# Patient Record
Sex: FEMALE | Race: WHITE | NOT HISPANIC OR LATINO | Employment: OTHER | URBAN - METROPOLITAN AREA
[De-identification: names, ages, dates, MRNs, and addresses within clinical notes are randomized per-mention and may not be internally consistent; named-entity substitution may affect disease eponyms.]

---

## 2017-01-05 ENCOUNTER — TELEPHONE (OUTPATIENT)
Dept: FAMILY MEDICINE | Facility: CLINIC | Age: 67
End: 2017-01-05

## 2017-01-05 DIAGNOSIS — R07.0 THROAT PAIN: Primary | ICD-10-CM

## 2017-01-05 LAB — S PYO AG THROAT QL IA.RAPID: NEGATIVE

## 2017-01-05 PROCEDURE — 87430 STREP A AG IA: CPT | Performed by: FAMILY MEDICINE

## 2017-01-05 NOTE — TELEPHONE ENCOUNTER
Patient called with concerns of a very sore throat.  Would like a rapid strep done but wants to avoid office visit and have lab only due to having grandchild with her.  Per Dr. Benitez-- ok for lab with rapid strep.  On arrival -- she states her one eye is red.  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  She does have a little crustiness.  No pain and vision ok.  Also states this started a few days ago with blisters on roof of mouth.  Rapid strep was negative and was sent for culture.  Per Dr Benitez -- no antibiotic now and will await the culture.  Told patient to call on Monday to check the results.  In the meantime to use lozengers, cool liquids and rest.

## 2017-01-07 LAB — BETA STREP GP A CULTURE: NEGATIVE

## 2017-01-23 ENCOUNTER — TRANSFERRED RECORDS (OUTPATIENT)
Dept: FAMILY MEDICINE | Facility: CLINIC | Age: 67
End: 2017-01-23

## 2017-01-31 ENCOUNTER — OFFICE VISIT (OUTPATIENT)
Dept: FAMILY MEDICINE | Facility: CLINIC | Age: 67
End: 2017-01-31

## 2017-01-31 VITALS
OXYGEN SATURATION: 98 % | SYSTOLIC BLOOD PRESSURE: 104 MMHG | DIASTOLIC BLOOD PRESSURE: 60 MMHG | HEART RATE: 82 BPM | BODY MASS INDEX: 27.11 KG/M2 | WEIGHT: 158 LBS | TEMPERATURE: 97.8 F

## 2017-01-31 DIAGNOSIS — J01.01 ACUTE RECURRENT MAXILLARY SINUSITIS: Primary | ICD-10-CM

## 2017-01-31 PROCEDURE — 99214 OFFICE O/P EST MOD 30 MIN: CPT | Performed by: FAMILY MEDICINE

## 2017-01-31 RX ORDER — CEFPROZIL 500 MG/1
500 TABLET, FILM COATED ORAL 2 TIMES DAILY
Qty: 20 TABLET | Refills: 0 | Status: SHIPPED | OUTPATIENT
Start: 2017-01-31 | End: 2017-03-27

## 2017-01-31 NOTE — MR AVS SNAPSHOT
After Visit Summary   1/31/2017    Macey Romero    MRN: [id number]           Patient Information     Date Of Birth          1950        Visit Information        Provider Department      1/31/2017 11:45 AM Long Ferrari MD [facility]        Today's Diagnoses     Acute recurrent maxillary sinusitis    -  1        Follow-ups after your visit        Who to contact     If you have questions or need follow up information about today's clinic visit or your schedule please contact [facility] directly at [phone].  Normal or non-critical lab and imaging results will be communicated to you by BaseKithart, letter or phone within 4 business days after the clinic has received the results. If you do not hear from us within 7 days, please contact the clinic through 2housest or phone. If you have a critical or abnormal lab result, we will notify you by phone as soon as possible.  Submit refill requests through Reclamador or call your pharmacy and they will forward the refill request to us. Please allow 3 business days for your refill to be completed.          Additional Information About Your Visit        MyChart Information     Reclamador gives you secure access to your electronic health record. If you see a primary care provider, you can also send messages to your care team and make appointments. If you have questions, please call your primary care clinic.  If you do not have a primary care provider, please call [phone] and they will assist you.        Care EveryWhere ID     This is your Care EveryWhere ID. This could be used by other organizations to access your [place] medical records  [id number]        Your Vitals Were     Pulse Temperature Pulse Oximetry             82 97.8  F (36.6  C) (Oral) 98%          Blood Pressure from Last 3 Encounters:   01/31/17 104/60   12/08/16 112/58   11/19/16 135/67    Weight from Last 3 Encounters:   01/31/17 71.668 kg (158 lb)   11/18/16  67.586 kg (149 lb)   10/28/16 69.4 kg (153 lb)              We Performed the Following     Asthma Action Plan (AAP)          Today's Medication Changes          These changes are accurate as of: 1/31/17 12:19 PM.  If you have any questions, ask your nurse or doctor.               Start taking these medicines.        Dose/Directions    cefPROZIL 500 MG tablet   Commonly known as:  CEFZIL   Used for:  Acute recurrent maxillary sinusitis        Dose:  500 mg   Take 1 tablet (500 mg) by mouth 2 times daily   Quantity:  20 tablet   Refills:  0         These medicines have changed or have updated prescriptions.        Dose/Directions    budesonide-formoterol 160-4.5 MCG/ACT Inhaler   Commonly known as:  SYMBICORT   This may have changed:    - when to take this  - reasons to take this   Used for:  Acute bronchospasm        Dose:  2 puff   Inhale 2 puffs into the lungs 2 times daily   Quantity:  3 Inhaler   Refills:  3            Where to get your medicines      These medications were sent to [facility] [address] [place] [address] [address]  [address] [place] [address] [facility] [address]    Hours:  24-hours Phone:  [phone]    - cefPROZIL 500 MG tablet             Primary Care Provider Office Phone # Fax #    Long Ferrari [phone]       [facility] [address] [place] [address]        Thank you!     Thank you for choosing [facility]  for your care. Our goal is always to provide you with excellent care. Hearing back from our patients is one way we can continue to improve our services. Please take a few minutes to complete the written survey that you may receive in the mail after your visit with us. Thank you!             Your Updated Medication List - Protect others around you: Learn how to safely use, store and throw away your medicines at www.disposemymeds.org.          This list is accurate as of: 1/31/17 12:19 PM.  Always use  your most recent med list.                   Brand Name Dispense Instructions for use    albuterol 108 (90 BASE) MCG/ACT Inhaler    PROAIR HFA/PROVENTIL HFA/VENTOLIN HFA    1 Inhaler    Inhale 2 puffs into the lungs every 6 hours as needed for shortness of breath / dyspnea       budesonide-formoterol 160-4.5 MCG/ACT Inhaler    SYMBICORT    3 Inhaler    Inhale 2 puffs into the lungs 2 times daily       cefPROZIL 500 MG tablet    CEFZIL    20 tablet    Take 1 tablet (500 mg) by mouth 2 times daily       docusate sodium 100 MG tablet    COLACE    60 tablet    Take 100-300 mg by mouth 2 times daily as needed for constipation       ibuprofen 600 MG tablet    ADVIL/MOTRIN    60 tablet    Take 1 tablet (600 mg) by mouth every 6 hours as needed for other (inflammatory pain)       NYQUIL SEVERE COLD/FLU PO          valACYclovir 1000 mg tablet    VALTREX    21 tablet    Take 1 tablet (1,000 mg) by mouth daily as needed

## 2017-01-31 NOTE — Clinical Note
My Asthma Action Plan  Name: Macey Roemro   YOB: 1950  Date: 1/31/2017   My doctor: Long Ferrari   My clinic: [facility]      My Control Medicine: Budesonide+formoterol (Symbicort) -            My Rescue Medicine: Albuterol (Proair/Ventolin/Proventil) HFA           My Asthma Severity: intermittent  Avoid your asthma triggers: dust mites, pollens and mold        GREEN ZONE   Good Control    I feel good    No cough or wheeze    Can work, sleep and play without asthma symptoms       Take your asthma control medicine every day.     1. If exercise triggers your asthma, take your rescue medication    15 minutes before exercise or sports, and    During exercise if you have asthma symptoms  2. Spacer to use with inhaler: If you have a spacer, make sure to use it with your inhaler             YELLOW ZONE Getting Worse  I have ANY of these:    I do not feel good    Cough or wheeze    Chest feels tight    Wake up at night   1. Keep taking your Green Zone medications  2. Start taking your rescue medicine:    every 20 minutes for up to 1 hour. Then every 4 hours for 24-48 hours.  3. If you stay in the Yellow Zone for more than 12-24 hours, contact your doctor.  4. If you do not return to the Green Zone in 12-24 hours or you get worse, start taking your oral steroid medicine if prescribed by your provider.           RED ZONE Medical Alert - Get Help  I have ANY of these:    I feel awful    Medicine is not helping    Breathing getting harder    Trouble walking or talking    Nose opens wide to breathe       1. Take your rescue medicine NOW  2. If your provider has prescribed an oral steroid medicine, start taking it NOW  3. Call your doctor NOW  4. If you are still in the Red Zone after 20 minutes and you have not reached your doctor:    Take your rescue medicine again and    Call 911 or go to the emergency room right away    See your regular doctor within 2 weeks of an Emergency Room or Urgent  Care visit for follow-up treatment.        The above medication may be given at school or day care?: Yes and N/A (Adult Patient)  Child can carry and use inhaler(s) at school with approval of school nurse?: Yes and N/A (Adult Patient)    Electronically signed by: Poonam Hope, January 31, 2017    Annual Reminders:  Meet with Asthma Educator,  Flu Shot in the Fall, consider Pneumonia Vaccination for patients with asthma (aged 19 and older).    Pharmacy:    [facility] [address] [place], [place] - [address] [facility] [address] [place], [place] - [address] [place] [address] AT [address] [place] [address] & [address]                    Asthma Triggers  How To Control Things That Make Your Asthma Worse    Triggers are things that make your asthma worse.  Look at the list below to help you find your triggers and what you can do about them.  You can help prevent asthma flare-ups by staying away from your triggers.      Trigger                                                          What you can do   Cigarette Smoke  Tobacco smoke can make asthma worse. Do not allow smoking in your home, car or around you.  Be sure no one smokes at a child s day care or school.  If you smoke, ask your health care provider for ways to help you quit.  Ask family members to quit too.  Ask your health care provider for a referral to Quit Plan to help you quit smoking, or call 1-519-566-PLAN.     Colds, Flu, Bronchitis  These are common triggers of asthma. Wash your hands often.  Don t touch your eyes, nose or mouth.  Get a flu shot every year.     Dust Mites  These are tiny bugs that live in cloth or carpet. They are too small to see. Wash sheets and blankets in hot water every week.   Encase pillows and mattress in dust mite proof covers.  Avoid having carpet if you can. If you have carpet, vacuum weekly.   Use a dust mask and HEPA vacuum.   Pollen and Outdoor Mold  Some people are allergic to trees, grass, or weed  pollen, or molds. Try to keep your windows closed.  Limit time out doors when pollen count is high.   Ask you health care provider about taking medicine during allergy season.     Animal Dander  Some people are allergic to skin flakes, urine or saliva from pets with fur or feathers. Keep pets with fur or feathers out of your home.    If you can t keep the pet outdoors, then keep the pet out of your bedroom.  Keep the bedroom door closed.  Keep pets off cloth furniture and away from stuffed toys.     Mice, Rats, and Cockroaches  Some people are allergic to the waste from these pests.   Cover food and garbage.  Clean up spills and food crumbs.  Store grease in the refrigerator.   Keep food out of the bedroom.   Indoor Mold  This can be a trigger if your home has high moisture. Fix leaking faucets, pipes, or other sources of water.   Clean moldy surfaces.  Dehumidify basement if it is damp and smelly.   Smoke, Strong Odors, and Sprays  These can reduce air quality. Stay away from strong odors and sprays, such as perfume, powder, hair spray, paints, smoke incense, paint, cleaning products, candles and new carpet.   Exercise or Sports  Some people with asthma have this trigger. Be active!  Ask your doctor about taking medicine before sports or exercise to prevent symptoms.    Warm up for 5-10 minutes before and after sports or exercise.     Other Triggers of Asthma  Cold air:  Cover your nose and mouth with a scarf.  Sometimes laughing or crying can be a trigger.  Some medicines and food can trigger asthma.

## 2017-01-31 NOTE — PROGRESS NOTES
Problem(s) Oriented visit        SUBJECTIVE:                                                    Macey Romero is a 66 year old female who presents to clinic today for the following health issues :    1. Acute recurrent maxillary sinusitis  Patient complains of frontal sinus pain and pressure.  There is purulent drainage from nose and post nasally.  There is pain in upper teeth.    Has been using afrin  Tired,  Sore everywhere,  Some wheezing  >25 min spent with patient, greater than 50% spent on discussion/education/planning, etc. About The encounter diagnosis was Acute recurrent maxillary sinusitis.      - cefPROZIL (CEFZIL) 500 MG tablet; Take 1 tablet (500 mg) by mouth 2 times daily  Dispense: 20 tablet; Refill: 0         Problem list, Medication list, Allergies, and Medical/Social/Surgical histories reviewed in EPIC and updated as appropriate.   Additional history: as documented    ROS:  General and CV completed and negative except as noted above    Histories:   Patient Active Problem List   Diagnosis     Reactive airway disease     Osteoarthritis     Osteopenia     Malignant neoplasm of female breast (H)     Pain disorder     IBS (irritable bowel syndrome)     Keloid of skin     Myalgia and myositis     Pterygium eye     Fx ankle     Health Care Home     Small bowel obstruction (H)     Hip pain, left     Past Surgical History   Procedure Laterality Date     Joint replacemtn, knee rt/lt  1/2011     Joint Replacement knee RT, with tibial straightenin     Mastectomy, simple rt/lt/claire  1989     Mastectomy Simple RT/LT/CLAIRE     Hysterectomy, ned       Anterior c-disc fusion       Mammoplasty augmentation bilateral       breast ca      Endoscopy  04/21/08     Open reduction internal fixation ankle  8/27/2013     Procedure: OPEN REDUCTION INTERNAL FIXATION ANKLE;  RIGHT OPEN REDUCTION INTERNAL FIXATION ANKLE WITH LIGAMENT REPAIR;  Surgeon: Nando Chang MD;  Location:  OR     Repair ligament ankle  8/27/2013      Procedure: REPAIR LIGAMENT ANKLE;;  Surgeon: Nando Chang MD;  Location:  OR     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     Procedure: PTERYGIUM WITH CONJUNCTIVAL AUTOLOGOUS TRANSPLANT;  Surgeon: Сергей Walters MD;  Location:  EC       Social History   Substance Use Topics     Smoking status: Former Smoker     Quit date: 01/01/2016     Smokeless tobacco: Never Used      Comment: quit but  still smokes, but not in house     Alcohol Use: No     Family History   Problem Relation Age of Onset     Respiratory Father      CANCER Mother      CANCER Brother      DIABETES Brother      CEREBROVASCULAR DISEASE Brother            OBJECTIVE:                                                    /60 mmHg  Pulse 82  Temp(Src) 97.8  F (36.6  C) (Oral)  Wt 71.668 kg (158 lb)  SpO2 98%  Body mass index is 27.11 kg/(m^2).   APPEARANCE: = Relaxed and in no distress  Conj/Eyelids = noninjected and lids and lashes are without inflammation  PERRLA/Irises = Pupils Round Reactive to Light and Irisis without inflammation  Ears/Nose = bilateral maxillary  Ear canals and TM's = Canals are not inflammed and have none or little wax and the drums are not injected and have a light reflex   Lips/Teeth/Gums = No lesions seen, good dentition, and gums seem healthy  Oropharynx = No leukoplakia, No injection to the tissues, Normal Uvula  Neck = No anterior or posterior adenopathy appreciated.  Resp effort = Calm regular breathing  Breath Sounds = Good air movement with no rales or rhonchi in any lung fields  Mood/Affect = Cooperative and interested     ASSESSMENT/PLAN:                                                        Macey was seen today for sinus problem, cough, headache, fever and results.    Diagnoses and all orders for this visit:    Acute recurrent maxillary sinusitis  -     cefPROZIL (CEFZIL) 500 MG tablet; Take 1 tablet (500 mg) by mouth 2 times daily            The following health  maintenance items are reviewed in Epic and correct as of today:  Health Maintenance   Topic Date Due     COLON CANCER SCREEN (SYSTEM ASSIGNED)  11/08/2000     A1C Q6 MO( NO INBASKET)  11/21/2012     PNEUMOCOCCAL (1 of 2 - PCV13) 11/08/2015     ASTHMA ACTION PLAN Q1 YR (NO INBASKET)  01/04/2017     FALL RISK ASSESSMENT  01/04/2017     ASTHMA CONTROL TEST Q6 MOS (NO INBASKET)  04/27/2017     LIPID MONITORING Q1 YEAR( NO INBASKET)  08/22/2017     INFLUENZA VACCINE (SYSTEM ASSIGNED)  09/01/2017     DEXA Q2 YR  09/14/2017     ADVANCE DIRECTIVE PLANNING Q5 YRS (NO INBASKET)  08/26/2018     TETANUS IMMUNIZATION (SYSTEM ASSIGNED)  06/04/2022     HEPATITIS C SCREENING  Completed       Long Ferrari MD  [facility]  Family Practice  [facility]  [phone]    For any issues my office # is [phone]

## 2017-02-01 ASSESSMENT — ASTHMA QUESTIONNAIRES: ACT_TOTALSCORE: 24

## 2017-02-09 ENCOUNTER — TELEPHONE (OUTPATIENT)
Dept: FAMILY MEDICINE | Facility: CLINIC | Age: 67
End: 2017-02-09

## 2017-02-09 DIAGNOSIS — J32.9 SINUSITIS: Primary | ICD-10-CM

## 2017-02-09 NOTE — TELEPHONE ENCOUNTER
----- Message from Long Ferrari MD sent at 2/9/2017  5:01 PM CST -----  Regarding: RE: sinusitis-- not much better  Dr. Jesus Manuel ARAUJO   ----- Message -----     From: Julianna Woodall     Sent: 2/9/2017  10:48 AM       To: Long Ferrari MD  Subject: sinusitis-- not much better                      Patient was seen 1/31 for sinusitis.  You gave her 10 days of cefzil 500 mg bid.  She is only about 25% better.  She can now breathe thru her nose but has headache, teeth hurt and face hurts still.  States she has been dealing with this since end of October.  So what now?  Allergy-- PCN, Erthromycin and levaquin.  More of cefzil?  Different rx ?  See ENT-- has seen  for different problem in the past.??

## 2017-02-23 ENCOUNTER — TELEPHONE (OUTPATIENT)
Dept: FAMILY MEDICINE | Facility: CLINIC | Age: 67
End: 2017-02-23

## 2017-02-23 DIAGNOSIS — F41.9 ANXIETY: Primary | ICD-10-CM

## 2017-02-23 RX ORDER — LORAZEPAM 0.5 MG/1
0.5 TABLET ORAL EVERY 8 HOURS PRN
Qty: 20 TABLET | Refills: 0 | COMMUNITY
Start: 2017-02-23 | End: 2017-04-17

## 2017-02-23 NOTE — TELEPHONE ENCOUNTER
Patient called and states she was sent to the ENT for her sinus problems and put on prednisone.  This always winds her up and makes her very anxious.  The ENT doctor told her to call the primary to get some Ativan while on the prednisone.  Per Dr. Long Ferrari-- ok for the ativan 0.5 mg -- one every 8 hours prn anxiety.  Patient informed and rx to pharmacy.

## 2017-03-27 ENCOUNTER — OFFICE VISIT (OUTPATIENT)
Dept: FAMILY MEDICINE | Facility: CLINIC | Age: 67
End: 2017-03-27

## 2017-03-27 ENCOUNTER — TRANSFERRED RECORDS (OUTPATIENT)
Dept: FAMILY MEDICINE | Facility: CLINIC | Age: 67
End: 2017-03-27

## 2017-03-27 VITALS
BODY MASS INDEX: 27.57 KG/M2 | SYSTOLIC BLOOD PRESSURE: 106 MMHG | OXYGEN SATURATION: 99 % | DIASTOLIC BLOOD PRESSURE: 64 MMHG | HEART RATE: 97 BPM | WEIGHT: 160.6 LBS | TEMPERATURE: 98.1 F | RESPIRATION RATE: 16 BRPM

## 2017-03-27 DIAGNOSIS — R10.11 ABDOMINAL PAIN, RIGHT UPPER QUADRANT: Primary | ICD-10-CM

## 2017-03-27 LAB
% GRANULOCYTES: 59.4 % (ref 42.2–75.2)
HCT VFR BLD AUTO: 40.9 % (ref 35–46)
HEMOGLOBIN: 13.5 G/DL (ref 11.8–15.5)
LYMPHOCYTES NFR BLD AUTO: 32.8 % (ref 20.5–51.1)
MCH RBC QN AUTO: 27.3 PG (ref 27–31)
MCHC RBC AUTO-ENTMCNC: 33.2 G/DL (ref 33–37)
MCV RBC AUTO: 82.4 FL (ref 80–100)
MONOCYTES NFR BLD AUTO: 7.8 % (ref 1.7–9.3)
PLATELET # BLD AUTO: 231 K/UL (ref 140–450)
RBC # BLD AUTO: 4.96 X10/CMM (ref 3.7–5.2)
WBC # BLD AUTO: 8.2 X10/CMM (ref 3.8–11)

## 2017-03-27 PROCEDURE — 85025 COMPLETE CBC W/AUTO DIFF WBC: CPT | Performed by: FAMILY MEDICINE

## 2017-03-27 PROCEDURE — 36415 COLL VENOUS BLD VENIPUNCTURE: CPT | Performed by: FAMILY MEDICINE

## 2017-03-27 PROCEDURE — 99214 OFFICE O/P EST MOD 30 MIN: CPT | Performed by: FAMILY MEDICINE

## 2017-03-27 NOTE — PROGRESS NOTES
"Has been on prednisone from Dr. Lugo,  Since around [facility] every day.  Lots of tylenol and advil.  \"I just want to feel good.!!!!!    Several low blood sugar episodes over the past few months.    Abdominal pain in the front and radiates from the front to the back \"like a rocket ship\"        Problem(s) Oriented visit      ROS:  5 point ROS completed and negative except noted above, including Gen, CV, Resp, GI, MS     HISTORY:   has no alcohol history on file.   reports that she quit smoking about 14 months ago. She has never used smokeless tobacco.    Past Medical History:   Diagnosis Date     Breast CA in situ 1987     Fibromyalgia      Osteoarthritis 2/1/2011     Osteopenia 2/1/2011     Pain disorder 2/1/2011     Pneumonia      PONV (postoperative nausea and vomiting)      Pterygium eye 8/26/2013     Reactive airway disease 2/1/2011     Past Surgical History:   Procedure Laterality Date     anterior c-disc fusion       ENDOSCOPY  04/21/08     HYSTERECTOMY, MARCUS       JOINT REPLACEMTN, KNEE RT/LT  1/2011    Joint Replacement knee RT, with tibial straightenin     MAMMOPLASTY AUGMENTATION BILATERAL      breast ca      MASTECTOMY, SIMPLE RT/LT/CLAIRE  1989    Mastectomy Simple RT/LT/CLAIRE     OPEN REDUCTION INTERNAL FIXATION ANKLE  8/27/2013    Procedure: OPEN REDUCTION INTERNAL FIXATION ANKLE;  RIGHT OPEN REDUCTION INTERNAL FIXATION ANKLE WITH LIGAMENT REPAIR;  Surgeon: Nando Chang MD;  Location:  OR     PTERYGIUM WITH CONJUNCTIVAL AUTOLOGOUS TRANSPLANT Left 8/29/2016    Procedure: PTERYGIUM WITH CONJUNCTIVAL AUTOLOGOUS TRANSPLANT;  Surgeon: Сергей Walters MD;  Location:  [facility]     REPAIR LIGAMENT ANKLE  8/27/2013    Procedure: REPAIR LIGAMENT ANKLE;;  Surgeon: Nando Chang MD;  Location:  OR       EXAM:  BP: 106/64   Pulse: 97    Temp: 98.1    Wt Readings from Last 2 Encounters:   03/27/17 72.8 kg (160 lb 9.6 oz)   01/31/17 71.7 kg (158 lb)       BMI= Body mass index is 27.57 " "kg/(m^2).    EXAM:  APPEARANCE: = Relaxed and in no distress  Conj/Eyelids = noninjected and lids and lashes are without inflammation  PERRLA/Irises = Pupils Round Reactive to Light and Irisis without inflammation  Neck = No anterior or posterior adenopathy appreciated.  Thyroid = Not enlarged and no masses felt  Resp effort = Calm regular breathing  Breath Sounds = Good air movement with no rales or rhonchi in any lung fields  Heart Rate, Rythym, & sounds (no Murm)  = Regular rate and rythym with no S3, S4, or murmer appreciated.  Carotid Art's = Pulses full and equal and no bruits appreciated  Abdomen: soft, nontender, no masses, & bowel sounds = tenderness marked RUQ, hypoactive bowel sounds  Liver/Spleen = Normal span and no splenomegaly noted  Digits and Nails = FROM in all finger joints, no nail dystrophy  Ext (edema) = No pretibial edema noted or elsewhere  Musculsktl =  Strength and ROM of major joints are within normal limits  SKIN = absent significant rashes or lesions   Recent/Remote Memory = Alert and Oriented x 3  Mood/Affect = Cooperative and interested      Assessment/Plan:  Macey was seen today for abdominal pain and fatigue.    Diagnoses and all orders for this visit:    Abdominal pain, right upper quadrant  -     CBC with Diff/Plt ([facility])  -     Comp. Metabolic Panel (14) ([facility])  -     Referral to [facility]        COUNSELING:   reports that she quit smoking about 14 months ago. She has never used smokeless tobacco.    Estimated body mass index is 27.57 kg/(m^2) as calculated from the following:    Height as of 11/18/16: 1.626 m (5' 4\").    Weight as of this encounter: 72.8 kg (160 lb 9.6 oz).     PatientAppropriate preventive services were discussed with this patient, including applicable screening as appropriate for cardiovascular disease, diabetes, osteopenia/osteoporosis, and glaucoma.  As appropriate for age/gender, discussed screening for colorectal cancer, prostate cancer, breast " cancer, and cervical cancer. Checklist reviewing preventive services available has been given to the patient.    Reviewed patients plan of care and provided an AVS. The Basic Care Plan (routine screening as documented in Health Maintenance) for Macey meets the Care Plan requirement. This Care Plan has been established and reviewed with the  Patient.      The following health maintenance items are reviewed in Epic and correct as of today:  Health Maintenance   Topic Date Due     COLON CANCER SCREEN (SYSTEM ASSIGNED)  11/08/2000     A1C Q6 MO( NO INBASKET)  11/21/2012     PNEUMOCOCCAL (1 of 2 - PCV13) 11/08/2015     FALL RISK ASSESSMENT  01/04/2017     ASTHMA CONTROL TEST Q6 MOS (NO INBASKET)  07/31/2017     LIPID MONITORING Q1 YEAR( NO INBASKET)  08/22/2017     INFLUENZA VACCINE (SYSTEM ASSIGNED)  09/01/2017     DEXA Q2 YR  09/14/2017     ASTHMA ACTION PLAN Q1 YR (NO INBASKET)  01/31/2018     ADVANCE DIRECTIVE PLANNING Q5 YRS (NO INBASKET)  08/26/2018     TETANUS IMMUNIZATION (SYSTEM ASSIGNED)  06/04/2022     HEPATITIS C SCREENING  Completed       Long Ferrari  [facility]  For any issues my office # is [phone]

## 2017-03-27 NOTE — MR AVS SNAPSHOT
After Visit Summary   3/27/2017    Macey Romero    MRN: [id number]           Patient Information     Date Of Birth          1950        Visit Information        Provider Department      3/27/2017 3:45 PM Long Ferrari MD [facility]        Today's Diagnoses     Abdominal pain, right upper quadrant    -  1       Follow-ups after your visit        Additional Services     Referral to [facility]       Referral to [facility]  Phone: [phone]  Fax: [phone]  Reason for referral: CT of abd pelvis  Very t  Please read and call                  Who to contact     If you have questions or need follow up information about today's clinic visit or your schedule please contact [facility] directly at [phone].  Normal or non-critical lab and imaging results will be communicated to you by [employer], letter or phone within 4 business days after the clinic has received the results. If you do not hear from us within 7 days, please contact the clinic through [employer] or phone. If you have a critical or abnormal lab result, we will notify you by phone as soon as possible.  Submit refill requests through [employer] or call your pharmacy and they will forward the refill request to us. Please allow 3 business days for your refill to be completed.          Additional Information About Your Visit        MyChart Information     [employer] gives you secure access to your electronic health record. If you see a primary care provider, you can also send messages to your care team and make appointments. If you have questions, please call your primary care clinic.  If you do not have a primary care provider, please call [phone] and they will assist you.        Care EveryWhere ID     This is your Care EveryWhere ID. This could be used by other organizations to access your [place] medical records  [id number]        Your Vitals Were     Pulse Temperature Respirations Pulse  Oximetry BMI (Body Mass Index)       97 98.1  F (36.7  C) (Oral) 16 99% 27.57 kg/m2        Blood Pressure from Last 3 Encounters:   03/27/17 106/64   01/31/17 104/60   12/08/16 112/58    Weight from Last 3 Encounters:   03/27/17 72.8 kg (160 lb 9.6 oz)   01/31/17 71.7 kg (158 lb)   11/18/16 67.6 kg (149 lb)              We Performed the Following     CBC with Diff/Plt ([facility])     Comp. Metabolic Panel (14) ([facility])     Referral to [facility]          Today's Medication Changes          These changes are accurate as of: 3/27/17  4:36 PM.  If you have any questions, ask your nurse or doctor.               These medicines have changed or have updated prescriptions.        Dose/Directions    budesonide-formoterol 160-4.5 MCG/ACT Inhaler   Commonly known as:  SYMBICORT   This may have changed:    - when to take this  - reasons to take this   Used for:  Acute bronchospasm        Dose:  2 puff   Inhale 2 puffs into the lungs 2 times daily   Quantity:  3 Inhaler   Refills:  3       ibuprofen 600 MG tablet   Commonly known as:  ADVIL/MOTRIN   This may have changed:    - how much to take  - when to take this   Used for:  Fx ankle        Dose:  600 mg   Take 1 tablet (600 mg) by mouth every 6 hours as needed for other (inflammatory pain)   Quantity:  60 tablet   Refills:  0                Primary Care Provider Office Phone # Fax #    Long Ferrari [phone]       [facility] [address]  [facility] [address]        Thank you!     Thank you for choosing [facility]  for your care. Our goal is always to provide you with excellent care. Hearing back from our patients is one way we can continue to improve our services. Please take a few minutes to complete the written survey that you may receive in the mail after your visit with us. Thank you!             Your Updated Medication List - Protect others around you: Learn how to safely use, store and throw away your  medicines at www.disposemymeds.org.          This list is accurate as of: 3/27/17  4:36 PM.  Always use your most recent med list.                   Brand Name Dispense Instructions for use    albuterol 108 (90 BASE) MCG/ACT Inhaler    PROAIR HFA/PROVENTIL HFA/VENTOLIN HFA    1 Inhaler    Inhale 2 puffs into the lungs every 6 hours as needed for shortness of breath / dyspnea       ATIVAN 0.5 MG tablet   Generic drug:  LORazepam     20 tablet    Take 1 tablet (0.5 mg) by mouth every 8 hours as needed for anxiety       budesonide-formoterol 160-4.5 MCG/ACT Inhaler    SYMBICORT    3 Inhaler    Inhale 2 puffs into the lungs 2 times daily       docusate sodium 100 MG tablet    COLACE    60 tablet    Take 100-300 mg by mouth 2 times daily as needed for constipation       ibuprofen 600 MG tablet    ADVIL/MOTRIN    60 tablet    Take 1 tablet (600 mg) by mouth every 6 hours as needed for other (inflammatory pain)       TYLENOL PO      Take 325 mg by mouth 2 times daily as needed for mild pain       valACYclovir 1000 mg tablet    VALTREX    21 tablet    Take 1 tablet (1,000 mg) by mouth daily as needed

## 2017-03-28 ENCOUNTER — TELEPHONE (OUTPATIENT)
Dept: FAMILY MEDICINE | Facility: CLINIC | Age: 67
End: 2017-03-28

## 2017-03-28 NOTE — TELEPHONE ENCOUNTER
Patient notified of normal abd CT results.  Advised to possibly add in miralax, dietary fiber, increase water intake to help with daily stools.  Adilene Vale

## 2017-03-29 LAB
ALBUMIN SERPL-MCNC: 4.4 G/DL (ref 3.6–4.8)
ALBUMIN/GLOB SERPL: 1.8 {RATIO} (ref 1.2–2.2)
ALP SERPL-CCNC: 75 IU/L (ref 39–117)
ALT SERPL-CCNC: 16 IU/L (ref 0–32)
AST SERPL-CCNC: 18 IU/L (ref 0–40)
BILIRUB SERPL-MCNC: 0.3 MG/DL (ref 0–1.2)
BUN SERPL-MCNC: 14 MG/DL (ref 8–27)
BUN/CREATININE RATIO: 18 (ref 11–26)
CALCIUM SERPL-MCNC: 9.5 MG/DL (ref 8.7–10.3)
CHLORIDE SERPLBLD-SCNC: 100 MMOL/L (ref 96–106)
CREAT SERPL-MCNC: 0.79 MG/DL (ref 0.57–1)
EGFR IF AFRICN AM: 90 ML/MIN/1.73
EGFR IF NONAFRICN AM: 78 ML/MIN/1.73
GLOBULIN, TOTAL: 2.5 G/DL (ref 1.5–4.5)
GLUCOSE SERPL-MCNC: 93 MG/DL (ref 65–99)
POTASSIUM SERPL-SCNC: 4.4 MMOL/L (ref 3.5–5.2)
PROT SERPL-MCNC: 6.9 G/DL (ref 6–8.5)
SODIUM SERPL-SCNC: 141 MMOL/L (ref 134–144)
TOTAL CO2: 26 MMOL/L (ref 18–28)

## 2017-03-30 NOTE — PROGRESS NOTES
Dear Macey,   I am writing to report that your included test results are within expected ranges. I do not suggest that we make any changes at this time.    Long Frerari M.D.

## 2017-04-07 ENCOUNTER — HOSPITAL ENCOUNTER (EMERGENCY)
Facility: CLINIC | Age: 67
Discharge: HOME | End: 2017-04-08
Attending: PHYSICIAN ASSISTANT | Admitting: PHYSICIAN ASSISTANT
Payer: MEDICARE

## 2017-04-07 ENCOUNTER — APPOINTMENT (OUTPATIENT)
Dept: CT IMAGING | Facility: CLINIC | Age: 67
End: 2017-04-07
Attending: PHYSICIAN ASSISTANT
Payer: MEDICARE

## 2017-04-07 DIAGNOSIS — R11.2 NAUSEA AND VOMITING IN ADULT: ICD-10-CM

## 2017-04-07 DIAGNOSIS — R10.84 ABDOMINAL PAIN, GENERALIZED: ICD-10-CM

## 2017-04-07 DIAGNOSIS — K59.00 CONSTIPATION, UNSPECIFIED CONSTIPATION TYPE: ICD-10-CM

## 2017-04-07 LAB
ALBUMIN SERPL-MCNC: 3.6 G/DL (ref 3.4–5)
ALBUMIN UR-MCNC: NEGATIVE MG/DL
ALP SERPL-CCNC: 75 U/L (ref 40–150)
ALT SERPL W P-5'-P-CCNC: 18 U/L (ref 0–50)
ANION GAP SERPL CALCULATED.3IONS-SCNC: 11 MMOL/L (ref 3–14)
APPEARANCE UR: CLEAR
AST SERPL W P-5'-P-CCNC: 16 U/L (ref 0–45)
BASOPHILS # BLD AUTO: 0 10E9/L (ref 0–0.2)
BASOPHILS NFR BLD AUTO: 0.3 %
BILIRUB SERPL-MCNC: 0.3 MG/DL (ref 0.2–1.3)
BILIRUB UR QL STRIP: NEGATIVE
BUN SERPL-MCNC: 16 MG/DL (ref 7–30)
CALCIUM SERPL-MCNC: 8.8 MG/DL (ref 8.5–10.1)
CHLORIDE SERPL-SCNC: 100 MMOL/L (ref 94–109)
CO2 SERPL-SCNC: 26 MMOL/L (ref 20–32)
COLOR UR AUTO: NORMAL
CREAT SERPL-MCNC: 0.94 MG/DL (ref 0.52–1.04)
DIFFERENTIAL METHOD BLD: NORMAL
EOSINOPHIL # BLD AUTO: 0.2 10E9/L (ref 0–0.7)
EOSINOPHIL NFR BLD AUTO: 2.3 %
ERYTHROCYTE [DISTWIDTH] IN BLOOD BY AUTOMATED COUNT: 13.6 % (ref 10–15)
GFR SERPL CREATININE-BSD FRML MDRD: 60 ML/MIN/1.7M2
GLUCOSE SERPL-MCNC: 162 MG/DL (ref 70–99)
GLUCOSE UR STRIP-MCNC: NEGATIVE MG/DL
HCT VFR BLD AUTO: 39.6 % (ref 35–47)
HGB BLD-MCNC: 13.8 G/DL (ref 11.7–15.7)
HGB UR QL STRIP: NEGATIVE
IMM GRANULOCYTES # BLD: 0.1 10E9/L (ref 0–0.4)
IMM GRANULOCYTES NFR BLD: 1 %
KETONES UR STRIP-MCNC: NEGATIVE MG/DL
LEUKOCYTE ESTERASE UR QL STRIP: NEGATIVE
LIPASE SERPL-CCNC: 102 U/L (ref 73–393)
LYMPHOCYTES # BLD AUTO: 3.3 10E9/L (ref 0.8–5.3)
LYMPHOCYTES NFR BLD AUTO: 31 %
MCH RBC QN AUTO: 28.8 PG (ref 26.5–33)
MCHC RBC AUTO-ENTMCNC: 34.8 G/DL (ref 31.5–36.5)
MCV RBC AUTO: 83 FL (ref 78–100)
MONOCYTES # BLD AUTO: 0.8 10E9/L (ref 0–1.3)
MONOCYTES NFR BLD AUTO: 7.8 %
NEUTROPHILS # BLD AUTO: 6.1 10E9/L (ref 1.6–8.3)
NEUTROPHILS NFR BLD AUTO: 57.6 %
NITRATE UR QL: NEGATIVE
NRBC # BLD AUTO: 0 10*3/UL
NRBC BLD AUTO-RTO: 0 /100
PH UR STRIP: 6.5 PH (ref 5–7)
PLATELET # BLD AUTO: 242 10E9/L (ref 150–450)
POTASSIUM SERPL-SCNC: 3.5 MMOL/L (ref 3.4–5.3)
PROT SERPL-MCNC: 7 G/DL (ref 6.8–8.8)
RBC # BLD AUTO: 4.8 10E12/L (ref 3.8–5.2)
RBC #/AREA URNS AUTO: 0 /HPF (ref 0–2)
SODIUM SERPL-SCNC: 137 MMOL/L (ref 133–144)
SP GR UR STRIP: 1.03 (ref 1–1.03)
SQUAMOUS #/AREA URNS AUTO: 1 /HPF (ref 0–1)
URN SPEC COLLECT METH UR: NORMAL
UROBILINOGEN UR STRIP-MCNC: NORMAL MG/DL (ref 0–2)
WBC # BLD AUTO: 10.7 10E9/L (ref 4–11)
WBC #/AREA URNS AUTO: <1 /HPF (ref 0–2)

## 2017-04-07 PROCEDURE — 25000132 ZZH RX MED GY IP 250 OP 250 PS 637: Mod: GY | Performed by: PHYSICIAN ASSISTANT

## 2017-04-07 PROCEDURE — 25500064 ZZH RX 255 OP 636: Performed by: PHYSICIAN ASSISTANT

## 2017-04-07 PROCEDURE — 74177 CT ABD & PELVIS W/CONTRAST: CPT

## 2017-04-07 PROCEDURE — 80053 COMPREHEN METABOLIC PANEL: CPT | Performed by: PHYSICIAN ASSISTANT

## 2017-04-07 PROCEDURE — A9270 NON-COVERED ITEM OR SERVICE: HCPCS | Mod: GY | Performed by: PHYSICIAN ASSISTANT

## 2017-04-07 PROCEDURE — 81001 URINALYSIS AUTO W/SCOPE: CPT | Performed by: PHYSICIAN ASSISTANT

## 2017-04-07 PROCEDURE — 85025 COMPLETE CBC W/AUTO DIFF WBC: CPT | Performed by: PHYSICIAN ASSISTANT

## 2017-04-07 PROCEDURE — 96361 HYDRATE IV INFUSION ADD-ON: CPT

## 2017-04-07 PROCEDURE — 25000128 H RX IP 250 OP 636: Performed by: PHYSICIAN ASSISTANT

## 2017-04-07 PROCEDURE — 99285 EMERGENCY DEPT VISIT HI MDM: CPT | Mod: 25

## 2017-04-07 PROCEDURE — 83690 ASSAY OF LIPASE: CPT | Performed by: PHYSICIAN ASSISTANT

## 2017-04-07 PROCEDURE — 25000125 ZZHC RX 250: Performed by: PHYSICIAN ASSISTANT

## 2017-04-07 PROCEDURE — 96374 THER/PROPH/DIAG INJ IV PUSH: CPT | Mod: 59

## 2017-04-07 RX ORDER — IOPAMIDOL 755 MG/ML
64 INJECTION, SOLUTION INTRAVASCULAR ONCE
Status: COMPLETED | OUTPATIENT
Start: 2017-04-07 | End: 2017-04-07

## 2017-04-07 RX ORDER — MECLIZINE HYDROCHLORIDE 25 MG/1
25 TABLET ORAL ONCE
Status: COMPLETED | OUTPATIENT
Start: 2017-04-07 | End: 2017-04-08

## 2017-04-07 RX ORDER — SODIUM CHLORIDE 9 MG/ML
1000 INJECTION, SOLUTION INTRAVENOUS CONTINUOUS
Status: DISCONTINUED | OUTPATIENT
Start: 2017-04-07 | End: 2017-04-08 | Stop reason: HOSPADM

## 2017-04-07 RX ORDER — HYDROMORPHONE HYDROCHLORIDE 1 MG/ML
0.5 INJECTION, SOLUTION INTRAMUSCULAR; INTRAVENOUS; SUBCUTANEOUS
Status: DISCONTINUED | OUTPATIENT
Start: 2017-04-07 | End: 2017-04-08 | Stop reason: HOSPADM

## 2017-04-07 RX ORDER — ONDANSETRON 2 MG/ML
4 INJECTION INTRAMUSCULAR; INTRAVENOUS EVERY 30 MIN PRN
Status: DISCONTINUED | OUTPATIENT
Start: 2017-04-07 | End: 2017-04-08 | Stop reason: HOSPADM

## 2017-04-07 RX ADMIN — ONDANSETRON 4 MG: 2 SOLUTION INTRAMUSCULAR; INTRAVENOUS at 21:03

## 2017-04-07 RX ADMIN — SODIUM CHLORIDE 78 ML: 9 INJECTION, SOLUTION INTRAVENOUS at 21:38

## 2017-04-07 RX ADMIN — IOPAMIDOL 64 ML: 755 INJECTION, SOLUTION INTRAVENOUS at 21:38

## 2017-04-07 RX ADMIN — SODIUM CHLORIDE 1000 ML: 9 INJECTION, SOLUTION INTRAVENOUS at 21:00

## 2017-04-07 RX ADMIN — DOCUSATE SODIUM 286 ML: 50 LIQUID ORAL at 23:45

## 2017-04-07 ASSESSMENT — ENCOUNTER SYMPTOMS
ABDOMINAL PAIN: 1
DIZZINESS: 1
VOMITING: 1
SHORTNESS OF BREATH: 0
HEMATURIA: 0
FEVER: 0
CONSTIPATION: 1
NAUSEA: 1
DYSURIA: 0
LIGHT-HEADEDNESS: 0
BLOOD IN STOOL: 1

## 2017-04-07 NOTE — ED AVS SNAPSHOT
Emergency Department    [address] [place] [id number]    Phone:  [phone]    Fax:  [phone]                                       Macey Romero   MRN: [id number]    Department:   Emergency Department   Date of Visit:  4/7/2017           After Visit Summary Signature Page     I have received my discharge instructions, and my questions have been answered. I have discussed any challenges I see with this plan with the nurse or doctor.    ..........................................................................................................................................  Patient/Patient Representative Signature      ..........................................................................................................................................  Patient Representative Print Name and Relationship to Patient    ..................................................               ................................................  Date                                            Time    ..........................................................................................................................................  Reviewed by Signature/Title    ...................................................              ..............................................  Date                                                            Time

## 2017-04-07 NOTE — ED AVS SNAPSHOT
Emergency Department    [address] [facility] [address]    Phone:  [phone]    Fax:  [phone]                                       Macey Romero   MRN: [id number]    Department:   Emergency Department   Date of Visit:  4/7/2017           Patient Information     Date Of Birth          1950        Your diagnoses for this visit were:     Constipation, unspecified constipation type     Nausea and vomiting in adult     Abdominal pain, generalized        You were seen by Marisa Farrar PA-C.      Follow-up Information     Follow up with Long Ferrari MD.    Specialty:  Family Practice    Contact information:    [facility]  [address]  [facility] [address] [phone]          Follow up with  Emergency Department.    Specialty:  EMERGENCY MEDICINE    Why:  If symptoms worsen    Contact information:    [address] [facility] [id number] [phone]        Discharge Instructions       Discharge Instructions  Constipation  Your doctor has diagnosed you with constipation. Constipation can cause severe cramping pain and your physician thinks this is the cause of your abdominal pain today.  People usually recognize that they are constipated because they have difficulty having bowel movements, are not having bowel movements frequently enough, or are not having large enough bowel movements. Sometimes, especially in children or older people, you do not recognize that you are constipated until it becomes severe. The most common cause of constipation is a combination of lack of exercise and not eating enough fruits, vegetables and whole grains. Constipation can also be a side effect of medications, such as narcotics, or may be caused by a disease of the digestive system.    Return to the Emergency Department if:    Your abdominal pain worsens or does not improve after a bowel movement.    You become very weak.    You get an oral temperature  above 102oF or as directed by your doctor.    You have blood in your stools (bright red or black, tarry stools).    You keep throwing up or can t drink liquids.    Your see blood when you throw up.    Your stomach gets bloated or bigger.    You have new symptoms or anything that worries you.    What can I do to help myself?    If your doctor gave you a cathartic medication, like magnesium citrate or GoLytely  (polyethylene glycol), you can expect to have cramps and gas pains after taking it. You can expect to have a number of bowel movements and even diarrhea in the course of clearing your bowels.  You will know your bowels have been cleaned out after you pass clear liquid. The cramps and gas should let up after you have emptied your bowels. You may want to wait until morning to take this type of medication so you aren t up in the night.     Sometimes instead of cathartics, we recommend laxatives like milk of magnesia to move your bowels more slowly, or an enema to help the bowels to move. Read and follow the package directions, or follow your physician s instructions.    Once you have become very constipated, it takes time for your bowels to return to normal and you need to be very careful to prevent becoming constipated again. Take a laxative if you don t move your bowels at least every two days.       Eat foods that have a lot of fiber. Good choices are fruits, vegetables, prune juice, apple juice and high fiber cereal. Limit dairy products such as milk and cheese, since these can make constipation worse.     Drink plenty of water and other fluids.     When you feel the need to go to the bathroom, go to the bathroom. Don t hold it.    Miralax , Metamucil , Colace , Senna or fiber supplements can be used daily.  Miralax  daily is often the best choice for children.    FOLLOW UP WITH YOUR REGULAR DOCTOR IF YOUR CONSTIPATION IS NOT IMPROVING.  Sometimes, chronic constipation requires further testing to determine the  cause. If you are over 50 years old, you may need a colonoscopy if you have not had one before.   If you were given a prescription for medicine here today, be sure to read all of the information (including the package insert) that comes with your prescription.  This will include important information about the medicine, its side effects, and any warnings that you need to know about.  The pharmacist who fills the prescription can provide more information and answer questions you may have about the medicine.  If you have questions or concerns that the pharmacist cannot address, please call or return to the Emergency Department.       Discharge Instructions  Abdominal Pain    Abdominal pain can be caused by many things. Your evaluation today does not show the exact cause for your pain. Your doctor today has decided that it is unlikely your pain is due to a life threatening problem, or a problem requiring surgery or hospital admission. Sometimes those problems cannot be found right away, so it is very important that you follow up as directed.  Sometimes only the changes which occur over time allow the cause of your pain to be found.    Return to the Emergency Department for a recheck in 8-12 hours if your pain continues.  If your pain gets worse, changes in location, or feels different, return to the Emergency Department right away.    ADULTS:  Return to the Emergency Department right away if:      You get an oral temperature above 102oF or as directed by your doctor.    You have blood in your stools (bright red or black, tarry stools).    You keep throwing up or can t drink liquids.    You see blood when you throw up.    You can t have a bowel movement or you can t pass gas.    Your stomach gets bloated or bigger.    Your skin or the whites of your eyes look yellow.    You faint.    You have bloody, frequent or painful urination.    You have new symptoms or anything that worries you.    CHILDREN:  Return to the  Emergency Department right away if your child has any of the above-listed symptoms or the following:      Pushes your hand away or screams/cries when his/her belly is touched.    You notice your child is very fussy or weak.    Your child is very tired and is too tired to eat or drink.    Your child is dehydrated.  Signs of dehydration can be:  o Your infant has had no wet diapers in 4-5 hours.  o Your older child has not passed urine in 6-8 hours.  o Your infant or child starts to have dry mouth and lips, or no saliva or tears.    PREGNANT WOMEN:  Return to the Emergency Department right away if you have any of the above-listed symptoms or the following:      You have bleeding, leaking fluid or passing tissue from the vagina.    You have worse pain or cramping, or pain in your shoulder or back.    You have vomiting that will not stop.    You have painful or bloody urination.    You have a temperature of 100oF or more.    Your baby is not moving as much as usual.    You faint.    You get a bad headache with or without eye problems and abdominal pain.    You have a convulsion or seizure.    You have unusual discharge from your vagina and abdominal pain.    Abdominal pain is pretty common during pregnancy.  Your pain may or may not be related to your pregnancy. You should follow-up closely with your OB doctor so they can evaluate you and your baby.  Until you follow-up with your regular doctor, do the following:       Avoid sex and do not put anything in your vagina.    Drink clear fluids.    Only take medications approved by your doctor.    MORE INFORMATION:    Appendicitis:  A possible cause of abdominal pain in any person who still has their appendix is acute appendicitis. Appendicitis is often hard to diagnose.  Testing does not always rule out early appendicitis or other causes of abdominal pain. Close follow-up with your doctor and re-evaluations may be needed to figure out the reason for your abdominal  "pain.    Follow-up:  It is very important that you make an appointment with your clinic and go to the appointment.  If you do not follow-up with your primary doctor, it may result in missing an important development which could result in permanent injury or disability and/or lasting pain.  If there is any problem keeping your appointment, call your doctor or return to the Emergency Department.    Medications:  Take your medications as directed by your doctor today.  Before using over-the-counter medications, ask your doctor and make sure to take the medications as directed.  If you have any questions about medications, ask your doctor.    Diet:  Resume your normal diet as much as possible, but do not eat fried, fatty or spicy foods while you have pain.  Do not drink alcohol or have caffeine.  Do not smoke tobacco.    Probiotics: If you have been given an antibiotic, you may want to also take a probiotic pill or eat yogurt with live cultures. Probiotics have \"good bacteria\" to help your intestines stay healthy. Studies have shown that probiotics help prevent diarrhea and other intestine problems (including C. diff infection) when you take antibiotics. You can buy these without a prescription in the pharmacy section of the store.     If you were given a prescription for medicine here today, be sure to read all of the information (including the package insert) that comes with your prescription.  This will include important information about the medicine, its side effects, and any warnings that you need to know about.  The pharmacist who fills the prescription can provide more information and answer questions you may have about the medicine.  If you have questions or concerns that the pharmacist cannot address, please call or return to the Emergency Department.       Remember that you can always come back to the Emergency Department if you are not able to see your regular doctor in the amount of time listed above, if " you get any new symptoms, or if there is anything that worries you.         24 Hour Appointment Hotline       To make an appointment at any [facility], call [phone] ([phone]). If you don't have a family doctor or clinic, we will help you find one. [place] clinics are conveniently located to serve the needs of you and your family.             Review of your medicines      START taking        Dose / Directions Last dose taken    meclizine 12.5 MG tablet   Commonly known as:  ANTIVERT   Dose:  12.5-25 mg   Quantity:  30 tablet        Take 1-2 tablets (12.5-25 mg) by mouth 4 times daily as needed for dizziness   Refills:  0        ondansetron 4 MG ODT tab   Commonly known as:  ZOFRAN ODT   Dose:  4 mg   Quantity:  10 tablet        Take 1 tablet (4 mg) by mouth every 8 hours as needed for nausea   Refills:  0          Our records show that you are taking the medicines listed below. If these are incorrect, please call your family doctor or clinic.        Dose / Directions Last dose taken    albuterol 108 (90 BASE) MCG/ACT Inhaler   Commonly known as:  PROAIR HFA/PROVENTIL HFA/VENTOLIN HFA   Dose:  2 puff   Quantity:  1 Inhaler        Inhale 2 puffs into the lungs every 6 hours as needed for shortness of breath / dyspnea   Refills:  3        ATIVAN 0.5 MG tablet   Dose:  0.5 mg   Quantity:  20 tablet   Generic drug:  LORazepam        Take 1 tablet (0.5 mg) by mouth every 8 hours as needed for anxiety   Refills:  0        budesonide-formoterol 160-4.5 MCG/ACT Inhaler   Commonly known as:  SYMBICORT   Dose:  2 puff   Quantity:  3 Inhaler        Inhale 2 puffs into the lungs 2 times daily   Refills:  3        docusate sodium 100 MG tablet   Commonly known as:  COLACE   Dose:  100-300 mg   Quantity:  60 tablet        Take 100-300 mg by mouth 2 times daily as needed for constipation   Refills:  1        ibuprofen 600 MG tablet   Commonly known as:  ADVIL/MOTRIN   Dose:  600 mg   Quantity:  60 tablet         Take 1 tablet (600 mg) by mouth every 6 hours as needed for other (inflammatory pain)   Refills:  0        TYLENOL PO   Dose:  325 mg   Indication:  Pain        Take 325 mg by mouth 2 times daily as needed for mild pain   Refills:  0        valACYclovir 1000 mg tablet   Commonly known as:  VALTREX   Dose:  1000 mg   Quantity:  21 tablet        Take 1 tablet (1,000 mg) by mouth daily as needed   Refills:  1                Prescriptions were sent or printed at these locations (2 Prescriptions)                   Other Prescriptions                Printed at Department/Unit printer (2 of 2)         meclizine (ANTIVERT) 12.5 MG tablet               ondansetron (ZOFRAN ODT) 4 MG ODT tab                Procedures and tests performed during your visit     CBC with platelets differential    CT Abdomen Pelvis w Contrast    Comprehensive metabolic panel    Lipase    UA with Microscopic      Orders Needing Specimen Collection     None      Pending Results     No orders found for last 3 day(s).            Pending Culture Results     No orders found for last 3 day(s).            Test Results From Your Hospital Stay        4/7/2017  9:18 PM      Component Results     Component Value Ref Range & Units Status    WBC 10.7 4.0 - 11.0 10e9/L Final    RBC Count 4.80 3.8 - 5.2 10e12/L Final    Hemoglobin 13.8 11.7 - 15.7 g/dL Final    Hematocrit 39.6 35.0 - 47.0 % Final    MCV 83 78 - 100 fl Final    MCH 28.8 26.5 - 33.0 pg Final    MCHC 34.8 31.5 - 36.5 g/dL Final    RDW 13.6 10.0 - 15.0 % Final    Platelet Count 242 150 - 450 10e9/L Final    Diff Method Automated Method  Final    % Neutrophils 57.6 % Final    % Lymphocytes 31.0 % Final    % Monocytes 7.8 % Final    % Eosinophils 2.3 % Final    % Basophils 0.3 % Final    % Immature Granulocytes 1.0 % Final    Nucleated RBCs 0 0 /100 Final    Absolute Neutrophil 6.1 1.6 - 8.3 10e9/L Final    Absolute Lymphocytes 3.3 0.8 - 5.3 10e9/L Final    Absolute Monocytes 0.8 0.0 - 1.3 10e9/L Final     Absolute Eosinophils 0.2 0.0 - 0.7 10e9/L Final    Absolute Basophils 0.0 0.0 - 0.2 10e9/L Final    Abs Immature Granulocytes 0.1 0 - 0.4 10e9/L Final    Absolute Nucleated RBC 0.0  Final         4/7/2017  9:41 PM      Component Results     Component Value Ref Range & Units Status    Sodium 137 133 - 144 mmol/L Final    Potassium 3.5 3.4 - 5.3 mmol/L Final    Chloride 100 94 - 109 mmol/L Final    Carbon Dioxide 26 20 - 32 mmol/L Final    Anion Gap 11 3 - 14 mmol/L Final    Glucose 162 (H) 70 - 99 mg/dL Final    Urea Nitrogen 16 7 - 30 mg/dL Final    Creatinine 0.94 0.52 - 1.04 mg/dL Final    GFR Estimate 60 (L) >60 mL/min/1.7m2 Final    Non  GFR Calc    GFR Estimate If Black 72 >60 mL/min/1.7m2 Final    African American GFR Calc    Calcium 8.8 8.5 - 10.1 mg/dL Final    Bilirubin Total 0.3 0.2 - 1.3 mg/dL Final    Albumin 3.6 3.4 - 5.0 g/dL Final    Protein Total 7.0 6.8 - 8.8 g/dL Final    Alkaline Phosphatase 75 40 - 150 U/L Final    ALT 18 0 - 50 U/L Final    AST 16 0 - 45 U/L Final         4/7/2017  9:38 PM      Component Results     Component Value Ref Range & Units Status    Lipase 102 73 - 393 U/L Final         4/7/2017 10:58 PM      Narrative     CT ABDOMEN AND PELVIS WITH CONTRAST 4/7/2017 9:44 PM     HISTORY: Mid abdominal pain, nausea and vomiting, evaluate for  obstruction.    CONTRAST DOSE:  64mL Isovue-370.    COMPARISON: 10/12/2015.    Radiation dose for this scan was reduced using automated exposure  control, adjustment of the mA and/or kV according to patient size, or  iterative reconstruction technique.    FINDINGS:  The liver, spleen, kidneys, adrenal glands, and pancreas  appear within normal limits. Cholecystectomy surgical clips are noted.  Prominent fecal debris is noted within the colon. The appendix is not  identified. However, no pericecal inflammatory changes are noted.  There is no bowel obstruction or pericolonic inflammatory change. No  free peritoneal fluid or air.  Pelvic contents appear within normal  limits.        Impression     IMPRESSION: No CT scan of an acute inflammatory process in the abdomen  or pelvis.    ADIS GARCIA MD         4/7/2017 11:17 PM      Component Results     Component Value Ref Range & Units Status    Color Urine Light Yellow  Final    Appearance Urine Clear  Final    Glucose Urine Negative NEG mg/dL Final    Bilirubin Urine Negative NEG Final    Ketones Urine Negative NEG mg/dL Final    Specific Gravity Urine 1.033 1.003 - 1.035 Final    Blood Urine Negative NEG Final    pH Urine 6.5 5.0 - 7.0 pH Final    Protein Albumin Urine Negative NEG mg/dL Final    Urobilinogen mg/dL Normal 0.0 - 2.0 mg/dL Final    Nitrite Urine Negative NEG Final    Leukocyte Esterase Urine Negative NEG Final    Source Midstream Urine  Final    WBC Urine <1 0 - 2 /HPF Final    RBC Urine 0 0 - 2 /HPF Final    Squamous Epithelial /HPF Urine 1 0 - 1 /HPF Final                Clinical Quality Measure: Blood Pressure Screening     Your blood pressure was checked while you were in the emergency department today. The last reading we obtained was  BP: 105/57 . Please read the guidelines below about what these numbers mean and what you should do about them.  If your systolic blood pressure (the top number) is less than 120 and your diastolic blood pressure (the bottom number) is less than 80, then your blood pressure is normal. There is nothing more that you need to do about it.  If your systolic blood pressure (the top number) is 120-139 or your diastolic blood pressure (the bottom number) is 80-89, your blood pressure may be higher than it should be. You should have your blood pressure rechecked within a year by a primary care provider.  If your systolic blood pressure (the top number) is 140 or greater or your diastolic blood pressure (the bottom number) is 90 or greater, you may have high blood pressure. High blood pressure is treatable, but if left untreated over time it can put  you at risk for heart attack, stroke, or kidney failure. You should have your blood pressure rechecked by a primary care provider within the next 4 weeks.  If your provider in the emergency department today gave you specific instructions to follow-up with your doctor or provider even sooner than that, you should follow that instruction and not wait for up to 4 weeks for your follow-up visit.        Thank you for choosing [facility]       Thank you for choosing [facility] for your care. Our goal is always to provide you with excellent care. Hearing back from our patients is one way we can continue to improve our services. Please take a few minutes to complete the written survey that you may receive in the mail after you visit with us. Thank you!        [employer] Information     [facility] gives you secure access to your electronic health record. If you see a primary care provider, you can also send messages to your care team and make appointments. If you have questions, please call your primary care clinic.  If you do not have a primary care provider, please call [phone] and they will assist you.        Care EveryWhere ID     This is your Care EveryWhere ID. This could be used by other organizations to access your [facility] medical records  [id number]        After Visit Summary       This is your record. Keep this with you and show to your community pharmacist(s) and doctor(s) at your next visit.

## 2017-04-08 VITALS
RESPIRATION RATE: 16 BRPM | TEMPERATURE: 97.8 F | BODY MASS INDEX: 26.46 KG/M2 | HEIGHT: 64 IN | WEIGHT: 155 LBS | SYSTOLIC BLOOD PRESSURE: 105 MMHG | DIASTOLIC BLOOD PRESSURE: 57 MMHG | OXYGEN SATURATION: 96 %

## 2017-04-08 PROCEDURE — A9270 NON-COVERED ITEM OR SERVICE: HCPCS | Mod: GY | Performed by: PHYSICIAN ASSISTANT

## 2017-04-08 PROCEDURE — 25000131 ZZH RX MED GY IP 250 OP 636 PS 637: Mod: GY | Performed by: PHYSICIAN ASSISTANT

## 2017-04-08 RX ORDER — MECLIZINE HCL 12.5 MG 12.5 MG/1
12.5-25 TABLET ORAL 4 TIMES DAILY PRN
Qty: 30 TABLET | Refills: 0 | Status: SHIPPED | OUTPATIENT
Start: 2017-04-08 | End: 2017-04-17

## 2017-04-08 RX ORDER — ONDANSETRON 4 MG/1
4 TABLET, ORALLY DISINTEGRATING ORAL EVERY 8 HOURS PRN
Qty: 10 TABLET | Refills: 0 | Status: SHIPPED | OUTPATIENT
Start: 2017-04-08 | End: 2017-04-11

## 2017-04-08 RX ADMIN — MECLIZINE HYDROCHLORIDE 25 MG: 25 TABLET ORAL at 00:12

## 2017-04-08 NOTE — ED PROVIDER NOTES
History     Chief Complaint:  Nausea & Vomiting    HPI   Macey Romero is a 66 year old female with history of SBO and IBS who presents by EMS to the emergency department today for evaluation of constipation. Patient also reports unusual abdominal distension, abdominal pain, nausea, vomiting, and dizziness that began today suddenly while at rest. Abdominal pain is characterized as a dull generalized ache. She states that she has a longstanding history of constipation and symptomatic treatment at home did not provide its expected relief, prompting her visit. Last BM was on 04/02 and patient describes sensations of needing to defecate, but not being able to. Last week patient had sharp twisting abdominal pain that was evaluated in clinic with CT imaging, as noted below, that was notable for significant stool. Hamburger and fries were had for lunch today. Patient has since felt weak and fatigued. Patient notes recent hemorrhoidectomy in November 2016 and an episode of bright red rectal bleeding that she attributes to postsurgical problems following that, but none since symptom onset on 4/2. Patient reports no blood in vomit, fever, lightheadedness, chest pain, shortness of breath, or urinary symptoms. No other concerns were voiced at this time.     Imaging results from clinic visit on 03/27/2017  CT abdomen and pelvis with contrast:  Impression:  Geno cute abnormality or specific cause for abdominal pain. There is moderate prominent stool throughout transverse and descending colon.     Allergies:  Tetanus toxoids  Erythromycin  Levaquin  Penicillin      Medications:    Diflucan  Tessalon  Robitussin  Bactrim  Percocet  Claritin  Tylenol  Zofran  Hydrodiuril  Valatrex  Symbicort  Albuterol  Advil     Past Medical History:    Fibromyalgia  Breast CA in situ  Pneumonia  Small bowel obstruction  Left hip pain  Pterygium eye  Myalgia and myositis  IBS  Keloid of skin  Reactive airway  "disease  Osteoarthritis  Osteopenia  Malignant neoplasm of female breast  Pain disorder      Past Surgical History:   Joint replacement of right and left knees  Mastectomy, bilateral  Hysterectomy  Anterior C-disc fusion  Mammoplasty augmentation bilateral  Endoscopy  Open reduction internal fixation ankle  Repair ligament ankle  Pterygium with conjunctival autologous transplant  Cholecystectomy      Family History:   Respiratory  Cancer  Diabetes  Cerebrovascular disease     Social History:  Relationship status:   Tobacco use: pos (quit)  Alcohol use: neg  The patient presents alone.     Review of Systems   Constitutional: Negative for fever.   Respiratory: Negative for shortness of breath.    Cardiovascular: Negative for chest pain.   Gastrointestinal: Positive for abdominal pain, blood in stool, constipation, nausea and vomiting.   Genitourinary: Negative for dysuria and hematuria.   Neurological: Positive for dizziness. Negative for light-headedness.   All other systems reviewed and are negative.    Physical Exam   First Vitals:  BP: 140/59  Heart Rate: 88  Temp: 97.8  F (36.6  C)  Resp: 16  Height: 162.6 cm (5' 4\")  Weight: 70.3 kg (155 lb)  SpO2: 93 %    Physical Exam  General: Resting comfortably on the gurney.    Head:  The scalp, head and face appear normal. No evidence of trauma.   ENT:  Pupils are equal, round and reactive to light. EOM intact. No nystagmus.     Oropharynx is dry.   Resp:  Non-labored breathing. No tachypnea.     Lung fields clear to auscultation without wheezes or rales.   CV:  Regular rate and rhythm. Normal S1 and S2, no S3 or S4.     No pathological murmur detected.     Radial, DP and PT pulses intact and symmetric.   GI:  Abdomen is soft and non-distended.     Hyperactive bowel sounds. Diffuse tenderness in the mid abdomen.  No rebound or guarding.     No masses or organomegaly.    MS:  Normal muscular tone.     Normal and symmetric motor strength of all four extremities. "     No asymmetrical lower leg swelling or calf tenderness.   Neuro:  Awake and alert. Speech is clear.     Symmetric facial expressions. Normal coordination.   Skin:  No rash or pallor.  Psych: Normal affect. Appropriate interactions.     Emergency Department Course     Imaging:  Radiology findings were communicated with the patient who voiced understanding of the findings.    CT abdomen pelvis w/ contrast:  No CT scan of an acute inflammatory process in the abdomen  or pelvis.  Reading per radiology    Laboratory:  Laboratory findings were communicated with the patient who voiced understanding of the findings.  CBC: AWNL. (WBC 10.7, HGB 13.8, )   CMP: Glucose: 162(H), GFR Estimate: 60(L), Creatinine 0.94  Lipase: 102  UA: AWNL    Interventions:  2100 NS 1000 mL IV  2103 Zofran 4 mg IV  Pink lady enema 286 mg Rectal    Emergency Department Course:  Nursing notes and vitals reviewed.  I performed an exam of the patient as documented above.   IV was inserted and blood was drawn for laboratory testing, results above.  The patient provided a urine sample here in the emergency department. This was sent for laboratory testing, findings above.  The patient was sent for a CT abdomen pelvis while in the emergency department, results above.     At 2336 the patient was rechecked and updated on lab results, imaging, and plan of care. She notes significant relief with enema here. We discussed symptomatic care for home.     I personally reviewed the treatment plan with the Patient and answered all related questions prior to discharge.    Impression & Plan      Medical Decision Making:  Macey Romero is a 66 year old female with a history of pain disorder and fibromyalgia who present to the ED with abdominal pain, nausea, vomiting, and constipation. She reports a longstanding history of constipation and has not had a BM for 6 days now. Just prior to this though she does note constipation last week with significant output  just prior to again not having a BM. She did have a CT scan of her abdomen last week for this that was normal. Concern today was for UTI, bowel obstruction, pancreatitis, diverticulitis, among others. Her lab work here is reassuring, without leukocytosis, anemia, concerning electrolyte abnormality, or renal dysfunction. She has had a cholecystectomy. No lipase elevation to suggest pancreatitis. Her urine is clear with no signs of infection or hematuria. CT scan of her abdomen is normal with no signs of obstruction or any other acute abnormality. There is a fair amount of fecal matter and I suspect that a lot of her pain is due to constipation. She was given an enema here with good output. She was feeling a significant improvement after IV fluids and was tolerating PO, but was still noting a mild amount of dizziness. She was given oral Meclizine with good improvement. I suspect her dizziness is due to her dehydration as well as possible inner ear problems as she also notes problems with her inner ear that is being evaluated by ENT. She has no other neurologic symptoms and a normal exam, I doubt central cause of dizziness such as stroke. She felt significantly improved after the above interventions. At this point I feel that she is safe for discharge to home. She was prescribed zofran and meclizine and told to take Magnesium Citrate at home for further stool clearance. She will follow-up with her PCP on Monday or Tuesday for recheck and return to the ED with worsening or changing pain, persistent vomiting, worsening dizziness or any other neurologic changes, or if she becomes worse in any way. I discussed the results, plan and any additional questions with the patient. She verbalized understanding and agreement with the plan.     Diagnosis:    ICD-10-CM    1. Constipation, unspecified constipation type K59.00    2. Nausea and vomiting in adult R11.2    3. Abdominal pain, generalized R10.84        Disposition:    Discharged to home. Plan for follow up with [facility]    Discharge Medications:   Details   meclizine (ANTIVERT) 12.5 MG tablet Take 1-2 tablets (12.5-25 mg) by mouth 4 times daily as needed for dizziness, Disp-30 tablet, R-0, Local Print      ondansetron (ZOFRAN ODT) 4 MG ODT tab Take 1 tablet (4 mg) by mouth every 8 hours as needed for nausea, Disp-10 tablet, R-0, Local Print             Scribe Disclosure:  I, Steve Prakash, am serving as a scribe at 8:55 PM on 4/7/2017 to document services personally performed by Marisa Farrar PA-C, based on my observations and the provider's statements to me.   EMERGENCY DEPARTMENT       Marisa Farrar PA-C  04/08/17 0138

## 2017-04-08 NOTE — ED NOTES
Bed: ED26  Expected date:   Expected time:   Means of arrival:   Comments:  441 56F constipation/N/V  2022

## 2017-04-08 NOTE — DISCHARGE INSTRUCTIONS
Discharge Instructions  Constipation  Your doctor has diagnosed you with constipation. Constipation can cause severe cramping pain and your physician thinks this is the cause of your abdominal pain today.  People usually recognize that they are constipated because they have difficulty having bowel movements, are not having bowel movements frequently enough, or are not having large enough bowel movements. Sometimes, especially in children or older people, you do not recognize that you are constipated until it becomes severe. The most common cause of constipation is a combination of lack of exercise and not eating enough fruits, vegetables and whole grains. Constipation can also be a side effect of medications, such as narcotics, or may be caused by a disease of the digestive system.    Return to the Emergency Department if:    Your abdominal pain worsens or does not improve after a bowel movement.    You become very weak.    You get an oral temperature above 102oF or as directed by your doctor.    You have blood in your stools (bright red or black, tarry stools).    You keep throwing up or can t drink liquids.    Your see blood when you throw up.    Your stomach gets bloated or bigger.    You have new symptoms or anything that worries you.    What can I do to help myself?    If your doctor gave you a cathartic medication, like magnesium citrate or GoLytely  (polyethylene glycol), you can expect to have cramps and gas pains after taking it. You can expect to have a number of bowel movements and even diarrhea in the course of clearing your bowels.  You will know your bowels have been cleaned out after you pass clear liquid. The cramps and gas should let up after you have emptied your bowels. You may want to wait until morning to take this type of medication so you aren t up in the night.     Sometimes instead of cathartics, we recommend laxatives like milk of magnesia to move your bowels more slowly, or an enema to  help the bowels to move. Read and follow the package directions, or follow your physician s instructions.    Once you have become very constipated, it takes time for your bowels to return to normal and you need to be very careful to prevent becoming constipated again. Take a laxative if you don t move your bowels at least every two days.       Eat foods that have a lot of fiber. Good choices are fruits, vegetables, prune juice, apple juice and high fiber cereal. Limit dairy products such as milk and cheese, since these can make constipation worse.     Drink plenty of water and other fluids.     When you feel the need to go to the bathroom, go to the bathroom. Don t hold it.    Miralax , Metamucil , Colace , Senna or fiber supplements can be used daily.  Miralax  daily is often the best choice for children.    FOLLOW UP WITH YOUR REGULAR DOCTOR IF YOUR CONSTIPATION IS NOT IMPROVING.  Sometimes, chronic constipation requires further testing to determine the cause. If you are over 50 years old, you may need a colonoscopy if you have not had one before.   If you were given a prescription for medicine here today, be sure to read all of the information (including the package insert) that comes with your prescription.  This will include important information about the medicine, its side effects, and any warnings that you need to know about.  The pharmacist who fills the prescription can provide more information and answer questions you may have about the medicine.  If you have questions or concerns that the pharmacist cannot address, please call or return to the Emergency Department.       Discharge Instructions  Abdominal Pain    Abdominal pain can be caused by many things. Your evaluation today does not show the exact cause for your pain. Your doctor today has decided that it is unlikely your pain is due to a life threatening problem, or a problem requiring surgery or hospital admission. Sometimes those problems cannot  be found right away, so it is very important that you follow up as directed.  Sometimes only the changes which occur over time allow the cause of your pain to be found.    Return to the Emergency Department for a recheck in 8-12 hours if your pain continues.  If your pain gets worse, changes in location, or feels different, return to the Emergency Department right away.    ADULTS:  Return to the Emergency Department right away if:      You get an oral temperature above 102oF or as directed by your doctor.    You have blood in your stools (bright red or black, tarry stools).    You keep throwing up or can t drink liquids.    You see blood when you throw up.    You can t have a bowel movement or you can t pass gas.    Your stomach gets bloated or bigger.    Your skin or the whites of your eyes look yellow.    You faint.    You have bloody, frequent or painful urination.    You have new symptoms or anything that worries you.    CHILDREN:  Return to the Emergency Department right away if your child has any of the above-listed symptoms or the following:      Pushes your hand away or screams/cries when his/her belly is touched.    You notice your child is very fussy or weak.    Your child is very tired and is too tired to eat or drink.    Your child is dehydrated.  Signs of dehydration can be:  o Your infant has had no wet diapers in 4-5 hours.  o Your older child has not passed urine in 6-8 hours.  o Your infant or child starts to have dry mouth and lips, or no saliva or tears.    PREGNANT WOMEN:  Return to the Emergency Department right away if you have any of the above-listed symptoms or the following:      You have bleeding, leaking fluid or passing tissue from the vagina.    You have worse pain or cramping, or pain in your shoulder or back.    You have vomiting that will not stop.    You have painful or bloody urination.    You have a temperature of 100oF or more.    Your baby is not moving as much as usual.    You  "faint.    You get a bad headache with or without eye problems and abdominal pain.    You have a convulsion or seizure.    You have unusual discharge from your vagina and abdominal pain.    Abdominal pain is pretty common during pregnancy.  Your pain may or may not be related to your pregnancy. You should follow-up closely with your OB doctor so they can evaluate you and your baby.  Until you follow-up with your regular doctor, do the following:       Avoid sex and do not put anything in your vagina.    Drink clear fluids.    Only take medications approved by your doctor.    MORE INFORMATION:    Appendicitis:  A possible cause of abdominal pain in any person who still has their appendix is acute appendicitis. Appendicitis is often hard to diagnose.  Testing does not always rule out early appendicitis or other causes of abdominal pain. Close follow-up with your doctor and re-evaluations may be needed to figure out the reason for your abdominal pain.    Follow-up:  It is very important that you make an appointment with your clinic and go to the appointment.  If you do not follow-up with your primary doctor, it may result in missing an important development which could result in permanent injury or disability and/or lasting pain.  If there is any problem keeping your appointment, call your doctor or return to the Emergency Department.    Medications:  Take your medications as directed by your doctor today.  Before using over-the-counter medications, ask your doctor and make sure to take the medications as directed.  If you have any questions about medications, ask your doctor.    Diet:  Resume your normal diet as much as possible, but do not eat fried, fatty or spicy foods while you have pain.  Do not drink alcohol or have caffeine.  Do not smoke tobacco.    Probiotics: If you have been given an antibiotic, you may want to also take a probiotic pill or eat yogurt with live cultures. Probiotics have \"good bacteria\" to " help your intestines stay healthy. Studies have shown that probiotics help prevent diarrhea and other intestine problems (including C. diff infection) when you take antibiotics. You can buy these without a prescription in the pharmacy section of the store.     If you were given a prescription for medicine here today, be sure to read all of the information (including the package insert) that comes with your prescription.  This will include important information about the medicine, its side effects, and any warnings that you need to know about.  The pharmacist who fills the prescription can provide more information and answer questions you may have about the medicine.  If you have questions or concerns that the pharmacist cannot address, please call or return to the Emergency Department.       Remember that you can always come back to the Emergency Department if you are not able to see your regular doctor in the amount of time listed above, if you get any new symptoms, or if there is anything that worries you.

## 2017-04-10 ENCOUNTER — CARE COORDINATION (OUTPATIENT)
Dept: CARE COORDINATION | Facility: CLINIC | Age: 67
End: 2017-04-10

## 2017-04-10 NOTE — PROGRESS NOTES
"ED/Discharge Protocol    \"Hi, my name is @ Rachel Wong MA CC  @, a Care Coordinator, and I am calling on behalf of [facility] Gomes's office at [facility].  I am calling to follow up and see how things are going for you after your recent visit.\"    \"I see that you were in the (ER/UC/IP) on 4/7/17 - dehydration, vomiting due to constipation of 4-5 days,   Better after after IV fluids, meclizine, zofran, an enema. Drinking one glass water every hour   How are you doing now that you are home?\" better now- but comes & goes to either extreme - diarrhea or constipation  C/o ringing in ears, elevated FBS in ER    Is patient experiencing symptoms that may require a hospital visit?  Not at this time    Discharge Instructions    \"Let's review your discharge instructions.  What is/are the follow-up recommendations?  Pt. Response: watch for fever, see PCP    \"Were you instructed to make a follow-up appointment?\"  Pt. Response: Yes.  Has appointment been made?   Yes  4/17/17    \"When you see the provider, I would recommend that you bring your discharge instructions with you. And bring medications/or list along with you.    Medications    \"How many new medications are you on since your hospitalization/ED visit?\"    0-1  \"How many of your current medicines changed (dose, timing, name, etc.) while you were in the hospital/ED visit?\"   No changes  \"Do you have questions about your medications?\"   No  \"Were you newly diagnosed with heart failure, COPD, diabetes, dvt, blood clot, pulmonary emboli or did you have a heart attack?\"   No  For patients on insulin: \"Did you start on insulin in the hospital or did you have your insulin dose changed?\"   No    Medication reconciliation completed? Yes    Was MTM referral placed (*Make sure to put transitions as reason for referral)?   No    Call Summary    \"Do you have any questions or concerns about your condition or care plan at the moment?\"    Yes wondering about " "ringing in ears - asking if any connection ?- recommend to discuss with PCP  Concerned with elevated BS    Triage advice given: yes will follow up with PCP             yes will continue with ED advise             yes will  contact hospital, if symptoms return or get worse    Patient was in ER 4 in the past 12 months (assess appropriateness of ER visits.)      \"If you have questions or things don't continue to improve, we encourage you contact us through the main clinic number,  [phone]. If the clinic is not open, the on-call provider is available to help you.     \"Thank you for your time and take care!\"            "

## 2017-04-17 ENCOUNTER — OFFICE VISIT (OUTPATIENT)
Dept: FAMILY MEDICINE | Facility: CLINIC | Age: 67
End: 2017-04-17

## 2017-04-17 VITALS
HEART RATE: 91 BPM | SYSTOLIC BLOOD PRESSURE: 92 MMHG | DIASTOLIC BLOOD PRESSURE: 48 MMHG | OXYGEN SATURATION: 98 % | RESPIRATION RATE: 16 BRPM

## 2017-04-17 DIAGNOSIS — Z12.11 SPECIAL SCREENING FOR MALIGNANT NEOPLASMS, COLON: ICD-10-CM

## 2017-04-17 DIAGNOSIS — K59.01 SLOW TRANSIT CONSTIPATION: Primary | ICD-10-CM

## 2017-04-17 PROCEDURE — 99213 OFFICE O/P EST LOW 20 MIN: CPT | Performed by: FAMILY MEDICINE

## 2017-04-17 RX ORDER — TOBRAMYCIN 3 MG/ML
1 SOLUTION/ DROPS OPHTHALMIC 4 TIMES DAILY
Refills: 1 | COMMUNITY
Start: 2017-04-04 | End: 2017-05-18

## 2017-04-17 NOTE — MR AVS SNAPSHOT
After Visit Summary   4/17/2017    Macey Romero    MRN: [id number]           Patient Information     Date Of Birth          1950        Visit Information        Provider Department      4/17/2017 10:15 AM Long Ferrari MD [facility]        Today's Diagnoses     Slow transit constipation    -  1    Special screening for malignant neoplasms, colon           Follow-ups after your visit        Additional Services     GASTROENTEROLOGY ADULT REF PROCEDURE ONLY       Last Lab Result: Creatinine (mg/dL)       Date                     Value                 04/07/2017               0.94             ----------  There is no height or weight on file to calculate BMI.     Needed:  No  Language:  English    Patient will be contacted to schedule procedure.     Please be aware that coverage of these services is subject to the terms and limitations of your health insurance plan.  Call member services at your health plan with any benefit or coverage questions.  Any procedures must be performed at a [facility] facility OR coordinated by your clinic's referral office.    Please bring the following with you to your appointment:    (1) Any X-Rays, CTs or MRIs which have been performed.  Contact the facility where they were done to arrange for  prior to your scheduled appointment.    (2) List of current medications   (3) This referral request   (4) Any documents/labs given to you for this referral                  Who to contact     If you have questions or need follow up information about today's clinic visit or your schedule please contact [facility] directly at [phone].  Normal or non-critical lab and imaging results will be communicated to you by MyChart, letter or phone within 4 business days after the clinic has received the results. If you do not hear from us within 7 days, please contact the clinic through MyChart or phone. If you have a critical or abnormal  lab result, we will notify you by phone as soon as possible.  Submit refill requests through [employer] or call your pharmacy and they will forward the refill request to us. Please allow 3 business days for your refill to be completed.          Additional Information About Your Visit        [employer] Information     [employer] gives you secure access to your electronic health record. If you see a primary care provider, you can also send messages to your care team and make appointments. If you have questions, please call your primary care clinic.  If you do not have a primary care provider, please call [phone] and they will assist you.        Care EveryWhere ID     This is your Care EveryWhere ID. This could be used by other organizations to access your [place] medical records  [id number]        Your Vitals Were     Pulse Respirations Pulse Oximetry             91 16 98%          Blood Pressure from Last 3 Encounters:   04/17/17 92/48   04/08/17 105/57   03/27/17 106/64    Weight from Last 3 Encounters:   04/07/17 70.3 kg (155 lb)   03/27/17 72.8 kg (160 lb 9.6 oz)   01/31/17 71.7 kg (158 lb)              We Performed the Following     GASTROENTEROLOGY ADULT REF PROCEDURE ONLY          Today's Medication Changes          These changes are accurate as of: 4/17/17 10:48 AM.  If you have any questions, ask your nurse or doctor.               These medicines have changed or have updated prescriptions.        Dose/Directions    ibuprofen 600 MG tablet   Commonly known as:  ADVIL/MOTRIN   This may have changed:    - how much to take  - when to take this   Used for:  Fx ankle        Dose:  600 mg   Take 1 tablet (600 mg) by mouth every 6 hours as needed for other (inflammatory pain)   Quantity:  60 tablet   Refills:  0                Primary Care Provider Office Phone # Fax #    Long Ferrari [phone]       [facility] [address]  [facility] [address]        Thank you!      Thank you for choosing [facility]  for your care. Our goal is always to provide you with excellent care. Hearing back from our patients is one way we can continue to improve our services. Please take a few minutes to complete the written survey that you may receive in the mail after your visit with us. Thank you!             Your Updated Medication List - Protect others around you: Learn how to safely use, store and throw away your medicines at www.disposemymeds.org.          This list is accurate as of: 4/17/17 10:48 AM.  Always use your most recent med list.                   Brand Name Dispense Instructions for use    albuterol 108 (90 BASE) MCG/ACT Inhaler    PROAIR HFA/PROVENTIL HFA/VENTOLIN HFA    1 Inhaler    Inhale 2 puffs into the lungs every 6 hours as needed for shortness of breath / dyspnea       budesonide-formoterol 160-4.5 MCG/ACT Inhaler    SYMBICORT    3 Inhaler    Inhale 2 puffs into the lungs 2 times daily       docusate sodium 100 MG tablet    COLACE    60 tablet    Take 100-300 mg by mouth 2 times daily as needed for constipation       ibuprofen 600 MG tablet    ADVIL/MOTRIN    60 tablet    Take 1 tablet (600 mg) by mouth every 6 hours as needed for other (inflammatory pain)       tobramycin 0.3 % ophthalmic solution    TOBREX     Place 1 drop Into the left ear 4 times daily       TYLENOL PO      Take 325 mg by mouth 2 times daily as needed for mild pain       valACYclovir 1000 mg tablet    VALTREX    21 tablet    Take 1 tablet (1,000 mg) by mouth daily as needed

## 2017-04-17 NOTE — PROGRESS NOTES
Follow up from the er.  Note reviewed from Marisa Farrar  Drinking much more water and stools are improved.    Problem(s) Oriented visit      ROS:  General and Resp. completed and negative except as noted above     HISTORY:   has no alcohol history on file.   reports that she quit smoking about 15 months ago. She has never used smokeless tobacco.    Past Medical History:   Diagnosis Date     Breast CA in situ 1987     Fibromyalgia      Osteoarthritis 2/1/2011     Osteopenia 2/1/2011     Pain disorder 2/1/2011     Pneumonia      PONV (postoperative nausea and vomiting)      Pterygium eye 8/26/2013     Reactive airway disease 2/1/2011     Past Surgical History:   Procedure Laterality Date     anterior c-disc fusion       ENDOSCOPY  04/21/08     HYSTERECTOMY, MARCUS       JOINT REPLACEMTN, KNEE RT/LT  1/2011    Joint Replacement knee RT, with tibial straightenin     MAMMOPLASTY AUGMENTATION BILATERAL      breast ca      MASTECTOMY, SIMPLE RT/LT/CLAIRE  1989    Mastectomy Simple RT/LT/CLAIRE     OPEN REDUCTION INTERNAL FIXATION ANKLE  8/27/2013    Procedure: OPEN REDUCTION INTERNAL FIXATION ANKLE;  RIGHT OPEN REDUCTION INTERNAL FIXATION ANKLE WITH LIGAMENT REPAIR;  Surgeon: Nando Chang MD;  Location: [facility] OR     PTERYGIUM WITH CONJUNCTIVAL AUTOLOGOUS TRANSPLANT Left 8/29/2016    Procedure: PTERYGIUM WITH CONJUNCTIVAL AUTOLOGOUS TRANSPLANT;  Surgeon: Сергей Walters MD;  Location:  [facility]     REPAIR LIGAMENT ANKLE  8/27/2013    Procedure: REPAIR LIGAMENT ANKLE;;  Surgeon: Nando Chang MD;  Location: [facility] OR       EXAM:  BP: 92/48   Pulse: 91    Temp: Data Unavailable    Wt Readings from Last 2 Encounters:   04/07/17 70.3 kg (155 lb)   03/27/17 72.8 kg (160 lb 9.6 oz)       BMI= There is no height or weight on file to calculate BMI.    EXAM:  APPEARANCE: = Relaxed and in no distress  Conj/Eyelids = noninjected and lids and lashes are without inflammation  PERRLA/Irises = Pupils Round Reactive to Light and Irisis without  "inflammation  Neck = No anterior or posterior adenopathy appreciated.  Thyroid = Not enlarged and no masses felt  Resp effort = Calm regular breathing  Breath Sounds = Good air movement with no rales or rhonchi in any lung fields  Heart Rate, Rythym, & sounds (no Murm)  = Regular rate and rythym with no S3, S4, or murmer appreciated.  Carotid Art's = Pulses full and equal and no bruits appreciated  Abdomen = Soft, nontender, no masses, & bowel sounds in all quadrants  Liver/Spleen = Normal span and no splenomegaly noted  Digits and Nails = FROM in all finger joints, no nail dystrophy  Ext (edema) = No pretibial edema noted or elsewhere  Musculsktl =  Strength and ROM of major joints are within normal limits  SKIN = absent significant rashes or lesions   Recent/Remote Memory = Alert and Oriented x 3  Mood/Affect = Cooperative and interested      Assessment/Plan:  Macey was seen today for fu after er visit.    Diagnoses and all orders for this visit:    Slow transit constipation    Special screening for malignant neoplasms, colon  -     GASTROENTEROLOGY ADULT REF PROCEDURE ONLY        COUNSELING:   reports that she quit smoking about 15 months ago. She has never used smokeless tobacco.    Estimated body mass index is 26.61 kg/(m^2) as calculated from the following:    Height as of 4/7/17: 1.626 m (5' 4\").    Weight as of 4/7/17: 70.3 kg (155 lb).       Appropriate preventive services were discussed with this patient, including applicable screening as appropriate for cardiovascular disease, diabetes, osteopenia/osteoporosis, and glaucoma.  As appropriate for age/gender, discussed screening for colorectal cancer, prostate cancer, breast cancer, and cervical cancer. Checklist reviewing preventive services available has been given to the patient.    Reviewed patients plan of care and provided an AVS. The Basic Care Plan (routine screening as documented in Health Maintenance) for Macey meets the Care Plan requirement. This " Care Plan has been established and reviewed with the  Patient.      The following health maintenance items are reviewed in Epic and correct as of today:  Health Maintenance   Topic Date Due     COLON CANCER SCREEN (SYSTEM ASSIGNED)  11/08/2000     A1C Q6 MO( NO INBASKET)  11/21/2012     PNEUMOCOCCAL (1 of 2 - PCV13) 11/08/2015     FALL RISK ASSESSMENT  01/04/2017     ASTHMA CONTROL TEST Q6 MOS (NO INBASKET)  07/31/2017     LIPID MONITORING Q1 YEAR( NO INBASKET)  08/22/2017     INFLUENZA VACCINE (SYSTEM ASSIGNED)  09/01/2017     DEXA Q2 YR  09/14/2017     ASTHMA ACTION PLAN Q1 YR (NO INBASKET)  01/31/2018     ADVANCE DIRECTIVE PLANNING Q5 YRS (NO INBASKET)  08/26/2018     TETANUS IMMUNIZATION (SYSTEM ASSIGNED)  06/04/2022     HEPATITIS C SCREENING  Completed       Long Ferrari  [facility]  For any issues my office # is [phone]

## 2017-05-18 ENCOUNTER — OFFICE VISIT (OUTPATIENT)
Dept: FAMILY MEDICINE | Facility: CLINIC | Age: 67
End: 2017-05-18

## 2017-05-18 VITALS
SYSTOLIC BLOOD PRESSURE: 100 MMHG | BODY MASS INDEX: 28.17 KG/M2 | DIASTOLIC BLOOD PRESSURE: 60 MMHG | HEIGHT: 63 IN | WEIGHT: 159 LBS | HEART RATE: 91 BPM | TEMPERATURE: 98.6 F | OXYGEN SATURATION: 96 %

## 2017-05-18 DIAGNOSIS — J01.00 ACUTE NON-RECURRENT MAXILLARY SINUSITIS: ICD-10-CM

## 2017-05-18 DIAGNOSIS — J20.9 ACUTE BRONCHITIS, UNSPECIFIED ORGANISM: ICD-10-CM

## 2017-05-18 DIAGNOSIS — J45.41 REACTIVE AIRWAY DISEASE, MODERATE PERSISTENT, WITH ACUTE EXACERBATION: ICD-10-CM

## 2017-05-18 DIAGNOSIS — R07.0 THROAT PAIN: Primary | ICD-10-CM

## 2017-05-18 LAB — S PYO AG THROAT QL IA.RAPID: NEGATIVE

## 2017-05-18 PROCEDURE — 87430 STREP A AG IA: CPT | Performed by: FAMILY MEDICINE

## 2017-05-18 PROCEDURE — 99213 OFFICE O/P EST LOW 20 MIN: CPT | Performed by: FAMILY MEDICINE

## 2017-05-18 RX ORDER — BUDESONIDE AND FORMOTEROL FUMARATE DIHYDRATE 160; 4.5 UG/1; UG/1
2 AEROSOL RESPIRATORY (INHALATION) 2 TIMES DAILY
Qty: 3 INHALER | Refills: 3 | Status: SHIPPED | OUTPATIENT
Start: 2017-05-18 | End: 2019-09-09

## 2017-05-18 RX ORDER — ALBUTEROL SULFATE 90 UG/1
2 AEROSOL, METERED RESPIRATORY (INHALATION) EVERY 6 HOURS PRN
Qty: 1 INHALER | Refills: 3 | Status: SHIPPED | OUTPATIENT
Start: 2017-05-18 | End: 2018-09-18

## 2017-05-18 RX ORDER — DOXYCYCLINE 100 MG/1
100 CAPSULE ORAL 2 TIMES DAILY
Qty: 20 CAPSULE | Refills: 0 | Status: SHIPPED | OUTPATIENT
Start: 2017-05-18 | End: 2017-09-14

## 2017-05-18 RX ORDER — FLUOROMETHOLONE 0.1 %
SUSPENSION, DROPS(FINAL DOSAGE FORM)(ML) OPHTHALMIC (EYE)
Refills: 0 | COMMUNITY
Start: 2017-05-03 | End: 2017-09-14

## 2017-05-18 NOTE — PROGRESS NOTES
Problem(s) Oriented visit        SUBJECTIVE:                                                    Macey Romero is a 66 year old female who presents to clinic today for 3-4 days of headache, productive cough with large amount of dark green sputum, and right ear pain. She had fever to 101F. She has had lots of neck aches. She does have RAD. She is using Symbicort and Albuterol, although she just realized hers are outdated.       Problem list, Medication list, Allergies, and Medical/Social/Surgical histories reviewed in [facility] and updated as appropriate.   Additional history: as documented    ROS:  5 point ROS completed and negative except noted above, including Gen, CV, Resp, GI, MS      Histories:   Patient Active Problem List   Diagnosis     Reactive airway disease     Osteoarthritis     Osteopenia     Malignant neoplasm of female breast (H)     Pain disorder     IBS (irritable bowel syndrome)     Keloid of skin     Myalgia and myositis     Pterygium eye     Fx ankle     Health Care Home     Small bowel obstruction (H)     Hip pain, left     Past Surgical History:   Procedure Laterality Date     anterior c-disc fusion       ENDOSCOPY  04/21/08     HYSTERECTOMY, MARCUS       JOINT REPLACEMTN, KNEE RT/LT  1/2011    Joint Replacement knee RT, with tibial straightenin     MAMMOPLASTY AUGMENTATION BILATERAL      breast ca      MASTECTOMY, SIMPLE RT/LT/CLAIRE  1989    Mastectomy Simple RT/LT/CLAIRE     OPEN REDUCTION INTERNAL FIXATION ANKLE  8/27/2013    Procedure: OPEN REDUCTION INTERNAL FIXATION ANKLE;  RIGHT OPEN REDUCTION INTERNAL FIXATION ANKLE WITH LIGAMENT REPAIR;  Surgeon: Nando Chang MD;  Location: [facility] OR     PTERYGIUM WITH CONJUNCTIVAL AUTOLOGOUS TRANSPLANT Left 8/29/2016    Procedure: PTERYGIUM WITH CONJUNCTIVAL AUTOLOGOUS TRANSPLANT;  Surgeon: Сергей Walters MD;  Location:  [facility]     REPAIR LIGAMENT ANKLE  8/27/2013    Procedure: REPAIR LIGAMENT ANKLE;;  Surgeon: Nando Chang MD;  Location:  OR      "  Social History   Substance Use Topics     Smoking status: Former Smoker     Quit date: 1/1/2016     Smokeless tobacco: Never Used      Comment: quit but  still smokes, but not in house     Alcohol use Not on file     Family History   Problem Relation Age of Onset     Respiratory Father      CANCER Mother      CANCER Brother      DIABETES Brother      CEREBROVASCULAR DISEASE Brother            OBJECTIVE:                                                    /60  Pulse 91  Temp 98.6  F (37  C)  Ht 1.6 m (5' 3\")  Wt 72.1 kg (159 lb)  SpO2 96%  BMI 28.17 kg/m2  Body mass index is 28.17 kg/(m^2).   GENERAL APPEARANCE: Alert, no acute distress  EYES: PERRL, EOM normal, conjunctiva and lids normal  HENT: right TM normal, left TM normal, maxillary sinus tenderness bilateral, throat/mouth:normal, mild erythema  NECK: No adenopathy,masses or thyromegaly  RESP: musical rhonchi scattered  CV: normal rate, regular rhythm, no murmur or gallop  NEURO: Alert, oriented, speech and mentation normal  PSYCH: mentation appears normal, affect and mood normal   Labs Resulted Today:   Results for orders placed or performed in visit on 05/18/17   Rapid Strep ([facility])   Result Value Ref Range    Rapid Strep A Screen negative neg     ASSESSMENT/PLAN:                                                        Macey was seen today for throat pain, fever, chronic cough and headache.    Diagnoses and all orders for this visit:    Throat pain  -     Rapid Strep ([facility])  -     Beta Strep Grp A Cult ([facility])    Reactive airway disease, moderate persistent, with acute exacerbation  -     budesonide-formoterol (SYMBICORT) 160-4.5 MCG/ACT Inhaler; Inhale 2 puffs into the lungs 2 times daily  -     albuterol (PROAIR HFA/PROVENTIL HFA/VENTOLIN HFA) 108 (90 BASE) MCG/ACT Inhaler; Inhale 2 puffs into the lungs every 6 hours as needed for shortness of breath / dyspnea    Acute non-recurrent maxillary sinusitis  -     doxycycline Monohydrate 100 MG " CAPS; Take 1 capsule (100 mg) by mouth 2 times daily for 10 days    Acute bronchitis, unspecified organism  -     doxycycline Monohydrate 100 MG CAPS; Take 1 capsule (100 mg) by mouth 2 times daily for 10 days    F/U if not improved. Recommend Mucinex for symptomatic relief.    The following health maintenance items are reviewed in Epic and correct as of today:  Health Maintenance   Topic Date Due     COLON CANCER SCREEN (SYSTEM ASSIGNED)  11/08/2000     A1C Q6 MO( NO INBASKET)  11/21/2012     PNEUMOCOCCAL (1 of 2 - PCV13) 11/08/2015     FALL RISK ASSESSMENT  01/04/2017     ASTHMA CONTROL TEST Q6 MOS (NO INBASKET)  07/31/2017     LIPID MONITORING Q1 YEAR( NO INBASKET)  08/22/2017     INFLUENZA VACCINE (SYSTEM ASSIGNED)  09/01/2017     DEXA Q2 YR  09/14/2017     ASTHMA ACTION PLAN Q1 YR (NO INBASKET)  01/31/2018     ADVANCE DIRECTIVE PLANNING Q5 YRS (NO INBASKET)  08/26/2018     TETANUS IMMUNIZATION (SYSTEM ASSIGNED)  06/04/2022     HEPATITIS C SCREENING  Completed       Thao Benitez MD  [facility]  Family Practice  [facility]  [phone]    For any issues my office # is [phone]

## 2017-05-18 NOTE — MR AVS SNAPSHOT
"              After Visit Summary   5/18/2017    Macey Romero    MRN: [id number]           Patient Information     Date Of Birth          1950        Visit Information        Provider Department      5/18/2017 11:45 AM Thao Benitez MD [facility]        Today's Diagnoses     Throat pain    -  1    Reactive airway disease, moderate persistent, with acute exacerbation        Acute non-recurrent maxillary sinusitis        Acute bronchitis, unspecified organism           Follow-ups after your visit        Who to contact     If you have questions or need follow up information about today's clinic visit or your schedule please contact [facility] directly at [phone].  Normal or non-critical lab and imaging results will be communicated to you by [employer], letter or phone within 4 business days after the clinic has received the results. If you do not hear from us within 7 days, please contact the clinic through [employer] or phone. If you have a critical or abnormal lab result, we will notify you by phone as soon as possible.  Submit refill requests through [employer] or call your pharmacy and they will forward the refill request to us. Please allow 3 business days for your refill to be completed.          Additional Information About Your Visit        MyChart Information     [employer] gives you secure access to your electronic health record. If you see a primary care provider, you can also send messages to your care team and make appointments. If you have questions, please call your primary care clinic.  If you do not have a primary care provider, please call [phone] and they will assist you.        Care EveryWhere ID     This is your Care EveryWhere ID. This could be used by other organizations to access your [place] medical records  [id number]        Your Vitals Were     Pulse Temperature Height Pulse Oximetry BMI (Body Mass Index)       91 98.6  F (37  C) 1.6 m (5' 3\") 96% " 28.17 kg/m2        Blood Pressure from Last 3 Encounters:   05/18/17 100/60   04/17/17 92/48   04/08/17 105/57    Weight from Last 3 Encounters:   05/18/17 72.1 kg (159 lb)   04/07/17 70.3 kg (155 lb)   03/27/17 72.8 kg (160 lb 9.6 oz)              We Performed the Following     Beta Strep Grp A Cult ([facility])     Rapid Strep ([facility])          Today's Medication Changes          These changes are accurate as of: 5/18/17 12:08 PM.  If you have any questions, ask your nurse or doctor.               Start taking these medicines.        Dose/Directions    doxycycline Monohydrate 100 MG Caps   Used for:  Acute non-recurrent maxillary sinusitis, Acute bronchitis, unspecified organism   Started by:  Thao Benitez MD        Dose:  100 mg   Take 1 capsule (100 mg) by mouth 2 times daily for 10 days   Quantity:  20 capsule   Refills:  0         These medicines have changed or have updated prescriptions.        Dose/Directions    ibuprofen 600 MG tablet   Commonly known as:  ADVIL/MOTRIN   This may have changed:    - how much to take  - when to take this   Used for:  Fx ankle        Dose:  600 mg   Take 1 tablet (600 mg) by mouth every 6 hours as needed for other (inflammatory pain)   Quantity:  60 tablet   Refills:  0            Where to get your medicines      These medications were sent to [facility] [address] [place] [address] [address] [place] [id number]    Hours:  24-hours Phone:  [phone]     albuterol 108 (90 BASE) MCG/ACT Inhaler    budesonide-formoterol 160-4.5 MCG/ACT Inhaler    doxycycline Monohydrate 100 MG Caps                Primary Care Provider Office Phone # Fax #    Long Ferrari [phone]       [facility] [address]  [facility] [address]        Thank you!     Thank you for choosing [facility]  for your care. Our goal is always to provide you with excellent care. Hearing back from  our patients is one way we can continue to improve our services. Please take a few minutes to complete the written survey that you may receive in the mail after your visit with us. Thank you!             Your Updated Medication List - Protect others around you: Learn how to safely use, store and throw away your medicines at www.disposemymeds.org.          This list is accurate as of: 5/18/17 12:08 PM.  Always use your most recent med list.                   Brand Name Dispense Instructions for use    albuterol 108 (90 BASE) MCG/ACT Inhaler    PROAIR HFA/PROVENTIL HFA/VENTOLIN HFA    1 Inhaler    Inhale 2 puffs into the lungs every 6 hours as needed for shortness of breath / dyspnea       budesonide-formoterol 160-4.5 MCG/ACT Inhaler    SYMBICORT    3 Inhaler    Inhale 2 puffs into the lungs 2 times daily       doxycycline Monohydrate 100 MG Caps     20 capsule    Take 1 capsule (100 mg) by mouth 2 times daily for 10 days       fluorometholone 0.1 % ophthalmic susp    FML LIQUIFILM     INSTILL 1 DROP IN BOTH EYES TID.       ibuprofen 600 MG tablet    ADVIL/MOTRIN    60 tablet    Take 1 tablet (600 mg) by mouth every 6 hours as needed for other (inflammatory pain)       TYLENOL PO      Take 325 mg by mouth 2 times daily as needed for mild pain

## 2017-05-20 LAB — BETA STREP GP A CULTURE: NEGATIVE

## 2017-07-19 ENCOUNTER — TRANSFERRED RECORDS (OUTPATIENT)
Dept: FAMILY MEDICINE | Facility: CLINIC | Age: 67
End: 2017-07-19

## 2017-07-31 ENCOUNTER — TELEPHONE (OUTPATIENT)
Dept: FAMILY MEDICINE | Facility: CLINIC | Age: 67
End: 2017-07-31

## 2017-07-31 DIAGNOSIS — T85.42XA: ICD-10-CM

## 2017-07-31 DIAGNOSIS — R07.89 CHEST WALL TENDERNESS: Primary | ICD-10-CM

## 2017-07-31 DIAGNOSIS — Z85.3 PERSONAL HISTORY OF MALIGNANT NEOPLASM OF BREAST: ICD-10-CM

## 2017-07-31 NOTE — TELEPHONE ENCOUNTER
"Patient calls saying saw plastic surgeon (Dr. Arriaga) due to implants out of place, extra tissue and pain at right lateral breast/chest when she reaches or moves in certain ways. States Dr. Arriaga told her to go back to her oncologist to work this up to be sure not cancer recurrence and if negative he would then proceed with options they discussed at visit to help with the issues.   Her oncology group was [facility] which dissolved and is no longer. She called [facility] and reports they won't see her without scans.   Patient is asking Dr. Ferrari to order the proper \"scans\" for her.     Surgeon, in his note, describes a \"tender fullness with a diffuse mass effect on the right chest wall. This is trigger point vs scar tissue vs neoplasia. Recommend oncology visit to workup the tender firmness in her right chest wall. This probably needs some imaging and possible biopsy.\"   See scanned copy of Dr. Arriaga's 7/19/17 consult note.     Hx Breast cancer with bilateral mastectomy and gel implants placed in 1987. Implants changed to saline in 1994.   Plan: per Dr. Ferrari - recommend see if Dr. Tanya Gonzalez at [facility] thinks this might be appropriate referral for her to evaluate. Left message on voicemail of Dr. Gonzalez's assistant with this question. Will await their response.  Luz Wilkinson RN    "

## 2017-07-31 NOTE — TELEPHONE ENCOUNTER
Call from Dr. Carlos Bañuelos's assistant - would recommend breast MRI as imaging. If Dr. Ferrari would like patient to see surgeon first, they could arrange or could start with MRI.  Plan: per Dr. Ferrari - will order bilateral breast MRI at [facility]. Patient agreeable and will await radiology call to schedule.  Luz Wilkinson RN

## 2017-08-09 ENCOUNTER — HOSPITAL ENCOUNTER (OUTPATIENT)
Dept: MRI IMAGING | Facility: CLINIC | Age: 67
Discharge: HOME | End: 2017-08-09
Attending: FAMILY MEDICINE | Admitting: FAMILY MEDICINE
Payer: MEDICARE

## 2017-08-09 DIAGNOSIS — T85.42XA: ICD-10-CM

## 2017-08-09 DIAGNOSIS — Z85.3 PERSONAL HISTORY OF MALIGNANT NEOPLASM OF BREAST: ICD-10-CM

## 2017-08-09 DIAGNOSIS — R07.89 CHEST WALL TENDERNESS: ICD-10-CM

## 2017-08-09 PROCEDURE — 0159T MR BREAST BILATERAL W/O & W CONTRAST: CPT

## 2017-08-09 PROCEDURE — A9585 GADOBUTROL INJECTION: HCPCS | Performed by: FAMILY MEDICINE

## 2017-08-09 PROCEDURE — 27210995 ZZH RX 272: Performed by: FAMILY MEDICINE

## 2017-08-09 PROCEDURE — 25000128 H RX IP 250 OP 636: Performed by: FAMILY MEDICINE

## 2017-08-09 RX ORDER — ACYCLOVIR 200 MG/1
60 CAPSULE ORAL ONCE
Status: COMPLETED | OUTPATIENT
Start: 2017-08-09 | End: 2017-08-09

## 2017-08-09 RX ORDER — GADOBUTROL 604.72 MG/ML
7 INJECTION INTRAVENOUS ONCE
Status: COMPLETED | OUTPATIENT
Start: 2017-08-09 | End: 2017-08-09

## 2017-08-09 RX ADMIN — SODIUM CHLORIDE 60 ML: 9 INJECTION, SOLUTION INTRAMUSCULAR; INTRAVENOUS; SUBCUTANEOUS at 14:27

## 2017-08-09 RX ADMIN — GADOBUTROL 7 ML: 604.72 INJECTION INTRAVENOUS at 14:27

## 2017-08-10 ENCOUNTER — TELEPHONE (OUTPATIENT)
Dept: MAMMOGRAPHY | Facility: CLINIC | Age: 67
End: 2017-08-10

## 2017-08-10 DIAGNOSIS — R92.8 ABNORMAL MAGNETIC RESONANCE IMAGING OF RIGHT BREAST: Primary | ICD-10-CM

## 2017-08-10 NOTE — TELEPHONE ENCOUNTER
Contacted [facility] regarding Ms. Romero 8/9/2017 Breast MRI report (BI-RADS 0) and of additional imaging recommended (Diagnostic U/S of Right breast and axilla) by Breast Center Radiologist, Dr. Magdy Grigsby.  Dr. Ferrari's nurse, Adilene, requested that we call the patient with MRI results and follow-up.  Orders to be placed by Dr. Ferrari for the recommended ultrasound imaging.  Will contact patient with MRI results and follow-up once orders received.

## 2017-08-11 ENCOUNTER — TELEPHONE (OUTPATIENT)
Dept: MAMMOGRAPHY | Facility: CLINIC | Age: 67
End: 2017-08-11

## 2017-08-11 NOTE — TELEPHONE ENCOUNTER
Ms. Romero was called and given her 8/9/2017 Breast MRI Results (Additional imaging evaluation needed Right Breast). She was given the scheduling phone number to set up Right Breast Ultrasound.

## 2017-08-14 ENCOUNTER — HOSPITAL ENCOUNTER (OUTPATIENT)
Dept: MAMMOGRAPHY | Facility: CLINIC | Age: 67
Discharge: HOME | End: 2017-08-14
Attending: FAMILY MEDICINE | Admitting: FAMILY MEDICINE
Payer: MEDICARE

## 2017-08-14 ENCOUNTER — HOSPITAL ENCOUNTER (OUTPATIENT)
Dept: MAMMOGRAPHY | Facility: CLINIC | Age: 67
End: 2017-08-14
Attending: FAMILY MEDICINE
Payer: MEDICARE

## 2017-08-14 DIAGNOSIS — R93.89 ABNORMAL MRI: ICD-10-CM

## 2017-08-14 DIAGNOSIS — N64.89 OTHER SPECIFIED DISORDERS OF BREAST: ICD-10-CM

## 2017-08-14 PROCEDURE — 76942 ECHO GUIDE FOR BIOPSY: CPT

## 2017-08-14 PROCEDURE — 88305 TISSUE EXAM BY PATHOLOGIST: CPT | Mod: 26 | Performed by: RADIOLOGY

## 2017-08-14 PROCEDURE — 88305 TISSUE EXAM BY PATHOLOGIST: CPT | Performed by: RADIOLOGY

## 2017-08-14 PROCEDURE — 38505 NEEDLE BIOPSY LYMPH NODES: CPT

## 2017-08-14 PROCEDURE — 25000125 ZZHC RX 250: Performed by: FAMILY MEDICINE

## 2017-08-14 PROCEDURE — 76642 ULTRASOUND BREAST LIMITED: CPT | Mod: RT

## 2017-08-14 RX ADMIN — LIDOCAINE HYDROCHLORIDE 5 ML: 10 INJECTION, SOLUTION INFILTRATION; PERINEURAL at 11:05

## 2017-08-14 NOTE — DISCHARGE INSTRUCTIONS

## 2017-08-15 LAB — COPATH REPORT: NORMAL

## 2017-08-15 NOTE — PROGRESS NOTES
After PATHOLOGY review by Breast Center Radiologist, Dr. Celena Blair, Ms. Romero was called and given her 8/14/2017 Right Axillary area Biopsy results (Benign Lymph Node) and recommended Follow up (No Imaging follow up needed at this time).  Biopsy site is moderately painful.  I encouraged her to try ice to the area and explained the swelling may take a few weeks to decrease entirely.

## 2017-09-13 NOTE — PROGRESS NOTES
HO of reactive airway disease   HO of Pneumonia   Ho of Fibromyalgia    SUBJECTIVE:   Macey Romero is a 66 year old female who presents to clinic today for the following health issues:    Often ill in the fall.  Travel no  Exposure: granddaughter clear runny nose  Retired OB RN    White female glasses pterygium in left eye more than right  RESPIRATORY SYMPTOMS/ cough      Duration:  3 weeks     Description  sore throat, productive cough, wheezing, chills, headache, fatigue/malaise, hoarse voice and nausea, measuring temperature from , chest congestion, post nasal drip.     Severity: moderate    Accompanying signs and symptoms: see above     History (predisposing factors):  Former smoker quit in 2016, HO of reactive airway disease     Precipitating or alleviating factors: NONE    Therapies tried and outcome:  Guaifenesin, Mucinex-D, tylenol - somewhat effective; Tesson             Problem list and histories reviewed & adjusted, as indicated.  Additional history: as documented    Patient Active Problem List   Diagnosis     Reactive airway disease     Osteoarthritis     Osteopenia     Malignant neoplasm of female breast (H)     Pain disorder     IBS (irritable bowel syndrome)     Keloid of skin     Myalgia and myositis     Pterygium eye     Fx ankle     Health Care Home     Small bowel obstruction (H)     Hip pain, left     Pneumonia     Past Surgical History:   Procedure Laterality Date     anterior c-disc fusion       ENDOSCOPY  04/21/08     HYSTERECTOMY, MARCUS       JOINT REPLACEMTN, KNEE RT/LT  1/2011    Joint Replacement knee RT, with tibial straightenin     MAMMOPLASTY AUGMENTATION BILATERAL      breast ca      MASTECTOMY, SIMPLE RT/LT/CLAIRE  1989    Mastectomy Simple RT/LT/CLAIRE     OPEN REDUCTION INTERNAL FIXATION ANKLE  8/27/2013    Procedure: OPEN REDUCTION INTERNAL FIXATION ANKLE;  RIGHT OPEN REDUCTION INTERNAL FIXATION ANKLE WITH LIGAMENT REPAIR;  Surgeon: Nando Chang MD;  Location:  OR      PTERYGIUM WITH CONJUNCTIVAL AUTOLOGOUS TRANSPLANT Left 8/29/2016    Procedure: PTERYGIUM WITH CONJUNCTIVAL AUTOLOGOUS TRANSPLANT;  Surgeon: Сергей Walters MD;  Location:  EC     REPAIR LIGAMENT ANKLE  8/27/2013    Procedure: REPAIR LIGAMENT ANKLE;;  Surgeon: Nando Chang MD;  Location:  OR       Social History   Substance Use Topics     Smoking status: Former Smoker     Quit date: 1/1/2016     Smokeless tobacco: Never Used      Comment: quit but  still smokes, but not in house     Alcohol use Not on file     Family History   Problem Relation Age of Onset     Respiratory Father      CANCER Mother      CANCER Brother      DIABETES Brother      CEREBROVASCULAR DISEASE Brother          Current Outpatient Prescriptions   Medication Sig Dispense Refill     olopatadine (PATANOL) 0.1 % ophthalmic solution 1 drop 2 times daily       budesonide-formoterol (SYMBICORT) 160-4.5 MCG/ACT Inhaler Inhale 2 puffs into the lungs 2 times daily 3 Inhaler 3     albuterol (PROAIR HFA/PROVENTIL HFA/VENTOLIN HFA) 108 (90 BASE) MCG/ACT Inhaler Inhale 2 puffs into the lungs every 6 hours as needed for shortness of breath / dyspnea 1 Inhaler 3     Acetaminophen (TYLENOL PO) Take 325 mg by mouth 2 times daily as needed for mild pain       Allergies   Allergen Reactions     Tetanus Toxoids Anaphylaxis     Erythromycin GI Disturbance     Levaquin Other (See Comments)     unknown     Penicillins Hives     Silicone      Recent Labs   Lab Test  04/07/17   2045  03/27/17   1645  10/28/16   1210  08/22/16   1050   09/24/15   1014   10/31/13   1520   05/21/12   0700  03/25/10   A1C   --    --    --    --    --    --    --    --    --   5.9   --   5.7   LDL   --    --    --   128*   --   128*   --   159*   < >   --    --    --    HDL   --    --    --   35*   --   40   --   38*   < >   --    --    --    TRIG   --    --    --   166*   --   133   --   245*   < >   --    --    --    ALT  18  16  33   --    --   22   < >   --    < >   --   "  < >   --    CR  0.94  0.79  1.10*  0.92   < >  0.89   < >   --    < >  0.76   < >   --    GFRESTIMATED  60*   --   50*   --    < >   --    < >   --    --   77   < >   --    GFRESTBLACK  72   --   60*   --    < >   --    < >   --    --   >90   < >   --    POTASSIUM  3.5  4.4  4.1  4.2   < >  4.9   < >   --    < >  4.5   < >   --     < > = values in this interval not displayed.      BP Readings from Last 3 Encounters:   09/14/17 110/76   05/18/17 100/60   04/17/17 92/48    Wt Readings from Last 3 Encounters:   09/14/17 72.6 kg (160 lb)   05/18/17 72.1 kg (159 lb)   04/07/17 70.3 kg (155 lb)                  Labs reviewed in EPIC          Reviewed and updated as needed this visit by clinical staff     Reviewed and updated as needed this visit by Provider         ROS:  Constitutional, HEENT, cardiovascular, pulmonary, GI, , musculoskeletal, neuro, skin, endocrine and psych systems are negative, except as otherwise noted.      OBJECTIVE:   /76  Pulse 98  Temp 98.2  F (36.8  C) (Oral)  Resp 16  Ht 1.6 m (5' 3\")  Wt 72.6 kg (160 lb)  SpO2 95%  BMI 28.34 kg/m2  Body mass index is 28.34 kg/(m^2).  GENERAL: healthy, alert and no distress  EYES: Eyes grossly normal to inspection, PERRL and conjunctivae and sclerae normal  HENT: ear canals and TM's normal, nose and mouth without ulcers or lesions  NECK: no adenopathy, no asymmetry, masses, or scars and thyroid normal to palpation  RESP: lungs clear to auscultation - no rales, rhonchi POSITIVE wheezes  CV: regular rate and rhythm, normal S1 S2, no S3 or S4, no murmur, click or rub, no peripheral edema and peripheral pulses strong  ABDOMEN: soft, nontender, no hepatosplenomegaly, no masses and bowel sounds normal  MS: no gross musculoskeletal defects noted, no edema  SKIN: no suspicious lesions or rashes  NEURO: Normal strength and tone, mentation intact and speech normal  PSYCH: mentation appears normal, affect normal/bright  LYMPH: no cervical, supraclavicular, " axillary, or inguinal adenopathy    Diagnostic Test Results:  Results for orders placed or performed during the hospital encounter of 08/14/17   US Biopsy Lymph Node Core    Addendum: 8/16/2017    AMISHA HOLDER  Accession # [id number]    Final pathology demonstrates benign lymph node. This is concordant.  Clinical follow-up is recommended, with management dependent on the  results/findings of the physical examination. Findings were discussed  with the patient at 11:45 AM on August 15, 2017.    CHANTE MURPHY MD (Date of Addendum: 8/16/2017 11:31 AM)        CHANTE MURPHY MD      Narrative    ULTRASOUND-GUIDED RIGHT BREAST CORE BIOPSY;   CLIP PLACEMENT;   8/14/2017 11:08 AM    INDICATION FOR PROCEDURE: Right axillary hypoechoic mass, possibly  lymph node.    PROCEDURE: Approximately 5 mL lidocaine without epinephrine was  infiltrated for local anesthetic and a 13-gauge trocar was introduced  via lateral approach.  The needle tip was placed adjacent to the  lesion. A series of 3 samples were obtained with a 14-gauge  core-cutting needle. A clip was then deployed to [place] the lesion.   There was less than 5 cc of blood loss.    The patient tolerated the procedure without difficulty and there was  no significant pain or immediate complication at the end of the  procedure.       Impression    IMPRESSION: Successful right axilla ultrasound-guided core biopsy and  clip placement.  Final pathology is pending.      CHANTE MURPHY MD   Surgical Path Exam   Result Value Ref Range    Copath Report       Patient Name: AMISHA HOLDER  MR#: [id number]  Specimen #: [id number]  Collected: 8/14/2017  Received: 8/14/2017  Reported: 8/15/2017 10:41  Ordering Phy(s): CHANTE MURPHY  Additional Phy(s): LISSY PUGA    For improved result formatting, select 'View Enhanced Report Format'  under Linked Documents section.    SPECIMEN(S):  Right ultrasound guided axillary needle biopsy    FINAL DIAGNOSIS:  Right axilla, ultrasound guided core  "biopsy - Benign lymph node and  fibrofatty tissue without pathologic abnormalities    COMMENT:    The benign lymph nodes are show focal vascular transformation of  sinuses, but this is of no special significance    Electronically signed out by:    Roib Ceja M.D.    CLINICAL HISTORY:    1.5 cm in size area of intermediate suspicion    GROSS:  The specimen is received in formalin with the patient's name and proper  identification labeled \"ultrasound guided right axillary biopsy, 1.5 cm  in size\".  The specimen consists of multiple similar appearing  yell ow-pink fibrofatty breast tissue core fragments measuring 3.7 x 0.1  x 0.1 cm in aggregate.  The specimen is entirely submitted in one  cassette. (Dictated by: Mohan Patterson 8/14/2017 04:06 PM)    MICROSCOPIC:  Microscopic performed    CPT Codes:  A: 65583-UA5    TESTING LAB LOCATION:  [address]  [address]  [phone]    COLLECTION SITE:  Client: [facility]  Location: [facility] (S)         ASSESSMENT/PLAN:     ASSESSMENT / PLAN:  (E78.5) Hyperlipidemia LDL goal <130  (primary encounter diagnosis)  Comment:   LDL Cholesterol Calculated   Date Value Ref Range Status   08/22/2016 128 (H) 0 - 99 mg/dL Final   ]  Fasting today for recheck  Plan: Lipid Panel ([facility]), VENOUS COLLECTION                (J20.9) Acute bronchitis, unspecified organism  Comment: we added codeine cough syrup at well  Plan: methylPREDNISolone (MEDROL DOSEPAK) 4 MG         tablet, doxycycline Monohydrate 100 MG CAPS,         guaiFENesin-codeine (ROBITUSSIN AC) 100-10         MG/5ML SOLN solution            (Z83.3) FHx: diabetes mellitus  Comment:   Plan: Hemoglobin A1C ([facility])            (Z12.11) Special screening for malignant neoplasms, colon  Comment:   Plan: ABSTRACT COLOGUARD-NO CHARGE                Patient Instructions   Medrol dose pack  Doxycycline    Lab today for lipid    Given biopsy report from breast "     Marlon Gastelum MD  [facility]

## 2017-09-14 ENCOUNTER — OFFICE VISIT (OUTPATIENT)
Dept: FAMILY MEDICINE | Facility: CLINIC | Age: 67
End: 2017-09-14

## 2017-09-14 VITALS
WEIGHT: 160 LBS | BODY MASS INDEX: 28.35 KG/M2 | HEART RATE: 98 BPM | HEIGHT: 63 IN | OXYGEN SATURATION: 95 % | TEMPERATURE: 98.2 F | DIASTOLIC BLOOD PRESSURE: 76 MMHG | RESPIRATION RATE: 16 BRPM | SYSTOLIC BLOOD PRESSURE: 110 MMHG

## 2017-09-14 DIAGNOSIS — J20.9 ACUTE BRONCHITIS, UNSPECIFIED ORGANISM: ICD-10-CM

## 2017-09-14 DIAGNOSIS — Z12.11 SPECIAL SCREENING FOR MALIGNANT NEOPLASMS, COLON: ICD-10-CM

## 2017-09-14 DIAGNOSIS — Z83.3 FHX: DIABETES MELLITUS: ICD-10-CM

## 2017-09-14 DIAGNOSIS — E78.5 HYPERLIPIDEMIA LDL GOAL <130: Primary | ICD-10-CM

## 2017-09-14 PROCEDURE — 99214 OFFICE O/P EST MOD 30 MIN: CPT | Performed by: FAMILY MEDICINE

## 2017-09-14 PROCEDURE — 36415 COLL VENOUS BLD VENIPUNCTURE: CPT | Performed by: FAMILY MEDICINE

## 2017-09-14 RX ORDER — DOXYCYCLINE 100 MG/1
100 CAPSULE ORAL 2 TIMES DAILY
Qty: 20 CAPSULE | Refills: 0 | Status: ON HOLD | OUTPATIENT
Start: 2017-09-14 | End: 2017-10-19

## 2017-09-14 RX ORDER — CODEINE PHOSPHATE AND GUAIFENESIN 10; 100 MG/5ML; MG/5ML
1 SOLUTION ORAL EVERY 4 HOURS PRN
Qty: 236 ML | Refills: 0 | Status: SHIPPED | OUTPATIENT
Start: 2017-09-14 | End: 2017-12-15

## 2017-09-14 RX ORDER — METHYLPREDNISOLONE 4 MG
TABLET, DOSE PACK ORAL
Qty: 21 TABLET | Refills: 0 | Status: SHIPPED | OUTPATIENT
Start: 2017-09-14 | End: 2017-12-15

## 2017-09-14 RX ORDER — OLOPATADINE HYDROCHLORIDE 1 MG/ML
1 SOLUTION/ DROPS OPHTHALMIC 2 TIMES DAILY
Status: ON HOLD | COMMUNITY
End: 2018-06-18

## 2017-09-14 NOTE — MR AVS SNAPSHOT
After Visit Summary   9/14/2017    Macey Romero    MRN: [id number]           Patient Information     Date Of Birth          1950        Visit Information        Provider Department      9/14/2017 7:45 AM Wilda Gastelum MD [facility]        Today's Diagnoses     Hyperlipidemia LDL goal <130    -  1    Acute non-recurrent maxillary sinusitis        Acute bronchitis, unspecified organism        FHx: diabetes mellitus        Special screening for malignant neoplasms, colon          Care Instructions    Medrol dose pack  Doxycycline    Lab today for lipid    Given biopsy report from breast     Cologuard          Follow-ups after your visit        Future tests that were ordered for you today     Open Future Orders        Priority Expected Expires Ordered    ABSTRACT COLOGUARD-NO CHARGE Routine  9/14/2018 9/14/2017            Who to contact     If you have questions or need follow up information about today's clinic visit or your schedule please contact [facility] directly at [phone].  Normal or non-critical lab and imaging results will be communicated to you by [employer], letter or phone within 4 business days after the clinic has received the results. If you do not hear from us within 7 days, please contact the clinic through [employer]ator[employer] or phone. If you have a critical or abnormal lab result, we will notify you by phone as soon as possible.  Submit refill requests through [employer] or call your pharmacy and they will forward the refill request to us. Please allow 3 business days for your refill to be completed.          Additional Information About Your Visit        MyChart Information     [employer] gives you secure access to your electronic health record. If you see a primary care provider, you can also send messages to your care team and make appointments. If you have questions, please call your primary care clinic.  If you do not have a primary care provider, please call  "[phone] and they will assist you.        Care EveryWhere ID     This is your Care EveryWhere ID. This could be used by other organizations to access your [facility] medical records  [id number]        Your Vitals Were     Pulse Temperature Respirations Height Pulse Oximetry BMI (Body Mass Index)    98 98.2  F (36.8  C) (Oral) 16 1.6 m (5' 3\") 95% 28.34 kg/m2       Blood Pressure from Last 3 Encounters:   09/14/17 110/76   05/18/17 100/60   04/17/17 92/48    Weight from Last 3 Encounters:   09/14/17 72.6 kg (160 lb)   05/18/17 72.1 kg (159 lb)   04/07/17 70.3 kg (155 lb)              We Performed the Following     Hemoglobin A1C ([facility])     Lipid Panel ([facility])          Today's Medication Changes          These changes are accurate as of: 9/14/17  8:17 AM.  If you have any questions, ask your nurse or doctor.               Start taking these medicines.        Dose/Directions    doxycycline Monohydrate 100 MG Caps   Used for:  Acute non-recurrent maxillary sinusitis, Acute bronchitis, unspecified organism   Started by:  Wilda Gastelum MD        Dose:  100 mg   Take 1 capsule (100 mg) by mouth 2 times daily   Quantity:  20 capsule   Refills:  0       methylPREDNISolone 4 MG tablet   Commonly known as:  MEDROL DOSEPAK   Used for:  Acute bronchitis, unspecified organism   Started by:  Wilda Gastelum MD        Follow package instructions   Quantity:  21 tablet   Refills:  0            Where to get your medicines      These medications were sent to [facility] [address] [place] [address] [address] [place] [address]    Hours:  24-hours Phone:  [phone]     doxycycline Monohydrate 100 MG Caps    methylPREDNISolone 4 MG tablet                Primary Care Provider Office Phone # Fax #    Long Ferrari [phone] [address]  [facility] [address]        Equal Access to Services     NATALIE DOSS AH: Hadii boris duval " john Mueller, wanatanaelda luqadaha, qaybta kaalmada taras, jacquelin chavarria laabyan devi. So [facility] [phone].    ATENCIÓN: Si habla havenañol, tiene a hoyos disposición servicios gratuitos de asistencia lingüística. Beatris al [phone].    We comply with applicable federal civil rights laws and [place] laws. We do not discriminate on the basis of race, color, national origin, age, disability sex, sexual orientation or gender identity.            Thank you!     Thank you for choosing [facility]  for your care. Our goal is always to provide you with excellent care. Hearing back from our patients is one way we can continue to improve our services. Please take a few minutes to complete the written survey that you may receive in the mail after your visit with us. Thank you!             Your Updated Medication List - Protect others around you: Learn how to safely use, store and throw away your medicines at www.disposemymeds.org.          This list is accurate as of: 9/14/17  8:17 AM.  Always use your most recent med list.                   Brand Name Dispense Instructions for use Diagnosis    albuterol 108 (90 BASE) MCG/ACT Inhaler    PROAIR HFA/PROVENTIL HFA/VENTOLIN HFA    1 Inhaler    Inhale 2 puffs into the lungs every 6 hours as needed for shortness of breath / dyspnea    Reactive airway disease, moderate persistent, with acute exacerbation       budesonide-formoterol 160-4.5 MCG/ACT Inhaler    SYMBICORT    3 Inhaler    Inhale 2 puffs into the lungs 2 times daily    Reactive airway disease, moderate persistent, with acute exacerbation       doxycycline Monohydrate 100 MG Caps     20 capsule    Take 1 capsule (100 mg) by mouth 2 times daily    Acute non-recurrent maxillary sinusitis, Acute bronchitis, unspecified organism       methylPREDNISolone 4 MG tablet    MEDROL DOSEPAK    21 tablet    Follow package instructions    Acute bronchitis, unspecified organism       olopatadine 0.1 %  ophthalmic solution    PATANOL     1 drop 2 times daily        TYLENOL PO      Take 325 mg by mouth 2 times daily as needed for mild pain    Abdominal pain, right upper quadrant

## 2017-09-14 NOTE — PATIENT INSTRUCTIONS
Medrol dose pack  Doxycycline    Lab today for lipid    Given biopsy report from breast     Cologuard

## 2017-09-15 LAB
CHOLEST SERPL-MCNC: 209 MG/DL (ref 100–199)
HBA1C MFR BLD: 6.2 % (ref 4.8–5.6)
HDLC SERPL-MCNC: 35 MG/DL
LDL/HDL RATIO: 3.7 RATIO UNITS (ref 0–3.2)
LDLC SERPL CALC-MCNC: 129 MG/DL (ref 0–99)
TRIGL SERPL-MCNC: 223 MG/DL (ref 0–149)
VLDLC SERPL CALC-MCNC: 45 MG/DL (ref 5–40)

## 2017-09-18 NOTE — PROGRESS NOTES
Higher than average risk  Lipids need work  Eat more fish, fish oil. Ground flax seed and oatmeal to reduce triglycerides  Increase exercise to improve HDL (the good cholesterol)  Consider Statin like lipitor low dose. Let me know if you want to start that.

## 2017-10-06 ENCOUNTER — TRANSFERRED RECORDS (OUTPATIENT)
Dept: FAMILY MEDICINE | Facility: CLINIC | Age: 67
End: 2017-10-06

## 2017-10-06 LAB
COLOGUARD-ABSTRACT: POSITIVE
COLOGUARD-ABSTRACT: POSITIVE

## 2017-10-13 ENCOUNTER — TELEPHONE (OUTPATIENT)
Dept: FAMILY MEDICINE | Facility: CLINIC | Age: 67
End: 2017-10-13

## 2017-10-13 DIAGNOSIS — R19.5 POSITIVE COLORECTAL CANCER SCREENING USING DNA-BASED STOOL TEST: Primary | ICD-10-CM

## 2017-10-13 NOTE — TELEPHONE ENCOUNTER
----- Message from Long Ferrari MD sent at 10/12/2017  5:29 PM CDT -----  Needs to have colonoscopy due to positive result.  KN

## 2017-10-13 NOTE — TELEPHONE ENCOUNTER
Discussed positive Cologuard results with patient.  Referral for diagnostic colonoscopy ordered.  Adilene Vale

## 2017-10-19 ENCOUNTER — SURGERY (OUTPATIENT)
Age: 67
End: 2017-10-19

## 2017-10-19 ENCOUNTER — HOSPITAL ENCOUNTER (OUTPATIENT)
Facility: CLINIC | Age: 67
Discharge: HOME | End: 2017-10-19
Attending: COLON & RECTAL SURGERY | Admitting: COLON & RECTAL SURGERY
Payer: MEDICARE

## 2017-10-19 VITALS
DIASTOLIC BLOOD PRESSURE: 49 MMHG | WEIGHT: 154 LBS | SYSTOLIC BLOOD PRESSURE: 107 MMHG | BODY MASS INDEX: 26.29 KG/M2 | HEIGHT: 64 IN | RESPIRATION RATE: 12 BRPM | OXYGEN SATURATION: 96 %

## 2017-10-19 LAB — COLONOSCOPY: NORMAL

## 2017-10-19 PROCEDURE — 99153 MOD SED SAME PHYS/QHP EA: CPT

## 2017-10-19 PROCEDURE — 45378 DIAGNOSTIC COLONOSCOPY: CPT | Performed by: COLON & RECTAL SURGERY

## 2017-10-19 PROCEDURE — 25000128 H RX IP 250 OP 636: Performed by: COLON & RECTAL SURGERY

## 2017-10-19 PROCEDURE — G0500 MOD SEDAT ENDO SERVICE >5YRS: HCPCS | Performed by: COLON & RECTAL SURGERY

## 2017-10-19 RX ORDER — SODIUM CHLORIDE 9 MG/ML
INJECTION, SOLUTION INTRAVENOUS CONTINUOUS PRN
Status: DISCONTINUED | OUTPATIENT
Start: 2017-10-19 | End: 2017-10-19 | Stop reason: HOSPADM

## 2017-10-19 RX ORDER — DIPHENHYDRAMINE HYDROCHLORIDE 50 MG/ML
INJECTION INTRAMUSCULAR; INTRAVENOUS PRN
Status: DISCONTINUED | OUTPATIENT
Start: 2017-10-19 | End: 2017-10-19 | Stop reason: HOSPADM

## 2017-10-19 RX ORDER — ONDANSETRON 2 MG/ML
4 INJECTION INTRAMUSCULAR; INTRAVENOUS
Status: COMPLETED | OUTPATIENT
Start: 2017-10-19 | End: 2017-10-19

## 2017-10-19 RX ORDER — LIDOCAINE 40 MG/G
CREAM TOPICAL
Status: DISCONTINUED | OUTPATIENT
Start: 2017-10-19 | End: 2017-10-19 | Stop reason: HOSPADM

## 2017-10-19 RX ORDER — FENTANYL CITRATE 50 UG/ML
INJECTION, SOLUTION INTRAMUSCULAR; INTRAVENOUS PRN
Status: DISCONTINUED | OUTPATIENT
Start: 2017-10-19 | End: 2017-10-19 | Stop reason: HOSPADM

## 2017-10-19 RX ADMIN — DIPHENHYDRAMINE HYDROCHLORIDE 25 MG: 50 INJECTION, SOLUTION INTRAMUSCULAR; INTRAVENOUS at 11:32

## 2017-10-19 RX ADMIN — FENTANYL CITRATE 25 MCG: 50 INJECTION, SOLUTION INTRAMUSCULAR; INTRAVENOUS at 11:28

## 2017-10-19 RX ADMIN — MIDAZOLAM HYDROCHLORIDE 2 MG: 1 INJECTION, SOLUTION INTRAMUSCULAR; INTRAVENOUS at 11:26

## 2017-10-19 RX ADMIN — FENTANYL CITRATE 25 MCG: 50 INJECTION, SOLUTION INTRAMUSCULAR; INTRAVENOUS at 11:39

## 2017-10-19 RX ADMIN — FENTANYL CITRATE 100 MCG: 50 INJECTION, SOLUTION INTRAMUSCULAR; INTRAVENOUS at 11:25

## 2017-10-19 RX ADMIN — ONDANSETRON 4 MG: 2 INJECTION INTRAMUSCULAR; INTRAVENOUS at 10:10

## 2017-10-19 RX ADMIN — MIDAZOLAM HYDROCHLORIDE 1 MG: 1 INJECTION, SOLUTION INTRAMUSCULAR; INTRAVENOUS at 11:43

## 2017-10-19 RX ADMIN — MIDAZOLAM HYDROCHLORIDE 1 MG: 1 INJECTION, SOLUTION INTRAMUSCULAR; INTRAVENOUS at 11:30

## 2017-10-19 RX ADMIN — SODIUM CHLORIDE 500 ML: 9 INJECTION, SOLUTION INTRAVENOUS at 10:10

## 2017-10-19 NOTE — H&P
Pre-Endoscopy History and Physical     Macey Romero MRN# [id number]   YOB: 1950 Age: 66 year old     Date of Procedure: 10/19/2017  Primary care provider: Long Ferrari  Type of Endoscopy: colonoscopy  Reason for Procedure: screening  Type of Anesthesia Anticipated: Moderate Sedation    HPI:    Macey is a 66 year old female who will be undergoing the above procedure.      A history and physical has been performed. The patient's medications and allergies have been reviewed. The risks and benefits of the procedure including the risk of bleeding, perforation, and missed lesions as well as the sedation options and risks were discussed with the patient.  All questions were answered and informed consent was obtained.      Allergies   Allergen Reactions     Tetanus Toxoids Anaphylaxis     Erythromycin GI Disturbance     Levaquin Other (See Comments)     unknown     Penicillins Hives     Silicone         Current Facility-Administered Medications   Medication     lidocaine 1 % 1 mL     lidocaine (LMX4) cream     sodium chloride (PF) 0.9% PF flush 3 mL     sodium chloride (PF) 0.9% PF flush 3 mL     sodium chloride (PF) 0.9% PF flush 3 mL     0.9% sodium chloride infusion     Facility-Administered Medications Ordered in Other Encounters   Medication     bupivacaine 0.75% in dextrose 8.25% (intrathecal) (SENSORCAINE) 0.75-8.25 % injection       Prescriptions Prior to Admission   Medication Sig Dispense Refill Last Dose     budesonide-formoterol (SYMBICORT) 160-4.5 MCG/ACT Inhaler Inhale 2 puffs into the lungs 2 times daily 3 Inhaler 3 10/19/2017     olopatadine (PATANOL) 0.1 % ophthalmic solution 1 drop 2 times daily   Unknown     methylPREDNISolone (MEDROL DOSEPAK) 4 MG tablet Follow package instructions 21 tablet 0 Unknown     guaiFENesin-codeine (ROBITUSSIN AC) 100-10 MG/5ML SOLN solution Take 5 mLs by mouth every 4 hours as needed for cough 236 mL 0 Unknown     albuterol (PROAIR HFA/PROVENTIL  HFA/VENTOLIN HFA) 108 (90 BASE) MCG/ACT Inhaler Inhale 2 puffs into the lungs every 6 hours as needed for shortness of breath / dyspnea 1 Inhaler 3 Unknown     Acetaminophen (TYLENOL PO) Take 325 mg by mouth 2 times daily as needed for mild pain   Unknown       Patient Active Problem List   Diagnosis     Reactive airway disease     Osteoarthritis     Osteopenia     Malignant neoplasm of female breast (H)     Pain disorder     IBS (irritable bowel syndrome)     Keloid of skin     Myalgia and myositis     Pterygium eye     Fx ankle     Health Care Home     Small bowel obstruction     Hip pain, left     Pneumonia        Past Medical History:   Diagnosis Date     Breast CA in situ 1987     Cancer (H) 1987    Right Breast treated with surgery     Fibromyalgia      Osteoarthritis 2/1/2011     Osteopenia 2/1/2011     Pain disorder 2/1/2011     Pneumonia      PONV (postoperative nausea and vomiting)      Pterygium eye 8/26/2013     Reactive airway disease 2/1/2011        Past Surgical History:   Procedure Laterality Date     anterior c-disc fusion       ENDOSCOPY  04/21/08     HYSTERECTOMY, MARCUS       JOINT REPLACEMTN, KNEE RT/LT  1/2011    Joint Replacement knee RT, with tibial straightenin     MAMMOPLASTY AUGMENTATION BILATERAL      breast ca      MASTECTOMY, SIMPLE RT/LT/CLAIRE  1989    Mastectomy Simple RT/LT/CLAIRE     OPEN REDUCTION INTERNAL FIXATION ANKLE  8/27/2013    Procedure: OPEN REDUCTION INTERNAL FIXATION ANKLE;  RIGHT OPEN REDUCTION INTERNAL FIXATION ANKLE WITH LIGAMENT REPAIR;  Surgeon: Nando Chang MD;  Location:  OR     PTERYGIUM WITH CONJUNCTIVAL AUTOLOGOUS TRANSPLANT Left 8/29/2016    Procedure: PTERYGIUM WITH CONJUNCTIVAL AUTOLOGOUS TRANSPLANT;  Surgeon: Сергей Walters MD;  Location:  [facility]     REPAIR LIGAMENT ANKLE  8/27/2013    Procedure: REPAIR LIGAMENT ANKLE;;  Surgeon: Nando Chang MD;  Location:  OR       Social History   Substance Use Topics     Smoking status: Former Smoker     Quit  "date: 1/1/2016     Smokeless tobacco: Never Used      Comment: quit but  still smokes, but not in house     Alcohol use No       Family History   Problem Relation Age of Onset     Respiratory Father      CANCER Brother      sarcoidosis     DIABETES Brother      CEREBROVASCULAR DISEASE Brother          PHYSICAL EXAM:   /68  Resp 16  Ht 1.626 m (5' 4\")  Wt 69.9 kg (154 lb)  SpO2 98%  BMI 26.43 kg/m2 Estimated body mass index is 26.43 kg/(m^2) as calculated from the following:    Height as of this encounter: 1.626 m (5' 4\").    Weight as of this encounter: 69.9 kg (154 lb).   Mental status - alert and oriented  RESP: lungs clear  CV: RRR  AIRWAY EXAM: Mallampatti Class II (visualization of the soft palate, fauces, and uvula)    IMPRESSION   ASA Class 1 - Healthy patient, no medical problems      Signed Electronically by: Niko Wong  October 19, 2017    Colorectal Surgery  [phone] (office)  [phone] (pager)  [email or web address]          "

## 2017-10-24 ENCOUNTER — HOSPITAL ENCOUNTER (EMERGENCY)
Facility: CLINIC | Age: 67
Discharge: HOME | End: 2017-10-24
Attending: EMERGENCY MEDICINE | Admitting: EMERGENCY MEDICINE
Payer: MEDICARE

## 2017-10-24 ENCOUNTER — APPOINTMENT (OUTPATIENT)
Dept: CT IMAGING | Facility: CLINIC | Age: 67
End: 2017-10-24
Attending: EMERGENCY MEDICINE
Payer: MEDICARE

## 2017-10-24 VITALS
DIASTOLIC BLOOD PRESSURE: 72 MMHG | HEIGHT: 64 IN | TEMPERATURE: 98.1 F | SYSTOLIC BLOOD PRESSURE: 130 MMHG | OXYGEN SATURATION: 95 % | RESPIRATION RATE: 16 BRPM | WEIGHT: 159 LBS | BODY MASS INDEX: 27.14 KG/M2 | HEART RATE: 82 BPM

## 2017-10-24 DIAGNOSIS — R10.9 ABDOMINAL CRAMPING: ICD-10-CM

## 2017-10-24 LAB
ALBUMIN SERPL-MCNC: 3.9 G/DL (ref 3.4–5)
ALBUMIN UR-MCNC: NEGATIVE MG/DL
ALP SERPL-CCNC: 80 U/L (ref 40–150)
ALT SERPL W P-5'-P-CCNC: 30 U/L (ref 0–50)
ANION GAP SERPL CALCULATED.3IONS-SCNC: 7 MMOL/L (ref 3–14)
APPEARANCE UR: CLEAR
AST SERPL W P-5'-P-CCNC: 19 U/L (ref 0–45)
BASOPHILS # BLD AUTO: 0 10E9/L (ref 0–0.2)
BASOPHILS NFR BLD AUTO: 0.4 %
BILIRUB SERPL-MCNC: 0.3 MG/DL (ref 0.2–1.3)
BILIRUB UR QL STRIP: NEGATIVE
BUN SERPL-MCNC: 18 MG/DL (ref 7–30)
CALCIUM SERPL-MCNC: 9.5 MG/DL (ref 8.5–10.1)
CHLORIDE SERPL-SCNC: 100 MMOL/L (ref 94–109)
CO2 SERPL-SCNC: 32 MMOL/L (ref 20–32)
COLOR UR AUTO: YELLOW
CREAT SERPL-MCNC: 0.98 MG/DL (ref 0.52–1.04)
DIFFERENTIAL METHOD BLD: NORMAL
EOSINOPHIL # BLD AUTO: 0.2 10E9/L (ref 0–0.7)
EOSINOPHIL NFR BLD AUTO: 2.7 %
ERYTHROCYTE [DISTWIDTH] IN BLOOD BY AUTOMATED COUNT: 13.4 % (ref 10–15)
GFR SERPL CREATININE-BSD FRML MDRD: 56 ML/MIN/1.7M2
GLUCOSE SERPL-MCNC: 97 MG/DL (ref 70–99)
GLUCOSE UR STRIP-MCNC: NEGATIVE MG/DL
HCT VFR BLD AUTO: 42.8 % (ref 35–47)
HGB BLD-MCNC: 15 G/DL (ref 11.7–15.7)
HGB UR QL STRIP: ABNORMAL
IMM GRANULOCYTES # BLD: 0 10E9/L (ref 0–0.4)
IMM GRANULOCYTES NFR BLD: 0.1 %
KETONES UR STRIP-MCNC: NEGATIVE MG/DL
LEUKOCYTE ESTERASE UR QL STRIP: ABNORMAL
LIPASE SERPL-CCNC: 107 U/L (ref 73–393)
LYMPHOCYTES # BLD AUTO: 3.2 10E9/L (ref 0.8–5.3)
LYMPHOCYTES NFR BLD AUTO: 40.7 %
MCH RBC QN AUTO: 29 PG (ref 26.5–33)
MCHC RBC AUTO-ENTMCNC: 35 G/DL (ref 31.5–36.5)
MCV RBC AUTO: 83 FL (ref 78–100)
MONOCYTES # BLD AUTO: 0.7 10E9/L (ref 0–1.3)
MONOCYTES NFR BLD AUTO: 9.5 %
NEUTROPHILS # BLD AUTO: 3.6 10E9/L (ref 1.6–8.3)
NEUTROPHILS NFR BLD AUTO: 46.6 %
NITRATE UR QL: NEGATIVE
NON-SQ EPI CELLS #/AREA URNS LPF: ABNORMAL /LPF
NRBC # BLD AUTO: 0 10*3/UL
NRBC BLD AUTO-RTO: 0 /100
PH UR STRIP: 7 PH (ref 5–7)
PLATELET # BLD AUTO: 196 10E9/L (ref 150–450)
POTASSIUM SERPL-SCNC: 3.6 MMOL/L (ref 3.4–5.3)
PROT SERPL-MCNC: 7.5 G/DL (ref 6.8–8.8)
RBC # BLD AUTO: 5.18 10E12/L (ref 3.8–5.2)
RBC #/AREA URNS AUTO: ABNORMAL /HPF
SODIUM SERPL-SCNC: 139 MMOL/L (ref 133–144)
SOURCE: ABNORMAL
SP GR UR STRIP: 1.01 (ref 1–1.03)
UROBILINOGEN UR STRIP-ACNC: 0.2 EU/DL (ref 0.2–1)
WBC # BLD AUTO: 7.8 10E9/L (ref 4–11)
WBC #/AREA URNS AUTO: ABNORMAL /HPF

## 2017-10-24 PROCEDURE — 85025 COMPLETE CBC W/AUTO DIFF WBC: CPT | Performed by: EMERGENCY MEDICINE

## 2017-10-24 PROCEDURE — 96375 TX/PRO/DX INJ NEW DRUG ADDON: CPT

## 2017-10-24 PROCEDURE — 25000125 ZZHC RX 250: Performed by: EMERGENCY MEDICINE

## 2017-10-24 PROCEDURE — 83690 ASSAY OF LIPASE: CPT | Performed by: EMERGENCY MEDICINE

## 2017-10-24 PROCEDURE — 99285 EMERGENCY DEPT VISIT HI MDM: CPT | Mod: 25

## 2017-10-24 PROCEDURE — 74177 CT ABD & PELVIS W/CONTRAST: CPT

## 2017-10-24 PROCEDURE — 80053 COMPREHEN METABOLIC PANEL: CPT | Performed by: EMERGENCY MEDICINE

## 2017-10-24 PROCEDURE — 96376 TX/PRO/DX INJ SAME DRUG ADON: CPT

## 2017-10-24 PROCEDURE — 25000132 ZZH RX MED GY IP 250 OP 250 PS 637: Mod: GY | Performed by: EMERGENCY MEDICINE

## 2017-10-24 PROCEDURE — 96361 HYDRATE IV INFUSION ADD-ON: CPT

## 2017-10-24 PROCEDURE — 81001 URINALYSIS AUTO W/SCOPE: CPT | Performed by: EMERGENCY MEDICINE

## 2017-10-24 PROCEDURE — 25000128 H RX IP 250 OP 636: Performed by: EMERGENCY MEDICINE

## 2017-10-24 PROCEDURE — 96374 THER/PROPH/DIAG INJ IV PUSH: CPT | Mod: 59

## 2017-10-24 RX ORDER — IOPAMIDOL 755 MG/ML
80 INJECTION, SOLUTION INTRAVASCULAR ONCE
Status: COMPLETED | OUTPATIENT
Start: 2017-10-24 | End: 2017-10-24

## 2017-10-24 RX ORDER — DICYCLOMINE HCL 20 MG
20 TABLET ORAL 2 TIMES DAILY
Qty: 20 TABLET | Refills: 0 | Status: SHIPPED | OUTPATIENT
Start: 2017-10-24 | End: 2017-11-03

## 2017-10-24 RX ORDER — MORPHINE SULFATE 4 MG/ML
4 INJECTION, SOLUTION INTRAMUSCULAR; INTRAVENOUS ONCE
Status: COMPLETED | OUTPATIENT
Start: 2017-10-24 | End: 2017-10-24

## 2017-10-24 RX ORDER — ONDANSETRON 2 MG/ML
4 INJECTION INTRAMUSCULAR; INTRAVENOUS ONCE
Status: COMPLETED | OUTPATIENT
Start: 2017-10-24 | End: 2017-10-24

## 2017-10-24 RX ORDER — ONDANSETRON 4 MG/1
4 TABLET, ORALLY DISINTEGRATING ORAL EVERY 8 HOURS PRN
Qty: 10 TABLET | Refills: 0 | Status: SHIPPED | OUTPATIENT
Start: 2017-10-24 | End: 2018-03-12

## 2017-10-24 RX ORDER — DICYCLOMINE HCL 20 MG
20 TABLET ORAL ONCE
Status: COMPLETED | OUTPATIENT
Start: 2017-10-24 | End: 2017-10-24

## 2017-10-24 RX ADMIN — DICYCLOMINE HYDROCHLORIDE 20 MG: 20 TABLET ORAL at 21:38

## 2017-10-24 RX ADMIN — SODIUM CHLORIDE 64 ML: 9 INJECTION, SOLUTION INTRAVENOUS at 20:17

## 2017-10-24 RX ADMIN — MORPHINE SULFATE 4 MG: 4 INJECTION, SOLUTION INTRAMUSCULAR; INTRAVENOUS at 19:48

## 2017-10-24 RX ADMIN — ONDANSETRON 4 MG: 2 SOLUTION INTRAMUSCULAR; INTRAVENOUS at 19:13

## 2017-10-24 RX ADMIN — ONDANSETRON 4 MG: 2 SOLUTION INTRAMUSCULAR; INTRAVENOUS at 19:47

## 2017-10-24 RX ADMIN — IOPAMIDOL 80 ML: 755 INJECTION, SOLUTION INTRAVENOUS at 20:16

## 2017-10-24 RX ADMIN — SODIUM CHLORIDE 1000 ML: 9 INJECTION, SOLUTION INTRAVENOUS at 19:13

## 2017-10-24 ASSESSMENT — ENCOUNTER SYMPTOMS
APPETITE CHANGE: 0
ABDOMINAL DISTENTION: 1
FEVER: 1
ABDOMINAL PAIN: 1
VOMITING: 0
NAUSEA: 0
CONSTIPATION: 1
CHILLS: 1

## 2017-10-24 NOTE — ED AVS SNAPSHOT
Emergency Department    [address] [facility] [address]    Phone:  [phone]    Fax:  [phone]                                       Macey Romero   MRN: [id number]    Department:   Emergency Department   Date of Visit:  10/24/2017           Patient Information     Date Of Birth          1950        Your diagnoses for this visit were:     Abdominal cramping        You were seen by Trierweiler, Chad A, MD.      Follow-up Information     Follow up with Long Ferrari MD In 2 days.    Specialty:  Family Practice    Contact information:    [address] [facility] [address] [phone]          Follow up with  Emergency Department.    Specialty:  EMERGENCY MEDICINE    Why:  If symptoms worsen    Contact information:    [address] [facility] [address] [phone]        Discharge Instructions         *Abdominal Pain, Unknown Cause (Female)    The exact cause of your abdominal (stomach) pain is not certain. This does not mean that this is something to worry about, or the right tests were not done. Everyone likes to know the exact cause of the problem, but sometimes with abdominal pain, there is no clear-cut cause, and this could be a good thing. The good news is that your symptoms can be treated, and you will feel better.   Your condition does not seem serious now; however, sometimes the signs of a serious problem may take more time to appear. For this reason, it is important for you to watch for any new symptoms, problems, or worsening of your condition.  Over the next few days, the abdominal pain may come and go, or be continuous. Other common symptoms can include nausea and vomiting. Sometimes it can be difficult to tell if you feel nauseous, you may just feel bad and not associate that feeling with nausea. Constipation, diarrhea, and a fever may go along with the pain.  The pain may continue even if treated correctly over the following days.  Depending on how things go, sometimes the cause can become clear and may require further or different treatment. Additional evaluations, medications, or tests may be needed.  Home care  Your health care provider may prescribe medications for pain, symptoms, or an infection.  Follow the health care provider's instructions for taking these medications.  General care    Rest until your next exam. No strenuous activities.    Try to find positions that ease discomfort. A small pillow placed on the abdomen may help relieve pain.    Something warm on your abdomen (such as a heating pad) may help, but be careful not to burn yourself.  Diet    Do not force yourself to eat, especially if having cramps, vomiting, or diarrhea.    Water is important so you do not get dehydrated. Soup may also be good. Sports drinks may also help, especially if they are not too acidic. Make sure you don't drink sugary drinks as this can make things worse. Take liquids in small amounts. Do not guzzle them.    Caffeine sometimes makes the pain and cramping worse.    Avoid dairy products if you have vomiting or diarrhea.    Don't eat large amounts at a time. Wait a few minutes between bites.    Eat a diet low in fiber (called a low-residue diet). Foods allowed include refined breads, white rice, fruit and vegetable juices without pulp, tender meats. These foods will pass more easily through the intestine.    Avoid fried or fatty foods, dairy, alcohol and spicy foods until your symptoms go away.  Follow-up care  Follow up with your health care provider as instructed, or if your pain does not begin to improve in the next 24 hours.  When to seek medical care  Seek prompt medical care if any of the following occur:    Pain gets worse or moves to the right lower abdomen    New or worsening vomiting or diarrhea    Swelling of the abdomen    Unable to pass stool for more than three days    New fever over 101  F (38.3 C), or rising fever    Blood in vomit  or bowel movements (dark red or black color)    Jaundice (yellow color of eyes and skin)    Weakness, dizziness    Chest, arm, back, neck or jaw pain    Unexpected vaginal bleeding or missed period  Call 911  Call emergency services if any of the following occur:    Trouble breathing    Confusion    Fainting or loss of consciousness    Rapid heart rate    Seizure    [id number] [employer] Beaulieu, [address] [facility], [place] [address]. All rights reserved. This information is not intended as a substitute for professional medical care. Always follow your healthcare professional's instructions.          Future Appointments        Provider Department Dept Phone Center    12/15/2017 9:45 AM Long Ferrari MD [facility] [phone] [facility]      24 Hour Appointment Hotline       To make an appointment at any [facility], call [phone] ([phone]). If you don't have a family doctor or clinic, we will help you find one. [facility] clinics are conveniently located to serve the needs of you and your family.             Review of your medicines      START taking        Dose / Directions Last dose taken    dicyclomine 20 MG tablet   Commonly known as:  BENTYL   Dose:  20 mg   Quantity:  20 tablet        Take 1 tablet (20 mg) by mouth 2 times daily for 10 days   Refills:  0        ondansetron 4 MG ODT tab   Commonly known as:  ZOFRAN ODT   Dose:  4 mg   Quantity:  10 tablet        Take 1 tablet (4 mg) by mouth every 8 hours as needed   Refills:  0          Our records show that you are taking the medicines listed below. If these are incorrect, please call your family doctor or clinic.        Dose / Directions Last dose taken    albuterol 108 (90 BASE) MCG/ACT Inhaler   Commonly known as:  PROAIR HFA/PROVENTIL HFA/VENTOLIN HFA   Dose:  2 puff   Quantity:  1 Inhaler        Inhale 2 puffs into the lungs every 6 hours as needed for shortness of breath / dyspnea   Refills:  3         budesonide-formoterol 160-4.5 MCG/ACT Inhaler   Commonly known as:  SYMBICORT   Dose:  2 puff   Quantity:  3 Inhaler        Inhale 2 puffs into the lungs 2 times daily   Refills:  3        guaiFENesin-codeine 100-10 MG/5ML Soln solution   Commonly known as:  ROBITUSSIN AC   Dose:  1 tsp.   Quantity:  236 mL        Take 5 mLs by mouth every 4 hours as needed for cough   Refills:  0        methylPREDNISolone 4 MG tablet   Commonly known as:  MEDROL DOSEPAK   Quantity:  21 tablet        Follow package instructions   Refills:  0        olopatadine 0.1 % ophthalmic solution   Commonly known as:  PATANOL   Dose:  1 drop        1 drop 2 times daily   Refills:  0        TYLENOL PO   Dose:  325 mg   Indication:  Pain        Take 325 mg by mouth 2 times daily as needed for mild pain   Refills:  0                Prescriptions were sent or printed at these locations (2 Prescriptions)                   Other Prescriptions                Printed at Department/Unit printer (2 of 2)         dicyclomine (BENTYL) 20 MG tablet               ondansetron (ZOFRAN ODT) 4 MG ODT tab                Procedures and tests performed during your visit     *UA reflex to Microscopic    CBC with platelets differential    CT Abdomen Pelvis w Contrast    Comprehensive metabolic panel    Lipase    Urine Microscopic      Orders Needing Specimen Collection     None      Pending Results     No orders found from 10/22/2017 to 10/25/2017.            Pending Culture Results     No orders found from 10/22/2017 to 10/25/2017.            Pending Results Instructions     If you had any lab results that were not finalized at the time of your Discharge, you can call the ED Lab Result RN at [phone]. You will be contacted by this team for any positive Lab results or changes in treatment. The nurses are available 7 days a week from 10A to 6:30P.  You can leave a message 24 hours per day and they will return your call.        Test Results From Your Hospital Stay         10/24/2017  7:03 PM      Component Results     Component Value Ref Range & Units Status    WBC 7.8 4.0 - 11.0 10e9/L Final    RBC Count 5.18 3.8 - 5.2 10e12/L Final    Hemoglobin 15.0 11.7 - 15.7 g/dL Final    Hematocrit 42.8 35.0 - 47.0 % Final    MCV 83 78 - 100 fl Final    MCH 29.0 26.5 - 33.0 pg Final    MCHC 35.0 31.5 - 36.5 g/dL Final    RDW 13.4 10.0 - 15.0 % Final    Platelet Count 196 150 - 450 10e9/L Final    Diff Method Automated Method  Final    % Neutrophils 46.6 % Final    % Lymphocytes 40.7 % Final    % Monocytes 9.5 % Final    % Eosinophils 2.7 % Final    % Basophils 0.4 % Final    % Immature Granulocytes 0.1 % Final    Nucleated RBCs 0 0 /100 Final    Absolute Neutrophil 3.6 1.6 - 8.3 10e9/L Final    Absolute Lymphocytes 3.2 0.8 - 5.3 10e9/L Final    Absolute Monocytes 0.7 0.0 - 1.3 10e9/L Final    Absolute Eosinophils 0.2 0.0 - 0.7 10e9/L Final    Absolute Basophils 0.0 0.0 - 0.2 10e9/L Final    Abs Immature Granulocytes 0.0 0 - 0.4 10e9/L Final    Absolute Nucleated RBC 0.0  Final         10/24/2017  7:20 PM      Component Results     Component Value Ref Range & Units Status    Sodium 139 133 - 144 mmol/L Final    Potassium 3.6 3.4 - 5.3 mmol/L Final    Chloride 100 94 - 109 mmol/L Final    Carbon Dioxide 32 20 - 32 mmol/L Final    Anion Gap 7 3 - 14 mmol/L Final    Glucose 97 70 - 99 mg/dL Final    Urea Nitrogen 18 7 - 30 mg/dL Final    Creatinine 0.98 0.52 - 1.04 mg/dL Final    GFR Estimate 56 (L) >60 mL/min/1.7m2 Final    Non  GFR Calc    GFR Estimate If Black 68 >60 mL/min/1.7m2 Final    African American GFR Calc    Calcium 9.5 8.5 - 10.1 mg/dL Final    Bilirubin Total 0.3 0.2 - 1.3 mg/dL Final    Albumin 3.9 3.4 - 5.0 g/dL Final    Protein Total 7.5 6.8 - 8.8 g/dL Final    Alkaline Phosphatase 80 40 - 150 U/L Final    ALT 30 0 - 50 U/L Final    AST 19 0 - 45 U/L Final         10/24/2017  7:18 PM      Component Results     Component Value Ref Range & Units Status     Lipase 107 73 - 393 U/L Final         10/24/2017  7:01 PM      Component Results     Component Value Ref Range & Units Status    Color Urine Yellow  Final    Appearance Urine Clear  Final    Glucose Urine Negative NEG^Negative mg/dL Final    Bilirubin Urine Negative NEG^Negative Final    Ketones Urine Negative NEG^Negative mg/dL Final    Specific Gravity Urine 1.015 1.003 - 1.035 Final    Blood Urine Trace (A) NEG^Negative Final    pH Urine 7.0 5.0 - 7.0 pH Final    Protein Albumin Urine Negative NEG^Negative mg/dL Final    Urobilinogen Urine 0.2 0.2 - 1.0 EU/dL Final    Nitrite Urine Negative NEG^Negative Final    Leukocyte Esterase Urine Trace (A) NEG^Negative Final    Source Midstream Urine  Final         10/24/2017  7:01 PM      Component Results     Component Value Ref Range & Units Status    WBC Urine O - 2 OTO2^O - 2 /HPF Final    RBC Urine O - 2 OTO2^O - 2 /HPF Final    Squamous Epithelial /LPF Urine Moderate (A) FEW^Few /LPF Final         10/24/2017  9:18 PM      Narrative     CT ABDOMEN AND PELVIS WITH CONTRAST 10/24/2017 8:20 PM     HISTORY: Diffuse abdominal pain, distention. Evaluate for SBO.    COMPARISON: 4/7/2017    TECHNIQUE: Volumetric helical acquisition of CT images from the lung  bases through the symphysis pubis after the administration of 80 mL  Isovue 370 intravenous contrast. Radiation dose for this scan was  reduced using automated exposure control, adjustment of the mA and/or  kV according to patient size, or iterative reconstruction technique.    FINDINGS: The liver, bilateral kidneys and adrenal glands, pancreas,  and spleen demonstrate no worrisome focal lesion. No hydronephrosis.  There are mild atherosclerotic changes of the visualized aorta and its  branches. There is no evidence of aortic dissection or aneurysm.  Appendix not confidently identified. Hysterectomy changes. No  diverticulitis. There is postsurgical change of a cholecystectomy.  There is some prominence to the biliary  system, although this is  commonly seen post cholecystectomy and may not have clinical  significance. There is no free fluid in the abdomen or pelvis. No free  air in the abdomen. Bone windows reveal no destructive lesions. There  are no abdominal or pelvic lymph nodes that are abnormal by size  criteria. The visualized lung bases are unremarkable. There are no  dilated loops of small bowel or colon.        Impression     IMPRESSION: No acute process demonstrated within the abdomen and  pelvis.    SHYANN GERARD MD                Clinical Quality Measure: Blood Pressure Screening     Your blood pressure was checked while you were in the emergency department today. The last reading we obtained was  BP: 130/72 . Please read the guidelines below about what these numbers mean and what you should do about them.  If your systolic blood pressure (the top number) is less than 120 and your diastolic blood pressure (the bottom number) is less than 80, then your blood pressure is normal. There is nothing more that you need to do about it.  If your systolic blood pressure (the top number) is 120-139 or your diastolic blood pressure (the bottom number) is 80-89, your blood pressure may be higher than it should be. You should have your blood pressure rechecked within a year by a primary care provider.  If your systolic blood pressure (the top number) is 140 or greater or your diastolic blood pressure (the bottom number) is 90 or greater, you may have high blood pressure. High blood pressure is treatable, but if left untreated over time it can put you at risk for heart attack, stroke, or kidney failure. You should have your blood pressure rechecked by a primary care provider within the next 4 weeks.  If your provider in the emergency department today gave you specific instructions to follow-up with your doctor or provider even sooner than that, you should follow that instruction and not wait for up to 4 weeks for your follow-up  visit.        Thank you for choosing [facility]       Thank you for choosing [facility] for your care. Our goal is always to provide you with excellent care. Hearing back from our patients is one way we can continue to improve our services. Please take a few minutes to complete the written survey that you may receive in the mail after you visit with us. Thank you!        [facility] Information     [email or web address] gives you secure access to your electronic health record. If you see a primary care provider, you can also send messages to your care team and make appointments. If you have questions, please call your primary care clinic.  If you do not have a primary care provider, please call [phone] and they will assist you.        Care EveryWhere ID     This is your Care EveryWhere ID. This could be used by other organizations to access your [facility] medical records  [id number]        Equal Access to Services     NATALIE DOSS : Dane Mueller, leida diaz, jacquelin mayo. So [facility] [place] [phone].    ATENCIÓN: Si habla español, tiene a hoyos disposición servicios gratuitos de asistencia lingüística. Llame al [phone].    We comply with applicable federal civil rights laws and [place] laws. We do not discriminate on the basis of race, color, national origin, age, disability, sex, sexual orientation, or gender identity.            After Visit Summary       This is your record. Keep this with you and show to your community pharmacist(s) and doctor(s) at your next visit.

## 2017-10-24 NOTE — ED PROVIDER NOTES
"  History     Chief Complaint:  abdominal pain     HPI   Macey Romero is a 66 year old female with a history of IBS and SBO and who is status post [facility] who presents for evaluation of abdominal pain. The patient had colonoscopy five days ago which showed only diverticula. She has not defecated since that colonoscopy and states she has not passed significant flatus. She has tried miralax, colace, and milk of magnesia without relief. She endorses diffuse abdominal pain with mild bloating, and subjective fever and chills. However, this differs from symptoms with her previous SBO which was \"sharp, stabbing\" pain. The patient denies any vomiting, urinary symptoms, or any other acute symptoms.         Allergies:  Tetanus Toxoids (anaphylaxis)  Erythromycin (GI upset)  Levaquin  Penicillins (hives)  Silicone     Medications:    Robitussin  Symbicort  Albuterol  Tylenol     Past Medical History:    irritable bowel syndrome   Hx SBO  Osteoarthritis  Osteopenia   Ca, breast  Reactive airway disease   Fibromyalgia     Past Surgical History:    MARCUS  Mastectomy, bilateral   ORIF ankle  TKA, bilateral   Cholecystectomy    Family History:    Sarcoidosis (brother)  Cerebrovascular disease (brother)    Social History:  Former smoker. Non-drinker.  Marital Status:   [2]      Review of Systems   Constitutional: Positive for chills and fever. Negative for appetite change.   Gastrointestinal: Positive for abdominal distention, abdominal pain and constipation. Negative for nausea and vomiting.   Genitourinary: Negative.    All other systems reviewed and are negative.      Physical Exam     Patient Vitals for the past 24 hrs:   BP Temp Temp src Pulse Resp SpO2 Height Weight   10/24/17 2132 - - - - - 95 % - -   10/24/17 2120 - - - - - 95 % - -   10/24/17 2000 130/72 - - - - 95 % - -   10/24/17 1930 133/72 - - - - - - -   10/24/17 1915 - - - - - 96 % - -   10/24/17 1912 - - - - - 93 % - -   10/24/17 1911 133/76 - - - - - - - " "  10/24/17 1747 156/80 98.1  F (36.7  C) Temporal 82 16 98 % 1.626 m (5' 4\") 72.1 kg (159 lb)      Physical Exam  Eye:  Pupils are equal, round, and reactive.  Extraocular movements intact.    ENT:  No rhinorrhea.  Moist mucus membranes.  Normal tongue and tonsil.    Cardiac:  Regular rate and rhythm.  No murmurs, gallops, or rubs.    Pulmonary:  Clear to auscultation bilaterally.  No wheezes, rales, or rhonchi.    Abdomen:  Mild diffuse distension and tenderness. Hyperactive bowel sounds noted    Musculoskeletal:  Normal movement of all extremities without evidence for deficit.    Skin:  Warm and dry without rashes.    Neurologic:  Non-focal exam without asymmetric weakness or numbness.     Psychiatric:  Normal affect with appropriate interaction with examiner.       Emergency Department Course   Imaging:  Radiographic findings were communicated with the patient who voiced understanding of the findings.  CT Abdomen/Pelvis w/ contrast:  No acute process demonstrated within the abdomen and pelvis. Results per Radiology.      Laboratory:  Laboratory findings were communicated with the patient who voiced understanding of the findings.  CBC w/diff: WNL (WBC 7.8, Hgb 15.0, Plt 196)  CMP: eGFR 56 (L), o/w WNL (Cr 0.98)  Lipase: 107 (WNL)   UA: Trace blood, trace leukocyte esterase, moderate squams, o/w Negative    Interventions:  1913: Normal Saline 0.9% 1L, IV   1913: ondansetron 4mg, IV  1947: ondansetron 4mg, IV  1948: morphine 4mg, IV   2138: bentyl 20mg, PO    Emergency Department Course:  Past medical records, nursing notes, and vitals reviewed. Placed on monitors.   1948: I performed an exam of the patient as documented above.   Peripheral IV access established. Blood drawn and sent. The above imaging study and labs were obtained. The patient was given the above interventions with improvement.  2100: I rechecked patient.  Clinical findings and plan explained to the Patient. Patient discharged home with instructions " regarding supportive care, medications, and reasons to return as well as the importance of close follow-up were reviewed.       Impression & Plan    Medical Decision Making:  Macey Romero is a 66 year old female who presented with concerns of having abdominal bloating and cramping after undergoing a colonoscopy last week.  She was concerned when she did not have a bowel movement through the weekend, and noted that she felt intense upper abdominal pressure that was worse with eating.  She has a long history of irritable bowel syndrome, noting she will typically go several days without having a bowel movement followed by having constipation and then multiple liquid stools.  However, she also has a history of small bowel obstruction and therefore further workup was initiated.  Laboratory investigation was unremarkable.  CT does not show any signs of acute intra-abdominal pathology.  With this, she shows stool all the way through the colon and I explained that she will likely be having bowel movements again in the very near future.  We will use Bentyl to help with her abdominal spasms and Zofran to help with nausea.  I advised her to avoid exceedingly strong laxatives.  Otherwise, she was advised to follow-up with her primary doctor by the end of the week if she continues to have no success with stooling with immediate return to us for any worsening of condition or other emergent concerns.      Diagnosis:    ICD-10-CM    1. Abdominal cramping R10.9        Disposition:  discharged to home    Discharge Medications:  New Prescriptions    DICYCLOMINE (BENTYL) 20 MG TABLET    Take 1 tablet (20 mg) by mouth 2 times daily for 10 days    ONDANSETRON (ZOFRAN ODT) 4 MG ODT TAB    Take 1 tablet (4 mg) by mouth every 8 hours as needed       Mario Alberto PEDRO am serving as a scribe at 6:21 PM on 10/24/2017 to document services personally performed by Trierweiler, Chad A, MD based on my observations and the provider's statements to  me.      Mario Alberto Campos  10/24/2017    EMERGENCY DEPARTMENT       Trierweiler, Chad A, MD  10/25/17 0027

## 2017-10-24 NOTE — ED AVS SNAPSHOT
Emergency Department    [address] [place] [address]    Phone:  [phone]    Fax:  [phone]                                       Macey Romero   MRN: [id number]    Department:   Emergency Department   Date of Visit:  10/24/2017           After Visit Summary Signature Page     I have received my discharge instructions, and my questions have been answered. I have discussed any challenges I see with this plan with the nurse or doctor.    ..........................................................................................................................................  Patient/Patient Representative Signature      ..........................................................................................................................................  Patient Representative Print Name and Relationship to Patient    ..................................................               ................................................  Date                                            Time    ..........................................................................................................................................  Reviewed by Signature/Title    ...................................................              ..............................................  Date                                                            Time

## 2017-10-25 NOTE — DISCHARGE INSTRUCTIONS
*Abdominal Pain, Unknown Cause (Female)    The exact cause of your abdominal (stomach) pain is not certain. This does not mean that this is something to worry about, or the right tests were not done. Everyone likes to know the exact cause of the problem, but sometimes with abdominal pain, there is no clear-cut cause, and this could be a good thing. The good news is that your symptoms can be treated, and you will feel better.   Your condition does not seem serious now; however, sometimes the signs of a serious problem may take more time to appear. For this reason, it is important for you to watch for any new symptoms, problems, or worsening of your condition.  Over the next few days, the abdominal pain may come and go, or be continuous. Other common symptoms can include nausea and vomiting. Sometimes it can be difficult to tell if you feel nauseous, you may just feel bad and not associate that feeling with nausea. Constipation, diarrhea, and a fever may go along with the pain.  The pain may continue even if treated correctly over the following days. Depending on how things go, sometimes the cause can become clear and may require further or different treatment. Additional evaluations, medications, or tests may be needed.  Home care  Your health care provider may prescribe medications for pain, symptoms, or an infection.  Follow the health care provider's instructions for taking these medications.  General care    Rest until your next exam. No strenuous activities.    Try to find positions that ease discomfort. A small pillow placed on the abdomen may help relieve pain.    Something warm on your abdomen (such as a heating pad) may help, but be careful not to burn yourself.  Diet    Do not force yourself to eat, especially if having cramps, vomiting, or diarrhea.    Water is important so you do not get dehydrated. Soup may also be good. Sports drinks may also help, especially if they are not too acidic. Make sure you  don't drink sugary drinks as this can make things worse. Take liquids in small amounts. Do not guzzle them.    Caffeine sometimes makes the pain and cramping worse.    Avoid dairy products if you have vomiting or diarrhea.    Don't eat large amounts at a time. Wait a few minutes between bites.    Eat a diet low in fiber (called a low-residue diet). Foods allowed include refined breads, white rice, fruit and vegetable juices without pulp, tender meats. These foods will pass more easily through the intestine.    Avoid fried or fatty foods, dairy, alcohol and spicy foods until your symptoms go away.  Follow-up care  Follow up with your health care provider as instructed, or if your pain does not begin to improve in the next 24 hours.  When to seek medical care  Seek prompt medical care if any of the following occur:    Pain gets worse or moves to the right lower abdomen    New or worsening vomiting or diarrhea    Swelling of the abdomen    Unable to pass stool for more than three days    New fever over 101  F (38.3 C), or rising fever    Blood in vomit or bowel movements (dark red or black color)    Jaundice (yellow color of eyes and skin)    Weakness, dizziness    Chest, arm, back, neck or jaw pain    Unexpected vaginal bleeding or missed period  Call 911  Call emergency services if any of the following occur:    Trouble breathing    Confusion    Fainting or loss of consciousness    Rapid heart rate    Seizure    [id number] Simran MillerP[facility], [address] [place] [address], [place] [address]. All rights reserved. This information is not intended as a substitute for professional medical care. Always follow your healthcare professional's instructions.

## 2017-10-26 ENCOUNTER — CARE COORDINATION (OUTPATIENT)
Dept: CARE COORDINATION | Facility: CLINIC | Age: 67
End: 2017-10-26

## 2017-10-26 RX ORDER — NEOMYCIN SULFATE, POLYMYXIN B SULFATE AND DEXAMETHASONE 3.5; 10000; 1 MG/ML; [USP'U]/ML; MG/ML
SUSPENSION/ DROPS OPHTHALMIC
Refills: 1 | COMMUNITY
Start: 2017-04-14 | End: 2018-05-30

## 2017-10-26 RX ORDER — VALACYCLOVIR HYDROCHLORIDE 1 G/1
TABLET, FILM COATED ORAL PRN
COMMUNITY
Start: 2016-12-08 | End: 2018-05-30

## 2017-10-26 RX ORDER — DOXYCYCLINE 100 MG/1
CAPSULE ORAL
Refills: 0 | COMMUNITY
Start: 2017-09-14 | End: 2017-12-15

## 2017-10-26 NOTE — PROGRESS NOTES
"ED/Discharge Protocol    \"Hi, my name is @ Rachel Wong MA CC  @, a Care Coordinator, and I am calling on behalf of Dr. Ferrari, Long Moscoso's office at [facility].  I am calling to follow up and see how things are going for you after your recent visit.\"    \"I see that you were in the (ER/UC/IP) on ER 10/24/17 abdomen cramping.    How are you doing now that you are home?\" had 2 BM's  Since -  newly on weight Watcher's program    Is patient experiencing symptoms that may require a hospital visit?  no    Discharge Instructions    \"Let's review your discharge instructions.  What is/are the follow-up recommendations?  Pt. Response: recommend to f/up with GI for results & consequences with ER    \"Were you instructed to make a follow-up appointment?\"  Pt. Response: No.       \"When you see the provider, I would recommend that you bring your discharge instructions with you. And bring medications/or list along with you.    Medications    \"How many new medications are you on since your hospitalization/ED visit?\"    0-1  \"How many of your current medicines changed (dose, timing, name, etc.) while you were in the hospital/ED visit?\"   No changes  \"Do you have questions about your medications?\"   Wants to go over colonoscopy   \"Were you newly diagnosed with heart failure, COPD, diabetes, dvt, blood clot, pulmonary emboli or did you have a heart attack?\"   No  For patients on insulin: \"Did you start on insulin in the hospital or did you have your insulin dose changed?\"   No    Medication reconciliation completed? Yes    Was MTM referral placed (*Make sure to put transitions as reason for referral)?   No    Call Summary    \"Do you have any questions or concerns about your condition or care plan at the moment?\"    Yes recommended to f/up with GI doctor- no recollection of verbal instructions while under anesthia or Dr Ferrari  Triage advice given: prn will follow up with PCP             yes will continue with ED " "advise             yes will  contact hospital, if symptoms return or get worse    Patient was in ER 2 in the past year (assess appropriateness of ER visits.)      \"If you have questions or things don't continue to improve, we encourage you contact us through the main clinic number,  [phone]. If the clinic is not open, the on-call provider is available to help you.     \"Thank you for your time and take care!\"              "

## 2017-12-15 ENCOUNTER — OFFICE VISIT (OUTPATIENT)
Dept: FAMILY MEDICINE | Facility: CLINIC | Age: 67
End: 2017-12-15

## 2017-12-15 VITALS
RESPIRATION RATE: 16 BRPM | WEIGHT: 149 LBS | BODY MASS INDEX: 25.44 KG/M2 | OXYGEN SATURATION: 97 % | HEART RATE: 88 BPM | HEIGHT: 64 IN | SYSTOLIC BLOOD PRESSURE: 104 MMHG | DIASTOLIC BLOOD PRESSURE: 52 MMHG

## 2017-12-15 DIAGNOSIS — J43.9 PULMONARY EMPHYSEMA, UNSPECIFIED EMPHYSEMA TYPE (H): ICD-10-CM

## 2017-12-15 DIAGNOSIS — Z23 NEED FOR 23-POLYVALENT PNEUMOCOCCAL POLYSACCHARIDE VACCINE: ICD-10-CM

## 2017-12-15 DIAGNOSIS — C50.919 MALIGNANT NEOPLASM OF FEMALE BREAST, UNSPECIFIED ESTROGEN RECEPTOR STATUS, UNSPECIFIED LATERALITY, UNSPECIFIED SITE OF BREAST (H): ICD-10-CM

## 2017-12-15 DIAGNOSIS — R73.9 HYPERGLYCEMIA: ICD-10-CM

## 2017-12-15 DIAGNOSIS — Z13.220 LIPID SCREENING: ICD-10-CM

## 2017-12-15 DIAGNOSIS — K59.00 CONSTIPATION, UNSPECIFIED CONSTIPATION TYPE: ICD-10-CM

## 2017-12-15 DIAGNOSIS — Z23 NEED FOR VACCINATION WITH 13-POLYVALENT PNEUMOCOCCAL CONJUGATE VACCINE: Primary | ICD-10-CM

## 2017-12-15 LAB
% GRANULOCYTES: 49.3 % (ref 42.2–75.2)
HCT VFR BLD AUTO: 46.8 % (ref 35–46)
HEMOGLOBIN: 15.2 G/DL (ref 11.8–15.5)
LYMPHOCYTES NFR BLD AUTO: 41.4 % (ref 20.5–51.1)
MCH RBC QN AUTO: 27.6 PG (ref 27–31)
MCHC RBC AUTO-ENTMCNC: 32.5 G/DL (ref 33–37)
MCV RBC AUTO: 84.8 FL (ref 80–100)
MONOCYTES NFR BLD AUTO: 9.3 % (ref 1.7–9.3)
PLATELET # BLD AUTO: 208 K/UL (ref 140–450)
RBC # BLD AUTO: 5.52 X10/CMM (ref 3.7–5.2)
WBC # BLD AUTO: 6 X10/CMM (ref 3.8–11)

## 2017-12-15 PROCEDURE — 90670 PCV13 VACCINE IM: CPT | Performed by: FAMILY MEDICINE

## 2017-12-15 PROCEDURE — 85025 COMPLETE CBC W/AUTO DIFF WBC: CPT | Performed by: FAMILY MEDICINE

## 2017-12-15 PROCEDURE — 36415 COLL VENOUS BLD VENIPUNCTURE: CPT | Performed by: FAMILY MEDICINE

## 2017-12-15 PROCEDURE — 99214 OFFICE O/P EST MOD 30 MIN: CPT | Mod: 25 | Performed by: FAMILY MEDICINE

## 2017-12-15 PROCEDURE — G0009 ADMIN PNEUMOCOCCAL VACCINE: HCPCS | Performed by: FAMILY MEDICINE

## 2017-12-15 NOTE — PATIENT INSTRUCTIONS
"Thanks for coming in to see me today!    Your next visit with us should be scheduled for Follow up in 6 months.    Listed next are the medical health Maintenance items we are tracking for your care and when they are next due (or overdue!)  Our goal is 100% \"up to date.\" Keep this list handy to call and schedule what you are due for in the future!    Health Maintenance   Topic Date Due     SPIROMETRY ONETIME  1950     COPD ACTION PLAN Q1 YR  1950     PNEUMOCOCCAL (1 of 2 - PCV13) 11/08/2015     FALL RISK ASSESSMENT  01/04/2017     ASTHMA CONTROL TEST Q6 MOS  07/31/2017     DEXA Q2 YR  09/14/2017     ASTHMA ACTION PLAN Q1 YR  01/31/2018     A1C Q6 MO  03/14/2018     ADVANCE DIRECTIVE PLANNING Q5 YRS  08/26/2018     LIPID MONITORING Q1 YEAR  09/14/2018     FIT-DNA (Cologuard) Q3 YR  10/06/2020     TETANUS IMMUNIZATION (SYSTEM ASSIGNED)  06/04/2022     COLON CANCER SCREEN (SYSTEM ASSIGNED)  10/19/2027     INFLUENZA VACCINE (SYSTEM ASSIGNED)  Addressed     HEPATITIS C SCREENING  Completed         At your visit today, we discussed your risk for falls and preventive options.    Fall Prevention  Falls often occur due to slipping, tripping or losing your balance. Millions of people fall every year and injure themselves. Here are ways to reduce your risk of falling again.    Think about your fall, was there anything that caused your fall that can be fixed, removed, or replaced?    Make your home safe by keeping walkways clear of objects you may trip over.    Use non-slip pads under rugs. Do not use area rugs or small throw rugs.    Use non-slip mats in bathtubs and showers.    Install handrails and lights on staircases.    Do not walk in poorly lit areas.    Do not stand on chairs or wobbly ladders.    Use caution when reaching overhead or looking upward. This position can cause a loss of balance.    Be sure your shoes fit properly, have non-slip bottoms and are in good condition.     Wear shoes both inside and " out. Avoid going barefoot or wearing slippers.    Be cautious when going up and down stairs, curbs, and when walking on uneven sidewalks.    If your balance is poor, consider using a cane or walker.    If your fall was related to alcohol use, stop or limit alcohol intake.     If your fall was related to use of sleeping medicines, talk to your doctor about this. You may need to reduce your dosage at bedtime if you awaken during the night to go to the bathroom.      To reduce the need for nighttime bathroom trips:    Avoid drinking fluids for several hours before going to bed    Empty your bladder before going to bed    Men can keep a urinal at the bedside    Stay as active as you can. Balance, flexibility, strength, and endurance all come from exercise. They all play a role in preventing falls. Ask your healthcare provider which types of activity are right for you.    Get your vision checked on a regular basis.    If you have pets, know where they are before you stand up or walk so you don't trip over them.    Use night lights.  Date Last Reviewed: 11/5/2015 2000-2017 [employer]. [address] [place] [address]. All rights reserved. This information is not intended as a substitute for professional medical care. Always follow your healthcare professional's instructions.

## 2017-12-15 NOTE — PROGRESS NOTES
The patient has had a history of overwieght.  Reviewed the weigth curves.   Their current BMI is:  Body mass index is 25.98 kg/(m^2).  Reviewed previous attempts at weight loss which have not been successful in producing prolonged weigth loss.   Discussed current eating and exercise habits.     Reviewed weights in chart:  Wt Readings from Last 10 Encounters:   12/15/17 67.6 kg (149 lb)   10/24/17 72.1 kg (159 lb)   10/19/17 69.9 kg (154 lb)   09/14/17 72.6 kg (160 lb)   05/18/17 72.1 kg (159 lb)   04/07/17 70.3 kg (155 lb)   03/27/17 72.8 kg (160 lb 9.6 oz)   01/31/17 71.7 kg (158 lb)   11/18/16 67.6 kg (149 lb)   10/28/16 69.4 kg (153 lb)     chief complaint  Constipation -    Onset: since childhood    Description:   Frequency of bowel movements: every 4-6 day    Accompanying Signs & Symptoms:   Abdominal pain: no  Blood in stool: YES- at times     Rectal pain: YES    Nausea/vomiting: no  Fevers: no  Weight loss or gain: losing weight    Precipitating and/or Alleviating factors:    Exercising daily: yes  Recent use of Narcotics, anticholinergics, calcium channel blockers, antacids, or iron supplements: no   Any previous tests: colonoscopy done last month   History of abdominal surgery: no  Chronic Laxative Use no  COPD remains stable.  Has not had any recent breathing troubles beyond usual baseline.  Has not any acute respiratory events.  Remains with intermittent cough, mild shortness of breath with overexertion as per usual.  Using medication as directed with reported side effects.    Breast cancer, no signs of recurrence  Problem(s) Oriented visit      ROS:  General and Resp. completed and negative except as noted above     HISTORY:   reports that she does not drink alcohol.   reports that she quit smoking about 1 years ago. She has never used smokeless tobacco.    Past Medical History:   Diagnosis Date     Breast CA in situ 1987     Cancer (H) 1987     Fibromyalgia      Osteoarthritis 2/1/2011     Osteopenia  2/1/2011     Pain disorder 2/1/2011     Pneumonia      PONV (postoperative nausea and vomiting)      Pterygium eye 8/26/2013     Reactive airway disease 2/1/2011     Past Surgical History:   Procedure Laterality Date     anterior c-disc fusion       COLONOSCOPY N/A 10/19/2017    Procedure: COLONOSCOPY;  COLONOSCOPY;  Surgeon: Niko Wong MD;  Location:  GI     ENDOSCOPY  04/21/08     HYSTERECTOMY, MARCUS       JOINT REPLACEMTN, KNEE RT/LT  1/2011    Joint Replacement knee RT, with tibial straightenin     MAMMOPLASTY AUGMENTATION BILATERAL      breast ca      MASTECTOMY, SIMPLE RT/LT/CLAIRE  1989    Mastectomy Simple RT/LT/CLAIRE     OPEN REDUCTION INTERNAL FIXATION ANKLE  8/27/2013    Procedure: OPEN REDUCTION INTERNAL FIXATION ANKLE;  RIGHT OPEN REDUCTION INTERNAL FIXATION ANKLE WITH LIGAMENT REPAIR;  Surgeon: Nando Chang MD;  Location:  OR     PTERYGIUM WITH CONJUNCTIVAL AUTOLOGOUS TRANSPLANT Left 8/29/2016    Procedure: PTERYGIUM WITH CONJUNCTIVAL AUTOLOGOUS TRANSPLANT;  Surgeon: Сергей Walters MD;  Location:  EC     REPAIR LIGAMENT ANKLE  8/27/2013    Procedure: REPAIR LIGAMENT ANKLE;;  Surgeon: Nando Chang MD;  Location:  OR       EXAM:  BP: 104/52   Pulse: 88    Temp: Data Unavailable    Wt Readings from Last 2 Encounters:   12/15/17 67.6 kg (149 lb)   10/24/17 72.1 kg (159 lb)       BMI= Body mass index is 25.98 kg/(m^2).    EXAM:  APPEARANCE: = Relaxed and in no distress      Assessment/Plan:  Macey was seen today for recheck medication, pancreas issue- hypoglycemic, results and constipation.    Diagnoses and all orders for this visit:    Need for vaccination with 13-polyvalent pneumococcal conjugate vaccine  -     PNEUMOCOCCAL CONJ VACCINE 13 VALENT IM    Malignant neoplasm of female breast, unspecified estrogen receptor status, unspecified laterality, unspecified site of breast (H)    Pulmonary emphysema, unspecified emphysema type (H)    Constipation, unspecified constipation type  -   "   Comp. Metabolic Panel (14) ([facility])  -     CBC with Diff/Plt ([facility])    Lipid screening  -     Lipid Panel ([facility])        COUNSELING:   reports that she quit smoking about 1 years ago. She has never used smokeless tobacco.    Estimated body mass index is 25.98 kg/(m^2) as calculated from the following:    Height as of this encounter: 1.613 m (5' 3.5\").    Weight as of this encounter: 67.6 kg (149 lb).         Appropriate preventive services were discussed with this patient, including applicable screening as appropriate for cardiovascular disease, diabetes, osteopenia/osteoporosis, and glaucoma.  As appropriate for age/gender, discussed screening for colorectal cancer, prostate cancer, breast cancer, and cervical cancer. Checklist reviewing preventive services available has been given to the patient.    Reviewed patients plan of care and provided an AVS. The Basic Care Plan (routine screening as documented in Health Maintenance) for Macey meets the Care Plan requirement. This Care Plan has been established and reviewed with the  Patient.      The following health maintenance items are reviewed in Epic and correct as of today:  Health Maintenance   Topic Date Due     SPIROMETRY ONETIME  1950     COPD ACTION PLAN Q1 YR  1950     PNEUMOCOCCAL (1 of 2 - PCV13) 11/08/2015     FALL RISK ASSESSMENT  01/04/2017     ASTHMA CONTROL TEST Q6 MOS  07/31/2017     DEXA Q2 YR  09/14/2017     ASTHMA ACTION PLAN Q1 YR  01/31/2018     A1C Q6 MO  03/14/2018     ADVANCE DIRECTIVE PLANNING Q5 YRS  08/26/2018     LIPID MONITORING Q1 YEAR  09/14/2018     FIT-DNA (Cologuard) Q3 YR  10/06/2020     TETANUS IMMUNIZATION (SYSTEM ASSIGNED)  06/04/2022     COLON CANCER SCREEN (SYSTEM ASSIGNED)  10/19/2027     INFLUENZA VACCINE (SYSTEM ASSIGNED)  Addressed     HEPATITIS C SCREENING  Completed       Long Ferrari  [facility]  For any issues my office # is [phone]            "

## 2017-12-15 NOTE — LETTER
My Asthma Action Plan  Name: Macey Romero   YOB: 1950  Date: 12/15/2017   My doctor: Long Ferrari MD   My clinic: [facility]        My Control Medicine: Budesonide + formoterol (Symbicort) -  160/4.5 mcg bid  My Rescue Medicine: Albuterol (Proair/Ventolin/Proventil) inhaler prn  My Oral Steroid Medicine: medrol dose pk My Asthma Severity: intermittent  Avoid your asthma triggers: upper respiratory infections               GREEN ZONE   Good Control    I feel good    No cough or wheeze    Can work, sleep and play without asthma symptoms       Take your asthma control medicine every day.     1. If exercise triggers your asthma, take your rescue medication    15 minutes before exercise or sports, and    During exercise if you have asthma symptoms  2. Spacer to use with inhaler: If you have a spacer, make sure to use it with your inhaler             YELLOW ZONE Getting Worse  I have ANY of these:    I do not feel good    Cough or wheeze    Chest feels tight    Wake up at night   1. Keep taking your Green Zone medications  2. Start taking your rescue medicine:    every 20 minutes for up to 1 hour. Then every 4 hours for 24-48 hours.  3. If you stay in the Yellow Zone for more than 12-24 hours, contact your doctor.  4. If you do not return to the Green Zone in 12-24 hours or you get worse, start taking your oral steroid medicine if prescribed by your provider.           RED ZONE Medical Alert - Get Help  I have ANY of these:    I feel awful    Medicine is not helping    Breathing getting harder    Trouble walking or talking    Nose opens wide to breathe       1. Take your rescue medicine NOW  2. If your provider has prescribed an oral steroid medicine, start taking it NOW  3. Call your doctor NOW  4. If you are still in the Red Zone after 20 minutes and you have not reached your doctor:    Take your rescue medicine again and    Call 911 or go to the emergency room right away    See  your regular doctor within 2 weeks of an Emergency Room or Urgent Care visit for follow-up treatment.        Electronically signed by: Rachel Wong, December 15, 2017    Annual Reminders:  Meet with Asthma Educator,  Flu Shot in the Fall, consider Pneumonia Vaccination for patients with asthma (aged 19 and older).    Pharmacy: [facility] [id number] [place] [address] [id number] AT [address] [place] [address]                    Asthma Triggers  How To Control Things That Make Your Asthma Worse    Triggers are things that make your asthma worse.  Look at the list below to help you find your triggers and what you can do about them.  You can help prevent asthma flare-ups by staying away from your triggers.      Trigger                                                          What you can do   Cigarette Smoke  Tobacco smoke can make asthma worse. Do not allow smoking in your home, car or around you.  Be sure no one smokes at a child s day care or school.  If you smoke, ask your health care provider for ways to help you quit.  Ask family members to quit too.  Ask your health care provider for a referral to Quit Plan to help you quit smoking, or call 1-711-908-PLAN.     Colds, Flu, Bronchitis  These are common triggers of asthma. Wash your hands often.  Don t touch your eyes, nose or mouth.  Get a flu shot every year.     Dust Mites  These are tiny bugs that live in cloth or carpet. They are too small to see. Wash sheets and blankets in hot water every week.   Encase pillows and mattress in dust mite proof covers.  Avoid having carpet if you can. If you have carpet, vacuum weekly.   Use a dust mask and HEPA vacuum.   Pollen and Outdoor Mold  Some people are allergic to trees, grass, or weed pollen, or molds. Try to keep your windows closed.  Limit time out doors when pollen count is high.   Ask you health care provider about taking medicine during allergy season.     Animal Dander  Some people are allergic to skin  flakes, urine or saliva from pets with fur or feathers. Keep pets with fur or feathers out of your home.    If you can t keep the pet outdoors, then keep the pet out of your bedroom.  Keep the bedroom door closed.  Keep pets off cloth furniture and away from stuffed toys.     Mice, Rats, and Cockroaches  Some people are allergic to the waste from these pests.   Cover food and garbage.  Clean up spills and food crumbs.  Store grease in the refrigerator.   Keep food out of the bedroom.   Indoor Mold  This can be a trigger if your home has high moisture. Fix leaking faucets, pipes, or other sources of water.   Clean moldy surfaces.  Dehumidify basement if it is damp and smelly.   Smoke, Strong Odors, and Sprays  These can reduce air quality. Stay away from strong odors and sprays, such as perfume, powder, hair spray, paints, smoke incense, paint, cleaning products, candles and new carpet.   Exercise or Sports  Some people with asthma have this trigger. Be active!  Ask your doctor about taking medicine before sports or exercise to prevent symptoms.    Warm up for 5-10 minutes before and after sports or exercise.     Other Triggers of Asthma  Cold air:  Cover your nose and mouth with a scarf.  Sometimes laughing or crying can be a trigger.  Some medicines and food can trigger asthma.

## 2017-12-15 NOTE — MR AVS SNAPSHOT
After Visit Summary   12/15/2017    Macey Romero    MRN: [id number]           Patient Information     Date Of Birth          1950        Visit Information        Provider Department      12/15/2017 9:45 AM Long Ferrari MD [facility]        Today's Diagnoses     Need for vaccination with 13-polyvalent pneumococcal conjugate vaccine    -  1    Malignant neoplasm of female breast, unspecified estrogen receptor status, unspecified laterality, unspecified site of breast (H)        Pulmonary emphysema, unspecified emphysema type (H)        Constipation, unspecified constipation type        Lipid screening          Care Instructions      At your visit today, we discussed your risk for falls and preventive options.    Fall Prevention  Falls often occur due to slipping, tripping or losing your balance. Millions of people fall every year and injure themselves. Here are ways to reduce your risk of falling again.    Think about your fall, was there anything that caused your fall that can be fixed, removed, or replaced?    Make your home safe by keeping walkways clear of objects you may trip over.    Use non-slip pads under rugs. Do not use area rugs or small throw rugs.    Use non-slip mats in bathtubs and showers.    Install handrails and lights on staircases.    Do not walk in poorly lit areas.    Do not stand on chairs or wobbly ladders.    Use caution when reaching overhead or looking upward. This position can cause a loss of balance.    Be sure your shoes fit properly, have non-slip bottoms and are in good condition.     Wear shoes both inside and out. Avoid going barefoot or wearing slippers.    Be cautious when going up and down stairs, curbs, and when walking on uneven sidewalks.    If your balance is poor, consider using a cane or walker.    If your fall was related to alcohol use, stop or limit alcohol intake.     If your fall was related to use of sleeping medicines, talk to  your doctor about this. You may need to reduce your dosage at bedtime if you awaken during the night to go to the bathroom.      To reduce the need for nighttime bathroom trips:    Avoid drinking fluids for several hours before going to bed    Empty your bladder before going to bed    Men can keep a urinal at the bedside    Stay as active as you can. Balance, flexibility, strength, and endurance all come from exercise. They all play a role in preventing falls. Ask your healthcare provider which types of activity are right for you.    Get your vision checked on a regular basis.    If you have pets, know where they are before you stand up or walk so you don't trip over them.    Use night lights.  Date Last Reviewed: 11/5/2015 2000-2017 [employer]. [address] [place] [address], [place] [address]. All rights reserved. This information is not intended as a substitute for professional medical care. Always follow your healthcare professional's instructions.                Follow-ups after your visit        Who to contact     If you have questions or need follow up information about today's clinic visit or your schedule please contact [facility] directly at [phone].  Normal or non-critical lab and imaging results will be communicated to you by GiveLoophart, letter or phone within 4 business days after the clinic has received the results. If you do not hear from us within 7 days, please contact the clinic through Hyperpott or phone. If you have a critical or abnormal lab result, we will notify you by phone as soon as possible.  Submit refill requests through [employer] or call your pharmacy and they will forward the refill request to us. Please allow 3 business days for your refill to be completed.          Additional Information About Your Visit        [employer] Information     [employer] gives you secure access to your electronic health record. If you see a primary care provider, you can also send messages  "to your care team and make appointments. If you have questions, please call your primary care clinic.  If you do not have a primary care provider, please call [phone] and they will assist you.        Care EveryWhere ID     This is your Care EveryWhere ID. This could be used by other organizations to access your [place] medical records  [id number]        Your Vitals Were     Pulse Respirations Height Pulse Oximetry BMI (Body Mass Index)       88 16 1.613 m (5' 3.5\") 97% 25.98 kg/m2        Blood Pressure from Last 3 Encounters:   12/15/17 104/52   10/24/17 130/72   10/19/17 107/49    Weight from Last 3 Encounters:   12/15/17 67.6 kg (149 lb)   10/24/17 72.1 kg (159 lb)   10/19/17 69.9 kg (154 lb)              We Performed the Following     CBC with Diff/Plt ([facility])     Comp. Metabolic Panel (14) ([facility])     Lipid Panel ([facility])     PNEUMOCOCCAL CONJ VACCINE 13 VALENT IM        Primary Care Provider Office Phone # Fax #    Long Ferrari [phone] [address] [facility] [address]        Equal Access to Services     NATALIE DOSS : Hadii aad ku hadasho Soomaali, waaxda luqadaha, qaybta kaalmada adeegyada, waxay idiin hayvenancion patricia chavarria lakyung ah. So [facility] [phone].    ATENCIÓN: Si habla español, tiene a hoyos disposición servicios gratuitos de asistencia lingüística. Llame al [phone].    We comply with applicable federal civil rights laws and [place] laws. We do not discriminate on the basis of race, color, national origin, age, disability, sex, sexual orientation, or gender identity.            Thank you!     Thank you for choosing [facility]  for your care. Our goal is always to provide you with excellent care. Hearing back from our patients is one way we can continue to improve our services. Please take a few minutes to complete the written survey that you may receive in the mail after your visit with us. Thank you!             Your Updated " Medication List - Protect others around you: Learn how to safely use, store and throw away your medicines at www.disposemymeds.org.          This list is accurate as of: 12/15/17 10:16 AM.  Always use your most recent med list.                   Brand Name Dispense Instructions for use Diagnosis    albuterol 108 (90 BASE) MCG/ACT Inhaler    PROAIR HFA/PROVENTIL HFA/VENTOLIN HFA    1 Inhaler    Inhale 2 puffs into the lungs every 6 hours as needed for shortness of breath / dyspnea    Reactive airway disease, moderate persistent, with acute exacerbation       budesonide-formoterol 160-4.5 MCG/ACT Inhaler    SYMBICORT    3 Inhaler    Inhale 2 puffs into the lungs 2 times daily    Reactive airway disease, moderate persistent, with acute exacerbation       DULCOLAX PO      Take by mouth daily        magnesium hydroxide 400 MG/5ML suspension    MILK OF MAGNESIA     Take 5 mLs by mouth daily as needed for constipation        neomycin-polymyxin-dexamethasone 3.5-98486-6.1 Susp ophthalmic susp    MAXITROL     ONE DROP OU QID        olopatadine 0.1 % ophthalmic solution    PATANOL     1 drop 2 times daily        ondansetron 4 MG ODT tab    ZOFRAN ODT    10 tablet    Take 1 tablet (4 mg) by mouth every 8 hours as needed        psyllium 0.52 G capsule     540 capsule    Take 1 capsule (0.52 g) by mouth daily        TYLENOL PO      Take 325 mg by mouth 2 times daily as needed for mild pain    Abdominal pain, right upper quadrant       valACYclovir 1000 mg tablet    VALTREX     as needed

## 2017-12-16 LAB
ALBUMIN SERPL-MCNC: 4.5 G/DL (ref 3.6–4.8)
ALBUMIN/GLOB SERPL: 1.7 {RATIO} (ref 1.2–2.2)
ALP SERPL-CCNC: 79 IU/L (ref 39–117)
ALT SERPL-CCNC: 16 IU/L (ref 0–32)
AST SERPL-CCNC: 23 IU/L (ref 0–40)
BILIRUB SERPL-MCNC: 0.4 MG/DL (ref 0–1.2)
BUN SERPL-MCNC: 13 MG/DL (ref 8–27)
BUN/CREATININE RATIO: 15 (ref 12–28)
CALCIUM SERPL-MCNC: 9.6 MG/DL (ref 8.7–10.3)
CHLORIDE SERPLBLD-SCNC: 100 MMOL/L (ref 96–106)
CHOLEST SERPL-MCNC: 176 MG/DL (ref 100–199)
CREAT SERPL-MCNC: 0.86 MG/DL (ref 0.57–1)
EGFR IF AFRICN AM: 81 ML/MIN/1.73
EGFR IF NONAFRICN AM: 70 ML/MIN/1.73
GLOBULIN, TOTAL: 2.6 G/DL (ref 1.5–4.5)
GLUCOSE SERPL-MCNC: 103 MG/DL (ref 65–99)
HBA1C MFR BLD: 5.7 % (ref 4.8–5.6)
HDLC SERPL-MCNC: 36 MG/DL
LDL/HDL RATIO: 2.9 RATIO UNITS (ref 0–3.2)
LDLC SERPL CALC-MCNC: 105 MG/DL (ref 0–99)
POTASSIUM SERPL-SCNC: 4.9 MMOL/L (ref 3.5–5.2)
PROT SERPL-MCNC: 7.1 G/DL (ref 6–8.5)
SODIUM SERPL-SCNC: 142 MMOL/L (ref 134–144)
TOTAL CO2: 28 MMOL/L (ref 18–28)
TRIGL SERPL-MCNC: 174 MG/DL (ref 0–149)
VLDLC SERPL CALC-MCNC: 35 MG/DL (ref 5–40)

## 2017-12-16 ASSESSMENT — ASTHMA QUESTIONNAIRES: ACT_TOTALSCORE: 24

## 2017-12-18 NOTE — PROGRESS NOTES
Dear Macey,   I am writing to report that your included test results are within expected ranges. I do not suggest that we make any changes at this time.    Long Ferrari M.D.

## 2017-12-21 ENCOUNTER — OFFICE VISIT (OUTPATIENT)
Dept: FAMILY MEDICINE | Facility: CLINIC | Age: 67
End: 2017-12-21

## 2017-12-21 VITALS
BODY MASS INDEX: 25.63 KG/M2 | TEMPERATURE: 98.6 F | DIASTOLIC BLOOD PRESSURE: 48 MMHG | WEIGHT: 147 LBS | SYSTOLIC BLOOD PRESSURE: 102 MMHG

## 2017-12-21 DIAGNOSIS — J20.9 ACUTE BRONCHITIS, UNSPECIFIED ORGANISM: ICD-10-CM

## 2017-12-21 DIAGNOSIS — J98.01 ACUTE BRONCHOSPASM: Primary | ICD-10-CM

## 2017-12-21 LAB
FLUAV AG UPPER RESP QL IA.RAPID: NORMAL
FLUBV AG UPPER RESP QL IA.RAPID: NORMAL

## 2017-12-21 PROCEDURE — 99214 OFFICE O/P EST MOD 30 MIN: CPT | Performed by: FAMILY MEDICINE

## 2017-12-21 PROCEDURE — 87804 INFLUENZA ASSAY W/OPTIC: CPT | Performed by: FAMILY MEDICINE

## 2017-12-21 RX ORDER — PREDNISONE 20 MG/1
TABLET ORAL
Qty: 10 TABLET | Refills: 0 | Status: SHIPPED | OUTPATIENT
Start: 2017-12-21 | End: 2018-03-12

## 2017-12-21 RX ORDER — DOXYCYCLINE 100 MG/1
100 CAPSULE ORAL 2 TIMES DAILY
Qty: 20 CAPSULE | Refills: 0 | Status: SHIPPED | OUTPATIENT
Start: 2017-12-21 | End: 2018-03-12

## 2017-12-21 NOTE — PROGRESS NOTES
Problem(s) Oriented visit        SUBJECTIVE:                                                    Macey Romero is a 67 year old female who presents to clinic today for the following health issues :    1. Acute bronchitis, unspecified organism  Recent URI  with typical URI sx.  At that time, the patient complained of typical URI symptoms: including low grade temps, coryza, nasal stuffiness, congestion, postnasal drip, sore throat, malaise.  Those symptoms have all left and now is having regular cough throughout the day.  Mainly dry cough, but somtimes productive, mainly in the mornings, but the nonproductive during rest of day.  Has some occ wheezing , cough worse at bedtime and in cold or humid air.    Does have a history of asthma.  Does smoke.    - Influenza Testing ([facility])  - predniSONE (DELTASONE) 20 MG tablet; Take two tablets (= 40mg) each day for 5 (five) days  Dispense: 10 tablet; Refill: 0  - doxycycline monohydrate 100 MG capsule; Take 1 capsule (100 mg) by mouth 2 times daily  Dispense: 20 capsule; Refill: 0    2. Acute bronchospasm  The patient complains of cough productive of green/yellow sputum, nocturnal, worsening over time for 4 days.           Problem list, Medication list, Allergies, and Medical/Social/Surgical histories reviewed in [facility] and updated as appropriate.   Additional history: as documented    ROS:  5 point ROS completed and negative except noted above, including Gen, CV, Resp, GI, MS    Histories:   Patient Active Problem List   Diagnosis     Reactive airway disease     Osteoarthritis     Osteopenia     Malignant neoplasm of female breast (H)     Pain disorder     IBS (irritable bowel syndrome)     Keloid of skin     Myalgia and myositis     Pterygium eye     Fx ankle     Health Care Home     Small bowel obstruction     Hip pain, left     Pneumonia     Past Surgical History:   Procedure Laterality Date     anterior c-disc fusion       COLONOSCOPY N/A 10/19/2017    Procedure: COLONOSCOPY;   COLONOSCOPY;  Surgeon: Niko Wong MD;  Location:  GI     ENDOSCOPY  04/21/08     HYSTERECTOMY, MARCUS       JOINT REPLACEMTN, KNEE RT/LT  1/2011    Joint Replacement knee RT, with tibial straightenin     MAMMOPLASTY AUGMENTATION BILATERAL      breast ca      MASTECTOMY, SIMPLE RT/LT/CLAIRE  1989    Mastectomy Simple RT/LT/CLAIRE     OPEN REDUCTION INTERNAL FIXATION ANKLE  8/27/2013    Procedure: OPEN REDUCTION INTERNAL FIXATION ANKLE;  RIGHT OPEN REDUCTION INTERNAL FIXATION ANKLE WITH LIGAMENT REPAIR;  Surgeon: Nando Chang MD;  Location:  OR     PTERYGIUM WITH CONJUNCTIVAL AUTOLOGOUS TRANSPLANT Left 8/29/2016    Procedure: PTERYGIUM WITH CONJUNCTIVAL AUTOLOGOUS TRANSPLANT;  Surgeon: Сергей Walters MD;  Location:  EC     REPAIR LIGAMENT ANKLE  8/27/2013    Procedure: REPAIR LIGAMENT ANKLE;;  Surgeon: Nando Chang MD;  Location:  OR       Social History   Substance Use Topics     Smoking status: Former Smoker     Quit date: 1/1/2016     Smokeless tobacco: Never Used      Comment: quit but  still smokes, but not in house     Alcohol use No     Family History   Problem Relation Age of Onset     Respiratory Father      CANCER Brother      sarcoidosis     DIABETES Brother      CEREBROVASCULAR DISEASE Brother            OBJECTIVE:                                                    /48  Temp 98.6  F (37  C) (Oral)  Wt 66.7 kg (147 lb)  BMI 25.63 kg/m2  Body mass index is 25.63 kg/(m^2).   APPEARANCE: = mild distress  Conj/Eyelids = noninjected and lids and lashes are without inflammation  PERRLA/Irises = Pupils Round Reactive to Light and Irisis without inflammation  Ears/Nose = External structures and Nares have usual shape and form  Ear canals and TM's = Canals are not inflammed and have none or little wax and the drums are not injected and have a light reflex   Lips/Teeth/Gums = No lesions seen, good dentition, and gums seem healthy  Oropharynx = No leukoplakia, No injection to the  tissues, Normal Uvula  Neck = No anterior or posterior adenopathy appreciated.  Resp effort =  Mild Distress  Breath Sounds =  scattered rhonchi and bilateral wheezing  Mood/Affect = Cooperative and interested     ASSESSMENT/PLAN:                                                        Macey was seen today for cough and dizziness.    Diagnoses and all orders for this visit:    Acute bronchospasm    Acute bronchitis, unspecified organism  -     Influenza Testing ([facility])  -     predniSONE (DELTASONE) 20 MG tablet; Take two tablets (= 40mg) each day for 5 (five) days  -     doxycycline monohydrate 100 MG capsule; Take 1 capsule (100 mg) by mouth 2 times daily    Also given samples for [employer]  Call or return if not improving.    See Patient Instructions    The following health maintenance items are reviewed in Epic and correct as of today:  Health Maintenance   Topic Date Due     SPIROMETRY ONETIME  1950     COPD ACTION PLAN Q1 YR  1950     DEXA Q2 YR  09/14/2017     ASTHMA CONTROL TEST Q6 MOS  06/15/2018     A1C Q6 MO  06/15/2018     ADVANCE DIRECTIVE PLANNING Q5 YRS  08/26/2018     ASTHMA ACTION PLAN Q1 YR  12/15/2018     FALL RISK ASSESSMENT  12/15/2018     LIPID MONITORING Q1 YEAR  12/15/2018     PNEUMOCOCCAL (2 of 2 - PPSV23) 12/15/2018     FIT-DNA (Cologuard) Q3 YR  10/06/2020     TETANUS IMMUNIZATION (SYSTEM ASSIGNED)  06/04/2022     COLON CANCER SCREEN (SYSTEM ASSIGNED)  10/19/2027     INFLUENZA VACCINE (SYSTEM ASSIGNED)  Addressed     HEPATITIS C SCREENING  Completed       Lnog Ferrari MD  [facility]  Family Practice  [facility]  [phone]    For any issues my office # is [phone]

## 2017-12-21 NOTE — MR AVS SNAPSHOT
After Visit Summary   12/21/2017    Macey Romero    MRN: [id number]           Patient Information     Date Of Birth          1950        Visit Information        Provider Department      12/21/2017 12:00 PM Long Ferrari MD [facility]        Today's Diagnoses     Acute bronchospasm    -  1    Acute bronchitis, unspecified organism           Follow-ups after your visit        Who to contact     If you have questions or need follow up information about today's clinic visit or your schedule please contact [facility] directly at [phone].  Normal or non-critical lab and imaging results will be communicated to you by Tokopediahart, letter or phone within 4 business days after the clinic has received the results. If you do not hear from us within 7 days, please contact the clinic through Cook[employer] or phone. If you have a critical or abnormal lab result, we will notify you by phone as soon as possible.  Submit refill requests through [employer] or call your pharmacy and they will forward the refill request to us. Please allow 3 business days for your refill to be completed.          Additional Information About Your Visit        MyChart Information     [employer] gives you secure access to your electronic health record. If you see a primary care provider, you can also send messages to your care team and make appointments. If you have questions, please call your primary care clinic.  If you do not have a primary care provider, please call [phone] and they will assist you.        Care EveryWhere ID     This is your Care EveryWhere ID. This could be used by other organizations to access your [place] medical records  [id number]        Your Vitals Were     Temperature BMI (Body Mass Index)                98.6  F (37  C) (Oral) 25.63 kg/m2           Blood Pressure from Last 3 Encounters:   12/21/17 102/48   12/15/17 104/52   10/24/17 130/72    Weight from Last 3 Encounters:    12/21/17 66.7 kg (147 lb)   12/15/17 67.6 kg (149 lb)   10/24/17 72.1 kg (159 lb)              We Performed the Following     Influenza Testing ([facility])          Today's Medication Changes          These changes are accurate as of: 12/21/17 12:38 PM.  If you have any questions, ask your nurse or doctor.               Start taking these medicines.        Dose/Directions    doxycycline monohydrate 100 MG capsule   Used for:  Acute bronchitis, unspecified organism   Started by:  Long Ferrari MD        Dose:  100 mg   Take 1 capsule (100 mg) by mouth 2 times daily   Quantity:  20 capsule   Refills:  0       predniSONE 20 MG tablet   Commonly known as:  DELTASONE   Used for:  Acute bronchitis, unspecified organism   Started by:  Long Ferrari MD        Take two tablets (= 40mg) each day for 5 (five) days   Quantity:  10 tablet   Refills:  0            Where to get your medicines      These medications were sent to [facility] [address] [place] [address] [address] [place] [id number]    Hours:  24-hours Phone:  [phone]     doxycycline monohydrate 100 MG capsule    predniSONE 20 MG tablet                Primary Care Provider Office Phone # Fax #    Long Ferrari [phone] [address]  [facility] [address]        Equal Access to Services     [facility] AH: Hadii aad ku hadasho Soomaali, waaxda luqadaha, qaybta kaalmada adeegyada, jacquelin purcell hayduran jenkins . So [facility] [phone].    ATENCIÓN: Si habla español, tiene a hoyos disposición servicios gratuitos de asistencia lingüística. Llame al [phone].    We comply with applicable federal civil rights laws and [place] laws. We do not discriminate on the basis of race, color, national origin, age, disability, sex, sexual orientation, or gender identity.            Thank you!     Thank you for choosing [facility]  for your care. Our goal  is always to provide you with excellent care. Hearing back from our patients is one way we can continue to improve our services. Please take a few minutes to complete the written survey that you may receive in the mail after your visit with us. Thank you!             Your Updated Medication List - Protect others around you: Learn how to safely use, store and throw away your medicines at www.disposemymeds.org.          This list is accurate as of: 12/21/17 12:38 PM.  Always use your most recent med list.                   Brand Name Dispense Instructions for use Diagnosis    albuterol 108 (90 BASE) MCG/ACT Inhaler    PROAIR HFA/PROVENTIL HFA/VENTOLIN HFA    1 Inhaler    Inhale 2 puffs into the lungs every 6 hours as needed for shortness of breath / dyspnea    Reactive airway disease, moderate persistent, with acute exacerbation       budesonide-formoterol 160-4.5 MCG/ACT Inhaler    SYMBICORT    3 Inhaler    Inhale 2 puffs into the lungs 2 times daily    Reactive airway disease, moderate persistent, with acute exacerbation       doxycycline monohydrate 100 MG capsule     20 capsule    Take 1 capsule (100 mg) by mouth 2 times daily    Acute bronchitis, unspecified organism       DULCOLAX PO      Take by mouth daily        magnesium hydroxide 400 MG/5ML suspension    MILK OF MAGNESIA     Take 5 mLs by mouth daily as needed for constipation        neomycin-polymyxin-dexamethasone 3.5-22344-2.1 Susp ophthalmic susp    MAXITROL     ONE DROP OU QID        olopatadine 0.1 % ophthalmic solution    PATANOL     1 drop 2 times daily        ondansetron 4 MG ODT tab    ZOFRAN ODT    10 tablet    Take 1 tablet (4 mg) by mouth every 8 hours as needed        predniSONE 20 MG tablet    DELTASONE    10 tablet    Take two tablets (= 40mg) each day for 5 (five) days    Acute bronchitis, unspecified organism       psyllium 0.52 G capsule     540 capsule    Take 1 capsule (0.52 g) by mouth daily        TYLENOL PO      Take 325 mg by mouth  2 times daily as needed for mild pain    Abdominal pain, right upper quadrant       valACYclovir 1000 mg tablet    VALTREX     as needed

## 2018-02-24 ENCOUNTER — NURSE TRIAGE (OUTPATIENT)
Dept: NURSING | Facility: CLINIC | Age: 68
End: 2018-02-24

## 2018-02-25 NOTE — TELEPHONE ENCOUNTER
"Reason for call: Macey calling concerned about her post surgical incision symptoms. Reports \"I had a cancerous skin lesion removal (left hand) on Wednesday 2/21/18, at [facility] in [place]. Reports today her symptoms are worsening. Reports \"I had to leave the bandage on for the first 24 hours and then remove it and clean the area; 24 hours afterward, it looked like a volcano and some bloody drainage, the skin around it wasn't swollen or red then.\"   Symptoms: back of left hand red, warm and swollen. Redness from incision \"hole\" to first knuckles. Increased pain. Scant amount of drainage, pink/clear, small amount of pus.  Symptoms started pain since procedure, redness and swelling are more recent.  Denies fever,chills or red streaks, doesn't have a thermometer.    Pain? Yes  Where? Left hand  Rated 4-5/10     Constant/Intermittent? \"painful since procedure, but today it feels like someone jabbed a pencil in there and left it there, the pain is more intense.\"  Pain worsens when touching  Pain is better when using ES Tylenol   Home cares tried: OTC med & wound cares (last ES Tylenol dose was 6 hours ago)  Care advice given per guideline protocol; advised caller to be seen within the next 4 hours, due to time of day, ER is only option. Caller verbalized understanding of care advice given and plans to wait until the morning and go to urgent care clinic. Gave [facility] urgent care [facility] location hours and address. Caller had no further questions.   Advised caller to call [facility] at [phone] and speak to the on-call doctor for 2nd level triage now. ([email or web address] Emergent symptoms reviewed. Encouraged caller to purchase a thermometer. Encouraged call back to [facility] 24/7 for nurse line services, new/worsening symptoms or further questions.  Zahraa Brown RN  [facility]    Reason for Disposition    [1] " "Incision looks infected (spreading redness, pain) AND [2] large red area (> 2 in. or 5 cm)     back of left hand red, warm and swollen. Redness from incision \"hole\" to first knuckles. Increased pain.    Additional Information    Negative: [1] Major abdominal surgical incision AND [2] wound gaping open AND [3] visible internal organs    Negative: Sounds like a life-threatening emergency to the triager    Negative: [1] Bleeding from incision AND [2] won't stop after 10 minutes of direct pressure    Negative: [1] Widespread rash AND [2] bright red, sunburn-like    Negative: Severe pain in the incision    Negative: [1] Suture came out early AND [2] wound gaping AND [3] < 48 hours since sutures placed    Negative: [1] Incision gaping open AND [2] length of opening > 2 inches (5 cm)    Negative: Patient sounds very sick or weak to the triager    Negative: Sounds like a serious complication to the triager    Negative: Fever > 100.5 F (38.1 C)    Negative: [1] Incision looks infected (spreading redness, pain) AND [2] fever > 99.5 F (37.5 C)    Answer Assessment - Initial Assessment Questions  1. SYMPTOM: \"What's the main symptom you're concerned about?\" (e.g., redness, pain, drainage)      pain  2. ONSET: \"When did ________  start?\"      Pain since procedure  3. SURGERY: \"What surgery was performed?\"      Skin cancer removal procedure  4. DATE of SURGERY: \"When was surgery performed?\"       Wednesday 2/21/18  5. INCISION SITE: \"Where is the incision located?\"       Back of left hand  6. REDNESS: \"Is there any redness at the incision site?\" If yes, ask: \"How wide across is the redness?\" (Inches, centimeters)       Yes, goes from where the hole is to knuckles  7. PAIN: \"Is there any pain?\" If so, ask: \"How bad is it?\"  (Scale 1-10; or mild, moderate, severe)      Yes, 4-5/10  8. BLEEDING: \"Is there any bleeding?\" If so, ask: \"How much?\" and \"Where?\"      Denies, some pink tinged fluid on band aid  9. DRAINAGE: \"Is there any " "drainage from the incision site?\" If yes, ask: \"What color and how much?\" (e.g., red, cloudy, pus; drops, teaspoon)      Scant amount, pink/clear, small amount of pus  10. FEVER: \"Do you have a fever?\" If so, ask: \"What is your temperature, how was it measured, and when did it start?\"        Denies, doesn't have a thermometer  11. OTHER SYMPTOMS: \"Do you have any other symptoms?\" (e.g., shaking chills, weakness, rash elsewhere on body)        denies    Protocols used: POST-OP INCISION SYMPTOMS-ADULT-AH    "

## 2018-03-12 ENCOUNTER — OFFICE VISIT (OUTPATIENT)
Dept: FAMILY MEDICINE | Facility: CLINIC | Age: 68
End: 2018-03-12

## 2018-03-12 VITALS
BODY MASS INDEX: 24.04 KG/M2 | HEIGHT: 64 IN | WEIGHT: 140.8 LBS | RESPIRATION RATE: 12 BRPM | DIASTOLIC BLOOD PRESSURE: 70 MMHG | SYSTOLIC BLOOD PRESSURE: 102 MMHG | OXYGEN SATURATION: 98 % | HEART RATE: 80 BPM

## 2018-03-12 DIAGNOSIS — E88.810 METABOLIC SYNDROME X: ICD-10-CM

## 2018-03-12 DIAGNOSIS — K59.01 SLOW TRANSIT CONSTIPATION: ICD-10-CM

## 2018-03-12 DIAGNOSIS — M17.11 PRIMARY OSTEOARTHRITIS OF RIGHT KNEE: Primary | ICD-10-CM

## 2018-03-12 DIAGNOSIS — E55.9 VITAMIN D DEFICIENCY: ICD-10-CM

## 2018-03-12 PROCEDURE — 36415 COLL VENOUS BLD VENIPUNCTURE: CPT | Performed by: FAMILY MEDICINE

## 2018-03-12 PROCEDURE — 99214 OFFICE O/P EST MOD 30 MIN: CPT | Performed by: FAMILY MEDICINE

## 2018-03-12 RX ORDER — MUPIROCIN 20 MG/G
OINTMENT TOPICAL
Refills: 0 | COMMUNITY
Start: 2018-03-05 | End: 2018-05-30

## 2018-03-12 NOTE — PATIENT INSTRUCTIONS
"Thanks for coming in to see me today!    Your next visit with us should be scheduled for Follow up in 6 months.    Listed next are the medical health Maintenance items we are tracking for your care and when they are next due (or overdue!)  Our goal is 100% \"up to date.\" Keep this list handy to call and schedule what you are due for in the future!    Health Maintenance   Topic Date Due     SPIROMETRY ONETIME  1950     COPD ACTION PLAN Q1 YR  1950     DEXA Q2 YR  09/14/2017     ASTHMA CONTROL TEST Q6 MOS  06/15/2018     A1C Q6 MO  06/15/2018     ADVANCE DIRECTIVE PLANNING Q5 YRS  08/26/2018     INFLUENZA VACCINE (SYSTEM ASSIGNED)  09/01/2018     ASTHMA ACTION PLAN Q1 YR  12/15/2018     FALL RISK ASSESSMENT  12/15/2018     LIPID MONITORING Q1 YEAR  12/15/2018     PNEUMOCOCCAL (2 of 2 - PPSV23) 12/15/2018     FIT-DNA (Cologuard) Q3 YR  10/06/2020     TETANUS IMMUNIZATION (SYSTEM ASSIGNED)  06/04/2022     COLON CANCER SCREEN (SYSTEM ASSIGNED)  10/19/2027     HEPATITIS C SCREENING  Completed         "

## 2018-03-12 NOTE — MR AVS SNAPSHOT
"              After Visit Summary   3/12/2018    Macey Romero    MRN: [id number]           Patient Information     Date Of Birth          1950        Visit Information        Provider Department      3/12/2018 8:15 AM Long Ferrari MD [facility]        Today's Diagnoses     Primary osteoarthritis of right knee    -  1    Slow transit constipation        Vitamin D deficiency        Metabolic syndrome X          Care Instructions    Thanks for coming in to see me today!    Your next visit with us should be scheduled for Follow up in 6 months.    Listed next are the medical health Maintenance items we are tracking for your care and when they are next due (or overdue!)  Our goal is 100% \"up to date.\" Keep this list handy to call and schedule what you are due for in the future!    Health Maintenance   Topic Date Due     SPIROMETRY ONETIME  1950     COPD ACTION PLAN Q1 YR  1950     DEXA Q2 YR  09/14/2017     ASTHMA CONTROL TEST Q6 MOS  06/15/2018     A1C Q6 MO  06/15/2018     ADVANCE DIRECTIVE PLANNING Q5 YRS  08/26/2018     INFLUENZA VACCINE (SYSTEM ASSIGNED)  09/01/2018     ASTHMA ACTION PLAN Q1 YR  12/15/2018     FALL RISK ASSESSMENT  12/15/2018     LIPID MONITORING Q1 YEAR  12/15/2018     PNEUMOCOCCAL (2 of 2 - PPSV23) 12/15/2018     FIT-DNA (Cologuard) Q3 YR  10/06/2020     TETANUS IMMUNIZATION (SYSTEM ASSIGNED)  06/04/2022     COLON CANCER SCREEN (SYSTEM ASSIGNED)  10/19/2027     HEPATITIS C SCREENING  Completed                 Follow-ups after your visit        Additional Services     PHYSICAL THERAPY REFERRAL       *This therapy referral will be filtered to a centralized scheduling office at [facility] and the patient will receive a call to schedule an appointment at a [place] location most convenient for them. *     [facility] provides Physical Therapy evaluation and treatment and many specialty services across the [place] " "system.  If requesting a specialty program, please choose from the list below.    If you have not heard from the scheduling office within 2 business days, please call [phone] for all locations, with the exception of [place], please call [phone] and [place], please call [phone]  Treatment: Evaluation & Treatment  Special Instructions/Modalities:   Special Programs: None    Please be aware that coverage of these services is subject to the terms and limitations of your health insurance plan.  Call member services at your health plan with any benefit or coverage questions.      **Note to Provider:  If you are referring outside of [place] for the therapy appointment, please list the name of the location in the \"special instructions\" above, print the referral and give to the patient to schedule the appointment.                  Who to contact     If you have questions or need follow up information about today's clinic visit or your schedule please contact [facility] directly at [phone].  Normal or non-critical lab and imaging results will be communicated to you by Giraffichart, letter or phone within 4 business days after the clinic has received the results. If you do not hear from us within 7 days, please contact the clinic through Scoreloopt or phone. If you have a critical or abnormal lab result, we will notify you by phone as soon as possible.  Submit refill requests through Social Bicycles or call your pharmacy and they will forward the refill request to us. Please allow 3 business days for your refill to be completed.          Additional Information About Your Visit        GirafficharAptela Information     Social Bicycles gives you secure access to your electronic health record. If you see a primary care provider, you can also send messages to your care team and make appointments. If you have questions, please call your primary care clinic.  If you do not have a primary care provider, please call [phone] and " "they will assist you.        Care EveryWhere ID     This is your Care EveryWhere ID. This could be used by other organizations to access your [place] medical records  [id number]        Your Vitals Were     Pulse Respirations Height Pulse Oximetry BMI (Body Mass Index)       80 12 1.613 m (5' 3.5\") 98% 24.55 kg/m2        Blood Pressure from Last 3 Encounters:   03/12/18 102/70   12/21/17 102/48   12/15/17 104/52    Weight from Last 3 Encounters:   03/12/18 63.9 kg (140 lb 12.8 oz)   12/21/17 66.7 kg (147 lb)   12/15/17 67.6 kg (149 lb)              We Performed the Following     Basic Metabolic Panel (8) ([facility])     Hemoglobin A1C ([facility])     Lipid Panel ([facility])     PHYSICAL THERAPY REFERRAL     Vitamin D  25-Hydroxy ([facility])          Today's Medication Changes          These changes are accurate as of 3/12/18  8:40 AM.  If you have any questions, ask your nurse or doctor.               These medicines have changed or have updated prescriptions.        Dose/Directions    budesonide-formoterol 160-4.5 MCG/ACT Inhaler   Commonly known as:  SYMBICORT   This may have changed:    - when to take this  - reasons to take this   Used for:  Reactive airway disease, moderate persistent, with acute exacerbation        Dose:  2 puff   Inhale 2 puffs into the lungs 2 times daily   Quantity:  3 Inhaler   Refills:  3                Primary Care Provider Office Phone # Fax #    Long Ferrari [phone] [address]  [facility] [phone]        Equal Access to Services     [facility] HospitalJOSS[facility] : Hadsteve lopez Soyumiko, waaxda luqadaha, qaybta kaaljacquelin singer. So [facility] [phone].    ATENCIÓN: Si habla español, tiene a hoyos disposición servicios gratuitos de asistencia lingüística. Llame al [phone].    We comply with applicable federal civil rights laws and [place] laws. We do not discriminate on the basis of race, color, national origin, " age, disability, sex, sexual orientation, or gender identity.            Thank you!     Thank you for choosing [facility]  for your care. Our goal is always to provide you with excellent care. Hearing back from our patients is one way we can continue to improve our services. Please take a few minutes to complete the written survey that you may receive in the mail after your visit with us. Thank you!             Your Updated Medication List - Protect others around you: Learn how to safely use, store and throw away your medicines at www.disposemAddonTVeds.org.          This list is accurate as of 3/12/18  8:40 AM.  Always use your most recent med list.                   Brand Name Dispense Instructions for use Diagnosis    albuterol 108 (90 BASE) MCG/ACT Inhaler    PROAIR HFA/PROVENTIL HFA/VENTOLIN HFA    1 Inhaler    Inhale 2 puffs into the lungs every 6 hours as needed for shortness of breath / dyspnea    Reactive airway disease, moderate persistent, with acute exacerbation       budesonide-formoterol 160-4.5 MCG/ACT Inhaler    SYMBICORT    3 Inhaler    Inhale 2 puffs into the lungs 2 times daily    Reactive airway disease, moderate persistent, with acute exacerbation       DULCOLAX PO      Take by mouth daily        magnesium hydroxide 400 MG/5ML suspension    MILK OF MAGNESIA     Take 5 mLs by mouth daily as needed for constipation        mupirocin 2 % ointment    BACTROBAN     KATHERINE TO OPEN SORES BID FOR TEN DAYS        neomycin-polymyxin-dexamethasone [id number] Susp ophthalmic susp    MAXITROL     ONE DROP OU QID        olopatadine 0.1 % ophthalmic solution    PATANOL     1 drop 2 times daily        TYLENOL PO      Take 325 mg by mouth 2 times daily as needed for mild pain    Abdominal pain, right upper quadrant       valACYclovir 1000 mg tablet    VALTREX     as needed Cold sores prn

## 2018-03-12 NOTE — PROGRESS NOTES
"Constipation  Constipation -    Onset: long year(s) ago    Description:   Frequency of bowel movements: every 5-7days    Accompanying Signs & Symptoms:   Abdominal pain: no  Blood in stool: no  Rectal pain: sometimes  Nausea/vomiting: no  Fevers: no  Weight loss or gain: YES    Precipitating and/or Alleviating factors:    Exercising daily: NO  Recent use of Narcotics, anticholinergics, calcium channel blockers, antacids, or iron supplements: no   Any previous tests: colonoscopy   History of abdominal surgery: YES- 4 c sections and abd hys, \"Marshal marcheti\"  Chronic Laxative Use MOM used once weekly, and dulcolax once a week    Mole biopsy, possible infection  Done on Feb 6, back on the 21st for hand      Wants screening blood for   Has had an A1C test in September at 6.2    Lipids, A1c, vit D etc.    Wonders about PT for the left knee.    Joined weight watchers and is down 27 pounds, due to inability to do exercise.\  Lots of hardware. Neck knees, breast implants etc all feel like they are odalys.    Problem(s) Oriented visit      ROS:  General and CV completed and negative except as noted above     HISTORY:   reports that she does not drink alcohol.   reports that she quit smoking about 2 years ago. She has never used smokeless tobacco.    Past Medical History:   Diagnosis Date     Breast CA in situ 1987     Cancer (H) 1987     Fibromyalgia      Osteoarthritis 2/1/2011     Osteopenia 2/1/2011     Pain disorder 2/1/2011     Pneumonia      PONV (postoperative nausea and vomiting)      Pterygium eye 8/26/2013     Reactive airway disease 2/1/2011     Past Surgical History:   Procedure Laterality Date     anterior c-disc fusion       COLONOSCOPY N/A 10/19/2017    Procedure: COLONOSCOPY;  COLONOSCOPY;  Surgeon: Niko Wong MD;  Location:  GI     ENDOSCOPY  04/21/08     HYSTERECTOMY, MARCUS       JOINT REPLACEMTN, KNEE RT/LT  1/2011    Joint Replacement knee RT, with tibial straightenin     MAMMOPLASTY " AUGMENTATION BILATERAL      breast ca      MASTECTOMY, SIMPLE RT/LT/CLAIRE  1989    Mastectomy Simple RT/LT/CLAIRE     OPEN REDUCTION INTERNAL FIXATION ANKLE  8/27/2013    Procedure: OPEN REDUCTION INTERNAL FIXATION ANKLE;  RIGHT OPEN REDUCTION INTERNAL FIXATION ANKLE WITH LIGAMENT REPAIR;  Surgeon: Nando Chang MD;  Location: [facility] OR     PTERYGIUM WITH CONJUNCTIVAL AUTOLOGOUS TRANSPLANT Left 8/29/2016    Procedure: PTERYGIUM WITH CONJUNCTIVAL AUTOLOGOUS TRANSPLANT;  Surgeon: Сергей Walters MD;  Location: [facility]     REPAIR LIGAMENT ANKLE  8/27/2013    Procedure: REPAIR LIGAMENT ANKLE;;  Surgeon: Nando Chang MD;  Location: [facility] OR       EXAM:  BP: 102/70   Pulse: 80    Temp: Data Unavailable    Wt Readings from Last 2 Encounters:   03/12/18 63.9 kg (140 lb 12.8 oz)   12/21/17 66.7 kg (147 lb)       BMI= Body mass index is 24.55 kg/(m^2).    EXAM:  APPEARANCE: = Relaxed and in no distress  Conj/Eyelids = noninjected and lids and lashes are without inflammation  PERRLA/Irises = Pupils Round Reactive to Light and Irisis without inflammation  Neck = No anterior or posterior adenopathy appreciated.  Thyroid = Not enlarged and no masses felt  Resp effort = Calm regular breathing  Breath Sounds = Good air movement with no rales or rhonchi in any lung fields  Heart Rate, Rythym, & sounds (no Murm)  = Regular rate and rythym with no S3, S4, or murmer appreciated.  Carotid Art's = Pulses full and equal and no bruits appreciated  Abdomen = Soft, nontender, no masses, & bowel sounds in all quadrants  Liver/Spleen = Normal span and no splenomegaly noted  Digits and Nails = FROM in all finger joints, no nail dystrophy  Ext (edema) = No pretibial edema noted or elsewhere  Musculsktl: decreased range of motion in the knees  SKIN = absent significant rashes or lesions   Recent/Remote Memory = Alert and Oriented x 3  Mood/Affect = Cooperative and interested      Assessment/Plan:  Macey was seen today for recheck medication and  "wound check.    Diagnoses and all orders for this visit:    Primary osteoarthritis of right knee  -     PHYSICAL THERAPY REFERRAL    Slow transit constipation    Vitamin D deficiency  -     Vitamin D  25-Hydroxy ([facility])    Metabolic syndrome X  -     Hemoglobin A1C ([facility])  -     Lipid Panel ([facility])  -     Basic Metabolic Panel (8) ([facility])        COUNSELING:   reports that she quit smoking about 2 years ago. She has never used smokeless tobacco.    Estimated body mass index is 24.55 kg/(m^2) as calculated from the following:    Height as of this encounter: 1.613 m (5' 3.5\").    Weight as of this encounter: 63.9 kg (140 lb 12.8 oz).       Appropriate preventive services were discussed with this patient, including applicable screening as appropriate for cardiovascular disease, diabetes, osteopenia/osteoporosis, and glaucoma.  As appropriate for age/gender, discussed screening for colorectal cancer, prostate cancer, breast cancer, and cervical cancer. Checklist reviewing preventive services available has been given to the patient.    Reviewed patients plan of care and provided an AVS. The Basic Care Plan (routine screening as documented in Health Maintenance) for Macey meets the Care Plan requirement. This Care Plan has been established and reviewed with the  Patient.      The following health maintenance items are reviewed in Epic and correct as of today:  Health Maintenance   Topic Date Due     SPIROMETRY ONETIME  1950     COPD ACTION PLAN Q1 YR  1950     DEXA Q2 YR  09/14/2017     ASTHMA CONTROL TEST Q6 MOS  06/15/2018     A1C Q6 MO  06/15/2018     ADVANCE DIRECTIVE PLANNING Q5 YRS  08/26/2018     INFLUENZA VACCINE (SYSTEM ASSIGNED)  09/01/2018     ASTHMA ACTION PLAN Q1 YR  12/15/2018     FALL RISK ASSESSMENT  12/15/2018     LIPID MONITORING Q1 YEAR  12/15/2018     PNEUMOCOCCAL (2 of 2 - PPSV23) 12/15/2018     FIT-DNA (Cologuard) Q3 YR  10/06/2020     TETANUS IMMUNIZATION (SYSTEM ASSIGNED)  " 06/04/2022     COLON CANCER SCREEN (SYSTEM ASSIGNED)  10/19/2027     HEPATITIS C SCREENING  Completed       Long Ferrari  [facility]  For any issues my office # is [phone]

## 2018-03-13 ENCOUNTER — TELEPHONE (OUTPATIENT)
Dept: FAMILY MEDICINE | Facility: CLINIC | Age: 68
End: 2018-03-13

## 2018-03-13 LAB
BUN SERPL-MCNC: 12 MG/DL (ref 8–27)
BUN/CREATININE RATIO: 13 (ref 12–28)
CALCIUM SERPL-MCNC: 9.8 MG/DL (ref 8.7–10.3)
CHLORIDE SERPLBLD-SCNC: 101 MMOL/L (ref 96–106)
CHOLEST SERPL-MCNC: 193 MG/DL (ref 100–199)
CREAT SERPL-MCNC: 0.93 MG/DL (ref 0.57–1)
EGFR IF AFRICN AM: 74 ML/MIN/1.73
EGFR IF NONAFRICN AM: 64 ML/MIN/1.73
GLUCOSE SERPL-MCNC: 104 MG/DL (ref 65–99)
HBA1C MFR BLD: 5.7 % (ref 4.8–5.6)
HDLC SERPL-MCNC: 34 MG/DL
LDL/HDL RATIO: 3.7 RATIO UNITS (ref 0–3.2)
LDLC SERPL CALC-MCNC: 127 MG/DL (ref 0–99)
POTASSIUM SERPL-SCNC: 4.5 MMOL/L (ref 3.5–5.2)
SODIUM SERPL-SCNC: 142 MMOL/L (ref 134–144)
TOTAL CO2: 27 MMOL/L (ref 18–28)
TRIGL SERPL-MCNC: 161 MG/DL (ref 0–149)
VITAMIN D, 25-HYDROXY: 28.1 NG/ML (ref 30–100)
VLDLC SERPL CALC-MCNC: 32 MG/DL (ref 5–40)

## 2018-03-13 NOTE — TELEPHONE ENCOUNTER
From: Rachel Wong      Sent: 3/12/2018   2:23 PM        To: Long Ferrari MD, Rachel Wong   Subject: pt asking ideal body wt for [employer]       Hi Dr LUJAN     Patient requested look at ideal weight for [employer].   Is at 140.2 # today BMI =24.55     WW told her between 116-146 #     Is 130 # ok or too low??     Let me know & I can call her back     Thanks Rachel     I think she is pretty close to ideal at 140.   KN     Patient informed-   She is using goal of #135 . Aiming for physical therapy for loosing body fat but has bilateral knee pain.  Post right knee  Replacement - due for treatment on Left knee.    Using [employer] for maintenance of good diet & accountability & flexibility with physical therapy for proper exercising with bad knees.  Due to family & personal hx of obesity    Rachel Wong MA March 13, 2018 10:40 AM

## 2018-03-14 NOTE — PROGRESS NOTES
Dear Macey,   I am writing to report that your included test results are within expected ranges. I do not suggest that we make any changes at this time.  Make sure to take 2,000 units of Vit D daily.    Long Ferrari M.D.

## 2018-03-21 ENCOUNTER — THERAPY VISIT (OUTPATIENT)
Dept: PHYSICAL THERAPY | Facility: CLINIC | Age: 68
End: 2018-03-21
Payer: MEDICARE

## 2018-03-21 DIAGNOSIS — M25.561 CHRONIC PAIN OF BOTH KNEES: Primary | ICD-10-CM

## 2018-03-21 DIAGNOSIS — G89.29 CHRONIC PAIN OF BOTH KNEES: Primary | ICD-10-CM

## 2018-03-21 DIAGNOSIS — M25.562 CHRONIC PAIN OF BOTH KNEES: Primary | ICD-10-CM

## 2018-03-21 PROCEDURE — G8978 MOBILITY CURRENT STATUS: HCPCS | Mod: GP | Performed by: PHYSICAL THERAPIST

## 2018-03-21 PROCEDURE — 97110 THERAPEUTIC EXERCISES: CPT | Mod: GP | Performed by: PHYSICAL THERAPIST

## 2018-03-21 PROCEDURE — 97161 PT EVAL LOW COMPLEX 20 MIN: CPT | Mod: GP | Performed by: PHYSICAL THERAPIST

## 2018-03-21 PROCEDURE — G8979 MOBILITY GOAL STATUS: HCPCS | Mod: GP | Performed by: PHYSICAL THERAPIST

## 2018-03-21 ASSESSMENT — ACTIVITIES OF DAILY LIVING (ADL)
LIMPING: THE SYMPTOM AFFECTS MY ACTIVITY SEVERELY
RAW_SCORE: 24
HOW_WOULD_YOU_RATE_THE_OVERALL_FUNCTION_OF_YOUR_KNEE_DURING_YOUR_USUAL_DAILY_ACTIVITIES?: ABNORMAL
KNEE_ACTIVITY_OF_DAILY_LIVING_SCORE: 34.29
PAIN: THE SYMPTOM AFFECTS MY ACTIVITY SEVERELY
STIFFNESS: THE SYMPTOM AFFECTS MY ACTIVITY SEVERELY
SQUAT: I AM UNABLE TO DO THE ACTIVITY
GIVING WAY, BUCKLING OR SHIFTING OF KNEE: THE SYMPTOM AFFECTS MY ACTIVITY SEVERELY
WALK: ACTIVITY IS FAIRLY DIFFICULT
KNEE_ACTIVITY_OF_DAILY_LIVING_SUM: 24
GO DOWN STAIRS: ACTIVITY IS FAIRLY DIFFICULT
SWELLING: THE SYMPTOM AFFECTS MY ACTIVITY MODERATELY
AS_A_RESULT_OF_YOUR_KNEE_INJURY,_HOW_WOULD_YOU_RATE_YOUR_CURRENT_LEVEL_OF_DAILY_ACTIVITY?: ABNORMAL
HOW_WOULD_YOU_RATE_THE_CURRENT_FUNCTION_OF_YOUR_KNEE_DURING_YOUR_USUAL_DAILY_ACTIVITIES_ON_A_SCALE_FROM_0_TO_100_WITH_100_BEING_YOUR_LEVEL_OF_KNEE_FUNCTION_PRIOR_TO_YOUR_INJURY_AND_0_BEING_THE_INABILITY_TO_PERFORM_ANY_OF_YOUR_USUAL_DAILY_ACTIVITIES?: 35
RISE FROM A CHAIR: ACTIVITY IS SOMEWHAT DIFFICULT
SIT WITH YOUR KNEE BENT: ACTIVITY IS NOT DIFFICULT
GO UP STAIRS: ACTIVITY IS VERY DIFFICULT
WEAKNESS: THE SYMPTOM AFFECTS MY ACTIVITY SEVERELY
STAND: ACTIVITY IS MINIMALLY DIFFICULT
KNEEL ON THE FRONT OF YOUR KNEE: I AM UNABLE TO DO THE ACTIVITY

## 2018-03-21 NOTE — PROGRESS NOTES
[facility] Initial Evaluation  Subjective:  Macey Romero is a 67 year old female.    Patient s chief complaints: pain in both knees.    Condition occurred due to R TKA x 2 last in 2011 (and L knee arthritis (doesn't want surgery)).  .  Date of Onset: 3/12/18 MD orders, patient notes pain has increased in the knees about the past 6 months, but has had some ongoing pain over the past few years  Location of symptoms is R knee anterior joint line and medial pes anserine area, L knee about the borders of the patella.  Symptoms other than pain include: stiffness, swelling, weakness, limping   Quality of pain is aching/sharp and frequency is constant low level ache, sharp with activity.    Pain dependence on time of day is: not dependent on time of day.   Pain rating is: 6/10.    Symptoms are exacerbated by: stairs, squatting, kneeling, sitting with knees extended prolonged.    Symptoms are relieved by:  nothing.    Progression of symptoms is that symptoms are:  worsening.  Imaging/Special tests include: none recent   Previous treatments include: hx of R TKA otherwise nothing recent.   Patient reports that general health is: excellent.   Pertinent medical history includes:  Osteoporosis, R ankle fracture, CA, TIA, fibromyalgia, dizziness, post menopausal.    Medical allergies includes: silicon.   Surgical history includes: breast CA, skin CA, ankle ORIF, fusion C6-7..  Current medications include: listed in epic snapshot (magnesium, valcyclovir, asthma medication)  Current occupation is: retired RN  Work status is: retired  Primary job tasks include: none  Barriers include: none  Red flags include: none    Patient's expectations for therapy include: less pain, flexibility, more use.    HPI                    Objective:  KNEE:    PROM:   L  R   Hyperextension     Extension 0 0   Flexion 155 120 (end range pain)     Strength:   L R   HIP     Flex 5 5   Ext 3+ 3+   Abd 4 4   KNEE     Flex 5 5 Pain  posterolateral knee   Ext 5 5     Functional: mild pain with supported partial depth squat at sink    System    Physical Exam    General     ROS    Assessment/Plan:    Patient is a 67 year old female with both sides knee complaints.    Patient has the following significant findings with corresponding treatment plan.                Diagnosis 1:  B knee pain, R>L (R s/p 2 TKA's 2011, and L more of PFPS type presentation)  Pain -  hot/cold therapy  Decreased ROM/flexibility - manual therapy and therapeutic exercise  Decreased strength - therapeutic exercise and therapeutic activities  Impaired balance - neuro re-education and therapeutic activities  Decreased proprioception - neuro re-education and therapeutic activities  Impaired gait - gait training  Decreased function - therapeutic activities    Therapy Evaluation Codes:   1) History comprised of:   Personal factors that impact the plan of care:      Time since onset of symptoms.    Comorbidity factors that impact the plan of care are:      None.     Medications impacting care: None.  2) Examination of Body Systems comprised of:   Body structures and functions that impact the plan of care:      Knee.   Activity limitations that impact the plan of care are:      Sitting, Squatting/kneeling, Stairs and kneeling.  3) Clinical presentation characteristics are:   Stable/Uncomplicated.  4) Decision-Making    Low complexity using standardized patient assessment instrument and/or measureable assessment of functional outcome.  Cumulative Therapy Evaluation is: Low complexity.    Previous and current functional limitations:  (See Goal Flow Sheet for this information)    Short term and Long term goals: (See Goal Flow Sheet for this information)     Communication ability:  Patient appears to be able to clearly communicate and understand verbal and written communication and follow directions correctly.  Treatment Explanation - The following has been discussed with the patient:    RX ordered/plan of care  Anticipated outcomes  Possible risks and side effects  This patient would benefit from PT intervention to resume normal activities.   Rehab potential is good.    Frequency:  1 X week, once daily  Duration:  for 8 weeks  Discharge Plan:  Achieve all LTG.  Independent in home treatment program.  Reach maximal therapeutic benefit.    Please refer to the daily flowsheet for treatment today, total treatment time and time spent performing 1:1 timed codes.

## 2018-03-21 NOTE — LETTER
DEPARTMENT OF HEALTH AND HUMAN SERVICES  CENTERS FOR MEDICARE & MEDICAID SERVICES    PLAN/UPDATED PLAN OF PROGRESS FOR OUTPATIENT REHABILITATION    PATIENTS NAME:  Macey Romero   : 1950    PROVIDER NUMBER:    [id number]    [facility]  [id number]    PROVIDER NAME: [facility]    MEDICAL RECORD NUMBER: [id number]     START OF CARE DATE:  SOC Date: 18   TYPE:  PT    PRIMARY/TREATMENT DIAGNOSIS: (Pertinent Medical Diagnosis)  Chronic pain of both knees    VISITS FROM START OF CARE: 1 Rxs Used: 1     [place] [facility] Initial Evaluation  Subjective:  Macey Romero is a 67 year old female.    Patient s chief complaints: pain in both knees.    Condition occurred due to R TKA x 2 last in  (and L knee arthritis (doesn't want surgery).  Date of Onset: 3/12/18 MD orders, patient notes pain has increased in the knees about the past 6 months, but has had some ongoing pain over the past few years  Location of symptoms is R knee anterior joint line and medial pes anserine area, L knee about the borders of the patella.  Symptoms other than pain include: stiffness, swelling, weakness, limping.  Quality of pain is aching/sharp and frequency is constant low level ache, sharp with activity.  Pain dependence on time of day is: not dependent on time of day. Pain rating is: 6/10.  Symptoms are exacerbated by: stairs, squatting, kneeling, sitting with knees extended prolonged.  Symptoms are relieved by:  nothing.    Progression of symptoms is that symptoms are:  worsening.  Imaging/Special tests include: none recent   Previous treatments include: hx of R TKA otherwise nothing recent.   Patient reports that general health is: excellent.   Pertinent medical history includes:  Osteoporosis, R ankle fracture, CA, TIA, fibromyalgia, dizziness, post menopausal.    Medical allergies includes: silicon.   Surgical history includes: breast CA, skin CA, ankle ORIF, fusion  C6-7..  Current medications include: listed in epic snapshot (magnesium, valcyclovir, asthma medication)  Current occupation is: retired RN  Work status is: retired  Primary job tasks include: none  Barriers include: none  Red flags include: none    Patient's expectations for therapy include: less pain, flexibility, more use.      PATIENTS NAME:  Macey Romero   : 1950      HPI                Objective:  KNEE:    PROM:   L  R   Hyperextension     Extension 0 0   Flexion 155 120 (end range pain)     Strength:   L R   HIP     Flex 5 5   Ext 3+ 3+   Abd 4 4   KNEE     Flex 5 5 Pain posterolateral knee   Ext 5 5     Functional: mild pain with supported partial depth squat at sink  System  Physical Exam  General   ROS    Assessment/Plan:    Patient is a 67 year old female with both sides knee complaints.    Patient has the following significant findings with corresponding treatment plan.                Diagnosis 1:  B knee pain, R>L (R s/p 2 TKA's , and L more of PFPS type presentation)  Pain -  hot/cold therapy  Decreased ROM/flexibility - manual therapy and therapeutic exercise  Decreased strength - therapeutic exercise and therapeutic activities  Impaired balance - neuro re-education and therapeutic activities  Decreased proprioception - neuro re-education and therapeutic activities  Impaired gait - gait training  Decreased function - therapeutic activities    Therapy Evaluation Codes:   1) History comprised of:   Personal factors that impact the plan of care:      Time since onset of symptoms.    Comorbidity factors that impact the plan of care are:      None.     Medications impacting care: None.        PATIENTS NAME:  Macey Romero   : 1950      2) Examination of Body Systems comprised of:   Body structures and functions that impact the plan of care:      Knee.   Activity limitations that impact the plan of care are:      Sitting, Squatting/kneeling, Stairs and kneeling.  3) Clinical  "presentation characteristics are:   Stable/Uncomplicated.  4) Decision-Making    Low complexity using standardized patient assessment instrument and/or measureable assessment of functional outcome.  Cumulative Therapy Evaluation is: Low complexity.    Communication ability:  Patient appears to be able to clearly communicate and understand verbal and written communication and follow directions correctly.  Treatment Explanation - The following has been discussed with the patient:   RX ordered/plan of care  Anticipated outcomes  Possible risks and side effects  This patient would benefit from PT intervention to resume normal activities.   Rehab potential is good.  Frequency:  1 X week, once daily  Duration:  for 8 weeks  Discharge Plan:  Achieve all LTG.  Independent in home treatment program.  Reach maximal therapeutic benefit.          Caregiver Signature/Credentials _____________________________ Date ________       Treating Provider: Jose Carlos Ngo DPT   I have reviewed and certified the need for these services and plan of treatment while under my care.        PHYSICIAN'S SIGNATURE:   _________________________________________  Date___________   Long Ferrari MD    Certification period:  Beginning of Cert date period: 03/21/18 to  End of Cert period date: 06/18/18     Functional Level Progress Report: Please see attached \"Goal Flow sheet for Functional level.\"    ____X____ Continue Services or       ________ DC Services                Service dates: From  SOC Date: 03/21/18 date to present                         "

## 2018-03-28 ENCOUNTER — THERAPY VISIT (OUTPATIENT)
Dept: PHYSICAL THERAPY | Facility: CLINIC | Age: 68
End: 2018-03-28
Payer: MEDICARE

## 2018-03-28 DIAGNOSIS — M25.561 CHRONIC PAIN OF BOTH KNEES: ICD-10-CM

## 2018-03-28 DIAGNOSIS — M25.562 CHRONIC PAIN OF BOTH KNEES: ICD-10-CM

## 2018-03-28 DIAGNOSIS — G89.29 CHRONIC PAIN OF BOTH KNEES: ICD-10-CM

## 2018-03-28 PROCEDURE — 97530 THERAPEUTIC ACTIVITIES: CPT | Mod: GP | Performed by: PHYSICAL THERAPIST

## 2018-03-28 PROCEDURE — 97110 THERAPEUTIC EXERCISES: CPT | Mod: GP | Performed by: PHYSICAL THERAPIST

## 2018-04-04 ENCOUNTER — THERAPY VISIT (OUTPATIENT)
Dept: PHYSICAL THERAPY | Facility: CLINIC | Age: 68
End: 2018-04-04
Payer: MEDICARE

## 2018-04-04 DIAGNOSIS — G89.29 CHRONIC PAIN OF BOTH KNEES: ICD-10-CM

## 2018-04-04 DIAGNOSIS — M25.561 CHRONIC PAIN OF BOTH KNEES: ICD-10-CM

## 2018-04-04 DIAGNOSIS — M25.562 CHRONIC PAIN OF BOTH KNEES: ICD-10-CM

## 2018-04-04 PROCEDURE — 97110 THERAPEUTIC EXERCISES: CPT | Mod: GP | Performed by: PHYSICAL THERAPIST

## 2018-04-04 PROCEDURE — 97530 THERAPEUTIC ACTIVITIES: CPT | Mod: GP | Performed by: PHYSICAL THERAPIST

## 2018-04-11 ENCOUNTER — THERAPY VISIT (OUTPATIENT)
Dept: PHYSICAL THERAPY | Facility: CLINIC | Age: 68
End: 2018-04-11
Payer: MEDICARE

## 2018-04-11 DIAGNOSIS — M25.562 CHRONIC PAIN OF BOTH KNEES: ICD-10-CM

## 2018-04-11 DIAGNOSIS — M25.561 CHRONIC PAIN OF BOTH KNEES: ICD-10-CM

## 2018-04-11 DIAGNOSIS — G89.29 CHRONIC PAIN OF BOTH KNEES: ICD-10-CM

## 2018-04-11 PROCEDURE — 97110 THERAPEUTIC EXERCISES: CPT | Mod: GP | Performed by: PHYSICAL THERAPIST

## 2018-04-11 PROCEDURE — 97530 THERAPEUTIC ACTIVITIES: CPT | Mod: GP | Performed by: PHYSICAL THERAPIST

## 2018-04-18 ENCOUNTER — THERAPY VISIT (OUTPATIENT)
Dept: PHYSICAL THERAPY | Facility: CLINIC | Age: 68
End: 2018-04-18
Payer: MEDICARE

## 2018-04-18 DIAGNOSIS — M25.561 CHRONIC PAIN OF BOTH KNEES: ICD-10-CM

## 2018-04-18 DIAGNOSIS — M25.562 CHRONIC PAIN OF BOTH KNEES: ICD-10-CM

## 2018-04-18 DIAGNOSIS — G89.29 CHRONIC PAIN OF BOTH KNEES: ICD-10-CM

## 2018-04-18 PROCEDURE — 97110 THERAPEUTIC EXERCISES: CPT | Mod: GP | Performed by: PHYSICAL THERAPIST

## 2018-04-18 PROCEDURE — 97530 THERAPEUTIC ACTIVITIES: CPT | Mod: GP | Performed by: PHYSICAL THERAPIST

## 2018-04-23 ENCOUNTER — THERAPY VISIT (OUTPATIENT)
Dept: PHYSICAL THERAPY | Facility: CLINIC | Age: 68
End: 2018-04-23
Payer: MEDICARE

## 2018-04-23 DIAGNOSIS — G89.29 CHRONIC PAIN OF BOTH KNEES: ICD-10-CM

## 2018-04-23 DIAGNOSIS — M25.561 CHRONIC PAIN OF BOTH KNEES: ICD-10-CM

## 2018-04-23 DIAGNOSIS — M25.562 CHRONIC PAIN OF BOTH KNEES: ICD-10-CM

## 2018-04-23 PROCEDURE — 97530 THERAPEUTIC ACTIVITIES: CPT | Mod: GP | Performed by: PHYSICAL THERAPIST

## 2018-04-23 PROCEDURE — G8978 MOBILITY CURRENT STATUS: HCPCS | Mod: GP | Performed by: PHYSICAL THERAPIST

## 2018-04-23 PROCEDURE — G8979 MOBILITY GOAL STATUS: HCPCS | Mod: GP | Performed by: PHYSICAL THERAPIST

## 2018-04-23 PROCEDURE — 97110 THERAPEUTIC EXERCISES: CPT | Mod: GP | Performed by: PHYSICAL THERAPIST

## 2018-04-23 ASSESSMENT — ACTIVITIES OF DAILY LIVING (ADL)
SIT WITH YOUR KNEE BENT: ACTIVITY IS NOT DIFFICULT
RAW_SCORE: 36
RISE FROM A CHAIR: ACTIVITY IS MINIMALLY DIFFICULT
GO DOWN STAIRS: ACTIVITY IS FAIRLY DIFFICULT
LIMPING: THE SYMPTOM AFFECTS MY ACTIVITY MODERATELY
GO UP STAIRS: ACTIVITY IS VERY DIFFICULT
HOW_WOULD_YOU_RATE_THE_OVERALL_FUNCTION_OF_YOUR_KNEE_DURING_YOUR_USUAL_DAILY_ACTIVITIES?: ABNORMAL
KNEE_ACTIVITY_OF_DAILY_LIVING_SCORE: 51.43
PAIN: THE SYMPTOM AFFECTS MY ACTIVITY MODERATELY
KNEE_ACTIVITY_OF_DAILY_LIVING_SUM: 36
WEAKNESS: THE SYMPTOM AFFECTS MY ACTIVITY SLIGHTLY
STAND: ACTIVITY IS NOT DIFFICULT
KNEEL ON THE FRONT OF YOUR KNEE: I AM UNABLE TO DO THE ACTIVITY
HOW_WOULD_YOU_RATE_THE_CURRENT_FUNCTION_OF_YOUR_KNEE_DURING_YOUR_USUAL_DAILY_ACTIVITIES_ON_A_SCALE_FROM_0_TO_100_WITH_100_BEING_YOUR_LEVEL_OF_KNEE_FUNCTION_PRIOR_TO_YOUR_INJURY_AND_0_BEING_THE_INABILITY_TO_PERFORM_ANY_OF_YOUR_USUAL_DAILY_ACTIVITIES?: 75
GIVING WAY, BUCKLING OR SHIFTING OF KNEE: THE SYMPTOM AFFECTS MY ACTIVITY SLIGHTLY
SQUAT: I AM UNABLE TO DO THE ACTIVITY
STIFFNESS: THE SYMPTOM AFFECTS MY ACTIVITY MODERATELY
SWELLING: I HAVE THE SYMPTOM BUT IT DOES NOT AFFECT MY ACTIVITY
WALK: ACTIVITY IS SOMEWHAT DIFFICULT
AS_A_RESULT_OF_YOUR_KNEE_INJURY,_HOW_WOULD_YOU_RATE_YOUR_CURRENT_LEVEL_OF_DAILY_ACTIVITY?: ABNORMAL

## 2018-04-23 NOTE — PROGRESS NOTES
Subjective:  HPI                    Objective:  System    Physical Exam    General     ROS    Assessment/Plan:    PROGRESS  REPORT    Progress reporting period is from 3/21/18 to 4/23/18 (6 visits).       SUBJECTIVE  Subjective changes noted by patient:  Patient reports overalll L knee feels a bit better than when starting PT.  R knee (hx of TKA) and R hip are about the same.   She will be traveling and continue her HEP moving forward at this time.     Current Pain level:  (4-5/10).     Initial Pain level: 6/10.   Changes in function:  Yes (See Goal flowsheet attached for changes in current functional level)  Adverse reaction to treatment or activity: None    OBJECTIVE  Changes noted in objective findings:  Yes,   Objective:   Able on 4 inch step up B.  Still very tender of greater trochanteric areas R>L.     Does have B hip (gluteal), knee (quad/hamstring) strength program in place.      ASSESSMENT/PLAN  Updated problem list and treatment plan: Diagnosis 1:  B knee pain R s/p 2 TKA in 2011, L more PFPS type presentation  Pain -  home program  Decreased ROM/flexibility - home program  Decreased strength - home program  Decreased proprioception - home program  Decreased function - home program  STG/LTGs have been met or progress has been made towards goals:  Yes (See Goal flow sheet completed today.)  Assessment of Progress: The patient's condition has potential to improve.  Self Management Plans:  Patient has been instructed in a home treatment program.  I have re-evaluated this patient and find that the nature, scope, duration and intensity of the therapy is appropriate for the medical condition of the patient.  Macey continues to require the following intervention to meet STG and LTG's:  PT    Recommendations:  Patient will continue HEP and trial self management with HEP at this time.  She will be traveling in the coming weeks.  Once returning, she may call or schedule PT if she is still having symptoms.  She may  also touch base with her MD as her R hip (consistent with bursitis), is very irritable, to discuss any other treatment options to calm that down.    Please refer to the daily flowsheet for treatment today, total treatment time and time spent performing 1:1 timed codes.

## 2018-05-30 ENCOUNTER — OFFICE VISIT (OUTPATIENT)
Dept: FAMILY MEDICINE | Facility: CLINIC | Age: 68
End: 2018-05-30

## 2018-05-30 VITALS
RESPIRATION RATE: 16 BRPM | BODY MASS INDEX: 24.73 KG/M2 | OXYGEN SATURATION: 99 % | HEIGHT: 63 IN | HEART RATE: 74 BPM | WEIGHT: 139.6 LBS | TEMPERATURE: 98.4 F

## 2018-05-30 DIAGNOSIS — H11.002 PTERYGIUM OF LEFT EYE: ICD-10-CM

## 2018-05-30 DIAGNOSIS — H01.003 BLEPHARITIS OF BOTH EYES, UNSPECIFIED EYELID, UNSPECIFIED TYPE: ICD-10-CM

## 2018-05-30 DIAGNOSIS — H01.006 BLEPHARITIS OF BOTH EYES, UNSPECIFIED EYELID, UNSPECIFIED TYPE: ICD-10-CM

## 2018-05-30 DIAGNOSIS — M79.7 FIBROMYALGIA: ICD-10-CM

## 2018-05-30 DIAGNOSIS — Z01.818 PREOP GENERAL PHYSICAL EXAM: Primary | ICD-10-CM

## 2018-05-30 DIAGNOSIS — Z86.19 H/O COLD SORES: ICD-10-CM

## 2018-05-30 LAB
% GRANULOCYTES: 57.1 % (ref 42.2–75.2)
HCT VFR BLD AUTO: 41.6 % (ref 35–46)
HEMOGLOBIN: 13.9 G/DL (ref 11.8–15.5)
LYMPHOCYTES NFR BLD AUTO: 34.3 % (ref 20.5–51.1)
MCH RBC QN AUTO: 28 PG (ref 27–31)
MCHC RBC AUTO-ENTMCNC: 33.4 G/DL (ref 33–37)
MCV RBC AUTO: 83.9 FL (ref 80–100)
MONOCYTES NFR BLD AUTO: 8.6 % (ref 1.7–9.3)
PLATELET # BLD AUTO: 184 K/UL (ref 140–450)
RBC # BLD AUTO: 4.95 X10/CMM (ref 3.7–5.2)
WBC # BLD AUTO: 5.8 X10/CMM (ref 3.8–11)

## 2018-05-30 PROCEDURE — 36415 COLL VENOUS BLD VENIPUNCTURE: CPT | Performed by: FAMILY MEDICINE

## 2018-05-30 PROCEDURE — 85025 COMPLETE CBC W/AUTO DIFF WBC: CPT | Performed by: FAMILY MEDICINE

## 2018-05-30 PROCEDURE — 99214 OFFICE O/P EST MOD 30 MIN: CPT | Performed by: FAMILY MEDICINE

## 2018-05-30 RX ORDER — VALACYCLOVIR HYDROCHLORIDE 1 G/1
1000 TABLET, FILM COATED ORAL DAILY PRN
Qty: 10 TABLET | Refills: 4 | Status: ON HOLD | OUTPATIENT
Start: 2018-05-30 | End: 2018-06-18

## 2018-05-30 RX ORDER — HYPOCHLOROUS ACID/SODIUM CHLOR 0.01 %
SPRAY, NON-AEROSOL (ML) TOPICAL
COMMUNITY
Start: 2018-05-30 | End: 2020-08-20

## 2018-05-30 RX ORDER — DOXYCYCLINE HYCLATE 50 MG/1
CAPSULE ORAL
Refills: 4 | Status: ON HOLD | COMMUNITY
Start: 2018-04-25 | End: 2018-06-18

## 2018-05-30 ASSESSMENT — ANXIETY QUESTIONNAIRES
7. FEELING AFRAID AS IF SOMETHING AWFUL MIGHT HAPPEN: NOT AT ALL
3. WORRYING TOO MUCH ABOUT DIFFERENT THINGS: NOT AT ALL
5. BEING SO RESTLESS THAT IT IS HARD TO SIT STILL: NOT AT ALL
2. NOT BEING ABLE TO STOP OR CONTROL WORRYING: NOT AT ALL
1. FEELING NERVOUS, ANXIOUS, OR ON EDGE: NOT AT ALL
GAD7 TOTAL SCORE: 0
6. BECOMING EASILY ANNOYED OR IRRITABLE: NOT AT ALL

## 2018-05-30 ASSESSMENT — PATIENT HEALTH QUESTIONNAIRE - PHQ9: 5. POOR APPETITE OR OVEREATING: NOT AT ALL

## 2018-05-30 NOTE — MR AVS SNAPSHOT
After Visit Summary   5/30/2018    Macey Romero    MRN: [id number]           Patient Information     Date Of Birth          1950        Visit Information        Provider Department      5/30/2018 9:45 AM Lakshmi Baez MD [facility]        Today's Diagnoses     Preop general physical exam    -  1    Blepharitis of both eyes, unspecified eyelid, unspecified type        Pterygium of left eye        H/O cold sores        Fibromyalgia          Care Instructions      Before Your Surgery      Call your surgeon if there is any change in your health. This includes signs of a cold or flu (such as a sore throat, runny nose, cough, rash or fever).    Do not smoke, drink alcohol or take over the counter medicine (unless your surgeon or primary care doctor tells you to) for the 24 hours before and after surgery.    If you take prescribed drugs: Follow your doctor s orders about which medicines to take and which to stop until after surgery.    Eating and drinking prior to surgery: follow the instructions from your surgeon    Take a shower or bath the night before surgery. Use the soap your surgeon gave you to gently clean your skin. If you do not have soap from your surgeon, use your regular soap. Do not shave or scrub the surgery site.  Wear clean pajamas and have clean sheets on your bed.     You should stop taking aspirin, ibuprofen (Advil, Motrin), naproxen (Aleve, Naprosyn), fish/krill oil and gingko 7 days before your surgery to minimize bleeding risk. You may use only acetaminophen (Tylenol) for pain management (up to a maximum of 3,000 mg daily if your liver is healthy). Acetaminophen does not contribute to bleeding risks.     Do NOT take any other oral prescription or over-the-counter medication on the morning of your surgery.  Remember not to eat or drink anything after midnight with the exception of only small sips of water to take your medication. Excess fluid in your  stomach increases your risks of vomiting and aspirating this fluid into your lungs when you have anesthesia. This can lead to a serious pneumonia. If you receive conflicting information between the instructions I provided today and those you receive from your surgeon, please follow the directions you receive from your surgeon.     If eye drops have been prescribed by your surgeon, take them according to the directions you received from your surgeon. If you have questions about your eye drops, please contact your surgeon's office directly.     Call your surgeon if you start to develop a fever, cough, or any other signs of infection between now and the time of your surgery.  You will likely not heal as well and your risk of infection goes up if you have elective surgery while you are ill. Your symptoms will be evaluated on a case by case basis by your surgeon. Some changes in your health may require your surgery to be delayed for your safety.    If you need a refill of your pain medication for post-op pain, please contact your surgeon or the clinician covering his/her practice.  If you are having continued pain that requires more pain medication more than was originally prescribed, your surgeon needs to be aware of this and adjust your medications for you. Pain meds for post-op pain will not be filled by your primary care physician.     You will likely have the best result if you call your surgeon's office before 2:00 pm Monday through Thursday. I do not have access to the specific prescribing practices of your own surgeon, but please be aware some surgeons' offices do not fill pain meds late in the day or on Friday/weekends.     Please obtain recommendations from your surgeon to manage narcotic induced constipation. This is very common and the pain from constipation can be excruciating. If you have post-op constipation, please contact your surgeon's office. Pushing hard to have a bowel movement after surgery may  compromise your sutures, make you feel lightheaded, and may contribute to new or worsening hemorrhoids. Some foods will help keep your bowels soft and moving (prunes). Avoid constipating foods such as peanut butter, cheese, bananas etc.    Medications to keep your bowels moving are all available over the counter. Common stool softeners and stimulants include Colace (100 mg twice a day), Senna (2 pills before bed every evening), Miralax (1 cap or a 17 gram capful mixed in any liquid), and Milk of Magnesia (only if your kidneys are healthy). Please work with your surgeon before your procedure to develop a plan to manage your bowels post-operatively. If you have significant constipation or diarrhea after your surgery, please contact your surgeon's office.                Follow-ups after your visit        Your next 10 appointments already scheduled     Jun 18, 2018   Procedure with Сергей Walters MD   [facility] (--)    [address], [address]  [facility] [place] [address] [phone]              Who to contact     If you have questions or need follow up information about today's clinic visit or your schedule please contact [facility] directly at [phone].  Normal or non-critical lab and imaging results will be communicated to you by [employer], letter or phone within 4 business days after the clinic has received the results. If you do not hear from us within 7 days, please contact the clinic through [employer] or phone. If you have a critical or abnormal lab result, we will notify you by phone as soon as possible.  Submit refill requests through [employer] or call your pharmacy and they will forward the refill request to us. Please allow 3 business days for your refill to be completed.          Additional Information About Your Visit        [employer] Information     [employer] gives you secure access to your electronic health record. If you see a primary care provider, you can also send  "messages to your care team and make appointments. If you have questions, please call your primary care clinic.  If you do not have a primary care provider, please call [phone] and they will assist you.        Care EveryWhere ID     This is your Care EveryWhere ID. This could be used by other organizations to access your [facility] medical records  [id number]        Your Vitals Were     Pulse Temperature Respirations Height Pulse Oximetry BMI (Body Mass Index)    74 98.4  F (36.9  C) (Oral) 16 1.6 m (5' 3\") 99% 24.73 kg/m2       Blood Pressure from Last 3 Encounters:   03/12/18 102/70   12/21/17 102/48   12/15/17 104/52    Weight from Last 3 Encounters:   05/30/18 63.3 kg (139 lb 9.6 oz)   03/12/18 63.9 kg (140 lb 12.8 oz)   12/21/17 66.7 kg (147 lb)              We Performed the Following     Basic Metabolic Panel (8) ([facility])     CBC with Diff/Plt ([facility])          Today's Medication Changes          These changes are accurate as of 5/30/18 11:59 PM.  If you have any questions, ask your nurse or doctor.               These medicines have changed or have updated prescriptions.        Dose/Directions    budesonide-formoterol 160-4.5 MCG/ACT Inhaler   Commonly known as:  SYMBICORT   This may have changed:    - when to take this  - reasons to take this   Used for:  Reactive airway disease, moderate persistent, with acute exacerbation        Dose:  2 puff   Inhale 2 puffs into the lungs 2 times daily   Quantity:  3 Inhaler   Refills:  3       valACYclovir 1000 mg tablet   Commonly known as:  VALTREX   This may have changed:    - how much to take  - how to take this  - when to take this  - additional instructions   Used for:  H/O cold sores   Changed by:  Lakshmi Baez MD        Dose:  1000 mg   Take 1 tablet (1,000 mg) by mouth daily as needed For cold sore.   Quantity:  10 tablet   Refills:  4         Stop taking these medicines if you haven't already. Please contact your care team if you have questions.  "    neomycin-polymyxin-dexamethasone [id number] Susp ophthalmic susp   Commonly known as:  MAXITROL   Stopped by:  Lakshmi Baez MD                Where to get your medicines      Some of these will need a paper prescription and others can be bought over the counter.  Ask your nurse if you have questions.     Bring a paper prescription for each of these medications     valACYclovir 1000 mg tablet                Primary Care Provider Office Phone # Fax #    Long Ferrari [phone] [address]  [facility] [address]        Equal Access to Services     [facility]: Hadii aad ku hadasho Soomaali, waaxda luqadaha, qaybta kaalmada adeegyada, jacquelin jenkins . So [facility] [phone].    ATENCIÓN: Si habla español, tiene a hoyos disposición servicios gratuitos de asistencia lingüística. [facility] [phone].    We comply with applicable federal civil rights laws and [place] laws. We do not discriminate on the basis of race, color, national origin, age, disability, sex, sexual orientation, or gender identity.            Thank you!     Thank you for choosing [facility]  for your care. Our goal is always to provide you with excellent care. Hearing back from our patients is one way we can continue to improve our services. Please take a few minutes to complete the written survey that you may receive in the mail after your visit with us. Thank you!             Your Updated Medication List - Protect others around you: Learn how to safely use, store and throw away your medicines at www.disposemymeds.org.          This list is accurate as of 5/30/18 11:59 PM.  Always use your most recent med list.                   Brand Name Dispense Instructions for use Diagnosis    albuterol 108 (90 Base) MCG/ACT Inhaler    PROAIR HFA/PROVENTIL HFA/VENTOLIN HFA    1 Inhaler    Inhale 2 puffs into the lungs every 6 hours as needed for shortness of breath /  dyspnea    Reactive airway disease, moderate persistent, with acute exacerbation       AVENOVA 0.01 % Soln      For chronic blepharitis.        budesonide-formoterol 160-4.5 MCG/ACT Inhaler    SYMBICORT    3 Inhaler    Inhale 2 puffs into the lungs 2 times daily    Reactive airway disease, moderate persistent, with acute exacerbation       doxycycline 50 MG capsule    VIBRAMYCIN     TK ONE C PO QD        magnesium hydroxide 400 MG/5ML suspension    MILK OF MAGNESIA     Take 5 mLs by mouth daily as needed for constipation        olopatadine 0.1 % ophthalmic solution    PATANOL     1 drop 2 times daily        JAY STOOL SOFTENER PO      Take by mouth as needed for constipation        TYLENOL PO      Take 325 mg by mouth 2 times daily as needed for mild pain    Abdominal pain, right upper quadrant       valACYclovir 1000 mg tablet    VALTREX    10 tablet    Take 1 tablet (1,000 mg) by mouth daily as needed For cold sore.    H/O cold sores

## 2018-05-30 NOTE — PROGRESS NOTES
[facility]  [address] [place] [id number] [phone]  Dept: [phone]    PRE-OP EVALUATION:  Today's date: 2018    Macey Romero (: 1950) presents for pre-operative evaluation assessment as requested by Dr. Walters.  She requires evaluation and anesthesia risk assessment prior to undergoing surgery/procedure for treatment of Eyelids .    Proposed Surgery/ Procedure: Combined blepharoplasty, brow lift bilateral  Date of Surgery/ Procedure: 18  Time of Surgery/ Procedure: 1200  Hospital/Surgical Facility: [facility]  Fax number for surgical facility: [phone]  Primary Physician: Long Ferrari  Type of Anesthesia Anticipated: to be determined    Patient has a Health Care Directive or Living Will:  YES     1. YES - DO YOU HAVE A HISTORY OF HEART ATTACK, STROKE, STENT, BYPASS OR SURGERY ON AN ARTERY IN THE HEAD, NECK, HEART OR LEG? Mini stoke  - some trouble with numbers; occasionally transposes numbers  2. NO - Do you ever have any pain or discomfort in your chest?  3. NO - Do you have a history of  Heart Failure?  4. NO - Are you troubled by shortness of breath when: walking on the level, up a slight hill or at night?  5. NO - Do you currently have a cold, bronchitis or other respiratory infection?  6. NO - Do you have a cough, shortness of breath or wheezing?  7. NO - Do you sometimes get pains in the calves of your legs when you walk?  8. YES - DO YOU OR ANYONE IN YOUR FAMILY HAVE PREVIOUS HISTORY OF BLOOD CLOTS? Mother (Von Willebrand's disease) & patient in early 70's after emergency    9. YES - DO YOU OR DOES ANYONE IN YOUR FAMILY HAVE A SERIOUS BLEEDING PROBLEM SUCH AS PROLONGED BLEEDING FOLLOWING SURGERIES OR CUTS? Mother and Daughter   10. NO - Have you ever had problems with anemia or been told to take iron pills?  11. NO - Have you had any abnormal blood loss such as black, tarry or bloody stools, or abnormal vaginal bleeding?  12. YES  - HAVE YOU EVER HAD A BLOOD TRANSFUSION? Early 70's  13. NO - Have you or any of your relatives ever had problems with anesthesia?  14. NO - Do you have sleep apnea, excessive snoring or daytime drowsiness?  15. NO - Do you have any prosthetic heart valves?  16. YES - DO YOU HAVE PROSTHETIC JOINTS? Knee  17. NO - Is there any chance that you may be pregnant?      HPI:     HPI related to upcoming procedure: Pt with limited field of vision. She has had eyelid surgery in the past, but continues to have difficult with retaining full field of vision and has chronic blepharitis.     Pt w/ known pterygium that continues to bother her.     Pt has seasonal allergies that bother her occasionally. She does not use her pulm meds except in cases of a flare. Has not used them for several months prior to her surgery.     Fibromyalgia - diffuse chronic pain for pt. Occas requires pt more time to move around secondary to pain.     Pt has a hx of recurrent cold sores. Would like refill for Valtrex, taken only when she feels one coming on.     MEDICAL HISTORY:     Note: Pt states she has a longer medical/surgical hx than we have on file at [facility].   I provided a copy of her note in process and asked her to review, update hard copy and return to us for medical record revision.     Patient Active Problem List    Diagnosis Date Noted     Fibromyalgia      Priority: Medium     Chronic pain of both knees 03/21/2018     Priority: Medium     Hip pain, left 12/18/2015     Priority: Medium     Health Care Home 10/31/2013     Priority: Medium     Fx ankle 08/27/2013     Priority: Medium     Pterygium eye 08/26/2013     Priority: Medium     Myalgia and myositis 09/12/2011     Priority: Medium     Problem list name updated by automated process. Provider to review       IBS (irritable bowel syndrome) 06/28/2011     Priority: Medium     Keloid of skin 06/28/2011     Priority: Medium     Reactive airway disease 02/01/2011     Priority: Medium      Osteoarthritis 02/01/2011     Priority: Medium     Osteopenia 02/01/2011     Priority: Medium     Malignant neoplasm of female breast (H) 02/01/2011     Priority: Medium     1987       Pain disorder 02/01/2011     Priority: Medium      Past Medical History:   Diagnosis Date     Breast CA in situ 1987     Cancer (H) 1987    Right Breast treated with surgery     Chronic constipation      Fibromyalgia      Meningitis     Several times as an adult     Osteoarthritis 2/1/2011     Osteopenia 2/1/2011     Pneumonia      Pterygium eye 8/26/2013     Reactive airway disease 2/1/2011     Seasonal allergies      Shingles     Prior to 2008 - scalp     Skin cancer     SCC - hand, left nose     Past Surgical History:   Procedure Laterality Date     anterior c-disc fusion       CHOLECYSTECTOMY       COLONOSCOPY N/A 10/19/2017    Procedure: COLONOSCOPY;  COLONOSCOPY;  Surgeon: Niko Wong MD;  Location:  GI     D & C      Bleeding before hysterectomy     ENDOSCOPY  04/21/08     HYSTERECTOMY, MARCUS      Ovaries and tubes out due to breast cancer     JOINT REPLACEMTN, KNEE RT/LT Right 01/2011    Joint Replacement knee RT, with tibial straightening (2 replacements)     MAMMOPLASTY AUGMENTATION BILATERAL      breast ca      MASTECTOMY, SIMPLE RT/LT/CLAIRE Bilateral 1989    Mastectomy Simple RT/LT/CLAIRE     MOHS MICROGRAPHIC PROCEDURE      Left lateral nose     OPEN REDUCTION INTERNAL FIXATION ANKLE  8/27/2013    Procedure: OPEN REDUCTION INTERNAL FIXATION ANKLE;  RIGHT OPEN REDUCTION INTERNAL FIXATION ANKLE WITH LIGAMENT REPAIR;  Surgeon: Nando Chang MD;  Location:  OR     PTERYGIUM WITH CONJUNCTIVAL AUTOLOGOUS TRANSPLANT Left 8/29/2016    Procedure: PTERYGIUM WITH CONJUNCTIVAL AUTOLOGOUS TRANSPLANT;  Surgeon: Сергей Walters MD;  Location:  EC     REPAIR LIGAMENT ANKLE  8/27/2013    Procedure: REPAIR LIGAMENT ANKLE;;  Surgeon: Nando Chang MD;  Location:  OR     SALIVARY GLAND SURGERY Left     Stone removal       "TONSILLECTOMY       Current Outpatient Prescriptions   Medication Sig Dispense Refill     Acetaminophen (TYLENOL PO) Take 325 mg by mouth 2 times daily as needed for mild pain       albuterol (PROAIR HFA/PROVENTIL HFA/VENTOLIN HFA) 108 (90 BASE) MCG/ACT Inhaler Inhale 2 puffs into the lungs every 6 hours as needed for shortness of breath / dyspnea 1 Inhaler 3     budesonide-formoterol (SYMBICORT) 160-4.5 MCG/ACT Inhaler Inhale 2 puffs into the lungs 2 times daily (Patient taking differently: Inhale 2 puffs into the lungs as needed ) 3 Inhaler 3     Docusate Sodium (JAY STOOL SOFTENER PO) Take by mouth as needed for constipation       doxycycline (VIBRAMYCIN) 50 MG capsule TK ONE C PO QD  4     Eyelid Cleansers (AVENOVA) 0.01 % SOLN For chronic blepharitis.       magnesium hydroxide (MILK OF MAGNESIA) 400 MG/5ML suspension Take 5 mLs by mouth daily as needed for constipation       olopatadine (PATANOL) 0.1 % ophthalmic solution 1 drop 2 times daily       valACYclovir (VALTREX) 1000 mg tablet Take 1 tablet (1,000 mg) by mouth daily as needed For cold sore. 10 tablet 4     OTC products: none    Allergies   Allergen Reactions     Tetanus Toxoids Anaphylaxis     Erythromycin GI Disturbance     Levaquin Other (See Comments)     unknown     Penicillins Hives     Silicone       Latex Allergy: NO    Social History   Substance Use Topics     Smoking status: Former Smoker     Quit date: 1/1/2016     Smokeless tobacco: Never Used      Comment: quit but  still smokes, but not in house     Alcohol use No     History   Drug Use No       REVIEW OF SYSTEMS:   Constitutional, neuro, ENT, endocrine, pulmonary, cardiac, gastrointestinal, genitourinary, musculoskeletal, integument and psychiatric systems are negative, except as otherwise noted. Pt has chronic pain.     EXAM:   Pulse 74  Temp 98.4  F (36.9  C) (Oral)  Resp 16  Ht 1.6 m (5' 3\")  Wt 63.3 kg (139 lb 9.6 oz)  SpO2 99%  BMI 24.73 kg/m2    GENERAL APPEARANCE: " healthy, alert and no distress     EYES: EOMI, PERRL. b Eyelid Ptosis present. Pterygium present in L eye. Scleras are mildly diffusely injected B.      HENT: ear canals and TM's normal and nose and mouth without ulcers or lesions     NECK: no adenopathy, no asymmetry, masses, or scars and thyroid normal to palpation     RESP: lungs clear to auscultation - no rales, rhonchi or wheezes     CV: regular rates and rhythm, normal S1 S2, no S3 or S4 and no murmur, click or rub     ABDOMEN:  soft, nontender, no HSM or masses and bowel sounds normal     MS: extremities normal- no gross deformities noted, no evidence of inflammation in joints. Note palpable screws present in R lateral ankle from prior ORIF. No erythema or bleeding, but pt notes pain with minimal direct pressure on these sites.      SKIN: no suspicious lesions or rashes     NEURO: Normal strength and tone, sensory exam grossly normal, mentation intact and speech normal     PSYCH: mentation appears normal. and affect normal/bright     LYMPHATICS: No cervical adenopathy    Nml exam. Recent wt loss.   DIAGNOSTICS:   No EKG required for low risk surgery (cataract, skin procedure, breast biopsy, etc)    Recent Labs   Lab Test  03/12/18   0857  12/15/17   1030 12/15/17  10/24/17   1839   09/08/11   0750   01/09/11   1725   HGB   --    --   15.2  15.0   < >  14.0   < >  10.2*   PLT   --    --   208  196   < >   --    < >  167   INR   --    --    --    --    --   1.00   --   0.94   NA  142  142   --   139   < >  141   --   132*   POTASSIUM  4.5  4.9   --   3.6   < >  4.1   --   3.9   CR  0.93  0.86   --   0.98   < >  0.80   --   0.91   A1C  5.7*  5.7*   --    --    < >   --    --    --     < > = values in this interval not displayed.      Results for orders placed or performed in visit on 05/30/18   CBC with Diff/Plt ([facility])   Result Value Ref Range    WBC x10/cmm 5.8 3.8 - 11.0 x10/cmm    % Lymphocytes 34.3 20.5 - 51.1 %    % Monocytes 8.6 1.7 - 9.3 %    %  Granulocytes 57.1 42.2 - 75.2 %    RBC x10/cmm 4.95 3.7 - 5.2 x10/cmm    Hemoglobin 13.9 11.8 - 15.5 g/dl    Hematocrit 41.6 35 - 46 %    MCV 83.9 80 - 100 fL    MCH 28.0 27.0 - 31.0 pg    MCHC 33.4 33.0 - 37.0 g/dL    Platelet Count 184 140 - 450 K/uL   BMP pending at time of initial chart completion.   IMPRESSION:   Reason for surgery/procedure: Decreased field of vision.   See Patient Instructions tab for further information.     The proposed surgical procedure is considered LOW risk.    REVISED CARDIAC RISK INDEX  The patient has the following serious cardiovascular risks for perioperative complications such as (MI, PE, VFib and 3  AV Block):  No serious cardiac risks  INTERPRETATION: 0 risks: Class I (very low risk - 0.4% complication rate)    The patient has the following additional risks for perioperative complications:  No identified additional risks      ICD-10-CM    1. Preop general physical exam Z01.818    2. Blepharitis of both eyes, unspecified eyelid, unspecified type H01.003 Basic Metabolic Panel (8) ([facility])    H01.006    3. Pterygium of left eye H11.002    4. H/O cold sores Z86.19 valACYclovir (VALTREX) 1000 mg tablet     CBC with Diff/Plt ([facility])   5. Fibromyalgia M79.7        RECOMMENDATIONS:     Pt will not be taking any medications on the day of her procedure. See details in AVS.     APPROVAL GIVEN to proceed with proposed procedure if pending BMP is wnl.      Signed Electronically by: Lakshmi Baez MD    Copy of this evaluation report is provided to requesting physician.    [place] Preop Guidelines    Revised Cardiac Risk Index

## 2018-05-31 LAB
BUN SERPL-MCNC: 14 MG/DL (ref 8–27)
BUN/CREATININE RATIO: 18 (ref 12–28)
CALCIUM SERPL-MCNC: 9.4 MG/DL (ref 8.7–10.3)
CHLORIDE SERPLBLD-SCNC: 102 MMOL/L (ref 96–106)
CREAT SERPL-MCNC: 0.76 MG/DL (ref 0.57–1)
EGFR IF AFRICN AM: 94 ML/MIN/1.73
EGFR IF NONAFRICN AM: 81 ML/MIN/1.73
GLUCOSE SERPL-MCNC: 96 MG/DL (ref 65–99)
POTASSIUM SERPL-SCNC: 4.3 MMOL/L (ref 3.5–5.2)
SODIUM SERPL-SCNC: 142 MMOL/L (ref 134–144)
TOTAL CO2: 26 MMOL/L (ref 18–28)

## 2018-05-31 ASSESSMENT — PATIENT HEALTH QUESTIONNAIRE - PHQ9: SUM OF ALL RESPONSES TO PHQ QUESTIONS 1-9: 0

## 2018-05-31 ASSESSMENT — ANXIETY QUESTIONNAIRES: GAD7 TOTAL SCORE: 0

## 2018-05-31 ASSESSMENT — ASTHMA QUESTIONNAIRES: ACT_TOTALSCORE: 25

## 2018-06-15 NOTE — H&P (VIEW-ONLY)
[facility]  [address] [place] [id number] [phone]  Dept: [phone]    PRE-OP EVALUATION:  Today's date: 2018    Macey Romero (: 1950) presents for pre-operative evaluation assessment as requested by Dr. Walters.  She requires evaluation and anesthesia risk assessment prior to undergoing surgery/procedure for treatment of Eyelids .    Proposed Surgery/ Procedure: Combined blepharoplasty, brow lift bilateral  Date of Surgery/ Procedure: 18  Time of Surgery/ Procedure: 1200  Hospital/Surgical Facility: [facility]  Fax number for surgical facility: [phone]  Primary Physician: Long Ferrari  Type of Anesthesia Anticipated: to be determined    Patient has a Health Care Directive or Living Will:  YES     1. YES - DO YOU HAVE A HISTORY OF HEART ATTACK, STROKE, STENT, BYPASS OR SURGERY ON AN ARTERY IN THE HEAD, NECK, HEART OR LEG? Mini stoke  - some trouble with numbers; occasionally transposes numbers  2. NO - Do you ever have any pain or discomfort in your chest?  3. NO - Do you have a history of  Heart Failure?  4. NO - Are you troubled by shortness of breath when: walking on the level, up a slight hill or at night?  5. NO - Do you currently have a cold, bronchitis or other respiratory infection?  6. NO - Do you have a cough, shortness of breath or wheezing?  7. NO - Do you sometimes get pains in the calves of your legs when you walk?  8. YES - DO YOU OR ANYONE IN YOUR FAMILY HAVE PREVIOUS HISTORY OF BLOOD CLOTS? Mother (Von Willebrand's disease) & patient in early 70's after emergency    9. YES - DO YOU OR DOES ANYONE IN YOUR FAMILY HAVE A SERIOUS BLEEDING PROBLEM SUCH AS PROLONGED BLEEDING FOLLOWING SURGERIES OR CUTS? Mother and Daughter   10. NO - Have you ever had problems with anemia or been told to take iron pills?  11. NO - Have you had any abnormal blood loss such as black, tarry or bloody stools, or abnormal vaginal bleeding?  12. YES  - HAVE YOU EVER HAD A BLOOD TRANSFUSION? Early 70's  13. NO - Have you or any of your relatives ever had problems with anesthesia?  14. NO - Do you have sleep apnea, excessive snoring or daytime drowsiness?  15. NO - Do you have any prosthetic heart valves?  16. YES - DO YOU HAVE PROSTHETIC JOINTS? Knee  17. NO - Is there any chance that you may be pregnant?      HPI:     HPI related to upcoming procedure: Pt with limited field of vision. She has had eyelid surgery in the past, but continues to have difficult with retaining full field of vision and has chronic blepharitis.     Pt w/ known pterygium that continues to bother her.     Pt has seasonal allergies that bother her occasionally. She does not use her pulm meds except in cases of a flare. Has not used them for several months prior to her surgery.     Fibromyalgia - diffuse chronic pain for pt. Occas requires pt more time to move around secondary to pain.     Pt has a hx of recurrent cold sores. Would like refill for Valtrex, taken only when she feels one coming on.     MEDICAL HISTORY:     Note: Pt states she has a longer medical/surgical hx than we have on file at [facility].   I provided a copy of her note in process and asked her to review, update hard copy and return to us for medical record revision.     Patient Active Problem List    Diagnosis Date Noted     Fibromyalgia      Priority: Medium     Chronic pain of both knees 03/21/2018     Priority: Medium     Hip pain, left 12/18/2015     Priority: Medium     Health Care Home 10/31/2013     Priority: Medium     Fx ankle 08/27/2013     Priority: Medium     Pterygium eye 08/26/2013     Priority: Medium     Myalgia and myositis 09/12/2011     Priority: Medium     Problem list name updated by automated process. Provider to review       IBS (irritable bowel syndrome) 06/28/2011     Priority: Medium     Keloid of skin 06/28/2011     Priority: Medium     Reactive airway disease 02/01/2011     Priority: Medium      Osteoarthritis 02/01/2011     Priority: Medium     Osteopenia 02/01/2011     Priority: Medium     Malignant neoplasm of female breast (H) 02/01/2011     Priority: Medium     1987       Pain disorder 02/01/2011     Priority: Medium      Past Medical History:   Diagnosis Date     Breast CA in situ 1987     Cancer (H) 1987    Right Breast treated with surgery     Chronic constipation      Fibromyalgia      Meningitis     Several times as an adult     Osteoarthritis 2/1/2011     Osteopenia 2/1/2011     Pneumonia      Pterygium eye 8/26/2013     Reactive airway disease 2/1/2011     Seasonal allergies      Shingles     Prior to 2008 - scalp     Skin cancer     SCC - hand, left nose     Past Surgical History:   Procedure Laterality Date     anterior c-disc fusion       CHOLECYSTECTOMY       COLONOSCOPY N/A 10/19/2017    Procedure: COLONOSCOPY;  COLONOSCOPY;  Surgeon: Niko Wong MD;  Location:  GI     D & C      Bleeding before hysterectomy     ENDOSCOPY  04/21/08     HYSTERECTOMY, MARCUS      Ovaries and tubes out due to breast cancer     JOINT REPLACEMTN, KNEE RT/LT Right 01/2011    Joint Replacement knee RT, with tibial straightening (2 replacements)     MAMMOPLASTY AUGMENTATION BILATERAL      breast ca      MASTECTOMY, SIMPLE RT/LT/CLAIRE Bilateral 1989    Mastectomy Simple RT/LT/CLAIRE     MOHS MICROGRAPHIC PROCEDURE      Left lateral nose     OPEN REDUCTION INTERNAL FIXATION ANKLE  8/27/2013    Procedure: OPEN REDUCTION INTERNAL FIXATION ANKLE;  RIGHT OPEN REDUCTION INTERNAL FIXATION ANKLE WITH LIGAMENT REPAIR;  Surgeon: Nando Chang MD;  Location:  OR     PTERYGIUM WITH CONJUNCTIVAL AUTOLOGOUS TRANSPLANT Left 8/29/2016    Procedure: PTERYGIUM WITH CONJUNCTIVAL AUTOLOGOUS TRANSPLANT;  Surgeon: Сергей Walters MD;  Location:  [facility]     REPAIR LIGAMENT ANKLE  8/27/2013    Procedure: REPAIR LIGAMENT ANKLE;;  Surgeon: Nando Chang MD;  Location:  OR     SALIVARY GLAND SURGERY Left     Stone removal       "TONSILLECTOMY       Current Outpatient Prescriptions   Medication Sig Dispense Refill     Acetaminophen (TYLENOL PO) Take 325 mg by mouth 2 times daily as needed for mild pain       albuterol (PROAIR HFA/PROVENTIL HFA/VENTOLIN HFA) 108 (90 BASE) MCG/ACT Inhaler Inhale 2 puffs into the lungs every 6 hours as needed for shortness of breath / dyspnea 1 Inhaler 3     budesonide-formoterol (SYMBICORT) 160-4.5 MCG/ACT Inhaler Inhale 2 puffs into the lungs 2 times daily (Patient taking differently: Inhale 2 puffs into the lungs as needed ) 3 Inhaler 3     Docusate Sodium (JAY STOOL SOFTENER PO) Take by mouth as needed for constipation       doxycycline (VIBRAMYCIN) 50 MG capsule TK ONE C PO QD  4     Eyelid Cleansers (AVENOVA) 0.01 % SOLN For chronic blepharitis.       magnesium hydroxide (MILK OF MAGNESIA) 400 MG/5ML suspension Take 5 mLs by mouth daily as needed for constipation       olopatadine (PATANOL) 0.1 % ophthalmic solution 1 drop 2 times daily       valACYclovir (VALTREX) 1000 mg tablet Take 1 tablet (1,000 mg) by mouth daily as needed For cold sore. 10 tablet 4     OTC products: none    Allergies   Allergen Reactions     Tetanus Toxoids Anaphylaxis     Erythromycin GI Disturbance     Levaquin Other (See Comments)     unknown     Penicillins Hives     Silicone       Latex Allergy: NO    Social History   Substance Use Topics     Smoking status: Former Smoker     Quit date: 1/1/2016     Smokeless tobacco: Never Used      Comment: quit but  still smokes, but not in house     Alcohol use No     History   Drug Use No       REVIEW OF SYSTEMS:   Constitutional, neuro, ENT, endocrine, pulmonary, cardiac, gastrointestinal, genitourinary, musculoskeletal, integument and psychiatric systems are negative, except as otherwise noted. Pt has chronic pain.     EXAM:   Pulse 74  Temp 98.4  F (36.9  C) (Oral)  Resp 16  Ht 1.6 m (5' 3\")  Wt 63.3 kg (139 lb 9.6 oz)  SpO2 99%  BMI 24.73 kg/m2    GENERAL APPEARANCE: " healthy, alert and no distress     EYES: EOMI, PERRL. b Eyelid Ptosis present. Pterygium present in L eye. Scleras are mildly diffusely injected B.      HENT: ear canals and TM's normal and nose and mouth without ulcers or lesions     NECK: no adenopathy, no asymmetry, masses, or scars and thyroid normal to palpation     RESP: lungs clear to auscultation - no rales, rhonchi or wheezes     CV: regular rates and rhythm, normal S1 S2, no S3 or S4 and no murmur, click or rub     ABDOMEN:  soft, nontender, no HSM or masses and bowel sounds normal     MS: extremities normal- no gross deformities noted, no evidence of inflammation in joints. Note palpable screws present in R lateral ankle from prior ORIF. No erythema or bleeding, but pt notes pain with minimal direct pressure on these sites.      SKIN: no suspicious lesions or rashes     NEURO: Normal strength and tone, sensory exam grossly normal, mentation intact and speech normal     PSYCH: mentation appears normal. and affect normal/bright     LYMPHATICS: No cervical adenopathy    Nml exam. Recent wt loss.   DIAGNOSTICS:   No EKG required for low risk surgery (cataract, skin procedure, breast biopsy, etc)    Recent Labs   Lab Test  03/12/18   0857  12/15/17   1030 12/15/17  10/24/17   1839   09/08/11   0750   01/09/11   1725   HGB   --    --   15.2  15.0   < >  14.0   < >  10.2*   PLT   --    --   208  196   < >   --    < >  167   INR   --    --    --    --    --   1.00   --   0.94   NA  142  142   --   139   < >  141   --   132*   POTASSIUM  4.5  4.9   --   3.6   < >  4.1   --   3.9   CR  0.93  0.86   --   0.98   < >  0.80   --   0.91   A1C  5.7*  5.7*   --    --    < >   --    --    --     < > = values in this interval not displayed.      Results for orders placed or performed in visit on 05/30/18   CBC with Diff/Plt ([facility])   Result Value Ref Range    WBC x10/cmm 5.8 3.8 - 11.0 x10/cmm    % Lymphocytes 34.3 20.5 - 51.1 %    % Monocytes 8.6 1.7 - 9.3 %    %  Granulocytes 57.1 42.2 - 75.2 %    RBC x10/cmm 4.95 3.7 - 5.2 x10/cmm    Hemoglobin 13.9 11.8 - 15.5 g/dl    Hematocrit 41.6 35 - 46 %    MCV 83.9 80 - 100 fL    MCH 28.0 27.0 - 31.0 pg    MCHC 33.4 33.0 - 37.0 g/dL    Platelet Count 184 140 - 450 K/uL   BMP pending at time of initial chart completion.   IMPRESSION:   Reason for surgery/procedure: Decreased field of vision.   See Patient Instructions tab for further information.     The proposed surgical procedure is considered LOW risk.    REVISED CARDIAC RISK INDEX  The patient has the following serious cardiovascular risks for perioperative complications such as (MI, PE, VFib and 3  AV Block):  No serious cardiac risks  INTERPRETATION: 0 risks: Class I (very low risk - 0.4% complication rate)    The patient has the following additional risks for perioperative complications:  No identified additional risks      ICD-10-CM    1. Preop general physical exam Z01.818    2. Blepharitis of both eyes, unspecified eyelid, unspecified type H01.003 Basic Metabolic Panel (8) ([facility])    H01.006    3. Pterygium of left eye H11.002    4. H/O cold sores Z86.19 valACYclovir (VALTREX) 1000 mg tablet     CBC with Diff/Plt ([facility])   5. Fibromyalgia M79.7        RECOMMENDATIONS:     Pt will not be taking any medications on the day of her procedure. See details in AVS.     APPROVAL GIVEN to proceed with proposed procedure if pending BMP is wnl.      Signed Electronically by: Lakshmi Baez MD    Copy of this evaluation report is provided to requesting physician.    [place] Preop Guidelines    Revised Cardiac Risk Index

## 2018-06-18 ENCOUNTER — ANESTHESIA (OUTPATIENT)
Dept: SURGERY | Facility: CLINIC | Age: 68
End: 2018-06-18
Payer: MEDICARE

## 2018-06-18 ENCOUNTER — ANESTHESIA EVENT (OUTPATIENT)
Dept: SURGERY | Facility: CLINIC | Age: 68
End: 2018-06-18
Payer: MEDICARE

## 2018-06-18 ENCOUNTER — HOSPITAL ENCOUNTER (OUTPATIENT)
Facility: CLINIC | Age: 68
Discharge: HOME | End: 2018-06-18
Attending: OPHTHALMOLOGY | Admitting: OPHTHALMOLOGY
Payer: MEDICARE

## 2018-06-18 VITALS
HEART RATE: 67 BPM | DIASTOLIC BLOOD PRESSURE: 46 MMHG | TEMPERATURE: 98 F | OXYGEN SATURATION: 93 % | RESPIRATION RATE: 14 BRPM | BODY MASS INDEX: 24.63 KG/M2 | SYSTOLIC BLOOD PRESSURE: 109 MMHG | HEIGHT: 63 IN | WEIGHT: 139 LBS

## 2018-06-18 DIAGNOSIS — H02.834 DERMATOCHALASIS OF BOTH UPPER EYELIDS: Primary | ICD-10-CM

## 2018-06-18 DIAGNOSIS — H57.819 BROW PTOSIS: ICD-10-CM

## 2018-06-18 DIAGNOSIS — H02.831 DERMATOCHALASIS OF BOTH UPPER EYELIDS: Primary | ICD-10-CM

## 2018-06-18 PROCEDURE — 25000125 ZZHC RX 250: Performed by: OPHTHALMOLOGY

## 2018-06-18 PROCEDURE — 25000128 H RX IP 250 OP 636: Performed by: NURSE ANESTHETIST, CERTIFIED REGISTERED

## 2018-06-18 PROCEDURE — 25000128 H RX IP 250 OP 636: Performed by: OPHTHALMOLOGY

## 2018-06-18 PROCEDURE — 36000102 ZZH EYE SURGERY LEVEL 3 EA 15 ADDTL MIN: Performed by: OPHTHALMOLOGY

## 2018-06-18 PROCEDURE — 40000169 ZZH STATISTIC PRE-PROCEDURE ASSESSMENT I: Performed by: OPHTHALMOLOGY

## 2018-06-18 PROCEDURE — 37000009 ZZH ANESTHESIA TECHNICAL FEE, EACH ADDTL 15 MIN: Performed by: OPHTHALMOLOGY

## 2018-06-18 PROCEDURE — 71000028 ZZH EYE RECOVERY PHASE 2 EACH 15 MINS: Performed by: OPHTHALMOLOGY

## 2018-06-18 PROCEDURE — 25000125 ZZHC RX 250: Performed by: NURSE ANESTHETIST, CERTIFIED REGISTERED

## 2018-06-18 PROCEDURE — 27210794 ZZH OR GENERAL SUPPLY STERILE: Performed by: OPHTHALMOLOGY

## 2018-06-18 PROCEDURE — 36000101 ZZH EYE SURGERY LEVEL 3 1ST 30 MIN: Performed by: OPHTHALMOLOGY

## 2018-06-18 PROCEDURE — 37000008 ZZH ANESTHESIA TECHNICAL FEE, 1ST 30 MIN: Performed by: OPHTHALMOLOGY

## 2018-06-18 RX ORDER — HYDROCODONE BITARTRATE AND ACETAMINOPHEN 5; 325 MG/1; MG/1
1 TABLET ORAL EVERY 4 HOURS PRN
Qty: 18 TABLET | Refills: 0 | Status: SHIPPED | OUTPATIENT
Start: 2018-06-18 | End: 2019-12-02

## 2018-06-18 RX ORDER — ERYTHROMYCIN 5 MG/G
OINTMENT OPHTHALMIC PRN
Status: DISCONTINUED | OUTPATIENT
Start: 2018-06-18 | End: 2018-06-18 | Stop reason: HOSPADM

## 2018-06-18 RX ORDER — LIDOCAINE HYDROCHLORIDE 20 MG/ML
INJECTION, SOLUTION INFILTRATION; PERINEURAL PRN
Status: DISCONTINUED | OUTPATIENT
Start: 2018-06-18 | End: 2018-06-18

## 2018-06-18 RX ORDER — PROPOFOL 10 MG/ML
INJECTION, EMULSION INTRAVENOUS PRN
Status: DISCONTINUED | OUTPATIENT
Start: 2018-06-18 | End: 2018-06-18

## 2018-06-18 RX ORDER — ERYTHROMYCIN 5 MG/G
OINTMENT OPHTHALMIC
Qty: 1 G | Refills: 1 | Status: SHIPPED | OUTPATIENT
Start: 2018-06-18 | End: 2018-11-06

## 2018-06-18 RX ORDER — DEXAMETHASONE SODIUM PHOSPHATE 4 MG/ML
INJECTION, SOLUTION INTRA-ARTICULAR; INTRALESIONAL; INTRAMUSCULAR; INTRAVENOUS; SOFT TISSUE PRN
Status: DISCONTINUED | OUTPATIENT
Start: 2018-06-18 | End: 2018-06-18

## 2018-06-18 RX ORDER — SODIUM CHLORIDE, SODIUM LACTATE, POTASSIUM CHLORIDE, CALCIUM CHLORIDE 600; 310; 30; 20 MG/100ML; MG/100ML; MG/100ML; MG/100ML
INJECTION, SOLUTION INTRAVENOUS CONTINUOUS PRN
Status: DISCONTINUED | OUTPATIENT
Start: 2018-06-18 | End: 2018-06-18

## 2018-06-18 RX ORDER — LORATADINE 10 MG/1
10 TABLET ORAL DAILY
COMMUNITY
End: 2018-09-18

## 2018-06-18 RX ORDER — FENTANYL CITRATE 50 UG/ML
INJECTION, SOLUTION INTRAMUSCULAR; INTRAVENOUS PRN
Status: DISCONTINUED | OUTPATIENT
Start: 2018-06-18 | End: 2018-06-18

## 2018-06-18 RX ORDER — ONDANSETRON 2 MG/ML
INJECTION INTRAMUSCULAR; INTRAVENOUS PRN
Status: DISCONTINUED | OUTPATIENT
Start: 2018-06-18 | End: 2018-06-18

## 2018-06-18 RX ADMIN — FENTANYL CITRATE 25 MCG: 50 INJECTION, SOLUTION INTRAMUSCULAR; INTRAVENOUS at 13:11

## 2018-06-18 RX ADMIN — PHENYLEPHRINE HYDROCHLORIDE 100 MCG: 10 INJECTION, SOLUTION INTRAMUSCULAR; INTRAVENOUS; SUBCUTANEOUS at 13:15

## 2018-06-18 RX ADMIN — FENTANYL CITRATE 50 MCG: 50 INJECTION, SOLUTION INTRAMUSCULAR; INTRAVENOUS at 12:21

## 2018-06-18 RX ADMIN — PROPOFOL 10 MG: 10 INJECTION, EMULSION INTRAVENOUS at 12:23

## 2018-06-18 RX ADMIN — FENTANYL CITRATE 25 MCG: 50 INJECTION, SOLUTION INTRAMUSCULAR; INTRAVENOUS at 12:55

## 2018-06-18 RX ADMIN — MIDAZOLAM 0.5 MG: 1 INJECTION INTRAMUSCULAR; INTRAVENOUS at 12:55

## 2018-06-18 RX ADMIN — LIDOCAINE HYDROCHLORIDE 40 MG: 20 INJECTION, SOLUTION INFILTRATION; PERINEURAL at 12:32

## 2018-06-18 RX ADMIN — MIDAZOLAM 0.5 MG: 1 INJECTION INTRAMUSCULAR; INTRAVENOUS at 12:58

## 2018-06-18 RX ADMIN — PROPOFOL 20 MG: 10 INJECTION, EMULSION INTRAVENOUS at 13:11

## 2018-06-18 RX ADMIN — DEXAMETHASONE SODIUM PHOSPHATE 4 MG: 4 INJECTION, SOLUTION INTRA-ARTICULAR; INTRALESIONAL; INTRAMUSCULAR; INTRAVENOUS; SOFT TISSUE at 12:29

## 2018-06-18 RX ADMIN — ONDANSETRON 4 MG: 2 INJECTION INTRAMUSCULAR; INTRAVENOUS at 12:29

## 2018-06-18 RX ADMIN — PROPOFOL 30 MG: 10 INJECTION, EMULSION INTRAVENOUS at 12:32

## 2018-06-18 RX ADMIN — MIDAZOLAM 1 MG: 1 INJECTION INTRAMUSCULAR; INTRAVENOUS at 12:21

## 2018-06-18 RX ADMIN — SODIUM CHLORIDE, POTASSIUM CHLORIDE, SODIUM LACTATE AND CALCIUM CHLORIDE: 600; 310; 30; 20 INJECTION, SOLUTION INTRAVENOUS at 12:05

## 2018-06-18 ASSESSMENT — LIFESTYLE VARIABLES: TOBACCO_USE: 0

## 2018-06-18 NOTE — IP AVS SNAPSHOT
MRN:[id number]                      After Visit Summary   6/18/2018    Macey Romero    MRN: [id number]           Thank you!     Thank you for choosing [facility] for your care. Our goal is always to provide you with excellent care. Hearing back from our patients is one way we can continue to improve our services. Please take a few minutes to complete the written survey that you may receive in the mail after you visit with us. Thank you!        Patient Information     Date Of Birth          1950        Designated Caregiver       Most Recent Value    Caregiver    Will someone help with your care after discharge? yes    Name of designated caregiver Kevin    Phone number of caregiver [phone]      About your hospital stay     You were admitted on:  June 18, 2018 You last received care in the:  [facility] [place]    You were discharged on:  June 18, 2018       Who to Call     For medical emergencies, please call 911.  For non-urgent questions about your medical care, please call your primary care provider or clinic, [phone]  For questions related to your surgery, please call your surgery clinic        Attending Provider     Provider Specialty    Сергей Walters MD Ophthalmology       Primary Care Provider Office Phone # Fax #    Long Ferrari [phone]      Further instructions from your care team       [facility] Discharge  Instructions  Anesthesia ([facility] Care Center)   Adult Discharge Instructions    For 24 hours after surgery    1. Get plenty of rest.  Make arrangements to have a responsible adult stay with you for at least 6 hours after you leave the hospital.  2. Do not drive or use heavy equipment for 24 hours.    3. Do not drink alcohol for 24 hours.  4. Do not sign legal documents or make important decisions for 24 hours.  5. Avoid strenuous or risky activities. You may feel lightheaded.  If so, sit for a few  minutes before standing.  Have someone help you get up.   6. Conscious sedation patients may resume a regular diet..  7. Any questions of medical nature, call your physician.    POST-OPERATIVE CARE FOLLOWING EYELID SURGERY  DR. Сергей Walters  [facility]  [phone]  ICE COMPRESSES:  Immediately following surgery, you should begin to apply ice compresses.  A good method is to use a clean washcloth soaked in a bowl of ice water.  Wring it out and gently lay across your eyes.  Do this at least 6 times per day for 15 minutes, but not while sleeping.  Continue cold compresses for the first four days after surgery, or longer if swelling persists.    OINTMENT:  Apply ERYTHROMYCIN OINTMENT to the surgical area (along the sutures if present) 3 TIMES PER DAY FOR 1 WEEK.  If you get ointment in your eye, it will blur your vision slightly, but will not harm you eye.  If you run out of antibiotic ointment use Vaseline instead until the wounds are fully healed.    ACTIVITY:  You may resume regular activities as tolerated.  You may shower and wash your hair on the day after surgery, be careful to avoid getting shampoo in your eyes. Sleeping with your head elevated will help with swelling.    MEDICATION:  Some discomfort and tenderness may be noticed around the eye.  If the DrCherelle has prescribed pain medication, please take it according to the directions on the bottle.  If you are not prescribed pain medication or you feel you do not need it, you may take Extra Strength Tylenol for mild pain.      WHAT TO EXPECT:  You should expect some slight oozing of blood from the incision site over the first two to three days after surgery.  Swelling and bruising will occur for one to two weeks or longer.  You may also experience itching and tearing during the first several weeks after surgery.  This is part of the normal healing process.    CALL THE DOCTOR S OFFICE WITH ANY QUESTIONS OR CONCERNS, [phone].  Please call if you  "suspect your wound has opened, you have a large / growing bruise (hematoma), vision loss not attributable to the ointment, or bleeding that does not seem to be stopping.    Pending Results     No orders found from 6/16/2018 to 6/19/2018.            Admission Information     Date & Time Provider Department Dept. Phone    6/18/2018 [place], Сергей SERNA MD [facility] [phone]      Your Vitals Were     Blood Pressure Pulse Temperature Respirations Height Weight    109/65 67 98  F (36.7  C) (Temporal) 14 1.6 m (5' 3\") 63 kg (139 lb)    Pulse Oximetry BMI (Body Mass Index)                93% 24.62 kg/m2          MyChart Information     [employer] gives you secure access to your electronic health record. If you see a primary care provider, you can also send messages to your care team and make appointments. If you have questions, please call your primary care clinic.  If you do not have a primary care provider, please call [phone] and they will assist you.        Care EveryWhere ID     This is your Care EveryWhere ID. This could be used by other organizations to access your [facility] medical records  [id number]        Equal Access to Services     NATALIE DOSS AH: Hadii boris Mueller, warin diaz, qadeniata kaalmaashely grajeda, jacquelin quinonez. So [facility] [phone].    ATENCIÓN: Si habla español, tiene a hoyos disposición servicios gratuitos de asistencia lingüística. Llame al [phone].    We comply with applicable federal civil rights laws and [place] laws. We do not discriminate on the basis of race, color, national origin, age, disability, sex, sexual orientation, or gender identity.               Review of your medicines      START taking        Dose / Directions    erythromycin ophthalmic ointment   Commonly known as:  ROMYCIN   Used for:  Dermatochalasis of both upper eyelids, Brow ptosis        Apply to incisions three times daily, in eyes at bedtime "   Quantity:  1 g   Refills:  1       HYDROcodone-acetaminophen 5-325 MG per tablet   Commonly known as:  NORCO   Used for:  Dermatochalasis of both upper eyelids, Brow ptosis        Dose:  1 tablet   Take 1 tablet by mouth every 4 hours as needed for pain   Quantity:  18 tablet   Refills:  0         CONTINUE these medicines which may have CHANGED, or have new prescriptions. If we are uncertain of the size of tablets/capsules you have at home, strength may be listed as something that might have changed.        Dose / Directions    budesonide-formoterol 160-4.5 MCG/ACT Inhaler   Commonly known as:  SYMBICORT   This may have changed:    - when to take this  - reasons to take this   Used for:  Reactive airway disease, moderate persistent, with acute exacerbation        Dose:  2 puff   Inhale 2 puffs into the lungs 2 times daily   Quantity:  3 Inhaler   Refills:  3         CONTINUE these medicines which have NOT CHANGED        Dose / Directions    albuterol 108 (90 Base) MCG/ACT Inhaler   Commonly known as:  PROAIR HFA/PROVENTIL HFA/VENTOLIN HFA   Used for:  Reactive airway disease, moderate persistent, with acute exacerbation        Dose:  2 puff   Inhale 2 puffs into the lungs every 6 hours as needed for shortness of breath / dyspnea   Quantity:  1 Inhaler   Refills:  3       AVENOVA 0.01 % Soln        For chronic blepharitis.   Refills:  0       loratadine 10 MG tablet   Commonly known as:  CLARITIN        Dose:  10 mg   Take 10 mg by mouth daily   Refills:  0       magnesium hydroxide 400 MG/5ML suspension   Commonly known as:  MILK OF MAGNESIA        Dose:  5 mL   Take 5 mLs by mouth daily as needed for constipation   Refills:  0       JAY STOOL SOFTENER PO        Take by mouth as needed for constipation   Refills:  0       TYLENOL PO   Indication:  Pain   Used for:  Abdominal pain, right upper quadrant        Dose:  325 mg   Take 325 mg by mouth 2 times daily as needed for mild pain   Refills:  0             Where to get your medicines      These medications were sent to [facility] [place], [place] - [address]  [address], [place] [address]     Phone:  [phone]     erythromycin ophthalmic ointment         Some of these will need a paper prescription and others can be bought over the counter. Ask your nurse if you have questions.     Bring a paper prescription for each of these medications     HYDROcodone-acetaminophen 5-325 MG per tablet                Protect others around you: Learn how to safely use, store and throw away your medicines at www.disposemymeds.org.        Information about OPIOIDS     PRESCRIPTION OPIOIDS: WHAT YOU NEED TO KNOW   We gave you an opioid (narcotic) pain medicine. It is important to manage your pain, but opioids are not always the best choice. You should first try all the other options your care team gave you. Take this medicine for as short a time (and as few doses) as possible.     These medicines have risks:    DO NOT drive when on new or higher doses of pain medicine. These medicines can affect your alertness and reaction times, and you could be arrested for driving under the influence (DUI). If you need to use opioids long-term, talk to your care team about driving.    DO NOT operate heave machinery    DO NOT do any other dangerous activities while taking these medicines.     DO NOT drink any alcohol while taking these medicines.      If the opioid prescribed includes acetaminophen, DO NOT take with any other medicines that contain acetaminophen. Read all labels carefully. Look for the word  acetaminophen  or  Tylenol.  Ask your pharmacist if you have questions or are unsure.    You can get addicted to pain medicines, especially if you have a history of addiction (chemical, alcohol or substance dependence). Talk to your care team about ways to reduce this risk.    Store your pills in a secure place, locked if possible. We will not replace any lost  or stolen medicine. If you don t finish your medicine, please throw away (dispose) as directed by your pharmacist. The [place] Pollution Control Agency has more information about safe disposal: https://www.pca.[facility].[place].us/living-green/managing-unwanted-medications.     All opioids tend to cause constipation. Drink plenty of water and eat foods that have a lot of fiber, such as fruits, vegetables, prune juice, apple juice and high-fiber cereal. Take a laxative (Miralax, milk of magnesia, Colace, Senna) if you don t move your bowels at least every other day.              Medication List: This is a list of all your medications and when to take them. Check marks below indicate your daily home schedule. Keep this list as a reference.      Medications           Morning Afternoon Evening Bedtime As Needed    albuterol 108 (90 Base) MCG/ACT Inhaler   Commonly known as:  PROAIR HFA/PROVENTIL HFA/VENTOLIN HFA   Inhale 2 puffs into the lungs every 6 hours as needed for shortness of breath / dyspnea                                AVENOVA 0.01 % Soln   For chronic blepharitis.                                budesonide-formoterol 160-4.5 MCG/ACT Inhaler   Commonly known as:  SYMBICORT   Inhale 2 puffs into the lungs 2 times daily                                erythromycin ophthalmic ointment   Commonly known as:  ROMYCIN   Apply to incisions three times daily, in eyes at bedtime   Last time this was given:  1 inch on 6/18/2018  1:03 PM                                HYDROcodone-acetaminophen 5-325 MG per tablet   Commonly known as:  NORCO   Take 1 tablet by mouth every 4 hours as needed for pain                                loratadine 10 MG tablet   Commonly known as:  CLARITIN   Take 10 mg by mouth daily                                magnesium hydroxide 400 MG/5ML suspension   Commonly known as:  MILK OF MAGNESIA   Take 5 mLs by mouth daily as needed for constipation                                JAY STOOL SOFTENER  PO   Take by mouth as needed for constipation                                TYLENOL PO   Take 325 mg by mouth 2 times daily as needed for mild pain

## 2018-06-18 NOTE — ANESTHESIA CARE TRANSFER NOTE
Patient: Macey Romero    Procedure(s):  BILATERAL UPPER LID BLEPHAROPLASTY; BILATERAL BROW PTOSIS REPAIR - Wound Class: I-Clean    Diagnosis: ptosis   Diagnosis Additional Information: No value filed.    Anesthesia Type:   MAC     Note:  Airway :Room Air  Patient transferred to:PACU  Handoff Report: Identifed the Patient, Identified the Reponsible Provider, Reviewed the pertinent medical history, Discussed the surgical course, Reviewed Intra-OP anesthesia mangement and issues during anesthesia, Set expectations for post-procedure period and Allowed opportunity for questions and acknowledgement of understanding      Vitals: (Last set prior to Anesthesia Care Transfer)    CRNA VITALS  6/18/2018 1315 - 6/18/2018 1350      6/18/2018             EKG: Sinus rhythm                Electronically Signed By: SHAHRAM Wong CRNA  June 18, 2018  1:50 PM

## 2018-06-18 NOTE — IP AVS SNAPSHOT
[facility]    [address]    [place] [address]    Phone:  [phone]    Fax:  [phone]                                       After Visit Summary   6/18/2018    Macey Romero    MRN: [id number]           After Visit Summary Signature Page     I have received my discharge instructions, and my questions have been answered. I have discussed any challenges I see with this plan with the nurse or doctor.    ..........................................................................................................................................  Patient/Patient Representative Signature      ..........................................................................................................................................  Patient Representative Print Name and Relationship to Patient    ..................................................               ................................................  Date                                            Time    ..........................................................................................................................................  Reviewed by Signature/Title    ...................................................              ..............................................  Date                                                            Time

## 2018-06-18 NOTE — ANESTHESIA POSTPROCEDURE EVALUATION
Patient: Macey Romero    Procedure(s):  BILATERAL UPPER LID BLEPHAROPLASTY; BILATERAL BROW PTOSIS REPAIR - Wound Class: I-Clean    Diagnosis:ptosis   Diagnosis Additional Information: No value filed.    Anesthesia Type:  MAC    Note:  Anesthesia Post Evaluation    Patient location during evaluation: PACU  Patient participation: Able to fully participate in evaluation  Level of consciousness: awake  Pain management: adequate  Airway patency: patent  Cardiovascular status: acceptable  Respiratory status: acceptable  Hydration status: acceptable  PONV: none     Anesthetic complications: None          Last vitals:  Vitals:    06/18/18 1100 06/18/18 1345   BP: 117/58 109/65   Pulse: 67    Resp: 20 14   Temp: 36.7  C (98  F)    SpO2: 98% 93%         Electronically Signed By: Марина Oneill  June 18, 2018  2:24 PM

## 2018-06-18 NOTE — ANESTHESIA PREPROCEDURE EVALUATION
Anesthesia Evaluation     . Pt has had prior anesthetic.     No history of anesthetic complications          ROS/MED HX    ENT/Pulmonary:     (+)asthma , recent URI . .   (-) tobacco use   Neurologic:       Cardiovascular:         METS/Exercise Tolerance:     Hematologic:         Musculoskeletal:         GI/Hepatic:        (-) GERD   Renal/Genitourinary:         Endo:         Psychiatric:         Infectious Disease:         Malignancy:         Other: Comment: Fibromyalgia  ibs    Mother with von willebrands                    Physical Exam  Normal systems: dental    Airway   Mallampati: II  TM distance: >3 FB  Neck ROM: full    Dental     Cardiovascular   Rhythm and rate: regular      Pulmonary    breath sounds clear to auscultation                    Anesthesia Plan      History & Physical Review  History and physical reviewed and following examination; no interval change.    ASA Status:  2 .        Plan for MAC with Intravenous induction.   PONV prophylaxis:  Ondansetron (or other 5HT-3)       Postoperative Care      Consents  Anesthetic plan, risks, benefits and alternatives discussed with:  Patient..                          .

## 2018-06-18 NOTE — DISCHARGE INSTRUCTIONS
[facility] Discharge  Instructions  Anesthesia (Eye Care Center)   Adult Discharge Instructions    For 24 hours after surgery    1. Get plenty of rest.  Make arrangements to have a responsible adult stay with you for at least 6 hours after you leave the hospital.  2. Do not drive or use heavy equipment for 24 hours.    3. Do not drink alcohol for 24 hours.  4. Do not sign legal documents or make important decisions for 24 hours.  5. Avoid strenuous or risky activities. You may feel lightheaded.  If so, sit for a few minutes before standing.  Have someone help you get up.   6. Conscious sedation patients may resume a regular diet..  7. Any questions of medical nature, call your physician.    POST-OPERATIVE CARE FOLLOWING EYELID SURGERY  DR. Сергей Walters  [facility]  [phone]  ICE COMPRESSES:  Immediately following surgery, you should begin to apply ice compresses.  A good method is to use a clean washcloth soaked in a bowl of ice water.  Wring it out and gently lay across your eyes.  Do this at least 6 times per day for 15 minutes, but not while sleeping.  Continue cold compresses for the first four days after surgery, or longer if swelling persists.    OINTMENT:  Apply ERYTHROMYCIN OINTMENT to the surgical area (along the sutures if present) 3 TIMES PER DAY FOR 1 WEEK.  If you get ointment in your eye, it will blur your vision slightly, but will not harm you eye.  If you run out of antibiotic ointment use Vaseline instead until the wounds are fully healed.    ACTIVITY:  You may resume regular activities as tolerated.  You may shower and wash your hair on the day after surgery, be careful to avoid getting shampoo in your eyes. Sleeping with your head elevated will help with swelling.    MEDICATION:  Some discomfort and tenderness may be noticed around the eye.  If the DrCherelle has prescribed pain medication, please take it according to the directions on the bottle.  If  you are not prescribed pain medication or you feel you do not need it, you may take Extra Strength Tylenol for mild pain.      WHAT TO EXPECT:  You should expect some slight oozing of blood from the incision site over the first two to three days after surgery.  Swelling and bruising will occur for one to two weeks or longer.  You may also experience itching and tearing during the first several weeks after surgery.  This is part of the normal healing process.    CALL THE DOCTOR S OFFICE WITH ANY QUESTIONS OR CONCERNS, [phone].  Please call if you suspect your wound has opened, you have a large / growing bruise (hematoma), vision loss not attributable to the ointment, or bleeding that does not seem to be stopping.

## 2018-07-16 NOTE — OP NOTE
PREOPERATIVE DIAGNOSIS: Bilateral brow ptosis, Bilateral upper lid dermatochalasis, visually significant.   POSTOPERATIVE DIAGNOSIS: Same  PROCEDURE PERFORMED: Bilateral direct brow lift, Bilateral upper lid blepharoplasty.   INDICATIONS FOR PROCEDURE:  Macey Romero presented to the eye clinic with decreased vision secondary to drooping of the eyebrows with the brow drooping below the margin of the superior orbital rim.  In addtion, upper eyelid skin was drooping which was visually significant based on visual field testing demonstrating improved superior peripheral vision with taped elevation of the eyelids and brows.  The risks, benefits and alternatives to bilateral upper lid blepharoplasty with brow ptosis repair were discussed with the patient. All pateint questions were answered. They understood and elected to proceed.   DESCRIPTION OF PROCEDURE: The patient was brought to the operating suite where they were prepped and draped in sterile fashion. Local anesthetic compromised of a 50:50 mixture of 0.5% Marcaine and 2% lidocaine with epinephrine was injected into the subcutaneous tissue of the brow as well as the eyelid.  Brow incisions were marked in a crescent shape and were approximately 6 mm in maximum vertical dimension - inscisions encompassed the lateral third of the brow and the inferior incision was just superior to the brow.  Incisions were created using the feather blade.  The flap was excised using the ashwini scissors.  Bleeding vessels were cauterized using the hot-wire cautery.  Incisions were then closed in two layers.  The deep layer was closed with 5-0 interrupted vicryl sutures and the superficial skin was closed using 6-0 fast gut in a running fashion.  Lid incisions were marked approximately 9 mm superior to the lash line, and 13 mm inferior to the inferior brow hairs. A pinch technique was used to ensure that sufficient anterior lamellar tissue would be present at the completion of the  procedure.  Incisions were created using the feather blade. The skin muscle flap was undermined using the Feliz tenotomy scissors. This flap was then excised using a combination of the Lynsey scissors and hot wire cautery. All bleeders were cauterized. The incisions were then closed using a 6-0 fast-absorbing gut suture in a running fashion.  Erythromycin ointment was applied to the wounds  ESTIMATED BLOOD LOSS: 5 cc   COMPLICATIONS: None.   DISPOSITION: The patient will be discharged to home, and will follow up in 1 week in the eye clinic.

## 2018-09-06 ENCOUNTER — TRANSFERRED RECORDS (OUTPATIENT)
Dept: FAMILY MEDICINE | Facility: CLINIC | Age: 68
End: 2018-09-06

## 2018-09-16 ENCOUNTER — APPOINTMENT (OUTPATIENT)
Dept: GENERAL RADIOLOGY | Facility: CLINIC | Age: 68
End: 2018-09-16
Attending: EMERGENCY MEDICINE
Payer: MEDICARE

## 2018-09-16 ENCOUNTER — HOSPITAL ENCOUNTER (EMERGENCY)
Facility: CLINIC | Age: 68
Discharge: HOME | End: 2018-09-16
Attending: EMERGENCY MEDICINE | Admitting: EMERGENCY MEDICINE
Payer: MEDICARE

## 2018-09-16 VITALS
WEIGHT: 135 LBS | RESPIRATION RATE: 18 BRPM | HEART RATE: 67 BPM | OXYGEN SATURATION: 98 % | SYSTOLIC BLOOD PRESSURE: 121 MMHG | HEIGHT: 63 IN | TEMPERATURE: 98.5 F | DIASTOLIC BLOOD PRESSURE: 55 MMHG | BODY MASS INDEX: 23.92 KG/M2

## 2018-09-16 DIAGNOSIS — J98.01 ACUTE BRONCHOSPASM: ICD-10-CM

## 2018-09-16 DIAGNOSIS — R05.9 COUGH: ICD-10-CM

## 2018-09-16 LAB
ANION GAP SERPL CALCULATED.3IONS-SCNC: 7 MMOL/L (ref 3–14)
BASE EXCESS BLDV CALC-SCNC: 4.1 MMOL/L
BASOPHILS # BLD AUTO: 0 10E9/L (ref 0–0.2)
BASOPHILS NFR BLD AUTO: 0 %
BUN SERPL-MCNC: 20 MG/DL (ref 7–30)
CALCIUM SERPL-MCNC: 8.8 MG/DL (ref 8.5–10.1)
CHLORIDE SERPL-SCNC: 106 MMOL/L (ref 94–109)
CO2 SERPL-SCNC: 27 MMOL/L (ref 20–32)
CREAT SERPL-MCNC: 0.93 MG/DL (ref 0.52–1.04)
DIFFERENTIAL METHOD BLD: NORMAL
EOSINOPHIL # BLD AUTO: 0.2 10E9/L (ref 0–0.7)
EOSINOPHIL NFR BLD AUTO: 2 %
ERYTHROCYTE [DISTWIDTH] IN BLOOD BY AUTOMATED COUNT: 12.9 % (ref 10–15)
GFR SERPL CREATININE-BSD FRML MDRD: 60 ML/MIN/1.7M2
GLUCOSE SERPL-MCNC: 89 MG/DL (ref 70–99)
HCO3 BLDV-SCNC: 29 MMOL/L (ref 21–28)
HCT VFR BLD AUTO: 40.3 % (ref 35–47)
HGB BLD-MCNC: 14 G/DL (ref 11.7–15.7)
LYMPHOCYTES # BLD AUTO: 4.2 10E9/L (ref 0.8–5.3)
LYMPHOCYTES NFR BLD AUTO: 45.5 %
MAGNESIUM SERPL-MCNC: 2.3 MG/DL (ref 1.6–2.3)
MCH RBC QN AUTO: 29.4 PG (ref 26.5–33)
MCHC RBC AUTO-ENTMCNC: 34.7 G/DL (ref 31.5–36.5)
MCV RBC AUTO: 85 FL (ref 78–100)
MONOCYTES # BLD AUTO: 0.6 10E9/L (ref 0–1.3)
MONOCYTES NFR BLD AUTO: 7 %
NEUTROPHILS # BLD AUTO: 4.2 10E9/L (ref 1.6–8.3)
NEUTROPHILS NFR BLD AUTO: 45.5 %
PCO2 BLDV: 42 MM HG (ref 40–50)
PH BLDV: 7.44 PH (ref 7.32–7.43)
PLATELET # BLD AUTO: 183 10E9/L (ref 150–450)
PLATELET # BLD EST: NORMAL 10*3/UL
PO2 BLDV: 28 MM HG (ref 25–47)
POTASSIUM SERPL-SCNC: 3.7 MMOL/L (ref 3.4–5.3)
RBC # BLD AUTO: 4.76 10E12/L (ref 3.8–5.2)
RBC MORPH BLD: NORMAL
SODIUM SERPL-SCNC: 140 MMOL/L (ref 133–144)
WBC # BLD AUTO: 9.2 10E9/L (ref 4–11)

## 2018-09-16 PROCEDURE — 96374 THER/PROPH/DIAG INJ IV PUSH: CPT

## 2018-09-16 PROCEDURE — 82803 BLOOD GASES ANY COMBINATION: CPT | Performed by: EMERGENCY MEDICINE

## 2018-09-16 PROCEDURE — 83735 ASSAY OF MAGNESIUM: CPT | Performed by: EMERGENCY MEDICINE

## 2018-09-16 PROCEDURE — 25000125 ZZHC RX 250: Performed by: EMERGENCY MEDICINE

## 2018-09-16 PROCEDURE — 85025 COMPLETE CBC W/AUTO DIFF WBC: CPT | Performed by: EMERGENCY MEDICINE

## 2018-09-16 PROCEDURE — A9270 NON-COVERED ITEM OR SERVICE: HCPCS | Mod: GY | Performed by: EMERGENCY MEDICINE

## 2018-09-16 PROCEDURE — 25000132 ZZH RX MED GY IP 250 OP 250 PS 637: Mod: GY | Performed by: EMERGENCY MEDICINE

## 2018-09-16 PROCEDURE — 71046 X-RAY EXAM CHEST 2 VIEWS: CPT

## 2018-09-16 PROCEDURE — 80048 BASIC METABOLIC PNL TOTAL CA: CPT | Performed by: EMERGENCY MEDICINE

## 2018-09-16 PROCEDURE — 94640 AIRWAY INHALATION TREATMENT: CPT

## 2018-09-16 PROCEDURE — 99284 EMERGENCY DEPT VISIT MOD MDM: CPT | Mod: 25

## 2018-09-16 PROCEDURE — 25000128 H RX IP 250 OP 636: Performed by: EMERGENCY MEDICINE

## 2018-09-16 PROCEDURE — 40000275 ZZH STATISTIC RCP TIME EA 10 MIN

## 2018-09-16 PROCEDURE — 96361 HYDRATE IV INFUSION ADD-ON: CPT

## 2018-09-16 RX ORDER — ALBUTEROL SULFATE 0.83 MG/ML
2.5 SOLUTION RESPIRATORY (INHALATION) EVERY 4 HOURS PRN
Qty: 1 BOX | Refills: 1 | Status: SHIPPED | OUTPATIENT
Start: 2018-09-16 | End: 2019-09-09

## 2018-09-16 RX ORDER — SODIUM CHLORIDE 9 MG/ML
1000 INJECTION, SOLUTION INTRAVENOUS CONTINUOUS
Status: DISCONTINUED | OUTPATIENT
Start: 2018-09-16 | End: 2018-09-16 | Stop reason: HOSPADM

## 2018-09-16 RX ORDER — IPRATROPIUM BROMIDE AND ALBUTEROL SULFATE 2.5; .5 MG/3ML; MG/3ML
3 SOLUTION RESPIRATORY (INHALATION)
Status: DISCONTINUED | OUTPATIENT
Start: 2018-09-16 | End: 2018-09-16 | Stop reason: HOSPADM

## 2018-09-16 RX ORDER — LORAZEPAM 1 MG/1
0.5 TABLET ORAL 3 TIMES DAILY PRN
Qty: 5 TABLET | Refills: 0 | Status: SHIPPED | OUTPATIENT
Start: 2018-09-16 | End: 2018-09-18

## 2018-09-16 RX ORDER — METHYLPREDNISOLONE SODIUM SUCCINATE 125 MG/2ML
80 INJECTION, POWDER, LYOPHILIZED, FOR SOLUTION INTRAMUSCULAR; INTRAVENOUS ONCE
Status: COMPLETED | OUTPATIENT
Start: 2018-09-16 | End: 2018-09-16

## 2018-09-16 RX ORDER — CETIRIZINE HYDROCHLORIDE 10 MG/1
10 TABLET ORAL DAILY
Qty: 30 TABLET | Refills: 0 | Status: SHIPPED | OUTPATIENT
Start: 2018-09-16 | End: 2018-10-16

## 2018-09-16 RX ORDER — PREDNISONE 20 MG/1
60 TABLET ORAL DAILY
Qty: 15 TABLET | Refills: 0 | Status: SHIPPED | OUTPATIENT
Start: 2018-09-16 | End: 2018-09-18 | Stop reason: SINTOL

## 2018-09-16 RX ORDER — BENZONATATE 200 MG/1
200 CAPSULE ORAL 3 TIMES DAILY PRN
Qty: 21 CAPSULE | Refills: 0 | Status: SHIPPED | OUTPATIENT
Start: 2018-09-16 | End: 2018-11-06

## 2018-09-16 RX ORDER — ACETAMINOPHEN 500 MG
1000 TABLET ORAL ONCE
Status: COMPLETED | OUTPATIENT
Start: 2018-09-16 | End: 2018-09-16

## 2018-09-16 RX ADMIN — IPRATROPIUM BROMIDE AND ALBUTEROL SULFATE 3 ML: 2.5; .5 SOLUTION RESPIRATORY (INHALATION) at 07:48

## 2018-09-16 RX ADMIN — METHYLPREDNISOLONE SODIUM SUCCINATE 81.25 MG: 125 INJECTION, POWDER, FOR SOLUTION INTRAMUSCULAR; INTRAVENOUS at 08:17

## 2018-09-16 RX ADMIN — SODIUM CHLORIDE 1000 ML: 9 INJECTION, SOLUTION INTRAVENOUS at 08:16

## 2018-09-16 RX ADMIN — ACETAMINOPHEN 1000 MG: 500 TABLET, FILM COATED ORAL at 09:51

## 2018-09-16 ASSESSMENT — ENCOUNTER SYMPTOMS
NUMBNESS: 0
HEADACHES: 1
HEMATURIA: 0
ABDOMINAL PAIN: 0
BACK PAIN: 0
NAUSEA: 0
WEAKNESS: 0
DIARRHEA: 0
FEVER: 0
DIFFICULTY URINATING: 0
VOMITING: 0
DYSURIA: 0
SHORTNESS OF BREATH: 1
COUGH: 1
CHILLS: 0

## 2018-09-16 NOTE — ED AVS SNAPSHOT
Emergency Department    [address] [place] [address]    Phone:  [phone]    Fax:  [phone]                                       Macey Romero   MRN: [id number]    Department:   Emergency Department   Date of Visit:  9/16/2018           After Visit Summary Signature Page     I have received my discharge instructions, and my questions have been answered. I have discussed any challenges I see with this plan with the nurse or doctor.    ..........................................................................................................................................  Patient/Patient Representative Signature      ..........................................................................................................................................  Patient Representative Print Name and Relationship to Patient    ..................................................               ................................................  Date                                   Time    ..........................................................................................................................................  Reviewed by Signature/Title    ...................................................              ..............................................  Date                                               Time          22EPIC Rev 08/18

## 2018-09-16 NOTE — DISCHARGE INSTRUCTIONS
Take the below medications as prescribed.  Please do not miss any doses.    New Prescriptions    ALBUTEROL (2.5 MG/3ML) 0.083% NEB SOLUTION    Take 1 vial (2.5 mg) by nebulization every 4 hours as needed for shortness of breath / dyspnea    CETIRIZINE (ZYRTEC) 10 MG TABLET    Take 1 tablet (10 mg) by mouth daily    LORAZEPAM (ATIVAN) 1 MG TABLET    Take 0.5 tablets (0.5 mg) by mouth 3 times daily as needed for anxiety    PREDNISONE (DELTASONE) 20 MG TABLET    Take 3 tablets (60 mg) by mouth daily for 5 days         Discharge Instructions  Bronchitis, Pneumonia, Bronchospasm    You were seen today for a chest infection or inflammation. If your provider decided this was due to a bacterial infection, you may need an antibiotic. Sometimes these are caused by a virus, and then an antibiotic will not help.     Generally, every Emergency Department visit should have a follow-up clinic visit with either a primary or a specialty clinic/provider. Please follow-up as instructed by your emergency provider today.    Return to the Emergency Department if:    Your breathing gets much worse.    You are very weak, or feel much more ill.    You develop new symptoms, such as chest pain.    You cough up blood.    You are vomiting (throwing up) enough that you cannot keep fluids or your medicine down.    What can I do to help myself?    Fill any prescriptions the provider gave you and take them right away--especially antibiotics. Be sure to finish the whole antibiotic prescription.    You may be given a prescription for an inhaler, which can help loosen tight air passages.  Use this as needed, but not more often than directed. Inhalers work much better when used with a spacer.     You may be given a prescription for a steroid to reduce inflammation. Used long-term, these can have side effects, but for short-term use they are safe. You may notice restlessness or increased appetite.        You may use non-prescription cough or cold  medicines. Cough medicines may help, but don t make the cough go away completely.     Avoid smoke, because this can make your symptoms worse. If you smoke, this may be a good time to quit! Consider using nicotine lozenges, gum, or patches to reduce cravings.     If you have a fever, Tylenol  (acetaminophen), Motrin  (ibuprofen), or Advil  (ibuprofen) may help bring fever down and may help you feel more comfortable. Be sure to read and follow the package directions, and ask your provider if you have questions.    Be sure to get your flu shot each year.  For certain ages, the pneumonia shot can help prevent pneumonia.  If you were given a prescription for medicine here today, be sure to read all of the information (including the package insert) that comes with your prescription.  This will include important information about the medicine, its side effects, and any warnings that you need to know about.  The pharmacist who fills the prescription can provide more information and answer questions you may have about the medicine.  If you have questions or concerns that the pharmacist cannot address, please call or return to the Emergency Department.     Remember that you can always come back to the Emergency Department if you are not able to see your regular provider in the amount of time listed above, if you get any new symptoms, or if there is anything that worries you.    Discharge Instructions  Wheezing    Reactive airway disease is a condition causing narrowing and inflammation of the airways that can make it hard to breathe.  Reactive airway disease can also cause cough, wheezing, noisy breathing and tightness in the chest.  Asthma can be brought on or  triggered  by many things, including dust, mold, pollen, cigarette smoke, exercise, stress and infections (like the common cold).     Generally, every Emergency Department visit should have a follow-up clinic visit with either a primary or a specialty clinic/provider.  Please follow-up as instructed by your emergency provider today.    Return to the Emergency Department if:    Your breathing gets worse.    You need to use your inhaler more often than every 4 hours, or cannot get relief from your inhaler.    You are very weak, or feel much more ill.    You develop new symptoms, such as chest pain.    You cough up blood.    You are vomiting (throwing up) enough that you cannot keep fluids or your medicine down.    What can I do to help myself?    Fill any prescriptions the provider gave you and take them right away--especially antibiotics. Be sure to finish the whole antibiotic prescription.    You may be given a prescription for an inhaler, which can help loosen tight air passages.  Use this as needed, but not more often than directed. Inhalers work much better when used with a spacer.     You may be given a prescription for a steroid to reduce inflammation. Used long-term, these can have some side effects, but for short-term use they are safe. You may notice restlessness or increased appetite (eating more).        You may use non-prescription cough or cold medicines. Cough medicines may help, but do not make the cough go away completely.     Avoid smoke, because this can make you feel worse. If you smoke, this may be a good time to quit! Consider using nicotine lozenges, gum, or patches to reduce cravings.     If you have a fever, Tylenol  (acetaminophen), Motrin  (ibuprofen), or Advil  (ibuprofen), may help bring fever down and may help you feel more comfortable. Be sure to read and follow the package directions, and ask your provider if you have questions.    Be sure to get your flu shot each year.  For certain age groups, the pneumonia shot can help prevent pneumonia.  It is important that you follow up with your regular provider, to be sure that you are improving from this spell (an acute asthma exacerbation), and also to do what you can to keep from having trouble again.  Sometimes you need long-term medicines to keep your asthma under control.   If you were given a prescription for medicine here today, be sure to read all of the information (including the package insert) that comes with your prescription.  This will include important information about the medicine, its side effects, and any warnings that you need to know about.  The pharmacist who fills the prescription can provide more information and answer questions you may have about the medicine.  If you have questions or concerns that the pharmacist cannot address, please call or return to the Emergency Department.   Remember that you can always come back to the Emergency Department if you are not able to see your regular provider in the amount of time listed above, if you get any new symptoms, or if there is anything that worries you.

## 2018-09-16 NOTE — ED PROVIDER NOTES
History     Chief Complaint:  Shortness of Breath    HPI   Macey Romero is a 67 year old female with a history of reactive airway disease who presents for evaluation of a cough and shortness of breath. The patient reports a one week history of productive cough with yellow sputum and associated shortness of breath. Symptoms have been progressively worsening. She is using her Symbicort inhaler and tried Mucinex at home without significant relief. She also reports a mild generalized headache that is a typical headache for her. She states she feels hot but denies any fevers, chills, chest pain, abdominal pain, or back pain. She denies any nausea, vomiting, bowel or bladder changes. No leg swelling. No recent travel or sick contacts. She has been eating and drinking normally. She notes she recently completed a Medrol Dosepak 3 days ago for a shoulder nerve impingement. The patient is a former smoker, quit in 2016. No known history of heart disease.     Allergies:  Tetanus toxoids  Erythromycin  Levaquin  Penicillins  Silicone     Medications:    Acetaminophen (TYLENOL PO)  albuterol (PROAIR HFA/PROVENTIL HFA/VENTOLIN HFA) 108 (90 BASE) MCG/ACT Inhaler  budesonide-formoterol (SYMBICORT) 160-4.5 MCG/ACT Inhaler  Docusate Sodium (JAY STOOL SOFTENER PO)  loratadine (CLARITIN) 10 MG tablet  magnesium hydroxide (MILK OF MAGNESIA) 400 MG/5ML suspension    Past Medical History:    Reactive airway disease  Fibromyalgia  History of breast cancer  Chronic constipation  Osteopenia  Meningitis  Squamous cell carcinoma  Shingles    Past Surgical History:    Cholecystectomy  D&C  Bilateral mastectomy  Cervical disc fusion  Hysterectomy  Right knee replacement  Tonsillectomy  Repair ankle ligament  Conjunctival autologous transplant  Mohs procedure    Family History:    Sarcoidosis  Diabetes  Cerebrovascular disease  Liver disease  Respiratory    Social History:  Smoking status: Former smoker, quit 2016  Alcohol use:  "No  Marital Status:   [2]     Review of Systems   Constitutional: Negative for chills and fever.   Eyes: Negative for visual disturbance.   Respiratory: Positive for cough and shortness of breath.    Cardiovascular: Negative for chest pain and leg swelling.   Gastrointestinal: Negative for abdominal pain, diarrhea, nausea and vomiting.   Genitourinary: Negative for difficulty urinating, dysuria and hematuria.   Musculoskeletal: Negative for back pain.   Neurological: Positive for headaches. Negative for weakness and numbness.   All other systems reviewed and are negative.      Physical Exam   Patient Vitals for the past 24 hrs:   BP Temp Temp src Pulse Resp SpO2 Height Weight   09/16/18 0900 - - - - - 96 % - -   09/16/18 0845 - - - - - 99 % - -   09/16/18 0815 - - - - - 100 % - -   09/16/18 0800 - - - - - 98 % - -   09/16/18 0748 - - - - - 98 % - -   09/16/18 0732 (!) 142/95 98.5  F (36.9  C) Oral 79 24 96 % 1.6 m (5' 3\") 61.2 kg (135 lb)       Physical Exam  Constitutional: Well developed, nontox appearance, appears forlorn, tearful  Head: Atraumatic.   Mouth/Throat: Oropharynx is clear and moist.   Neck:  no stridor  Eyes: no scleral icterus  Cardiovascular: RRR, 2+ bilat radial pulses  Pulmonary/Chest: intermittent coughing, nml resp effort, wheezing bilat  Abdominal: ND, +BS, soft, NT, no rebound or guarding   Ext: Warm, well perfused, no edema  Neurological: A&O, symmetric facies, moves ext x4  Skin: Skin is warm and dry.   Psychiatric: Behavior is normal. Thought content normal.   Nursing note and vitals reviewed.    Emergency Department Course   Imaging:  Radiographic findings were communicated with the patient who voiced understanding of the findings.    XR Chest 2 views  No active disease. Previous cervical fusion and  cholecystectomy noted.  As read by Radiology.    Laboratory:  CBC: WNL (WBC 9.2, HGB 14.0, )  BMP: GFR 60(L), o/w WNL (Creatinine 0.93)  VBG: pH 7.44(H), Bicarbonate venous " 29(H), o/w WNL  Magnesium: 2.3     Interventions:  0748: Duoneb 3 mL  0816: NS 1L IV Bolus  0817: Solumedrol 81.25 mg IV  0951: Tylenol 1,000 mg oral    Emergency Department Course:  Past medical records, nursing notes, and vitals reviewed.  724: I performed an exam of the patient and obtained history, as documented above.  IV inserted and blood drawn.  The patient was sent for a CXR while in the emergency department, findings above.    32: I rechecked the patient. Explained findings to the patient.    I rechecked the patient.  Findings and plan explained to the Patient. Patient discharged home with instructions regarding supportive care, medications, and reasons to return. The importance of close follow-up was reviewed.     Impression & Plan      Medical Decision Makin year old female presents with cough and dyspnea.     Differential diagnosis includes bronchitis, acute bronchospasm, pneumonia, viral URI, allergies, asthma. Chest x-ray obtained as noted above and negative for pneumonia. Laboratory work up as above is unremarkable, notable for a normal WBC and normal VBG. Evaluation today showed evidence of bronchitis and bronchospasm.  No evidence of pneumonia.  No clinical concern for cardiac cause of symptoms or PE.  No evidence of respiratory failure. Patient was given a Duoneb and solumedrol in the ED with improvement and resolution of wheezing. Based on time course of illness, I suspect viral process and no indication for antibiotics at this time. Plan for discharge home bronchodilators, steroids, symptom management.  Pt reports she gets anxiety with steroid use and needs anxiolytic medications for him. Recommendations given regarding follow up with primary care doctor and return to the emergency department as needed for new or worsening symptoms. The patient is understanding and agreeable to plan. Patient discharged in stable condition.      Diagnosis:    ICD-10-CM   1. Acute bronchospasm J98.01   2.  Cough R05     Disposition: Discharged to home    Discharge Medications:   Details   albuterol (2.5 MG/3ML) 0.083% neb solution Take 1 vial (2.5 mg) by nebulization every 4 hours as needed for shortness of breath / dyspnea, Disp-1 Box, R-1, Local Print      cetirizine (ZYRTEC) 10 MG tablet Take 1 tablet (10 mg) by mouth daily, Disp-30 tablet, R-0, Local Print      LORazepam (ATIVAN) 1 MG tablet Take 0.5 tablets (0.5 mg) by mouth 3 times daily as needed for anxiety, Disp-5 tablet, R-0, Local Print      predniSONE (DELTASONE) 20 MG tablet Take 3 tablets (60 mg) by mouth daily for 5 days, Disp-15 tablet, R-0, Local Print     Gayathri Clay  9/16/2018    EMERGENCY DEPARTMENT    I, Gayathri Clay, am serving as a scribe at 7:24 AM on 9/16/2018 to document services personally performed by Gurpreet Lambert MD based on my observations and the provider's statements to me.        Gurpreet Lambert MD  09/17/18 0034

## 2018-09-16 NOTE — ED AVS SNAPSHOT
Emergency Department    [address] [facility] [address]    Phone:  [phone]    Fax:  [phone]                                       Macey Romero   MRN: [id number]    Department:   Emergency Department   Date of Visit:  9/16/2018           Patient Information     Date Of Birth          1950        Your diagnoses for this visit were:     Acute bronchospasm     Cough        You were seen by Gurpreet Lambert MD.      Follow-up Information     Follow up with Long Ferrari MD In 2 days.    Specialty:  Family Practice    Contact information:    [address]  [facility] [address] [phone]          Follow up with  Emergency Department.    Specialty:  EMERGENCY MEDICINE    Why:  As needed    Contact information:    [address] [facility] [address] [phone]        Discharge Instructions       Take the below medications as prescribed.  Please do not miss any doses.    New Prescriptions    ALBUTEROL (2.5 MG/3ML) 0.083% NEB SOLUTION    Take 1 vial (2.5 mg) by nebulization every 4 hours as needed for shortness of breath / dyspnea    CETIRIZINE (ZYRTEC) 10 MG TABLET    Take 1 tablet (10 mg) by mouth daily    LORAZEPAM (ATIVAN) 1 MG TABLET    Take 0.5 tablets (0.5 mg) by mouth 3 times daily as needed for anxiety    PREDNISONE (DELTASONE) 20 MG TABLET    Take 3 tablets (60 mg) by mouth daily for 5 days         Discharge Instructions  Bronchitis, Pneumonia, Bronchospasm    You were seen today for a chest infection or inflammation. If your provider decided this was due to a bacterial infection, you may need an antibiotic. Sometimes these are caused by a virus, and then an antibiotic will not help.     Generally, every Emergency Department visit should have a follow-up clinic visit with either a primary or a specialty clinic/provider. Please follow-up as instructed by your emergency provider today.    Return to the Emergency Department if:    Your  breathing gets much worse.    You are very weak, or feel much more ill.    You develop new symptoms, such as chest pain.    You cough up blood.    You are vomiting (throwing up) enough that you cannot keep fluids or your medicine down.    What can I do to help myself?    Fill any prescriptions the provider gave you and take them right away--especially antibiotics. Be sure to finish the whole antibiotic prescription.    You may be given a prescription for an inhaler, which can help loosen tight air passages.  Use this as needed, but not more often than directed. Inhalers work much better when used with a spacer.     You may be given a prescription for a steroid to reduce inflammation. Used long-term, these can have side effects, but for short-term use they are safe. You may notice restlessness or increased appetite.        You may use non-prescription cough or cold medicines. Cough medicines may help, but don t make the cough go away completely.     Avoid smoke, because this can make your symptoms worse. If you smoke, this may be a good time to quit! Consider using nicotine lozenges, gum, or patches to reduce cravings.     If you have a fever, Tylenol  (acetaminophen), Motrin  (ibuprofen), or Advil  (ibuprofen) may help bring fever down and may help you feel more comfortable. Be sure to read and follow the package directions, and ask your provider if you have questions.    Be sure to get your flu shot each year.  For certain ages, the pneumonia shot can help prevent pneumonia.  If you were given a prescription for medicine here today, be sure to read all of the information (including the package insert) that comes with your prescription.  This will include important information about the medicine, its side effects, and any warnings that you need to know about.  The pharmacist who fills the prescription can provide more information and answer questions you may have about the medicine.  If you have questions or  concerns that the pharmacist cannot address, please call or return to the Emergency Department.     Remember that you can always come back to the Emergency Department if you are not able to see your regular provider in the amount of time listed above, if you get any new symptoms, or if there is anything that worries you.    Discharge Instructions  Wheezing    Reactive airway disease is a condition causing narrowing and inflammation of the airways that can make it hard to breathe.  Reactive airway disease can also cause cough, wheezing, noisy breathing and tightness in the chest.  Asthma can be brought on or  triggered  by many things, including dust, mold, pollen, cigarette smoke, exercise, stress and infections (like the common cold).     Generally, every Emergency Department visit should have a follow-up clinic visit with either a primary or a specialty clinic/provider. Please follow-up as instructed by your emergency provider today.    Return to the Emergency Department if:    Your breathing gets worse.    You need to use your inhaler more often than every 4 hours, or cannot get relief from your inhaler.    You are very weak, or feel much more ill.    You develop new symptoms, such as chest pain.    You cough up blood.    You are vomiting (throwing up) enough that you cannot keep fluids or your medicine down.    What can I do to help myself?    Fill any prescriptions the provider gave you and take them right away--especially antibiotics. Be sure to finish the whole antibiotic prescription.    You may be given a prescription for an inhaler, which can help loosen tight air passages.  Use this as needed, but not more often than directed. Inhalers work much better when used with a spacer.     You may be given a prescription for a steroid to reduce inflammation. Used long-term, these can have some side effects, but for short-term use they are safe. You may notice restlessness or increased appetite (eating more).         You may use non-prescription cough or cold medicines. Cough medicines may help, but do not make the cough go away completely.     Avoid smoke, because this can make you feel worse. If you smoke, this may be a good time to quit! Consider using nicotine lozenges, gum, or patches to reduce cravings.     If you have a fever, Tylenol  (acetaminophen), Motrin  (ibuprofen), or Advil  (ibuprofen), may help bring fever down and may help you feel more comfortable. Be sure to read and follow the package directions, and ask your provider if you have questions.    Be sure to get your flu shot each year.  For certain age groups, the pneumonia shot can help prevent pneumonia.  It is important that you follow up with your regular provider, to be sure that you are improving from this spell (an acute asthma exacerbation), and also to do what you can to keep from having trouble again. Sometimes you need long-term medicines to keep your asthma under control.   If you were given a prescription for medicine here today, be sure to read all of the information (including the package insert) that comes with your prescription.  This will include important information about the medicine, its side effects, and any warnings that you need to know about.  The pharmacist who fills the prescription can provide more information and answer questions you may have about the medicine.  If you have questions or concerns that the pharmacist cannot address, please call or return to the Emergency Department.   Remember that you can always come back to the Emergency Department if you are not able to see your regular provider in the amount of time listed above, if you get any new symptoms, or if there is anything that worries you.      24 Hour Appointment Hotline       To make an appointment at any [facility], call [phone] ([phone]). If you don't have a family doctor or clinic, we will help you find one. [facility] are conveniently  located to serve the needs of you and your family.             Review of your medicines      START taking        Dose / Directions Last dose taken    cetirizine 10 MG tablet   Commonly known as:  zyrTEC   Dose:  10 mg   Quantity:  30 tablet        Take 1 tablet (10 mg) by mouth daily   Refills:  0        LORazepam 1 MG tablet   Commonly known as:  ATIVAN   Dose:  0.5 mg   Quantity:  5 tablet        Take 0.5 tablets (0.5 mg) by mouth 3 times daily as needed for anxiety   Refills:  0        predniSONE 20 MG tablet   Commonly known as:  DELTASONE   Dose:  60 mg   Quantity:  15 tablet        Take 3 tablets (60 mg) by mouth daily for 5 days   Refills:  0          CONTINUE these medicines which may have CHANGED, or have new prescriptions. If we are uncertain of the size of tablets/capsules you have at home, strength may be listed as something that might have changed.        Dose / Directions Last dose taken    * albuterol 108 (90 Base) MCG/ACT inhaler   Commonly known as:  PROAIR HFA/PROVENTIL HFA/VENTOLIN HFA   Dose:  2 puff   What changed:  Another medication with the same name was added. Make sure you understand how and when to take each.   Quantity:  1 Inhaler        Inhale 2 puffs into the lungs every 6 hours as needed for shortness of breath / dyspnea   Refills:  3        * albuterol (2.5 MG/3ML) 0.083% neb solution   Dose:  2.5 mg   What changed:  You were already taking a medication with the same name, and this prescription was added. Make sure you understand how and when to take each.   Quantity:  1 Box        Take 1 vial (2.5 mg) by nebulization every 4 hours as needed for shortness of breath / dyspnea   Refills:  1        budesonide-formoterol 160-4.5 MCG/ACT Inhaler   Commonly known as:  SYMBICORT   Dose:  2 puff   What changed:    - when to take this  - reasons to take this   Quantity:  3 Inhaler        Inhale 2 puffs into the lungs 2 times daily   Refills:  3        * Notice:  This list has 2 medication(s)  that are the same as other medications prescribed for you. Read the directions carefully, and ask your doctor or other care provider to review them with you.      Our records show that you are taking the medicines listed below. If these are incorrect, please call your family doctor or clinic.        Dose / Directions Last dose taken    AVENOVA 0.01 % Soln        For chronic blepharitis.   Refills:  0        erythromycin ophthalmic ointment   Commonly known as:  ROMYCIN   Quantity:  1 g        Apply to incisions three times daily, in eyes at bedtime   Refills:  1        HYDROcodone-acetaminophen 5-325 MG per tablet   Commonly known as:  NORCO   Dose:  1 tablet   Quantity:  18 tablet        Take 1 tablet by mouth every 4 hours as needed for pain   Refills:  0        loratadine 10 MG tablet   Commonly known as:  CLARITIN   Dose:  10 mg        Take 10 mg by mouth daily   Refills:  0        magnesium hydroxide 400 MG/5ML suspension   Commonly known as:  MILK OF MAGNESIA   Dose:  5 mL        Take 5 mLs by mouth daily as needed for constipation   Refills:  0        JAY STOOL SOFTENER PO        Take by mouth as needed for constipation   Refills:  0        TYLENOL PO   Dose:  325 mg   Indication:  Pain        Take 325 mg by mouth 2 times daily as needed for mild pain   Refills:  0                Prescriptions were sent or printed at these locations (4 Prescriptions)                   Other Prescriptions                Printed at Department/Unit printer (4 of 4)         albuterol (2.5 MG/3ML) 0.083% neb solution               cetirizine (ZYRTEC) 10 MG tablet               LORazepam (ATIVAN) 1 MG tablet               predniSONE (DELTASONE) 20 MG tablet                Procedures and tests performed during your visit     Basic metabolic panel    Blood gas venous    CBC with platelets differential    Magnesium    XR Chest 2 Views      Orders Needing Specimen Collection     None      Pending Results     No orders found from  9/14/2018 to 9/17/2018.            Pending Culture Results     No orders found from 9/14/2018 to 9/17/2018.            Pending Results Instructions     If you had any lab results that were not finalized at the time of your Discharge, you can call the ED Lab Result RN at [phone]. You will be contacted by this team for any positive Lab results or changes in treatment. The nurses are available 7 days a week from 10A to 6:30P.  You can leave a message 24 hours per day and they will return your call.        Test Results From Your Hospital Stay        9/16/2018  9:02 AM      Component Results     Component Value Ref Range & Units Status    WBC 9.2 4.0 - 11.0 10e9/L Final    RBC Count 4.76 3.8 - 5.2 10e12/L Final    Hemoglobin 14.0 11.7 - 15.7 g/dL Final    Hematocrit 40.3 35.0 - 47.0 % Final    MCV 85 78 - 100 fl Final    MCH 29.4 26.5 - 33.0 pg Final    MCHC 34.7 31.5 - 36.5 g/dL Final    RDW 12.9 10.0 - 15.0 % Final    Platelet Count 183 150 - 450 10e9/L Final    Diff Method Manual Differential  Final    % Neutrophils 45.5 % Final    % Lymphocytes 45.5 % Final    % Monocytes 7.0 % Final    % Eosinophils 2.0 % Final    % Basophils 0.0 % Final    Absolute Neutrophil 4.2 1.6 - 8.3 10e9/L Final    Absolute Lymphocytes 4.2 0.8 - 5.3 10e9/L Final    Absolute Monocytes 0.6 0.0 - 1.3 10e9/L Final    Absolute Eosinophils 0.2 0.0 - 0.7 10e9/L Final    Absolute Basophils 0.0 0.0 - 0.2 10e9/L Final    RBC Morphology   Final    Consistent with reported results    Platelet Estimate   Final    Automated count confirmed.  Platelet morphology is normal.         9/16/2018  8:48 AM      Component Results     Component Value Ref Range & Units Status    Sodium 140 133 - 144 mmol/L Final    Potassium 3.7 3.4 - 5.3 mmol/L Final    Chloride 106 94 - 109 mmol/L Final    Carbon Dioxide 27 20 - 32 mmol/L Final    Anion Gap 7 3 - 14 mmol/L Final    Glucose 89 70 - 99 mg/dL Final    Urea Nitrogen 20 7 - 30 mg/dL Final    Creatinine 0.93 0.52 -  1.04 mg/dL Final    GFR Estimate 60 (L) >60 mL/min/1.7m2 Final    Non  GFR Calc    GFR Estimate If Black 72 >60 mL/min/1.7m2 Final    African American GFR Calc    Calcium 8.8 8.5 - 10.1 mg/dL Final         9/16/2018  8:36 AM      Component Results     Component Value Ref Range & Units Status    Ph Venous 7.44 (H) 7.32 - 7.43 pH Final    PCO2 Venous 42 40 - 50 mm Hg Final    PO2 Venous 28 25 - 47 mm Hg Final    Bicarbonate Venous 29 (H) 21 - 28 mmol/L Final    Base Excess Venous 4.1 mmol/L Final    Abnormal Result, Ref range: -7.7 to 1.9         9/16/2018  8:59 AM      Narrative     CHEST TWO VIEWS  9/16/2018 8:37 AM     HISTORY: Dyspnea, cough.    COMPARISON: None.        Impression     IMPRESSION: No active disease. Previous cervical fusion and  cholecystectomy noted.    EFRAIN CASTELLANO MD         9/16/2018  8:48 AM      Component Results     Component Value Ref Range & Units Status    Magnesium 2.3 1.6 - 2.3 mg/dL Final                Clinical Quality Measure: Blood Pressure Screening     Your blood pressure was checked while you were in the emergency department today. The last reading we obtained was  BP: 121/55 . Please read the guidelines below about what these numbers mean and what you should do about them.  If your systolic blood pressure (the top number) is less than 120 and your diastolic blood pressure (the bottom number) is less than 80, then your blood pressure is normal. There is nothing more that you need to do about it.  If your systolic blood pressure (the top number) is 120-139 or your diastolic blood pressure (the bottom number) is 80-89, your blood pressure may be higher than it should be. You should have your blood pressure rechecked within a year by a primary care provider.  If your systolic blood pressure (the top number) is 140 or greater or your diastolic blood pressure (the bottom number) is 90 or greater, you may have high blood pressure. High blood pressure is treatable, but  if left untreated over time it can put you at risk for heart attack, stroke, or kidney failure. You should have your blood pressure rechecked by a primary care provider within the next 4 weeks.  If your provider in the emergency department today gave you specific instructions to follow-up with your doctor or provider even sooner than that, you should follow that instruction and not wait for up to 4 weeks for your follow-up visit.        Thank you for choosing [facility]       Thank you for choosing [facility] for your care. Our goal is always to provide you with excellent care. Hearing back from our patients is one way we can continue to improve our services. Please take a few minutes to complete the written survey that you may receive in the mail after you visit with us. Thank you!        [employer] Information     [employer] gives you secure access to your electronic health record. If you see a primary care provider, you can also send messages to your care team and make appointments. If you have questions, please call your primary care clinic.  If you do not have a primary care provider, please call [phone] and they will assist you.        Care EveryWhere ID     This is your Care EveryWhere ID. This could be used by other organizations to access your [facility] medical records  [id number]        Equal Access to Services     NATALIE DOSS : Hadii boris Mueller, leida diaz, pascale grajeda, jacquelin quinonez. So [facility] [phone].    ATENCIÓN: Si habla español, tiene a hoyos disposición servicios gratuitos de asistencia lingüística. Trevoname al [phone].    We comply with applicable federal civil rights laws and [place] laws. We do not discriminate on the basis of race, color, national origin, age, disability, sex, sexual orientation, or gender identity.            After Visit Summary       This is your record. Keep this with you and show to your community pharmacist(s) and  doctor(s) at your next visit.

## 2018-09-18 ENCOUNTER — OFFICE VISIT (OUTPATIENT)
Dept: FAMILY MEDICINE | Facility: CLINIC | Age: 68
End: 2018-09-18

## 2018-09-18 VITALS
OXYGEN SATURATION: 96 % | TEMPERATURE: 98 F | RESPIRATION RATE: 20 BRPM | DIASTOLIC BLOOD PRESSURE: 56 MMHG | HEART RATE: 88 BPM | SYSTOLIC BLOOD PRESSURE: 124 MMHG | BODY MASS INDEX: 24.52 KG/M2 | WEIGHT: 138.4 LBS

## 2018-09-18 DIAGNOSIS — J98.01 ACUTE BRONCHOSPASM: ICD-10-CM

## 2018-09-18 DIAGNOSIS — J45.41 REACTIVE AIRWAY DISEASE, MODERATE PERSISTENT, WITH ACUTE EXACERBATION: ICD-10-CM

## 2018-09-18 DIAGNOSIS — R05.9 COUGH: Primary | ICD-10-CM

## 2018-09-18 PROCEDURE — 99214 OFFICE O/P EST MOD 30 MIN: CPT | Performed by: FAMILY MEDICINE

## 2018-09-18 PROCEDURE — 94760 N-INVAS EAR/PLS OXIMETRY 1: CPT | Performed by: FAMILY MEDICINE

## 2018-09-18 RX ORDER — LORAZEPAM 1 MG/1
0.5 TABLET ORAL 3 TIMES DAILY PRN
Qty: 20 TABLET | Refills: 0 | Status: SHIPPED | OUTPATIENT
Start: 2018-09-18 | End: 2020-05-20

## 2018-09-18 RX ORDER — CODEINE PHOSPHATE AND GUAIFENESIN 10; 100 MG/5ML; MG/5ML
2 SOLUTION ORAL EVERY 4 HOURS PRN
Qty: 473 ML | Refills: 0 | Status: SHIPPED | OUTPATIENT
Start: 2018-09-18 | End: 2020-05-20

## 2018-09-18 RX ORDER — PREDNISONE 20 MG/1
60 TABLET ORAL EVERY MORNING
Refills: 0 | COMMUNITY
Start: 2018-09-16 | End: 2020-05-20

## 2018-09-18 RX ORDER — ALBUTEROL SULFATE 90 UG/1
2 AEROSOL, METERED RESPIRATORY (INHALATION) EVERY 6 HOURS PRN
Qty: 1 INHALER | Refills: 3 | Status: SHIPPED | OUTPATIENT
Start: 2018-09-18 | End: 2019-09-09

## 2018-09-18 NOTE — PROGRESS NOTES
Problem(s) Oriented visit        SUBJECTIVE:                                                    Macey Romero is a 67 year old female who presents to clinic today for the following health issues :    ED/UC Followup:    Facility:  [facility]  Date of visit: 9/16/18  Reason for visit: terrible URI put on high dose steroids.  Current Status: much improved but doesn't tolerate these steroids well......  Some halluciantions of wispy figures on the edge of her vision when she is on steroids.               1. Cough  See notes from the ER,  Still wheezy  Usual problem in the fall   Back on all her inhalers.    - PULSE OXIMETRY, SINGLE DETERMINATION  - Pulse oximetry nursing         Problem list, Medication list, Allergies, and Medical/Social/Surgical histories reviewed in [facility] and updated as appropriate.   Additional history: as documented    ROS:  General and CV completed and negative except as noted above    Histories:   Patient Active Problem List   Diagnosis     Reactive airway disease     Osteoarthritis     Osteopenia     Malignant neoplasm of female breast (H)     Pain disorder     IBS (irritable bowel syndrome)     Keloid of skin     Myalgia and myositis     Pterygium eye     Fx ankle     Health Care Home     Hip pain, left     Chronic pain of both knees     Fibromyalgia     Past Surgical History:   Procedure Laterality Date     anterior c-disc fusion       BLEPHAROPLASTY, BROW LIFT BILATERAL, COMBINED Bilateral 6/18/2018    Procedure: COMBINED BLEPHAROPLASTY, BROW LIFT BILATERAL;  BILATERAL UPPER LID BLEPHAROPLASTY; BILATERAL BROW PTOSIS REPAIR;  Surgeon: Сергей Walters MD;  Location:  [facility]     CHOLECYSTECTOMY       COLONOSCOPY N/A 10/19/2017    Procedure: COLONOSCOPY;  COLONOSCOPY;  Surgeon: Niko Wong MD;  Location:  GI     D & C      Bleeding before hysterectomy     ENDOSCOPY  04/21/08     HYSTERECTOMY, MARCUS      Ovaries and tubes out due to breast cancer     JOINT REPLACEMTN, KNEE RT/LT Right 01/2011     Joint Replacement knee RT, with tibial straightening (2 replacements)     MAMMOPLASTY AUGMENTATION BILATERAL      breast ca      MASTECTOMY, SIMPLE RT/LT/CLAIRE Bilateral 1989    Mastectomy Simple RT/LT/CLAIRE     MOHS MICROGRAPHIC PROCEDURE      Left lateral nose     OPEN REDUCTION INTERNAL FIXATION ANKLE  8/27/2013    Procedure: OPEN REDUCTION INTERNAL FIXATION ANKLE;  RIGHT OPEN REDUCTION INTERNAL FIXATION ANKLE WITH LIGAMENT REPAIR;  Surgeon: Nando Chang MD;  Location: [facility] OR     PTERYGIUM WITH CONJUNCTIVAL AUTOLOGOUS TRANSPLANT Left 8/29/2016    Procedure: PTERYGIUM WITH CONJUNCTIVAL AUTOLOGOUS TRANSPLANT;  Surgeon: Сергей Walters MD;  Location: [facility]     REPAIR LIGAMENT ANKLE  8/27/2013    Procedure: REPAIR LIGAMENT ANKLE;;  Surgeon: Nando Chang MD;  Location: [facility] OR     SALIVARY GLAND SURGERY Left     Stone removal      TONSILLECTOMY         Social History   Substance Use Topics     Smoking status: Former Smoker     Quit date: 1/1/2016     Smokeless tobacco: Never Used      Comment: quit but  still smokes, but not in house     Alcohol use No     Family History   Problem Relation Age of Onset     Respiratory Father      Cancer Brother      sarcoidosis     Diabetes Brother      Cerebrovascular Disease Brother      Liver Disease Brother            OBJECTIVE:                                                    /56  Pulse 88  Temp 98  F (36.7  C) (Oral)  Resp 20  Wt 62.8 kg (138 lb 6.4 oz)  SpO2 96%  BMI 24.52 kg/m2  Body mass index is 24.52 kg/(m^2).   APPEARANCE: = Relaxed and in no distress  Conj/Eyelids = noninjected and lids and lashes are without inflammation  PERRLA/Irises = Pupils Round Reactive to Light and Irisis without inflammation  Ears/Nose = External structures and Nares have usual shape and form  Ear canals and TM's = Canals are not inflammed and have none or little wax and the drums are not injected and have a light reflex   Lips/Teeth/Gums = No lesions seen, good  dentition, and gums seem healthy  Oropharynx = No leukoplakia, No injection to the tissues, Normal Uvula  Neck = No anterior or posterior adenopathy appreciated.  Resp effort = Calm regular breathing  Breath Sounds =  bilateral wheezing  Mood/Affect = Cooperative and interested     ASSESSMENT/PLAN:                                                        Macey was seen today for er f/u, cough and breathing problem.    Diagnoses and all orders for this visit:    Cough  -     PULSE OXIMETRY, SINGLE DETERMINATION  -     Pulse oximetry nursing  -     guaiFENesin-codeine (ROBITUSSIN AC) 100-10 MG/5ML SOLN solution; Take 10 mLs by mouth every 4 hours as needed for cough    Acute bronchospasm    Reactive airway disease, moderate persistent, with acute exacerbation  -     albuterol (PROAIR HFA/PROVENTIL HFA/VENTOLIN HFA) 108 (90 Base) MCG/ACT inhaler; Inhale 2 puffs into the lungs every 6 hours as needed for shortness of breath / dyspnea        >25 min spent with patient, greater than 50% spent on discussion/education/planning, etc. About The primary encounter diagnosis was Cough. Diagnoses of Acute bronchospasm and Reactive airway disease, moderate persistent, with acute exacerbation were also pertinent to this visit.      See Patient Instructions    The following health maintenance items are reviewed in Epic and correct as of today:  Health Maintenance   Topic Date Due     SPIROMETRY ONETIME  1950     COPD ACTION PLAN Q1 YR  1950     DEXA Q2 YR  09/14/2017     ADVANCE DIRECTIVE PLANNING Q5 YRS  08/26/2018     INFLUENZA VACCINE (1) 09/01/2018     A1C Q6 MO  09/12/2018     ASTHMA CONTROL TEST Q6 MOS  11/30/2018     ASTHMA ACTION PLAN Q1 YR  12/15/2018     FALL RISK ASSESSMENT  12/15/2018     PNEUMOCOCCAL (2 of 2 - PPSV23) 12/15/2018     LIPID MONITORING Q1 YEAR  03/12/2019     AGUSTINA QUESTIONNAIRE 1 YEAR  05/30/2019     PHQ-9 Q1YR  05/30/2019     FIT-DNA (Cologuard) Q3 YR  10/06/2020     TETANUS IMMUNIZATION (SYSTEM  ASSIGNED)  06/04/2022     HEPATITIS C SCREENING  Completed       Long Ferrari MD  [facility]  [phone]    For any issues my office # is [phone]

## 2018-09-18 NOTE — MR AVS SNAPSHOT
After Visit Summary   9/18/2018    Macey Romero    MRN: [id number]           Patient Information     Date Of Birth          1950        Visit Information        Provider Department      9/18/2018 3:30 PM Long Ferrari MD [facility]        Today's Diagnoses     Cough    -  1    Acute bronchospasm        Reactive airway disease, moderate persistent, with acute exacerbation           Follow-ups after your visit        Who to contact     If you have questions or need follow up information about today's clinic visit or your schedule please contact [facility] directly at [phone].  Normal or non-critical lab and imaging results will be communicated to you by [employer], letter or phone within 4 business days after the clinic has received the results. If you do not hear from us within 7 days, please contact the clinic through [employer] or phone. If you have a critical or abnormal lab result, we will notify you by phone as soon as possible.  Submit refill requests through [employer] or call your pharmacy and they will forward the refill request to us. Please allow 3 business days for your refill to be completed.          Additional Information About Your Visit        MyChart Information     [employer] gives you secure access to your electronic health record. If you see a primary care provider, you can also send messages to your care team and make appointments. If you have questions, please call your primary care clinic.  If you do not have a primary care provider, please call [phone] and they will assist you.        Care EveryWhere ID     This is your Care EveryWhere ID. This could be used by other organizations to access your [facility] medical records  [id number]        Your Vitals Were     Pulse Temperature Respirations Pulse Oximetry BMI (Body Mass Index)       88 98  F (36.7  C) (Oral) 20 96% 24.52 kg/m2        Blood Pressure from Last 3 Encounters:   09/18/18 124/56    09/16/18 121/55   06/18/18 109/46    Weight from Last 3 Encounters:   09/18/18 62.8 kg (138 lb 6.4 oz)   09/16/18 61.2 kg (135 lb)   06/18/18 63 kg (139 lb)              We Performed the Following     Pulse oximetry nursing     PULSE OXIMETRY, SINGLE DETERMINATION          Today's Medication Changes          These changes are accurate as of 9/18/18  4:16 PM.  If you have any questions, ask your nurse or doctor.               Start taking these medicines.        Dose/Directions    guaiFENesin-codeine 100-10 MG/5ML Soln solution   Commonly known as:  ROBITUSSIN AC   Used for:  Cough   Started by:  Long Ferrari MD        Dose:  2 tsp.   Take 10 mLs by mouth every 4 hours as needed for cough   Quantity:  473 mL   Refills:  0         These medicines have changed or have updated prescriptions.        Dose/Directions    budesonide-formoterol 160-4.5 MCG/ACT Inhaler   Commonly known as:  SYMBICORT   This may have changed:    - when to take this  - reasons to take this   Used for:  Reactive airway disease, moderate persistent, with acute exacerbation        Dose:  2 puff   Inhale 2 puffs into the lungs 2 times daily   Quantity:  3 Inhaler   Refills:  3       predniSONE 20 MG tablet   Commonly known as:  DELTASONE   This may have changed:  Another medication with the same name was removed. Continue taking this medication, and follow the directions you see here.   Changed by:  Long Ferrari MD        Dose:  60 mg   60 mg by Oral or Feeding Tube route every morning 3 tablets   Refills:  0            Where to get your medicines      [facility] [address] [place] [address] [address] & [facility]  [address] [place] [address] [facility] [address]    Hours:  24-hours Phone:  [phone]     albuterol 108 (90 Base) MCG/ACT inhaler         Some of these will need a paper prescription and others can be bought over the counter.  Ask your nurse if you have questions.     Bring a  paper prescription for each of these medications     guaiFENesin-codeine 100-10 MG/5ML Soln solution                Primary Care Provider Office Phone # Fax #    Long Ferrari [phone] [address]  [facility] [address]        Equal Access to Services     [facility] Hospital[facility] : Hadii aad ku hadasho Soomaali, waaxda luqadaha, qaybta kaalmada adeegyada, waxay idiin hayaan adeeg deon la'aan . So [facility] [phone].    ATENCIÓN: Si habla español, tiene a hoyos disposición servicios gratuitos de asistencia lingüística. Treovname al [phone].    We comply with applicable federal civil rights laws and [place] laws. We do not discriminate on the basis of race, color, national origin, age, disability, sex, sexual orientation, or gender identity.            Thank you!     Thank you for choosing [facility]  for your care. Our goal is always to provide you with excellent care. Hearing back from our patients is one way we can continue to improve our services. Please take a few minutes to complete the written survey that you may receive in the mail after your visit with us. Thank you!             Your Updated Medication List - Protect others around you: Learn how to safely use, store and throw away your medicines at www.disposemymeds.org.          This list is accurate as of 9/18/18  4:16 PM.  Always use your most recent med list.                   Brand Name Dispense Instructions for use Diagnosis    * albuterol (2.5 MG/3ML) 0.083% neb solution     1 Box    Take 1 vial (2.5 mg) by nebulization every 4 hours as needed for shortness of breath / dyspnea        * albuterol 108 (90 Base) MCG/ACT inhaler    PROAIR HFA/PROVENTIL HFA/VENTOLIN HFA    1 Inhaler    Inhale 2 puffs into the lungs every 6 hours as needed for shortness of breath / dyspnea    Reactive airway disease, moderate persistent, with acute exacerbation       AVENOVA 0.01 % Soln      For chronic blepharitis.         benzonatate 200 MG capsule    TESSALON    21 capsule    Take 1 capsule (200 mg) by mouth 3 times daily as needed for cough        budesonide-formoterol 160-4.5 MCG/ACT Inhaler    SYMBICORT    3 Inhaler    Inhale 2 puffs into the lungs 2 times daily    Reactive airway disease, moderate persistent, with acute exacerbation       cetirizine 10 MG tablet    zyrTEC    30 tablet    Take 1 tablet (10 mg) by mouth daily        erythromycin ophthalmic ointment    ROMYCIN    1 g    Apply to incisions three times daily, in eyes at bedtime    Dermatochalasis of both upper eyelids, Brow ptosis       guaiFENesin-codeine 100-10 MG/5ML Soln solution    ROBITUSSIN AC    473 mL    Take 10 mLs by mouth every 4 hours as needed for cough    Cough       HYDROcodone-acetaminophen 5-325 MG per tablet    NORCO    18 tablet    Take 1 tablet by mouth every 4 hours as needed for pain    Dermatochalasis of both upper eyelids, Brow ptosis       LORazepam 1 MG tablet    ATIVAN    5 tablet    Take 0.5 tablets (0.5 mg) by mouth 3 times daily as needed for anxiety        magnesium hydroxide 400 MG/5ML suspension    MILK OF MAGNESIA     Take 5 mLs by mouth daily as needed for constipation        JAY STOOL SOFTENER PO      Take by mouth as needed for constipation        predniSONE 20 MG tablet    DELTASONE     60 mg by Oral or Feeding Tube route every morning 3 tablets        TYLENOL PO      Take 325 mg by mouth 2 times daily as needed for mild pain    Abdominal pain, right upper quadrant       * Notice:  This list has 2 medication(s) that are the same as other medications prescribed for you. Read the directions carefully, and ask your doctor or other care provider to review them with you.

## 2018-10-20 ENCOUNTER — TRANSFERRED RECORDS (OUTPATIENT)
Dept: FAMILY MEDICINE | Facility: CLINIC | Age: 68
End: 2018-10-20

## 2018-10-22 ENCOUNTER — TRANSFERRED RECORDS (OUTPATIENT)
Dept: FAMILY MEDICINE | Facility: CLINIC | Age: 68
End: 2018-10-22

## 2018-11-05 NOTE — PROGRESS NOTES
[facility]  [address] [place] [id number] [phone]  Dept: [phone]    PRE-OP EVALUATION:  Today's date: 2018    Macey Romero (: 1950) presents for pre-operative evaluation assessment as requested by Dr. Guadarrama.  She requires evaluation and anesthesia risk assessment prior to undergoing surgery/procedure for treatment of right ankle hardware failure- removal .     Proposed Surgery/ Procedure: remove hardware in right ankle  Date of Surgery/ Procedure: 18  Time of Surgery/ Procedure: [facility]  Fax number for surgical facility: [phone]  Primary Physician: Long Ferrari/Juanita BUTTS  Type of Anesthesia Anticipated: General    Patient has a Health Care Directive or Living Will:  YES has will bring    1. NO - Do you have a history of heart attack, stroke, stent, bypass or surgery on an artery in the head, neck, heart or legs?  2. NO - Do you ever have any pain or discomfort in your chest?  3. NO - Do you have a history of  Heart Failure?  4. NO - Are you troubled by shortness of breath when: walking on the level, up a slight hill or at night?  5. NO - Do you currently have a cold, bronchitis or other respiratory infection?  6. NO - Do you have a cough, shortness of breath or wheezing?  7. NO - Do you sometimes get pains in the calves of your legs when you walk?  8. YES - DO YOU OR ANYONE IN YOUR FAMILY HAVE PREVIOUS HISTORY OF BLOOD CLOTS? Self, mother  9. YES - DO YOU OR DOES ANYONE IN YOUR FAMILY HAVE A SERIOUS BLEEDING PROBLEM SUCH AS PROLONGED BLEEDING FOLLOWING SURGERIES OR CUTS? Mother , daughter  10. NO - Have you ever had problems with anemia or been told to take iron pills?  11. NO - Have you had any abnormal blood loss such as black, tarry or bloody stools, or abnormal vaginal bleeding?  12. YES - HAVE YOU EVER HAD A BLOOD TRANSFUSION? 1972  13. NO - Have you or any of your  relatives ever had problems with anesthesia?  14. NO - Do you have sleep apnea, excessive snoring or daytime drowsiness?  15. NO - Do you have any prosthetic heart valves?  16. YES - DO YOU HAVE PROSTHETIC JOINTS? Right knee & C6-7 titanium box, right ankle  17. NO - Is there any chance that you may be pregnant?      HPI:     HPI related to upcoming procedure: 4 screws and a plate in right ankle causing pain and needs to be removed.   Also with Significant weight loss from [employer] (30 lbs since Oct) and wants chol and A1C tested.      Emphysema - Patient has a longstanding history of moderate-severe Asthma . Patient has been doing well overall noting NO SYMPTOMS and continues on medication regimen consisting of Symbicort without adverse reactions or side effects.                                                                                                                                               .                                                                                                                            .      MEDICAL HISTORY:     Patient Active Problem List    Diagnosis Date Noted     Pulmonary emphysema, unspecified emphysema type (H) 11/06/2018     Priority: Medium     Fibromyalgia      Priority: Medium     Chronic pain of both knees 03/21/2018     Priority: Medium     Hip pain, left 12/18/2015     Priority: Medium     Health Care Home 10/31/2013     Priority: Medium     Fx ankle 08/27/2013     Priority: Medium     Pterygium eye 08/26/2013     Priority: Medium     Myalgia and myositis 09/12/2011     Priority: Medium     Problem list name updated by automated process. Provider to review       IBS (irritable bowel syndrome) 06/28/2011     Priority: Medium     Keloid of skin 06/28/2011     Priority: Medium     Reactive airway disease 02/01/2011     Priority: Medium     Osteoarthritis 02/01/2011     Priority: Medium     Osteopenia 02/01/2011     Priority: Medium     Malignant neoplasm of  female breast (H) 02/01/2011     Priority: Medium     1987       Pain disorder 02/01/2011     Priority: Medium      Past Medical History:   Diagnosis Date     Breast CA in situ 1987     Cancer (H) 1987    Right Breast treated with surgery     Chronic constipation      Fibromyalgia      Meningitis     Several times as an adult     Osteoarthritis 2/1/2011     Osteopenia 2/1/2011     Pneumonia      Pterygium eye 8/26/2013     Reactive airway disease 2/1/2011     Seasonal allergies      Shingles     Prior to 2008 - scalp     Skin cancer     SCC - hand, left nose     Past Surgical History:   Procedure Laterality Date     anterior c-disc fusion       BLEPHAROPLASTY, BROW LIFT BILATERAL, COMBINED Bilateral 6/18/2018    Procedure: COMBINED BLEPHAROPLASTY, BROW LIFT BILATERAL;  BILATERAL UPPER LID BLEPHAROPLASTY; BILATERAL BROW PTOSIS REPAIR;  Surgeon: Сергей Walters MD;  Location:  [facility]     CHOLECYSTECTOMY       COLONOSCOPY N/A 10/19/2017    Procedure: COLONOSCOPY;  COLONOSCOPY;  Surgeon: Niko Wong MD;  Location:  [facility]     D & C      Bleeding before hysterectomy     ENDOSCOPY  04/21/08     HYSTERECTOMY, MARCUS      Ovaries and tubes out due to breast cancer     JOINT REPLACEMTN, KNEE RT/LT Right 01/2011    Joint Replacement knee RT, with tibial straightening (2 replacements)     MAMMOPLASTY AUGMENTATION BILATERAL      breast ca      MASTECTOMY, SIMPLE RT/LT/CLAIRE Bilateral 1989    Mastectomy Simple RT/LT/CLAIRE     MOHS MICROGRAPHIC PROCEDURE      Left lateral nose     OPEN REDUCTION INTERNAL FIXATION ANKLE  8/27/2013    Procedure: OPEN REDUCTION INTERNAL FIXATION ANKLE;  RIGHT OPEN REDUCTION INTERNAL FIXATION ANKLE WITH LIGAMENT REPAIR;  Surgeon: Nando Chang MD;  Location:  OR     PTERYGIUM WITH CONJUNCTIVAL AUTOLOGOUS TRANSPLANT Left 8/29/2016    Procedure: PTERYGIUM WITH CONJUNCTIVAL AUTOLOGOUS TRANSPLANT;  Surgeon: Сергей Walters MD;  Location:  [facility]     REPAIR LIGAMENT ANKLE  8/27/2013    Procedure:  REPAIR LIGAMENT ANKLE;;  Surgeon: Nando Chang MD;  Location: [facility] OR     SALIVARY GLAND SURGERY Left     Stone removal      TONSILLECTOMY       Current Outpatient Prescriptions   Medication Sig Dispense Refill     Acetaminophen (TYLENOL PO) Take 325 mg by mouth 2 times daily as needed for mild pain       albuterol (PROAIR HFA/PROVENTIL HFA/VENTOLIN HFA) 108 (90 Base) MCG/ACT inhaler Inhale 2 puffs into the lungs every 6 hours as needed for shortness of breath / dyspnea 1 Inhaler 3     budesonide-formoterol (SYMBICORT) 160-4.5 MCG/ACT Inhaler Inhale 2 puffs into the lungs 2 times daily (Patient taking differently: Inhale 2 puffs into the lungs as needed ) 3 Inhaler 3     Docusate Sodium (JAY STOOL SOFTENER PO) Take by mouth as needed for constipation       Eyelid Cleansers (AVENOVA) 0.01 % SOLN For chronic blepharitis.       UNABLE TO FIND by Both Eyelids route daily MEDICATION NAME: Cliradex - eyelid - blepharitis       guaiFENesin-codeine (ROBITUSSIN AC) 100-10 MG/5ML SOLN solution Take 10 mLs by mouth every 4 hours as needed for cough (Patient not taking: Reported on 11/6/2018) 473 mL 0     HYDROcodone-acetaminophen (NORCO) 5-325 MG per tablet Take 1 tablet by mouth every 4 hours as needed for pain (Patient not taking: Reported on 9/18/2018) 18 tablet 0     LORazepam (ATIVAN) 1 MG tablet Take 0.5 tablets (0.5 mg) by mouth 3 times daily as needed for anxiety (Patient not taking: Reported on 11/6/2018) 20 tablet 0     magnesium hydroxide (MILK OF MAGNESIA) 400 MG/5ML suspension Take 5 mLs by mouth daily as needed for constipation       predniSONE (DELTASONE) 20 MG tablet 60 mg by Oral or Feeding Tube route every morning 3 tablets  0     OTC products: no recent use of OTC ASA, NSAIDS or Steroids and no use of herbal medications or other supplements    Allergies   Allergen Reactions     Tetanus Toxoids Anaphylaxis     Erythromycin GI Disturbance     Levaquin Other (See Comments)     unknown      "Penicillins Hives     Silicone Rash      Latex Allergy: NO    Social History   Substance Use Topics     Smoking status: Former Smoker     Quit date: 1/1/2016     Smokeless tobacco: Never Used      Comment: quit but  still smokes, but not in house     Alcohol use No     History   Drug Use No       REVIEW OF SYSTEMS:   CONSTITUTIONAL: NEGATIVE for fever, chills, change in weight  INTEGUMENTARY/SKIN: NEGATIVE for worrisome rashes, moles or lesions  EYES: NEGATIVE for vision changes or irritation  ENT/MOUTH: NEGATIVE for ear, mouth and throat problems  RESP: NEGATIVE for significant cough or SOB  BREAST: NEGATIVE for masses, tenderness or discharge  CV: NEGATIVE for chest pain, palpitations or peripheral edema  GI: NEGATIVE for nausea, abdominal pain, heartburn, or change in bowel habits  : NEGATIVE for frequency, dysuria, or hematuria  MUSCULOSKELETAL:POSITIVE  For whole body arthralgias and Hx joint replacement right knee, titanium box in C 6-7  NEURO: NEGATIVE for weakness, dizziness or paresthesias  ENDOCRINE: NEGATIVE for temperature intolerance, skin/hair changes  HEME: NEGATIVE for bleeding problems  PSYCHIATRIC: NEGATIVE for changes in mood or affect    EXAM:   /62  Pulse 68  Temp 97.9  F (36.6  C) (Oral)  Resp 16  Ht 1.6 m (5' 3\")  Wt 63 kg (138 lb 12.8 oz)  BMI 24.59 kg/m2    GENERAL APPEARANCE: healthy, alert and no distress     EYES: EOMI, PERRL     HENT: ear canals and TM's normal and nose and mouth without ulcers or lesions     NECK: no adenopathy, no asymmetry, masses, or scars and thyroid normal to palpation     RESP: lungs clear to auscultation - no rales, rhonchi or wheezes     CV: regular rates and rhythm, normal S1 S2, no S3 or S4 and no murmur, click or rub     ABDOMEN:  soft, nontender, no HSM or masses and bowel sounds normal     MS: extremities normal- no gross deformities noted and tenderness right ankle over screws     SKIN: no suspicious lesions or rashes     NEURO: Normal " strength and tone, sensory exam grossly normal, mentation intact and speech normal     PSYCH: mentation appears normal. and affect normal/bright     LYMPHATICS: No cervical adenopathy    DIAGNOSTICS:     EKG: appears normal, NSR, normal axis, normal intervals, no acute ST/T changes c/w ischemia, unchanged from previous tracings  Hemoglobin A1C  Labs Resulted Today:   Results for orders placed or performed in visit on 11/06/18   CBC with Diff/Plt ([facility])   Result Value Ref Range    WBC x10/cmm 6.6 3.8 - 11.0 x10/cmm    % Lymphocytes 39.2 20.5 - 51.1 %    % Monocytes 7.9 1.7 - 9.3 %    % Granulocytes 52.9 42.2 - 75.2 %    RBC x10/cmm 5.13 3.7 - 5.2 x10/cmm    Hemoglobin 14.4 11.8 - 15.5 g/dl    Hematocrit 43.3 35 - 46 %    MCV 84.2 80 - 100 fL    MCH 28.2 27.0 - 31.0 pg    MCHC 33.4 33.0 - 37.0 g/dL    Platelet Count 188 140 - 450 K/uL       Recent Labs   Lab Test  09/16/18   0812  05/30/18   1137  05/30/18   1035  03/12/18   0857  12/15/17   1030   09/08/11   0750   01/09/11   1725   HGB  14.0   --   13.9   --    --    < >  14.0   < >  10.2*   PLT  183   --   184   --    --    < >   --    < >  167   INR   --    --    --    --    --    --   1.00   --   0.94   NA  140  142   --   142  142   < >  141   --   132*   POTASSIUM  3.7  4.3   --   4.5  4.9   < >  4.1   --   3.9   CR  0.93  0.76   --   0.93  0.86   < >  0.80   --   0.91   A1C   --    --    --   5.7*  5.7*   < >   --    --    --     < > = values in this interval not displayed.        IMPRESSION:   Reason for surgery/procedure: hardwarre removal  Diagnosis/reason for consult: pre op    The proposed surgical procedure is considered INTERMEDIATE risk.    REVISED CARDIAC RISK INDEX  The patient has the following serious cardiovascular risks for perioperative complications such as (MI, PE, VFib and 3  AV Block):  No serious cardiac risks  INTERPRETATION: 0 risks: Class I (very low risk - 0.4% complication rate)    The patient has the following additional risks for  perioperative complications:  No identified additional risks      ICD-10-CM    1. Preop general physical exam Z01.818 CBC with Diff/Plt ([facility])     EKG 12-lead complete w/read - Clinics     Hemoglobin A1C ([facility])   2. Pain in joint involving ankle and foot, right M25.571 CBC with Diff/Plt ([facility])     EKG 12-lead complete w/read - Clinics     Hemoglobin A1C ([facility])   3. Pulmonary emphysema, unspecified emphysema type (H) J43.9        RECOMMENDATIONS:     --Consult hospital rounder / IM to assist post-op medical management      --Patient is to take all scheduled medications on the day of surgery EXCEPT for modifications listed below.  --Patient is on no chronic medications    APPROVAL GIVEN to proceed with proposed procedure, without further diagnostic evaluation     1. Preop general physical exam  Okay for surgery  - CBC with Diff/Plt ([facility])  - EKG 12-lead complete w/read - Clinics  - Hemoglobin A1C ([facility])    2. Pain in joint involving ankle and foot, right  Per surgeon     3. Pulmonary emphysema, unspecified emphysema type (H)  Controlled with symbicort seasonally    4. Hyperglycemia  Significant weight loss from [employer] (30 lbs since Oct)  - Hemoglobin A1C ([facility])      5. Hyperlipidemia LDL goal <130  - Lipid Panel ([facility])       Signed Electronically by: SHAHRAM Martines CNP    Copy of this evaluation report is provided to requesting physician.    [facility] Preop Guidelines    Revised Cardiac Risk Index

## 2018-11-06 ENCOUNTER — OFFICE VISIT (OUTPATIENT)
Dept: FAMILY MEDICINE | Facility: CLINIC | Age: 68
End: 2018-11-06

## 2018-11-06 VITALS
DIASTOLIC BLOOD PRESSURE: 62 MMHG | SYSTOLIC BLOOD PRESSURE: 114 MMHG | TEMPERATURE: 97.9 F | HEART RATE: 68 BPM | WEIGHT: 138.8 LBS | HEIGHT: 63 IN | BODY MASS INDEX: 24.59 KG/M2 | RESPIRATION RATE: 16 BRPM

## 2018-11-06 DIAGNOSIS — E78.5 HYPERLIPIDEMIA LDL GOAL <130: ICD-10-CM

## 2018-11-06 DIAGNOSIS — J43.9 PULMONARY EMPHYSEMA, UNSPECIFIED EMPHYSEMA TYPE (H): ICD-10-CM

## 2018-11-06 DIAGNOSIS — M25.571 PAIN IN JOINT INVOLVING ANKLE AND FOOT, RIGHT: ICD-10-CM

## 2018-11-06 DIAGNOSIS — Z01.818 PREOP GENERAL PHYSICAL EXAM: Primary | ICD-10-CM

## 2018-11-06 DIAGNOSIS — R73.9 HYPERGLYCEMIA: ICD-10-CM

## 2018-11-06 LAB
% GRANULOCYTES: 52.9 % (ref 42.2–75.2)
HCT VFR BLD AUTO: 43.3 % (ref 35–46)
HEMOGLOBIN: 14.4 G/DL (ref 11.8–15.5)
LYMPHOCYTES NFR BLD AUTO: 39.2 % (ref 20.5–51.1)
MCH RBC QN AUTO: 28.2 PG (ref 27–31)
MCHC RBC AUTO-ENTMCNC: 33.4 G/DL (ref 33–37)
MCV RBC AUTO: 84.2 FL (ref 80–100)
MONOCYTES NFR BLD AUTO: 7.9 % (ref 1.7–9.3)
PLATELET # BLD AUTO: 188 K/UL (ref 140–450)
RBC # BLD AUTO: 5.13 X10/CMM (ref 3.7–5.2)
WBC # BLD AUTO: 6.6 X10/CMM (ref 3.8–11)

## 2018-11-06 PROCEDURE — 36415 COLL VENOUS BLD VENIPUNCTURE: CPT | Performed by: NURSE PRACTITIONER

## 2018-11-06 PROCEDURE — 85025 COMPLETE CBC W/AUTO DIFF WBC: CPT | Performed by: NURSE PRACTITIONER

## 2018-11-06 PROCEDURE — 99214 OFFICE O/P EST MOD 30 MIN: CPT | Performed by: NURSE PRACTITIONER

## 2018-11-06 PROCEDURE — 93000 ELECTROCARDIOGRAM COMPLETE: CPT | Performed by: NURSE PRACTITIONER

## 2018-11-06 NOTE — MR AVS SNAPSHOT
After Visit Summary   11/6/2018    Macey Romero    MRN: [id number]           Patient Information     Date Of Birth          1950        Visit Information        Provider Department      11/6/2018 8:45 AM Juanita Bajwa APRN [facility]        Today's Diagnoses     Preop general physical exam    -  1    Pain in joint involving ankle and foot, right        Pulmonary emphysema, unspecified emphysema type (H)        Hyperglycemia        Hyperlipidemia LDL goal <130          Care Instructions      Before Your Surgery      Call your surgeon if there is any change in your health. This includes signs of a cold or flu (such as a sore throat, runny nose, cough, rash or fever).    Do not smoke, drink alcohol or take over the counter medicine (unless your surgeon or primary care doctor tells you to) for the 24 hours before and after surgery.    If you take prescribed drugs: Follow your doctor s orders about which medicines to take and which to stop until after surgery.    Eating and drinking prior to surgery: follow the instructions from your surgeon    Take a shower or bath the night before surgery. Use the soap your surgeon gave you to gently clean your skin. If you do not have soap from your surgeon, use your regular soap. Do not shave or scrub the surgery site.  Wear clean pajamas and have clean sheets on your bed.     Before Your Surgery      Call your surgeon if there is any change in your health. This includes signs of a cold or flu (such as a sore throat, runny nose, cough, rash or fever).    Do not smoke, drink alcohol or take over the counter medicine (unless your surgeon or primary care doctor tells you to) for the 24 hours before and after surgery.    If you take prescribed drugs: Follow your doctor s orders about which medicines to take and which to stop until after surgery.    Eating and drinking prior to surgery: follow the instructions from your surgeon    Take a shower  "or bath the night before surgery. Use the soap your surgeon gave you to gently clean your skin. If you do not have soap from your surgeon, use your regular soap. Do not shave or scrub the surgery site.  Wear clean pajamas and have clean sheets on your bed.           Follow-ups after your visit        Follow-up notes from your care team     Return if symptoms worsen or fail to improve.      Who to contact     If you have questions or need follow up information about today's clinic visit or your schedule please contact [facility] directly at [phone].  Normal or non-critical lab and imaging results will be communicated to you by [employer], letter or phone within 4 business days after the clinic has received the results. If you do not hear from us within 7 days, please contact the clinic through [employer] or phone. If you have a critical or abnormal lab result, we will notify you by phone as soon as possible.  Submit refill requests through [employer] or call your pharmacy and they will forward the refill request to us. Please allow 3 business days for your refill to be completed.          Additional Information About Your Visit        MyChart Information     [employer] gives you secure access to your electronic health record. If you see a primary care provider, you can also send messages to your care team and make appointments. If you have questions, please call your primary care clinic.  If you do not have a primary care provider, please call [phone] and they will assist you.        Care EveryWhere ID     This is your Care EveryWhere ID. This could be used by other organizations to access your [place] medical records  [id number]        Your Vitals Were     Pulse Temperature Respirations Height BMI (Body Mass Index)       68 97.9  F (36.6  C) (Oral) 16 1.6 m (5' 3\") 24.59 kg/m2        Blood Pressure from Last 3 Encounters:   11/06/18 114/62   09/18/18 124/56   09/16/18 121/55    Weight from Last 3 " Encounters:   11/06/18 63 kg (138 lb 12.8 oz)   09/18/18 62.8 kg (138 lb 6.4 oz)   09/16/18 61.2 kg (135 lb)              We Performed the Following     CBC with Diff/Plt ([facility])     EKG 12-lead complete w/read - Clinics     Hemoglobin A1C ([facility])     Lipid Panel ([facility])          Today's Medication Changes          These changes are accurate as of 11/6/18 11:56 AM.  If you have any questions, ask your nurse or doctor.               These medicines have changed or have updated prescriptions.        Dose/Directions    budesonide-formoterol 160-4.5 MCG/ACT Inhaler   Commonly known as:  SYMBICORT   This may have changed:    - when to take this  - reasons to take this   Used for:  Reactive airway disease, moderate persistent, with acute exacerbation        Dose:  2 puff   Inhale 2 puffs into the lungs 2 times daily   Quantity:  3 Inhaler   Refills:  3                Primary Care Provider Office Phone # Fax #    Long Ferrari [phone] [address]  [facility] [phone]        Equal Access to Services     ANAND [facility] : Hadii boris lopez Soyumiko, waaxda emily, qaybta marguerite grajeda, jacquelin quinonez. So [facility] [phone].    ATENCIÓN: Si habla español, tiene a hoyos disposición servicios gratuitos de asistencia lingüística. [facility] [phone].    We comply with applicable federal civil rights laws and [place] laws. We do not discriminate on the basis of race, color, national origin, age, disability, sex, sexual orientation, or gender identity.            Thank you!     Thank you for choosing [facility]  for your care. Our goal is always to provide you with excellent care. Hearing back from our patients is one way we can continue to improve our services. Please take a few minutes to complete the written survey that you may receive in the mail after your visit with us. Thank you!             Your Updated Medication List - Protect  others around you: Learn how to safely use, store and throw away your medicines at www.disposemymeds.org.          This list is accurate as of 11/6/18 11:56 AM.  Always use your most recent med list.                   Brand Name Dispense Instructions for use Diagnosis    albuterol 108 (90 Base) MCG/ACT inhaler    PROAIR HFA/PROVENTIL HFA/VENTOLIN HFA    1 Inhaler    Inhale 2 puffs into the lungs every 6 hours as needed for shortness of breath / dyspnea    Reactive airway disease, moderate persistent, with acute exacerbation       AVENOVA 0.01 % Soln      For chronic blepharitis.        budesonide-formoterol 160-4.5 MCG/ACT Inhaler    SYMBICORT    3 Inhaler    Inhale 2 puffs into the lungs 2 times daily    Reactive airway disease, moderate persistent, with acute exacerbation       guaiFENesin-codeine 100-10 MG/5ML Soln solution    ROBITUSSIN AC    473 mL    Take 10 mLs by mouth every 4 hours as needed for cough    Cough       HYDROcodone-acetaminophen 5-325 MG per tablet    NORCO    18 tablet    Take 1 tablet by mouth every 4 hours as needed for pain    Dermatochalasis of both upper eyelids, Brow ptosis       LORazepam 1 MG tablet    ATIVAN    20 tablet    Take 0.5 tablets (0.5 mg) by mouth 3 times daily as needed for anxiety    Acute bronchospasm       magnesium hydroxide 400 MG/5ML suspension    MILK OF MAGNESIA     Take 5 mLs by mouth daily as needed for constipation        JAY STOOL SOFTENER PO      Take by mouth as needed for constipation        predniSONE 20 MG tablet    DELTASONE     60 mg by Oral or Feeding Tube route every morning 3 tablets        TYLENOL PO      Take 325 mg by mouth 2 times daily as needed for mild pain    Abdominal pain, right upper quadrant       UNABLE TO FIND      by Both Eyelids route daily MEDICATION NAME: Cliradex - eyelid - blepharitis

## 2018-11-07 LAB
CHOLEST SERPL-MCNC: 200 MG/DL (ref 100–199)
HBA1C MFR BLD: 5.8 % (ref 4.8–5.6)
HDLC SERPL-MCNC: 42 MG/DL
LDL/HDL RATIO: 3.1 RATIO (ref 0–3.2)
LDLC SERPL CALC-MCNC: 129 MG/DL (ref 0–99)
TRIGL SERPL-MCNC: 144 MG/DL (ref 0–149)
VLDLC SERPL CALC-MCNC: 29 MG/DL (ref 5–40)

## 2018-11-13 NOTE — PROGRESS NOTES
11/6/18 [facility] @ [phone]    Isaiah Hilton,   [facility]  [phone]

## 2018-11-16 ENCOUNTER — TRANSFERRED RECORDS (OUTPATIENT)
Dept: FAMILY MEDICINE | Facility: CLINIC | Age: 68
End: 2018-11-16

## 2018-11-20 ENCOUNTER — TRANSFERRED RECORDS (OUTPATIENT)
Dept: FAMILY MEDICINE | Facility: CLINIC | Age: 68
End: 2018-11-20

## 2019-05-01 DIAGNOSIS — Z13.29 SCREENING FOR THYROID DISORDER: Primary | ICD-10-CM

## 2019-05-02 LAB — TSH BLD-ACNC: 2.62 UIU/ML (ref 0.45–4.5)

## 2019-05-28 ENCOUNTER — TRANSFERRED RECORDS (OUTPATIENT)
Dept: FAMILY MEDICINE | Facility: CLINIC | Age: 69
End: 2019-05-28

## 2019-06-04 ENCOUNTER — TRANSFERRED RECORDS (OUTPATIENT)
Dept: FAMILY MEDICINE | Facility: CLINIC | Age: 69
End: 2019-06-04

## 2019-09-09 ENCOUNTER — OFFICE VISIT (OUTPATIENT)
Dept: FAMILY MEDICINE | Facility: CLINIC | Age: 69
End: 2019-09-09

## 2019-09-09 VITALS
OXYGEN SATURATION: 98 % | HEART RATE: 83 BPM | RESPIRATION RATE: 16 BRPM | DIASTOLIC BLOOD PRESSURE: 64 MMHG | WEIGHT: 148.6 LBS | HEIGHT: 63 IN | SYSTOLIC BLOOD PRESSURE: 112 MMHG | BODY MASS INDEX: 26.33 KG/M2

## 2019-09-09 DIAGNOSIS — R22.1 NECK MASS: ICD-10-CM

## 2019-09-09 DIAGNOSIS — M85.89 OSTEOPENIA OF MULTIPLE SITES: ICD-10-CM

## 2019-09-09 DIAGNOSIS — C50.919 MALIGNANT NEOPLASM OF FEMALE BREAST, UNSPECIFIED ESTROGEN RECEPTOR STATUS, UNSPECIFIED LATERALITY, UNSPECIFIED SITE OF BREAST (H): ICD-10-CM

## 2019-09-09 DIAGNOSIS — K58.2 IRRITABLE BOWEL SYNDROME WITH BOTH CONSTIPATION AND DIARRHEA: ICD-10-CM

## 2019-09-09 DIAGNOSIS — M79.7 FIBROMYALGIA: ICD-10-CM

## 2019-09-09 DIAGNOSIS — Z23 NEED FOR VACCINATION: ICD-10-CM

## 2019-09-09 DIAGNOSIS — Z00.00 ENCOUNTER FOR MEDICARE ANNUAL WELLNESS EXAM: Primary | ICD-10-CM

## 2019-09-09 DIAGNOSIS — Z13.6 SCREENING FOR HEART DISEASE: ICD-10-CM

## 2019-09-09 DIAGNOSIS — Z82.69 FAMILY HISTORY OF ANKYLOSING SPONDYLITIS: ICD-10-CM

## 2019-09-09 DIAGNOSIS — J43.9 PULMONARY EMPHYSEMA, UNSPECIFIED EMPHYSEMA TYPE (H): ICD-10-CM

## 2019-09-09 DIAGNOSIS — J45.41 REACTIVE AIRWAY DISEASE, MODERATE PERSISTENT, WITH ACUTE EXACERBATION: ICD-10-CM

## 2019-09-09 LAB
% GRANULOCYTES: 53.3 % (ref 42.2–75.2)
HCT VFR BLD AUTO: 45.6 % (ref 35–46)
HEMOGLOBIN: 15 G/DL (ref 11.8–15.5)
LYMPHOCYTES NFR BLD AUTO: 38.6 % (ref 20.5–51.1)
MCH RBC QN AUTO: 27.8 PG (ref 27–31)
MCHC RBC AUTO-ENTMCNC: 32.9 G/DL (ref 33–37)
MCV RBC AUTO: 84.3 FL (ref 80–100)
MONOCYTES NFR BLD AUTO: 8.1 % (ref 1.7–9.3)
PLATELET # BLD AUTO: 213 K/UL (ref 140–450)
RBC # BLD AUTO: 5.41 X10/CMM (ref 3.7–5.2)
WBC # BLD AUTO: 6.2 X10/CMM (ref 3.8–11)

## 2019-09-09 PROCEDURE — G0439 PPPS, SUBSEQ VISIT: HCPCS | Performed by: FAMILY MEDICINE

## 2019-09-09 PROCEDURE — 36415 COLL VENOUS BLD VENIPUNCTURE: CPT | Performed by: FAMILY MEDICINE

## 2019-09-09 PROCEDURE — 99214 OFFICE O/P EST MOD 30 MIN: CPT | Mod: 25 | Performed by: FAMILY MEDICINE

## 2019-09-09 PROCEDURE — 85025 COMPLETE CBC W/AUTO DIFF WBC: CPT | Performed by: FAMILY MEDICINE

## 2019-09-09 PROCEDURE — 90732 PPSV23 VACC 2 YRS+ SUBQ/IM: CPT | Performed by: FAMILY MEDICINE

## 2019-09-09 PROCEDURE — G0009 ADMIN PNEUMOCOCCAL VACCINE: HCPCS | Performed by: FAMILY MEDICINE

## 2019-09-09 RX ORDER — ALBUTEROL SULFATE 90 UG/1
2 AEROSOL, METERED RESPIRATORY (INHALATION) EVERY 6 HOURS PRN
Qty: 1 INHALER | Refills: 3 | Status: SHIPPED | OUTPATIENT
Start: 2019-09-09 | End: 2020-05-20

## 2019-09-09 RX ORDER — BUDESONIDE AND FORMOTEROL FUMARATE DIHYDRATE 160; 4.5 UG/1; UG/1
2 AEROSOL RESPIRATORY (INHALATION) 2 TIMES DAILY
Qty: 3 INHALER | Refills: 3 | Status: SHIPPED | OUTPATIENT
Start: 2019-09-09 | End: 2020-10-21

## 2019-09-09 ASSESSMENT — ANXIETY QUESTIONNAIRES
2. NOT BEING ABLE TO STOP OR CONTROL WORRYING: NOT AT ALL
5. BEING SO RESTLESS THAT IT IS HARD TO SIT STILL: NOT AT ALL
1. FEELING NERVOUS, ANXIOUS, OR ON EDGE: NOT AT ALL
7. FEELING AFRAID AS IF SOMETHING AWFUL MIGHT HAPPEN: NOT AT ALL
6. BECOMING EASILY ANNOYED OR IRRITABLE: NOT AT ALL
3. WORRYING TOO MUCH ABOUT DIFFERENT THINGS: NOT AT ALL
GAD7 TOTAL SCORE: 0

## 2019-09-09 ASSESSMENT — PATIENT HEALTH QUESTIONNAIRE - PHQ9
5. POOR APPETITE OR OVEREATING: NOT AT ALL
SUM OF ALL RESPONSES TO PHQ QUESTIONS 1-9: 0

## 2019-09-09 ASSESSMENT — MIFFLIN-ST. JEOR: SCORE: 1177.14

## 2019-09-09 NOTE — PROGRESS NOTES
"  SUBJECTIVE:   Macey Romero is a 68 year old female who presents for Preventive Visit.    CONCERNS:  1. Chronic Pain  Ongoing pain in the sholders, multiple joints, push a spot and it hurts.  2. Vertigo has been dealing with for a month or so, moves quick and the room spins.  \"Floating and not connected.\"  3. Edema at end of the day  Noting more recently.  4. Lymph Node - right side of neck been slowly growing for 6 months. Sore to touch  5.   Asthma stable.  Has not had any recent breathing troubles beyond usual baseline.  Has not any acute respiratory events.  Remains with intermittent cough, mild shortness of breath with overexertion as per usual.  Using medication as directed with reported side effects    ACT Total Scores 12/15/2017 5/30/2018 9/9/2019   ACT TOTAL SCORE - - -   ASTHMA ER VISITS - - -   ASTHMA HOSPITALIZATIONS - - -   ACT TOTAL SCORE (Goal Greater than or Equal to 20) 24 25 25   In the past 12 months, how many times did you visit the emergency room for your asthma without being admitted to the hospital? 0 0 0   In the past 12 months, how many times were you hospitalized overnight because of your asthma? 0 0 0       Are you in the first 12 months of your Medicare Part B coverage?  No    Physical Health:    In general, how would you rate your overall physical health? good    Outside of work, how many days during the week do you exercise? none    Outside of work, approximately how many minutes a day do you exercise?less than 15 minutes    If you drink alcohol do you typically have >3 drinks per day or >7 drinks per week? Not Applicable    Do you usually eat at least 4 servings of fruit and vegetables a day, include whole grains & fiber and avoid regularly eating high fat or \"junk\" foods? Yes    Do you have any problems taking medications regularly?  No    Do you have any side effects from medications? none    Needs assistance for the following daily activities: no assistance needed    Which of the " following safety concerns are present in your home?  throw rugs in the hallway     Hearing impairment: No    In the past 6 months, have you been bothered by leaking of urine? yes    Mental Health:    In general, how would you rate your overall mental or emotional health? excellent  PHQ-2 Score:      Do you feel safe in your environment? Yes    HEARING FREQUENCY:     Right Ear:     500 Hz : Pass   1000 Hz: Pass   2000 Hz: Pass   4000 Hz: Pass  Left Ear:     500 Hz :  Pass   1000 Hz: Pass   2000 Hz: Pass   4000 Hz: Pass    Nery Michele CMA    Do you have a Health Care Directive? Yes: Advance Directive has been received and scanned.    Fall risk:  Fallen 2 or more times in the past year?: Yes  Any fall with injury in the past year?: Yes     Cognitive Screenin) Repeat 3 items (Leader, Season, Table)    2) Clock draw: NORMAL  3) 3 item recall: Recalls 3 objects  Results: 3 items recalled: COGNITIVE IMPAIRMENT LESS LIKELY    Mini-CogTM Copyright ROBERT Ruelas. Licensed by the author for use in [facility]; reprinted with permission (bonifacio@.[facility]). All rights reserved.      Do you have sleep apnea, excessive snoring or daytime drowsiness?: no    Reviewed and updated as needed this visit by clinical staff  Tobacco  Allergies  Meds  Problems         Reviewed and updated as needed this visit by Provider  Allergies  Meds  Problems        Social History     Tobacco Use     Smoking status: Former Smoker     Last attempt to quit: 2016     Years since quitting: 3.6     Smokeless tobacco: Never Used     Tobacco comment: quit but  still smokes, but not in house   Substance Use Topics     Alcohol use: No     Alcohol/week: 0.0 oz                           Current providers sharing in care for this patient include:   Patient Care Team:  Long Ferrari MD as PCP - General (Family Practice)  Long Ferrari MD as Assigned PCP    The following health maintenance items are reviewed in Epic and  correct as of today:  Health Maintenance   Topic Date Due     SPIROMETRY  1950     COPD ACTION PLAN  1950     DTAP/TDAP/TD IMMUNIZATION (1 - Tdap) 11/08/1975     ZOSTER IMMUNIZATION (1 of 2) 11/08/2000     MEDICARE ANNUAL WELLNESS VISIT  11/08/2015     DEXA  09/14/2017     PNEUMOCOCCAL IMMUNIZATION 65+ HIGH/HIGHEST RISK (2 of 2 - PPSV23) 02/09/2018     ADVANCE CARE PLANNING  08/26/2018     ASTHMA CONTROL TEST  11/30/2018     ASTHMA ACTION PLAN  12/15/2018     FALL RISK ASSESSMENT  12/15/2018     A1C  05/06/2019     AGUSTINA ASSESSMENT  05/30/2019     PHQ-9  05/30/2019     INFLUENZA VACCINE (1) 09/01/2019     LIPID  11/06/2019     FIT-DNA (Cologuard)  10/06/2020     HEPATITIS C SCREENING  Completed     IPV IMMUNIZATION  Aged Out     MENINGITIS IMMUNIZATION  Aged Out     Patient Active Problem List   Diagnosis     Reactive airway disease     Osteoarthritis     Osteopenia     Malignant neoplasm of female breast (H)     Pain disorder     IBS (irritable bowel syndrome)     Keloid of skin     Myalgia and myositis     Pterygium eye     Fx ankle     Health Care Home     Hip pain, left     Chronic pain of both knees     Fibromyalgia     Pulmonary emphysema, unspecified emphysema type (H)     Past Surgical History:   Procedure Laterality Date     anterior c-disc fusion       BLEPHAROPLASTY, BROW LIFT BILATERAL, COMBINED Bilateral 6/18/2018    Procedure: COMBINED BLEPHAROPLASTY, BROW LIFT BILATERAL;  BILATERAL UPPER LID BLEPHAROPLASTY; BILATERAL BROW PTOSIS REPAIR;  Surgeon: Сергей Walters MD;  Location:  [facility]     CHOLECYSTECTOMY       COLONOSCOPY N/A 10/19/2017    Procedure: COLONOSCOPY;  COLONOSCOPY;  Surgeon: Niko Wong MD;  Location:  GI     D & C      Bleeding before hysterectomy     ENDOSCOPY  04/21/08     HYSTERECTOMY, MARCUS      Ovaries and tubes out due to breast cancer     JOINT REPLACEMTN, KNEE RT/LT Right 01/2011    Joint Replacement knee RT, with tibial straightening (2 replacements)      "MAMMOPLASTY AUGMENTATION BILATERAL      breast ca      MASTECTOMY, SIMPLE RT/LT/CLAIRE Bilateral 1989    Mastectomy Simple RT/LT/CLAIRE     MOHS MICROGRAPHIC PROCEDURE      Left lateral nose     OPEN REDUCTION INTERNAL FIXATION ANKLE  8/27/2013    Procedure: OPEN REDUCTION INTERNAL FIXATION ANKLE;  RIGHT OPEN REDUCTION INTERNAL FIXATION ANKLE WITH LIGAMENT REPAIR;  Surgeon: Nando Chang MD;  Location: [facility] OR     PTERYGIUM WITH CONJUNCTIVAL AUTOLOGOUS TRANSPLANT Left 8/29/2016    Procedure: PTERYGIUM WITH CONJUNCTIVAL AUTOLOGOUS TRANSPLANT;  Surgeon: Сергей Walters MD;  Location: [facility]     REPAIR LIGAMENT ANKLE  8/27/2013    Procedure: REPAIR LIGAMENT ANKLE;;  Surgeon: Nando Chang MD;  Location: [facility] OR     SALIVARY GLAND SURGERY Left     Stone removal      TONSILLECTOMY         Social History     Tobacco Use     Smoking status: Former Smoker     Last attempt to quit: 1/1/2016     Years since quitting: 3.6     Smokeless tobacco: Never Used     Tobacco comment: quit but  still smokes, but not in house   Substance Use Topics     Alcohol use: No     Alcohol/week: 0.0 oz     Family History   Problem Relation Age of Onset     Respiratory Father      Cancer Brother         sarcoidosis     Diabetes Brother      Cerebrovascular Disease Brother      Liver Disease Brother          Pneumonia Vaccine:Adults age 65+ who received their first dose of Pneumovax (PPSV23) prior to age 65 years: Should be given PCV 13 > 1 year after their most recent PPSV23 AND should be given a another dose of PPSV23 > 5 years after their most recent dose of PPSV23    ROS:  Constitutional, HEENT, cardiovascular, pulmonary, GI, , musculoskeletal, neuro, skin, endocrine and psych systems are negative, except as otherwise noted.    OBJECTIVE:   /64   Pulse 83   Resp 16   Ht 1.607 m (5' 3.25\")   Wt 67.4 kg (148 lb 9.6 oz)   SpO2 98%   BMI 26.12 kg/m   Estimated body mass index is 26.12 kg/m  as calculated from the following:    " "Height as of this encounter: 1.607 m (5' 3.25\").    Weight as of this encounter: 67.4 kg (148 lb 9.6 oz).  EXAM:   GENERAL APPEARANCE: healthy, alert and no distress  EYES: Eyes grossly normal to inspection, PERRL and conjunctivae and sclerae normal  HENT: ear canals and TM's normal, nose and mouth without ulcers or lesions, oropharynx clear and oral mucous membranes moist  NECK: no adenopathy, no asymmetry, there is a small tender right sided mass, or scars and thyroid normal to palpation  RESP: lungs clear to auscultation - no rales, rhonchi or wheezes  BREAST:implants are 30 years old and there is a stiffing to the right one  CV: regular rate and rhythm, normal S1 S2, no S3 or S4, no murmur, click or rub, no peripheral edema and peripheral pulses strong  ABDOMEN: soft, nontender, no hepatosplenomegaly, no masses and bowel sounds normal  MS: no musculoskeletal defects are noted and gait is age appropriate without ataxia  SKIN: no suspicious lesions or rashes  NEURO: Normal strength and tone, sensory exam grossly normal, mentation intact and speech normal  PSYCH: mentation appears normal and affect normal/bright    Diagnostic Test Results:  Labs reviewed in Epic    ASSESSMENT / PLAN:   Macey was seen today for physical and hearing screening.    Diagnoses and all orders for this visit:    Encounter for Medicare annual wellness exam    Need for vaccination  -     PNEUMOCOCCAL VACCINE,ADULT,SQ OR IM  -     ADMIN PNEUMOCOCCAL VACCINE  -     HC FLU VACCINE, INCREASED ANTIGEN, PRESV FREE [92579]    Pulmonary emphysema, unspecified emphysema type (H)    Malignant neoplasm of female breast, unspecified estrogen receptor status, unspecified laterality, unspecified site of breast (H)    Fibromyalgia  Discussed briefly the issues of fibromylagia, including the diagnostic criteria, lack of specific pathological findings, high frequency of co-morbid psychological diagnosed (usually depression and bipolar), and treatment " "considerations (importance of maintaining regular sleep, regular exercise, etc).     Irritable bowel syndrome with both constipation and diarrhea  -     Comp. Metabolic Panel (14) ([facility])  -     CBC with Diff/Plt ([facility])    Osteopenia of multiple sites  -     DEXA - Hip/Pelvis/Spine (FUTURE/[facility]); Future    Screening for heart disease  -     Lipid Panel ([facility])    Neck mass  Small and should be evaluated  -     OTOLARYNGOLOGY REFERRAL    Family history of ankylosing spondylitis  -     HLA-B27 Typing        End of Life Planning:  Patient currently has an advanced directive: Yes.  Practitioner is supportive of decision.    COUNSELING:  Reviewed preventive health counseling, as reflected in patient instructions       Regular exercise    Estimated body mass index is 26.12 kg/m  as calculated from the following:    Height as of this encounter: 1.607 m (5' 3.25\").    Weight as of this encounter: 67.4 kg (148 lb 9.6 oz).         reports that she quit smoking about 3 years ago. She has never used smokeless tobacco.      Appropriate preventive services were discussed with this patient, including applicable screening as appropriate for cardiovascular disease, diabetes, osteopenia/osteoporosis, and glaucoma.  As appropriate for age/gender, discussed screening for colorectal cancer, prostate cancer, breast cancer, and cervical cancer. Checklist reviewing preventive services available has been given to the patient.    Reviewed patients plan of care and provided an AVS. The Basic Care Plan (routine screening as documented in Health Maintenance) for Macey meets the Care Plan requirement. This Care Plan has been established and reviewed with the Patient.    Counseling Resources:  ATP IV Guidelines  Pooled Cohorts Equation Calculator  Breast Cancer Risk Calculator  FRAX Risk Assessment  ICSI Preventive Guidelines  Dietary Guidelines for Americans, 2010  USDA's MyPlate  ASA Prophylaxis  Lung CA Screening    Long Moscoso " MD [place]  [facility]    She is at risk for falling and has been provided with information to reduce the risk of falling at home.

## 2019-09-09 NOTE — PATIENT INSTRUCTIONS
Patient Education   Personalized Prevention Plan  You are due for the preventive services outlined below.  Your care team is available to assist you in scheduling these services.  If you have already completed any of these items, please share that information with your care team to update in your medical record.  Health Maintenance Due   Topic Date Due     Breathing Capacity Test  1950     COPD Action Plan  1950     Diptheria Tetanus Pertussis (DTAP/TDAP/TD) Vaccine (1 - Tdap) 11/08/1975     Zoster (Shingles) Vaccine (1 of 2) 11/08/2000     Annual Wellness Visit  11/08/2015     Osteoporosis Screening  09/14/2017     Pneumococcal Vaccine (2 of 2 - PPSV23) 02/09/2018     Discuss Advance Care Planning  08/26/2018     Asthma Control Test  11/30/2018     Asthma Action Plan - yearly  12/15/2018     FALL RISK ASSESSMENT  12/15/2018     A1C Lab  05/06/2019     Anxiety Assessment  05/30/2019     Depression Assessment  05/30/2019     Flu Vaccine (1) 09/01/2019        Patient Education   Personalized Prevention Plan  You are due for the preventive services outlined below.  Your care team is available to assist you in scheduling these services.  If you have already completed any of these items, please share that information with your care team to update in your medical record.  Health Maintenance Due   Topic Date Due     Breathing Capacity Test  1950     COPD Action Plan  1950     Diptheria Tetanus Pertussis (DTAP/TDAP/TD) Vaccine (1 - Tdap) 11/08/1975     Zoster (Shingles) Vaccine (1 of 2) 11/08/2000     Annual Wellness Visit  11/08/2015     Osteoporosis Screening  09/14/2017     Pneumococcal Vaccine (2 of 2 - PPSV23) 02/09/2018     Discuss Advance Care Planning  08/26/2018     Asthma Control Test  11/30/2018     Asthma Action Plan - yearly  12/15/2018     FALL RISK ASSESSMENT  12/15/2018     A1C Lab  05/06/2019     Anxiety Assessment  05/30/2019     Depression Assessment  05/30/2019     Flu Vaccine  (1) 09/01/2019       Preventing Falls in the Home  An adult or child can fall for many reasons. If you are an older adult, you may fall because your reaction time slows down. Your muscles and joints may get stiff, weak, or less flexible because of illness, medicines, or a physical condition. These things can also make a child more likely to fall or be injured in a fall.  Other health problems that make falls more likely include:    Arthritis    Dizziness or lightheadedness when you get out of bed (orthostatic hypotension)    History of a stroke    Dizziness    Anemia    Certain medicines taken for mental illness    Problems with balance or gait    History of falls with or without an injury    Changes in vision (vision impairment)    Changes in thinking skills and memory (cognitive impairment)  Injuries from a fall can include broken bones, dislocated joints, and cuts. When these injuries are serious enough, they can make it impossible for you or a child who is injured in a fall to live on his or her own.  Prevention tips  To help prevent falls and fall-related injuries, follow the tips below.   Floors  Make floors safer by doing the following:     Put nonskid pads under area rugs.    Remove throw rugs.    Replace worn floor coverings.    Tack carpets firmly to each step on carpeted stairs. Put nonskid strips on the edges of uncarpeted stairs.    Keep floors and stairs free of clutter and cords.    Arrange furniture so there are clear pathways.    Clean up any spills right away.    Wear shoes that fit.  Bathrooms    Make bathrooms safer by doing the following:     Install grab bars in the tub or shower.    Apply nonskid strips or put a nonskid rubber mat in the tub or shower.    Sit on a bath chair to bathe.    Use bathmats with nonskid backing.  Lighting and the environment  Improve lighting in your home by doing the following:     Keep a flashlight in each room. Or put a lamp next to the bed within easy  reach.    Put nightlights in the bedrooms, hallways, kitchen, and bathrooms.    Make sure all stairways have good lighting.    Take your time when going up and down stairs.    Put handrails on both sides of stairs and in walkways for more support. To prevent injury to your wrist or arm, don t use handrails to pull yourself up.    Install grab bars to pull yourself up.    Move or rearrange items that you use often. This will make them easier to find or reach.    Look at your home to find any safety hazards. Especially look at doorways, walkways, and the driveway. Remove or repair any safety problems that you find.  Date Last Reviewed: 8/1/2016 2000-2018 [employer]. [address] [place] [address], [place] [address]. All rights reserved. This information is not intended as a substitute for professional medical care. Always follow your healthcare professional's instructions.           At your visit today, we discussed your risk for falls and preventive options.    Fall Prevention  Falls often occur due to slipping, tripping or losing your balance. Millions of people fall every year and injure themselves. Here are ways to reduce your risk of falling again.    Think about your fall, was there anything that caused your fall that can be fixed, removed, or replaced?    Make your home safe by keeping walkways clear of objects you may trip over, such as electric cords.    Use non-slip pads under rugs. Don't use area rugs or small throw rugs.    Use non-slip mats in bathtubs and showers.    Install handrails and lights on staircases. The handrails should be on both sides of the stairs.    Don't walk in poorly lit areas.    Don't stand on chairs or wobbly ladders.    Use caution when reaching overhead or looking upward. This position can cause a loss of balance.    Be sure your shoes fit properly, have non-slip bottoms and are in good condition.     Wear shoes both inside and out. Don't go barefoot or wear  slippers.    Be cautious when going up and down stairs, curbs, and when walking on uneven sidewalks.    If your balance is poor, consider using a cane or walker.    If your fall was related to alcohol use, stop or limit alcohol intake.     If your fall was related to use of sleeping medicines, talk to your healthcare provider about this. You may need to reduce your dosage at bedtime if you awaken during the night to go to the bathroom.      To reduce the need for nighttime bathroom trips:  ? Don't drink fluids for several hours before going to bed  ? Empty your bladder before going to bed  ? Men can keep a urinal at the bedside    Stay as active as you can. Balance, flexibility, strength, and endurance all come from exercise. They all play a role in preventing falls. Ask your healthcare provider which types of activity are right for you.    Get your vision checked on a regular basis.    If you have pets, know where they are before you stand up or walk so you don't trip over them.    Use night lights.    Go over all your medicines with a pharmacist or other healthcare provider to see if any of them could make you more likely to fall.  Date Last Reviewed: 4/1/2018 2000-2018 [employer]. [address] [place] [address], [place] [address]. All rights reserved. This information is not intended as a substitute for professional medical care. Always follow your healthcare professional's instructions.

## 2019-09-09 NOTE — LETTER
My Asthma Action Plan    Name: Macey Romero   YOB: 1950  Date: 9/9/2019   My doctor: Long Ferrari MD   My clinic: [facility]        My Rescue Medicine:   Albuterol inhaler (Proair/Ventolin/Proventil HFA)  2-4 puffs EVERY 4 HOURS as needed. Use a spacer if recommended by your provider.   My Asthma Severity:   Intermittent / Exercise Induced  Know your asthma triggers: Patient is unaware of triggers             GREEN ZONE   Good Control    I feel good    No cough or wheeze    Can work, sleep and play without asthma symptoms       Take your asthma control medicine every day.     1. If exercise triggers your asthma, take your rescue medication    15 minutes before exercise or sports, and    During exercise if you have asthma symptoms  2. Spacer to use with inhaler: If you have a spacer, make sure to use it with your inhaler             YELLOW ZONE Getting Worse  I have ANY of these:    I do not feel good    Cough or wheeze    Chest feels tight    Wake up at night   1. Keep taking your Green Zone medications  2. Start taking your rescue medicine:    every 20 minutes for up to 1 hour. Then every 4 hours for 24-48 hours.  3. If you stay in the Yellow Zone for more than 12-24 hours, contact your doctor.  4. If you do not return to the Green Zone in 12-24 hours or you get worse, start taking your oral steroid medicine if prescribed by your provider.           RED ZONE Medical Alert - Get Help  I have ANY of these:    I feel awful    Medicine is not helping    Breathing getting harder    Trouble walking or talking    Nose opens wide to breathe       1. Take your rescue medicine NOW  2. If your provider has prescribed an oral steroid medicine, start taking it NOW  3. Call your doctor NOW  4. If you are still in the Red Zone after 20 minutes and you have not reached your doctor:    Take your rescue medicine again and    Call 911 or go to the emergency room right away    See your regular  doctor within 2 weeks of an Emergency Room or Urgent Care visit for follow-up treatment.          Annual Reminders:  Meet with Asthma Educator,  Flu Shot in the Fall, consider Pneumonia Vaccination for patients with asthma (aged 19 and older).    Pharmacy: [id number] - [place], [place] - [address] AT [address] [place] [address]                        Asthma Triggers  How To Control Things That Make Your Asthma Worse    Triggers are things that make your asthma worse.  Look at the list below to help you find your triggers and   what you can do about them. You can help prevent asthma flare-ups by staying away from your triggers.      Trigger                                                          What you can do   Cigarette Smoke  Tobacco smoke can make asthma worse. Do not allow smoking in your home, car or around you.  Be sure no one smokes at a child s day care or school.  If you smoke, ask your health care provider for ways to help you quit.  Ask family members to quit too.  Ask your health care provider for a referral to Quit Plan to help you quit smoking, or call 3-154-946-PLAN.     Colds, Flu, Bronchitis  These are common triggers of asthma. Wash your hands often.  Don t touch your eyes, nose or mouth.  Get a flu shot every year.     Dust Mites  These are tiny bugs that live in cloth or carpet. They are too small to see. Wash sheets and blankets in hot water every week.   Encase pillows and mattress in dust mite proof covers.  Avoid having carpet if you can. If you have carpet, vacuum weekly.   Use a dust mask and HEPA vacuum.   Pollen and Outdoor Mold  Some people are allergic to trees, grass, or weed pollen, or molds. Try to keep your windows closed.  Limit time out doors when pollen count is high.   Ask you health care provider about taking medicine during allergy season.     Animal Dander  Some people are allergic to skin flakes, urine or saliva from pets with fur or feathers. Keep pets with  fur or feathers out of your home.    If you can t keep the pet outdoors, then keep the pet out of your bedroom.  Keep the bedroom door closed.  Keep pets off cloth furniture and away from stuffed toys.     Mice, Rats, and Cockroaches  Some people are allergic to the waste from these pests.   Cover food and garbage.  Clean up spills and food crumbs.  Store grease in the refrigerator.   Keep food out of the bedroom.   Indoor Mold  This can be a trigger if your home has high moisture. Fix leaking faucets, pipes, or other sources of water.   Clean moldy surfaces.  Dehumidify basement if it is damp and smelly.   Smoke, Strong Odors, and Sprays  These can reduce air quality. Stay away from strong odors and sprays, such as perfume, powder, hair spray, paints, smoke incense, paint, cleaning products, candles and new carpet.   Exercise or Sports  Some people with asthma have this trigger. Be active!  Ask your doctor about taking medicine before sports or exercise to prevent symptoms.    Warm up for 5-10 minutes before and after sports or exercise.     Other Triggers of Asthma  Cold air:  Cover your nose and mouth with a scarf.  Sometimes laughing or crying can be a trigger.  Some medicines and food can trigger asthma.

## 2019-09-10 LAB
ALBUMIN SERPL-MCNC: 4.6 G/DL (ref 3.6–4.8)
ALBUMIN/GLOB SERPL: 2.1 {RATIO} (ref 1.2–2.2)
ALP SERPL-CCNC: 83 IU/L (ref 39–117)
ALT SERPL-CCNC: 11 IU/L (ref 0–32)
AST SERPL-CCNC: 17 IU/L (ref 0–40)
BILIRUB SERPL-MCNC: 0.5 MG/DL (ref 0–1.2)
BUN SERPL-MCNC: 14 MG/DL (ref 8–27)
BUN/CREATININE RATIO: 18 (ref 12–28)
CALCIUM SERPL-MCNC: 9.8 MG/DL (ref 8.7–10.3)
CHLORIDE SERPLBLD-SCNC: 101 MMOL/L (ref 96–106)
CHOLEST SERPL-MCNC: 195 MG/DL (ref 100–199)
CREAT SERPL-MCNC: 0.79 MG/DL (ref 0.57–1)
EGFR IF AFRICN AM: 89 ML/MIN/1.73
EGFR IF NONAFRICN AM: 77 ML/MIN/1.73
GLOBULIN, TOTAL: 2.2 G/DL (ref 1.5–4.5)
GLUCOSE SERPL-MCNC: 93 MG/DL (ref 65–99)
HDLC SERPL-MCNC: 40 MG/DL
LDL/HDL RATIO: 3 RATIO (ref 0–3.2)
LDLC SERPL CALC-MCNC: 120 MG/DL (ref 0–99)
POTASSIUM SERPL-SCNC: 4.5 MMOL/L (ref 3.5–5.2)
PROT SERPL-MCNC: 6.8 G/DL (ref 6–8.5)
SODIUM SERPL-SCNC: 143 MMOL/L (ref 134–144)
TOTAL CO2: 29 MMOL/L (ref 20–29)
TRIGL SERPL-MCNC: 173 MG/DL (ref 0–149)
VLDLC SERPL CALC-MCNC: 35 MG/DL (ref 5–40)

## 2019-09-10 ASSESSMENT — ASTHMA QUESTIONNAIRES: ACT_TOTALSCORE: 25

## 2019-09-10 ASSESSMENT — ANXIETY QUESTIONNAIRES: GAD7 TOTAL SCORE: 0

## 2019-09-13 ENCOUNTER — HOSPITAL ENCOUNTER (OUTPATIENT)
Dept: BONE DENSITY | Facility: CLINIC | Age: 69
Discharge: HOME | End: 2019-09-13
Attending: FAMILY MEDICINE | Admitting: FAMILY MEDICINE
Payer: MEDICARE

## 2019-09-13 DIAGNOSIS — M85.89 OSTEOPENIA OF MULTIPLE SITES: ICD-10-CM

## 2019-09-13 PROCEDURE — 77080 DXA BONE DENSITY AXIAL: CPT

## 2019-09-16 LAB — HLA-B27 QL NAA+PROBE: POSITIVE

## 2019-09-20 ENCOUNTER — TRANSFERRED RECORDS (OUTPATIENT)
Dept: HEALTH INFORMATION MANAGEMENT | Facility: CLINIC | Age: 69
End: 2019-09-20

## 2019-09-20 ENCOUNTER — TELEPHONE (OUTPATIENT)
Dept: FAMILY MEDICINE | Facility: CLINIC | Age: 69
End: 2019-09-20

## 2019-09-20 DIAGNOSIS — Z82.69 FAMILY HISTORY OF ANKYLOSING SPONDYLITIS: ICD-10-CM

## 2019-09-20 DIAGNOSIS — Z15.89 HLA B27 (HLA B27 POSITIVE): ICD-10-CM

## 2019-09-20 DIAGNOSIS — M25.50 ARTHRALGIA OF MULTIPLE SITES: Primary | ICD-10-CM

## 2019-09-20 DIAGNOSIS — M79.7 FIBROMYALGIA: ICD-10-CM

## 2019-09-20 NOTE — TELEPHONE ENCOUNTER
Per Dr. 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 Patient agrees. She is given phone number to [facility] and will call to schedule. Referral, visit notes, labs and DEXA faxed to rheum.  Luz Wilkinson RN

## 2019-10-03 NOTE — PROGRESS NOTES
[facility] Breast Surgery Consultation    HPI:   Macey Romero is a 68 year old female who is seen in consultation at the request of Dr. Weston for evaluation of a right breast mass. She has a history of breast cancer and underwent bilateral mastectomy with reconstruction in 1987. She reports she had felt a lump in her breast in her 20s and presented to several providers then prior to having a surgical excisional biopsy [facility] which revealed DCIS. She then underwent bilateral mastectomies with immediate reconstruction with silicone implants. She had issues related to the silicone with constant fatigue. She had saline implants then placed in 1994. She had a breast MRI in 2017 due to a concerning lump in the right axilla and had a biopsy which was benign and revealed a benign lymph node. Her MRI of the breast was normal and revealed retropectoral saline implants.     She reports that for the past few weeks she has felt lumpy tissue on her right breast as well as her left which is more tender on the right and feels above the implant. She feels this when she is sitting up and can pinch the skin to feel a lump type feeling. She reports she had keloids post operatively in the past which resolved with time. She used to work as a surgical RN in the past. She was here today with Julia Strong's mother (also a surgical RN).     She had an ultrasound prior to her appt today which revealed normal tissue at the areas of her concern.       Hormonal history:   menarche 13,  4 children, 1st at age 22, post-menopausal, unsure of number of years of OCP use, no HRT, no fertility treatment.     Family history of breast cancer: No  Family history of ovarian cancer:  No  Family history of colon cancer: No  Family history of prostate cancer: No    Imaging:   ULTRASOUND BILATERAL BREAST  10/8/2019 8:00 AM      HISTORY:  Multiple breast lumps and rippling of her implants. The  patient is status post  bilateral mastectomy with implant  reconstruction. She originally had silicone implants which were  revised to saline implants many years ago.     COMPARISON:  None.     FINDINGS:  Bilateral whole breast ultrasound was performed. No cyst,  mass, or other sonographic abnormality is seen in either breast. The  implants appear sonographically unremarkable.                                                                       IMPRESSION: BI-RADS CATEGORY: 2 - Benign Finding(s).     RECOMMENDED FOLLOW-UP: Clinical follow-up.        Past Medical History:   has a past medical history of Breast CA in situ (1987), Cancer (H) (1987), Chronic constipation, Fibromyalgia, Meningitis, Osteoarthritis (2/1/2011), Osteopenia (2/1/2011), Pneumonia, Pterygium eye (8/26/2013), Reactive airway disease (2/1/2011), Seasonal allergies, Shingles, and Skin cancer.      Current Outpatient Medications:      Acetaminophen (TYLENOL PO), Take 325 mg by mouth 2 times daily as needed for mild pain, Disp: , Rfl:      albuterol (PROAIR HFA/PROVENTIL HFA/VENTOLIN HFA) 108 (90 Base) MCG/ACT inhaler, Inhale 2 puffs into the lungs every 6 hours as needed for shortness of breath / dyspnea, Disp: 1 Inhaler, Rfl: 3     budesonide-formoterol (SYMBICORT) 160-4.5 MCG/ACT Inhaler, Inhale 2 puffs into the lungs 2 times daily, Disp: 3 Inhaler, Rfl: 3     Docusate Sodium (JAY STOOL SOFTENER PO), Take by mouth as needed for constipation, Disp: , Rfl:      Eyelid Cleansers (AVENOVA) 0.01 % SOLN, For chronic blepharitis., Disp: , Rfl:      guaiFENesin-codeine (ROBITUSSIN AC) 100-10 MG/5ML SOLN solution, Take 10 mLs by mouth every 4 hours as needed for cough (Patient not taking: Reported on 11/6/2018), Disp: 473 mL, Rfl: 0     HYDROcodone-acetaminophen (NORCO) 5-325 MG per tablet, Take 1 tablet by mouth every 4 hours as needed for pain (Patient not taking: Reported on 9/18/2018), Disp: 18 tablet, Rfl: 0     LORazepam (ATIVAN) 1 MG tablet, Take 0.5 tablets (0.5  mg) by mouth 3 times daily as needed for anxiety (Patient not taking: Reported on 11/6/2018), Disp: 20 tablet, Rfl: 0     magnesium hydroxide (MILK OF MAGNESIA) 400 MG/5ML suspension, Take 5 mLs by mouth daily as needed for constipation, Disp: , Rfl:      predniSONE (DELTASONE) 20 MG tablet, 60 mg by Oral or Feeding Tube route every morning 3 tablets, Disp: , Rfl: 0     UNABLE TO FIND, by Both Eyelids route daily MEDICATION NAME: Cliradex - eyelid - blepharitis, Disp: , Rfl:     Past Surgical History:  Past Surgical History:   Procedure Laterality Date     anterior c-disc fusion       BLEPHAROPLASTY, BROW LIFT BILATERAL, COMBINED Bilateral 6/18/2018    Procedure: COMBINED BLEPHAROPLASTY, BROW LIFT BILATERAL;  BILATERAL UPPER LID BLEPHAROPLASTY; BILATERAL BROW PTOSIS REPAIR;  Surgeon: Сергей Walters MD;  [facility]:  [facility]     CHOLECYSTECTOMY       COLONOSCOPY N/A 10/19/2017    Procedure: COLONOSCOPY;  COLONOSCOPY;  Surgeon: Niko Wong MD;  [facility]:  [facility]     D & C      Bleeding before hysterectomy     ENDOSCOPY  04/21/08     HYSTERECTOMY, MARCUS      Ovaries and tubes out due to breast cancer     JOINT REPLACEMTN, KNEE RT/LT Right 01/2011    Joint Replacement knee RT, with tibial straightening (2 replacements)     MAMMOPLASTY AUGMENTATION BILATERAL      breast ca      MASTECTOMY, SIMPLE RT/LT/CLAIRE Bilateral 1989    Mastectomy Simple RT/LT/CLAIRE     MOHS MICROGRAPHIC PROCEDURE      Left lateral nose     OPEN REDUCTION INTERNAL FIXATION ANKLE  8/27/2013    Procedure: OPEN REDUCTION INTERNAL FIXATION ANKLE;  RIGHT OPEN REDUCTION INTERNAL FIXATION ANKLE WITH LIGAMENT REPAIR;  Surgeon: Nando Chang MD;  Location:  OR     PTERYGIUM WITH CONJUNCTIVAL AUTOLOGOUS TRANSPLANT Left 8/29/2016    Procedure: PTERYGIUM WITH CONJUNCTIVAL AUTOLOGOUS TRANSPLANT;  Surgeon: Сергей Walters MD;  [facility]:  [facility]     REPAIR LIGAMENT ANKLE  8/27/2013    Procedure: REPAIR LIGAMENT ANKLE;;  Surgeon: Nando Chang MD;   Location: [facility] OR     SALIVARY GLAND SURGERY Left     Stone removal      TONSILLECTOMY             Allergies   Allergen Reactions     Tetanus Toxoids Anaphylaxis     Erythromycin GI Disturbance     Levaquin Other (See Comments)     unknown     Penicillins Hives     Silicone Rash         Social History:  Social History     Socioeconomic History     Marital status:      Spouse name: Not on file     Number of children: Not on file     Years of education: Not on file     Highest education level: Not on file   Occupational History     Not on file   Social Needs     Financial resource strain: Not on file     Food insecurity:     Worry: Not on file     Inability: Not on file     Transportation needs:     Medical: Not on file     Non-medical: Not on file   Tobacco Use     Smoking status: Former Smoker     Last attempt to quit: 1/1/2016     Years since quitting: 3.7     Smokeless tobacco: Never Used     Tobacco comment: quit but  still smokes, but not in house   Substance and Sexual Activity     Alcohol use: No     Alcohol/week: 0.0 standard drinks     Drug use: No     Sexual activity: Never     Partners: Male   Lifestyle     Physical activity:     Days per week: Not on file     Minutes per session: Not on file     Stress: Not on file   Relationships     Social connections:     Talks on phone: Not on file     Gets together: Not on file     Attends Restorationism service: Not on file     Active member of club or organization: Not on file     Attends meetings of clubs or organizations: Not on file     Relationship status: Not on file     Intimate partner violence:     Fear of current or ex partner: Not on file     Emotionally abused: Not on file     Physically abused: Not on file     Forced sexual activity: Not on file   Other Topics Concern     Parent/sibling w/ CABG, MI or angioplasty before 65F 55M? Not Asked   Social History Narrative     Not on file        ROS:  The 10 point review of systems is negative other than  "noted in the HPI and above.    PE:  Vitals: /60   Pulse 81   Ht 1.607 m (5' 3.25\")   Wt 67.1 kg (148 lb)   BMI 26.01 kg/m    General appearance: well-nourished, sitting comfortably, no apparent distress  Psych: normal affect, pleasant  HEENT:  Head normocephalic and atraumatic, pupils equal and round, conjunctivae clear, mucous membranes moist, external ears and nose normal  Neck: Supple without thyromegaly or masses  Lungs: Respirations unlabored  Lymphatic: No cervical, or supraclavicular lymphadenopathy  Extremities: Without edema  Musculoskeletal:  Normal station and gait  Neurologic: nonfocal, grossly intact times four extremities, alert and oriented times three  Psychiatric: Mood and affect are appropriate  Skin: Without lesions or rashes    Breast:  A bilateral breast exam was performed in the supine position.. Bilateral breasts were palpated in a circumferential clockwise fashion including the supraclavicular and axillary areas.   The breasts are surgically absent with implants in place. With patient supine the exam is benign. No palpable masses. Pt sitting upright she is able to squeeze the tissue anterior to the implant which to me feels like normal subcutaneous fat consistent with ultrasound findings. This is present bilaterally.     Lymph:       No supraclavicular/infraclavicular adenopathy.   Axillary adenopathy: none    Assessment/Plan: Macey Romero is a 68 year old who presents with concern for right breast lump anterior to her implant. Her ultrasound and exam reveal benign subcutaneous tissue at the area of concern without evidence of mass. We discussed options going forward. I would recommend annual chest exam, sooner if she has new concerns. We discussed she could consider breast MRI for further evaluation as this is the most sensitive test for breast imaging. At this point she would like to wait on this, if symptoms change, she will call and we will order. We did discuss possible " removal of implants and the surgical details associated with this if she were to proceed. At this point she is not interested in implant removal.       Time spent with the patient with greater that 50% of the time in discussion was 30 minutes.    Karina Cunha MD      Please route or send letter to:  Primary Care Provider (PCP) and Referring Provider

## 2019-10-07 ENCOUNTER — TELEPHONE (OUTPATIENT)
Dept: SURGERY | Facility: CLINIC | Age: 69
End: 2019-10-07

## 2019-10-07 DIAGNOSIS — Z85.3 HISTORY OF RIGHT BREAST CANCER: ICD-10-CM

## 2019-10-07 DIAGNOSIS — N63.10 LUMP OF RIGHT BREAST: Primary | ICD-10-CM

## 2019-10-07 NOTE — TELEPHONE ENCOUNTER
Patient notified, Dr. Cunha would like an ultrasound of right breast prior to consult scheduled for tomorrow.     Macey is scheduled for right breast ultrasound on 10/8 check in at 0715 at [facility], [place]. She will keep her consult with Dr. Cunha as scheduled.    Both parties in agreement of plan.    Christina CRAFTN, RN, OCN  Oncology Care Coordinator  [facility]  [facility]  Phone: [phone]

## 2019-10-08 ENCOUNTER — HOSPITAL ENCOUNTER (OUTPATIENT)
Dept: MAMMOGRAPHY | Facility: CLINIC | Age: 69
Discharge: HOME | End: 2019-10-08
Attending: SURGERY | Admitting: SURGERY
Payer: MEDICARE

## 2019-10-08 ENCOUNTER — OFFICE VISIT (OUTPATIENT)
Dept: SURGERY | Facility: CLINIC | Age: 69
End: 2019-10-08
Payer: MEDICARE

## 2019-10-08 VITALS
SYSTOLIC BLOOD PRESSURE: 110 MMHG | BODY MASS INDEX: 26.22 KG/M2 | HEIGHT: 63 IN | DIASTOLIC BLOOD PRESSURE: 60 MMHG | HEART RATE: 81 BPM | WEIGHT: 148 LBS

## 2019-10-08 DIAGNOSIS — N63.10 LUMP OF RIGHT BREAST: ICD-10-CM

## 2019-10-08 DIAGNOSIS — Z85.3 HISTORY OF RIGHT BREAST CANCER: ICD-10-CM

## 2019-10-08 DIAGNOSIS — Z86.000 HISTORY OF DUCTAL CARCINOMA IN SITU (DCIS) OF BREAST: Primary | ICD-10-CM

## 2019-10-08 DIAGNOSIS — N63.20 LUMP OF LEFT BREAST: ICD-10-CM

## 2019-10-08 PROCEDURE — 99204 OFFICE O/P NEW MOD 45 MIN: CPT | Performed by: SURGERY

## 2019-10-08 PROCEDURE — 76641 ULTRASOUND BREAST COMPLETE: CPT | Mod: 50

## 2019-10-08 ASSESSMENT — MIFFLIN-ST. JEOR: SCORE: 1174.41

## 2019-10-08 NOTE — LETTER
[facility]    [address], [address]  [place], [place] [address]  Phone [phone]  Fax [phone]    [address], [address]  [facility] [place] [address]  Phone [phone]  Fax [phone]    [email or web address]     2019    Re: Macey Romero  : 1950      [facility] Breast Surgery Consultation     HPI:   Macey Romero is a 68 year old female who is seen in consultation at the request of Dr. Weston for evaluation of a right breast mass. She has a history of breast cancer and underwent bilateral mastectomy with reconstruction in . She reports she had felt a lump in her breast in her 20s and presented to several providers then prior to having a surgical excisional biopsy [facility] which revealed DCIS. She then underwent bilateral mastectomies with immediate reconstruction with silicone implants. She had issues related to the silicone with constant fatigue. She had saline implants then placed in . She had a breast MRI in 2017 due to a concerning lump in the right axilla and had a biopsy which was benign and revealed a benign lymph node. Her MRI of the breast was normal and revealed retropectoral saline implants.      She reports that for the past few weeks she has felt lumpy tissue on her right breast as well as her left which is more tender on the right and feels above the implant. She feels this when she is sitting up and can pinch the skin to feel a lump type feeling. She reports she had keloids post operatively in the past which resolved with time. She used to work as a surgical RN in the past. She was here today with Julia Strong's mother (also a surgical RN).      She had an ultrasound prior to her appt today which revealed normal tissue at the areas of her concern.         Hormonal history:   menarche 13,  4 children, 1st at age 22, post-menopausal, unsure of  number of years of OCP use, no HRT, no fertility treatment.      Family history of breast cancer: No  Family history of ovarian cancer:  No  Family history of colon cancer: No  Family history of prostate cancer: No     Imaging:   ULTRASOUND BILATERAL BREAST  10/8/2019 8:00 AM      HISTORY:  Multiple breast lumps and rippling of her implants. The  patient is status post bilateral mastectomy with implant  reconstruction. She originally had silicone implants which were  revised to saline implants many years ago.     COMPARISON:  None.     FINDINGS:  Bilateral whole breast ultrasound was performed. No cyst,  mass, or other sonographic abnormality is seen in either breast. The  implants appear sonographically unremarkable.         IMPRESSION: BI-RADS CATEGORY: 2 - Benign Finding(s).     RECOMMENDED FOLLOW-UP: Clinical follow-up.           Past Medical History:   has a past medical history of Breast CA in situ (1987), Cancer (H) (1987), Chronic constipation, Fibromyalgia, Meningitis, Osteoarthritis (2/1/2011), Osteopenia (2/1/2011), Pneumonia, Pterygium eye (8/26/2013), Reactive airway disease (2/1/2011), Seasonal allergies, Shingles, and Skin cancer.        Current Outpatient Medications:      Acetaminophen (TYLENOL PO), Take 325 mg by mouth 2 times daily as needed for mild pain, Disp: , Rfl:      albuterol (PROAIR HFA/PROVENTIL HFA/VENTOLIN HFA) 108 (90 Base) MCG/ACT inhaler, Inhale 2 puffs into the lungs every 6 hours as needed for shortness of breath / dyspnea, Disp: 1 Inhaler, Rfl: 3     budesonide-formoterol (SYMBICORT) 160-4.5 MCG/ACT Inhaler, Inhale 2 puffs into the lungs 2 times daily, Disp: 3 Inhaler, Rfl: 3     Docusate Sodium (JAY STOOL SOFTENER PO), Take by mouth as needed for constipation, Disp: , Rfl:      Eyelid Cleansers (AVENOVA) 0.01 % SOLN, For chronic blepharitis., Disp: , Rfl:      guaiFENesin-codeine (ROBITUSSIN AC) 100-10 MG/5ML SOLN solution, Take 10 mLs by mouth every 4 hours as needed for  cough (Patient not taking: Reported on 11/6/2018), Disp: 473 mL, Rfl: 0     HYDROcodone-acetaminophen (NORCO) 5-325 MG per tablet, Take 1 tablet by mouth every 4 hours as needed for pain (Patient not taking: Reported on 9/18/2018), Disp: 18 tablet, Rfl: 0     LORazepam (ATIVAN) 1 MG tablet, Take 0.5 tablets (0.5 mg) by mouth 3 times daily as needed for anxiety (Patient not taking: Reported on 11/6/2018), Disp: 20 tablet, Rfl: 0     magnesium hydroxide (MILK OF MAGNESIA) 400 MG/5ML suspension, Take 5 mLs by mouth daily as needed for constipation, Disp: , Rfl:      predniSONE (DELTASONE) 20 MG tablet, 60 mg by Oral or Feeding Tube route every morning 3 tablets, Disp: , Rfl: 0     UNABLE TO FIND, by Both Eyelids route daily MEDICATION NAME: Cliradex - eyelid - blepharitis, Disp: , Rfl:      Past Surgical History:  Past Surgical History         Past Surgical History:   Procedure Laterality Date     anterior c-disc fusion         BLEPHAROPLASTY, BROW LIFT BILATERAL, COMBINED Bilateral 6/18/2018     Procedure: COMBINED BLEPHAROPLASTY, BROW LIFT BILATERAL;  BILATERAL UPPER LID BLEPHAROPLASTY; BILATERAL BROW PTOSIS REPAIR;  Surgeon: Сергей Walters MD;  [facility]     CHOLECYSTECTOMY         COLONOSCOPY N/A 10/19/2017     Procedure: COLONOSCOPY;  COLONOSCOPY;  Surgeon: Niko Wong MD;  Location:  [facility]     D & C         Bleeding before hysterectomy     ENDOSCOPY   04/21/08     HYSTERECTOMY, MARCUS         Ovaries and tubes out due to breast cancer     JOINT REPLACEMTN, KNEE RT/LT Right 01/2011     Joint Replacement knee RT, with tibial straightening (2 replacements)     MAMMOPLASTY AUGMENTATION BILATERAL         breast ca      MASTECTOMY, SIMPLE RT/LT/CLAIRE Bilateral 1989     Mastectomy Simple RT/LT/CLAIRE     MOHS MICROGRAPHIC PROCEDURE         Left lateral nose     OPEN REDUCTION INTERNAL FIXATION ANKLE   8/27/2013     Procedure: OPEN REDUCTION INTERNAL FIXATION ANKLE;  RIGHT OPEN REDUCTION INTERNAL FIXATION  ANKLE WITH LIGAMENT REPAIR;  Surgeon: Nando Chang MD;  Location: [facility]     PTERYGIUM WITH CONJUNCTIVAL AUTOLOGOUS TRANSPLANT Left 8/29/2016     Procedure: PTERYGIUM WITH CONJUNCTIVAL AUTOLOGOUS TRANSPLANT;  Surgeon: Сергей Walters MD;  [facility]     REPAIR LIGAMENT ANKLE   8/27/2013     Procedure: REPAIR LIGAMENT ANKLE;;  Surgeon: Nando Chang MD;  Location: [facility]     SALIVARY GLAND SURGERY Left       Stone removal      TONSILLECTOMY                                     Allergies   Allergen Reactions     Tetanus Toxoids Anaphylaxis     Erythromycin GI Disturbance     Levaquin Other (See Comments)       unknown     Penicillins Hives     Silicone Rash            Social History:  Social History   Social History            Socioeconomic History     Marital status:        Spouse name: Not on file     Number of children: Not on file     Years of education: Not on file     Highest education level: Not on file   Occupational History     Not on file   Social Needs     Financial resource strain: Not on file     Food insecurity:       Worry: Not on file       Inability: Not on file     Transportation needs:       Medical: Not on file       Non-medical: Not on file   Tobacco Use     Smoking status: Former Smoker       Last attempt to quit: 1/1/2016       Years since quitting: 3.7     Smokeless tobacco: Never Used     Tobacco comment: quit but  still smokes, but not in house   Substance and Sexual Activity     Alcohol use: No       Alcohol/week: 0.0 standard drinks     Drug use: No     Sexual activity: Never       Partners: Male   Lifestyle     Physical activity:       Days per week: Not on file       Minutes per session: Not on file     Stress: Not on file   Relationships     Social connections:       Talks on phone: Not on file       Gets together: Not on file       Attends [employer]ism service: Not on file       Active member of club or organization: Not on file       Attends meetings of clubs  "or organizations: Not on file       Relationship status: Not on file     Intimate partner violence:       Fear of current or ex partner: Not on file       Emotionally abused: Not on file       Physically abused: Not on file       Forced sexual activity: Not on file   Other Topics Concern     Parent/sibling w/ CABG, MI or angioplasty before 65F 55M? Not Asked   Social History Narrative     Not on file                        ROS:  The 10 point review of systems is negative other than noted in the HPI and above.     PE:  Vitals: /60   Pulse 81   Ht 1.607 m (5' 3.25\")   Wt 67.1 kg (148 lb)   BMI 26.01 kg/m    General appearance: well-nourished, sitting comfortably, no apparent distress  Psych: normal affect, pleasant  HEENT:  Head normocephalic and atraumatic, pupils equal and round, conjunctivae clear, mucous membranes moist, external ears and nose normal  Neck: Supple without thyromegaly or masses  Lungs: Respirations unlabored  Lymphatic: No cervical, or supraclavicular lymphadenopathy  Extremities: Without edema  Musculoskeletal:  Normal station and gait  Neurologic: nonfocal, grossly intact times four extremities, alert and oriented times three  Psychiatric: Mood and affect are appropriate  Skin: Without lesions or rashes     Breast:  A bilateral breast exam was performed in the supine position.. Bilateral breasts were palpated in a circumferential clockwise fashion including the supraclavicular and axillary areas.   The breasts are surgically absent with implants in place. With patient supine the exam is benign. No palpable masses. Pt sitting upright she is able to squeeze the tissue anterior to the implant which to me feels like normal subcutaneous fat consistent with ultrasound findings. This is present bilaterally.      Lymph:                            No supraclavicular/infraclavicular adenopathy.               Axillary adenopathy: none     Assessment/Plan: Macey Romero is a 68 year old who " presents with concern for right breast lump anterior to her implant. Her ultrasound and exam reveal benign subcutaneous tissue at the area of concern without evidence of mass. We discussed options going forward. I would recommend annual chest exam, sooner if she has new concerns. We discussed she could consider breast MRI for further evaluation as this is the most sensitive test for breast imaging. At this point she would like to wait on this, if symptoms change, she will call and we will order. We did discuss possible removal of implants and the surgical details associated with this if she were to proceed. At this point she is not interested in implant removal.         Time spent with the patient with greater that 50% of the time in discussion was 30 minutes.     Karina Cunha MD

## 2019-10-08 NOTE — NURSING NOTE
Breast Patients    BREAST PATIENTS (ALL)    1-Do you have any of the following symptoms? Breast Pain and Lump(s) or Mass(es)  2-In which breast are you having the symptoms? Both  3-Have you had a Mammogram? Yes  Where: [facility]  Date: 08/10/17  4-Have you ever had a breast cyst drained? No  5-Have you ever had a breast biopsy? Yes  Side: Right  Date: 01/1987 AND 08/2017  6-Have you ever had a Breast Cancer? Yes  Side: Right  Date: 1987   7-Is there a history of Breast Cancer in your family? No  8-Have you ever had Ovarian Cancer? No  9-Is there a history of Ovarian Cancer in your family? No  10-Summarize your caffeine intake (i.e. coffee, tea, chocolate, soda etc.): decaf    BREAST PATIENTS (FEMALE)    11-What age did your periods begin? 13  12-Date your last menstrual period began? ?  13-Number of full-term pregnancies: 0, 4 preemies  14-Your age when your first child was born? 22  15-Did you nurse your children? Yes  16-Are you pregnant now? No  17-Have you begun menopause? Yes  Age Menopause began:  1999  18-Have you had either ovary removed?Yes  Date of Surgery:  1999  19-Do you have breast implants? Yes   20-Do you use hormone replacement therapy?  No  21-Have you taken oral contraceptive pills?  Yes, For how many years?  ?  22-Have you had an intrauterine device (IUD) placed?  No  23-What is your current bra size?  36    Wilma Lassiter CMA  10/8/2019      2:58 PM

## 2019-10-23 NOTE — PROGRESS NOTES
10/11/19 faxed this office note to [facility] @ [phone]    Isaiah Hilton,   [facility]  [phone]

## 2019-10-27 ENCOUNTER — HEALTH MAINTENANCE LETTER (OUTPATIENT)
Age: 69
End: 2019-10-27

## 2019-10-29 ENCOUNTER — TRANSFERRED RECORDS (OUTPATIENT)
Dept: FAMILY MEDICINE | Facility: CLINIC | Age: 69
End: 2019-10-29

## 2019-11-11 ENCOUNTER — TELEPHONE (OUTPATIENT)
Dept: FAMILY MEDICINE | Facility: CLINIC | Age: 69
End: 2019-11-11

## 2019-11-11 ENCOUNTER — TRANSFERRED RECORDS (OUTPATIENT)
Dept: FAMILY MEDICINE | Facility: CLINIC | Age: 69
End: 2019-11-11

## 2019-11-11 DIAGNOSIS — R05.9 COUGH: ICD-10-CM

## 2019-11-11 DIAGNOSIS — J45.909 REACTIVE AIRWAY DISEASE: Primary | ICD-10-CM

## 2019-11-11 RX ORDER — CODEINE PHOSPHATE AND GUAIFENESIN 10; 100 MG/5ML; MG/5ML
1-2 SOLUTION ORAL EVERY 4 HOURS PRN
Qty: 180 ML | Refills: 0 | COMMUNITY
Start: 2019-11-11 | End: 2019-12-02

## 2019-11-11 NOTE — TELEPHONE ENCOUNTER
Patient calls complains of URI symptoms x 4 days. Started with sore throat, fever, cough. Now cough and congestion is into lungs and reactive airway disease is flaring. Has started using her albuterol and symbicort but not helping as much as she would like with cough and wheezing. Gets coughing and can't stop.   Offered appt for tomorrow morning or urgent care this evening. Patient unable to RTC tomorrow and not interested in urgent care. Asks if Dr. Ferrari might prescribe RobAC or medrol dosepak.   Plan: ok per Dr. Ferrari for RobAC 6oz but needs eval if symptoms don't improve. Patient agrees. Rx phoned to pharmacy.   Luz Wilkinson RN

## 2019-11-17 NOTE — PATIENT INSTRUCTIONS
Before Your Surgery      Call your surgeon if there is any change in your health. This includes signs of a cold or flu (such as a sore throat, runny nose, cough, rash or fever).    Do not smoke, drink alcohol or take over the counter medicine (unless your surgeon or primary care doctor tells you to) for the 24 hours before and after surgery.    If you take prescribed drugs: Follow your doctor s orders about which medicines to take and which to stop until after surgery.    Eating and drinking prior to surgery: follow the instructions from your surgeon    Take a shower or bath the night before surgery. Use the soap your surgeon gave you to gently clean your skin. If you do not have soap from your surgeon, use your regular soap. Do not shave or scrub the surgery site.  Wear clean pajamas and have clean sheets on your bed.     You should stop taking aspirin, ibuprofen (Advil, Motrin), naproxen (Aleve, Naprosyn), fish/krill oil and gingko 7 days before your surgery to minimize bleeding risk. You may use only acetaminophen (Tylenol) for pain management (up to a maximum of 3,000 mg daily if your liver is healthy). Acetaminophen does not contribute to bleeding risks.     Do NOT take any other oral prescription or over-the-counter medication on the morning of your surgery.  Remember not to eat or drink anything after midnight with the exception of only small sips of water to take your medication. Excess fluid in your stomach increases your risks of vomiting and aspirating this fluid into your lungs when you have anesthesia. This can lead to a serious pneumonia. If you receive conflicting information between the instructions I provided today and those you receive from your surgeon, please follow the directions you receive from your surgeon.     If eye drops have been prescribed by your surgeon, take them according to the directions you received from your surgeon. If you have questions about your eye drops, please contact  your surgeon's office directly.     Call your surgeon if you start to develop a fever, cough, or any other signs of infection between now and the time of your surgery.  You will likely not heal as well and your risk of infection goes up if you have elective surgery while you are ill. Your symptoms will be evaluated on a case by case basis by your surgeon. Some changes in your health may require your surgery to be delayed for your safety.    If you need a refill of your pain medication for post-op pain, please contact your surgeon or the clinician covering his/her practice.  If you are having continued pain that requires more pain medication more than was originally prescribed, your surgeon needs to be aware of this and adjust your medications for you. Pain meds for post-op pain will not be filled by your primary care physician.     You will likely have the best result if you call your surgeon's office before 2:00 pm Monday through Thursday. I do not have access to the specific prescribing practices of your own surgeon, but please be aware some surgeons' offices do not fill pain meds late in the day or on Friday/weekends.     Please obtain recommendations from your surgeon to manage narcotic induced constipation. This is very common and the pain from constipation can be excruciating. If you have post-op constipation, please contact your surgeon's office. Pushing hard to have a bowel movement after surgery may compromise your sutures, make you feel lightheaded, and may contribute to new or worsening hemorrhoids. Some foods will help keep your bowels soft and moving (prunes). Avoid constipating foods such as peanut butter, cheese, bananas etc.    Medications to keep your bowels moving are all available over the counter. Common stool softeners and stimulants include Colace (100 mg twice a day), Senna (2 pills before bed every evening), Miralax (1 cap or a 17 gram capful mixed in any liquid), and Milk of Magnesia (only  if your kidneys are healthy). Please work with your surgeon before your procedure to develop a plan to manage your bowels post-operatively. If you have significant constipation or diarrhea after your surgery, please contact your surgeon's office.         Safety plan discussed with...

## 2019-11-19 ENCOUNTER — TRANSFERRED RECORDS (OUTPATIENT)
Dept: FAMILY MEDICINE | Facility: CLINIC | Age: 69
End: 2019-11-19

## 2019-11-25 ENCOUNTER — TRANSFERRED RECORDS (OUTPATIENT)
Dept: FAMILY MEDICINE | Facility: CLINIC | Age: 69
End: 2019-11-25

## 2019-12-02 ENCOUNTER — OFFICE VISIT (OUTPATIENT)
Dept: FAMILY MEDICINE | Facility: CLINIC | Age: 69
End: 2019-12-02

## 2019-12-02 VITALS — HEART RATE: 93 BPM | DIASTOLIC BLOOD PRESSURE: 68 MMHG | OXYGEN SATURATION: 97 % | SYSTOLIC BLOOD PRESSURE: 136 MMHG

## 2019-12-02 DIAGNOSIS — R06.2 WHEEZING: ICD-10-CM

## 2019-12-02 DIAGNOSIS — J40 BRONCHITIS: Primary | ICD-10-CM

## 2019-12-02 PROCEDURE — 99214 OFFICE O/P EST MOD 30 MIN: CPT | Performed by: FAMILY MEDICINE

## 2019-12-02 RX ORDER — METHYLPREDNISOLONE 4 MG
TABLET, DOSE PACK ORAL
Qty: 21 TABLET | Refills: 0 | Status: SHIPPED | OUTPATIENT
Start: 2019-12-02 | End: 2020-05-20

## 2019-12-02 RX ORDER — CODEINE PHOSPHATE AND GUAIFENESIN 10; 100 MG/5ML; MG/5ML
1-2 SOLUTION ORAL EVERY 4 HOURS PRN
Qty: 236 ML | Refills: 1 | Status: SHIPPED | OUTPATIENT
Start: 2019-12-02 | End: 2020-05-20

## 2019-12-02 RX ORDER — AZITHROMYCIN 250 MG/1
TABLET, FILM COATED ORAL
Qty: 6 TABLET | Refills: 0 | Status: SHIPPED | OUTPATIENT
Start: 2019-12-02 | End: 2020-05-20

## 2019-12-02 NOTE — PROGRESS NOTES
The patient complains of cough productive of green/yellow sputum, with shortness of breath during the cough, paroxysmal, nocturnal, worsening over time for 4 weeks.  Quality: croupy, daytime, productive and occasional  Severity: moderate  Associated symptoms: nasal congestion and rhinorrhea.    Problem(s) Oriented visit      ROS:  General and CV completed and negative except as noted above     HISTORY:   reports no history of alcohol use.   reports that she quit smoking about 3 years ago. She has never used smokeless tobacco.    Past Medical History:   Diagnosis Date     Breast CA in situ 1987     Cancer (H) 1987     Chronic constipation      Fibromyalgia      Meningitis      Osteoarthritis 2/1/2011     Osteopenia 2/1/2011     Pneumonia      Pterygium eye 8/26/2013     Reactive airway disease 2/1/2011     Seasonal allergies      Shingles      Skin cancer      Past Surgical History:   Procedure Laterality Date     anterior c-disc fusion       BLEPHAROPLASTY, BROW LIFT BILATERAL, COMBINED Bilateral 6/18/2018    Procedure: COMBINED BLEPHAROPLASTY, BROW LIFT BILATERAL;  BILATERAL UPPER LID BLEPHAROPLASTY; BILATERAL BROW PTOSIS REPAIR;  Surgeon: Сергей Walters MD;  Location:  [facility]     CHOLECYSTECTOMY       COLONOSCOPY N/A 10/19/2017    Procedure: COLONOSCOPY;  COLONOSCOPY;  Surgeon: Niko Wong MD;  Location:  GI     D & C      Bleeding before hysterectomy     ENDOSCOPY  04/21/08     HYSTERECTOMY, MARCUS      Ovaries and tubes out due to breast cancer     JOINT REPLACEMTN, KNEE RT/LT Right 01/2011    Joint Replacement knee RT, with tibial straightening (2 replacements)     MAMMOPLASTY AUGMENTATION BILATERAL      breast ca      MASTECTOMY, SIMPLE RT/LT/CLAIRE Bilateral 1989    Mastectomy Simple RT/LT/CLAIRE     MOHS MICROGRAPHIC PROCEDURE      Left lateral nose     OPEN REDUCTION INTERNAL FIXATION ANKLE  8/27/2013    Procedure: OPEN REDUCTION INTERNAL FIXATION ANKLE;  RIGHT OPEN REDUCTION INTERNAL FIXATION ANKLE WITH  LIGAMENT REPAIR;  Surgeon: Nando Chang MD;  Location[facility]     PTERYGIUM WITH CONJUNCTIVAL AUTOLOGOUS TRANSPLANT Left 8/29/2016    Procedure: PTERYGIUM WITH CONJUNCTIVAL AUTOLOGOUS TRANSPLANT;  Surgeon: Сергей Walters MD;  [facility]     REPAIR LIGAMENT ANKLE  8/27/2013    Procedure: REPAIR LIGAMENT ANKLE;;  Surgeon: Nando Chang MD;  Location: [facility]     SALIVARY GLAND SURGERY Left     Stone removal      TONSILLECTOMY         EXAM:  BP: 136/68   Pulse: 93    Temp: Data Unavailable    Wt Readings from Last 2 Encounters:   10/08/19 67.1 kg (148 lb)   09/09/19 67.4 kg (148 lb 9.6 oz)       BMI= There is no height or weight on file to calculate BMI.    EXAM:  APPEARANCE: = Relaxed and in no distress  Conj/Eyelids = noninjected and lids and lashes are without inflammation  PERRLA/Irises = Pupils Round Reactive to Light and Irisis without inflammation  Ears/Nose = External structures and Nares have usual shape and form  Ear canals and TM's = Canals are not inflammed and have none or little wax and the drums are not injected and have a light reflex   Lips/Teeth/Gums = No lesions seen, good dentition, and gums seem healthy  Oropharynx = No leukoplakia, No injection to the tissues, Normal Uvula  Neck = No anterior or posterior adenopathy appreciated.  Resp effort = Calm regular breathing  Breath Sounds =  bilateral wheezing and distant  Mood/Affect = Cooperative and interested      Assessment/Plan:  Macey was seen today for cough.    Diagnoses and all orders for this visit:    Bronchitis  -     azithromycin (ZITHROMAX) 250 MG tablet; Take 2 tablets (500 mg) by mouth daily for 1 day, THEN 1 tablet (250 mg) daily for 4 days.  -     methylPREDNISolone (MEDROL DOSEPAK) 4 MG tablet therapy pack; Follow Package Directions  -     guaiFENesin-codeine (ROBITUSSIN AC) 100-10 MG/5ML solution; Take 5-10 mLs by mouth every 4 hours as needed for cough    Wheezing  -     azithromycin (ZITHROMAX) 250 MG tablet; Take 2  "tablets (500 mg) by mouth daily for 1 day, THEN 1 tablet (250 mg) daily for 4 days.  -     methylPREDNISolone (MEDROL DOSEPAK) 4 MG tablet therapy pack; Follow Package Directions  -     guaiFENesin-codeine (ROBITUSSIN AC) 100-10 MG/5ML solution; Take 5-10 mLs by mouth every 4 hours as needed for cough      >25 min spent with patient, greater than 50% spent on discussion/education/planning, etc. About The primary encounter diagnosis was Bronchitis. A diagnosis of Wheezing was also pertinent to this visit.      COUNSELING:   reports that she quit smoking about 3 years ago. She has never used smokeless tobacco.    Estimated body mass index is 26.01 kg/m  as calculated from the following:    Height as of 10/8/19: 1.607 m (5' 3.25\").    Weight as of 10/8/19: 67.1 kg (148 lb).       Appropriate preventive services were discussed with this patient, including applicable screening as appropriate for cardiovascular disease, diabetes, osteopenia/osteoporosis, and glaucoma.  As appropriate for age/gender, discussed screening for colorectal cancer, prostate cancer, breast cancer, and cervical cancer. Checklist reviewing preventive services available has been given to the patient.    Reviewed patients plan of care and prPatientided an AVS. The Basic Care Plan (routine screening as documented in Health Maintenance) for Macey meets the Care Plan requirement. This Care Plan has been established and reviewed with the  Patient.      The following health maintenance items are reviewed in Epic and correct as of today:  Health Maintenance   Topic Date Due     SPIROMETRY  1950     URINE DRUG SCREEN  1950     COPD ACTION PLAN  1950     DTAP/TDAP/TD IMMUNIZATION (1 - Tdap) 11/08/1961     ZOSTER IMMUNIZATION (1 of 2) 11/08/2000     ADVANCE CARE PLANNING  08/26/2018     A1C  05/06/2019     INFLUENZA VACCINE (1) 09/01/2019     ASTHMA CONTROL TEST  03/09/2020     MEDICARE ANNUAL WELLNESS VISIT  09/09/2020     LIPID  09/09/2020 "     AGUSTINA ASSESSMENT  09/09/2020     ASTHMA ACTION PLAN  09/09/2020     FALL RISK ASSESSMENT  09/09/2020     PHQ-9  09/09/2020     FIT-DNA (Cologuard)  10/06/2020     DEXA  09/13/2021     HEPATITIS C SCREENING  Completed     PNEUMOCOCCAL IMMUNIZATION 65+ HIGH/HIGHEST RISK  Completed     IPV IMMUNIZATION  Aged Out     MENINGITIS IMMUNIZATION  Aged Out       Long Ferrari  [facility]  For any issues my office # is [phone]

## 2019-12-04 ENCOUNTER — TRANSFERRED RECORDS (OUTPATIENT)
Dept: FAMILY MEDICINE | Facility: CLINIC | Age: 69
End: 2019-12-04

## 2019-12-11 ENCOUNTER — TRANSFERRED RECORDS (OUTPATIENT)
Dept: FAMILY MEDICINE | Facility: CLINIC | Age: 69
End: 2019-12-11

## 2020-01-19 ENCOUNTER — HOSPITAL ENCOUNTER (EMERGENCY)
Facility: CLINIC | Age: 70
Discharge: HOME | End: 2020-01-19
Attending: EMERGENCY MEDICINE | Admitting: EMERGENCY MEDICINE
Payer: MEDICARE

## 2020-01-19 ENCOUNTER — APPOINTMENT (OUTPATIENT)
Dept: MRI IMAGING | Facility: CLINIC | Age: 70
End: 2020-01-19
Attending: EMERGENCY MEDICINE
Payer: MEDICARE

## 2020-01-19 ENCOUNTER — APPOINTMENT (OUTPATIENT)
Dept: GENERAL RADIOLOGY | Facility: CLINIC | Age: 70
End: 2020-01-19
Attending: EMERGENCY MEDICINE
Payer: MEDICARE

## 2020-01-19 VITALS
BODY MASS INDEX: 24.8 KG/M2 | HEIGHT: 63 IN | OXYGEN SATURATION: 97 % | DIASTOLIC BLOOD PRESSURE: 65 MMHG | HEART RATE: 93 BPM | RESPIRATION RATE: 18 BRPM | TEMPERATURE: 97.9 F | SYSTOLIC BLOOD PRESSURE: 116 MMHG | WEIGHT: 140 LBS

## 2020-01-19 DIAGNOSIS — J10.1 INFLUENZA A: ICD-10-CM

## 2020-01-19 LAB
ALBUMIN SERPL-MCNC: 3.6 G/DL (ref 3.4–5)
ALBUMIN UR-MCNC: 10 MG/DL
ALP SERPL-CCNC: 110 U/L (ref 40–150)
ALT SERPL W P-5'-P-CCNC: 119 U/L (ref 0–50)
ANION GAP SERPL CALCULATED.3IONS-SCNC: 6 MMOL/L (ref 3–14)
APPEARANCE UR: CLEAR
AST SERPL W P-5'-P-CCNC: 60 U/L (ref 0–45)
BASOPHILS # BLD AUTO: 0 10E9/L (ref 0–0.2)
BASOPHILS NFR BLD AUTO: 0 %
BILIRUB SERPL-MCNC: 0.6 MG/DL (ref 0.2–1.3)
BILIRUB UR QL STRIP: NEGATIVE
BUN SERPL-MCNC: 14 MG/DL (ref 7–30)
CALCIUM SERPL-MCNC: 8.8 MG/DL (ref 8.5–10.1)
CHLORIDE SERPL-SCNC: 100 MMOL/L (ref 94–109)
CO2 SERPL-SCNC: 28 MMOL/L (ref 20–32)
COLOR UR AUTO: YELLOW
CREAT SERPL-MCNC: 0.92 MG/DL (ref 0.52–1.04)
DIFFERENTIAL METHOD BLD: ABNORMAL
EOSINOPHIL # BLD AUTO: 0 10E9/L (ref 0–0.7)
EOSINOPHIL NFR BLD AUTO: 1 %
ERYTHROCYTE [DISTWIDTH] IN BLOOD BY AUTOMATED COUNT: 13.4 % (ref 10–15)
FLUAV+FLUBV AG SPEC QL: NEGATIVE
FLUAV+FLUBV AG SPEC QL: POSITIVE
GFR SERPL CREATININE-BSD FRML MDRD: 64 ML/MIN/{1.73_M2}
GLUCOSE SERPL-MCNC: 105 MG/DL (ref 70–99)
GLUCOSE UR STRIP-MCNC: NEGATIVE MG/DL
HCT VFR BLD AUTO: 44.8 % (ref 35–47)
HGB BLD-MCNC: 15 G/DL (ref 11.7–15.7)
HGB UR QL STRIP: NEGATIVE
IMM GRANULOCYTES # BLD: 0 10E9/L (ref 0–0.4)
IMM GRANULOCYTES NFR BLD: 0.2 %
KETONES UR STRIP-MCNC: 10 MG/DL
LEUKOCYTE ESTERASE UR QL STRIP: ABNORMAL
LYMPHOCYTES # BLD AUTO: 1.4 10E9/L (ref 0.8–5.3)
LYMPHOCYTES NFR BLD AUTO: 35.1 %
MCH RBC QN AUTO: 28.4 PG (ref 26.5–33)
MCHC RBC AUTO-ENTMCNC: 33.5 G/DL (ref 31.5–36.5)
MCV RBC AUTO: 85 FL (ref 78–100)
MONOCYTES # BLD AUTO: 0.6 10E9/L (ref 0–1.3)
MONOCYTES NFR BLD AUTO: 15.5 %
MUCOUS THREADS #/AREA URNS LPF: PRESENT /LPF
NEUTROPHILS # BLD AUTO: 2 10E9/L (ref 1.6–8.3)
NEUTROPHILS NFR BLD AUTO: 48.2 %
NITRATE UR QL: NEGATIVE
NRBC # BLD AUTO: 0 10*3/UL
NRBC BLD AUTO-RTO: 0 /100
PH UR STRIP: 5.5 PH (ref 5–7)
PLATELET # BLD AUTO: 152 10E9/L (ref 150–450)
POTASSIUM SERPL-SCNC: 3.4 MMOL/L (ref 3.4–5.3)
PROT SERPL-MCNC: 7.5 G/DL (ref 6.8–8.8)
RBC # BLD AUTO: 5.29 10E12/L (ref 3.8–5.2)
RBC #/AREA URNS AUTO: 2 /HPF (ref 0–2)
SODIUM SERPL-SCNC: 134 MMOL/L (ref 133–144)
SOURCE: ABNORMAL
SP GR UR STRIP: 1.03 (ref 1–1.03)
SPECIMEN SOURCE: ABNORMAL
SQUAMOUS #/AREA URNS AUTO: 2 /HPF (ref 0–1)
TROPONIN I SERPL-MCNC: <0.015 UG/L (ref 0–0.04)
UROBILINOGEN UR STRIP-MCNC: NORMAL MG/DL (ref 0–2)
WBC # BLD AUTO: 4.1 10E9/L (ref 4–11)
WBC #/AREA URNS AUTO: 29 /HPF (ref 0–5)

## 2020-01-19 PROCEDURE — 85025 COMPLETE CBC W/AUTO DIFF WBC: CPT | Performed by: EMERGENCY MEDICINE

## 2020-01-19 PROCEDURE — 70544 MR ANGIOGRAPHY HEAD W/O DYE: CPT

## 2020-01-19 PROCEDURE — 96375 TX/PRO/DX INJ NEW DRUG ADDON: CPT | Mod: 59

## 2020-01-19 PROCEDURE — 99285 EMERGENCY DEPT VISIT HI MDM: CPT | Mod: 25

## 2020-01-19 PROCEDURE — 84484 ASSAY OF TROPONIN QUANT: CPT | Performed by: EMERGENCY MEDICINE

## 2020-01-19 PROCEDURE — 70553 MRI BRAIN STEM W/O & W/DYE: CPT

## 2020-01-19 PROCEDURE — 70549 MR ANGIOGRAPH NECK W/O&W/DYE: CPT

## 2020-01-19 PROCEDURE — 81001 URINALYSIS AUTO W/SCOPE: CPT | Performed by: EMERGENCY MEDICINE

## 2020-01-19 PROCEDURE — A9585 GADOBUTROL INJECTION: HCPCS | Performed by: EMERGENCY MEDICINE

## 2020-01-19 PROCEDURE — 94640 AIRWAY INHALATION TREATMENT: CPT

## 2020-01-19 PROCEDURE — 87804 INFLUENZA ASSAY W/OPTIC: CPT | Performed by: EMERGENCY MEDICINE

## 2020-01-19 PROCEDURE — 96374 THER/PROPH/DIAG INJ IV PUSH: CPT | Mod: 59

## 2020-01-19 PROCEDURE — 93005 ELECTROCARDIOGRAM TRACING: CPT

## 2020-01-19 PROCEDURE — 96361 HYDRATE IV INFUSION ADD-ON: CPT

## 2020-01-19 PROCEDURE — 87086 URINE CULTURE/COLONY COUNT: CPT | Performed by: EMERGENCY MEDICINE

## 2020-01-19 PROCEDURE — 25500064 ZZH RX 255 OP 636: Performed by: EMERGENCY MEDICINE

## 2020-01-19 PROCEDURE — 25000132 ZZH RX MED GY IP 250 OP 250 PS 637: Mod: GY | Performed by: EMERGENCY MEDICINE

## 2020-01-19 PROCEDURE — 25000125 ZZHC RX 250: Performed by: EMERGENCY MEDICINE

## 2020-01-19 PROCEDURE — 25800030 ZZH RX IP 258 OP 636: Performed by: EMERGENCY MEDICINE

## 2020-01-19 PROCEDURE — 25000128 H RX IP 250 OP 636: Performed by: EMERGENCY MEDICINE

## 2020-01-19 PROCEDURE — 80053 COMPREHEN METABOLIC PANEL: CPT | Performed by: EMERGENCY MEDICINE

## 2020-01-19 PROCEDURE — 71046 X-RAY EXAM CHEST 2 VIEWS: CPT

## 2020-01-19 RX ORDER — IPRATROPIUM BROMIDE AND ALBUTEROL SULFATE 2.5; .5 MG/3ML; MG/3ML
3 SOLUTION RESPIRATORY (INHALATION) ONCE
Status: COMPLETED | OUTPATIENT
Start: 2020-01-19 | End: 2020-01-19

## 2020-01-19 RX ORDER — DIPHENHYDRAMINE HYDROCHLORIDE 50 MG/ML
25 INJECTION INTRAMUSCULAR; INTRAVENOUS ONCE
Status: COMPLETED | OUTPATIENT
Start: 2020-01-19 | End: 2020-01-19

## 2020-01-19 RX ORDER — MECLIZINE HYDROCHLORIDE 25 MG/1
25 TABLET ORAL ONCE
Status: COMPLETED | OUTPATIENT
Start: 2020-01-19 | End: 2020-01-19

## 2020-01-19 RX ORDER — MECLIZINE HYDROCHLORIDE 25 MG/1
25 TABLET ORAL EVERY 6 HOURS PRN
Qty: 30 TABLET | Refills: 1 | Status: SHIPPED | OUTPATIENT
Start: 2020-01-19 | End: 2020-05-20

## 2020-01-19 RX ORDER — SODIUM CHLORIDE 9 MG/ML
1000 INJECTION, SOLUTION INTRAVENOUS CONTINUOUS
Status: DISCONTINUED | OUTPATIENT
Start: 2020-01-19 | End: 2020-01-19 | Stop reason: HOSPADM

## 2020-01-19 RX ORDER — GADOBUTROL 604.72 MG/ML
10 INJECTION INTRAVENOUS ONCE
Status: COMPLETED | OUTPATIENT
Start: 2020-01-19 | End: 2020-01-19

## 2020-01-19 RX ORDER — ONDANSETRON 4 MG/1
4 TABLET, ORALLY DISINTEGRATING ORAL EVERY 6 HOURS PRN
Qty: 20 TABLET | Refills: 0 | Status: SHIPPED | OUTPATIENT
Start: 2020-01-19 | End: 2020-05-20

## 2020-01-19 RX ADMIN — MECLIZINE HYDROCHLORIDE 25 MG: 25 TABLET ORAL at 15:01

## 2020-01-19 RX ADMIN — GADOBUTROL 10 ML: 604.72 INJECTION INTRAVENOUS at 13:22

## 2020-01-19 RX ADMIN — DIPHENHYDRAMINE HYDROCHLORIDE 25 MG: 50 INJECTION, SOLUTION INTRAMUSCULAR; INTRAVENOUS at 11:59

## 2020-01-19 RX ADMIN — SODIUM CHLORIDE 1000 ML: 9 INJECTION, SOLUTION INTRAVENOUS at 12:02

## 2020-01-19 RX ADMIN — IPRATROPIUM BROMIDE AND ALBUTEROL SULFATE 3 ML: .5; 3 SOLUTION RESPIRATORY (INHALATION) at 11:58

## 2020-01-19 RX ADMIN — PROCHLORPERAZINE EDISYLATE 10 MG: 5 INJECTION INTRAMUSCULAR; INTRAVENOUS at 11:59

## 2020-01-19 ASSESSMENT — ENCOUNTER SYMPTOMS
HEADACHES: 1
COUGH: 1
LIGHT-HEADEDNESS: 1
SHORTNESS OF BREATH: 0
BACK PAIN: 1
DIZZINESS: 1
VOMITING: 0
NAUSEA: 0

## 2020-01-19 ASSESSMENT — MIFFLIN-ST. JEOR: SCORE: 1129.17

## 2020-01-19 NOTE — ED PROVIDER NOTES
"  History   Chief Complaint:  Dizziness; Flank Pain; and Headache    The history is provided by the patient.      Macey Romero is a 69 year old female with vertigo who presents for dizziness and a constant headache persisting for the last 6-7 days. She notes that her dizziness and headache started after she hit the back of her head on the car door. The patient states that her dizziness is similar to past vertigo experiences, however her past vertigo does not last as long and is never constant unlike her symptoms today. She generally does not get headaches with her vertigo, and states that her head pain radiates down to her neck and into her back. Her back pain feels muscular to her, and she notes that this is new for her vertigo episodes. She also notes that she has been light-headed with deep breaths over this time and states she has never had a headache last this long. She notes she is unsure if she has ever had a concussion in the past.    She also notes that she has had a constant cough starting around the same time, as well as a rib pain that started shortly after her cough. She notes that her cough is forceful and sometimes productive, and states that her right rib pain \"feels like a bra band is on too tight.\" She notes that her cough exacerbates her headache, and has had right ear \"muffled hearing.\"     She notes that she has also had very dark brown/orange urine recently, and states that she generally tends to be constipated, normally having a bowel movement once a week. She denies any abdominal pain, nausea, vomiting, dysuria, black or bloody stools, and any new medications. She states that she has treated her symptoms with Advil/Tylenol, last taken around 7.5 hours ago. To note, upon examination the patient made remarks about seeing \"yellow flowers\" on the plain white wall, noting that there was a \"meadow of flowers.\"     Allergies:  Tetanus toxoids  Erythromycin  Levaquin  Penicillins   Silicone " "    Medications:    Albuterol inhaler  Symbicort inhaler  Docusate sodium  Ativan    Past Medical History:    Breast cancer in situ  Chronic constipation  Fibromyalgia  Meningitis  Osteoarthritis  Osteopenia  Pneumonia  Pterygium eye  Reactive airway disease  Seasonal allergies  Shingles  Skin cancer  Keloid of skin   IBS  Chronic pain of both knees  Pulmonary emphysema     Past Surgical History:    Anterior cervical disc fusion  Blepharoplasty, brow lift bilateral, combined  Cholecystectomy  D&C  MARCUS  Tonsillectomy  Salivary gland stone removal  Ankle ligament repair    Family History:    Cancer sarcoidosis  Diabetes  Cerebrovascular Disease  Liver disease    Social History:  Former smoker, quit four years ago.  Negative for alcohol use.  Negative for drug use.  Marital Status:      Review of Systems   Respiratory: Positive for cough. Negative for shortness of breath.    Gastrointestinal: Negative for nausea and vomiting.   Genitourinary:        (+) Brown/orange urine   Musculoskeletal: Positive for back pain (Low).   Neurological: Positive for dizziness, light-headedness and headaches.   All other systems reviewed and are negative.      Physical Exam     Patient Vitals for the past 24 hrs:   BP Temp Temp src Pulse Resp SpO2 Height Weight   01/19/20 1400 116/65 -- -- 93 -- -- -- --   01/19/20 1330 116/60 -- -- 92 -- 97 % -- --   01/19/20 1112 125/59 97.9  F (36.6  C) Oral 97 18 100 % 1.6 m (5' 3\") 63.5 kg (140 lb)       Physical Exam  Constitutional: Well developed, nontox appearance  Head: Atraumatic.   Mouth/Throat: Oropharynx is clear and moist.   Neck:  no stridor, Full ROM, no meningismus  Eyes: no scleral icterus, PERRL, EOMI  Cardiovascular: RRR, 2+ bilat radial pulses  Pulmonary/Chest: nml resp effort, Clear BS bilat  Abdominal: ND, +BS, soft, NT, no rebound or guarding   : no CVA tenderness bilat  Ext: Warm, well perfused, no edema  Neurological: A&O,  CNII-XII intact, nml finger to nose, 5/5 " strength throughout upper and lower ext, symmetric; sensation grossly intact  Skin: Skin is warm and dry.   Psychiatric: Behavior is normal. Thought content normal.   Nursing note and vitals reviewed.      Emergency Department Course     ECG:  Indication: Dizziness  Time: 1114  Vent. Rate 100 bpm. CA interval 122. QRS duration 78. QT/QTc 348/448. P-R-T axis 49 44 31. Normal sinus rhythm. Normal ECG. Read time: 1121    Imaging:  Radiology findings were communicated with the patient who voiced understanding of the findings.    XR Chest, G/E 2 views:   IMPRESSION: No evidence of fracture or pneumothorax. The cardiomediastinal silhouette and pulmonary vasculature appear normal. No airspace disease or effusions. No new findings.  As per radiology.    MR Neck (carotids) w/o and w/ contrast :   Normal MR angiogram of the neck. As per radiology     MR Brain w/o and w/ contrast:   Diffuse cerebral volume loss and cerebral white matter changes consistent with chronic small vessel ischemic disease. No evidence for acute intracranial pathology. As per radiology    MRA Brain ([place]) w/o contrast:   Normal MR angiogram of the head. As per radiology    Laboratory:  Laboratory findings were communicated with the patient who voiced understanding of the findings.    CBC: WBC: 4.1, HGB: 15.0, PLT: 152    CMP: Glucose 105 (high),  (high), AST 60 (high), o/w WNL (Creatinine: 0.92)    1131 Troponin: <0.015     UA with Microscopic: Ketones 10, Protein Albumin 10, Leukocyte Estrace 10, WBC 29 (high), Squamous Epithelial 2 (high), Mucous Present o/w Negative    Influenza A/B Antigen: A Antigen: Positive; B Antigen: Negative     Urine Culture: Pending    Interventions:  1158 DuoNeb 3mL Nebulizer  1159 Benadryl 25mg IV  1159 Compazine 10mg IV  1202 NS 1L IV  1322 Gadavist 10mL IV  1501 Antivert 25 mg PO    Emergency Department Course:  Past medical records, nursing notes, and vitals reviewed.    1155 I performed an exam of  the patient as documented above.     EKG obtained in the ED, see results above.   IV was inserted and blood was drawn for laboratory testing, results above.  The patient provided a urine sample here in the emergency department. This was sent for laboratory testing, findings above.  The patient was sent for MRI and x-ray while in the emergency department, results above.     1515 I rechecked the patient and discussed the results of her workup thus far.     Findings and plan explained to the patient. Patient discharged home with instructions regarding supportive care, medications, and reasons to return. The importance of close follow-up was reviewed. The patient was prescribed Antivert and Zofran.    I personally reviewed the laboratory and imaging results with the patient and answered all related questions prior to discharge.     Impression & Plan     Medical Decision Makin year old female presenting w/ headache, cough, vertigo     DDx includes acute on chronic peripheral vertigo, concussion, intracranial abnormality such as hemorrhage or mass, vascular injury such as dissection, viral illness, influenza.  EKG interpretation as noted above.  Doubt acute CVA given history and physical exam labs significant for positive influenza A, minimal elevation in transaminases, UA sig for WBCs and leuk esterase although the pt has had no urinary symptoms therefore I will not treat at this time.  Imaging sig for no acute intracranial or vascular abnormality.  Interventions as noted above with improvement in symptoms.  Patient's presentation most consistent with influenza and concussion.  Given reassuring work-up and improvement in sx, at this time I feel the pt is safe for discharge.  Recommendations given regarding follow up with PCP and return to the emergency department as needed for new or worsening symptoms.  Patient counseled on all results, disposition and diagnosis.  They are understanding and agreeable to plan.  Patient discharged in stable condition.         Diagnosis:    ICD-10-CM    1. Influenza A J10.1        Disposition:  Discharged to home.    Discharge Medications:  New Prescriptions    MECLIZINE (ANTIVERT) 25 MG TABLET    Take 1 tablet (25 mg) by mouth every 6 hours as needed for dizziness    ONDANSETRON (ZOFRAN ODT) 4 MG ODT TAB    Take 1 tablet (4 mg) by mouth every 6 hours as needed       Scribe Disclosure:  I, Keon Kline, am serving as a scribe at 11:28 AM on 1/19/2020 to document services personally performed by Gurpreet Lambert MD based on my observations and the provider's statements to me.          Gurpreet Lambert MD  01/20/20 [id number]

## 2020-01-19 NOTE — ED TRIAGE NOTES
"Pt reports headache and productive cough since Tuesday. Pt also reports right sided flank pain with brown concentrated urine. Pt also reports several episodes where she \"Im dizzy where the ground goes out from under me and I fall down, it has been more frequent where I just need to stay in bed\"  "

## 2020-01-19 NOTE — ED AVS SNAPSHOT
Emergency Department  [address] [place] [address]  Phone:  [phone]  Fax:  [phone]                                    Macey Romero   MRN: [id number]    Department:   Emergency Department   Date of Visit:  1/19/2020           After Visit Summary Signature Page    I have received my discharge instructions, and my questions have been answered. I have discussed any challenges I see with this plan with the nurse or doctor.    ..........................................................................................................................................  Patient/Patient Representative Signature      ..........................................................................................................................................  Patient Representative Print Name and Relationship to Patient    ..................................................               ................................................  Date                                   Time    ..........................................................................................................................................  Reviewed by Signature/Title    ...................................................              ..............................................  Date                                               Time          22EPIC Rev 08/18

## 2020-01-19 NOTE — DISCHARGE INSTRUCTIONS
-Take acetaminophen 500 to 1000 mg by mouth every 4 to 6 hours as needed for pain or fever.  Do not take more than 4000 mg in 24 hours.  Do not take within 6 hours of another acetaminophen containing medication such as norco (vicodin) or percocet.  - Take ibuprofen 600 to 800 mg by mouth every 6 to 8 hours as needed for pain or fever      Discharge Instructions  Influenza    You were diagnosed today with influenza or influenza like illness.  Influenza is a respiratory (breathing) illness caused by influenza A or B viruses.  Influenza causes five primary symptoms: fever, headache, muscle aches/fatigue/malaise, sore throat and cough.  These symptoms start one to four days after you have been around a person with this illness. Influenza is spread through sneezing and coughing and can live on surfaces for several days.  It is usually contagious for 5 days but in some cases up to 10 days and often affects several family members. If you have a family member who is less than 2 years old, older than 65 years old, pregnant or has a serious medical condition, they should be seen right away by a provider to decide if they should take preventative medications. Although influenza will make you feel very ill, most patients don t require any specific treatment. An antiviral medication might be prescribed for certain groups of patients (older patients, younger patients, and those with certain chronic medical problems).    Generally, every Emergency Department visit should have a follow-up clinic visit with either a primary or a specialty clinic/provider. Please follow-up as instructed by your emergency provider today.    Return to the Emergency Department if:  You have trouble breathing.  You develop pain in your chest.  You have signs of being dehydrated, such as dizziness or unable to urinate (pee) at least three times daily.  You are confused or severely weak.  You cannot stop vomiting (throwing up) or you cannot drink enough  fluids.    In children, you should seek help if the child has any of the above or if child:  Has blue or purplish skin color.  Is so irritable that he or she does not want to be held.  Does not have tears when crying (in infants) or does not urinate at least three times daily.  Does not wake up easily.    What can I do to help myself?  Rest.  Fluids -- Drink hydrating solutions such as Gatorade  or Pedialyte  as often as you can. If you are drinking enough, you should pass urine at least every eight hours.  Tylenol  (acetaminophen) and Advil  (ibuprofen) can relieve fever, headache, and muscle aches. Do not give aspirin to children under 18 years old.   Antiviral treatment -- Antiviral medicines do not make the flu symptoms go away immediately.  They have only been shown to make the symptoms go away 12 to 24 hours sooner than they would without treatment.     Antibiotics -- Antibiotics are NOT useful for treating viral illnesses such as influenza. Antibiotics should only be used if there is a bacterial complication of the flu such as bacterial pneumonia, ear infection, or sinusitis.  Because you were diagnosed with a flu like illness you are very contagious.  This means you cannot work, attend school or  for at least 24 hours or until you no longer have a fever.  If you were given a prescription for medicine here today, be sure to read all of the information (including the package insert) that comes with your prescription.  This will include important information about the medicine, its side effects, and any warnings that you need to know about.  The pharmacist who fills the prescription can provide more information and answer questions you may have about the medicine.  If you have questions or concerns that the pharmacist cannot address, please call or return to the Emergency Department.   Remember that you can always come back to the Emergency Department if you are not able to see your regular provider in  the amount of time listed above, if you get any new symptoms, or if there is anything that worries you.

## 2020-01-19 NOTE — ED NOTES
Patient ambulated to the restroom. Patient had  Complaints of being dizzy with movement but she was able to ambulate with assistance

## 2020-01-20 LAB
BACTERIA SPEC CULT: NORMAL
Lab: NORMAL
SPECIMEN SOURCE: NORMAL

## 2020-01-21 NOTE — RESULT ENCOUNTER NOTE
Final urine culture report is NEGATIVE per [facility] Result protocol.    If NEGATIVE result, no change in treatment, per [facility] Result protocol.

## 2020-01-27 ENCOUNTER — TRANSFERRED RECORDS (OUTPATIENT)
Dept: FAMILY MEDICINE | Facility: CLINIC | Age: 70
End: 2020-01-27

## 2020-01-31 ENCOUNTER — TRANSFERRED RECORDS (OUTPATIENT)
Dept: FAMILY MEDICINE | Facility: CLINIC | Age: 70
End: 2020-01-31

## 2020-02-12 ENCOUNTER — TRANSFERRED RECORDS (OUTPATIENT)
Dept: HEALTH INFORMATION MANAGEMENT | Facility: CLINIC | Age: 70
End: 2020-02-12

## 2020-03-19 DIAGNOSIS — J45.20 MILD INTERMITTENT REACTIVE AIRWAY DISEASE WITHOUT COMPLICATION: Primary | ICD-10-CM

## 2020-03-19 RX ORDER — ALBUTEROL SULFATE 90 UG/1
2 AEROSOL, METERED RESPIRATORY (INHALATION) EVERY 6 HOURS
Qty: 1 INHALER | Refills: 2 | Status: SHIPPED | OUTPATIENT
Start: 2020-03-19 | End: 2021-06-25

## 2020-03-23 DIAGNOSIS — J45.20 MILD INTERMITTENT REACTIVE AIRWAY DISEASE WITHOUT COMPLICATION: Primary | ICD-10-CM

## 2020-03-23 RX ORDER — ALBUTEROL SULFATE 0.83 MG/ML
SOLUTION RESPIRATORY (INHALATION)
Qty: 1 BOX | Refills: 3 | Status: SHIPPED | OUTPATIENT
Start: 2020-03-23 | End: 2022-09-23

## 2020-05-19 ENCOUNTER — TRANSFERRED RECORDS (OUTPATIENT)
Dept: HEALTH INFORMATION MANAGEMENT | Facility: CLINIC | Age: 70
End: 2020-05-19

## 2020-05-20 ENCOUNTER — TRANSFERRED RECORDS (OUTPATIENT)
Dept: HEALTH INFORMATION MANAGEMENT | Facility: CLINIC | Age: 70
End: 2020-05-20

## 2020-05-21 ENCOUNTER — TELEPHONE (OUTPATIENT)
Dept: OPHTHALMOLOGY | Facility: CLINIC | Age: 70
End: 2020-05-21

## 2020-05-21 NOTE — TELEPHONE ENCOUNTER
Referral in system-- Dr. Walters referring  Referring to neuro-ophthalmology    Pt illness in December followed by subjective vision changes-- more in certain contrasts-- seeing patterns/dots at times    Scheduled first available with Dr. Frazier June 10th  Pt aware of date/time/location    Note to facilitator--AUGUSTINE Puri, RN 4:30 PM 05/21/20                [facility]    Phone Message    May a detailed message be left on voicemail: yes     Reason for Call: Other: Patient is be referred for subjective visual disturbances. Referral has been scanned. Please review and call patient to schedule. Any questions please call Juanita at [phone]     Action Taken: Other: Eye Clinic    Travel Screening: Not Applicable

## 2020-05-22 ENCOUNTER — VIRTUAL VISIT (OUTPATIENT)
Dept: FAMILY MEDICINE | Facility: CLINIC | Age: 70
End: 2020-05-22

## 2020-05-22 DIAGNOSIS — Z20.822 EXPOSURE TO COVID-19 VIRUS: Primary | ICD-10-CM

## 2020-05-22 DIAGNOSIS — C50.919 MALIGNANT NEOPLASM OF FEMALE BREAST, UNSPECIFIED ESTROGEN RECEPTOR STATUS, UNSPECIFIED LATERALITY, UNSPECIFIED SITE OF BREAST (H): ICD-10-CM

## 2020-05-22 DIAGNOSIS — R42 VERTIGO: ICD-10-CM

## 2020-05-22 DIAGNOSIS — J43.9 PULMONARY EMPHYSEMA, UNSPECIFIED EMPHYSEMA TYPE (H): ICD-10-CM

## 2020-05-22 PROCEDURE — 99214 OFFICE O/P EST MOD 30 MIN: CPT | Mod: GT | Performed by: FAMILY MEDICINE

## 2020-05-22 NOTE — LETTER
[facility]  [address] [place]  [address]  Phone: [phone]    June 1, 2020      Macey Romero  [address]  [facility] [address]              Dear Macey,    I am writing to report that your included test results are negative for previous COVID infection. I do not suggest that we make any changes at this time.         Sincerely,     Long Ferrari M.D.    Results for orders placed or performed in visit on 05/22/20   SARS CoV2 Yamileth IgA ([facility])     Status: None   Result Value Ref Range    SARS CoV2 Yamileth IgA Negative Negative    Narrative    Test(s) 164073-SARS-CoV-2 Antibody, IgA  has not been reviewed by the Food and Drug Administration.  Performed at:  [address]  [id number]  : Russ Dykes MD, Phone:  [phone]   SARS CoV2 Yamileth IgG ([facility])     Status: None   Result Value Ref Range    SARS CoV2 Yamileth IgG Comment Negative    Narrative    Test(s) 164057-SARS-CoV-2 Antibody, IgG; 201429-  DiaSorin SARS-CoV-2 Ab, IgG  has not been FDA cleared or approved. This test has  been authorized by FDA under an Emergency Use Authorization  (EUA). This test is only authorized for the duration of the  declaration that circumstances exist justifying the authorization  of emergency use of in vitro diagnostics for detection and/or  diagnosis of COVID-19 under Section 564(b)(1) of the Act, 21  U.S.C. 360bbb-3(b)(1), unless the authorization is terminated or  revoked sooner. This test has been authorized only for detecting  the presence of antibodies against SARS-CoV-2, not for any other  viruses or pathogens.  Performed at:  01 - [facility] [address] [place], [place]  [id number]  : Lam Tian MD, Phone:  [phone]   DiaSorin SARS CoV2 Yamileth IgG ([facility])     Status: None   Result Value Ref Range    DiaSorin SARS CoV2 Yamileth IgG Negative Negative    Narrative    Performed at:  01 - [facility] [address],  [place], [place]  [id number]  : Lam Tian MD, Phone:  [phone]

## 2020-05-22 NOTE — PROGRESS NOTES
"  Problem(s) Oriented visit      Macey Romero is being evaluated via a billable video visit.      The patient has been notified of following:     \"This video visit will be conducted via a call between you and your physician/provider. We have found that certain health care needs can be provided without the need for an in-person physical exam.  This service lets us provide the care you need with a video conversation.  If a prescription is necessary we can send it directly to your pharmacy.  If lab work is needed we can place an order for that and you can then stop by our lab to have the test done at a later time.    If during the course of the call the physician/provider feels a video visit is not appropriate, you will not be charged for this service.\"     Physician has received verbal consent for a Video Visit from the patient? Yes  11:15 AM  SUBJECTIVE:                                                    Subjective     Macey Romero is a 69 year old female who presents to clinic today for the following health issues:    HPI     Went to the ER in January with with a fever, low grade temp, sick for weeks,did have Influenza A     Histories:   Patient Active Problem List   Diagnosis     Reactive airway disease     Osteoarthritis     Osteopenia     Malignant neoplasm of female breast (H)     Pain disorder     IBS (irritable bowel syndrome)     Keloid of skin     Myalgia and myositis     Pterygium eye     Fx ankle     Health Care Home     Hip pain, left     Chronic pain of both knees     Fibromyalgia     Pulmonary emphysema, unspecified emphysema type (H)     Past Surgical History:   Procedure Laterality Date     anterior c-disc fusion       BLEPHAROPLASTY, BROW LIFT BILATERAL, COMBINED Bilateral 6/18/2018    Procedure: COMBINED BLEPHAROPLASTY, BROW LIFT BILATERAL;  BILATERAL UPPER LID BLEPHAROPLASTY; BILATERAL BROW PTOSIS REPAIR;  Surgeon: Сергей Walters MD;  Location: [facility]     CHOLECYSTECTOMY       COLONOSCOPY N/A " 10/19/2017    Procedure: COLONOSCOPY;  COLONOSCOPY;  Surgeon: Niko Wong MD;  Location:  GI     D & C      Bleeding before hysterectomy     ENDOSCOPY  08     HYSTERECTOMY, MARCUS      Ovaries and tubes out due to breast cancer     JOINT REPLACEMTN, KNEE RT/LT Right 2011    Joint Replacement knee RT, with tibial straightening (2 replacements)     MAMMOPLASTY AUGMENTATION BILATERAL      breast ca      MASTECTOMY, SIMPLE RT/LT/CLAIRE Bilateral 1989    Mastectomy Simple RT/LT/CLAIRE     MOHS MICROGRAPHIC PROCEDURE      Left lateral nose     OPEN REDUCTION INTERNAL FIXATION ANKLE  2013    Procedure: OPEN REDUCTION INTERNAL FIXATION ANKLE;  RIGHT OPEN REDUCTION INTERNAL FIXATION ANKLE WITH LIGAMENT REPAIR;  Surgeon: Nando Chang MD;  Location:  OR     PTERYGIUM WITH CONJUNCTIVAL AUTOLOGOUS TRANSPLANT Left 2016    Procedure: PTERYGIUM WITH CONJUNCTIVAL AUTOLOGOUS TRANSPLANT;  Surgeon: Сергей Walters MD;  Location:  [facility]     REPAIR LIGAMENT ANKLE  2013    Procedure: REPAIR LIGAMENT ANKLE;;  Surgeon: Nando Chagn MD;  Location:  OR     SALIVARY GLAND SURGERY Left     Stone removal      TONSILLECTOMY         Social History     Tobacco Use     Smoking status: Former Smoker     Last attempt to quit: 2016     Years since quittin.3     Smokeless tobacco: Never Used     Tobacco comment: quit but  still smokes, but not in house   Substance Use Topics     Alcohol use: No     Alcohol/week: 0.0 standard drinks     Family History   Problem Relation Age of Onset     Respiratory Father      Cancer Brother         sarcoidosis     Diabetes Brother      Cerebrovascular Disease Brother      Liver Disease Brother            Reviewed and updated as needed this visit by Provider         ROS:  General and Resp. completed and negative except as noted above          OBJECTIVE:                                                      Objective    There were no vitals taken for this  "visit.  Estimated body mass index is 24.8 kg/m  as calculated from the following:    Height as of 1/19/20: 1.6 m (5' 3\").    Weight as of 1/19/20: 63.5 kg (140 lb).    Physical Exam   APPEARANCE: = Relaxed and in no distress  Resp effort = Calm regular breathing  Breath Sounds = Good air movement with no rales or rhonchi in any lung fields  Musculsktl =  Strength and ROM of major joints are within normal limits  Mood/Affect = Cooperative and interested            ASSESSMENT/PLAN:                                                        There are no Patient Instructions on file for this visit.  Diagnoses and all orders for this visit:    Exposure to Covid-19 Virus  -     SARS CoV2 Yamileth IgG ([facility])  -     SARS CoV2 Yamileth IgA ([facility])    Vertigo ciould be due to cerebral volume loss seen on last MRI    Pulmonary emphysema, unspecified emphysema type (H)    Malignant neoplasm of female breast, unspecified estrogen receptor status, unspecified laterality, unspecified site of breast (H)        Able to come over and get blood work...  Long discussion about     Video-Visit Details    Type of service:  Video Visit  Originating Location (pt. Location): Home  Distant Location (provider location):  [facility]   Mode of Communication:  Video Conference via Apple Facetime      The following health maintenance items are reviewed in Epic and correct as of today:  Health Maintenance   Topic Date Due     SPIROMETRY  1950     URINE DRUG SCREEN  1950     COPD ACTION PLAN  1950     DTAP/TDAP/TD IMMUNIZATION (1 - Tdap) 11/08/1975     ZOSTER IMMUNIZATION (1 of 2) 11/08/2000     ADVANCE CARE PLANNING  08/26/2018     A1C  05/06/2019     PHQ-2  01/01/2020     INFLUENZA VACCINE (Season Ended) 09/01/2020     MEDICARE ANNUAL WELLNESS VISIT  09/09/2020     LIPID  09/09/2020     FALL RISK ASSESSMENT  09/09/2020     COLORECTAL CANCER SCREENING  10/06/2020     DEXA  09/13/2021     HEPATITIS C SCREENING  Completed     " PNEUMOCOCCAL IMMUNIZATION 65+ LOW/MEDIUM RISK  Completed     IPV IMMUNIZATION  Aged Out     MENINGITIS IMMUNIZATION  Aged Out       Long eFrrari MD  [facility]  [phone]    For any issues my office # is [phone]

## 2020-05-25 LAB
DIASORIN SARS COV2 ABY IGG: NEGATIVE
SARS COV2 ABY IGA: NEGATIVE
SARS COV2 ABY IGG: NORMAL

## 2020-05-29 NOTE — RESULT ENCOUNTER NOTE
Dear Macey,   I am writing to report that your included test results are negative for previous COVID infection. I do not suggest that we make any changes at this time.  Long Ferrari M.D.

## 2020-06-10 ENCOUNTER — OFFICE VISIT (OUTPATIENT)
Dept: OPHTHALMOLOGY | Facility: CLINIC | Age: 70
End: 2020-06-10
Attending: OPHTHALMOLOGY
Payer: MEDICARE

## 2020-06-10 DIAGNOSIS — H53.40 UNSPECIFIED VISUAL FIELD DEFECTS: ICD-10-CM

## 2020-06-10 DIAGNOSIS — H53.10 SUBJECTIVE VISUAL DISTURBANCE: Primary | ICD-10-CM

## 2020-06-10 PROCEDURE — 92133 CPTRZD OPH DX IMG PST SGM ON: CPT | Mod: ZF | Performed by: OPHTHALMOLOGY

## 2020-06-10 PROCEDURE — G0463 HOSPITAL OUTPT CLINIC VISIT: HCPCS | Mod: ZF

## 2020-06-10 PROCEDURE — 92083 EXTENDED VISUAL FIELD XM: CPT | Mod: ZF | Performed by: OPHTHALMOLOGY

## 2020-06-10 ASSESSMENT — REFRACTION_WEARINGRX
OD_SPHERE: +2.00
OD_CYLINDER: +0.75
OD_AXIS: 070
OS_CYLINDER: +0.75
OS_ADD: +2.75
OS_AXIS: 180
OS_SPHERE: +2.50
OD_ADD: +2.75

## 2020-06-10 ASSESSMENT — VISUAL ACUITY
CORRECTION_TYPE: GLASSES
OD_CC: 20/20
METHOD: SNELLEN - LINEAR
OS_CC: 20/20

## 2020-06-10 ASSESSMENT — TONOMETRY
OS_IOP_MMHG: 13
OD_IOP_MMHG: 12
IOP_METHOD: ICARE

## 2020-06-10 ASSESSMENT — EXTERNAL EXAM - RIGHT EYE: OD_EXAM: NORMAL

## 2020-06-10 ASSESSMENT — CONF VISUAL FIELD
OD_NORMAL: 1
OS_NORMAL: 1

## 2020-06-10 ASSESSMENT — CUP TO DISC RATIO
OD_RATIO: 0.2
OS_RATIO: 0.2

## 2020-06-10 ASSESSMENT — EXTERNAL EXAM - LEFT EYE: OS_EXAM: NORMAL

## 2020-06-10 NOTE — PROGRESS NOTES
"   1. Subjective visual disturbance in both eyes of unclear etiology coinciding with severe headaches, confusion, and influenza A infection.  It is purely speculative however the patient may have suffered mild encephalitis secondary to influenza which is a known potential sequelae.  She now appears to be suffering from impaired suppression of physiologic entopic phenomena.  I think this is attributable to dysfunction of visual processing portions of the brain.  Unfortunately I am not aware of any way to objectively evaluate or prove this very and nor would there be any effective specific treatment aside from hoping that symptoms will improve with time.  Structurally her eye exam is normal normal and neuro-ophthalmologic exam essentially unremarkable. I see no other more serious progressive etiology for her symptoms.  I do not expect that her symptoms will worsen with time.    2. Mild epiretinal membrane in the right eye- not the etiology for subjective visual disturbances that plague her.  Does not appear to be visually significant at this time.    3. Inflamed nasal pterygium in the left eye- patient interested in re-excision. I advised her to follow-up with Dr. Walters for this issue.    Macey Romero is a pleasant 69 year old White female who presents to my neuro-ophthalmology clinic today. Referred by Dr. Walters for subjective visual disturbances.     In January 2020 had high fever and bad cough and found to have Influenza A. Had a bad headache and some encephalopathy. States since that illness has had vision changes. Describes a smooth wall as appearing like it has stucco. Looking at back seat in rearview mirror states seats appear bright red instead of black. Looks like a red mesh with a black spot in it. When looking down to read notices distortion of the lines on the paper and words \"come and go.\" Notices difficult with white objects - for example saw a white car and thought the edges appeared wm. When she " "looks at a white screen notices jagged edges that look like \"a raulito of trees without leaves.\" When blinks feels like she can see the blood vessels in her eye as well as \"black lightning bolts.\" When she closes eyes and looks back and forth gets the appearance of spinning sparklers. Occasionally sees thinks like a bug or man in peripheral vision but then there is nothing there. Overall feels like there visual changes are getting worse. Notes crusting in the AM with some ocular irritation. Denies diplopia.    Also endorses vertigo since this recent illness. Was working with therapist to work on vertigo but unable to follow-up right now due to COVID-19. Has a few episodes of vertigo each day. Has led to 3-4 falls. Denies ataxia. Has daily dull headaches - previously headaches not daily. Some trouble with numbers and short term memory. Had recent memory test that she reportedly passed - had difficulty with placing hands on clock. Denies diplopia or floaters. Had MRI/MRA brain in January due to headache and dizziness:    MRI Brain w/ & w/o Contrast (1/19/2020)  IMPRESSION: Diffuse cerebral volume loss and cerebral white matter  changes consistent with chronic small vessel ischemic disease. No  evidence for acute intracranial pathology.    I reviewed these images today and agree with the interpretation.     MRA Brain & Neck (1/19/2020)  IMPRESSION: Normal MR angiogram of the head.   IMPRESSION: Normal MR angiogram of the neck.      POH: Hx of pterygium removal left eye with recurrence, hx of ERM right eye, s/p bilateral blepharoplasty  PMH: TIA (2005), Hx of breast cancer (s/p bilateral mastectomy 1987), squamous cell carcinomas of face, osteoarthritis, fibromyalgia, irritable bowel syndrome, reactive airway  FH: Family history of ptosis and diabetes. Mother with cerebral aneurysm  SH: Retired OB-Gyn nurse    Visual acuity is 20/20 in each eye with correction. Intraocular pressure is normal. Pupils normal without " afferent pupillary defect. Color plates 11/11 in each eye. Confrontational visual fields full. Motility normal. Slit lamp exam with meibomian gland dysfunction, mild corneal haze right eye, nasal mildly inflamed pterygium left eye and bilateral nuclear sclerotic cataracts. Dilated fundus exam with normal appearing optic nerves and mild epiretinal membrane right eye.    Octopus automated 30 degree visual fields show mild nonspecific changes in each eye. OCT rNFL with normal thickness in each eye compared to age-matched controls. Macular OCT scan with evidence of ERM in the right eye.    This patient has bilateral subjective visual disturbances of unclear etiology.  History sounds suggestive of enhanced recognition of entoptic phenomenon (e.g. 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 Symptoms varied and not stereotypic or localized as would be seen with focal seizures or other pathology of temporal or parietal lobes.  My suspicion is that there is some dysfunction of visual processing following her influenza infection which may have potentially caused mild encephalitis.  I see no role for repeating MRI at this time considering she had those visual changes at the time of her most recent MRI and it was essentially unremarkable per radiology read and my review.    I did not make a follow-up appointment, but I would be happy to see the patient back in the future should any new neuro-ophthalmic concern arise or should her symptoms get considerably worse of course I would always be happy to take another look at her to reevaluate.  She will follow-up for her future care with Dr. Walters and will also inquire about her inflamed pterygium with him.         Complete documentation of historical and exam elements from today's encounter can be found in the full encounter summary report (not reduplicated in this progress note).  I personally obtained the chief  complaint(s) and history of present illness.  I confirmed and edited as necessary the review of systems, past medical/surgical history, family history, social history, and examination findings as documented by others; and I examined the patient myself.  I personally reviewed the relevant tests, images, and reports as documented above.  I formulated and edited as necessary the assessment and plan and discussed the findings and management plan with the patient and family.  I personally reviewed the ophthalmic test(s) associated with this encounter, agree with the interpretation(s) as documented by the resident/fellow, and have edited the corresponding report(s) as necessary.     MD Jax Richards MD  Ophthalmology Resident, PGY-3

## 2020-06-10 NOTE — NURSING NOTE
Chief Complaints and History of Present Illnesses   Patient presents with     Blurred Vision Evaluation     Chief Complaint(s) and History of Present Illness(es)     Blurred Vision Evaluation               Comments     Macey Romero is a 69 year old female who presents today for    1. Subjective visual disturbance  Complaining of   In January had high fever and bad cough. Was seen in ED. They cultured influenza A. Has had daily headaches since then. Patient ended up with vertigo. Has fallen down 3-4 times. That's when the lightening bolts and color changes started. The symptoms have been worse in the last month. Almost constant.   History of breast cancer. Diagnosed 1987. Had bilateral mastectomies. Patient has a history of a couple of squamous cell carcinomas on her face. Around her nose.   Had pterygium that was removed from the left eye, but grew back within a few months.     Jose MURRIETA 8:58 AM Lina 10, 2020

## 2020-06-10 NOTE — LETTER
2020    RE: Macey Romero  : 1950  MRN: [id number]    Dear Dr. Walters:    Thank you for referring your patient, Macey Romero, to my neuro-ophthalmology clinic recently.  After a thorough neuro-ophthalmic history and examination, I came to the following conclusions:   1. Subjective visual disturbance in both eyes of unclear etiology coinciding with severe headaches, confusion, and influenza A infection.  It is purely speculative however the patient may have suffered mild encephalitis secondary to influenza which is a known potential sequelae.  She now appears to be suffering from impaired suppression of physiologic entopic phenomena.  I think this is attributable to dysfunction of visual processing portions of the brain.  Unfortunately I am not aware of any way to objectively evaluate or prove this very and nor would there be any effective specific treatment aside from hoping that symptoms will improve with time.  Structurally her eye exam is normal normal and neuro-ophthalmologic exam essentially unremarkable. I see no other more serious progressive etiology for her symptoms.  I do not expect that her symptoms will worsen with time.    2. Mild epiretinal membrane in the right eye- not the etiology for subjective visual disturbances that plague her.  Does not appear to be visually significant at this time.    3. Inflamed nasal pterygium in the left eye- patient interested in re-excision. I advised her to follow-up with Dr. Walters for this issue.    Macey Romero is a pleasant 69 year old White female who presents to my neuro-ophthalmology clinic today. Referred by Dr. Walters for subjective visual disturbances.     In 2020 had high fever and bad cough and found to have Influenza A. Had a bad headache and some encephalopathy. States since that illness has had vision changes. Describes a smooth wall as appearing like it has stucco. Looking at back seat in rearview mirror states seats appear  "bright red instead of black. Looks like a red mesh with a black spot in it. When looking down to read notices distortion of the lines on the paper and words \"come and go.\" Notices difficult with white objects - for example saw a white car and thought the edges appeared wm. When she looks at a white screen notices jagged edges that look like \"a raulito of trees without leaves.\" When blinks feels like she can see the blood vessels in her eye as well as \"black lightning bolts.\" When she closes eyes and looks back and forth gets the appearance of spinning sparklers. Occasionally sees thinks like a bug or man in peripheral vision but then there is nothing there. Overall feels like there visual changes are getting worse. Notes crusting in the AM with some ocular irritation. Denies diplopia.    Also endorses vertigo since this recent illness. Was working with therapist to work on vertigo but unable to follow-up right now due to COVID-19. Has a few episodes of vertigo each day. Has led to 3-4 falls. Denies ataxia. Has daily dull headaches - previously headaches not daily. Some trouble with numbers and short term memory. Had recent memory test that she reportedly passed - had difficulty with placing hands on clock. Denies diplopia or floaters. Had MRI/MRA brain in January due to headache and dizziness:    MRI Brain w/ & w/o Contrast (1/19/2020)  IMPRESSION: Diffuse cerebral volume loss and cerebral white matter  changes consistent with chronic small vessel ischemic disease. No  evidence for acute intracranial pathology.    I reviewed these images today and agree with the interpretation.     MRA Brain & Neck (1/19/2020)  IMPRESSION: Normal MR angiogram of the head.   IMPRESSION: Normal MR angiogram of the neck.      POH: Hx of pterygium removal left eye with recurrence, hx of ERM right eye, s/p bilateral blepharoplasty  PMH: TIA (2005), Hx of breast cancer (s/p bilateral mastectomy 1987), squamous cell carcinomas of face, " osteoarthritis, fibromyalgia, irritable bowel syndrome, reactive airway  FH: Family history of ptosis and diabetes. Mother with cerebral aneurysm  SH: Retired OB-Gyn nurse    Visual acuity is 20/20 in each eye with correction. Intraocular pressure is normal. Pupils normal without afferent pupillary defect. Color plates 11/11 in each eye. Confrontational visual fields full. Motility normal. Slit lamp exam with meibomian gland dysfunction, mild corneal haze right eye, nasal mildly inflamed pterygium left eye and bilateral nuclear sclerotic cataracts. Dilated fundus exam with normal appearing optic nerves and mild epiretinal membrane right eye.    Octopus automated 30 degree visual fields show mild nonspecific changes in each eye. OCT rNFL with normal thickness in each eye compared to age-matched controls. Macular OCT scan with evidence of ERM in the right eye.    This patient has bilateral subjective visual disturbances of unclear etiology.  History sounds suggestive of enhanced recognition of entoptic phenomenon (e.g. 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 Symptoms varied and not stereotypic or localized as would be seen with focal seizures or other pathology of temporal or parietal lobes.  My suspicion is that there is some dysfunction of visual processing following her influenza infection which may have potentially caused mild encephalitis.  I see no role for repeating MRI at this time considering she had those visual changes at the time of her most recent MRI and it was essentially unremarkable per radiology read and my review.    I did not make a follow-up appointment, but I would be happy to see the patient back in the future should any new neuro-ophthalmic concern arise or should her symptoms get considerably worse of course I would always be happy to take another look at her to reevaluate.  She will follow-up for her future care with  Dr. Walters and will also inquire about her inflamed pterygium with him.      Again, thank you for trusting me with the care of your patient.  For further exam details, please feel free to contact our office for additional records.  If you wish to contact me regarding this patient please email me at [email or web address] or give my clinic a call to arrange a phone conversation.    Sincerely,    Scout Fenton MD  , Neuro-Ophthalmology and Adult Strabismus Surgery  The Meghan Olson Chair in Neuro-Ophthalmology  Department of Ophthalmology and Visual Neurosciences  [facility]

## 2020-08-20 ENCOUNTER — OFFICE VISIT (OUTPATIENT)
Dept: FAMILY MEDICINE | Facility: CLINIC | Age: 70
End: 2020-08-20

## 2020-08-20 VITALS
WEIGHT: 161 LBS | RESPIRATION RATE: 16 BRPM | DIASTOLIC BLOOD PRESSURE: 70 MMHG | TEMPERATURE: 99 F | SYSTOLIC BLOOD PRESSURE: 108 MMHG | OXYGEN SATURATION: 97 % | BODY MASS INDEX: 28.53 KG/M2 | HEIGHT: 63 IN | HEART RATE: 79 BPM

## 2020-08-20 DIAGNOSIS — J45.40 MODERATE PERSISTENT REACTIVE AIRWAY DISEASE WITHOUT COMPLICATION: ICD-10-CM

## 2020-08-20 DIAGNOSIS — C50.919 MALIGNANT NEOPLASM OF FEMALE BREAST, UNSPECIFIED ESTROGEN RECEPTOR STATUS, UNSPECIFIED LATERALITY, UNSPECIFIED SITE OF BREAST (H): ICD-10-CM

## 2020-08-20 DIAGNOSIS — Z01.818 PREOP GENERAL PHYSICAL EXAM: Primary | ICD-10-CM

## 2020-08-20 DIAGNOSIS — H35.371 MACULAR PUCKER, RIGHT EYE: ICD-10-CM

## 2020-08-20 DIAGNOSIS — M79.7 FIBROMYALGIA: ICD-10-CM

## 2020-08-20 PROCEDURE — 99215 OFFICE O/P EST HI 40 MIN: CPT | Performed by: FAMILY MEDICINE

## 2020-08-20 ASSESSMENT — MIFFLIN-ST. JEOR: SCORE: 1224.42

## 2020-08-20 NOTE — PROGRESS NOTES
[facility]  [address] [place] [id number] [phone]  Dept: [phone]    PRE-OP EVALUATION:  Today's date: 2020    Macey Romero (: 1950) presents for pre-operative evaluation assessment as requested by Dr. Baljinder Onofre.  She requires evaluation and anesthesia risk assessment prior to undergoing surgery/procedure for treatment of membrane peel, right eye .    Proposed Surgery/ Procedure: Vitrectomy, Right eye  Date of Surgery/ Procedure: 2020  Time of Surgery/ Procedure: 1000  Hospital/Surgical Facility: [facility]  Surgery Fax Number: [phone] & [phone]  Primary Physician: Long Ferrari  Type of Anesthesia Anticipated: to be determined    Preoperative Questionnaire:   YES - HAVE YOU EVER HAD A HEART ATTACK OR STROKE? 2004  No - Have you ever had surgery on your heart or blood vessels, such as a stent, coronary (heart) bypass, or surgery on an artery in the head, neck, heart, or legs?  No - Do you have chest pain when you are physically active?  No - Do you have a history of heart failure?  No - Do you currently have a cold, bronchitis, or symptoms of other respiratory (head and chest) infections?  No - Do you have a cough, shortness of breath, or wheezing?  YES - DO YOU OR ANYONE IN YOUR FAMILY HAVE A HISTORY OF BLOOD CLOTS? Family Member - Possibly patient after   YES - DO YOU OR ANYONE IN YOUR FAMILY HAVE A SERIOUS BLEEDING PROBLEM, SUCH AS LONG-LASTING BLEEDING AFTER SURGERIES OR CUTS? Mother/Daughter  No - Have you ever had anemia or been told to take iron pills?  No - Have you had any abnormal blood loss such as black, tarry or bloody stools, or abnormal vaginal bleeding?  No - Have you ever had a blood transfusion?  Yes - ARE YOU WILLING TO HAVE A BLOOD TRANSFUSION IF IT IS MEDICALLY NEEDED BEFORE, DURING, OR AFTER YOUR SURGERY?  No - Have you or anyone in your family ever had problems with anesthesia  "(sedation for surgery)?  No - Do you have sleep apnea, excessive snoring, or daytime drowsiness?   No - Do you have any artifical heart valves or other implanted medical devices, such as a pacemaker, defibrillator, or continuous glucose monitor?  No - Do you have any artifical joints?  No - Are you allergic to latex?  No - Is there any chance that you may be pregnant?    Patient has a Health Care Directive or Living Will:  YES     HPI:     HPI related to upcoming procedure: Has a \"crease\" in the retina and now ready for Vitrectomy.    This winter had Influenza A. That may have led to this eye problem.      See problem list for active medical problems.  Problems all longstanding and stable, except as noted/documented.  See ROS for pertinent symptoms related to these conditions.      MEDICAL HISTORY:     Patient Active Problem List    Diagnosis Date Noted     Pulmonary emphysema, unspecified emphysema type (H) 11/06/2018     Priority: Medium     Fibromyalgia      Priority: Medium     Chronic pain of both knees 03/21/2018     Priority: Medium     Hip pain, left 12/18/2015     Priority: Medium     Health Care Home 10/31/2013     Priority: Medium     Fx ankle 08/27/2013     Priority: Medium     Pterygium eye 08/26/2013     Priority: Medium     Myalgia and myositis 09/12/2011     Priority: Medium     Problem list name updated by automated process. Provider to review       IBS (irritable bowel syndrome) 06/28/2011     Priority: Medium     Keloid of skin 06/28/2011     Priority: Medium     Reactive airway disease 02/01/2011     Priority: Medium     Osteoarthritis 02/01/2011     Priority: Medium     Osteopenia 02/01/2011     Priority: Medium     Malignant neoplasm of female breast (H) 02/01/2011     Priority: Medium     1987       Pain disorder 02/01/2011     Priority: Medium      Past Medical History:   Diagnosis Date     Breast CA in situ 1987     Cancer (H) 1987    Right Breast treated with surgery     Chronic " constipation      Fibromyalgia      Meningitis     Several times as an adult     Osteoarthritis 2/1/2011     Osteopenia 2/1/2011     Pneumonia      Pterygium eye 8/26/2013     Reactive airway disease 2/1/2011     Seasonal allergies      Shingles     Prior to 2008 - scalp     Skin cancer     SCC - hand, left nose     Past Surgical History:   Procedure Laterality Date     anterior c-disc fusion       BLEPHAROPLASTY, BROW LIFT BILATERAL, COMBINED Bilateral 6/18/2018    Procedure: COMBINED BLEPHAROPLASTY, BROW LIFT BILATERAL;  BILATERAL UPPER LID BLEPHAROPLASTY; BILATERAL BROW PTOSIS REPAIR;  Surgeon: Сергей Walters MD;  Location:  [facility]     CHOLECYSTECTOMY       COLONOSCOPY N/A 10/19/2017    Procedure: COLONOSCOPY;  COLONOSCOPY;  Surgeon: Niko Wong MD;  Location:  GI     D & C      Bleeding before hysterectomy     ENDOSCOPY  04/21/08     HYSTERECTOMY, MARCUS      Ovaries and tubes out due to breast cancer     JOINT REPLACEMTN, KNEE RT/LT Right 01/2011    Joint Replacement knee RT, with tibial straightening (2 replacements)     MAMMOPLASTY AUGMENTATION BILATERAL      breast ca      MASTECTOMY, SIMPLE RT/LT/CLAIRE Bilateral 1989    Mastectomy Simple RT/LT/CLAIRE     MOHS MICROGRAPHIC PROCEDURE      Left lateral nose     OPEN REDUCTION INTERNAL FIXATION ANKLE  8/27/2013    Procedure: OPEN REDUCTION INTERNAL FIXATION ANKLE;  RIGHT OPEN REDUCTION INTERNAL FIXATION ANKLE WITH LIGAMENT REPAIR;  Surgeon: Nando Chang MD;  Location:  OR     PTERYGIUM WITH CONJUNCTIVAL AUTOLOGOUS TRANSPLANT Left 8/29/2016    Procedure: PTERYGIUM WITH CONJUNCTIVAL AUTOLOGOUS TRANSPLANT;  Surgeon: Сергей Walters MD;  Location:  [facility]     REPAIR LIGAMENT ANKLE  8/27/2013    Procedure: REPAIR LIGAMENT ANKLE;;  Surgeon: Nando Chang MD;  Location:  OR     SALIVARY GLAND SURGERY Left     Stone removal      TONSILLECTOMY       Current Outpatient Medications   Medication Sig Dispense Refill     Acetaminophen (TYLENOL PO) Take 325 mg  "by mouth 2 times daily as needed for mild pain       albuterol (PROAIR HFA/PROVENTIL HFA/VENTOLIN HFA) 108 (90 Base) MCG/ACT inhaler Inhale 2 puffs into the lungs every 6 hours 1 Inhaler 2     albuterol (PROVENTIL) (2.5 MG/3ML) 0.083% neb solution Take 1 vial by nebulization every 4 hours as needed for shortness of breath/dyspnea or wheezing 1 Box 3     budesonide-formoterol (SYMBICORT) 160-4.5 MCG/ACT Inhaler Inhale 2 puffs into the lungs 2 times daily 3 Inhaler 3     OTC products: None, except as noted above    Allergies   Allergen Reactions     Tetanus Toxoids Anaphylaxis     Erythromycin GI Disturbance     Levaquin Other (See Comments)     unknown     Penicillins Hives     Silicone Rash      Latex Allergy: NO    Social History     Tobacco Use     Smoking status: Former Smoker     Last attempt to quit: 2016     Years since quittin.6     Smokeless tobacco: Never Used     Tobacco comment: quit but  still smokes, but not in house   Substance Use Topics     Alcohol use: No     Alcohol/week: 0.0 standard drinks     History   Drug Use No       REVIEW OF SYSTEMS:   Constitutional, neuro, ENT, endocrine, pulmonary, cardiac, gastrointestinal, genitourinary, musculoskeletal, integument and psychiatric systems are negative, except as otherwise noted.    EXAM:   /70   Pulse 79   Temp 99  F (37.2  C) (Skin)   Resp 16   Ht 1.6 m (5' 3\")   Wt 73 kg (161 lb)   SpO2 97%   BMI 28.52 kg/m      GENERAL APPEARANCE: healthy, alert and no distress     EYES: EOMI, PERRL     HENT: ear canals and TM's normal and nose and mouth without ulcers or lesions     NECK: no adenopathy, no asymmetry, masses, or scars and thyroid normal to palpation     RESP: lungs clear to auscultation - no rales, rhonchi or wheezes     CV: regular rates and rhythm, normal S1 S2, no S3 or S4 and no murmur, click or rub     ABDOMEN:  soft, nontender, no HSM or masses and bowel sounds normal     MS: extremities normal- no gross deformities " noted, no evidence of inflammation in joints, FROM in all extremities.     SKIN: no suspicious lesions or rashes     NEURO: Normal strength and tone, sensory exam grossly normal, mentation intact and speech normal     PSYCH: mentation appears normal. and affect normal/bright     LYMPHATICS: No cervical adenopathy    DIAGNOSTICS:   No labs or EKG required for low risk surgery (cataract, skin procedure, breast biopsy, etc)    Recent Labs   Lab Test 01/19/20  1131 09/09/19  0930 09/09/19 11/06/18  0845  03/12/18  0857   HGB 15.0  --  15.0   < >  --    < >  --      --  213   < >  --    < >  --     143  --   --   --    < > 142   POTASSIUM 3.4 4.5  --   --   --    < > 4.5   CR 0.92 0.79  --   --   --    < > 0.93   A1C  --   --   --   --  5.8*  --  5.7*    < > = values in this interval not displayed.        IMPRESSION:   Reason for surgery/procedure: macular pucker    The proposed surgical procedure is considered LOW risk.    REVISED CARDIAC RISK INDEX  The patient has the following serious cardiovascular risks for perioperative complications such as (MI, PE, VFib and 3  AV Block):  No serious cardiac risks  INTERPRETATION: 1 risks: Class II (low risk - 0.9% complication rate)    The patient has the following additional risks for perioperative complications:  No identified additional risks      ICD-10-CM    1. Preop general physical exam  Z01.818    2. Fibromyalgia  M79.7    3. Moderate persistent reactive airway disease without complication  J45.40    4. Malignant neoplasm of female breast, unspecified estrogen receptor status, unspecified laterality, unspecified site of breast (H)  C50.919    5. Macular pucker, right eye  H35.371        RECOMMENDATIONS:         --Patient is to take all scheduled medications on the day of surgery EXCEPT for modifications listed below.    APPROVAL GIVEN to proceed with proposed procedure, without further diagnostic evaluation       Signed Electronically by: Long Moscoso  MD Vega    Copy of this evaluation report is provided to requesting physician.    [place] Preop Guidelines    Revised Cardiac Risk Index

## 2020-08-20 NOTE — PATIENT INSTRUCTIONS

## 2020-09-03 NOTE — PROGRESS NOTES
8/20/20 faxed this preop to [facility] @ [phone] and [phone]    Isaiah Hilton,   [facility]  [phone]

## 2020-10-12 ENCOUNTER — TRANSFERRED RECORDS (OUTPATIENT)
Dept: FAMILY MEDICINE | Facility: CLINIC | Age: 70
End: 2020-10-12

## 2020-10-19 ENCOUNTER — TRANSFERRED RECORDS (OUTPATIENT)
Dept: FAMILY MEDICINE | Facility: CLINIC | Age: 70
End: 2020-10-19

## 2020-10-21 ENCOUNTER — OFFICE VISIT (OUTPATIENT)
Dept: FAMILY MEDICINE | Facility: CLINIC | Age: 70
End: 2020-10-21

## 2020-10-21 VITALS
TEMPERATURE: 98.1 F | DIASTOLIC BLOOD PRESSURE: 56 MMHG | SYSTOLIC BLOOD PRESSURE: 104 MMHG | HEART RATE: 75 BPM | OXYGEN SATURATION: 97 % | RESPIRATION RATE: 17 BRPM

## 2020-10-21 DIAGNOSIS — Z01.818 PREOP GENERAL PHYSICAL EXAM: Primary | ICD-10-CM

## 2020-10-21 DIAGNOSIS — R73.03 PREDIABETES: ICD-10-CM

## 2020-10-21 DIAGNOSIS — M79.7 FIBROMYALGIA: ICD-10-CM

## 2020-10-21 DIAGNOSIS — J45.41 REACTIVE AIRWAY DISEASE, MODERATE PERSISTENT, WITH ACUTE EXACERBATION: ICD-10-CM

## 2020-10-21 PROBLEM — J43.9 PULMONARY EMPHYSEMA, UNSPECIFIED EMPHYSEMA TYPE (H): Status: RESOLVED | Noted: 2018-11-06 | Resolved: 2020-10-21

## 2020-10-21 LAB
% GRANULOCYTES: 65.8 % (ref 42.2–75.2)
HCT VFR BLD AUTO: 43.1 % (ref 35–46)
HEMOGLOBIN: 14.6 G/DL (ref 11.8–15.5)
LYMPHOCYTES NFR BLD AUTO: 27.8 % (ref 20.5–51.1)
MCH RBC QN AUTO: 28.3 PG (ref 27–31)
MCHC RBC AUTO-ENTMCNC: 34 G/DL (ref 33–37)
MCV RBC AUTO: 83.2 FL (ref 80–100)
MONOCYTES NFR BLD AUTO: 6.4 % (ref 1.7–9.3)
PLATELET # BLD AUTO: 207 K/UL (ref 140–450)
RBC # BLD AUTO: 5.17 X10/CMM (ref 3.7–5.2)
WBC # BLD AUTO: 6.5 X10/CMM (ref 3.8–11)

## 2020-10-21 PROCEDURE — 93000 ELECTROCARDIOGRAM COMPLETE: CPT | Performed by: NURSE PRACTITIONER

## 2020-10-21 PROCEDURE — 85025 COMPLETE CBC W/AUTO DIFF WBC: CPT | Performed by: NURSE PRACTITIONER

## 2020-10-21 PROCEDURE — 99214 OFFICE O/P EST MOD 30 MIN: CPT | Performed by: NURSE PRACTITIONER

## 2020-10-21 RX ORDER — BUDESONIDE AND FORMOTEROL FUMARATE DIHYDRATE 160; 4.5 UG/1; UG/1
2 AEROSOL RESPIRATORY (INHALATION) 2 TIMES DAILY
Qty: 1 INHALER | Refills: 3 | Status: SHIPPED | OUTPATIENT
Start: 2020-10-21 | End: 2021-10-27

## 2020-10-21 NOTE — PATIENT INSTRUCTIONS
"Patient Instructions   - Nothing to eat/drink starting midnight the night before, unless surgical team tells you otherwise     - Avoid ibuprofen, naproxen (aleve), aspirin, fish oil, vitamin e 1 week prior to surgery     - If you have pain - ok to take Acetaminophen (Tylenol) 650 mg every 4-6 hours as needed.     - If you fall ill prior to surgery - (ie - fever, chills, nausea, vomiting or diarrhea, cough, etc) please let me and your surgeon know asap    Preparing for Your Surgery  Getting started  A surgery nurse will call you to review your health history and instructions. They will give you an arrival time based on your scheduled surgery time.  Please be ready to share the following:    Your doctor's clinic name and phone number    Your medical, surgical and anesthesia history    A list of allergies and sensitivities    A list of medicines, including herbal treatments and over-the-counter drugs    Whether the patient has a legal guardian (ask how to send us the papers in advance)  If your child is having surgery, please ask for a copy of Preparing for Your Child's Surgery.    Preparing for surgery    Within 30 days of surgery: Have an exam at your family clinic (primary care clinic), or go to a pre-operative clinic. This exam is called a \"History and Physical,\" or H&P.    At your H&P exam, talk to your care team about all medicines you take. If you need to stop any medicines before surgery, ask when to start taking them again.  ? We do this for your safety. Many medicines can make you bleed too much during surgery. Some change how well surgery (anesthesia) drugs work.    Call your insurance company to see what it will and won't pay for. Ask if they need to pre-approve the surgery. (If no insurance, call [phone].)    Call your surgeon's clinic if there's any change in your health. This includes signs of a cold or flu (sore throat, runny nose, cough, rash, fever). It also includes a scrape or scratch near the " surgery site.    If you have questions on the day of surgery, call your surgery center.  Eating and drinking guidelines  For your safety: Unless your surgeon tells you otherwise, follow the guidelines below.    Eat and drink as usual until 8 hours before surgery. After that, no food or milk.    Drink clear liquids until 2 hours before surgery. These are liquids you can see through, like water, Gatorade and Propel Water. You may also have black coffee and tea (no cream or milk).    Nothing by mouth within 2 hours of surgery. This includes gum, candy and breath mints.    Stop alcohol the midnight before surgery.    If your family clinic tells you to take medicine on the morning of surgery, it's okay to take it with a sip of water.  Preventing infection    Shower or bathe the night before and morning of your surgery. Follow the instructions your clinic gave you. (If no instructions, use regular soap.)    Don't shave or clip hair near your surgery site. This can lead to skin infection.    Don't smoke the morning of surgery. Smoking increases the risk of infection. You may chew nicotine gum up to 2 hours before surgery. A nicotine patch is okay.  ? Note: Some surgeries require you to completely quit smoking and nicotine. Check with your surgeon.    Your care team will make every effort to keep you safe from infection. We will:  ? Clean our hands often with soap and water (or an alcohol-based hand rub).  ? Clean the skin at your surgery site with a special soap that kills germs. We'll also remove hair from the site as needed.  ? Wear special hair covers, masks, gowns and gloves during surgery.  ? Give antibiotic medicine, if prescribed. Not all surgeries need antibiotics.  What to bring on the day of surgery    Photo ID and insurance card    Copy of your health care directive, if you have one    Glasses and hearing aides (bring cases)  ? You can't wear contacts during surgery    Inhaler and eye drops, if you use them  (tell us about these when you arrive)    CPAP machine or breathing device, if you use them    A few personal items, if spending the night    If you have . . .  ? A pacemaker or ICD (cardiac defibrillator): Bring the ID card.  ? An implanted stimulator: Bring the remote control.  ? A legal guardian: Bring a copy of the certified (court-stamped) guardianship papers.  Please remove any jewelry, including body piercings. Leave jewelry and other valuables at home.  If you're going home the day of surgery  Important: If you don't follow the rules below, we must cancel your surgery.     Arrange for someone to drive you home after surgery. You may not drive, take a taxi or take public transportation by yourself (unless you'll have local anesthesia only).    Arrange for a responsible adult to stay with you overnight. If you don't, we may keep you in the hospital overnight, and you may need to pay the costs yourself.  Questions?   If you have any questions for your care team, list them here: _________________________________________________________________________________________________________________________________________________________________________________________________________________________________________________________________________________________________________________________  For informational purposes only. Not to replace the advice of your health care provider. Copyright   3710-[address]149 [facility] [employer] [facility]. All rights reserved. Clinically reviewed by Autumn Valero MD. [employer] [id number] - REV 07/19.

## 2020-10-21 NOTE — PROGRESS NOTES
[facility]  [address] [place] [id number] [phone]  Dept: [phone]    PRE-OP EVALUATION:  Today's date: 10/21/2020    Macey Romero (: 1950) presents for pre-operative evaluation assessment as requested by Dr. Le.  She requires evaluation and anesthesia risk assessment prior to undergoing surgery/procedure for treatment of left knee torn cartilage .    Proposed Surgery/ Procedure: Laparoscopic Left Knee   Date of Surgery/ Procedure: 10/23/2020  Time of Surgery/ Procedure: 3:00 pm  Hospital/Surgical Facility: [facility]  Surgery Fax Number: [phone]  Primary Physician: Long Ferrari  Type of Anesthesia Anticipated: General    Preoperative Questionnaire:   YES - HAVE YOU EVER HAD A HEART ATTACK OR STROKE? Mini Stroke   No - Have you ever had surgery on your heart or blood vessels, such as a stent, coronary (heart) bypass, or surgery on an artery in the head, neck, heart, or legs?  No - Do you have chest pain when you are physically active?  No - Do you have a history of heart failure?  No - Do you currently have a cold, bronchitis, or symptoms of other respiratory (head and chest) infections?  YES - DO YOU HAVE A COUGH, SHORTNESS OF BREATH, OR WHEEZING? Seasonal - uses inhalers  YES - DO YOU OR ANYONE IN YOUR FAMILY HAVE A HISTORY OF BLOOD CLOTS? Mother - pt in  after emergency   YES - DO YOU OR ANYONE IN YOUR FAMILY HAVE A SERIOUS BLEEDING PROBLEM, SUCH AS LONG-LASTING BLEEDING AFTER SURGERIES OR CUTS? Mother  No - Have you ever had anemia or been told to take iron pills?  No - Have you had any abnormal blood loss such as black, tarry or bloody stools, or abnormal vaginal bleeding?  YES - HAVE YOU EVER HAD A BLOOD TRANSFUSION?  with emergency   Yes - Are you willing to have a blood transfusion if it is medically needed before, during, or after your surgery?  No - Have you or anyone in your family ever had problems with  anesthesia (sedation for surgery)?  No - Do you have sleep apnea, excessive snoring, or daytime drowsiness?   No - Do you have any artifical heart valves or other implanted medical devices, such as a pacemaker, defibrillator, or continuous glucose monitor?  YES - DO YOU HAVE ANY ARTIFICIAL JOINTS? Right knee  No - Are you allergic to latex?  No - Is there any chance that you may be pregnant?    Patient has a Health Care Directive or Living Will:  YES     HPI:     HPI related to upcoming procedure: Left knee pain and swelling - hoping that arthroscopic surgery will help. Continues to have pain, swelling, decreased mobility with right knee after surgery and injury.    COPD/asthma - Using Symbicort inhaler, which she feels helps with breathing. Denies persistent cough, shortness of breath. Is smoking a bit in the past few months. States her  smokes 4 packs daily (cigarettes).      MEDICAL HISTORY:     Patient Active Problem List    Diagnosis Date Noted     Fibromyalgia      Priority: Medium     Chronic pain of both knees 03/21/2018     Priority: Medium     Hip pain, left 12/18/2015     Priority: Medium     Pterygium eye 08/26/2013     Priority: Medium     Myalgia and myositis 09/12/2011     Priority: Medium     Problem list name updated by automated process. Provider to review       IBS (irritable bowel syndrome) 06/28/2011     Priority: Medium     Keloid of skin 06/28/2011     Priority: Medium     Reactive airway disease 02/01/2011     Priority: Medium     Osteoarthritis 02/01/2011     Priority: Medium     Osteopenia 02/01/2011     Priority: Medium     Malignant neoplasm of female breast (H) 02/01/2011     Priority: Medium     1987        Past Medical History:   Diagnosis Date     Breast CA in situ 1987     Cancer (H) 1987    Right Breast treated with surgery     Chronic constipation      Fibromyalgia      Meningitis     Several times as an adult     Osteoarthritis 2/1/2011     Osteopenia 2/1/2011     Pneumonia       Pterygium eye 8/26/2013     Reactive airway disease 2/1/2011     Seasonal allergies      Shingles     Prior to 2008 - scalp     Skin cancer     SCC - hand, left nose     Past Surgical History:   Procedure Laterality Date     anterior c-disc fusion       BLEPHAROPLASTY, BROW LIFT BILATERAL, COMBINED Bilateral 6/18/2018    Procedure: COMBINED BLEPHAROPLASTY, BROW LIFT BILATERAL;  BILATERAL UPPER LID BLEPHAROPLASTY; BILATERAL BROW PTOSIS REPAIR;  Surgeon: Сергей Walters MD;  Location:  [facility]     CHOLECYSTECTOMY       COLONOSCOPY N/A 10/19/2017    Procedure: COLONOSCOPY;  COLONOSCOPY;  Surgeon: Niko Wong MD;  Location:  GI     D & C      Bleeding before hysterectomy     ENDOSCOPY  04/21/08     HYSTERECTOMY, MARCUS      Ovaries and tubes out due to breast cancer     JOINT REPLACEMTN, KNEE RT/LT Right 01/2011    Joint Replacement knee RT, with tibial straightening (2 replacements)     MAMMOPLASTY AUGMENTATION BILATERAL      breast ca      MASTECTOMY, SIMPLE RT/LT/CLAIRE Bilateral 1989    Mastectomy Simple RT/LT/CLAIRE     MOHS MICROGRAPHIC PROCEDURE      Left lateral nose     OPEN REDUCTION INTERNAL FIXATION ANKLE  8/27/2013    Procedure: OPEN REDUCTION INTERNAL FIXATION ANKLE;  RIGHT OPEN REDUCTION INTERNAL FIXATION ANKLE WITH LIGAMENT REPAIR;  Surgeon: Nando Chang MD;  Location:  OR     PTERYGIUM WITH CONJUNCTIVAL AUTOLOGOUS TRANSPLANT Left 8/29/2016    Procedure: PTERYGIUM WITH CONJUNCTIVAL AUTOLOGOUS TRANSPLANT;  Surgeon: Сергей Walters MD;  Location:  [facility]     REPAIR LIGAMENT ANKLE  8/27/2013    Procedure: REPAIR LIGAMENT ANKLE;;  Surgeon: Nando Chang MD;  Location:  OR     SALIVARY GLAND SURGERY Left     Stone removal      TONSILLECTOMY       Current Outpatient Medications   Medication Sig Dispense Refill     Acetaminophen (TYLENOL PO) Take 325 mg by mouth 2 times daily as needed for mild pain       albuterol (PROAIR HFA/PROVENTIL HFA/VENTOLIN HFA) 108 (90 Base) MCG/ACT inhaler Inhale 2  puffs into the lungs every 6 hours 1 Inhaler 2     albuterol (PROVENTIL) (2.5 MG/3ML) 0.083% neb solution Take 1 vial by nebulization every 4 hours as needed for shortness of breath/dyspnea or wheezing 1 Box 3     budesonide-formoterol (SYMBICORT) 160-4.5 MCG/ACT Inhaler Inhale 2 puffs into the lungs 2 times daily 1 Inhaler 3     OTC products: no recent use of OTC ASA, NSAIDS or Steroids    Allergies   Allergen Reactions     Tetanus Toxoids Anaphylaxis     Erythromycin GI Disturbance     Levaquin Other (See Comments)     unknown     Penicillins Hives     Silicone Rash      Latex Allergy: NO    Social History     Tobacco Use     Smoking status: Former Smoker     Quit date: 2016     Years since quittin.8     Smokeless tobacco: Never Used     Tobacco comment: quit but  still smokes, but not in house   Substance Use Topics     Alcohol use: No     Alcohol/week: 0.0 standard drinks     History   Drug Use No       REVIEW OF SYSTEMS:   CONSTITUTIONAL: NEGATIVE for fever, chills, change in weight  INTEGUMENTARY/SKIN: NEGATIVE for worrisome rashes, moles or lesions  EYES: NEGATIVE for vision changes or irritation  ENT/MOUTH: NEGATIVE for ear, mouth and throat problems  RESP: NEGATIVE for significant cough or SOB  CV: NEGATIVE for chest pain, palpitations or peripheral edema  GI: NEGATIVE for nausea, abdominal pain, heartburn, or change in bowel habits  : NEGATIVE for frequency, dysuria, or hematuria  MUSCULOSKELETAL: POSITIVE for bilateral knee pain  NEURO: NEGATIVE for weakness, dizziness or paresthesias  ENDOCRINE: NEGATIVE for temperature intolerance, skin/hair changes  HEME: NEGATIVE for bleeding problems  PSYCHIATRIC: NEGATIVE for changes in mood or affect    EXAM:   /56   Pulse 75   Temp 98.1  F (36.7  C) (Skin)   Resp 17   SpO2 97%     GENERAL APPEARANCE: healthy, alert and no distress     EYES: EOMI, PERRL     HENT: ear canals and TM's normal and nose and mouth without ulcers or lesions      NECK: no adenopathy, no asymmetry, masses, or scars and thyroid normal to palpation     RESP: lungs clear to auscultation - no rales, rhonchi or wheezes     CV: regular rates and rhythm, normal S1 S2, no S3 or S4 and no murmur, click or rub     ABDOMEN:  soft, nontender, no HSM or masses and bowel sounds normal     MS: Left knee in brace, decreased ROM. Right knee swelling and decreased ROM     SKIN: no suspicious lesions or rashes     NEURO: Normal strength and tone, sensory exam grossly normal, mentation intact and speech normal     PSYCH: mentation appears normal. and affect normal/bright     LYMPHATICS: No cervical adenopathy    DIAGNOSTICS:     EKG: appears normal, NSR, normal axis, normal intervals, no acute ST/T changes c/w ischemia, no LVH by voltage criteria, unchanged from previous tracings  Labs Drawn and in Process:   Unresulted Labs Ordered in the Past 30 Days of this Admission     No orders found from 9/21/2020 to 10/22/2020.          Recent Labs   Lab Test 01/19/20  1131 09/09/19  0930 09/09/19 11/06/18  0845 11/06/18  0845 03/12/18  0857 03/12/18  0857   HGB 15.0  --  15.0   < >  --    < >  --      --  213   < >  --    < >  --     143  --   --   --    < > 142   POTASSIUM 3.4 4.5  --   --   --    < > 4.5   CR 0.92 0.79  --   --   --    < > 0.93   A1C  --   --   --   --  5.8*  --  5.7*    < > = values in this interval not displayed.        IMPRESSION:   Reason for surgery/procedure: left knee arthroscopic surgery   Diagnosis/reason for consult: pre-operative clearance    The proposed surgical procedure is considered INTERMEDIATE risk.    REVISED CARDIAC RISK INDEX  The patient has the following serious cardiovascular risks for perioperative complications such as (MI, PE, VFib and 3  AV Block):  No serious cardiac risks  INTERPRETATION: 0 risks: Class I (very low risk - 0.4% complication rate)    The patient has the following additional risks for perioperative complications:  No identified  additional risks      ICD-10-CM    1. Preop general physical exam  Z01.818 EKG 12-lead complete w/read - Clinics     CBC with Diff/Plt ([facility])   2. Reactive airway disease, moderate persistent, with acute exacerbation  J45.41 budesonide-formoterol (SYMBICORT) 160-4.5 MCG/ACT Inhaler   3. Fibromyalgia  M79.7 EKG 12-lead complete w/read - Clinics     CBC with Diff/Plt ([facility])   4. Prediabetes  R73.03 Hemoglobin A1C ([facility])       RECOMMENDATIONS:     --Consult hospital rounder / IM to assist post-op medical management    Pulmonary Risk  Incentive spirometry post op  Respiratory Therapy (Respiratory Care IP Consult)  post op  Maximize COPD treatment    Advised smoking cessation.     --Patient is to take all scheduled medications on the day of surgery EXCEPT for modifications listed below.    APPROVAL GIVEN to proceed with proposed procedure, without further diagnostic evaluation       Signed Electronically by: SHAHRAM Martel CNP    Copy of this evaluation report is provided to requesting physician.    [facility] Preop Guidelines    Revised Cardiac Risk Index

## 2020-10-22 LAB — HBA1C MFR BLD: 5.9 % (ref 4.8–5.6)

## 2020-10-23 ENCOUNTER — TRANSFERRED RECORDS (OUTPATIENT)
Dept: FAMILY MEDICINE | Facility: CLINIC | Age: 70
End: 2020-10-23

## 2020-10-26 NOTE — PROGRESS NOTES
10/22/20 faxed preop and EKG and labs to [facility] @ [phone]    Isaiah Hilton,   [facility]  [phone]

## 2020-10-31 ENCOUNTER — VIRTUAL VISIT (OUTPATIENT)
Dept: FAMILY MEDICINE | Facility: OTHER | Age: 70
End: 2020-10-31

## 2020-10-31 ENCOUNTER — NURSE TRIAGE (OUTPATIENT)
Dept: NURSING | Facility: CLINIC | Age: 70
End: 2020-10-31

## 2020-11-01 NOTE — PROGRESS NOTES
"Date: 10/31/2020 18:41:50  Clinician: Kasia Mendes  Clinician NPI: [id number]  Patient: Macey Romero  Patient : 1950  Patient Address: [facility] [place] [address]  Patient Phone: [phone]  Visit Protocol: URI  Patient Summary:  Macey is a 69 year old ( : 1950 ) female who initiated a [facility] Visit for COVID-19 (Coronavirus) evaluation and screening. When asked the question \"Please sign me up to receive news, health information and promotions from [facility].\", Macey responded \"No\".    Macey states her symptoms started gradually 5-6 days ago. After her symptoms started, they improved and then got worse again.   Her symptoms consist of a headache, wheezing, a cough, nasal congestion, facial pain or pressure, myalgia, chills, malaise, and a sore throat. She is experiencing mild difficulty breathing with activities but can speak normally in full sentences. Macey also feels feverish.   Symptom details     Nasal secretions: The color of her mucus is yellow.    Cough: Macey coughs every 5-10 minutes and her cough is more bothersome at night. Phlegm comes into her throat when she coughs. She does not believe her cough is caused by post-nasal drip. The color of the phlegm is yellow.     Sore throat: Macey reports having moderate throat pain (4-6 on a 10 point pain scale), does not have exudate on her tonsils, and can swallow liquids. She is not sure if the lymph nodes in her neck are enlarged. A rash has not appeared on the skin since the sore throat started.     Temperature: Her current temperature is 100.7 degrees Fahrenheit. Macey has had a temperature over 100 degrees Fahrenheit for 1-2 days.     Wheezing: Macey has been diagnosed with asthma. Additional wheezing details as reported by the patient (free text): My chest feels a little heavy. Using my albuterol inhaler. Helps       Facial pain or pressure: The facial pain or pressure feels worse when bending over or leaning forward.     " Headache: She states the headache is moderate (4-6 on a 10 point pain scale).      Macey denies having ear pain, nausea, teeth pain, ageusia, diarrhea, anosmia, vomiting, and rhinitis. She also denies having recent facial or sinus surgery in the past 60 days.   Precipitating events  Macey is not sure if she has been exposed to someone with strep throat. She has not recently been exposed to someone with influenza. Macey has not been in close contact with any high risk individuals.   Pertinent COVID-19 (Coronavirus) information  Macey does not work or volunteer as healthcare worker or a . In the past 14 days, Macey has not worked or volunteered at a healthcare facility or group living setting.   In the past 14 days, she also has not lived in a congregate living setting.   Macey has not had a close contact with a laboratory-confirmed COVID-19 patient within 14 days of symptom onset.    Since December 2019, Macey has not been diagnosed with lab-confirmed COVID-19 test and has had upper respiratory infection (URI) or influenza-like illness.      Date(s) of previous URI or influenza-like illness (free-text): 10/24/2020     Symptoms Macey experienced during previous URI or influenza-like illness as reported by the patient (free-text): Headache. Frequent productive cough. Tired        Pertinent medical history  Macey had 2 sinus infections within the past year.   Macey has taken an antibiotic medication in the past month. Antibiotic details as reported by the patient (free text): Given during knee surgery a week ago.   Macey does not get yeast infections when she takes antibiotics.   Macey does not need a return to work/school note.   Weight: 155 lbs   Macey smokes or uses smokeless tobacco.   Weight: 155 lbs    MEDICATIONS: No current medications, ALLERGIES: Penicillins, Levaquin, erythromycin base  Clinician Response:  Dear Macey,   Your symptoms show that you may have coronavirus (COVID-19). This illness  "can cause fever, cough and trouble breathing. Many people get a mild case and get better on their own. Some people can get very sick.  What should I do?  We would like to test you for this virus.   1. Please call [phone] to schedule your visit. Explain that [facility] were [facility] have a COVID-19 test. Be ready to share your [facility] visit ID number.  The following will serve as your written order for this COVID Test, ordered by me, for the indication of suspected COVID [Z20.828]: The test will be ordered in [employer], our electronic health record, after you are scheduled. It will show as ordered and authorized by Seb Kay MD.  Order: COVID-19 (Coronavirus) PCR for SYMPTOMATIC testing from [facility].   2. When it's time for your COVID test:  Stay at least 6 feet away from others. (If someone will drive you to your test, stay in the backseat, as far away from the  as you can.)   Cover your mouth and nose with a mask, tissue or washcloth.  Go straight to the testing site. Don't make any stops on the way there or back.      3.Starting now: Stay home and away from others (self-isolate) until:   You've had no fever---and no medicine that reduces fever---for one full day (24 hours). And...   Your other symptoms have gotten better. For example, your cough or breathing has improved. And...   At least 10 days have passed since your symptoms started.       During this time, don't leave the house except for testing or medical care.   Stay in your own room, even for meals. Use your own bathroom if you can.   Stay away from others in your home. No hugging, kissing or shaking hands. No visitors.  Don't go to work, school or anywhere else.    Clean \"high touch\" surfaces often (doorknobs, counters, handles, etc.). Use a household cleaning spray or wipes. You'll find a full list of  on the EPA website: www.epa.gov/pesticide-registration/list-n-disinfectants-use-against-sars-cov-2.   Cover your mouth and nose with " a mask, tissue or washcloth to avoid spreading germs.  Wash your hands and face often. Use soap and water.  Caregivers in these groups are at risk for severe illness due to COVID-19:  o People 65 years and older  o People who live in a nursing home or long-term care facility  o People with chronic disease (lung, heart, cancer, diabetes, kidney, liver, immunologic)  o People who have a weakened immune system, including those who:   Are in cancer treatment  Take medicine that weakens the immune system, such as corticosteroids  Had a bone marrow or organ transplant  Have an immune deficiency  Have poorly controlled HIV or AIDS  Are obese (body mass index of 40 or higher)  Smoke regularly   o Caregivers should wear gloves while washing dishes, handling laundry and cleaning bedrooms and bathrooms.  o Use caution when washing and drying laundry: Don't shake dirty laundry, and use the warmest water setting that you can.  o For more tips, go to www.cdc.gov/coronavirus/2019-ncov/downloads/10Things.pdf.    4.Sign up for [employer]. We know it's scary to hear that you might have COVID-19. We want to track your symptoms to make sure you're okay over the next 2 weeks. Please look for an email from [employer]edia---this is a free, online program that we'll use to keep in touch. To sign up, follow the link in the email. Learn more at [email or web address]  How can I take care of myself?   Get lots of rest. Drink extra fluids (unless a doctor has told you not to).   Take Tylenol (acetaminophen) for fever or pain. If you have liver or kidney problems, ask your family doctor if it's okay to take Tylenol.   Adults can take either:    650 mg (two 325 mg pills) every 4 to 6 hours, or...   1,000 mg (two 500 mg pills) every 8 hours as needed.    Note: Don't take more than 3,000 mg in one day. Acetaminophen is found in many medicines (both prescribed and over-the-counter medicines). Read all labels to be sure you don't take too  much.   For children, check the Tylenol bottle for the right dose. The dose is based on the child's age or weight.    If you have other health problems (like cancer, heart failure, an organ transplant or severe kidney disease): Call your specialty clinic if you don't feel better in the next 2 days.       Know when to call 911. Emergency warning signs include:    Trouble breathing or shortness of breath Pain or pressure in the chest that doesn't go away Feeling confused like you haven't felt before, or not being able to wake up Bluish-colored lips or face.  Where can I get more information?   [facility] -- About COVID-19: [email or web address]   CDC -- What to Do If You're Sick: www.cdc.gov/coronavirus/2019-ncov/about/steps-when-sick.html   CDC -- Ending Home Isolation: www.cdc.gov/coronavirus/2019-ncov/hcp/disposition-in-home-patients.html   [facility] -- Caring for Someone: www.cdc.gov/coronavirus/2019-ncov/if-you-are-sick/care-for-someone.html   [facility] -- Interim Guidance for Hospital Discharge to Home: www.[facility].[place].us/diseases/coronavirus/hcp/hospdischarge.pdf   [facility] clinical trials (COVID-19 research studies): clinicalaffairs.[facility]/n-clinical-trials    Below are the COVID-19 hotlines at the [place] Department of Health ([facility]). Interpreters are available.    For health questions: Call 123-133-9087 or 1-694.297.3877 (7 a.m. to 7 p.m.) For questions about schools and childcare: Call 137-401-1341 or 1-204.513.5564 (7 a.m. to 7 p.m.)    Diagnosis: Cough  Diagnosis ICD: R05

## 2020-11-01 NOTE — TELEPHONE ENCOUNTER
Patient calling - says she did an [facility] visit and an order was placed for  COVID19 test.  She says she has cough, headache, fever, body aches.      No chest pain.  No difficulty breathing.    Advised patient to schedule COVID19 test as ordered by [facility] provider.  Care advise and isolation recommendations given per protocol.  Patient says she will call if she decides to schedule.  Advised to call back if symptoms worsen.    Emani Maher RN  Triage Nurse Advisor    COVID 19 Nurse Triage Plan/Patient Instructions    Please be aware that novel coronavirus (COVID-19) may be circulating in the community. If you develop symptoms such as fever, cough, or SOB or if you have concerns about the presence of another infection including coronavirus (COVID-19), please contact your health care provider or visit [email or web address].     Disposition/Instructions    Virtual Visit with provider recommended. Reference Visit Selection Guide.    Thank you for taking steps to prevent the spread of this virus.  o Limit your contact with others.  o Wear a simple mask to cover your cough.  o Wash your hands well and often.    Resources    [facility]: About COVID-19: [email or web address]    CDC: What to Do If You're Sick: www.cdc.gov/coronavirus/2019-ncov/about/steps-when-sick.html    CDC: Ending Home Isolation: www.cdc.gov/coronavirus/2019-ncov/hcp/disposition-in-home-patients.html     CDC: Caring for Someone: www.cdc.gov/coronavirus/2019-ncov/if-you-are-sick/care-for-someone.html     [facility]: Interim Guidance for Hospital Discharge to Home: www.health.[facility].mn.us/diseases/coronavirus/hcp/hospdischarge.pdf    [facility] clinical trials (COVID-19 research studies): clinicalaffairs.[facility]/um-clinical-trials     Below are the COVID-19 hotlines at the [place] Department of [facility] ([facility]). Interpreters are available.   o For health questions: Call 328-187-9951 or 1-336.325.7611 (7 a.m. to 7 p.m.)  o For questions about  schools and childcare: Call 718-310-0682 or 1-588.185.4688 (7 a.m. to 7 p.m.)       Reason for Disposition    HIGH RISK patient (e.g., age > 64 years, diabetes, heart or lung disease, weak immune system) (Exception: Has already been evaluated by healthcare provider and has no new or worsening symptoms)    Additional Information    Negative: SEVERE difficulty breathing (e.g., struggling for each breath, speaks in single words)    Negative: Difficult to awaken or acting confused (e.g., disoriented, slurred speech)    Negative: Bluish (or gray) lips or face now    Negative: Shock suspected (e.g., cold/pale/clammy skin, too weak to stand, low BP, rapid pulse)    Negative: Sounds like a life-threatening emergency to the triager    Negative: [1] COVID-19 exposure AND [2] no symptoms    Negative: COVID-19 and Breastfeeding, questions about    Negative: [1] Adult with possible COVID-19 symptoms AND [2] triager concerned about severity of symptoms or other causes    Negative: SEVERE or constant chest pain or pressure (Exception: mild central chest pain, present only when coughing)    Negative: MODERATE difficulty breathing (e.g., speaks in phrases, SOB even at rest, pulse 100-120)    Negative: Patient sounds very sick or weak to the triager    Protocols used: CORONAVIRUS (COVID-19) DIAGNOSED OR [id number]-QD 8.4.20

## 2020-11-05 ENCOUNTER — TRANSFERRED RECORDS (OUTPATIENT)
Dept: FAMILY MEDICINE | Facility: CLINIC | Age: 70
End: 2020-11-05

## 2020-12-14 ENCOUNTER — TRANSFERRED RECORDS (OUTPATIENT)
Dept: FAMILY MEDICINE | Facility: CLINIC | Age: 70
End: 2020-12-14

## 2020-12-22 ENCOUNTER — TRANSFERRED RECORDS (OUTPATIENT)
Dept: FAMILY MEDICINE | Facility: CLINIC | Age: 70
End: 2020-12-22

## 2021-01-14 ENCOUNTER — HEALTH MAINTENANCE LETTER (OUTPATIENT)
Age: 71
End: 2021-01-14

## 2021-01-19 ENCOUNTER — TRANSFERRED RECORDS (OUTPATIENT)
Dept: FAMILY MEDICINE | Facility: CLINIC | Age: 71
End: 2021-01-19

## 2021-01-20 ENCOUNTER — NURSE TRIAGE (OUTPATIENT)
Dept: NURSING | Facility: CLINIC | Age: 71
End: 2021-01-20

## 2021-01-21 ENCOUNTER — TRANSFERRED RECORDS (OUTPATIENT)
Dept: FAMILY MEDICINE | Facility: CLINIC | Age: 71
End: 2021-01-21

## 2021-01-21 NOTE — TELEPHONE ENCOUNTER
Patient calling - says she just had a fall.  Thinks she broke her left wrist.  Tripped on her dog's leash as she was letting him back in the house.  Fell on wrists, elbows and knees.   Rates pain in wrist 10/10. Her right ear is red and hot.  She denies hitting her face or head on the floor.  Knees and elbows are bruising and are sore.  Most pain is in left wrist.      Triaged to disposition of See Provider within 4 hours (or PCP Triage).  Patient's PCP is with [facility].  Patient says she is terrified to go to ED due to COVID19.  Advised patient to contact the on-call provider at [facility] to discuss best option for her.    Emani Maher RN  Triage Nurse Advisor    COVID 19 Nurse Triage Plan/Patient Instructions    Please be aware that novel coronavirus (COVID-19) may be circulating in the community. If you develop symptoms such as fever, cough, or SOB or if you have concerns about the presence of another infection including coronavirus (COVID-19), please contact your health care provider or visit [email or web address].     Disposition/Instructions    In-Person Visit with provider recommended. Reference Visit Selection Guide.    Thank you for taking steps to prevent the spread of this virus.  o Limit your contact with others.  o Wear a simple mask to cover your cough.  o Wash your hands well and often.    Resources    [facility]: About COVID-19: [email or web address]    CDC: What to Do If You're Sick: www.cdc.gov/coronavirus/2019-ncov/about/steps-when-sick.html    CDC: Ending Home Isolation: www.cdc.gov/coronavirus/2019-ncov/hcp/disposition-in-home-patients.html     CDC: Caring for Someone: www.cdc.gov/coronavirus/2019-ncov/if-you-are-sick/care-for-someone.html     [facility]: Interim Guidance for Hospital Discharge to Home: www.health.[facility].mn.us/diseases/coronavirus/hcp/hospdischarge.pdf    [facility] clinical trials (COVID-19 research studies):  clinicalaffairs.[facility]-clinical-trials     Below are the COVID-19 hotlines at the [place] Department of Health ([facility]). Interpreters are available.   o For health questions: Call 449-159-9285 or 1-896.163.6891 (7 a.m. to 7 p.m.)  o For questions about schools and childcare: Call 679-861-3493 or 1-685.105.7519 (7 a.m. to 7 p.m.)         Additional Information    Negative: Serious injury with multiple fractures    Negative: [1] Major bleeding (e.g., actively dripping or spurting) AND [2] can't be stopped    Negative: Sounds like a life-threatening emergency to the triager    Negative: Wound looks infected    Negative: Arm pain from overuse (e.g., sports, lifting, physical work)    Negative: Arm pain not from an injury    Negative: Shoulder injury is main concern    Negative: Elbow injury is main concern    Negative: Wrist or hand injury is main concern    Negative: Finger injury is main concern    Negative: Looks like a broken bone (crooked or deformed)    Negative: Looks like a dislocated joint    Negative: Bullet wound, stabbed by knife, or other serious penetrating wound    Negative: Can't move injured arm at all    Negative: [1] Bleeding AND [2] won't stop after 10 minutes of direct pressure (using correct technique)    Negative: Skin is split open or gaping (or length > 1/2 inch or 12 mm)    Negative: [1] Dirt in the wound AND [2] not removed with 15 minutes of scrubbing    Negative: Sounds like a serious injury to the triager    [1] SEVERE pain AND [2] not improved 2 hours after pain medicine/ice packs    Negative: [1] ACUTE NEURO SYMPTOM AND [2] present now  (DEFINITION: difficult to awaken OR confused thinking and talking OR slurred speech OR weakness of arms OR unsteady walking)    Negative: Knocked out (unconscious) > 1 minute    Negative: Seizure (convulsion) occurred  (Exception: prior history of seizures and now alert and without Acute Neuro Symptoms)    Negative: Penetrating head injury (e.g.,  "knife, gun shot wound, metal object)    Negative: [1] Major bleeding (e.g., actively dripping or spurting) AND [2] can't be stopped    Negative: [1] Dangerous mechanism of injury (e.g., MVA, diving, trampoline, contact sports, fall > 10 feet or 3 meters) AND [2] NECK pain AND [3] began < 1 hour after injury    Negative: Sounds like a life-threatening emergency to the triager    Negative: [1] Recently examined and diagnosed with a concussion by a healthcare provider AND [2] questions about concussion symptoms    Negative: Can't remember what happened (amnesia)    Negative: Vomiting once or more    Negative: [1] Loss of vision or double vision AND [2] present now    Negative: One or two \"black eyes\" (bruising, purple color of eyelids)    Negative: Watery or blood-tinged fluid dripping from the NOSE or EARS now  (Exception: tears from crying or nosebleed from nasal trauma)    Negative: Large swelling or bruise > 2 inches (5 cm)    Negative: Skin is split open or gaping  (or length > 1/2 inch or 12 mm)    Negative: [1] Bleeding AND [2] won't stop after 10 minutes of direct pressure (using correct technique)    Negative: Sounds like a serious injury to the triager    Negative: [1] ACUTE NEURO SYMPTOM AND [2] now fine  (DEFINITION: difficult to awaken OR confused thinking and talking OR slurred speech OR weakness of arms OR unsteady walking)    Negative: [1] Knocked out (unconscious) < 1 minute AND [2] now fine    Negative: [1] SEVERE headache AND [2] not improved 2 hours after pain medicine/ice packs    Negative: Dangerous injury (e.g., MVA, diving, trampoline, contact sports, fall > 10 feet or 3 meters) or severe blow from hard object (e.g., golf club or baseball bat)    Negative: Taking Coumadin (warfarin) or other strong blood thinner, or known bleeding disorder (e.g., thrombocytopenia)    Negative: Suspicious history for the injury    Negative: [1] Age over 65 years AND [2] swelling or bruise    Negative: Patient is " confused or is an unreliable provider of information (e.g., dementia, profound mental retardation, alcohol intoxication)    Negative: [1] Last tetanus shot > 5 years ago AND [2] DIRTY cut or scrape    Negative: [1] After 72 hours AND [2] headache persists    Scalp swelling, bruise or pain    Protocols used: ARM INJURY-A-, HEAD INJURY-A-AH

## 2021-02-01 DIAGNOSIS — Z87.81 FRACTURE, HEALED: Primary | ICD-10-CM

## 2021-02-01 DIAGNOSIS — M81.0 OSTEOPOROSIS: ICD-10-CM

## 2021-02-01 PROCEDURE — 36415 COLL VENOUS BLD VENIPUNCTURE: CPT | Performed by: FAMILY MEDICINE

## 2021-02-01 NOTE — LETTER
[facility]  [address] [place]  [address]  Phone: [phone]    February 8, 2021      Macey Romero  [address]  [facility] [address]              Dear Macey,    The results from your recent visit showed to  make sure you  increase the vitamin D  by 1000 units a day from current dose.         Sincerely,     Long Ferrari M.D.    Results c c'd [facility]  [facility]    Results for orders placed or performed in visit on 02/01/21   Vitamin D  25-Hydroxy ([facility])     Status: Abnormal   Result Value Ref Range    Vitamin D,25-Hydroxy 22.3 (L) 30.0 - 100.0 ng/mL    Narrative    Performed at:  01 - [facility] [address], [place], [place]  [id number]  : Lam Tian MD, Phone:  [phone]

## 2021-02-02 LAB — VITAMIN D, 25-HYDROXY: 22.3 NG/ML (ref 30–100)

## 2021-03-04 DIAGNOSIS — Z23 HIGH PRIORITY FOR COVID-19 VIRUS VACCINATION: Primary | ICD-10-CM

## 2021-03-04 PROCEDURE — 91301 PR COVID VAC MODERNA 100 MCG/0.5 ML IM: CPT | Performed by: FAMILY MEDICINE

## 2021-03-04 PROCEDURE — 0011A PR COVID VAC MODERNA 100 MCG/0.5 ML IM: CPT | Performed by: FAMILY MEDICINE

## 2021-03-25 ENCOUNTER — TRANSFERRED RECORDS (OUTPATIENT)
Dept: FAMILY MEDICINE | Facility: CLINIC | Age: 71
End: 2021-03-25

## 2021-04-01 DIAGNOSIS — Z23 HIGH PRIORITY FOR COVID-19 VIRUS VACCINATION: Primary | ICD-10-CM

## 2021-04-01 PROCEDURE — 0012A COVID-19,PF,MODERNA: CPT | Performed by: FAMILY MEDICINE

## 2021-04-01 PROCEDURE — 91301 COVID-19,PF,MODERNA: CPT | Performed by: FAMILY MEDICINE

## 2021-05-08 ENCOUNTER — HEALTH MAINTENANCE LETTER (OUTPATIENT)
Age: 71
End: 2021-05-08

## 2021-05-17 ENCOUNTER — TRANSFERRED RECORDS (OUTPATIENT)
Dept: FAMILY MEDICINE | Facility: CLINIC | Age: 71
End: 2021-05-17

## 2021-06-16 ENCOUNTER — NURSE TRIAGE (OUTPATIENT)
Dept: NURSING | Facility: CLINIC | Age: 71
End: 2021-06-16

## 2021-06-16 LAB
BASOPHILS # BLD AUTO: 0.1 10E9/L (ref 0–0.2)
BASOPHILS NFR BLD AUTO: 0.6 %
DIFFERENTIAL METHOD BLD: NORMAL
EOSINOPHIL # BLD AUTO: 0.5 10E9/L (ref 0–0.7)
EOSINOPHIL NFR BLD AUTO: 6.1 %
ERYTHROCYTE [DISTWIDTH] IN BLOOD BY AUTOMATED COUNT: 12.3 % (ref 10–15)
HCT VFR BLD AUTO: 43.3 % (ref 35–47)
HGB BLD-MCNC: 14.1 G/DL (ref 11.7–15.7)
IMM GRANULOCYTES # BLD: 0 10E9/L (ref 0–0.4)
IMM GRANULOCYTES NFR BLD: 0.2 %
LYMPHOCYTES # BLD AUTO: 3.6 10E9/L (ref 0.8–5.3)
LYMPHOCYTES NFR BLD AUTO: 40.5 %
MCH RBC QN AUTO: 27.6 PG (ref 26.5–33)
MCHC RBC AUTO-ENTMCNC: 32.6 G/DL (ref 31.5–36.5)
MCV RBC AUTO: 85 FL (ref 78–100)
MONOCYTES # BLD AUTO: 0.9 10E9/L (ref 0–1.3)
MONOCYTES NFR BLD AUTO: 9.7 %
NEUTROPHILS # BLD AUTO: 3.8 10E9/L (ref 1.6–8.3)
NEUTROPHILS NFR BLD AUTO: 42.9 %
NRBC # BLD AUTO: 0 10*3/UL
NRBC BLD AUTO-RTO: 0 /100
PLATELET # BLD AUTO: 216 10E9/L (ref 150–450)
RBC # BLD AUTO: 5.1 10E12/L (ref 3.8–5.2)
WBC # BLD AUTO: 8.9 10E9/L (ref 4–11)

## 2021-06-16 PROCEDURE — 99284 EMERGENCY DEPT VISIT MOD MDM: CPT | Mod: 25

## 2021-06-16 PROCEDURE — 85025 COMPLETE CBC W/AUTO DIFF WBC: CPT | Performed by: EMERGENCY MEDICINE

## 2021-06-16 ASSESSMENT — MIFFLIN-ST. JEOR: SCORE: 1214.89

## 2021-06-17 ENCOUNTER — HOSPITAL ENCOUNTER (EMERGENCY)
Facility: CLINIC | Age: 71
Discharge: HOME | End: 2021-06-17
Attending: EMERGENCY MEDICINE | Admitting: EMERGENCY MEDICINE
Payer: MEDICARE

## 2021-06-17 ENCOUNTER — APPOINTMENT (OUTPATIENT)
Dept: ULTRASOUND IMAGING | Facility: CLINIC | Age: 71
End: 2021-06-17
Attending: EMERGENCY MEDICINE
Payer: MEDICARE

## 2021-06-17 ENCOUNTER — TELEPHONE (OUTPATIENT)
Dept: MAMMOGRAPHY | Facility: CLINIC | Age: 71
End: 2021-06-17

## 2021-06-17 ENCOUNTER — OFFICE VISIT (OUTPATIENT)
Dept: FAMILY MEDICINE | Facility: CLINIC | Age: 71
End: 2021-06-17

## 2021-06-17 VITALS
BODY MASS INDEX: 28.35 KG/M2 | TEMPERATURE: 98.6 F | SYSTOLIC BLOOD PRESSURE: 147 MMHG | RESPIRATION RATE: 16 BRPM | HEIGHT: 63 IN | OXYGEN SATURATION: 98 % | DIASTOLIC BLOOD PRESSURE: 78 MMHG | WEIGHT: 160 LBS | HEART RATE: 80 BPM

## 2021-06-17 VITALS
OXYGEN SATURATION: 96 % | BODY MASS INDEX: 28.34 KG/M2 | TEMPERATURE: 97.8 F | HEART RATE: 80 BPM | DIASTOLIC BLOOD PRESSURE: 68 MMHG | WEIGHT: 160 LBS | SYSTOLIC BLOOD PRESSURE: 128 MMHG

## 2021-06-17 DIAGNOSIS — C50.919 MALIGNANT NEOPLASM OF FEMALE BREAST, UNSPECIFIED ESTROGEN RECEPTOR STATUS, UNSPECIFIED LATERALITY, UNSPECIFIED SITE OF BREAST (H): ICD-10-CM

## 2021-06-17 DIAGNOSIS — R92.8 ABNORMAL ULTRASOUND OF BREAST: ICD-10-CM

## 2021-06-17 DIAGNOSIS — Z90.13 H/O BILATERAL MASTECTOMY: ICD-10-CM

## 2021-06-17 DIAGNOSIS — R07.89 CHEST WALL PAIN: ICD-10-CM

## 2021-06-17 DIAGNOSIS — N64.4 BREAST PAIN: Primary | ICD-10-CM

## 2021-06-17 PROCEDURE — 76641 ULTRASOUND BREAST COMPLETE: CPT | Mod: RT

## 2021-06-17 PROCEDURE — 99214 OFFICE O/P EST MOD 30 MIN: CPT | Performed by: NURSE PRACTITIONER

## 2021-06-17 RX ORDER — DULOXETIN HYDROCHLORIDE 20 MG/1
CAPSULE, DELAYED RELEASE ORAL
COMMUNITY
Start: 2021-06-16 | End: 2021-06-17

## 2021-06-17 RX ORDER — OXYCODONE AND ACETAMINOPHEN 5; 325 MG/1; MG/1
1 TABLET ORAL EVERY 6 HOURS PRN
Qty: 12 TABLET | Refills: 0 | Status: SHIPPED | OUTPATIENT
Start: 2021-06-17 | End: 2021-06-20

## 2021-06-17 NOTE — ED PROVIDER NOTES
"  History   Chief Complaint:  Breast Pain       HPI   Macey Romero is a 70 year old female with history of mastectomy, breast cancer, shingles and meningitis who presents with right breast pain and swelling. The patient reports that she noticed a burning sensation and a bump on her right breast today. She notes the pain radiates from the bump. She is nervous the breast cancer is back, or her breast implant ruptured.       Review of Systems   Musculoskeletal:        Right breast pain and swelling   All other systems reviewed and are negative.        Allergies:  Tetanus Toxoids  Erythromycin  Levaquin  Penicillins  Silicone    Medications:  Cymbalta  Albuterol   Symbicort    Past Medical History:    Breast cancer  Chronic constipation   Fibromyalgia  Meningitis  osteoarthritis   Osteopenia  Pneumonia  Pterygium eye  Reactive airway disease  Shingles  Skin cancer  Chronic pain  Myalgia  IBS       Past Surgical History:    Blepharoplasty, brow lift  Anterior c-disc fusion  Cholecystectomy   Colonoscopy   D & C  Endoscopy  Hysterectomy   Joint replacement, Knee  Mammoplasty  Mastectomy   Tonsillectomy  Ankle ligament repair  Salivary gland surgery     Family History:    Cancer    Social History:  The patient presents alone.  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    Physical Exam     Patient Vitals for the past 24 hrs:   BP Temp Temp src Pulse Resp SpO2 Height Weight   06/17/21 0251 (!) 147/78 -- -- 80 16 98 % -- --   06/16/21 2301 (!) 155/60 98.6  F (37  C) Oral -- -- -- -- --   06/16/21 2259 -- -- -- 86 17 98 % 1.6 m (5' 3\") 72.6 kg (160 lb)       Physical Exam  Vitals: reviewed by me  General: Pt seen on \[facility] [place] [facility]", [facility], cooperative, and alert to conversation  Eyes: Tracking well, clear conjunctiva BL  ENT: MMM, midline trachea.   Lungs: No tachypnea, no accessory muscle use. No respiratory distress.   CV: Rate as above  Abd: Soft, non tender, no guarding, no rebound. Non distended  MSK: " no joint effusion.  No evidence of trauma  Skin: No rash.  Right breast with no tenderness to palpation, does have a small area above the right breast with significant and very sensitive tenderness to palpation.  There are no skin changes in this area.  Slight swelling noted, but no abscess.  Neuro: Clear speech and no facial droop.  Psych: Not RIS, no e/o AH/VH      Emergency Department Course      Imaging:  US Breast right complete 4 quadrants:  Thin fluid collection surrounds the right breast saline implant, containing some low-level echoes. This measures a maximum of 7 mm in depth and is predominantly adjacent to the implant from 2-5 o'clock. No drainable collection, as per radiology.     Laboratory:  CBC: WBC 8.9, HGB 14.1,        Emergency Department Course:    Reviewed:  I reviewed nursing notes, vitals, past medical history and care everywhere    Assessments:  0144 I obtained history and examined the patient as noted above.   0217 I rechecked the patient and explained findings.   0225 I rechecked the patient and was comfortable with discharge.    Disposition:  The patient was discharged to home.       Impression & Plan     Medical Decision Making:  This is a very pleasant 70 year old female who presents to the ER with very sensitive chest wall pain above her right breast implant. Ultrasound shows no evidence of an abscess, and she has no skin changes in this area. She does however have significant tenderness to palpation to a small area just above her right breast, that is described best as neuropathic pain and possible even could be considered consistent with early shingles. This pain is reproducible, and occupies a very small area. It does not cross midline. There are no skin changes in this area.  I do think that she is stable for outpatient management, and whether or not her saline implant is leaking, we did discuss. It is hard to tell based on the ultrasound, but she needs to follow with her  regular doctor in 48 hours regardless. Will plan for discharge as above.       Diagnosis:    ICD-10-CM    1. Chest wall pain  R07.89    2. Abnormal ultrasound of breast  R92.8        Discharge Medications:  Discharge Medication List as of 6/17/2021  2:13 AM          Scribe Disclosure:  I, Corey Alvarez, am serving as a scribe at 1:44 AM on 6/17/2021 to document services personally performed by Reagan Roca MD based on my observations and the provider's statements to me.            Reagan Roca MD  06/18/21 8785

## 2021-06-17 NOTE — PROGRESS NOTES
"Problem(s) Oriented visit        SUBJECTIVE:                                                    Macey Romero is a 70 year old female who presents to clinic today for the following health issues :    Seen in ED late last night/early this morning for right breast pain that started suddenly yesterday without known injury or trauma. Has history of right breast cancer approximately 27 years ago for which she had a bilateral mastectomy with reconstruction. Explains that she had a poor experience back then with providers. Pain in right breast at 2 o'clock position - sharp, burning pain with touching the area. Got worse and radiated to entire breast as time went on yesterday. Breast ultrasound done in ER showed:  \"ORIGINAL PRELIMINARY IMPRESSION:   Thin fluid collection surrounds the right breast saline implant,  containing some low-level echoes. This measures a maximum of 7 mm in  depth and is predominantly adjacent to the implant from 2-5 o'clock.  No drainable collection.\" Was told to follow-up with her PCP.   Patient feels as though her right breast implant is leaking due to sensation she is having but is also worried about recurrent cancer as possibility.   Pain kept her up last night and she is worried about that happening again tonight.  Denies fever, chills, sweats, body aches, chest pressure, palpitations, shortness of breath, rashes (thought to possibly be early shingles when she was in ER last night)    Problem list, Medication list, Allergies, and Medical/Social/Surgical histories reviewed in EPIC and updated as appropriate.   Additional history: as documented    ROS:  5 point ROS completed and negative except noted above, including Gen, Breast, CV, Resp, Psych    OBJECTIVE:                                                    /68   Pulse 80   Temp 97.8  F (36.6  C)   Wt 72.6 kg (160 lb)   SpO2 96%   BMI 28.34 kg/m    Body mass index is 28.34 kg/m .   GENERAL: healthy, alert and no distress  RESP: " "calm regular breathing, no cough  BREAST: bilateral breast implants. Right breast - pain to any touch 2 o'clock position approx 2-3 cm from nipple. Palpable abnormality center of breast  CV: normal rate  SKIN: no suspicious lesions or rashes  PSYCH: anxious mood     ASSESSMENT/PLAN:                                                      Macey was seen today for er f/u, shingles and breast cancer.    Diagnoses and all orders for this visit:    Breast pain  -     oxyCODONE-acetaminophen (PERCOCET) 5-325 MG tablet; Take 1 tablet by mouth every 6 hours as needed for pain  -     PLASTIC SURGERY REFERRAL  Discussed side effects associated with narcotic pain meds including GI disturbances (such as diarrhea, nausea, vomiting, constipation, etc.), drowsiness, decreased cognition, narcotic withdrawal, and narcotic addiction/dependence.  Told the patient never to drink alcohol while taking this medication and not to drive or operate dangerous machinery while taking it.  Also discussed in specific detail my expectation regarding responsible use of narcotic and the implied \"contract\" with the patient that this type of medication will be used properly and exactly as instructed, and that any abnormal behaviors associated with the use of this medication will cause me to stop prescribing these medications at any time.      Malignant neoplasm of female breast, unspecified estrogen receptor status, unspecified laterality, unspecified site of breast (H)  -     PLASTIC SURGERY REFERRAL  [facility] - Dr. Kimberly Vines - referral placed and patient given information to schedule. Also should have biopsy of fluid collection    H/O bilateral mastectomy  Comments:  with reconstruction  Possibility of implant leaking. Referral to plastic surgery    [facility] queried June 17, 2021: Yes: No concerns identified    See Patient Instructions  Patient Instructions   [facility] - Dr. Kimberly Vines     Percocet - 1 tab every 6 " hours as needed for severe pain. Take medication as directed. Do not take medication while driving, operating heavy machinery or while drinking alcohol.      Thank you for coming in today!     We are working to improve our digital reputation.  Would you please help us by reviewing our clinic on Google and/or Facebook?  These are links for filling out a review for the clinic:    [email or web address]                 [email or web address]    We truly appreciate you taking the time to do this.     General Information:    Today you had your appointment with Deandra Strong CNP  My hours are:    Monday 8 AM - 5 PM  Wednesday: 8 AM - 5 PM  Thursday: 8 AM - 5 PM  Fridays: 8 AM - 5 PM    I am not in the office Tuesdays. Therefore non urgent calls received on Tuesday will be addressed when I am back in the office on Wednesday.     If lab work was done today as part of your evaluation you will generally be contacted via My Chart, mail, or phone with the results within 1-5 days. If there is an alarming result we will contact you by phone. Lab results come back at varying times, I generally wait until all labs are resulted before making comments on results. Please note labs are automatically released to My Chart once available.     If you need refills please contact your pharmacy They will send a refill request to me to review. Please allow 3 business days for us to process all refill requests.     Please call or send a medical message with any questions or concerns        SHAHRAM Martel CNP  [facility]  Family Practice  [facility]  [phone]    For any issues my office # is [phone]

## 2021-06-17 NOTE — ED NOTES
Pt to Ultrasound from Triage.  Keturah Resendez RN,.......................................... 6/17/2021   12:06 AM

## 2021-06-17 NOTE — DISCHARGE INSTRUCTIONS
As we discussed, no clear cause was found for your chest wall pain, but it does not appear to be a breast infection, nor does it appear to be an abscess.  There is a small amount of fluid around your implant, this should be followed up with your regular doctor, as it is nonspecific.  You may also be developing an early case of shingles, please pay attention to this area in the days ahead to see if you have any eruptions of skin abnormalities, because at that time she would certainly qualify for medication that would help your case.  Come back to the ER immediately with any worsening symptoms of any kind, and be absolutely certain to follow with your regular doctor within the next 3 to 4 days.

## 2021-06-17 NOTE — TELEPHONE ENCOUNTER
Patient says she had breast implants after breast cancer. She noticed a lump size of lima bean on her chest. Now the area of pain is become larger and her whole breast feels like it's on fire. Patient her breast looks different too. Pain level 4-5/10 but goes up to 10/10 when she touches it.    Disposition: go to the ED/call your provider  Reviewed care advice with caller per RN triage protocol guideline.  Advised to call back with worsening symptoms, concerns or questions. Caller verbalized understanding.          Reason for Disposition    Patient sounds very sick or weak to the triager    Additional Information    Negative: [1] SEVERE breast pain AND [2] fever > 103 F (39.4 C)    Protocols used: BREAST SYMPTOMS-A-AH

## 2021-06-17 NOTE — TELEPHONE ENCOUNTER
I called Macey Romero regarding her Right Breast Ultrasound results done during her ER visit on 6/16-17 2021.      [facility] radiologist Dr.Bryan Grigsby has given the final result and recommended follow up. See below.    Macey was instructed to have an appointment with Plastic Surgeon, Dr. Vines during her appointment with her primary Clinic ([facility]) today.     I explained Ultrasound results and orders are in Epic, should Dr. Vines want Ms. Romero to complete the recommended follow up.    Study Result    Right breast ultrasound     Comparisons: 10/8/2019     History: RIGHT breast pain. Remote history of RIGHT breast cancer  status post bilateral mastectomy with saline implant reconstruction  about 20 years ago. Now with pain and concern for infection or implant  rupture. The pain is most focal above the breasts, between the  clavicle and reconstructed breast.     FINDINGS:   Focussed ultrasound of all 4 quadrants of the RIGHT breast  was performed.      Thin rim of heterogeneous fluid is seen surrounding the RIGHT breast  implant. This is most pronounced at 1:00-3:00 and measures up to 0.9 x  4.8 x 1.3 cm. Differential diagnosis includes benign proteinaceous  maranda-implant fluid, hematoma, infection and much less likely mass.  Given the amount of fluid that remains within the saline implant,  rupture is thought to be less likely.                                                                      IMPRESSION: BI-RADS CATEGORY: 4 - Suspicious Abnormality-Biopsy Should  Be Considered     RECOMMENDED FOLLOW-UP: Ultrasound guided RIGHT breast fluid aspiration  versus core needle biopsy.     Given fluid collection versus mass in the maranda-implant space at 1:00  to 2:00 on the RIGHT, ultrasound-guided aspiration should be  attempted. If the fluid does not fully aspirate, core needle biopsy  will be performed.      ORIGINAL PRELIMINARY IMPRESSION:   Thin fluid collection surrounds  the right breast saline implant,  containing some low-level echoes. This measures a maximum of 7 mm in  depth and is predominantly adjacent to the implant from 2-5 o'clock.  No drainable collection.     COMPLETE MAMMOGRAPHY REPORT WILL FOLLOW LATER TODAY     Preliminary Interpretation Dictated By: Buddy Herrera MD  Date: 6/17/2021      MD Kasia CHEN RN BSN  [facility]  [phone]

## 2021-06-17 NOTE — ED TRIAGE NOTES
Patient has saline breast implant s/p mastectomy to right breast. She reports extreme pain to right breast over last couple days. Patient concerned that cancer is occurring again. She reports a small mass to right breast.

## 2021-06-18 ASSESSMENT — ASTHMA QUESTIONNAIRES: ACT_TOTALSCORE: 22

## 2021-06-18 NOTE — PATIENT INSTRUCTIONS
[facility] - Dr. Kimberly Vines     Percocet - 1 tab every 6 hours as needed for severe pain. Take medication as directed. Do not take medication while driving, operating heavy machinery or while drinking alcohol.      Thank you for coming in today!     We are working to improve our digital reputation.  Would you please help us by reviewing our clinic on Google and/or Facebook?  These are links for filling out a review for the clinic:    [email or web address]                 [email or web address]    We truly appreciate you taking the time to do this.     General Information:    Today you had your appointment with Deandra Strong CNP  My hours are:    Monday 8 AM - 5 PM  Wednesday: 8 AM - 5 PM  Thursday: 8 AM - 5 PM  Fridays: 8 AM - 5 PM    I am not in the office Tuesdays. Therefore non urgent calls received on Tuesday will be addressed when I am back in the office on Wednesday.     If lab work was done today as part of your evaluation you will generally be contacted via My Chart, mail, or phone with the results within 1-5 days. If there is an alarming result we will contact you by phone. Lab results come back at varying times, I generally wait until all labs are resulted before making comments on results. Please note labs are automatically released to My Chart once available.     If you need refills please contact your pharmacy They will send a refill request to me to review. Please allow 3 business days for us to process all refill requests.     Please call or send a medical message with any questions or concerns

## 2021-06-23 ENCOUNTER — HOSPITAL ENCOUNTER (OUTPATIENT)
Dept: MAMMOGRAPHY | Facility: CLINIC | Age: 71
End: 2021-06-23
Attending: FAMILY MEDICINE
Payer: MEDICARE

## 2021-06-23 DIAGNOSIS — T88.8XXA FLUID COLLECTION AT SURGICAL SITE: ICD-10-CM

## 2021-06-23 DIAGNOSIS — N64.4 BREAST PAIN, RIGHT: ICD-10-CM

## 2021-06-23 LAB
GRAM STN SPEC: NORMAL
GRAM STN SPEC: NORMAL
Lab: NORMAL
SPECIMEN SOURCE: NORMAL

## 2021-06-23 PROCEDURE — 87070 CULTURE OTHR SPECIMN AEROBIC: CPT | Performed by: FAMILY MEDICINE

## 2021-06-23 PROCEDURE — 76642 ULTRASOUND BREAST LIMITED: CPT | Mod: RT

## 2021-06-23 PROCEDURE — 87205 SMEAR GRAM STAIN: CPT | Performed by: FAMILY MEDICINE

## 2021-06-23 PROCEDURE — 88305 TISSUE EXAM BY PATHOLOGIST: CPT | Mod: TC | Performed by: FAMILY MEDICINE

## 2021-06-23 PROCEDURE — 88112 CYTOPATH CELL ENHANCE TECH: CPT | Mod: TC | Performed by: FAMILY MEDICINE

## 2021-06-23 PROCEDURE — 87075 CULTR BACTERIA EXCEPT BLOOD: CPT | Performed by: FAMILY MEDICINE

## 2021-06-23 PROCEDURE — 999N001018 HC STATISTIC H-CELL BLOCK W/STAIN: Performed by: FAMILY MEDICINE

## 2021-06-23 PROCEDURE — 250N000009 HC RX 250: Mod: JW | Performed by: FAMILY MEDICINE

## 2021-06-23 PROCEDURE — 88305 TISSUE EXAM BY PATHOLOGIST: CPT | Mod: 26 | Performed by: PATHOLOGY

## 2021-06-23 PROCEDURE — 88112 CYTOPATH CELL ENHANCE TECH: CPT | Mod: 26 | Performed by: PATHOLOGY

## 2021-06-23 PROCEDURE — 76942 ECHO GUIDE FOR BIOPSY: CPT

## 2021-06-23 RX ADMIN — LIDOCAINE HYDROCHLORIDE 5 ML: 10 INJECTION, SOLUTION INFILTRATION; PERINEURAL at 08:15

## 2021-06-25 ENCOUNTER — OFFICE VISIT (OUTPATIENT)
Dept: FAMILY MEDICINE | Facility: CLINIC | Age: 71
End: 2021-06-25

## 2021-06-25 ENCOUNTER — TELEPHONE (OUTPATIENT)
Dept: MAMMOGRAPHY | Facility: CLINIC | Age: 71
End: 2021-06-25

## 2021-06-25 VITALS
BODY MASS INDEX: 28.75 KG/M2 | RESPIRATION RATE: 16 BRPM | WEIGHT: 162.25 LBS | HEIGHT: 63 IN | HEART RATE: 78 BPM | OXYGEN SATURATION: 97 % | SYSTOLIC BLOOD PRESSURE: 120 MMHG | DIASTOLIC BLOOD PRESSURE: 64 MMHG

## 2021-06-25 DIAGNOSIS — R73.03 PREDIABETES: ICD-10-CM

## 2021-06-25 DIAGNOSIS — Z90.13 H/O BILATERAL MASTECTOMY: ICD-10-CM

## 2021-06-25 DIAGNOSIS — Z71.89 ADVANCED CARE PLANNING/COUNSELING DISCUSSION: ICD-10-CM

## 2021-06-25 DIAGNOSIS — Z01.818 PREOP GENERAL PHYSICAL EXAM: Primary | ICD-10-CM

## 2021-06-25 DIAGNOSIS — J45.30 MILD PERSISTENT ASTHMA WITHOUT COMPLICATION: ICD-10-CM

## 2021-06-25 DIAGNOSIS — E78.5 HYPERLIPIDEMIA LDL GOAL <100: ICD-10-CM

## 2021-06-25 LAB
% GRANULOCYTES: 56.7 % (ref 42.2–75.2)
COPATH REPORT: NORMAL
HCT VFR BLD AUTO: 41.7 % (ref 35–46)
HEMOGLOBIN: 14.4 G/DL (ref 11.8–15.5)
LYMPHOCYTES NFR BLD AUTO: 35 % (ref 20.5–51.1)
MCH RBC QN AUTO: 28.1 PG (ref 27–31)
MCHC RBC AUTO-ENTMCNC: 34.5 G/DL (ref 33–37)
MCV RBC AUTO: 81.4 FL (ref 80–100)
MONOCYTES NFR BLD AUTO: 8.3 % (ref 1.7–9.3)
PLATELET # BLD AUTO: 201 K/UL (ref 140–450)
RBC # BLD AUTO: 5.12 X10/CMM (ref 3.7–5.2)
WBC # BLD AUTO: 6.2 X10/CMM (ref 3.8–11)

## 2021-06-25 PROCEDURE — 93000 ELECTROCARDIOGRAM COMPLETE: CPT | Mod: 59 | Performed by: NURSE PRACTITIONER

## 2021-06-25 PROCEDURE — 36415 COLL VENOUS BLD VENIPUNCTURE: CPT | Performed by: NURSE PRACTITIONER

## 2021-06-25 PROCEDURE — 99215 OFFICE O/P EST HI 40 MIN: CPT | Performed by: NURSE PRACTITIONER

## 2021-06-25 PROCEDURE — 85025 COMPLETE CBC W/AUTO DIFF WBC: CPT | Performed by: NURSE PRACTITIONER

## 2021-06-25 ASSESSMENT — MIFFLIN-ST. JEOR: SCORE: 1225.09

## 2021-06-25 NOTE — TELEPHONE ENCOUNTER
Received a call from Ms. Romero regarding her right breast abscess/seroma aspiration.   verified.  Reviewed the gram stain and preliminary cytology results with her.  Explained that the cultures are still being monitored and pending.  Informed her that the results should be finalized over the weekend and that any follow up care will come directly from Dr. Ferrari, presumably on Monday.  Ms. Romero understands and in agreement with the plan.  Denies any further questions or concerns.

## 2021-06-25 NOTE — PATIENT INSTRUCTIONS
Recommend daily Colace (Docusate) & Miralax now until done with pain medications to help with constipation    Patient Instructions   - Nothing to eat/drink starting midnight the night before, unless surgical team tells you otherwise     - Avoid ibuprofen, naproxen (aleve), aspirin, fish oil, vitamin e 1 week prior to surgery     - If you have pain - ok to take Acetaminophen (Tylenol) 650 mg every 4-6 hours as needed.     - If you fall ill prior to surgery - (ie - fever, chills, nausea, vomiting or diarrhea, cough, etc) please let me and your surgeon know asap   Preparing for Your Surgery  Getting started  A nurse will call you to review your health history and instructions. They will give you an arrival time based on your scheduled surgery time.  Please be ready to share the following:    Your doctor's clinic name and phone number    Your medical, surgical and anesthesia history    A list of allergies and sensitivities    A list of medicines, including herbal treatments and over-the-counter drugs    Whether the patient has a legal guardian (ask how to send us the papers in advance)  If you have a child who's having surgery, please ask for a copy of Preparing for Your Child's Surgery.    Preparing for surgery    Within 30 days of surgery: Have a pre-op exam (sometimes called an H&P, or History and Physical). This can be done at a clinic or pre-operative center.  ? If you're having a , you may not need this exam. Talk to your care team    At your pre-op exam, talk to your care team about all medicines you take. If you need to stop any medicines before surgery, ask when to start taking them again.  ? We do this for your safety. Many medicines can make you bleed too much during surgery. Some change how well surgery (anesthesia) drugs work.    Call your insurance company to let them know you're having surgery. (If you don't have insurance, call [phone].)    Call your clinic if there's any change in your  health. This includes signs of a cold or flu (sore throat, runny nose, cough, rash, fever). It also includes a scrape or scratch near the surgery site.    If you have questions on the day of surgery, call your hospital or surgery center.  Eating and drinking guidelines  For your safety: Unless your surgeon tells you otherwise, follow the guidelines below.    Eat and drink as usual until 8 hours before surgery. After that, no food or milk.    Drink clear liquids until 2 hours before surgery. These are liquids you can see through, like water, Gatorade and Propel Water. You may also have black coffee and tea (no cream or milk).    Nothing by mouth within 2 hours of surgery. This includes gum, candy and breath mints.    If you drink, stop drinking alcohol the night before surgery.    If your care team tells you to take medicine on the morning of surgery, it's okay to take it with a sip of water.  Preventing infection    Shower or bathe the night before and morning of your surgery. Follow the instructions your clinic gave you. (If no instructions, use regular soap.)    Don't shave or clip hair near your surgery site. We'll remove the hair if needed.    Don't smoke or vape the morning of surgery. You may chew nicotine gum up to 2 hours before surgery. A nicotine patch is okay.  ? Note: Some surgeries require you to completely quit smoking and nicotine. Check with your surgeon.    Your care team will make every effort to keep you safe from infection. We will:  ? Clean our hands often with soap and water (or an alcohol-based hand rub).  ? Clean the skin at your surgery site with a special soap that kills germs.  ? Give you a special gown to keep you warm. (Cold raises the risk of infection.)  ? Wear special hair covers, masks, gowns and gloves during surgery.  ? Give antibiotic medicine, if prescribed. Not all surgeries need antibiotics.  What to bring on the day of surgery    Photo ID and insurance card    Copy of your  health care directive, if you have one    Glasses and hearing aides (bring cases)  ? You can't wear contacts during surgery    Inhaler and eye drops, if you use them (tell us about these when you arrive)    CPAP machine or breathing device, if you use them    A few personal items, if spending the night    If you have . . .  ? A pacemaker or ICD (cardiac defibrillator): Bring the ID card.  ? An implanted stimulator: Bring the remote control.  ? A legal guardian: Bring a copy of the certified (court-stamped) guardianship papers.  Please remove any jewelry, including body piercings. Leave jewelry and other valuables at home.  If you're going home the day of surgery  Important: If you don't follow the rules below, we must cancel your surgery.     Arrange for someone to drive you home after surgery. You may not drive, take a taxi or take public transportation by yourself (unless you'll have local anesthesia only).    Arrange for a responsible adult to stay with you overnight. If you don't, we may keep you in the hospital overnight, and you may need to pay the costs yourself.  Questions?   If you have any questions for your care team, list them here: _________________________________________________________________________________________________________________________________________________________________________________________________________________________________________________________________________________________________________________________  For informational purposes only. Not to replace the advice of your health care provider. Copyright   2003, 2019 [facility]. All rights reserved. Clinically reviewed by Autumn Valero MD. [place] [id number] - REV 4/20.    My Asthma Action Plan    Name: Macey Romero   YOB: 1950  Date: 6/25/2021   My doctor: SHAHRAM Martel CNP   My clinic: [facility]        My Control Medicine: Budesonide + formoterol (Symbicort HFA) -   160/4.5 mcg 2 puffs BID  My Rescue Medicine: Albuterol (Proair/Ventolin/Proventil HFA) 2-4 puffs EVERY 4 HOURS as needed. Use a spacer if recommended by your provider.   My Asthma Severity:   Mild Persistent  Know your asthma triggers: upper respiratory infections and exercise or sports               GREEN ZONE   Good Control    I feel good    No cough or wheeze    Can work, sleep and play without asthma symptoms       Take your asthma control medicine every day.     1. If exercise triggers your asthma, take your rescue medication    15 minutes before exercise or sports, and    During exercise if you have asthma symptoms  2. Spacer to use with inhaler: If you have a spacer, make sure to use it with your inhaler             YELLOW ZONE Getting Worse  I have ANY of these:    I do not feel good    Cough or wheeze    Chest feels tight    Wake up at night   1. Keep taking your Green Zone medications  2. Start taking your rescue medicine:    every 20 minutes for up to 1 hour. Then every 4 hours for 24-48 hours.  3. If you stay in the Yellow Zone for more than 12-24 hours, contact your doctor.  4. If you do not return to the Green Zone in 12-24 hours or you get worse, start taking your oral steroid medicine if prescribed by your provider.           RED ZONE Medical Alert - Get Help  I have ANY of these:    I feel awful    Medicine is not helping    Breathing getting harder    Trouble walking or talking    Nose opens wide to breathe       1. Take your rescue medicine NOW  2. If your provider has prescribed an oral steroid medicine, start taking it NOW  3. Call your doctor NOW  4. If you are still in the Red Zone after 20 minutes and you have not reached your doctor:    Take your rescue medicine again and    Call 911 or go to the emergency room right away    See your regular doctor within 2 weeks of an Emergency Room or Urgent Care visit for follow-up treatment.          Annual Reminders:  Meet with Asthma Educator,  Flu  Shot in the Fall, consider Pneumonia Vaccination for patients with asthma (aged 19 and older).    Pharmacy: [id number] - [place] - [address] AT [address] [place] [address]    Electronically signed by SHAHRAM Martel CNP   Date: 06/25/21                      Asthma Triggers  How To Control Things That Make Your Asthma Worse    Triggers are things that make your asthma worse.  Look at the list below to help you find your triggers and what you can do about them.  You can help prevent asthma flare-ups by staying away from your triggers.      Trigger                                                          What you can do   Cigarette Smoke  Tobacco smoke can make asthma worse. Do not allow smoking in your home, car or around you.  Be sure no one smokes at a child s day care or school.  If you smoke, ask your health care provider for ways to help you quit.  Ask family members to quit too.  Ask your health care provider for a referral to Quit Plan to help you quit smoking, or call 0-694-345-PLAN.     Colds, Flu, Bronchitis  These are common triggers of asthma. Wash your hands often.  Don t touch your eyes, nose or mouth.  Get a flu shot every year.     Dust Mites  These are tiny bugs that live in cloth or carpet. They are too small to see. Wash sheets and blankets in hot water every week.   Encase pillows and mattress in dust mite proof covers.  Avoid having carpet if you can. If you have carpet, vacuum weekly.   Use a dust mask and HEPA vacuum.   Pollen and Outdoor Mold  Some people are allergic to trees, grass, or weed pollen, or molds. Try to keep your windows closed.  Limit time out doors when pollen count is high.   Ask you health care provider about taking medicine during allergy season.     Animal Dander  Some people are allergic to skin flakes, urine or saliva from pets with fur or feathers. Keep pets with fur or feathers out of your home.    If you can t keep the pet outdoors, then  keep the pet out of your bedroom.  Keep the bedroom door closed.  Keep pets off cloth furniture and away from stuffed toys.     Mice, Rats, and Cockroaches   Some people are allergic to the waste from these pests.   Cover food and garbage.  Clean up spills and food crumbs.  Store grease in the refrigerator.   Keep food out of the bedroom.   Indoor Mold  This can be a trigger if your home has high moisture. Fix leaking faucets, pipes, or other sources of water.   Clean moldy surfaces.  Dehumidify basement if it is damp and smelly.   Smoke, Strong Odors, and Sprays  These can reduce air quality. Stay away from strong odors and sprays, such as perfume, powder, hair spray, paints, smoke incense, paint, cleaning products, candles and new carpet.   Exercise or Sports  Some people with asthma have this trigger. Be active!  Ask your doctor about taking medicine before sports or exercise to prevent symptoms.    Warm up for 5-10 minutes before and after sports or exercise.     Other Triggers of Asthma  Cold air:  Cover your nose and mouth with a scarf.  Sometimes laughing or crying can be a trigger.  Some medicines and food can trigger asthma.

## 2021-06-25 NOTE — PROGRESS NOTES
[facility]  [address] [place] [address]  Phone: [phone]  Fax: [phone]  Primary Provider: Deandra Garcia  Pre-op Performing Provider: DEANDRA GARCIA      PREOPERATIVE EVALUATION:  Today's date: 6/25/2021  Preoperative Questionnaire:   Yes - Have you ever had a heart attack or stroke?  No - Have you ever had surgery on your heart or blood vessels, such as a stent, coronary (heart) bypass, or surgery on an artery in the head, neck, heart, or legs?  No - Do you have chest pain when you are physically active?  No - Do you have a history of heart failure?  No - Do you currently have a cold, bronchitis, or symptoms of other respiratory (head and chest) infections?  No - Do you have a cough, shortness of breath, or wheezing?  Yes - Do you or anyone in your family have a history of blood clots?  Yes - Do you or anyone in your family have a serious bleeding problem, such as long-lasting bleeding after surgeries or cuts? Mother  No - Have you ever had anemia or been told to take iron pills?  No - Have you had any abnormal blood loss such as black, tarry or bloody stools, or abnormal vaginal bleeding?  Yes - Have you ever had a blood transfusion?1972  Yes - Are you willing to have a blood transfusion if it is medically needed before, during, or after your surgery?  No - Have you or anyone in your family ever had problems with anesthesia (sedation for surgery)?  No - Do you have sleep apnea, excessive snoring, or daytime drowsiness?   No - Do you have any artifical heart valves or other implanted medical devices, such as a pacemaker, defibrillator, or continuous glucose monitor?  Yes - Do you have any artifical joints? Knee and spine  No - Are you allergic to latex?  No - Is there any chance that you may be pregnant?          Macey Romero is a 70 year old female who presents for a preoperative evaluation.    Surgical Information:  Surgery/Procedure: Breast implant  removal  Surgery Location: [facility]  Surgeon: Mohinder  Surgery Date: 07/02/21  Time of Surgery: 6:30am  Where patient plans to recover: At home with family  Fax number for surgical facility: [phone]    Type of Anesthesia Anticipated: to be determined    Assessment & Plan     The proposed surgical procedure is considered INTERMEDIATE risk.    Preop general physical exam  - CBC with Diff/Plt ([facility])  - EKG 12-lead complete with read (future)  - VENOUS COLLECTION  H/O bilateral mastectomy  Cleared for scheduled surgery  - CBC with Diff/Plt ([facility])  - VENOUS COLLECTION    Advanced care planning/counseling discussion  Patient has copy and  will have day of surgery  - VENOUS COLLECTION    Prediabetes  Not on medication. Checking labs today  - Comp. Metabolic Panel (14) ([facility])  - Hemoglobin A1C ([facility])  - VENOUS COLLECTION    Hyperlipidemia LDL goal <100  Not on medication. Checking fasting lipid panel  - Lipid Panel ([facility])  - VENOUS COLLECTION    Mild persistent asthma without complication  Well controlled with current inhalers.      Risks and Recommendations:  The patient has the following additional risks and recommendations for perioperative complications:   - No identified additional risk factors other than previously addressed    Medication Instructions:   - ibuprofen (Advil, Motrin): HOLD 1 day before surgery.     RECOMMENDATION:  APPROVAL GIVEN to proceed with proposed procedure, without further diagnostic evaluation.    Review of external notes as documented above       Subjective     HPI related to upcoming procedure: right breast pain, seen in ED 6/17. Fluid collection present on ultrasound. Biopsy/abscess drainage done 6/23 - normal cytology and not growing any bacteria at this time (preliminary results). Patient wishes to have bilateral breast implants removed by Dr. Vines and does not want others put in.    Health Care Directive:  Patient does not have a Health Care Directive or Living  Will: Patient states has Advance Directive and will bring in a copy to clinic.    Preoperative Review of :   reviewed - controlled substances reflected in medication list.  PDMP Review       Value Time User    State PDMP site checked  Yes 6/25/2021 12:19 PM Deandra Strong APRN CNP           Status of Chronic Conditions:  ASTHMA - Patient has a longstanding history of moderate-severe Asthma . Patient has been doing well overall noting NO SYMPTOMS and continues on medication regimen consisting of Symbicort daily, Albuterol prn without adverse reactions or side effects.       Review of Systems  Constitutional, neuro, ENT, endocrine, pulmonary, cardiac, gastrointestinal, genitourinary, musculoskeletal, integument and psychiatric systems are negative, except as otherwise noted.    Patient Active Problem List    Diagnosis Date Noted     Malignant neoplasm of female breast, unspecified estrogen receptor status, unspecified laterality, unspecified site of breast (H) 06/17/2021     Priority: Medium     H/O bilateral mastectomy 06/17/2021     Priority: Medium     with reconstruction       Fibromyalgia      Priority: Medium     Chronic pain of both knees 03/21/2018     Priority: Medium     Right hip pain 12/18/2015     Priority: Medium     Pterygium eye 08/26/2013     Priority: Medium     Myalgia and myositis 09/12/2011     Priority: Medium     Problem list name updated by automated process. Provider to review       IBS (irritable bowel syndrome) 06/28/2011     Priority: Medium     Keloid of skin 06/28/2011     Priority: Medium     Mild persistent asthma without complication 02/01/2011     Priority: Medium     Osteoarthritis 02/01/2011     Priority: Medium     Osteopenia 02/01/2011     Priority: Medium     History of breast cancer 02/01/2011     Priority: Medium     1987        Past Medical History:   Diagnosis Date     Breast CA in situ 1987     Cancer (H) 1987    Right Breast treated with surgery     Chronic  constipation      Fibromyalgia      Meningitis     Several times as an adult     Osteoarthritis 2/1/2011     Osteopenia 2/1/2011     Pneumonia      Pterygium eye 8/26/2013     Reactive airway disease 2/1/2011     Seasonal allergies      Shingles     Prior to 2008 - scalp     Skin cancer     SCC - hand, left nose     Past Surgical History:   Procedure Laterality Date     anterior c-disc fusion       BLEPHAROPLASTY, BROW LIFT BILATERAL, COMBINED Bilateral 6/18/2018    Procedure: COMBINED BLEPHAROPLASTY, BROW LIFT BILATERAL;  BILATERAL UPPER LID BLEPHAROPLASTY; BILATERAL BROW PTOSIS REPAIR;  Surgeon: Сергей Walters MD;  Location:  [facility]     CHOLECYSTECTOMY       COLONOSCOPY N/A 10/19/2017    Procedure: COLONOSCOPY;  COLONOSCOPY;  Surgeon: Niko Wong MD;  Location:  GI     D & C      Bleeding before hysterectomy     ENDOSCOPY  04/21/08     HYSTERECTOMY, MARCUS      Ovaries and tubes out due to breast cancer     JOINT REPLACEMTN, KNEE RT/LT Right 01/2011    Joint Replacement knee RT, with tibial straightening (2 replacements)     MAMMOPLASTY AUGMENTATION BILATERAL      breast ca      MASTECTOMY, SIMPLE RT/LT/CLAIRE Bilateral 1989    Mastectomy Simple RT/LT/CLAIRE     MOHS MICROGRAPHIC PROCEDURE      Left lateral nose     OPEN REDUCTION INTERNAL FIXATION ANKLE  8/27/2013    Procedure: OPEN REDUCTION INTERNAL FIXATION ANKLE;  RIGHT OPEN REDUCTION INTERNAL FIXATION ANKLE WITH LIGAMENT REPAIR;  Surgeon: Nando Chang MD;  Location:  OR     PTERYGIUM WITH CONJUNCTIVAL AUTOLOGOUS TRANSPLANT Left 8/29/2016    Procedure: PTERYGIUM WITH CONJUNCTIVAL AUTOLOGOUS TRANSPLANT;  Surgeon: Сергей Walters MD;  Location:  [facility]     REPAIR LIGAMENT ANKLE  8/27/2013    Procedure: REPAIR LIGAMENT ANKLE;;  Surgeon: Nando Chang MD;  Location:  OR     SALIVARY GLAND SURGERY Left     Stone removal      TONSILLECTOMY       Current Outpatient Medications   Medication Sig Dispense Refill     albuterol (PROVENTIL) (2.5 MG/3ML)  "0.083% neb solution Take 1 vial by nebulization every 4 hours as needed for shortness of breath/dyspnea or wheezing 1 Box 3     budesonide-formoterol (SYMBICORT) 160-4.5 MCG/ACT Inhaler Inhale 2 puffs into the lungs 2 times daily 1 Inhaler 3       Allergies   Allergen Reactions     Tetanus Toxoids Anaphylaxis     Erythromycin GI Disturbance     Levaquin Hives and Itching     unknown     Pcn [Penicillins] Hives     Silicone Rash        Social History     Tobacco Use     Smoking status: Former Smoker     Quit date: 2016     Years since quittin.4     Smokeless tobacco: Never Used     Tobacco comment: quit but  still smokes, but not in house   Substance Use Topics     Alcohol use: No     Alcohol/week: 0.0 standard drinks       History   Drug Use No         Objective     /64   Pulse 78   Resp 16   Ht 1.6 m (5' 3\")   Wt 73.6 kg (162 lb 4 oz)   SpO2 97%   BMI 28.74 kg/m      Physical Exam    GENERAL APPEARANCE: healthy, alert and no distress     EYES: Eyes grossly normal to inspection     HENT: ear canals and TM's normal, nose and mouth without ulcers or lesions, oral mucous membranes moist and oropharynx clear     NECK: no adenopathy and no asymmetry, masses, or scars     RESP: lungs clear to auscultation - no rales, rhonchi or wheezes     CV: regular rates and rhythm, normal S1 S2, no S3 or S4 and no murmur, click or rub     ABDOMEN:  soft, nontender, no HSM or masses and bowel sounds normal     MS: extremities normal- no gross deformities noted, no evidence of inflammation in joints, FROM in all extremities.     SKIN: Flaky skin lesion left forearm     NEURO: Normal strength and tone, sensory exam grossly normal, mentation intact and speech normal     PSYCH: anxious mood     LYMPHATICS: No cervical adenopathy    Recent Labs   Lab Test 21  2307 10/21/20  1454 10/21/20  1441 20  1131 19  0930   HGB 14.1 14.6  --  15.0  --     207  --  152  --    NA  --   --   --  134 143 "   POTASSIUM  --   --   --  3.4 4.5   CR  --   --   --  0.92 0.79   A1C  --   --  5.9*  --   --         Diagnostics:  Recent Results (from the past 24 hour(s))   CBC with Diff/Plt ([facility])    Collection Time: 06/25/21 10:45 AM   Result Value Ref Range    WBC x10/cmm 6.2 3.8 - 11.0 x10/cmm    % Lymphocytes 35.0 20.5 - 51.1 %    % Monocytes 8.3 1.7 - 9.3 %    % Granulocytes 56.7 42.2 - 75.2 %    RBC x10/cmm 5.12 3.7 - 5.2 x10/cmm    Hemoglobin 14.4 11.8 - 15.5 g/dl    Hematocrit 41.7 35 - 46 %    MCV 81.4 80 - 100 fL    MCH 28.1 27.0 - 31.0 pg    MCHC 34.5 33.0 - 37.0 g/dL    Platelet Count 201 140 - 450 K/uL      EKG: appears normal, NSR, normal axis, normal intervals, no acute ST/T changes c/w ischemia, no LVH by voltage criteria, unchanged from previous tracings    Revised Cardiac Risk Index (RCRI):  The patient has the following serious cardiovascular risks for perioperative complications:   - No serious cardiac risks = 0 points     RCRI Interpretation: 0 points: Class I (very low risk - 0.4% complication rate)           Signed Electronically by: SHAHRAM Martel CNP  Copy of this evaluation report is provided to requesting physician.

## 2021-06-26 LAB — HBA1C MFR BLD: 5.9 % (ref 4.8–5.6)

## 2021-06-28 LAB
ALBUMIN SERPL-MCNC: 4.4 G/DL (ref 3.8–4.8)
ALBUMIN/GLOB SERPL: 1.8 {RATIO} (ref 1.2–2.2)
ALP SERPL-CCNC: 82 IU/L (ref 48–121)
ALT SERPL-CCNC: 11 IU/L (ref 0–32)
AST SERPL-CCNC: 13 IU/L (ref 0–40)
BACTERIA SPEC CULT: NO GROWTH
BILIRUB SERPL-MCNC: 0.6 MG/DL (ref 0–1.2)
BUN SERPL-MCNC: 14 MG/DL (ref 8–27)
BUN/CREATININE RATIO: 15 (ref 12–28)
CALCIUM SERPL-MCNC: 9.8 MG/DL (ref 8.7–10.3)
CHLORIDE SERPLBLD-SCNC: 101 MMOL/L (ref 96–106)
CHOLEST SERPL-MCNC: 209 MG/DL (ref 100–199)
CREAT SERPL-MCNC: 0.95 MG/DL (ref 0.57–1)
EGFR IF AFRICN AM: 70 ML/MIN/1.73
EGFR IF NONAFRICN AM: 61 ML/MIN/1.73
GLOBULIN, TOTAL: 2.4 G/DL (ref 1.5–4.5)
GLUCOSE SERPL-MCNC: 108 MG/DL (ref 65–99)
HBA1C MFR BLD: NORMAL % (ref 0–5.7)
HDLC SERPL-MCNC: 42 MG/DL
LDL/HDL RATIO: 3.3 RATIO (ref 0–3.2)
LDLC SERPL CALC-MCNC: 139 MG/DL (ref 0–99)
Lab: NORMAL
POTASSIUM SERPL-SCNC: 4.8 MMOL/L (ref 3.5–5.2)
PROT SERPL-MCNC: 6.8 G/DL (ref 6–8.5)
SODIUM SERPL-SCNC: 142 MMOL/L (ref 134–144)
SPECIMEN SOURCE: NORMAL
TOTAL CO2: 28 MMOL/L (ref 20–29)
TRIGL SERPL-MCNC: 154 MG/DL (ref 0–149)
VLDLC SERPL CALC-MCNC: 28 MG/DL (ref 5–40)

## 2021-06-29 NOTE — PROGRESS NOTES
6/25/21 faxed preop and EKG and labs to [facility] @ [phone]    Isaiah Hilton,   [facility]  [phone]

## 2021-06-30 LAB
BACTERIA SPEC CULT: NORMAL
Lab: NORMAL
SPECIMEN SOURCE: NORMAL

## 2021-07-02 ENCOUNTER — HOSPITAL PATHOLOGY (OUTPATIENT)
Dept: OTHER | Facility: CLINIC | Age: 71
End: 2021-07-02

## 2021-07-06 LAB — COPATH REPORT: NORMAL

## 2021-08-01 ENCOUNTER — NURSE TRIAGE (OUTPATIENT)
Dept: NURSING | Facility: CLINIC | Age: 71
End: 2021-08-01

## 2021-08-01 NOTE — TELEPHONE ENCOUNTER
"Pt stated she has been constipated for the past 4 weeks.  It hs been three weeks since pt has had a stool.  Pt states she has a cluster of hemorrhoids that may be blocking and pt is starting to have abdominal pain.  Pain \"7-8\" nina 0/10 pain scale, pt states she is very bloated.  Pt advised to be seen within 24 hrs.  Pt may go to ED today.  Soni Collier RN, MA  [place] Nurse Advisor      Reason for Disposition    Last bowel movement (BM) > 4 days ago    Additional Information    Negative: [1] Abdomen pain is main symptom AND [2] male    Negative: [1] Abdomen pain is main symptom AND [2] adult female    Negative: Rectal bleeding or blood in stool is main symptom    Negative: Rectal pain or itching is main symptom    Negative: Constipation in a cancer patient who is currently (or recently) receiving chemotherapy or radiation therapy, or cancer patient who has metastatic or end-stage cancer and is receiving palliative care    Negative: Patient sounds very sick or weak to the triager    Negative: [1] Vomiting AND [2] abdomen looks much more swollen than usual    Negative: [1] Vomiting AND [2] contains bile (green color)    Negative: [1] Constant abdominal pain AND [2] present > 2 hours    Negative: [1] Rectal pain or fullness from fecal impaction (rectum full of stool) AND [2] NOT better after SITZ bath, suppository or enema    Negative: [1] Intermittent mild abdominal pain AND [2] fever    Negative: Abdomen is more swollen than usual    Negative: Leaking stool    Protocols used: CONSTIPATION-A-AH      "

## 2021-08-12 ENCOUNTER — TRANSFERRED RECORDS (OUTPATIENT)
Dept: FAMILY MEDICINE | Facility: CLINIC | Age: 71
End: 2021-08-12

## 2021-10-23 ENCOUNTER — APPOINTMENT (OUTPATIENT)
Dept: CT IMAGING | Facility: CLINIC | Age: 71
End: 2021-10-23
Attending: PHYSICIAN ASSISTANT
Payer: MEDICARE

## 2021-10-23 ENCOUNTER — NURSE TRIAGE (OUTPATIENT)
Dept: NURSING | Facility: CLINIC | Age: 71
End: 2021-10-23

## 2021-10-23 ENCOUNTER — HOSPITAL ENCOUNTER (EMERGENCY)
Facility: CLINIC | Age: 71
Discharge: HOME | End: 2021-10-23
Attending: PHYSICIAN ASSISTANT | Admitting: PHYSICIAN ASSISTANT
Payer: MEDICARE

## 2021-10-23 VITALS
HEART RATE: 85 BPM | RESPIRATION RATE: 16 BRPM | SYSTOLIC BLOOD PRESSURE: 167 MMHG | OXYGEN SATURATION: 98 % | HEIGHT: 63 IN | TEMPERATURE: 97.9 F | DIASTOLIC BLOOD PRESSURE: 73 MMHG | WEIGHT: 155 LBS | BODY MASS INDEX: 27.46 KG/M2

## 2021-10-23 DIAGNOSIS — S30.0XXA CONTUSION OF COCCYX, INITIAL ENCOUNTER: ICD-10-CM

## 2021-10-23 DIAGNOSIS — S06.0X0A CONCUSSION WITHOUT LOSS OF CONSCIOUSNESS, INITIAL ENCOUNTER: ICD-10-CM
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
## 2021-10-23 PROCEDURE — 250N000013 HC RX MED GY IP 250 OP 250 PS 637: Performed by: PHYSICIAN ASSISTANT

## 2021-10-23 PROCEDURE — 70486 CT MAXILLOFACIAL W/O DYE: CPT | Mod: MG

## 2021-10-23 PROCEDURE — 250N000011 HC RX IP 250 OP 636: Performed by: PHYSICIAN ASSISTANT

## 2021-10-23 PROCEDURE — 99285 EMERGENCY DEPT VISIT HI MDM: CPT | Mod: 25

## 2021-10-23 PROCEDURE — 72125 CT NECK SPINE W/O DYE: CPT | Mod: ME

## 2021-10-23 PROCEDURE — 70450 CT HEAD/BRAIN W/O DYE: CPT | Mod: ME

## 2021-10-23 RX ORDER — ACETAMINOPHEN 500 MG
1000 TABLET ORAL ONCE
Status: COMPLETED | OUTPATIENT
Start: 2021-10-23 | End: 2021-10-23

## 2021-10-23 RX ORDER — ONDANSETRON 4 MG/1
4 TABLET, ORALLY DISINTEGRATING ORAL ONCE
Status: COMPLETED | OUTPATIENT
Start: 2021-10-23 | End: 2021-10-23

## 2021-10-23 RX ORDER — OXYCODONE HYDROCHLORIDE 5 MG/1
5 TABLET ORAL ONCE
Status: COMPLETED | OUTPATIENT
Start: 2021-10-23 | End: 2021-10-23

## 2021-10-23 RX ORDER — ONDANSETRON 4 MG/1
4 TABLET, ORALLY DISINTEGRATING ORAL EVERY 6 HOURS PRN
Qty: 10 TABLET | Refills: 0 | Status: SHIPPED | OUTPATIENT
Start: 2021-10-23 | End: 2021-10-27

## 2021-10-23 RX ADMIN — OXYCODONE HYDROCHLORIDE 5 MG: 5 TABLET ORAL at 21:17

## 2021-10-23 RX ADMIN — ACETAMINOPHEN 1000 MG: 500 TABLET, FILM COATED ORAL at 21:17

## 2021-10-23 RX ADMIN — ONDANSETRON 4 MG: 4 TABLET, ORALLY DISINTEGRATING ORAL at 21:17

## 2021-10-23 RX ADMIN — ONDANSETRON 4 MG: 4 TABLET, ORALLY DISINTEGRATING ORAL at 22:34

## 2021-10-23 ASSESSMENT — ENCOUNTER SYMPTOMS
DIZZINESS: 1
ABDOMINAL PAIN: 0
HEADACHES: 1
NECK PAIN: 1
NAUSEA: 1
SHORTNESS OF BREATH: 0

## 2021-10-23 ASSESSMENT — MIFFLIN-ST. JEOR: SCORE: 1192.21

## 2021-10-24 NOTE — ED PROVIDER NOTES
"  History   Chief Complaint:  Fall     HPI   Macey Romero is a 70 year old female with history of osteoarthritis, fibromyalgia and breast cancer who presents with fall. The patient reports she was going to sit down at her desk and her chair was not there so she fell on right side and hit head on desk. The patient denies loss of consciousness. The patient states since she has been experience head, neck, jaw, and tailbone pain. She states she also has some nausea and dizziness after hitting her head. She notes she needed help getting up because she could not get up on her own but has been able to walk fine since. The patient denies chest pain, abdominal pain, and shortness of breath. No Numbness or weakness in her arms or legs.  She is not on blood thinners.    Review of Systems   Respiratory: Negative for shortness of breath.    Cardiovascular: Negative for chest pain.   Gastrointestinal: Positive for nausea. Negative for abdominal pain.   Musculoskeletal: Positive for neck pain.   Neurological: Positive for dizziness and headaches. Negative for syncope.   All other systems reviewed and are negative.    Allergies:  Tetanus Toxoids  Erythromycin  Levaquin  Pcn   Silicone    Medications:  Albuterol   Symbicort     Past Medical History:     Breast cancer   Fibromyalgia  Meningitis   Osteoarthritis   Shingles   Skin cancer   Asthma     Past Surgical History:    Cervical disk fusion   Blepharoplasty  Cholecystectomy   Hysterectomy   Right knee replacement   Mastectomy   Mammoplasty   Ankle internal fixation reduction   Repair ligament ankle   Salivary gland removal  Tonsillectomy    Family History:    Father- respiratory   Brother- sarcoidosis, diabetes, CVD, liver disease     Social History:  The patient presents alone.     Physical Exam     Patient Vitals for the past 24 hrs:   BP Temp Temp src Pulse Resp SpO2 Height Weight   10/23/21 2056 (!) 167/73 97.9  F (36.6  C) Oral 85 16 98 % 1.6 m (5' 3\") 70.3 kg (155 lb) "       Physical Exam  General: Alert and cooperative with exam.  Head:  Scalp with small right frontotemporal hematoma.  Otherwise NC/AT without bruising, hematomas.  Eyes:  No scleral icterus, PERRL, EOMI   ENT:  The external nose and ears are normal.    TM's normal bilaterally    No bruising or facial bone tenderness.    The oropharynx is normal without evidence of dental trauma or intraoral lacerations.  Neck:  Normal range of motion without rigidity. Able to rotate 45 degrees BL.  CV:  Regular rate and rhythm    No pathologic murmur, rubs, or gallops.  Resp:  Breath sounds are clear bilaterally.  No stridor in neck.    Non-labored, no retractions or accessory muscle use  Abdomen: Abdomen is soft, no distension, no tenderness, no masses.  No flank tenderness.  MS:  No midline cervical, thoracic, or lumbar tenderness.  Mild tailbone ttp.    No tenderness over sternum, scapula, ribs, clavicles.    PROM of all other major joints performed and unremarkable.  Skin:  Warm and dry, No bruising. 2+ Radial, DP, PT pulses BL.  Neuro: Oriented x 3.  Strength and sensation grossly intact in all 4 extremities.  Cranial nerves  2-12 intact. GCS: 15.  Gait normal.  Psych: Awake. Alert. Normal affect. Appropriate interactions.    Emergency Department Course     Imaging:  Cervical spine CT w/o contrast   Preliminary Result   IMPRESSION:   HEAD CT:   1.  Mild right frontotemporal scalp swelling. No acute intracranial hemorrhage or calvarial fracture.      2.  Stable age-related change.      FACIAL BONE CT:   1.  No facial bone or mandibular fracture.      CERVICAL SPINE CT:   1.  No fracture or posttraumatic subluxation.         CT Facial Bones without Contrast   Preliminary Result   IMPRESSION:   HEAD CT:   1.  Mild right frontotemporal scalp swelling. No acute intracranial hemorrhage or calvarial fracture.      2.  Stable age-related change.      FACIAL BONE CT:   1.  No facial bone or mandibular fracture.      CERVICAL SPINE CT:    1.  No fracture or posttraumatic subluxation.         Head CT w/o contrast   Preliminary Result   IMPRESSION:   HEAD CT:   1.  Mild right frontotemporal scalp swelling. No acute intracranial hemorrhage or calvarial fracture.      2.  Stable age-related change.      FACIAL BONE CT:   1.  No facial bone or mandibular fracture.      CERVICAL SPINE CT:   1.  No fracture or posttraumatic subluxation.           Report per radiology      Emergency Department Course:  Reviewed:  I reviewed nursing notes, vitals, past medical history, Care Everywhere and [facility]    Assessments:   I obtained history and examined the patient as noted above.      I rechecked the patient and explained findings.   Patient ambulated here in the ED without difficulty.    Interventions:   Zofran, 4 mg, PO      Acetaminophen, 1000 mg, PO     Roxicodone, 5 mg, PO      Zofran, 4 mg, PO     Disposition:  The patient was discharged to home.     Impression & Plan     Medical Decision Makin-year-old female presents after mechanical fall while trying to sit down and accidentally missing the chair.  Work-up here is reassuring.  CT scans of the head, facial bones, and cervical spine are negative.  Head to toe trauma exam is otherwise nonconcerning.  She has a normal neurologic exam with no deficits is able to rotate neck bilaterally and no indication for MRI of C-spine.  She does have symptoms of concussion with some nausea and dizziness.  She is not anticoagulated.  Discussed home concussion precautions and follow-up with PCP.  Return for new or worsening symptoms.    Diagnosis:    ICD-10-CM    1. Fall, initial encounter  W19.XXXA    2. Concussion without loss of consciousness, initial encounter  S06.0X0A    3. Contusion of coccyx, initial encounter  S30.0XXA        Discharge Medications:  New Prescriptions    ONDANSETRON (ZOFRAN ODT) 4 MG ODT TAB    Take 1 tablet (4 mg) by mouth every 6 hours as needed for nausea or vomiting        Scribe Disclosure:  I, Michaela Toussaint, am serving as a scribe at 9:00 PM on 10/23/2021 to document services personally performed by Dewayne Zayas PA-C based on my observations and the provider's statements to me.            Dewayne Zayas PA-C  10/23/21 2987

## 2021-10-24 NOTE — TELEPHONE ENCOUNTER
~6:15 pm - Macey fell when she was going to sit down in a rolling chair but missed it/the chair wasn't there.    She hit the Rt side of her head and jaw on the corner of a desk.    Her jaw is painful. When she tries to fully open her jaw the pain is severe  Her Rt ear is bright red and bruising.    She also hit her lower back on one of the cross legs on the base of the rolling chair.    ER advised    COVID 19 Nurse Triage Plan/Patient Instructions    Please be aware that novel coronavirus (COVID-19) may be circulating in the community. If you develop symptoms such as fever, cough, or SOB or if you have concerns about the presence of another infection including coronavirus (COVID-19), please contact your health care provider or visit [email or web address].     Disposition/Instructions    ED Visit recommended. Follow protocol based instructions.     Bring Your Own Device:  Please also bring your smart device(s) (smart phones, tablets, laptops) and their charging cables for your personal use and to communicate with your care team during your visit.    Thank you for taking steps to prevent the spread of this virus.  o Limit your contact with others.  o Wear a simple mask to cover your cough.  o Wash your hands well and often.    Resources    [facility]: About COVID-19: [email or web address]    CDC: What to Do If You're Sick: www.cdc.gov/coronavirus/2019-ncov/about/steps-when-sick.html    CDC: Ending Home Isolation: www.cdc.gov/coronavirus/2019-ncov/hcp/disposition-in-home-patients.html     CDC: Caring for Someone: www.cdc.gov/coronavirus/2019-ncov/if-you-are-sick/care-for-someone.html     [facility]: Interim Guidance for Hospital Discharge to Home: www.health.[facility].[place]/diseases/coronavirus/hcp/hospdischarge.pdf    [facility] clinical trials (COVID-19 research studies): [email or web address].[email or web address]/umn-clinical-trials     Below are the COVID-19 hotlines at the [place] Department of Health  ([facility]). Interpreters are available.   o For health questions: Call 655-090-1214 or 1-956.990.7180 (7 a.m. to 7 p.m.)  o For questions about schools and childcare: Call 869-136-9530 or 1-857.323.6905 (7 a.m. to 7 p.m.)     Sandra Snyder RN  [facility] Nurse Advisors      Reason for Disposition    Can't fully open or close the mouth    Additional Information    Negative: [1] Major bleeding (e.g., actively dripping or spurting) AND [2] can't be stopped    Negative: Bullet wound, knife wound, or other penetrating object    Negative: Difficulty breathing or choking    Negative: Knocked out (unconscious) > 1 minute    Negative: Difficult to awaken or acting confused (e.g., disoriented, slurred speech)    Negative: Severe neck pain    Negative: Sounds like a life-threatening emergency to the triager    Negative: Looks like a broken bone (e.g., cheekbone is flat on one side)    Protocols used: FACE INJURY-A-AH

## 2021-10-24 NOTE — ED TRIAGE NOTES
Pt while trying to sit down on chair. Pt landed on wheel. Pt may have hit his head. Pt reports back pain and nausea.

## 2021-10-27 ENCOUNTER — OFFICE VISIT (OUTPATIENT)
Dept: FAMILY MEDICINE | Facility: CLINIC | Age: 71
End: 2021-10-27

## 2021-10-27 VITALS
OXYGEN SATURATION: 96 % | HEART RATE: 72 BPM | TEMPERATURE: 97.1 F | SYSTOLIC BLOOD PRESSURE: 128 MMHG | DIASTOLIC BLOOD PRESSURE: 70 MMHG

## 2021-10-27 DIAGNOSIS — E78.5 HYPERLIPIDEMIA LDL GOAL <130: ICD-10-CM

## 2021-10-27 DIAGNOSIS — R41.3 MEMORY LOSS: ICD-10-CM

## 2021-10-27 DIAGNOSIS — J45.41 REACTIVE AIRWAY DISEASE, MODERATE PERSISTENT, WITH ACUTE EXACERBATION: ICD-10-CM
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
## 2021-10-27 DIAGNOSIS — R73.03 PREDIABETES: ICD-10-CM

## 2021-10-27 DIAGNOSIS — M85.89 OSTEOPENIA OF MULTIPLE SITES: ICD-10-CM

## 2021-10-27 PROCEDURE — 99214 OFFICE O/P EST MOD 30 MIN: CPT | Performed by: NURSE PRACTITIONER

## 2021-10-27 PROCEDURE — 36415 COLL VENOUS BLD VENIPUNCTURE: CPT | Performed by: NURSE PRACTITIONER

## 2021-10-27 RX ORDER — BUDESONIDE AND FORMOTEROL FUMARATE DIHYDRATE 160; 4.5 UG/1; UG/1
2 AEROSOL RESPIRATORY (INHALATION) DAILY
Qty: 10.2 G | Refills: 3 | Status: SHIPPED | OUTPATIENT
Start: 2021-10-27 | End: 2024-06-27

## 2021-10-27 NOTE — PATIENT INSTRUCTIONS
Schedule follow-up appointment with Neurology.    Fasting labs done today - will send you results before the weekend.    Schedule DEXA bone scan when you are able

## 2021-10-27 NOTE — PROGRESS NOTES
Problem(s) Oriented visit        SUBJECTIVE:                                                    Macey Romero is a 70 year old female who presents to clinic today for the following health issues :    Here for follow-up from ED visit on 10/23/2021 at [facility] after falling. This was fall #4 this year. Last year had 7 or 8 falls. This time went to sit back in chair but  had moved it and she fell backwards hitting right side of head. Has nausea and dizziness after this and was unable to get up on her own. Since ED visit, she has improved but has concerns about her frequent falls and her memory.  Things with numbers are the hardest to remember but also forgets names. Had Neurology appointment 1/2020 before pandemic started. Was supposed to have follow-up but didn't due to pandemic.     Problem list, Medication list, Allergies, and Medical/Social/Surgical histories reviewed in EPIC and updated as appropriate.   Additional history: as documented    ROS:  7 point ROS completed and negative except noted above, including Gen, HENT, CV, Resp, MS, Neuro, Psych    OBJECTIVE:                                                    /70   Pulse 72   Temp 97.1  F (36.2  C) (Skin)   SpO2 96%   There is no height or weight on file to calculate BMI.   GENERAL: Elderly female in no acute distress  EYES: Eyes grossly normal to inspection and PERRL  HENT: Healing hematoma right side of head above ear  NECK: no adenopathy and no asymmetry, masses, or scars  RESP: lungs clear to auscultation - no rales, rhonchi or wheezes  CV: regular rates and rhythm, normal S1 S2, no S3 or S4, no murmur, click or rub and no peripheral edema  MS: extremities normal- no gross deformities noted  NEURO: Normal strength and tone, sensory exam grossly normal and mentation intact  PSYCH: normal affect & mood     ASSESSMENT/PLAN:                                                      BMI:   Estimated body mass index is 27.46 kg/m  as calculated  "from the following:    Height as of 10/23/21: 1.6 m (5' 3\").    Weight as of 10/23/21: 70.3 kg (155 lb).   Weight management plan: Discussed healthy diet and exercise guidelines      Macey was seen today for er f/u and refill request.    Diagnoses and all orders for this visit:    Fall, subsequent encounter  Symptoms of concussion have resolved. Concerns remain about frequent falls. Follow-up with neurology    Reactive airway disease, moderate persistent, with acute exacerbation  -     budesonide-formoterol (SYMBICORT) 160-4.5 MCG/ACT Inhaler; Inhale 2 puffs into the lungs daily  -     VENOUS COLLECTION  Inhaler refilled. Well controlled    Memory loss  -     Adult Neurology Referral; Future  Neurology referral placed - patient to follow-up with them    Osteopenia of multiple sites  -     DEXA - Hip/Pelvis/Spine (FUTURE/[facility]); Future    Hyperlipidemia LDL goal <130  -     Lipid Panel ([facility])  -     VENOUS COLLECTION    Prediabetes  -     Hemoglobin A1C ([facility])  -     Basic Metabolic Panel (8) ([facility])  -     VENOUS COLLECTION        See Patient Instructions  Patient Instructions   Schedule follow-up appointment with Neurology.    Fasting labs done today - will send you results before the weekend.    Schedule DEXA bone scan when you are able      SHAHRAM Martel CNP  [facility]  Family Practice  [facility]  [phone]    For any issues my office # is [phone]      "

## 2021-10-28 LAB
BUN SERPL-MCNC: 9 MG/DL (ref 8–27)
BUN/CREATININE RATIO: 9 (ref 12–28)
CALCIUM SERPL-MCNC: 9.5 MG/DL (ref 8.7–10.3)
CHLORIDE SERPLBLD-SCNC: 99 MMOL/L (ref 96–106)
CHOLEST SERPL-MCNC: 206 MG/DL (ref 100–199)
CREAT SERPL-MCNC: 0.97 MG/DL (ref 0.57–1)
EGFR IF AFRICN AM: 68 ML/MIN/1.73
EGFR IF NONAFRICN AM: 59 ML/MIN/1.73
GLUCOSE SERPL-MCNC: 96 MG/DL (ref 65–99)
HBA1C MFR BLD: 6.1 % (ref 4.8–5.6)
HDLC SERPL-MCNC: 43 MG/DL
LDL/HDL RATIO: 3.1 RATIO (ref 0–3.2)
LDLC SERPL CALC-MCNC: 134 MG/DL (ref 0–99)
POTASSIUM SERPL-SCNC: 4.4 MMOL/L (ref 3.5–5.2)
SODIUM SERPL-SCNC: 140 MMOL/L (ref 134–144)
TOTAL CO2: 27 MMOL/L (ref 20–29)
TRIGL SERPL-MCNC: 160 MG/DL (ref 0–149)
VLDLC SERPL CALC-MCNC: 29 MG/DL (ref 5–40)

## 2021-11-04 ENCOUNTER — TRANSFERRED RECORDS (OUTPATIENT)
Dept: FAMILY MEDICINE | Facility: CLINIC | Age: 71
End: 2021-11-04

## 2021-11-17 ENCOUNTER — HOSPITAL ENCOUNTER (OUTPATIENT)
Dept: BONE DENSITY | Facility: CLINIC | Age: 71
Discharge: HOME | End: 2021-11-17
Attending: NURSE PRACTITIONER | Admitting: NURSE PRACTITIONER
Payer: MEDICARE

## 2021-11-17 DIAGNOSIS — M85.89 OSTEOPENIA OF MULTIPLE SITES: ICD-10-CM

## 2021-11-17 PROCEDURE — 77085 DXA BONE DENSITY AXL VRT FX: CPT

## 2021-12-12 ENCOUNTER — APPOINTMENT (OUTPATIENT)
Dept: CT IMAGING | Facility: CLINIC | Age: 71
End: 2021-12-12
Attending: EMERGENCY MEDICINE
Payer: MEDICARE

## 2021-12-12 ENCOUNTER — APPOINTMENT (OUTPATIENT)
Dept: GENERAL RADIOLOGY | Facility: CLINIC | Age: 71
End: 2021-12-12
Attending: EMERGENCY MEDICINE
Payer: MEDICARE

## 2021-12-12 ENCOUNTER — HOSPITAL ENCOUNTER (EMERGENCY)
Facility: CLINIC | Age: 71
Discharge: HOME | End: 2021-12-12
Attending: EMERGENCY MEDICINE | Admitting: EMERGENCY MEDICINE
Payer: MEDICARE

## 2021-12-12 ENCOUNTER — NURSE TRIAGE (OUTPATIENT)
Dept: NURSING | Facility: CLINIC | Age: 71
End: 2021-12-12
Payer: MEDICARE

## 2021-12-12 VITALS
RESPIRATION RATE: 22 BRPM | TEMPERATURE: 97.3 F | DIASTOLIC BLOOD PRESSURE: 68 MMHG | OXYGEN SATURATION: 96 % | HEART RATE: 90 BPM | SYSTOLIC BLOOD PRESSURE: 136 MMHG

## 2021-12-12 DIAGNOSIS — R09.A2 GLOBUS HYSTERICUS: ICD-10-CM

## 2021-12-12 DIAGNOSIS — S27.818A ABRASION OF ESOPHAGUS, INITIAL ENCOUNTER: ICD-10-CM

## 2021-12-12 LAB
ANION GAP SERPL CALCULATED.3IONS-SCNC: 5 MMOL/L (ref 3–14)
BASOPHILS # BLD AUTO: 0.1 10E3/UL (ref 0–0.2)
BASOPHILS NFR BLD AUTO: 1 %
BUN SERPL-MCNC: 19 MG/DL (ref 7–30)
CALCIUM SERPL-MCNC: 8.8 MG/DL (ref 8.5–10.1)
CHLORIDE BLD-SCNC: 103 MMOL/L (ref 94–109)
CO2 SERPL-SCNC: 30 MMOL/L (ref 20–32)
CREAT SERPL-MCNC: 1.03 MG/DL (ref 0.52–1.04)
EOSINOPHIL # BLD AUTO: 0.2 10E3/UL (ref 0–0.7)
EOSINOPHIL NFR BLD AUTO: 2 %
ERYTHROCYTE [DISTWIDTH] IN BLOOD BY AUTOMATED COUNT: 12.7 % (ref 10–15)
GFR SERPL CREATININE-BSD FRML MDRD: 55 ML/MIN/1.73M2
GLUCOSE BLD-MCNC: 118 MG/DL (ref 70–99)
HCT VFR BLD AUTO: 43.9 % (ref 35–47)
HGB BLD-MCNC: 14.3 G/DL (ref 11.7–15.7)
IMM GRANULOCYTES # BLD: 0.1 10E3/UL
IMM GRANULOCYTES NFR BLD: 1 %
LYMPHOCYTES # BLD AUTO: 2.8 10E3/UL (ref 0.8–5.3)
LYMPHOCYTES NFR BLD AUTO: 30 %
MCH RBC QN AUTO: 27.5 PG (ref 26.5–33)
MCHC RBC AUTO-ENTMCNC: 32.6 G/DL (ref 31.5–36.5)
MCV RBC AUTO: 84 FL (ref 78–100)
MONOCYTES # BLD AUTO: 0.9 10E3/UL (ref 0–1.3)
MONOCYTES NFR BLD AUTO: 9 %
NEUTROPHILS # BLD AUTO: 5.4 10E3/UL (ref 1.6–8.3)
NEUTROPHILS NFR BLD AUTO: 57 %
NRBC # BLD AUTO: 0 10E3/UL
NRBC BLD AUTO-RTO: 0 /100
PLATELET # BLD AUTO: 229 10E3/UL (ref 150–450)
POTASSIUM BLD-SCNC: 3.6 MMOL/L (ref 3.4–5.3)
RBC # BLD AUTO: 5.2 10E6/UL (ref 3.8–5.2)
SARS-COV-2 RNA RESP QL NAA+PROBE: NEGATIVE
SODIUM SERPL-SCNC: 138 MMOL/L (ref 133–144)
WBC # BLD AUTO: 9.4 10E3/UL (ref 4–11)

## 2021-12-12 PROCEDURE — 87635 SARS-COV-2 COVID-19 AMP PRB: CPT | Performed by: EMERGENCY MEDICINE

## 2021-12-12 PROCEDURE — 250N000011 HC RX IP 250 OP 636: Performed by: EMERGENCY MEDICINE

## 2021-12-12 PROCEDURE — 99285 EMERGENCY DEPT VISIT HI MDM: CPT | Mod: 25

## 2021-12-12 PROCEDURE — 82565 ASSAY OF CREATININE: CPT | Performed by: EMERGENCY MEDICINE

## 2021-12-12 PROCEDURE — 85025 COMPLETE CBC W/AUTO DIFF WBC: CPT | Performed by: EMERGENCY MEDICINE

## 2021-12-12 PROCEDURE — 96375 TX/PRO/DX INJ NEW DRUG ADDON: CPT

## 2021-12-12 PROCEDURE — 258N000003 HC RX IP 258 OP 636: Performed by: EMERGENCY MEDICINE

## 2021-12-12 PROCEDURE — 96374 THER/PROPH/DIAG INJ IV PUSH: CPT

## 2021-12-12 PROCEDURE — 250N000013 HC RX MED GY IP 250 OP 250 PS 637: Performed by: EMERGENCY MEDICINE

## 2021-12-12 PROCEDURE — 71250 CT THORAX DX C-: CPT | Mod: MG

## 2021-12-12 PROCEDURE — 71045 X-RAY EXAM CHEST 1 VIEW: CPT

## 2021-12-12 PROCEDURE — 36415 COLL VENOUS BLD VENIPUNCTURE: CPT | Performed by: EMERGENCY MEDICINE

## 2021-12-12 PROCEDURE — 96361 HYDRATE IV INFUSION ADD-ON: CPT

## 2021-12-12 PROCEDURE — C9803 HOPD COVID-19 SPEC COLLECT: HCPCS

## 2021-12-12 RX ORDER — ONDANSETRON 2 MG/ML
4 INJECTION INTRAMUSCULAR; INTRAVENOUS ONCE
Status: COMPLETED | OUTPATIENT
Start: 2021-12-12 | End: 2021-12-12

## 2021-12-12 RX ORDER — MAGNESIUM HYDROXIDE/ALUMINUM HYDROXICE/SIMETHICONE 120; 1200; 1200 MG/30ML; MG/30ML; MG/30ML
30 SUSPENSION ORAL ONCE
Status: COMPLETED | OUTPATIENT
Start: 2021-12-12 | End: 2021-12-12

## 2021-12-12 RX ORDER — SODIUM CHLORIDE 9 MG/ML
INJECTION, SOLUTION INTRAVENOUS CONTINUOUS
Status: DISCONTINUED | OUTPATIENT
Start: 2021-12-12 | End: 2021-12-13 | Stop reason: HOSPADM

## 2021-12-12 RX ORDER — HYDROMORPHONE HCL IN WATER/PF 6 MG/30 ML
0.2 PATIENT CONTROLLED ANALGESIA SYRINGE INTRAVENOUS ONCE
Status: COMPLETED | OUTPATIENT
Start: 2021-12-12 | End: 2021-12-12

## 2021-12-12 RX ADMIN — ONDANSETRON 4 MG: 2 INJECTION INTRAMUSCULAR; INTRAVENOUS at 21:27

## 2021-12-12 RX ADMIN — HYDROMORPHONE HYDROCHLORIDE 0.2 MG: 0.2 INJECTION, SOLUTION INTRAMUSCULAR; INTRAVENOUS; SUBCUTANEOUS at 21:29

## 2021-12-12 RX ADMIN — ALUMINUM HYDROXIDE, MAGNESIUM HYDROXIDE, AND SIMETHICONE 30 ML: 200; 200; 20 SUSPENSION ORAL at 23:07

## 2021-12-12 RX ADMIN — SODIUM CHLORIDE: 9 INJECTION, SOLUTION INTRAVENOUS at 21:25

## 2021-12-12 ASSESSMENT — ENCOUNTER SYMPTOMS
COUGH: 0
SHORTNESS OF BREATH: 0

## 2021-12-13 NOTE — ED NOTES
Patient c/o difficulty swallowing. She also had a small emesis.Dr ariza notified.xray changed to portable

## 2021-12-13 NOTE — TELEPHONE ENCOUNTER
Patient calling with concerns about:    Swallowed a chicken bone at or about 6:15     Now she states that she has a very uncomfortable feeling of food being stuck in her throat, her voice sounds 'very hoarse' to her, and she says 'her saliva is making a bubbling sort of noise in her throat'.    Patient Denies:  Difficulty breathing  Swollen tongue    According to the protocol, patient should go to ED now.  Care advice given. Patient verbalizes understanding and agrees with plan of care.     Bee Hsu RN, Nurse Advisor 8:19 PM 12/12/2021    COVID 19 Nurse Triage Plan/Patient Instructions    Please be aware that novel coronavirus (COVID-19) may be circulating in the community. If you develop symptoms such as fever, cough, or SOB or if you have concerns about the presence of another infection including coronavirus (COVID-19), please contact your health care provider or visit [email or web address].     Disposition/Instructions    ED Visit recommended. Follow protocol based instructions.     Bring Your Own Device:  Please also bring your smart device(s) (smart phones, tablets, laptops) and their charging cables for your personal use and to communicate with your care team during your visit.    Thank you for taking steps to prevent the spread of this virus.  o Limit your contact with others.  o Wear a simple mask to cover your cough.  o Wash your hands well and often.    Resources    [facility]: About COVID-19: [email or web address]    CDC: What to Do If You're Sick: www.cdc.gov/coronavirus/2019-ncov/about/steps-when-sick.html    CDC: Ending Home Isolation: www.cdc.gov/coronavirus/2019-ncov/hcp/disposition-in-home-patients.html     CDC: Caring for Someone: www.cdc.gov/coronavirus/2019-ncov/if-you-are-sick/care-for-someone.html     [facility]: Interim Guidance for Hospital Discharge to Home: www.health.[facility].mn.[place]/diseases/coronavirus/hcp/hospdischarge.pdf    [facility] clinical trials  (COVID-19 research studies): [email or web address].[facility]-clinical-trials     Below are the COVID-19 hotlines at the [place] Department of Health ([facility]). Interpreters are available.   o For health questions: Call 110-920-7241 or 1-948.705.1143 (7 a.m. to 7 p.m.)  o For questions about schools and childcare: Call 756-632-6926 or 1-761.569.7759 (7 a.m. to 7 p.m.)         Reason for Disposition    Symptoms of food or bone stuck in throat or esophagus (e.g., pain in throat or chest, FB sensation, blood-tinged saliva)    Additional Information    Negative: [1] Symptoms of blocked esophagus (e.g., can't swallow normal secretions, drooling) AND [2] present now    Negative: Mouth ulcers are seen    Negative: Sore throat (throat pain with swallowing)    Negative: Swallowed a (non-edible) foreign body    Negative: Feeding tube, questions or concerns related to    Negative: Swelling of tongue    Protocols used: SWALLOWING DIFFICULTY-A-AH

## 2021-12-13 NOTE — ED PROVIDER NOTES
History     Chief Complaint:  Swallowed Foreign Body       HPI   Macey Romero is a 71 year old female who presents with a possible chicken bone in her throat that occurred at about 6 PM.  She did not aspirate it, it went down her esophagus but was very painful.  She feels like it is stuck in about the upper sternum part of her chest and she feels like it is gurgling in there.  She was able to drink water.  She does not feel short of breath.  It does not hurt into her back.  No recent cough.  No fevers.  No other associated signs or symptoms.    ROS:  Review of Systems   Respiratory: Negative for cough and shortness of breath.    Cardiovascular: Positive for chest pain.   Gastrointestinal:        Swallowing difficulty with pain   All other systems reviewed and are negative.       Allergies:  Tetanus Toxoids  Erythromycin  Levaquin  Pcn [Penicillins]  Silicone     Medications:    albuterol (PROVENTIL) (2.5 MG/3ML) 0.083% neb solution  budesonide-formoterol (SYMBICORT) 160-4.5 MCG/ACT Inhaler        Past Medical History:    Past Medical History:   Diagnosis Date     Breast CA in situ 1987     Cancer (H) 1987     Chronic constipation      Fibromyalgia      Meningitis      Osteoarthritis 2/1/2011     Osteopenia 2/1/2011     Pneumonia      Pterygium eye 8/26/2013     Reactive airway disease 2/1/2011     Seasonal allergies      Shingles      Skin cancer      Patient Active Problem List   Diagnosis     Mild persistent asthma without complication     Osteoarthritis     Osteopenia     History of breast cancer     IBS (irritable bowel syndrome)     Keloid of skin     Myalgia and myositis     Pterygium eye     Right hip pain     Chronic pain of both knees     Fibromyalgia     Malignant neoplasm of female breast, unspecified estrogen receptor status, unspecified laterality, unspecified site of breast (H)     H/O bilateral mastectomy     Memory loss     Reactive airway disease, moderate persistent, with acute exacerbation      Prediabetes     Hyperlipidemia LDL goal <130        Past Surgical History:    Past Surgical History:   Procedure Laterality Date     anterior c-disc fusion       BLEPHAROPLASTY, BROW LIFT BILATERAL, COMBINED Bilateral 6/18/2018    Procedure: COMBINED BLEPHAROPLASTY, BROW LIFT BILATERAL;  BILATERAL UPPER LID BLEPHAROPLASTY; BILATERAL BROW PTOSIS REPAIR;  Surgeon: Сергей Walters MD;  [facility]:  [facility]     CHOLECYSTECTOMY       COLONOSCOPY N/A 10/19/2017    Procedure: COLONOSCOPY;  COLONOSCOPY;  Surgeon: Niko Wong MD;  Location:  [facility]     D & C      Bleeding before hysterectomy     ENDOSCOPY  04/21/08     HYSTERECTOMY, MARCUS      Ovaries and tubes out due to breast cancer     JOINT REPLACEMTN, KNEE RT/LT Right 01/2011    Joint Replacement knee RT, with tibial straightening (2 replacements)     MAMMOPLASTY AUGMENTATION BILATERAL      breast ca      MASTECTOMY, SIMPLE RT/LT/CLAIRE Bilateral 1989    Mastectomy Simple RT/LT/CLAIRE     MOHS MICROGRAPHIC PROCEDURE      Left lateral nose     OPEN REDUCTION INTERNAL FIXATION ANKLE  8/27/2013    Procedure: OPEN REDUCTION INTERNAL FIXATION ANKLE;  RIGHT OPEN REDUCTION INTERNAL FIXATION ANKLE WITH LIGAMENT REPAIR;  Surgeon: Nando Chang MD;  Location:  OR     PTERYGIUM WITH CONJUNCTIVAL AUTOLOGOUS TRANSPLANT Left 8/29/2016    Procedure: PTERYGIUM WITH CONJUNCTIVAL AUTOLOGOUS TRANSPLANT;  Surgeon: Сергей Walters MD;  Location:  [facility]     REPAIR LIGAMENT ANKLE  8/27/2013    Procedure: REPAIR LIGAMENT ANKLE;;  Surgeon: Nando Chang MD;  Location:  OR     SALIVARY GLAND SURGERY Left     Stone removal      TONSILLECTOMY          Family History:    family history includes Cancer in her brother; Cerebrovascular Disease in her brother; Diabetes in her brother; Liver Disease in her brother; Respiratory in her father.    Social History:   reports that she quit smoking about 5 years ago. She has never used smokeless tobacco. She reports that she does not drink alcohol and  does not use drugs.  PCP: Deandra Strong     Physical Exam     Patient Vitals for the past 24 hrs:   BP Temp Temp src Pulse Resp SpO2   12/12/21 2130 136/68 -- -- -- -- 96 %   12/12/21 2041 (!) 169/70 97.3  F (36.3  C) Temporal 90 22 97 %        Physical Exam  Nursing note and vitals reviewed.    Constitutional:  Appears uncomfortable.    HENT:    Nose normal.  No discharge.      Oral mucosa is moist.  Eyes:    Conjunctivae are normal without injection.  Pupils are equal.  Lymph:  No enlarged or tender cervical or submandibular lymph nodes.  No tenderness in her neck.  Cardiovascular:  Normal rate, regular rhythm with normal S1 and S2.      Normal heart sounds and peripheral pulses 2+ and equal.       No murmur or corey.  Pulmonary:  Effort is normal but she has some expiratory wheezes and squeaks bilaterally.  No rales.  GI:    Soft. No distension and no mass. No tenderness.   Neurological:   Alert and oriented. No focal weakness.  Skin:    Skin is warm and dry. No rash noted. No diaphoresis.  No crepitus in the neck or in the skin of her supra clavicular areas.  Psychiatric:   Behavior is normal. Appropriate mood and affect.     Judgment and thought content normal.         Emergency Department Course       Imaging:  Chest CT w/o contrast   Final Result   IMPRESSION:    1.  No visible foreign body or pneumomediastinum.   2.  Several small nonspecific nodules.      REFERENCE:   Guidelines for Management of Incidental Pulmonary Nodules Detected on CT Images: From the Fleischner Society 2017.    Guidelines apply to incidental nodules in patients who are 35 years or older.   Guidelines do not apply to lung cancer screening, patients with immunosuppression, or patients with known primary cancer.      SINGLE NODULE   Nodule size <6 mm   Low-risk patients: No follow-up needed.   High-risk patients: Optional follow-up at 12 months.      Nodule size 6-8 mm   Low-risk patients: Follow-up CT at 6-12 months, then consider  CT at 18-24 months.   High-risk patients: Follow-up CT at 6-12 months, then at 18-24 months if no change.      Nodule size >8 mm   Either low or high-risk patients:   Consider CT, PET/CT, or tissue sampling at 3 months.            XR Chest Port 1 View   Final Result   IMPRESSION: No definite radiopaque foreign body, spine hardware limits evaluation of neck. No infiltrates.         Report per radiology    Laboratory:  Labs Ordered and Resulted from Time of ED Arrival to Time of ED Departure   BASIC METABOLIC PANEL - Abnormal       Result Value    Sodium 138      Potassium 3.6      Chloride 103      Carbon Dioxide (CO2) 30      Anion Gap 5      Urea Nitrogen 19      Creatinine 1.03      Calcium 8.8      Glucose 118 (*)     GFR Estimate 55 (*)    COVID-19 VIRUS (CORONAVIRUS) BY PCR - Normal    SARS CoV2 PCR Negative     CBC WITH PLATELETS AND DIFFERENTIAL    WBC Count 9.4      RBC Count 5.20      Hemoglobin 14.3      Hematocrit 43.9      MCV 84      MCH 27.5      MCHC 32.6      RDW 12.7      Platelet Count 229      % Neutrophils 57      % Lymphocytes 30      % Monocytes 9      % Eosinophils 2      % Basophils 1      % Immature Granulocytes 1      NRBCs per 100 WBC 0      Absolute Neutrophils 5.4      Absolute Lymphocytes 2.8      Absolute Monocytes 0.9      Absolute Eosinophils 0.2      Absolute Basophils 0.1      Absolute Immature Granulocytes 0.1      Absolute NRBCs 0.0            Emergency Department Course:      Reviewed:  I reviewed nursing notes, vitals and past medical history    Assessments:  2050 I obtained history and examined the patient as noted above.   2135 I rechecked the patient and explained findings.         Interventions:  Medications   sodium chloride 0.9% infusion ( Intravenous New Bag 12/12/21 2125)   alum & mag hydroxide-simethicone (MAALOX) suspension 30 mL (has no administration in time range)   HYDROmorphone (DILAUDID) injection 0.2 mg (0.2 mg Intravenous Given 12/12/21 2129)   ondansetron  (ZOFRAN) injection 4 mg (4 mg Intravenous Given 12/12/21 2127)        Disposition:  The patient was discharged to home.     Impression & Plan      Covid-19  Macey Romero was evaluated during a global COVID-19 pandemic, which necessitated consideration that the patient might be at risk for infection with the SARS-CoV-2 virus that causes COVID-19.   Applicable protocols for evaluation were followed during the patient's care.   COVID-19 was considered as part of the patient's evaluation. The plan for testing is:  a test was obtained during this visit.      Medical Decision Making:  Patient comes in with a sensation of a chicken bone in her esophagus.  She is having some sensation of some gurgling when she tries to swallow and I noted some squeaks and expiratory wheezes when she takes a deep breath.  I did not feel any crepitus in her neck or supraclavicular areas.  She does have a history of reactive airway disease.  There was no stridor.  Plain x-ray did not reveal any obvious pneumomediastinum.  I gave her 0.2 mg of Dilaudid and 4 of Zofran.  She was sent down for CT scan and it came back without any evidence of a foreign body or perforation of the esophagus or pneumomediastinum.  She was feeling quite a bit better when I went back in to go over the result with her.  It still was uncomfortable but she did not have that feeling like there was a big ball of phlegm in her throat.  She was given Maalox and reassurance and will follow up if any further problems over the next 2 to 3 days.  Otherwise cold liquids and a soft or liquid diet for 24 hours.  She is Covid negative as well.    Take a liquid antiacid tomorrow as needed, soft or liquid diet for 24 hours.  No hot foods or liquids.  Follow-up with your doctor in the clinic in the next 2 to 3 days if symptoms are not resolving.      Diagnosis:    ICD-10-CM    1. Abrasion of esophagus, initial encounter  S27.818A    2. 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

## 2021-12-13 NOTE — DISCHARGE INSTRUCTIONS
Take a liquid antiacid tomorrow as needed, soft or liquid diet for 24 hours.  No hot foods or liquids.  Follow-up with your doctor in the clinic in the next 2 to 3 days if symptoms are not resolving.

## 2021-12-15 ENCOUNTER — OFFICE VISIT (OUTPATIENT)
Dept: FAMILY MEDICINE | Facility: CLINIC | Age: 71
End: 2021-12-15

## 2021-12-15 VITALS
HEART RATE: 83 BPM | DIASTOLIC BLOOD PRESSURE: 62 MMHG | SYSTOLIC BLOOD PRESSURE: 118 MMHG | TEMPERATURE: 97.7 F | OXYGEN SATURATION: 100 %

## 2021-12-15 DIAGNOSIS — H25.9 SENILE CATARACT OF RIGHT EYE, UNSPECIFIED AGE-RELATED CATARACT TYPE: ICD-10-CM

## 2021-12-15 DIAGNOSIS — S27.818D: ICD-10-CM

## 2021-12-15 DIAGNOSIS — J45.41 REACTIVE AIRWAY DISEASE, MODERATE PERSISTENT, WITH ACUTE EXACERBATION: ICD-10-CM

## 2021-12-15 DIAGNOSIS — Z01.818 PREOP GENERAL PHYSICAL EXAM: Primary | ICD-10-CM

## 2021-12-15 PROBLEM — C50.919 MALIGNANT NEOPLASM OF FEMALE BREAST, UNSPECIFIED ESTROGEN RECEPTOR STATUS, UNSPECIFIED LATERALITY, UNSPECIFIED SITE OF BREAST (H): Status: RESOLVED | Noted: 2021-06-17 | Resolved: 2021-12-15

## 2021-12-15 PROCEDURE — 99214 OFFICE O/P EST MOD 30 MIN: CPT | Performed by: NURSE PRACTITIONER

## 2021-12-15 RX ORDER — GABAPENTIN 100 MG/1
100 CAPSULE ORAL
COMMUNITY
Start: 2021-11-09 | End: 2022-01-17

## 2021-12-15 NOTE — PROGRESS NOTES
"  [facility]  [address] [place] [address]  Phone: [phone]  Fax: [phone]  Primary Provider: Deandra Garcia  Pre-op Performing Provider: DEANDRA GARCIA    PREOPERATIVE EVALUATION:  Today's date: 12/15/2021    Macey Romero is a 71 year old female who presents for a preoperative evaluation.    Surgical Information:  Surgery/Procedure: Cataract repair  Surgery Location: [facility]  Surgeon: Dr Walters  Surgery Date: 1/10/2022  Time of Surgery: TBD  Where patient plans to recover: At home with family  Fax number for surgical facility: [phone]    Type of Anesthesia Anticipated: to be determined     Preoperative Questionnaire:   No - Have you ever had a heart attack or stroke?  No - Have you ever had surgery on your heart or blood vessels, such as a stent, coronary (heart) bypass, or surgery on an artery in the head, neck, heart, or legs?  No - Do you have chest pain when you are physically active?  No - Do you have a history of heart failure?  No - Do you currently have a cold, bronchitis, or symptoms of other respiratory (head and chest) infections?  No - Do you have a cough, shortness of breath, or wheezing?  YES, self, \"1972 post-CS\" - Do you or anyone in your family have a history of blood clots?  No - Do you or anyone in your family have a serious bleeding problem, such as long-lasting bleeding after surgeries or cuts?  No - Have you ever had anemia or been told to take iron pills?  No - Have you had any abnormal blood loss such as black, tarry or bloody stools, or abnormal vaginal bleeding?  YES, 1972 - Have you ever had a blood transfusion?  Yes - Are you willing to have a blood transfusion if it is medically needed before, during, or after your surgery?  No - Have you or anyone in your family ever had problems with anesthesia (sedation for surgery)?  No - Do you have sleep apnea, excessive snoring, or daytime drowsiness?   No - Do you have any " artifical heart valves or other implanted medical devices, such as a pacemaker, defibrillator, or continuous glucose monitor?  YES: right knee, and titanium box + bone graft C6-C7 - Do you have any artifical joints?  No - Are you allergic to latex?  No - Is there any chance that you may be pregnant?      Assessment & Plan     The proposed surgical procedure is considered LOW risk.    Preop general physical exam  Approved for upcoming surgery    Senile cataract of right eye, unspecified age-related cataract type  Surgery scheduled    Esophageal abrasion, subsequent encounter  Stable, improving. Recommend ENT if worsening    Reactive airway disease, moderate persistent, with acute exacerbation  No changes. Continue inhalers without changes    Risks and Recommendations:  The patient has the following additional risks and recommendations for perioperative complications:   - No identified additional risk factors other than previously addressed    Medication Instructions:   - pregabalin, gabapentin: Continue without modification.   - LABA, inhaled corticosteroid, long-acting anticholinergics: Continue without modification.   - rescue Inhaler: Continue PRN. Bring to hospital on the day of surgery.    RECOMMENDATION:  APPROVAL GIVEN to proceed with proposed procedure, without further diagnostic evaluation.    Review of external notes as documented above     Subjective     HPI related to upcoming procedure: having right cataract removed    Health Care Directive:  Patient does not have a Health Care Directive or Living Will: Discussed advance care planning with patient; however, patient declined at this time.    Preoperative Review of :   reviewed - no concerns identified  PDMP Review       Value Time User    State PDMP site checked  Yes 12/15/2021  4:42 PM Deandra Strong APRN CNP        Status of Chronic Conditions:  Reactive airway disease/asthma - using Symbicort 1 puff daily and Albuterol neb as  needed.    Chronic nerve pain where scars are present from breast implant removals this year. Started on Gabapentin 100 mg at bedtime a couple weeks ago but not helping.     Review of Systems  Constitutional, neuro, ENT, endocrine, pulmonary, cardiac, gastrointestinal, genitourinary, musculoskeletal, integument and psychiatric systems are negative, except as otherwise noted.    Patient Active Problem List    Diagnosis Date Noted     Memory loss 10/27/2021     Priority: Medium     Reactive airway disease, moderate persistent, with acute exacerbation 10/27/2021     Priority: Medium     Prediabetes 10/27/2021     Priority: Medium     Hyperlipidemia LDL goal <130 10/27/2021     Priority: Medium     H/O bilateral mastectomy 06/17/2021     Priority: Medium     with reconstruction       Fibromyalgia      Priority: Medium     Chronic pain of both knees 03/21/2018     Priority: Medium     Right hip pain 12/18/2015     Priority: Medium     Pterygium eye 08/26/2013     Priority: Medium     Myalgia and myositis 09/12/2011     Priority: Medium     Problem list name updated by automated process. Provider to review       IBS (irritable bowel syndrome) 06/28/2011     Priority: Medium     Keloid of skin 06/28/2011     Priority: Medium     Osteoarthritis 02/01/2011     Priority: Medium     Osteopenia 02/01/2011     Priority: Medium     History of breast cancer 02/01/2011     Priority: Medium     1987        Past Medical History:   Diagnosis Date     Breast CA in situ 1987     Cancer (H) 1987    Right Breast treated with surgery     Chronic constipation      Fibromyalgia      Malignant neoplasm of female breast, unspecified estrogen receptor status, unspecified laterality, unspecified site of breast (H) 6/17/2021     Meningitis     Several times as an adult     Osteoarthritis 2/1/2011     Osteopenia 2/1/2011     Pneumonia      Pterygium eye 8/26/2013     Reactive airway disease 2/1/2011     Seasonal allergies      Shingles     Prior  to 2008 - scalp     Skin cancer     SCC - hand, left nose     Past Surgical History:   Procedure Laterality Date     anterior c-disc fusion       BLEPHAROPLASTY, BROW LIFT BILATERAL, COMBINED Bilateral 6/18/2018    Procedure: COMBINED BLEPHAROPLASTY, BROW LIFT BILATERAL;  BILATERAL UPPER LID BLEPHAROPLASTY; BILATERAL BROW PTOSIS REPAIR;  Surgeon: Сергей Walters MD;  Location:  [facility]     CHOLECYSTECTOMY       COLONOSCOPY N/A 10/19/2017    Procedure: COLONOSCOPY;  COLONOSCOPY;  Surgeon: Niko Wong MD;  Location:  GI     D & C      Bleeding before hysterectomy     ENDOSCOPY  04/21/08     HYSTERECTOMY, MARCUS      Ovaries and tubes out due to breast cancer     JOINT REPLACEMTN, KNEE RT/LT Right 01/2011    Joint Replacement knee RT, with tibial straightening (2 replacements)     MAMMOPLASTY AUGMENTATION BILATERAL      breast ca      MASTECTOMY, SIMPLE RT/LT/CLAIRE Bilateral 1989    Mastectomy Simple RT/LT/CLAIRE     MOHS MICROGRAPHIC PROCEDURE      Left lateral nose     OPEN REDUCTION INTERNAL FIXATION ANKLE  8/27/2013    Procedure: OPEN REDUCTION INTERNAL FIXATION ANKLE;  RIGHT OPEN REDUCTION INTERNAL FIXATION ANKLE WITH LIGAMENT REPAIR;  Surgeon: Nando Chang MD;  Location:  OR     PTERYGIUM WITH CONJUNCTIVAL AUTOLOGOUS TRANSPLANT Left 8/29/2016    Procedure: PTERYGIUM WITH CONJUNCTIVAL AUTOLOGOUS TRANSPLANT;  Surgeon: Сергей Walters MD;  Location:  [facility]     REPAIR LIGAMENT ANKLE  8/27/2013    Procedure: REPAIR LIGAMENT ANKLE;;  Surgeon: Nando Chang MD;  Location:  OR     SALIVARY GLAND SURGERY Left     Stone removal      TONSILLECTOMY       Current Outpatient Medications   Medication Sig Dispense Refill     albuterol (PROVENTIL) (2.5 MG/3ML) 0.083% neb solution Take 1 vial by nebulization every 4 hours as needed for shortness of breath/dyspnea or wheezing 1 Box 3     budesonide-formoterol (SYMBICORT) 160-4.5 MCG/ACT Inhaler Inhale 2 puffs into the lungs daily 10.2 g 3     gabapentin (NEURONTIN)  100 MG capsule Take 100 mg by mouth         Allergies   Allergen Reactions     Levaquin Hives and Itching     Pcn [Penicillins] Hives     Tetanus Toxoids Anaphylaxis     Erythromycin GI Disturbance     Silicone Rash        Social History     Tobacco Use     Smoking status: Former Smoker     Quit date: 2016     Years since quittin.9     Smokeless tobacco: Never Used     Tobacco comment: quit but  still smokes, but not in house   Substance Use Topics     Alcohol use: No     Alcohol/week: 0.0 standard drinks     Family History   Problem Relation Age of Onset     Respiratory Father      Cancer Brother         sarcoidosis     Diabetes Brother      Cerebrovascular Disease Brother      Liver Disease Brother      History   Drug Use No         Objective     /62   Pulse 83   Temp 97.7  F (36.5  C)   SpO2 100%     Physical Exam    GENERAL APPEARANCE: healthy, alert and no distress     EYES: Eyes grossly normal to inspection     HENT: nose and mouth without ulcers or lesions, oral mucous membranes moist and oropharynx clear     NECK: no adenopathy and no asymmetry, masses, or scars     RESP: lungs clear to auscultation - no rales, rhonchi or wheezes     CV: regular rates and rhythm, normal S1 S2, no S3 or S4 and no murmur, click or rub     ABDOMEN:  soft, nontender, no HSM or masses and bowel sounds normal     MS: extremities normal- no gross deformities noted, no evidence of inflammation in joints, FROM in all extremities.     SKIN: prominent scarring bilateral breasts from implant removal     NEURO: Normal strength and tone, sensory exam grossly normal, mentation intact and speech normal     PSYCH: normal affect & mood    Recent Labs   Lab Test 21  2101 10/27/21  1037 21  1046 21  1045   HGB 14.3  --   --  14.4     --   --  201    140 142  --    POTASSIUM 3.6 4.4 4.8  --    CR 1.03 0.97 0.95  --    A1C  --  6.1* CANCELED  5.9*  --         Diagnostics:  Recent Results  (from the past 168 hour(s))   Basic metabolic panel    Collection Time: 12/12/21  9:01 PM   Result Value Ref Range    Sodium 138 133 - 144 mmol/L    Potassium 3.6 3.4 - 5.3 mmol/L    Chloride 103 94 - 109 mmol/L    Carbon Dioxide (CO2) 30 20 - 32 mmol/L    Anion Gap 5 3 - 14 mmol/L    Urea Nitrogen 19 7 - 30 mg/dL    Creatinine 1.03 0.52 - 1.04 mg/dL    Calcium 8.8 8.5 - 10.1 mg/dL    Glucose 118 (H) 70 - 99 mg/dL    GFR Estimate 55 (L) >60 mL/min/1.73m2   CBC with platelets and differential    Collection Time: 12/12/21  9:01 PM   Result Value Ref Range    WBC Count 9.4 4.0 - 11.0 10e3/uL    RBC Count 5.20 3.80 - 5.20 10e6/uL    Hemoglobin 14.3 11.7 - 15.7 g/dL    Hematocrit 43.9 35.0 - 47.0 %    MCV 84 78 - 100 fL    MCH 27.5 26.5 - 33.0 pg    MCHC 32.6 31.5 - 36.5 g/dL    RDW 12.7 10.0 - 15.0 %    Platelet Count 229 150 - 450 10e3/uL    % Neutrophils 57 %    % Lymphocytes 30 %    % Monocytes 9 %    % Eosinophils 2 %    % Basophils 1 %    % Immature Granulocytes 1 %    NRBCs per 100 WBC 0 <1 /100    Absolute Neutrophils 5.4 1.6 - 8.3 10e3/uL    Absolute Lymphocytes 2.8 0.8 - 5.3 10e3/uL    Absolute Monocytes 0.9 0.0 - 1.3 10e3/uL    Absolute Eosinophils 0.2 0.0 - 0.7 10e3/uL    Absolute Basophils 0.1 0.0 - 0.2 10e3/uL    Absolute Immature Granulocytes 0.1 <=0.4 10e3/uL    Absolute NRBCs 0.0 10e3/uL   Asymptomatic COVID-19 Virus (Coronavirus) by PCR Nasopharyngeal    Collection Time: 12/12/21  9:02 PM    Specimen: Nasopharyngeal; Swab   Result Value Ref Range    SARS CoV2 PCR Negative Negative      No EKG required for low risk surgery (cataract, skin procedure, breast biopsy, etc).    Revised Cardiac Risk Index (RCRI):  The patient has the following serious cardiovascular risks for perioperative complications:   - No serious cardiac risks = 0 points     RCRI Interpretation: 0 points: Class I (very low risk - 0.4% complication rate)           Signed Electronically by: SHAHRAM Martel CNP  Copy of this  evaluation report is provided to requesting physician.

## 2021-12-15 NOTE — PATIENT INSTRUCTIONS
Recommend following up with ENT, Dr. Lugo if throat/chest symptoms not continuing to improve  Preparing for Your Surgery  Getting started  A nurse will call you to review your health history and instructions. They will give you an arrival time based on your scheduled surgery time.  Please be ready to share the following:    Your doctor's clinic name and phone number    Your medical, surgical and anesthesia history    A list of allergies and sensitivities    A list of medicines, including herbal treatments and over-the-counter drugs    Whether the patient has a legal guardian (ask how to send us the papers in advance)  If you have a child who's having surgery, please ask for a copy of Preparing for Your Child's Surgery.    Preparing for surgery    Within 30 days of surgery: Have a pre-op exam (sometimes called an H&P, or History and Physical). This can be done at a clinic or pre-operative center.  ? If you're having a , you may not need this exam. Talk to your care team    At your pre-op exam, talk to your care team about all medicines you take. If you need to stop any medicines before surgery, ask when to start taking them again.  ? We do this for your safety. Many medicines can make you bleed too much during surgery. Some change how well surgery (anesthesia) drugs work.    Call your insurance company to let them know you're having surgery. (If you don't have insurance, call [phone].)    Call your clinic if there's any change in your health. This includes signs of a cold or flu (sore throat, runny nose, cough, rash, fever). It also includes a scrape or scratch near the surgery site.    If you have questions on the day of surgery, call your hospital or surgery center.  Eating and drinking guidelines  For your safety: Unless your surgeon tells you otherwise, follow the guidelines below.    Eat and drink as usual until 8 hours before surgery. After that, no food or milk.    Drink clear liquids until 2  hours before surgery. These are liquids you can see through, like water, Gatorade and Propel Water. You may also have black coffee and tea (no cream or milk).    Nothing by mouth within 2 hours of surgery. This includes gum, candy and breath mints.    If you drink, stop drinking alcohol the night before surgery.    If your care team tells you to take medicine on the morning of surgery, it's okay to take it with a sip of water.  Preventing infection    Shower or bathe the night before and morning of your surgery. Follow the instructions your clinic gave you. (If no instructions, use regular soap.)    Don't shave or clip hair near your surgery site. We'll remove the hair if needed.    Don't smoke or vape the morning of surgery. You may chew nicotine gum up to 2 hours before surgery. A nicotine patch is okay.  ? Note: Some surgeries require you to completely quit smoking and nicotine. Check with your surgeon.    Your care team will make every effort to keep you safe from infection. We will:  ? Clean our hands often with soap and water (or an alcohol-based hand rub).  ? Clean the skin at your surgery site with a special soap that kills germs.  ? Give you a special gown to keep you warm. (Cold raises the risk of infection.)  ? Wear special hair covers, masks, gowns and gloves during surgery.  ? Give antibiotic medicine, if prescribed. Not all surgeries need antibiotics.  What to bring on the day of surgery    Photo ID and insurance card    Copy of your health care directive, if you have one    Glasses and hearing aides (bring cases)  ? You can't wear contacts during surgery    Inhaler and eye drops, if you use them (tell us about these when you arrive)    CPAP machine or breathing device, if you use them    A few personal items, if spending the night    If you have . . .  ? A pacemaker or ICD (cardiac defibrillator): Bring the ID card.  ? An implanted stimulator: Bring the remote control.  ? A legal guardian: Bring a  copy of the certified (court-stamped) guardianship papers.  Please remove any jewelry, including body piercings. Leave jewelry and other valuables at home.  If you're going home the day of surgery  Important: If you don't follow the rules below, we must cancel your surgery.     Arrange for someone to drive you home after surgery. You may not drive, take a taxi or take public transportation by yourself (unless you'll have local anesthesia only).    Arrange for a responsible adult to stay with you overnight. If you don't, we may keep you in the hospital overnight, and you may need to pay the costs yourself.  Questions?   If you have any questions for your care team, list them here: _________________________________________________________________________________________________________________________________________________________________________________________________________________________________________________________________________________________________________________________  For informational purposes only. Not to replace the advice of your health care provider. Copyright   2003, 2019 [employer]. All rights reserved. Clinically reviewed by Autumn Valero MD. [employer] [id number] - REV 4/20.

## 2021-12-15 NOTE — PROGRESS NOTES
Problem(s) Oriented visit        SUBJECTIVE:                                                    Macey Romero is a 71 year old female who presents to clinic today for the following health issues :    {Superlists:159652}    There are no diagnoses linked to this encounter.     Problem list, Medication list, Allergies, and Medical/Social/Surgical histories reviewed in EPIC and updated as appropriate.   Additional history: as documented    ROS:  {ROS list:782165}    OBJECTIVE:                                                    /62   Pulse 83   Temp 97.7  F (36.5  C)   SpO2 100%   There is no height or weight on file to calculate BMI.   {EXAM List:294561}     ASSESSMENT/PLAN:                                                    {Quality Requirements:731325}    There are no diagnoses linked to this encounter.    {Follow-Up:121886}  There are no Patient Instructions on file for this visit.    SHAHRAM Martel [facility]  Family Practice  [facility]  [phone]    For any issues my office # is [phone]

## 2021-12-16 ASSESSMENT — ASTHMA QUESTIONNAIRES: ACT_TOTALSCORE: 23

## 2021-12-22 NOTE — PROGRESS NOTES
12/16/21 and 12/17/21 faxed this preop, RKG and labs to [facility] @ [phone]    Isaiah Hilton,   [facility]  [phone]

## 2022-01-17 ENCOUNTER — OFFICE VISIT (OUTPATIENT)
Dept: FAMILY MEDICINE | Facility: CLINIC | Age: 72
End: 2022-01-17

## 2022-01-17 VITALS
SYSTOLIC BLOOD PRESSURE: 124 MMHG | HEART RATE: 79 BPM | TEMPERATURE: 97.1 F | HEIGHT: 63 IN | WEIGHT: 155 LBS | OXYGEN SATURATION: 96 % | DIASTOLIC BLOOD PRESSURE: 64 MMHG | BODY MASS INDEX: 27.46 KG/M2

## 2022-01-17 DIAGNOSIS — H25.9 SENILE CATARACT OF LEFT EYE, UNSPECIFIED AGE-RELATED CATARACT TYPE: ICD-10-CM

## 2022-01-17 DIAGNOSIS — Z01.818 PREOP GENERAL PHYSICAL EXAM: Primary | ICD-10-CM

## 2022-01-17 PROCEDURE — 99214 OFFICE O/P EST MOD 30 MIN: CPT | Performed by: NURSE PRACTITIONER

## 2022-01-17 ASSESSMENT — MIFFLIN-ST. JEOR: SCORE: 1187.21

## 2022-01-17 NOTE — PATIENT INSTRUCTIONS
Patient Instructions   - Nothing to eat/drink starting midnight the night before, unless surgical team tells you otherwise     - Avoid ibuprofen, naproxen (aleve), aspirin, fish oil, vitamin e 1 week prior to surgery     - If you have pain - ok to take Acetaminophen (Tylenol) 650 mg every 4-6 hours as needed.     - If you fall ill prior to surgery - (ie - fever, chills, nausea, vomiting or diarrhea, cough, etc) please let me and your surgeon know asap    Recommend Vitamin D3 2000 international unit(s) daily

## 2022-01-17 NOTE — PROGRESS NOTES
[facility] [address] [place] [address]  Phone: [phone]  Fax: [phone]  Primary Provider: Deandra Garcia  Pre-op Performing Provider: DEANDRA GARCIA      PREOPERATIVE EVALUATION:  Today's date: 1/17/2022    Macey Romero is a 71 year old female who presents for a preoperative evaluation.    Surgical Information:  Surgery/Procedure: Cataract Removal - Left Eye  Surgery Location: [facility]  Surgeon: Dr. Walters  Surgery Date: 1/31/22  Time of Surgery: TBD  Where patient plans to recover: At home with family  Fax number for surgical facility: [phone]    Type of Anesthesia Anticipated: to be determined    Preoperative Questionnaire:   No - Have you ever had a heart attack or stroke?  No - Have you ever had surgery on your heart or blood vessels, such as a stent, coronary (heart) bypass, or surgery on an artery in the head, neck, heart, or legs?  No - Do you have chest pain when you are physically active?  No - Do you have a history of heart failure?  No - Do you currently have a cold, bronchitis, or symptoms of other respiratory (head and chest) infections?  No - Do you have a cough, shortness of breath, or wheezing?  No - Do you or anyone in your family have a history of blood clots?  YES - Do you or anyone in your family have a serious bleeding problem, such as long-lasting bleeding after surgeries or cuts? Mother and patient  No - Have you ever had anemia or been told to take iron pills?  No - Have you had any abnormal blood loss such as black, tarry or bloody stools, or abnormal vaginal bleeding?  YES - Have you ever had a blood transfusion? 1972  Yes - Are you willing to have a blood transfusion if it is medically needed before, during, or after your surgery?  No - Have you or anyone in your family ever had problems with anesthesia (sedation for surgery)?  No - Do you have sleep apnea, excessive snoring, or daytime drowsiness?   No - Do you  have any artifical heart valves or other implanted medical devices, such as a pacemaker, defibrillator, or continuous glucose monitor?  No - Do you have any artifical joints?  No - Are you allergic to latex?  No - Is there any chance that you may be pregnant?      Assessment & Plan     The proposed surgical procedure is considered LOW risk.    Preop general physical exam  Approved for surgery    Senile cataract of left eye, unspecified age-related cataract type    Risks and Recommendations:  The patient has the following additional risks and recommendations for perioperative complications:   - No identified additional risk factors other than previously addressed    Medication Instructions:  Patient is to take all scheduled medications on the day of surgery    RECOMMENDATION:  APPROVAL GIVEN to proceed with proposed procedure, without further diagnostic evaluation.    Subjective     HPI related to upcoming procedure: having left cataract removed. Already had right eye done without complication.    Health Care Directive:  Patient does not have a Health Care Directive or Living Will: Discussed advance care planning with patient; information given to patient to review.    Preoperative Review of :   reviewed - No concerns  PDMP Review       Value Time User    State PDMP site checked  Yes 1/17/2022  1:33 PM Deandra Strong APRN CNP        Status of Chronic Conditions:  See problem list for active medical problems.  Problems all longstanding and stable, except as noted/documented.  See ROS for pertinent symptoms related to these conditions.      Review of Systems  Constitutional, neuro, ENT, endocrine, pulmonary, cardiac, gastrointestinal, genitourinary, musculoskeletal, integument and psychiatric systems are negative, except as otherwise noted.    Patient Active Problem List    Diagnosis Date Noted     Memory loss 10/27/2021     Priority: Medium     Reactive airway disease, moderate persistent, with acute  exacerbation 10/27/2021     Priority: Medium     Prediabetes 10/27/2021     Priority: Medium     Hyperlipidemia LDL goal <130 10/27/2021     Priority: Medium     H/O bilateral mastectomy 06/17/2021     Priority: Medium     with reconstruction       Fibromyalgia      Priority: Medium     Chronic pain of both knees 03/21/2018     Priority: Medium     Right hip pain 12/18/2015     Priority: Medium     Pterygium eye 08/26/2013     Priority: Medium     Myalgia and myositis 09/12/2011     Priority: Medium     Problem list name updated by automated process. Provider to review       IBS (irritable bowel syndrome) 06/28/2011     Priority: Medium     Keloid of skin 06/28/2011     Priority: Medium     Osteoarthritis 02/01/2011     Priority: Medium     Osteopenia 02/01/2011     Priority: Medium     History of breast cancer 02/01/2011     Priority: Medium     1987        Past Medical History:   Diagnosis Date     Breast CA in situ 1987     Cancer (H) 1987    Right Breast treated with surgery     Chronic constipation      Fibromyalgia      Malignant neoplasm of female breast, unspecified estrogen receptor status, unspecified laterality, unspecified site of breast (H) 6/17/2021     Meningitis     Several times as an adult     Osteoarthritis 2/1/2011     Osteopenia 2/1/2011     Pneumonia      Pterygium eye 8/26/2013     Reactive airway disease 2/1/2011     Seasonal allergies      Shingles     Prior to 2008 - scalp     Skin cancer     SCC - hand, left nose     Past Surgical History:   Procedure Laterality Date     anterior c-disc fusion       BLEPHAROPLASTY, BROW LIFT BILATERAL, COMBINED Bilateral 6/18/2018    Procedure: COMBINED BLEPHAROPLASTY, BROW LIFT BILATERAL;  BILATERAL UPPER LID BLEPHAROPLASTY; BILATERAL BROW PTOSIS REPAIR;  Surgeon: Сергей Walters MD;  Location:  [facility]     CHOLECYSTECTOMY       COLONOSCOPY N/A 10/19/2017    Procedure: COLONOSCOPY;  COLONOSCOPY;  Surgeon: Niko Wong MD;  Location:  GI     D & C       Bleeding before hysterectomy     ENDOSCOPY  08     HYSTERECTOMY, MARCUS      Ovaries and tubes out due to breast cancer     JOINT REPLACEMTN, KNEE RT/LT Right 2011    Joint Replacement knee RT, with tibial straightening (2 replacements)     MAMMOPLASTY AUGMENTATION BILATERAL      breast ca      MASTECTOMY, SIMPLE RT/LT/CLAIRE Bilateral 1989    Mastectomy Simple RT/LT/CLAIRE     MOHS MICROGRAPHIC PROCEDURE      Left lateral nose     OPEN REDUCTION INTERNAL FIXATION ANKLE  2013    Procedure: OPEN REDUCTION INTERNAL FIXATION ANKLE;  RIGHT OPEN REDUCTION INTERNAL FIXATION ANKLE WITH LIGAMENT REPAIR;  Surgeon: Nando Chang MD;  Location: [facility]     PTERYGIUM WITH CONJUNCTIVAL AUTOLOGOUS TRANSPLANT Left 2016    Procedure: PTERYGIUM WITH CONJUNCTIVAL AUTOLOGOUS TRANSPLANT;  Surgeon: Сергей Walters MD;  Location: [facility]     REPAIR LIGAMENT ANKLE  2013    Procedure: REPAIR LIGAMENT ANKLE;;  Surgeon: Nando Chang MD;  Location: [facility] OR     SALIVARY GLAND SURGERY Left     Stone removal      TONSILLECTOMY       Current Outpatient Medications   Medication Sig Dispense Refill     albuterol (PROVENTIL) (2.5 MG/3ML) 0.083% neb solution Take 1 vial by nebulization every 4 hours as needed for shortness of breath/dyspnea or wheezing 1 Box 3     budesonide-formoterol (SYMBICORT) 160-4.5 MCG/ACT Inhaler Inhale 2 puffs into the lungs daily 10.2 g 3       Allergies   Allergen Reactions     Levaquin Hives and Itching     Pcn [Penicillins] Hives     Tetanus Toxoids Anaphylaxis     Erythromycin GI Disturbance     Silicone Rash        Social History     Tobacco Use     Smoking status: Former Smoker     Quit date: 2016     Years since quittin.0     Smokeless tobacco: Never Used     Tobacco comment: quit but  still smokes, but not in house   Substance Use Topics     Alcohol use: No     Alcohol/week: 0.0 standard drinks     Family History   Problem Relation Age of Onset     Respiratory Father       "Cancer Brother         sarcoidosis     Diabetes Brother      Cerebrovascular Disease Brother      Liver Disease Brother      History   Drug Use No         Objective     /64   Pulse 79   Temp 97.1  F (36.2  C) (Skin)   Ht 1.6 m (5' 3\")   Wt 70.3 kg (155 lb)   SpO2 96%   BMI 27.46 kg/m      Physical Exam    GENERAL APPEARANCE: healthy, alert and no distress     EYES: Eyes grossly normal to inspection. No lens in right side of glasses     HENT: nose and mouth without ulcers or lesions, oral mucous membranes moist and oropharynx clear     NECK: no adenopathy and no asymmetry, masses, or scars     RESP: lungs clear to auscultation - no rales, rhonchi or wheezes     CV: regular rates and rhythm, normal S1 S2, no S3 or S4 and no murmur, click or rub     ABDOMEN:  soft, nontender, no HSM or masses and bowel sounds normal     MS: extremities normal- no gross deformities noted     SKIN: no suspicious lesions or rashes     NEURO: Normal strength and tone, sensory exam grossly normal, mentation intact and speech normal     PSYCH: normal affect & mood     LYMPHATICS: No cervical adenopathy    Recent Labs   Lab Test 12/12/21  2101 10/27/21  1037 06/25/21  1046 06/25/21  1045   HGB 14.3  --   --  14.4     --   --  201    140 142  --    POTASSIUM 3.6 4.4 4.8  --    CR 1.03 0.97 0.95  --    A1C  --  6.1* CANCELED  5.9*  --         Diagnostics:  No labs were ordered during this visit.   No EKG required for low risk surgery (cataract, skin procedure, breast biopsy, etc).    Revised Cardiac Risk Index (RCRI):  The patient has the following serious cardiovascular risks for perioperative complications:   - No serious cardiac risks = 0 points     RCRI Interpretation: 0 points: Class I (very low risk - 0.4% complication rate)           Signed Electronically by: SHAHRAM Martel CNP  Copy of this evaluation report is provided to requesting physician.      "

## 2022-01-27 NOTE — PROGRESS NOTES
1/18/22 faxed this preop to [facility] @ [phone]    Isaiah Hilton,   [facility]  [phone]

## 2022-02-10 ENCOUNTER — TRANSFERRED RECORDS (OUTPATIENT)
Dept: FAMILY MEDICINE | Facility: CLINIC | Age: 72
End: 2022-02-10

## 2022-02-12 ENCOUNTER — HEALTH MAINTENANCE LETTER (OUTPATIENT)
Age: 72
End: 2022-02-12

## 2022-02-17 ENCOUNTER — TRANSFERRED RECORDS (OUTPATIENT)
Dept: FAMILY MEDICINE | Facility: CLINIC | Age: 72
End: 2022-02-17

## 2022-03-01 ENCOUNTER — OFFICE VISIT (OUTPATIENT)
Dept: FAMILY MEDICINE | Facility: CLINIC | Age: 72
End: 2022-03-01

## 2022-03-01 VITALS
BODY MASS INDEX: 27.46 KG/M2 | DIASTOLIC BLOOD PRESSURE: 58 MMHG | OXYGEN SATURATION: 99 % | SYSTOLIC BLOOD PRESSURE: 111 MMHG | HEART RATE: 85 BPM | WEIGHT: 155 LBS | HEIGHT: 63 IN | RESPIRATION RATE: 18 BRPM

## 2022-03-01 DIAGNOSIS — F34.1 DYSTHYMIA: ICD-10-CM

## 2022-03-01 DIAGNOSIS — L71.9 ROSACEA: Primary | ICD-10-CM

## 2022-03-01 PROCEDURE — 99214 OFFICE O/P EST MOD 30 MIN: CPT | Performed by: FAMILY MEDICINE

## 2022-03-01 RX ORDER — ERYTHROMYCIN 20 MG/G
GEL TOPICAL DAILY
Qty: 60 G | Refills: 1 | Status: SHIPPED | OUTPATIENT
Start: 2022-03-01 | End: 2022-09-23

## 2022-03-01 ASSESSMENT — ANXIETY QUESTIONNAIRES
GAD7 TOTAL SCORE: 1
2. NOT BEING ABLE TO STOP OR CONTROL WORRYING: NOT AT ALL
1. FEELING NERVOUS, ANXIOUS, OR ON EDGE: NOT AT ALL
6. BECOMING EASILY ANNOYED OR IRRITABLE: SEVERAL DAYS
IF YOU CHECKED OFF ANY PROBLEMS ON THIS QUESTIONNAIRE, HOW DIFFICULT HAVE THESE PROBLEMS MADE IT FOR YOU TO DO YOUR WORK, TAKE CARE OF THINGS AT HOME, OR GET ALONG WITH OTHER PEOPLE: SOMEWHAT DIFFICULT
5. BEING SO RESTLESS THAT IT IS HARD TO SIT STILL: NOT AT ALL
7. FEELING AFRAID AS IF SOMETHING AWFUL MIGHT HAPPEN: NOT AT ALL
3. WORRYING TOO MUCH ABOUT DIFFERENT THINGS: NOT AT ALL

## 2022-03-01 ASSESSMENT — PATIENT HEALTH QUESTIONNAIRE - PHQ9
SUM OF ALL RESPONSES TO PHQ QUESTIONS 1-9: 2
5. POOR APPETITE OR OVEREATING: NOT AT ALL

## 2022-03-01 NOTE — PROGRESS NOTES
"Problem(s) Oriented visit        SUBJECTIVE:                                                    Macey Romero is a 71 year old female who presents to clinic today for the following health issues :          1. Rosacea  Rash is described as redness on middle of face, adjacent to nose and nasal folds, medial forehead, and chin areas.   This does become more \"bright\" when exposed to steam and heat, such as when drinking hot beverages.       2. Dysthymia  Depression:  Has known history of depression.  Has been on medication for this.  The patient does not report any significant side effects of this medication.  The prior symptoms leading to the original diagnosis and decision to start medication therapy are not yet optimal.     Appetite is stable.  Sleeping patterns are stable.  No reported thoughts of suicide or homicide.  Last PHQ-9 score on record=   PHQ-9 SCORE 9/24/2015 5/30/2018 9/9/2019 9/9/2019 3/1/2022   PHQ-9 Total Score 2 0 0 0 2     More sad than depressed.       Problem list, Medication list, Allergies, and Medical/Social/Surgical histories reviewed in [facility] and updated as appropriate.   Additional history: as documented    ROS:  General and Resp. completed and negative except as noted above    Histories:   Patient Active Problem List   Diagnosis     Osteoarthritis     Osteopenia     History of breast cancer     IBS (irritable bowel syndrome)     Keloid of skin     Myalgia and myositis     Pterygium eye     Right hip pain     Chronic pain of both knees     Fibromyalgia     H/O bilateral mastectomy     Memory loss     Reactive airway disease, moderate persistent, with acute exacerbation     Prediabetes     Hyperlipidemia LDL goal <130     Past Surgical History:   Procedure Laterality Date     anterior c-disc fusion       BLEPHAROPLASTY, BROW LIFT BILATERAL, COMBINED Bilateral 6/18/2018    Procedure: COMBINED BLEPHAROPLASTY, BROW LIFT BILATERAL;  BILATERAL UPPER LID BLEPHAROPLASTY; BILATERAL BROW PTOSIS " "REPAIR;  Surgeon: Сергей Walters MD;  Location:  [facility]     CHOLECYSTECTOMY       COLONOSCOPY N/A 10/19/2017    Procedure: COLONOSCOPY;  COLONOSCOPY;  Surgeon: Niko Wong MD;  Location:  GI     D & C      Bleeding before hysterectomy     ENDOSCOPY  08     HYSTERECTOMY, MARCUS      Ovaries and tubes out due to breast cancer     JOINT REPLACEMTN, KNEE RT/LT Right 2011    Joint Replacement knee RT, with tibial straightening (2 replacements)     MAMMOPLASTY AUGMENTATION BILATERAL      breast ca      MASTECTOMY, SIMPLE RT/LT/CLAIRE Bilateral 1989    Mastectomy Simple RT/LT/CLAIRE     MOHS MICROGRAPHIC PROCEDURE      Left lateral nose     OPEN REDUCTION INTERNAL FIXATION ANKLE  2013    Procedure: OPEN REDUCTION INTERNAL FIXATION ANKLE;  RIGHT OPEN REDUCTION INTERNAL FIXATION ANKLE WITH LIGAMENT REPAIR;  Surgeon: Nando Chang MD;  Location:  OR     PTERYGIUM WITH CONJUNCTIVAL AUTOLOGOUS TRANSPLANT Left 2016    Procedure: PTERYGIUM WITH CONJUNCTIVAL AUTOLOGOUS TRANSPLANT;  Surgeon: Сергей Walters MD;  Location:  [facility]     REPAIR LIGAMENT ANKLE  2013    Procedure: REPAIR LIGAMENT ANKLE;;  Surgeon: Nando Chang MD;  Location:  OR     SALIVARY GLAND SURGERY Left     Stone removal      TONSILLECTOMY         Social History     Tobacco Use     Smoking status: Former Smoker     Quit date: 2016     Years since quittin.1     Smokeless tobacco: Never Used     Tobacco comment: quit but  still smokes, but not in house   Substance Use Topics     Alcohol use: No     Alcohol/week: 0.0 standard drinks     Family History   Problem Relation Age of Onset     Respiratory Father      Cancer Brother         sarcoidosis     Diabetes Brother      Cerebrovascular Disease Brother      Liver Disease Brother            OBJECTIVE:                                                    /58   Pulse 85   Resp 18   Ht 1.6 m (5' 3\")   Wt 70.3 kg (155 lb)   SpO2 99%   BMI 27.46 kg/m    Body " mass index is 27.46 kg/m .   APPEARANCE: = alert and crying     ASSESSMENT/PLAN:                                                        Macey was seen today for depression and memory loss.    Diagnoses and all orders for this visit:    Rosacea  -     erythromycin with ethanol (EMGEL) 2 % gel; Apply topically daily    Dysthymia  -     Adult Mental Health  Referral; Future    needs counseling    Work on weight loss  Regular exercise  There are no Patient Instructions on file for this visit.    The following health maintenance items are reviewed in Epic and correct as of today:  Health Maintenance   Topic Date Due     DTAP/TDAP/TD IMMUNIZATION (1 - Tdap) Never done     MEDICARE ANNUAL WELLNESS VISIT  09/09/2020     ZOSTER IMMUNIZATION (2 of 2) 11/18/2021     A1C  04/27/2022     ASTHMA CONTROL TEST  06/15/2022     FALL RISK ASSESSMENT  06/17/2022     ASTHMA ACTION PLAN  06/25/2022     LIPID  10/27/2022     LUNG CANCER SCREENING  12/12/2022     DEXA  11/17/2023     ADVANCE CARE PLANNING  06/25/2026     COLORECTAL CANCER SCREENING  10/19/2027     HEPATITIS C SCREENING  Completed     PHQ-2  Completed     INFLUENZA VACCINE  Completed     Pneumococcal Vaccine: 65+ Years  Completed     COVID-19 Vaccine  Completed     IPV IMMUNIZATION  Aged Out     MENINGITIS IMMUNIZATION  Aged Out     HEPATITIS B IMMUNIZATION  Aged Out     URINE DRUG SCREEN  Discontinued       Long Ferrari MD  [facility]  Family Practice  [facility]  [phone]    For any issues my office # is [phone]

## 2022-03-02 ASSESSMENT — ANXIETY QUESTIONNAIRES: GAD7 TOTAL SCORE: 1

## 2022-04-04 ENCOUNTER — TRANSFERRED RECORDS (OUTPATIENT)
Dept: FAMILY MEDICINE | Facility: CLINIC | Age: 72
End: 2022-04-04

## 2022-04-12 ENCOUNTER — OFFICE VISIT (OUTPATIENT)
Dept: FAMILY MEDICINE | Facility: CLINIC | Age: 72
End: 2022-04-12

## 2022-04-12 VITALS
HEART RATE: 88 BPM | DIASTOLIC BLOOD PRESSURE: 60 MMHG | RESPIRATION RATE: 16 BRPM | SYSTOLIC BLOOD PRESSURE: 124 MMHG | OXYGEN SATURATION: 99 %

## 2022-04-12 DIAGNOSIS — L71.9 ROSACEA: ICD-10-CM

## 2022-04-12 DIAGNOSIS — B37.0 THRUSH: Primary | ICD-10-CM

## 2022-04-12 PROCEDURE — 99213 OFFICE O/P EST LOW 20 MIN: CPT | Performed by: FAMILY MEDICINE

## 2022-04-12 RX ORDER — FLUCONAZOLE 100 MG/1
100 TABLET ORAL DAILY
Qty: 15 TABLET | Refills: 0 | Status: SHIPPED | OUTPATIENT
Start: 2022-04-12 | End: 2022-04-27

## 2022-04-12 RX ORDER — KETOROLAC TROMETHAMINE 5 MG/ML
SOLUTION OPHTHALMIC
COMMUNITY
Start: 2022-03-31 | End: 2022-08-25

## 2022-04-12 RX ORDER — CLINDAMYCIN PHOSPHATE 10 UG/ML
LOTION TOPICAL
COMMUNITY
Start: 2022-03-08 | End: 2022-06-20

## 2022-04-12 NOTE — PROGRESS NOTES
On antibiotic ointment for her facial skin    Problem(s) Oriented visit      ROS:  General and Resp. completed and negative except as noted above     HISTORY:   reports no history of alcohol use.   reports that she quit smoking about 6 years ago. She has never used smokeless tobacco.    Past Medical History:   Diagnosis Date     Breast CA in situ 1987     Cancer (H) 1987     Chronic constipation      Fibromyalgia      Malignant neoplasm of female breast, unspecified estrogen receptor status, unspecified laterality, unspecified site of breast (H) 6/17/2021     Meningitis      Osteoarthritis 2/1/2011     Osteopenia 2/1/2011     Pneumonia      Pterygium eye 8/26/2013     Reactive airway disease 2/1/2011     Seasonal allergies      Shingles      Skin cancer      Past Surgical History:   Procedure Laterality Date     anterior c-disc fusion       BLEPHAROPLASTY, BROW LIFT BILATERAL, COMBINED Bilateral 6/18/2018    Procedure: COMBINED BLEPHAROPLASTY, BROW LIFT BILATERAL;  BILATERAL UPPER LID BLEPHAROPLASTY; BILATERAL BROW PTOSIS REPAIR;  Surgeon: Сергей Walters MD;  Location:  [facility]     CHOLECYSTECTOMY       COLONOSCOPY N/A 10/19/2017    Procedure: COLONOSCOPY;  COLONOSCOPY;  Surgeon: Niko Wong MD;  Location:  [facility]     D & C      Bleeding before hysterectomy     ENDOSCOPY  04/21/08     HYSTERECTOMY, MARCUS      Ovaries and tubes out due to breast cancer     JOINT REPLACEMTN, KNEE RT/LT Right 01/2011    Joint Replacement knee RT, with tibial straightening (2 replacements)     MAMMOPLASTY AUGMENTATION BILATERAL      breast ca      MASTECTOMY, SIMPLE RT/LT/CLAIRE Bilateral 1989    Mastectomy Simple RT/LT/CLAIRE     MOHS MICROGRAPHIC PROCEDURE      Left lateral nose     OPEN REDUCTION INTERNAL FIXATION ANKLE  8/27/2013    Procedure: OPEN REDUCTION INTERNAL FIXATION ANKLE;  RIGHT OPEN REDUCTION INTERNAL FIXATION ANKLE WITH LIGAMENT REPAIR;  Surgeon: Nando Chang MD;  Location:  OR     PTERYGIUM WITH CONJUNCTIVAL  "AUTOLOGOUS TRANSPLANT Left 8/29/2016    Procedure: PTERYGIUM WITH CONJUNCTIVAL AUTOLOGOUS TRANSPLANT;  Surgeon: Сергей Walters MD;  Location:  [facility]     REPAIR LIGAMENT ANKLE  8/27/2013    Procedure: REPAIR LIGAMENT ANKLE;;  Surgeon: Nando Chang MD;  Location: [facility]     SALIVARY GLAND SURGERY Left     Stone removal      TONSILLECTOMY         EXAM:  BP: 124/60   Pulse: 88    Temp: Data Unavailable    Wt Readings from Last 2 Encounters:   03/01/22 70.3 kg (155 lb)   01/17/22 70.3 kg (155 lb)       BMI= There is no height or weight on file to calculate BMI.    EXAM:  APPEARANCE: = Relaxed and in no distress  Conj/Eyelids = noninjected and lids and lashes are without inflammation  Ears/Nose = External structures and Nares have usual shape and form  Oropharynx = some whitish tongue coverage.  Cheeks are a bit red   Neck = No anterior or posterior adenopathy appreciated.      Assessment/Plan:  Macey was seen today for rosacea.    Diagnoses and all orders for this visit:    Thrush  -     fluconazole (DIFLUCAN) 100 MG tablet; Take 1 tablet (100 mg) by mouth daily for 15 days    Rosacea     COUNSELING:   reports that she quit smoking about 6 years ago. She has never used smokeless tobacco.    Estimated body mass index is 27.46 kg/m  as calculated from the following:    Height as of 3/1/22: 1.6 m (5' 3\").    Weight as of 3/1/22: 70.3 kg (155 lb).       Appropriate preventive services were discussed with this patient, including applicable screening as appropriate for cardiovascular disease, diabetes, osteopenia/osteoporosis, and glaucoma.  As appropriate for age/gender, discussed screening for colorectal cancer, prostate cancer, breast cancer, and cervical cancer. Checklist reviewing preventive services available has been given to the patient.    Reviewed patients plan of care and provided an AVS. The Basic Care Plan (routine screening as documented in Health Maintenance) for Macey meets the Care Plan requirement. " This Care Plan has been established and reviewed with the  Patient.      The following health maintenance items are reviewed in Epic and correct as of today:  Health Maintenance   Topic Date Due     DEPRESSION ACTION PLAN  Never done     DTAP/TDAP/TD IMMUNIZATION (1 - Tdap) Never done     MEDICARE ANNUAL WELLNESS VISIT  09/09/2020     ZOSTER IMMUNIZATION (2 of 2) 11/18/2021     A1C  04/27/2022     ASTHMA CONTROL TEST  06/15/2022     FALL RISK ASSESSMENT  06/17/2022     ASTHMA ACTION PLAN  06/25/2022     LIPID  10/27/2022     LUNG CANCER SCREENING  12/12/2022     PHQ-9  03/01/2023     DEXA  11/17/2023     ADVANCE CARE PLANNING  06/25/2026     COLORECTAL CANCER SCREENING  10/19/2027     HEPATITIS C SCREENING  Completed     INFLUENZA VACCINE  Completed     Pneumococcal Vaccine: 65+ Years  Completed     COVID-19 Vaccine  Completed     IPV IMMUNIZATION  Aged Out     MENINGITIS IMMUNIZATION  Aged Out     HEPATITIS B IMMUNIZATION  Aged Out     URINE DRUG SCREEN  Discontinued       Long Ferrari  [facility]  For any issues my office # is [phone]

## 2022-04-13 ENCOUNTER — TRANSFERRED RECORDS (OUTPATIENT)
Dept: FAMILY MEDICINE | Facility: CLINIC | Age: 72
End: 2022-04-13

## 2022-04-18 DIAGNOSIS — J45.20 MILD INTERMITTENT REACTIVE AIRWAY DISEASE WITHOUT COMPLICATION: ICD-10-CM

## 2022-04-18 DIAGNOSIS — J45.41 REACTIVE AIRWAY DISEASE, MODERATE PERSISTENT, WITH ACUTE EXACERBATION: Primary | ICD-10-CM

## 2022-04-18 RX ORDER — ALBUTEROL SULFATE 90 UG/1
2 AEROSOL, METERED RESPIRATORY (INHALATION) EVERY 6 HOURS
Qty: 18 G | Refills: 1 | Status: SHIPPED | OUTPATIENT
Start: 2022-04-18 | End: 2023-04-17

## 2022-04-18 NOTE — TELEPHONE ENCOUNTER
Patient calls requesting refill on albuterol inhaler. Historically uses this med infrequently and has not filled it in 2 years. States feels allergies starting to set in and would like inhaler to have on hand for prn use. Takes Symbicort daily to control symptoms.   Last related visit: 10/27/21 with Deandra Strong CNP     Routed request to Deandra Strong CNP.   Luz Wilkinson RN

## 2022-05-11 ENCOUNTER — TRANSFERRED RECORDS (OUTPATIENT)
Dept: FAMILY MEDICINE | Facility: CLINIC | Age: 72
End: 2022-05-11

## 2022-06-04 ENCOUNTER — HEALTH MAINTENANCE LETTER (OUTPATIENT)
Age: 72
End: 2022-06-04

## 2022-06-10 ENCOUNTER — TRANSFERRED RECORDS (OUTPATIENT)
Dept: FAMILY MEDICINE | Facility: CLINIC | Age: 72
End: 2022-06-10

## 2022-06-19 ENCOUNTER — HOSPITAL ENCOUNTER (EMERGENCY)
Facility: CLINIC | Age: 72
Discharge: HOME | End: 2022-06-19
Attending: EMERGENCY MEDICINE | Admitting: EMERGENCY MEDICINE
Payer: MEDICARE

## 2022-06-19 VITALS
WEIGHT: 155 LBS | OXYGEN SATURATION: 99 % | TEMPERATURE: 98.3 F | DIASTOLIC BLOOD PRESSURE: 60 MMHG | HEART RATE: 90 BPM | BODY MASS INDEX: 27.46 KG/M2 | SYSTOLIC BLOOD PRESSURE: 142 MMHG | HEIGHT: 63 IN

## 2022-06-19 DIAGNOSIS — M79.2 NEURITIS: ICD-10-CM

## 2022-06-19 DIAGNOSIS — S80.822A: ICD-10-CM

## 2022-06-19 PROCEDURE — 99282 EMERGENCY DEPT VISIT SF MDM: CPT

## 2022-06-19 ASSESSMENT — ENCOUNTER SYMPTOMS
FEVER: 0
CHILLS: 0
NUMBNESS: 0

## 2022-06-19 NOTE — DISCHARGE INSTRUCTIONS
If the blister pops, keep it clean and you could use peroxide.  Use the pad over it whenever you are wearing your brace.  Okay to shower.  Tylenol as needed.  If you develop blisters up the side of your leg or down your leg below it, contact your doctor or go to urgent care where they can evaluate you for shingles.  If you develop significant redness that extends out from the blister and continues to grow, especially if you develop fevers, you need to be reevaluated.  Do not walk any more than you have to.  Contact your doctor tomorrow for an update and follow-up.

## 2022-06-19 NOTE — ED TRIAGE NOTES
Pt  Has wound on left lower leg from a brace and wants it looked at as it is painful and burning. Size of dime, no drainage noted, is red.     Triage Assessment     Row Name 06/19/22 0944       Triage Assessment (Adult)    Airway WDL WDL       Respiratory WDL    Respiratory WDL WDL       Skin Circulation/Temperature WDL    Skin Circulation/Temperature WDL WDL       Cardiac WDL    Cardiac WDL WDL       Peripheral/Neurovascular WDL    Peripheral Neurovascular WDL WDL       Cognitive/Neuro/Behavioral WDL    Cognitive/Neuro/Behavioral WDL WDL

## 2022-06-19 NOTE — ED PROVIDER NOTES
History     Chief Complaint:  Wound Check       HPI   Macey Romero is a 71 year old female who presents with a painful blister over the lateral aspect of the left upper leg just below the knee.  It is very sensitive and a burning pain that extends back toward the upper calf area.  She said that she is wearing a knee brace because she has such bad arthritis pending surgery and the brace rubbed on this area causing the vesicle.  It looked like there was clear fluid in it initially and now it is a little darker.  Because of the pain she wanted to go to urgent care but they sent her here.  No fevers or chills.  She has had shingles in the past and it has that burning pain like that but she does not have any other lesions and the blister is right at the place that it was rubbing on her leg.  She is supposed to wear the knee brace all the time but cannot wear it right now because of the blister.  She has not taken anything for the pain.    ROS:  Review of Systems   Constitutional: Negative for chills and fever.   Skin:        Vesicle lower leg left side   Neurological: Negative for numbness.        Burning pain over the left lower leg by the vesicle.   All other systems reviewed and are negative.       Allergies:  Levaquin  Pcn [Penicillins]  Tetanus Toxoids  Erythromycin  Silicone     Medications:    albuterol (PROAIR HFA/PROVENTIL HFA/VENTOLIN HFA) 108 (90 Base) MCG/ACT inhaler  albuterol (PROVENTIL) (2.5 MG/3ML) 0.083% neb solution  budesonide-formoterol (SYMBICORT) 160-4.5 MCG/ACT Inhaler  clindamycin (CLEOCIN T) 1 % external lotion  erythromycin with ethanol (EMGEL) 2 % gel  ketorolac (ACULAR) 0.5 % ophthalmic solution  metroNIDAZOLE (METROCREAM) 0.75 % external cream        Past Medical History:    Past Medical History:   Diagnosis Date     Breast CA in situ 1987     Cancer (H) 1987     Chronic constipation      Fibromyalgia      Malignant neoplasm of female breast, unspecified estrogen receptor status,  unspecified laterality, unspecified site of breast (H) 6/17/2021     Meningitis      Osteoarthritis 2/1/2011     Osteopenia 2/1/2011     Pneumonia      Pterygium eye 8/26/2013     Reactive airway disease 2/1/2011     Seasonal allergies      Shingles      Skin cancer        Past Surgical History:    Past Surgical History:   Procedure Laterality Date     anterior c-disc fusion       BLEPHAROPLASTY, BROW LIFT BILATERAL, COMBINED Bilateral 6/18/2018    Procedure: COMBINED BLEPHAROPLASTY, BROW LIFT BILATERAL;  BILATERAL UPPER LID BLEPHAROPLASTY; BILATERAL BROW PTOSIS REPAIR;  Surgeon: Сергей Walters MD;  Location:  [facility]     CHOLECYSTECTOMY       COLONOSCOPY N/A 10/19/2017    Procedure: COLONOSCOPY;  COLONOSCOPY;  Surgeon: Niko Wong MD;  Location:  GI     D & C      Bleeding before hysterectomy     ENDOSCOPY  04/21/08     HYSTERECTOMY, MARCUS      Ovaries and tubes out due to breast cancer     JOINT REPLACEMTN, KNEE RT/LT Right 01/2011    Joint Replacement knee RT, with tibial straightening (2 replacements)     MAMMOPLASTY AUGMENTATION BILATERAL      breast ca      MASTECTOMY, SIMPLE RT/LT/CLAIRE Bilateral 1989    Mastectomy Simple RT/LT/CLAIRE     MOHS MICROGRAPHIC PROCEDURE      Left lateral nose     OPEN REDUCTION INTERNAL FIXATION ANKLE  8/27/2013    Procedure: OPEN REDUCTION INTERNAL FIXATION ANKLE;  RIGHT OPEN REDUCTION INTERNAL FIXATION ANKLE WITH LIGAMENT REPAIR;  Surgeon: Nando Chang MD;  Location:  OR     PTERYGIUM WITH CONJUNCTIVAL AUTOLOGOUS TRANSPLANT Left 8/29/2016    Procedure: PTERYGIUM WITH CONJUNCTIVAL AUTOLOGOUS TRANSPLANT;  Surgeon: Сергей Walters MD;  Location:  [facility]     REPAIR LIGAMENT ANKLE  8/27/2013    Procedure: REPAIR LIGAMENT ANKLE;;  Surgeon: Nando Chang MD;  Location:  OR     SALIVARY GLAND SURGERY Left     Stone removal      TONSILLECTOMY          Family History:    family history includes Cancer in her brother; Cerebrovascular Disease in her brother; Diabetes in her  "brother; Liver Disease in her brother; Respiratory in her father.    Social History:   reports that she quit smoking about 6 years ago. She has never used smokeless tobacco. She reports that she does not drink alcohol and does not use drugs.  PCP: Deandra Strong A     Physical Exam     Patient Vitals for the past 24 hrs:   BP Temp Temp src Pulse SpO2 Height Weight   06/19/22 0940 (!) 142/60 98.3  F (36.8  C) Temporal 90 99 % 1.6 m (5' 3\") 70.3 kg (155 lb)        Physical Exam  Nursing note and vitals reviewed.    Constitutional:  Appears comfortable.    Cardiovascular:  Cap refill less than 2 seconds.  Musculoskeletal:  Range of motion of the leg is normal. No extremity deformity.     There is tenderness around this vesicle and over the vesicle.    Neurological:   Alert and oriented. Exhibits good muscle tone.      Sensation in the leg is grossly normal.  Skin:    Skin is warm and dry.  There is a 1.3 cm solitary vesicle and it appears that there is some blood-tinged fluid in the vesicle.  It is not tense.  It is very tender.  The tenderness extends around to this and just posterior over the upper calf area.  There is 1 other vesicle near it.  There were no other lesions up or down her leg to suggest shingles.  There is minimal erythema surrounding it that would suggest cellulitis.  Psychiatric:   Behavior is normal. Appropriate mood and affect.     Judgment and thought content normal.               Emergency Department Course       Emergency Department Course:      Reviewed:  I reviewed nursing notes and vitals    Assessments:  1130 I obtained history and examined the patient as noted above.       Interventions:  Medications - No data to display     Disposition:  The patient was discharged to home.     Impression & Plan        Medical Decision Making:  Patient comes in with a vesicle with some significant burning pain around it.  I looked up and down her leg and there was no other lesions.  This is right at the " site where the brace was rubbing her leg.  I think she has irritated a cutaneous nerve as well causing the neuritis.  The vesicle is intact and I did not want to break the skin.  I put a gauze pad and wrapped with Coban and she was able to put the brace back on and wear it much more comfortably.  It still sensitive in this area but it is improved.  She is supposed to be wearing this brace most of the time.  I would like her to follow-up with her doctor in the clinic this week.  If she develops other vesicles and lesions up or down the leg, this would likely be consistent with shingles and she would need recheck.  She understands this.    If the blister pops, keep it clean and you could use peroxide.  Use the pad over it whenever you are wearing your brace.  Okay to shower.  Tylenol as needed.  If you develop blisters up the side of your leg or down your leg below it, contact your doctor or go to urgent care where they can evaluate you for shingles.  If you develop significant redness that extends out from the blister and continues to grow, especially if you develop fevers, you need to be reevaluated.  Do not walk any more than you have to.  Contact your doctor tomorrow for an update and follow-up.      Diagnosis:    ICD-10-CM    1. Friction blister of left lower extremity, initial encounter  S80.822A    2. Neuritis  M79.2     Cutaneous        Discharge Medications:  New Prescriptions    No medications on file        6/19/2022   Maria Esther Almonte MD Powell, Tracy Alan, MD  06/19/22 1158

## 2022-06-20 ENCOUNTER — OFFICE VISIT (OUTPATIENT)
Dept: FAMILY MEDICINE | Facility: CLINIC | Age: 72
End: 2022-06-20

## 2022-06-20 VITALS
RESPIRATION RATE: 16 BRPM | BODY MASS INDEX: 27.46 KG/M2 | OXYGEN SATURATION: 97 % | SYSTOLIC BLOOD PRESSURE: 127 MMHG | HEART RATE: 79 BPM | HEIGHT: 63 IN | WEIGHT: 155 LBS | TEMPERATURE: 97.8 F | DIASTOLIC BLOOD PRESSURE: 72 MMHG

## 2022-06-20 DIAGNOSIS — R73.03 PREDIABETES: Primary | ICD-10-CM

## 2022-06-20 DIAGNOSIS — S80.822D: ICD-10-CM

## 2022-06-20 DIAGNOSIS — E78.5 HYPERLIPIDEMIA LDL GOAL <130: ICD-10-CM

## 2022-06-20 DIAGNOSIS — J45.40 MODERATE PERSISTENT ASTHMA WITHOUT COMPLICATION: ICD-10-CM

## 2022-06-20 PROCEDURE — 99214 OFFICE O/P EST MOD 30 MIN: CPT | Performed by: NURSE PRACTITIONER

## 2022-06-20 PROCEDURE — 36415 COLL VENOUS BLD VENIPUNCTURE: CPT | Performed by: NURSE PRACTITIONER

## 2022-06-20 ASSESSMENT — ASTHMA QUESTIONNAIRES: ACT_TOTALSCORE: 25

## 2022-06-20 NOTE — PATIENT INSTRUCTIONS
Check insurance for Shingrix (shingles) vaccine coverage. Can get at pharmacy like [facility], [facility], etc if desired.    You will get lab results through [employer]

## 2022-06-20 NOTE — PROGRESS NOTES
Problem(s) Oriented visit        SUBJECTIVE:                                                    Macey Romero is a 71 year old female who presents to clinic today for the following health issues :    Torn meniscus of left knee. States she was told she needs total knee replacement. Scared to do this due to complications when she had right knee replacement. Wearing brace on left leg and developed friction blister. Seen in ED for this. Symptoms have improved some. Covering with non adherent dressing and coban.    Hyperlipidemia - not on statin medication.  The 10-year ASCVD risk score (Dividekendall PRITCHARD Jr., et al., 2013) is: 10.9%    Values used to calculate the score:      Age: 71 years      Sex: Female      Is Non- : No      Diabetic: No      Tobacco smoker: No      Systolic Blood Pressure: 127 mmHg      Is BP treated: No      HDL Cholesterol: 43 mg/dL      Total Cholesterol: 206 mg/dL    Asthma - well controlled but frustrated about Symbicort costing more when she picked up last refill.   ACT Total Scores 6/17/2021 12/15/2021 6/20/2022   ACT TOTAL SCORE - - -   ASTHMA ER VISITS - - -   ASTHMA HOSPITALIZATIONS - - -   ACT TOTAL SCORE (Goal Greater than or Equal to 20) 22 23 25   In the past 12 months, how many times did you visit the emergency room for your asthma without being admitted to the hospital? 0 0 0   In the past 12 months, how many times were you hospitalized overnight because of your asthma? 0 0 0     Prediabetes - A1C increasing over the past year. Not on Metformin or other medication.   Lab Results   Component Value Date    A1C 6.1 10/27/2021    A1C 5.9 06/25/2021    A1C CANCELED 06/25/2021    A1C 5.9 10/21/2020    A1C 5.8 11/06/2018     Problem list, Medication list, Allergies, and Medical/Social/Surgical histories reviewed in EPIC and updated as appropriate.   Additional history: as documented    ROS:  5 point ROS completed and negative except noted above, including Gen, CV, Resp, GI,  "MS    OBJECTIVE:                                                    /72   Pulse 79   Temp 97.8  F (36.6  C) (Temporal)   Resp 16   Ht 1.6 m (5' 3\")   Wt 70.3 kg (155 lb)   SpO2 97%   BMI 27.46 kg/m    Body mass index is 27.46 kg/m .   GENERAL: healthy, alert and no distress  RESP: lungs clear to auscultation - no rales, rhonchi or wheezes  CV: regular rates and rhythm, normal S1 S2, no S3 or S4 and no murmur, click or rub  MS: decreased mobility of left knee. Able to ambulate without assistance  SKIN: healing blister left lateral leg near knee  NEURO: sensory exam grossly normal and mentation intact  PSYCH: normal affect & mood     ASSESSMENT/PLAN:                                                      Macey was seen today for diabetes.    Diagnoses and all orders for this visit:    Prediabetes  -     Hemoglobin A1C ([facility])  -     Basic Metabolic Panel (8) ([facility])  -     VENOUS COLLECTION  Checking A1C today. If going up, consider metformin to prevent progression to type 2 diabetes.    Hyperlipidemia LDL goal <130  Declines statin. Lifestyle modifications reviewed to lower cholesterol levels.    Moderate persistent asthma without complication  Well controlled. No changes to current treatment plan. Patient to address cost with insurance to see if another similar inhaler would be cheaper.    Friction blister of leg, left, subsequent encounter  Continue current dressing to protect area. Monitor for signs of infection.       See Patient Instructions  Patient Instructions   Check insurance for Shingrix (shingles) vaccine coverage. Can get at pharmacy like [facility], [facility], etc if desired.    You will get lab results through SHAHRAM Blancas CNP  [facility]  Family Practice  [facility]  [phone]    For any issues my office # is [phone]      "

## 2022-06-21 LAB
BUN SERPL-MCNC: 14 MG/DL (ref 8–27)
BUN/CREATININE RATIO: 14 (ref 12–28)
CALCIUM SERPL-MCNC: 9.5 MG/DL (ref 8.7–10.3)
CHLORIDE SERPLBLD-SCNC: 102 MMOL/L (ref 96–106)
CREAT SERPL-MCNC: 1.02 MG/DL (ref 0.57–1)
EGFR: 59 ML/MIN/1.73
GLUCOSE SERPL-MCNC: 99 MG/DL (ref 65–99)
HBA1C MFR BLD: 6 % (ref 4.8–5.6)
POTASSIUM SERPL-SCNC: 4 MMOL/L (ref 3.5–5.2)
SODIUM SERPL-SCNC: 140 MMOL/L (ref 134–144)
TOTAL CO2: 23 MMOL/L (ref 20–29)

## 2022-06-22 PROBLEM — N18.31 STAGE 3A CHRONIC KIDNEY DISEASE (H): Status: ACTIVE | Noted: 2022-06-22

## 2022-06-30 ENCOUNTER — TRANSFERRED RECORDS (OUTPATIENT)
Dept: FAMILY MEDICINE | Facility: CLINIC | Age: 72
End: 2022-06-30

## 2022-07-15 ENCOUNTER — TRANSFERRED RECORDS (OUTPATIENT)
Dept: FAMILY MEDICINE | Facility: CLINIC | Age: 72
End: 2022-07-15

## 2022-07-19 ENCOUNTER — TRANSFERRED RECORDS (OUTPATIENT)
Dept: FAMILY MEDICINE | Facility: CLINIC | Age: 72
End: 2022-07-19

## 2022-07-21 ENCOUNTER — TRANSFERRED RECORDS (OUTPATIENT)
Dept: FAMILY MEDICINE | Facility: CLINIC | Age: 72
End: 2022-07-21

## 2022-08-01 ENCOUNTER — TRANSFERRED RECORDS (OUTPATIENT)
Dept: FAMILY MEDICINE | Facility: CLINIC | Age: 72
End: 2022-08-01

## 2022-08-12 ENCOUNTER — TRANSFERRED RECORDS (OUTPATIENT)
Dept: FAMILY MEDICINE | Facility: CLINIC | Age: 72
End: 2022-08-12

## 2022-08-25 ENCOUNTER — APPOINTMENT (OUTPATIENT)
Dept: CT IMAGING | Facility: CLINIC | Age: 72
DRG: 392 | End: 2022-08-25
Attending: EMERGENCY MEDICINE
Payer: MEDICARE

## 2022-08-25 ENCOUNTER — HOSPITAL ENCOUNTER (INPATIENT)
Facility: CLINIC | Age: 72
LOS: 2 days | Discharge: HOME | DRG: 392 | End: 2022-08-30
Attending: EMERGENCY MEDICINE | Admitting: INTERNAL MEDICINE
Payer: MEDICARE

## 2022-08-25 DIAGNOSIS — K52.9 COLITIS: ICD-10-CM

## 2022-08-25 DIAGNOSIS — K92.1 HEMATOCHEZIA: ICD-10-CM

## 2022-08-25 DIAGNOSIS — K58.2 IRRITABLE BOWEL SYNDROME WITH BOTH CONSTIPATION AND DIARRHEA: Primary | ICD-10-CM

## 2022-08-25 DIAGNOSIS — K64.9 HEMORRHOIDS, UNSPECIFIED HEMORRHOID TYPE: ICD-10-CM

## 2022-08-25 LAB
ALBUMIN SERPL-MCNC: 3.9 G/DL (ref 3.4–5)
ALP SERPL-CCNC: 81 U/L (ref 40–150)
ALT SERPL W P-5'-P-CCNC: 18 U/L (ref 0–50)
ANION GAP SERPL CALCULATED.3IONS-SCNC: 5 MMOL/L (ref 3–14)
AST SERPL W P-5'-P-CCNC: 17 U/L (ref 0–45)
BASOPHILS # BLD AUTO: 0 10E3/UL (ref 0–0.2)
BASOPHILS NFR BLD AUTO: 1 %
BILIRUB SERPL-MCNC: 0.7 MG/DL (ref 0.2–1.3)
BUN SERPL-MCNC: 14 MG/DL (ref 7–30)
CALCIUM SERPL-MCNC: 9.9 MG/DL (ref 8.5–10.1)
CHLORIDE BLD-SCNC: 101 MMOL/L (ref 94–109)
CO2 SERPL-SCNC: 29 MMOL/L (ref 20–32)
CREAT SERPL-MCNC: 1.1 MG/DL (ref 0.52–1.04)
EOSINOPHIL # BLD AUTO: 0.2 10E3/UL (ref 0–0.7)
EOSINOPHIL NFR BLD AUTO: 3 %
ERYTHROCYTE [DISTWIDTH] IN BLOOD BY AUTOMATED COUNT: 12.4 % (ref 10–15)
GFR SERPL CREATININE-BSD FRML MDRD: 53 ML/MIN/1.73M2
GLUCOSE BLD-MCNC: 150 MG/DL (ref 70–99)
HCO3 BLDV-SCNC: 31 MMOL/L (ref 21–28)
HCT VFR BLD AUTO: 46.1 % (ref 35–47)
HGB BLD-MCNC: 15.3 G/DL (ref 11.7–15.7)
HOLD SPECIMEN: NORMAL
IMM GRANULOCYTES # BLD: 0 10E3/UL
IMM GRANULOCYTES NFR BLD: 0 %
LACTATE BLD-SCNC: 0.7 MMOL/L
LIPASE SERPL-CCNC: 59 U/L (ref 73–393)
LYMPHOCYTES # BLD AUTO: 3.5 10E3/UL (ref 0.8–5.3)
LYMPHOCYTES NFR BLD AUTO: 42 %
MCH RBC QN AUTO: 27.7 PG (ref 26.5–33)
MCHC RBC AUTO-ENTMCNC: 33.2 G/DL (ref 31.5–36.5)
MCV RBC AUTO: 83 FL (ref 78–100)
MONOCYTES # BLD AUTO: 0.7 10E3/UL (ref 0–1.3)
MONOCYTES NFR BLD AUTO: 8 %
NEUTROPHILS # BLD AUTO: 3.9 10E3/UL (ref 1.6–8.3)
NEUTROPHILS NFR BLD AUTO: 46 %
NRBC # BLD AUTO: 0 10E3/UL
NRBC BLD AUTO-RTO: 0 /100
PCO2 BLDV: 52 MM HG (ref 40–50)
PH BLDV: 7.37 [PH] (ref 7.32–7.43)
PLATELET # BLD AUTO: 230 10E3/UL (ref 150–450)
PO2 BLDV: 21 MM HG (ref 25–47)
POTASSIUM BLD-SCNC: 3.7 MMOL/L (ref 3.4–5.3)
PROT SERPL-MCNC: 7.5 G/DL (ref 6.8–8.8)
RBC # BLD AUTO: 5.53 10E6/UL (ref 3.8–5.2)
SAO2 % BLDV: 33 % (ref 94–100)
SARS-COV-2 RNA RESP QL NAA+PROBE: NEGATIVE
SODIUM SERPL-SCNC: 135 MMOL/L (ref 133–144)
WBC # BLD AUTO: 8.4 10E3/UL (ref 4–11)

## 2022-08-25 PROCEDURE — 258N000003 HC RX IP 258 OP 636: Performed by: INTERNAL MEDICINE

## 2022-08-25 PROCEDURE — 250N000013 HC RX MED GY IP 250 OP 250 PS 637: Performed by: INTERNAL MEDICINE

## 2022-08-25 PROCEDURE — 96376 TX/PRO/DX INJ SAME DRUG ADON: CPT

## 2022-08-25 PROCEDURE — C9803 HOPD COVID-19 SPEC COLLECT: HCPCS

## 2022-08-25 PROCEDURE — 99285 EMERGENCY DEPT VISIT HI MDM: CPT | Mod: 25

## 2022-08-25 PROCEDURE — 80053 COMPREHEN METABOLIC PANEL: CPT | Performed by: EMERGENCY MEDICINE

## 2022-08-25 PROCEDURE — 96374 THER/PROPH/DIAG INJ IV PUSH: CPT

## 2022-08-25 PROCEDURE — 250N000009 HC RX 250: Performed by: EMERGENCY MEDICINE

## 2022-08-25 PROCEDURE — G1010 CDSM STANSON: HCPCS

## 2022-08-25 PROCEDURE — 258N000003 HC RX IP 258 OP 636: Performed by: EMERGENCY MEDICINE

## 2022-08-25 PROCEDURE — 82803 BLOOD GASES ANY COMBINATION: CPT

## 2022-08-25 PROCEDURE — 96361 HYDRATE IV INFUSION ADD-ON: CPT

## 2022-08-25 PROCEDURE — 85025 COMPLETE CBC W/AUTO DIFF WBC: CPT | Performed by: EMERGENCY MEDICINE

## 2022-08-25 PROCEDURE — 96375 TX/PRO/DX INJ NEW DRUG ADDON: CPT

## 2022-08-25 PROCEDURE — 250N000011 HC RX IP 250 OP 636

## 2022-08-25 PROCEDURE — 99220 PR INITIAL OBSERVATION CARE,LEVEL III: CPT | Performed by: INTERNAL MEDICINE

## 2022-08-25 PROCEDURE — U0003 INFECTIOUS AGENT DETECTION BY NUCLEIC ACID (DNA OR RNA); SEVERE ACUTE RESPIRATORY SYNDROME CORONAVIRUS 2 (SARS-COV-2) (CORONAVIRUS DISEASE [COVID-19]), AMPLIFIED PROBE TECHNIQUE, MAKING USE OF HIGH THROUGHPUT TECHNOLOGIES AS DESCRIBED BY CMS-2020-01-R: HCPCS | Performed by: EMERGENCY MEDICINE

## 2022-08-25 PROCEDURE — 250N000011 HC RX IP 250 OP 636: Performed by: EMERGENCY MEDICINE

## 2022-08-25 PROCEDURE — 83690 ASSAY OF LIPASE: CPT | Performed by: EMERGENCY MEDICINE

## 2022-08-25 PROCEDURE — 36415 COLL VENOUS BLD VENIPUNCTURE: CPT | Performed by: EMERGENCY MEDICINE

## 2022-08-25 PROCEDURE — G0378 HOSPITAL OBSERVATION PER HR: HCPCS

## 2022-08-25 PROCEDURE — 74177 CT ABD & PELVIS W/CONTRAST: CPT | Mod: MG

## 2022-08-25 PROCEDURE — 250N000013 HC RX MED GY IP 250 OP 250 PS 637: Performed by: EMERGENCY MEDICINE

## 2022-08-25 RX ORDER — ONDANSETRON 2 MG/ML
4 INJECTION INTRAMUSCULAR; INTRAVENOUS ONCE
Status: DISCONTINUED | OUTPATIENT
Start: 2022-08-25 | End: 2022-08-25

## 2022-08-25 RX ORDER — AMOXICILLIN 250 MG
2 CAPSULE ORAL 2 TIMES DAILY
Status: DISCONTINUED | OUTPATIENT
Start: 2022-08-25 | End: 2022-08-30 | Stop reason: HOSPADM

## 2022-08-25 RX ORDER — NALOXONE HYDROCHLORIDE 0.4 MG/ML
0.4 INJECTION, SOLUTION INTRAMUSCULAR; INTRAVENOUS; SUBCUTANEOUS
Status: DISCONTINUED | OUTPATIENT
Start: 2022-08-25 | End: 2022-08-30 | Stop reason: HOSPADM

## 2022-08-25 RX ORDER — ONDANSETRON 4 MG/1
4 TABLET, ORALLY DISINTEGRATING ORAL EVERY 6 HOURS PRN
Status: DISCONTINUED | OUTPATIENT
Start: 2022-08-26 | End: 2022-08-30 | Stop reason: HOSPADM

## 2022-08-25 RX ORDER — HYDROCORTISONE 2.5 %
CREAM (GRAM) TOPICAL DAILY PRN
COMMUNITY
End: 2024-02-02

## 2022-08-25 RX ORDER — HYDROMORPHONE HYDROCHLORIDE 1 MG/ML
0.5 INJECTION, SOLUTION INTRAMUSCULAR; INTRAVENOUS; SUBCUTANEOUS
Status: DISCONTINUED | OUTPATIENT
Start: 2022-08-25 | End: 2022-08-25

## 2022-08-25 RX ORDER — ONDANSETRON 2 MG/ML
4 INJECTION INTRAMUSCULAR; INTRAVENOUS EVERY 6 HOURS PRN
Status: DISCONTINUED | OUTPATIENT
Start: 2022-08-26 | End: 2022-08-30 | Stop reason: HOSPADM

## 2022-08-25 RX ORDER — MORPHINE SULFATE 4 MG/ML
4 INJECTION, SOLUTION INTRAMUSCULAR; INTRAVENOUS ONCE
Status: COMPLETED | OUTPATIENT
Start: 2022-08-25 | End: 2022-08-25

## 2022-08-25 RX ORDER — IOPAMIDOL 755 MG/ML
78 INJECTION, SOLUTION INTRAVASCULAR ONCE
Status: COMPLETED | OUTPATIENT
Start: 2022-08-25 | End: 2022-08-25

## 2022-08-25 RX ORDER — ONDANSETRON 2 MG/ML
4 INJECTION INTRAMUSCULAR; INTRAVENOUS ONCE
Status: COMPLETED | OUTPATIENT
Start: 2022-08-25 | End: 2022-08-25

## 2022-08-25 RX ORDER — OXYCODONE HYDROCHLORIDE 5 MG/1
5 TABLET ORAL ONCE
Status: COMPLETED | OUTPATIENT
Start: 2022-08-25 | End: 2022-08-25

## 2022-08-25 RX ORDER — ONDANSETRON 2 MG/ML
4 INJECTION INTRAMUSCULAR; INTRAVENOUS
Status: DISCONTINUED | OUTPATIENT
Start: 2022-08-25 | End: 2022-08-25

## 2022-08-25 RX ORDER — MULTIVIT-MIN/IRON/FOLIC ACID/K 18-600-40
1 CAPSULE ORAL EVERY MORNING
COMMUNITY
End: 2023-06-20

## 2022-08-25 RX ORDER — ONDANSETRON 2 MG/ML
INJECTION INTRAMUSCULAR; INTRAVENOUS
Status: COMPLETED
Start: 2022-08-25 | End: 2022-08-25

## 2022-08-25 RX ORDER — ACETAMINOPHEN 650 MG/1
650 SUPPOSITORY RECTAL EVERY 6 HOURS PRN
Status: DISCONTINUED | OUTPATIENT
Start: 2022-08-25 | End: 2022-08-30 | Stop reason: HOSPADM

## 2022-08-25 RX ORDER — BISACODYL 10 MG
10 SUPPOSITORY, RECTAL RECTAL DAILY PRN
Status: DISCONTINUED | OUTPATIENT
Start: 2022-08-25 | End: 2022-08-30 | Stop reason: HOSPADM

## 2022-08-25 RX ORDER — AMOXICILLIN 250 MG
1 CAPSULE ORAL 2 TIMES DAILY
Status: DISCONTINUED | OUTPATIENT
Start: 2022-08-25 | End: 2022-08-30 | Stop reason: HOSPADM

## 2022-08-25 RX ORDER — NALOXONE HYDROCHLORIDE 0.4 MG/ML
0.2 INJECTION, SOLUTION INTRAMUSCULAR; INTRAVENOUS; SUBCUTANEOUS
Status: DISCONTINUED | OUTPATIENT
Start: 2022-08-25 | End: 2022-08-30 | Stop reason: HOSPADM

## 2022-08-25 RX ORDER — ACETAMINOPHEN 325 MG/1
650 TABLET ORAL EVERY 6 HOURS PRN
Status: DISCONTINUED | OUTPATIENT
Start: 2022-08-25 | End: 2022-08-30 | Stop reason: HOSPADM

## 2022-08-25 RX ORDER — POLYETHYLENE GLYCOL 3350 17 G/17G
17 POWDER, FOR SOLUTION ORAL DAILY
Status: DISCONTINUED | OUTPATIENT
Start: 2022-08-26 | End: 2022-08-26

## 2022-08-25 RX ORDER — SODIUM CHLORIDE 9 MG/ML
INJECTION, SOLUTION INTRAVENOUS CONTINUOUS
Status: DISCONTINUED | OUTPATIENT
Start: 2022-08-25 | End: 2022-08-26

## 2022-08-25 RX ADMIN — MORPHINE SULFATE 4 MG: 4 INJECTION, SOLUTION INTRAMUSCULAR; INTRAVENOUS at 17:45

## 2022-08-25 RX ADMIN — ONDANSETRON 4 MG: 2 INJECTION INTRAMUSCULAR; INTRAVENOUS at 18:52

## 2022-08-25 RX ADMIN — SODIUM CHLORIDE 1000 ML: 9 INJECTION, SOLUTION INTRAVENOUS at 17:42

## 2022-08-25 RX ADMIN — OXYCODONE HYDROCHLORIDE 5 MG: 5 TABLET ORAL at 19:44

## 2022-08-25 RX ADMIN — SENNOSIDES AND DOCUSATE SODIUM 2 TABLET: 50; 8.6 TABLET ORAL at 22:27

## 2022-08-25 RX ADMIN — SODIUM CHLORIDE 62 ML: 9 INJECTION, SOLUTION INTRAVENOUS at 17:07

## 2022-08-25 RX ADMIN — ACETAMINOPHEN 650 MG: 325 TABLET ORAL at 23:01

## 2022-08-25 RX ADMIN — IOPAMIDOL 78 ML: 755 INJECTION, SOLUTION INTRAVENOUS at 17:07

## 2022-08-25 RX ADMIN — SODIUM CHLORIDE: 9 INJECTION, SOLUTION INTRAVENOUS at 22:22

## 2022-08-25 RX ADMIN — ONDANSETRON 4 MG: 2 INJECTION INTRAMUSCULAR; INTRAVENOUS at 17:45

## 2022-08-25 ASSESSMENT — ACTIVITIES OF DAILY LIVING (ADL)
ADLS_ACUITY_SCORE: 35
ADLS_ACUITY_SCORE: 37

## 2022-08-25 ASSESSMENT — ENCOUNTER SYMPTOMS
SHORTNESS OF BREATH: 0
ABDOMINAL DISTENTION: 1
NAUSEA: 1
ANAL BLEEDING: 1
CHILLS: 0
FEVER: 0
VOMITING: 0
ABDOMINAL PAIN: 1

## 2022-08-25 NOTE — ED PROVIDER NOTES
History   Chief Complaint:  Abdominal Pain       The history is provided by the patient.      Macey Romero is a 71 year old female with history of bowel obstructions and hemorrhoids who presents with abdominal pain and rectal bleeding. Patient reports she had the sudden onset of abdominal pain yesterday accompanied by the urge to stool. States she sat down on the toilet this morning, and noticed bright red blood in the toiled with a mucus-like presence. States the abdominal pain is intermittent and reports her rectal pain feels like previous hemorrhoids. Describes the abdominal pain as contractions in her lower left quadrant, which are intermittent but worse today. Endorses nausea and slight abdominal distention. Denies vomiting, fever, chills, urinary issues or vaginal bleeding. No shortness of breath or chest pain. Remarks having heart burn last night and woke up this morning with food in her mouth. Patient has had a total hysterectomy, mastectomy (bilateral), appendectomy and cholecystectomy.     Review of Systems   Constitutional: Negative for chills and fever.   Respiratory: Negative for shortness of breath.    Cardiovascular: Negative for chest pain.   Gastrointestinal: Positive for abdominal distention, abdominal pain, anal bleeding and nausea. Negative for vomiting.   Genitourinary: Negative.  Negative for vaginal bleeding.   All other systems reviewed and are negative.    Allergies:  Levaquin  Pcn [Penicillins]  Tetanus Toxoids  Erythromycin  Silicone    Medications:  albuterol (PROAIR HFA/PROVENTIL HFA/VENTOLIN HFA) 108 (90 Base) MCG/ACT inhaler  albuterol (PROVENTIL) (2.5 MG/3ML) 0.083% neb solution  budesonide-formoterol (SYMBICORT) 160-4.5 MCG/ACT Inhaler  erythromycin with ethanol (EMGEL) 2 % gel  ketorolac (ACULAR) 0.5 % ophthalmic solution  metroNIDAZOLE (METROCREAM) 0.75 % external cream    Past Medical History:     Osteoarthritis   Osteopenia   Breast cancer   IBS   Keloid of skin  Fibromyalgia    Hyperlipidemia   Stage 3 CKD      Past Surgical History:    Anterior C disc fusion  Blepharoplasty  Cholecystectomy  Colonoscopy   Dilation and curettage   Endoscopy   Hysterectomy   Joint replacement knee   Mammoplasty augmentation  Mastectomy   Mohs micrographic procedure   Open reduction internal procedure (x2)  Repair ankle ligament   Tonsillectomy      Social History:  The patient presents to the ED alone  PCP: Deandra Strong     Physical Exam     Patient Vitals for the past 24 hrs:   BP Temp Temp src Pulse Resp SpO2 Weight   08/25/22 1915 -- -- -- 76 11 95 % --   08/25/22 1900 -- -- -- 77 18 98 % --   08/25/22 1830 -- -- -- 94 20 97 % --   08/25/22 1800 (!) 140/66 -- -- 82 8 96 % --   08/25/22 1437 (!) 156/72 97.1  F (36.2  C) Temporal 102 22 97 % 70.3 kg (155 lb)       Physical Exam  SKIN:  Warm, dry.  No jaundice.  HEMATOLOGIC/IMMUNOLOGIC/LYMPHATIC:  No pallor.  EYES:  Conjunctivae normal.  Anicteric.  CARDIOVASCULAR: Tachycardic rate with regular rhythm.  No murmur.  RESPIRATORY:  No respiratory distress, breath sounds equal and normal.  GASTROINTESTINAL:  Soft tender abdomen at the left lower quadrant with active bowel sounds.  No distention.  No palpable mass.  MUSCULOSKELETAL: Normal body habitus.  NEUROLOGIC:  Alert, conversant.  PSYCHIATRIC:  Normal mood, appears to be in pain.    Emergency Department Course     Imaging:  CT Abdomen Pelvis w Contrast   Final Result   IMPRESSION:    1.  Questionable mild sigmoid colitis.   2.  Findings suggestive of constipation.   3.  No additional visualized explanation for patient's symptoms.        Report per radiology    Laboratory:  Labs Ordered and Resulted from Time of ED Arrival to Time of ED Departure   COMPREHENSIVE METABOLIC PANEL - Abnormal       Result Value    Sodium 135      Potassium 3.7      Chloride 101      Carbon Dioxide (CO2) 29      Anion Gap 5      Urea Nitrogen 14      Creatinine 1.10 (*)     Calcium 9.9      Glucose 150 (*)     Alkaline  Phosphatase 81      AST 17      ALT 18      Protein Total 7.5      Albumin 3.9      Bilirubin Total 0.7      GFR Estimate 53 (*)    LIPASE - Abnormal    Lipase 59 (*)    CBC WITH PLATELETS AND DIFFERENTIAL - Abnormal    WBC Count 8.4      RBC Count 5.53 (*)     Hemoglobin 15.3      Hematocrit 46.1      MCV 83      MCH 27.7      MCHC 33.2      RDW 12.4      Platelet Count 230      % Neutrophils 46      % Lymphocytes 42      % Monocytes 8      % Eosinophils 3      % Basophils 1      % Immature Granulocytes 0      NRBCs per 100 WBC 0      Absolute Neutrophils 3.9      Absolute Lymphocytes 3.5      Absolute Monocytes 0.7      Absolute Eosinophils 0.2      Absolute Basophils 0.0      Absolute Immature Granulocytes 0.0      Absolute NRBCs 0.0     ISTAT GASES LACTATE VENOUS POCT - Abnormal    Lactic Acid POCT 0.7      Bicarbonate Venous POCT 31 (*)     O2 Sat, Venous POCT 33 (*)     pCO2V Venous POCT 52 (*)     pH Venous POCT 7.37      pO2 Venous POCT 21 (*)    COVID-19 VIRUS (CORONAVIRUS) BY PCR      Emergency Department Course:    Reviewed:  I reviewed nursing notes, vitals, past medical history and Care Everywhere    Assessments/Consults:  ED Course as of 08/25/22 2036   Thu Aug 25, 2022   1737 I obtained history and examined the patient.    1820 I rechecked the patient and explained findings.    2008 I rechecked the patient and explained findings.    2019 I spoke to Dr. Gunderson, hospitalist, who accepts the patient for admission.        Interventions:  Medications   ondansetron (ZOFRAN) injection 4 mg ( Intravenous Canceled Entry 8/25/22 1859)   0.9% sodium chloride BOLUS (1,000 mLs Intravenous New Bag 8/25/22 1742)   Saline (62 mLs As instructed Given 8/25/22 1707)   iopamidol (ISOVUE-370) solution 78 mL (78 mLs Intravenous Given 8/25/22 1707)   morphine (PF) injection 4 mg (4 mg Intravenous Given 8/25/22 1745)   ondansetron (ZOFRAN) injection 4 mg (4 mg Intravenous Given 8/25/22 1745)   oxyCODONE (ROXICODONE)  tablet 5 mg (5 mg Oral Given 8/25/22 1944)     Disposition:  The patient was admitted to the hospital under the care of Dr. Gunderson.     Impression & Plan     CMS Diagnoses: None    Medical Decision Making:  This patient suffers abdominal pain favoring the left lower quadrant with associated vomiting and hematochezia and hemorrhoids.  Thorough evaluation undertaken as above.  Colitis noted on CT scan which would correlate with the patient's symptoms.  Lactic acid negative so I doubt mesenteric ischemia.  The patient did improve to some degree with our interventions but was still feeling fairly symptomatic and uncomfortable going home.  So she will be admitted for further care and testing as needed.    Diagnosis:    ICD-10-CM    1. Colitis  K52.9    2. Hemorrhoids, unspecified hemorrhoid type  K64.9    3. Hematochezia  K92.1        Scribe Disclosure:  I, PATSY LEE, am serving as a scribe at 5:12 PM on 8/25/2022 to document services personally performed by Geoffrey Graham MD based on my observations and the provider's statements to me.          Geoffrey Graham MD  08/25/22 3261

## 2022-08-25 NOTE — ED NOTES
Rapid Assessment Note    History:   The patient reports having a sudden onset of severe abdominal pain last night. She denies any emesis and has not had any PO intake since her pain started until just prior to presentation. She also notes having a recent bowel movement in which there was only a cluster of hemorrhoids and no stool. The patient has had 2 bowel obstructions in the past and has had a cholecystectomy, appendectomy, and 4 c-sections.    Exam:   General: appears uncomfortable  HEENT: wearing mask  CV: appears well perfused  Resp: normal effort, speaks in full phrases  MSK: ambulatory  Neuro: clear speech, oriented  Psych: cooperative      Plan of Care:   I evaluated the patient and developed an initial plan of care. I discussed this plan and explained that I, or one of my partners, would be returning to complete the evaluation.     I, Elma Dickey, am serving as a scribe to document services personally performed by TIMO Lo MD, based on my observations and the provider's statements to me.    11/5/2018  [employer]    Portions of this medical record were completed by a scribe. UPON MY REVIEW AND AUTHENTICATION BY ELECTRONIC SIGNATURE, this confirms (a) I performed the applicable clinical services, and (b) the record is accurate.      Ke Lo MD  08/25/22 1748

## 2022-08-25 NOTE — ED TRIAGE NOTES
Patient comes in with severe abdominal pain and vomiting that started yesterday.  Unable to have BM either. States this feels like previous SBO.        Triage Assessment     Row Name 08/25/22 4014       Triage Assessment (Adult)    Airway WDL WDL       Respiratory WDL    Respiratory WDL WDL       Skin Circulation/Temperature WDL    Skin Circulation/Temperature WDL WDL       Cardiac WDL    Cardiac WDL WDL       Peripheral/Neurovascular WDL    Peripheral Neurovascular WDL WDL       Cognitive/Neuro/Behavioral WDL    Cognitive/Neuro/Behavioral WDL WDL

## 2022-08-26 PROCEDURE — 96361 HYDRATE IV INFUSION ADD-ON: CPT

## 2022-08-26 PROCEDURE — 250N000013 HC RX MED GY IP 250 OP 250 PS 637: Performed by: INTERNAL MEDICINE

## 2022-08-26 PROCEDURE — G0378 HOSPITAL OBSERVATION PER HR: HCPCS

## 2022-08-26 PROCEDURE — 99226 PR SUBSEQUENT OBSERVATION CARE,LEVEL III: CPT | Performed by: INTERNAL MEDICINE

## 2022-08-26 PROCEDURE — 250N000011 HC RX IP 250 OP 636: Performed by: INTERNAL MEDICINE

## 2022-08-26 PROCEDURE — 250N000013 HC RX MED GY IP 250 OP 250 PS 637: Performed by: PHYSICIAN ASSISTANT

## 2022-08-26 RX ORDER — HYOSCYAMINE SULFATE 0.125 MG
125 TABLET ORAL EVERY 4 HOURS PRN
Status: DISCONTINUED | OUTPATIENT
Start: 2022-08-26 | End: 2022-08-30 | Stop reason: HOSPADM

## 2022-08-26 RX ORDER — POLYETHYLENE GLYCOL 3350 17 G/17G
238 POWDER, FOR SOLUTION ORAL ONCE
Status: COMPLETED | OUTPATIENT
Start: 2022-08-26 | End: 2022-08-26

## 2022-08-26 RX ORDER — SODIUM CHLORIDE AND POTASSIUM CHLORIDE 150; 450 MG/100ML; MG/100ML
INJECTION, SOLUTION INTRAVENOUS CONTINUOUS
Status: DISCONTINUED | OUTPATIENT
Start: 2022-08-27 | End: 2022-08-27

## 2022-08-26 RX ORDER — PANTOPRAZOLE SODIUM 40 MG/1
40 TABLET, DELAYED RELEASE ORAL
Status: DISCONTINUED | OUTPATIENT
Start: 2022-08-27 | End: 2022-08-27

## 2022-08-26 RX ORDER — ONDANSETRON 2 MG/ML
4 INJECTION INTRAMUSCULAR; INTRAVENOUS
Status: CANCELLED | OUTPATIENT
Start: 2022-08-26

## 2022-08-26 RX ORDER — LIDOCAINE 40 MG/G
CREAM TOPICAL
Status: CANCELLED | OUTPATIENT
Start: 2022-08-26

## 2022-08-26 RX ORDER — POLYETHYLENE GLYCOL 3350 17 G/17G
17 POWDER, FOR SOLUTION ORAL 2 TIMES DAILY
Status: DISCONTINUED | OUTPATIENT
Start: 2022-08-26 | End: 2022-08-30 | Stop reason: HOSPADM

## 2022-08-26 RX ORDER — DICYCLOMINE HYDROCHLORIDE 10 MG/1
10 CAPSULE ORAL 4 TIMES DAILY
Status: DISCONTINUED | OUTPATIENT
Start: 2022-08-26 | End: 2022-08-28

## 2022-08-26 RX ORDER — BISACODYL 5 MG
10 TABLET, DELAYED RELEASE (ENTERIC COATED) ORAL ONCE
Status: COMPLETED | OUTPATIENT
Start: 2022-08-26 | End: 2022-08-26

## 2022-08-26 RX ADMIN — HYOSCYAMINE SULFATE 125 MCG: 0.12 TABLET ORAL at 02:36

## 2022-08-26 RX ADMIN — ONDANSETRON 4 MG: 4 TABLET, ORALLY DISINTEGRATING ORAL at 18:15

## 2022-08-26 RX ADMIN — POLYETHYLENE GLYCOL 3350 238 G: 17 POWDER, FOR SOLUTION ORAL at 16:09

## 2022-08-26 RX ADMIN — HYOSCYAMINE SULFATE 125 MCG: 0.12 TABLET ORAL at 12:27

## 2022-08-26 RX ADMIN — LINACLOTIDE 290 MCG: 290 CAPSULE, GELATIN COATED ORAL at 12:16

## 2022-08-26 RX ADMIN — OXYCODONE HYDROCHLORIDE 2.5 MG: 5 TABLET ORAL at 01:49

## 2022-08-26 RX ADMIN — DICYCLOMINE HYDROCHLORIDE 10 MG: 10 CAPSULE ORAL at 19:45

## 2022-08-26 RX ADMIN — OXYCODONE HYDROCHLORIDE 2.5 MG: 5 TABLET ORAL at 12:28

## 2022-08-26 RX ADMIN — DICYCLOMINE HYDROCHLORIDE 10 MG: 10 CAPSULE ORAL at 15:54

## 2022-08-26 RX ADMIN — ONDANSETRON 4 MG: 2 INJECTION INTRAMUSCULAR; INTRAVENOUS at 01:55

## 2022-08-26 RX ADMIN — ACETAMINOPHEN 650 MG: 325 TABLET ORAL at 10:39

## 2022-08-26 RX ADMIN — BISACODYL 10 MG: 5 TABLET, COATED ORAL at 12:16

## 2022-08-26 RX ADMIN — DICYCLOMINE HYDROCHLORIDE 10 MG: 10 CAPSULE ORAL at 12:16

## 2022-08-26 RX ADMIN — SENNOSIDES AND DOCUSATE SODIUM 2 TABLET: 50; 8.6 TABLET ORAL at 09:12

## 2022-08-26 RX ADMIN — POTASSIUM CHLORIDE AND SODIUM CHLORIDE: 450; 150 INJECTION, SOLUTION INTRAVENOUS at 23:47

## 2022-08-26 RX ADMIN — OXYCODONE HYDROCHLORIDE 2.5 MG: 5 TABLET ORAL at 18:15

## 2022-08-26 RX ADMIN — SENNOSIDES AND DOCUSATE SODIUM 1 TABLET: 50; 8.6 TABLET ORAL at 19:44

## 2022-08-26 ASSESSMENT — ACTIVITIES OF DAILY LIVING (ADL)
ADLS_ACUITY_SCORE: 43
ADLS_ACUITY_SCORE: 37
ADLS_ACUITY_SCORE: 43
ADLS_ACUITY_SCORE: 37
ADLS_ACUITY_SCORE: 43

## 2022-08-26 NOTE — PROGRESS NOTES
PRIOR TO DISCHARGE        Comments: -diagnostic tests and consults completed and resulted not met  -vital signs normal or at patient baseline met  -tolerating oral intake to maintain hydration partially met   Nurse to notify provider when observation goals have been met and patient is ready for discharge.

## 2022-08-26 NOTE — PHARMACY-ADMISSION MEDICATION HISTORY
Pharmacy Medication History  Admission medication history interview status for the 8/25/2022  admission is complete. See EPIC admission navigator for prior to admission medications     Location of Interview: Patient room  Medication history sources: Patient and Surescripts    Significant changes made to the medication list:  Added: hydrocortisone cream, vitamins  Not using: albuterol nebs, erythromycin gel    In the past week, patient estimated taking medication this percent of the time: greater than 90%    Additional medication history information:   Patient has only been inhaling 1 puff daily of symbicort due to cost.     Medication reconciliation completed by provider prior to medication history? No    Time spent in this activity: 15 minutes    Prior to Admission medications    Medication Sig Last Dose Taking? Auth Provider Long Term End Date   albuterol (PROAIR HFA/PROVENTIL HFA/VENTOLIN HFA) 108 (90 Base) MCG/ACT inhaler Inhale 2 puffs into the lungs every 6 hours  Yes Deandra Strong, APRN CNP Yes    CALCIUM PO Take 1 tablet by mouth daily Past Month at Unknown time Yes Unknown, Entered By History     CYANOCOBALAMIN PO Take by mouth daily Past Month at Unknown time Yes Unknown, Entered By History     hydrocortisone 2.5 % cream Apply topically every morning Apply to face 8/25/2022 at Unknown time Yes Unknown, Entered By History     metroNIDAZOLE (METROCREAM) 0.75 % external cream Apply topically every evening 8/24/2022 at Unknown time Yes Reported, Patient     Vitamin D, Cholecalciferol, 25 MCG (1000 UT) TABS Take 2 tablets by mouth daily Past Month at Unknown time Yes Unknown, Entered By History     albuterol (PROVENTIL) (2.5 MG/3ML) 0.083% neb solution Take 1 vial by nebulization every 4 hours as needed for shortness of breath/dyspnea or wheezing  Patient not taking: Reported on 6/20/2022   Long Ferrari MD Yes    budesonide-formoterol (SYMBICORT) 160-4.5 MCG/ACT Inhaler Inhale 2 puffs into the  lungs daily  Patient taking differently: Inhale 1 puff into the lungs daily 8/23/2022  Deandra Strong APRN CNP Yes    erythromycin with ethanol (EMGEL) 2 % gel Apply topically daily   Long Ferrari MD         The information provided in this note is only as accurate as the sources available at the time of update(s)

## 2022-08-26 NOTE — PLAN OF CARE
Summary:   admitted dt abdominal pain, constipation   Care Plan Summary Note: Workups/diagnostics; consults, medication mgt   Orientation: A/Ox4   Observation Goals (met & not met): in progress   Activity Level: IND   Fall Risk: Yes, at times when Oxycodone is taken   Behavior & Aggression Tool Color: Green   Pain Management: Tylenol, Oxycodone, Levsin, pt avoids NSAIDS   ABNL VS/O2: VSS on RA except pain   ABNL Lab/BG: Creatinine: 1.10 GFR 53   Diet: regular   MSK: pt has L medial meniscal tear, Left foot tendon tear, adv for sx in September, Knee brace and Ortho boots at bedside  Bowel/Bladder: continent of urine, Bmx4-small p Soap suds enema, pt c/o episodic cramping and frequent passage of gas and feeling of pressure at rectum, adv of soreness at bottom-adv on proper perineal care, peribottle used    Drains/Devices: PIV R AC, NS infusing at 75cc/hr   Tests/Procedures for next shift: GI consult, bowel regimen,   Anticipated DC date: 1-2 days given w/ improved bowel status       Goal Outcome Evaluation:     Observation goals  PRIOR TO DISCHARGE        Comments:   -diagnostic tests and consults completed and resulted :unmet, pending  consults   -vital signs normal or at patient baseline :partially met, VSS on RA, except pain   -tolerating oral intake to maintain hydration :met  Nurse to notify provider when observation goals have been met and patient is ready for discharge.

## 2022-08-26 NOTE — PROGRESS NOTES
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  She was admitted on 8/25/2022     Problem List:  1.  Severe constipation       H/o irritable bowel syndrome    GI consult requested and is pending  She passed some gas initially with soaps suds enema earlier in her stay - but has passed  Min gas since that time  No stools since admission  She continues on miralax daily - may need to increase dose and/or consider partial bowel prep to relieve stool burden  Will await GI recs, as well.    2.  LLQ abd pain       Sigmoid colitis    CT of the abd with evidence of sigmoid colitis.   Clear liquids only until seen by GI today  No leukocytosis or fever - no abx, for now    3.  GERD  On daily PPI    4.  H/o hemorrhoids  Suspect rectal bleeding PTA was from hemorrhoids and attempt to have BM    5.  H/o glucose intolerance    Last hgbA1c noted to be 6.0 (6/2022)            Diet: Regular Diet Adult    DVT Prophylaxis: Pneumatic Compression Devices  Boudreaux Catheter: Not present  Code Status: Full Code      Disposition Plan   Awaiting GI rounds and recs  Clear liquids, for now    Entered: Nadine Agosto DO 08/26/2022, 7:26 AM     The patient's care was discussed with the Patient.    We are operating under sub optimal conditions in the setting of a world wide pandemic, hospitals are running at full capacity with limited bed availability. We are providing the best possible patient care with limited resources.    Interval History     Still having a lot of rectal pain/pressure if sits upright.  Also having more localized pain in the LLQ of the abd.   No flatus or BM this am.  Had a lot of flatus yesterday after enema given.  Min po intake today as unsure if GI procedure will be needed.  No CP, SOB, HA.  Mouth is very dry.      -Data reviewed today: I reviewed all new labs and imaging results over the last 24 hours. I personally reviewed no images or EKG's today.    Physical Exam   Temp: 97.8  F (36.6  C) Temp src: Oral BP: 106/52 Pulse: 62   Resp: 18 SpO2: 95 % O2 Device: None (Room air)    Vitals:    08/25/22 1437 08/25/22 2210   Weight: 70.3 kg (155 lb) 72.7 kg (160 lb 4.8 oz)     Vital Signs with Ranges  Temp:  [97.1  F (36.2  C)-98.3  F (36.8  C)] 97.8  F (36.6  C)  Pulse:  [] 62  Resp:  [8-22] 18  BP: (106-156)/(52-72) 106/52  SpO2:  [91 %-98 %] 95 %  No intake/output data recorded.    GEN:  Alert, oriented x 3, appears fairly comfortable at rest in the bed, no overt respiratory distress.  HEENT:  Normocephalic/atraumatic, no scleral icterus, no nasal discharge, mouth and membranes appear slightly dry  NECK:  No clear thyromegaly or JVD  CV:  Regular rate and rhythm, no loud murmur to ausc.  S1 + S2 noted, no S3 or S4.  LUNGS:  Clear to auscultation ant/lat bilaterally.  No rales/rhonchi/wheezing auscultated bilaterally.  No costal retractions bilaterally.  Symmetric chest rise on inhalation noted.  ABD:  slower bowel sounds to ausc, no tinkling noted, abd is still soft to palpation.  Mild reproducible tenderness to the LLQ without clear R/G/R noted.  Mild distension noted throughout. No masses palpated.  No obvious HSM to exam.  EXT:  No pretibial edema or cyanosis bilaterally. No joint synovitis noted.  No calf-tenderness or asymmetry noted.  SKIN:  Dry to touch, no rashes or jaundice noted.  PSYCH:  Mood appropriate, Not tearful or depressed.  Cooperative.  Maintains direct eye contact.  NEURO:  No tremors at rest, speech is clear and appropriate. CN 2-12 intact to gross testing bilaterally    Data   Labs:  Recent Labs   Lab 08/25/22  1456       POTASSIUM 3.7   CHLORIDE 101   CO2 29   ANIONGAP 5   *   BUN 14   CR 1.10*   GFRESTIMATED 53*   DONNA 9.9     Recent Labs   Lab 08/25/22  1456   WBC 8.4   HGB 15.3   HCT 46.1   MCV 83        Recent Labs   Lab 08/25/22  1456   HGB 15.3      Recent Imaging:   Recent Results (from the past 24 hour(s))   CT Abdomen Pelvis w Contrast    Narrative    EXAM: CT ABDOMEN PELVIS W CONTRAST  LOCATION: [facility]  DATE/TIME: 8/25/2022 5:17 PM    INDICATION: acute abd pain, vomiting, prior appy chantell, feels like prior SBO, also rectal prolapse hemorrhoids bleeding?  COMPARISON: CT 10/24/2017  TECHNIQUE: CT scan of the abdomen and pelvis was performed following injection of IV contrast. Multiplanar reformats were obtained. Dose reduction techniques were used.  CONTRAST: 78mL Isovue 370        FINDINGS:   LOWER CHEST: Small hiatal hernia. Otherwise unremarkable.    HEPATOBILIARY: Prior cholecystectomy.    PANCREAS: Normal.    SPLEEN: Normal.    ADRENAL GLANDS: Normal.    KIDNEYS/BLADDER: No hydronephrosis. Urinary bladder is unremarkable.    BOWEL: Large amount of formed stool throughout the colon. Questionable mild sigmoid colonic wall thickening with some trace adjacent fat stranding and fluid. No evidence of bowel obstruction. Prior appendectomy.    LYMPH NODES: Normal.    VASCULATURE: Scattered calcified atherosclerosis.    PELVIC ORGANS: Hysterectomy.    MUSCULOSKELETAL: No acute bony abnormality.      Impression    IMPRESSION:   1.  Questionable mild sigmoid colitis.  2.  Findings suggestive of constipation.  3.  No additional visualized explanation for patient's symptoms.       Medications     sodium chloride 75 mL/hr at 08/25/22 2222       polyethylene glycol  17 g Oral Daily     senna-docusate  1 tablet Oral BID    Or     senna-docusate  2 tablet Oral BID

## 2022-08-26 NOTE — CONSULTS
[facility]  Gastroenterology Consultation         Macey Romero  [address] [id number]  71 year old female    Admission Date/Time: 8/25/2022  Primary Care Provider: Deandra Strong  Referring / Attending Physician: Dr. Sam Gunderson    We were asked to see the patient in consultation by Dr. Sam Gunderson for evaluation of sigmoid colitis, severe constipation and history of IBS-C .      CC: abdominal pain    HPI:  Macey Romero is a 71 year old female who has a past medical history of constipating, IBS-C, asthma, CKD stage III, whom presented with c/o abdominal pain and rectal bleeding. Pain started 2 days ago and had bright red blood in toilet and on toilet paper. She had had intermittent abdominal pain, feels significantly constipated and unable to have a bowel movement. She denies regular use of a laxative. She has nausea without vomiting. No fever of chills. She has had IBS with constipation for over 50 years has small pebble like stools that and had had no improvement with prior otc laxatives.     She has no prior similar symptoms. Underwent CT in ED and noted possible sigmoid colitis and severe constipation.  Hemoglobin stable at 15.3.  Last colonoscopy was in 2017 which showed internal hemorrhoids and diverticulosis.    In additaion complains of heartburn, belching with eating and drinking. Has no prior history of EGD. Has tried Tums with no improvement in past    ROS: A comprehensive ten point review of systems was negative aside from those in mentioned in the HPI.      PAST MED HX:  I have reviewed this patient's medical history and updated it with pertinent information if needed.   Past Medical History:   Diagnosis Date     Breast CA in situ 1987     Cancer (H) 1987    Right Breast treated with surgery     Chronic constipation      Fibromyalgia      Malignant neoplasm of female breast, unspecified estrogen receptor status, unspecified laterality,  unspecified site of breast (H) 6/17/2021     Meningitis     Several times as an adult     Osteoarthritis 2/1/2011     Osteopenia 2/1/2011     Pneumonia      Pterygium eye 8/26/2013     Reactive airway disease 2/1/2011     Seasonal allergies      Shingles     Prior to 2008 - scalp     Skin cancer     SCC - hand, left nose       MEDICATIONS:   Prior to Admission Medications   Prescriptions Last Dose Informant Patient Reported? Taking?   CALCIUM PO Past Month at Unknown time  Yes Yes   Sig: Take 1 tablet by mouth daily   CYANOCOBALAMIN PO Past Month at Unknown time  Yes Yes   Sig: Take by mouth daily   Vitamin D, Cholecalciferol, 25 MCG (1000 UT) TABS Past Month at Unknown time  Yes Yes   Sig: Take 2 tablets by mouth daily   albuterol (PROAIR HFA/PROVENTIL HFA/VENTOLIN HFA) 108 (90 Base) MCG/ACT inhaler   No Yes   Sig: Inhale 2 puffs into the lungs every 6 hours   albuterol (PROVENTIL) (2.5 MG/3ML) 0.083% neb solution   No No   Sig: Take 1 vial by nebulization every 4 hours as needed for shortness of breath/dyspnea or wheezing   Patient not taking: Reported on 6/20/2022   budesonide-formoterol (SYMBICORT) 160-4.5 MCG/ACT Inhaler 8/23/2022  No No   Sig: Inhale 2 puffs into the lungs daily   Patient taking differently: Inhale 1 puff into the lungs daily   erythromycin with ethanol (EMGEL) 2 % gel   No No   Sig: Apply topically daily   hydrocortisone 2.5 % cream 8/25/2022 at Unknown time  Yes Yes   Sig: Apply topically every morning Apply to face   metroNIDAZOLE (METROCREAM) 0.75 % external cream 8/24/2022 at Unknown time  Yes Yes   Sig: Apply topically every evening      Facility-Administered Medications: None       ALLERGIES:   Allergies   Allergen Reactions     Levaquin Hives and Itching     Pcn [Penicillins] Hives     Tetanus Toxoids Anaphylaxis     Erythromycin GI Disturbance     Silicone Rash       SOCIAL HISTORY:  Social History     Tobacco Use     Smoking status: Former Smoker     Quit date: 1/1/2016     Years  since quittin.6     Smokeless tobacco: Never Used     Tobacco comment: quit but  still smokes, but not in house   Substance Use Topics     Alcohol use: No     Alcohol/week: 0.0 standard drinks     Drug use: No       FAMILY HISTORY:  Family History   Problem Relation Age of Onset     Respiratory Father      Cancer Brother         sarcoidosis     Diabetes Brother      Cerebrovascular Disease Brother      Liver Disease Brother        PHYSICAL EXAM:   General  Alert, oriented and comfortable  Vital Signs with Ranges  Temp: 98.2  F (36.8  C) Temp src: Oral BP: 109/57 Pulse: 73   Resp: 18 SpO2: 97 % O2 Device: None (Room air)    No intake/output data recorded.    Constitutional: healthy, alert and no distress   Cardiovascular: negative, PMI normal. No lifts, heaves, or thrills. RRR. No murmurs, clicks gallops or rub  Respiratory: negative, Percussion normal. Good diaphragmatic excursion. Lungs clear  Abdomen: Abdomen soft, tender. BS normal. No masses, organomegaly, positive findings: distended, tenderness mild generalized          ADDITIONAL COMMENTS:   I reviewed the patient's new clinical lab test results.   Recent Labs   Lab Test 22  1456 21  2101 21  1045   WBC 8.4 9.4 6.2   HGB 15.3 14.3 14.4   MCV 83 84 81.4    229 201     Recent Labs   Lab Test 22  1456 22  0942 21  2101 21  1046 20  1131   POTASSIUM 3.7 4.0 3.6   < > 3.4   CHLORIDE 101 102 103   < > 100   CO2 29  --  30  --  28   BUN 14 14  14 19   < > 14   ANIONGAP 5  --  5  --  6    < > = values in this interval not displayed.     Recent Labs   Lab Test 22  1456 21  1046 20  1415 20  1131 12/15/17  1030 10/24/17  1839 10/24/17  1830 17  2258 17  2045   ALBUMIN 3.9 4.4  --  3.6   < > 3.9  --   --  3.6   BILITOTAL 0.7 0.6  --  0.6   < > 0.3  --   --  0.3   ALT 18 11  --  119*   < > 30  --   --  18   AST 17 13  --  60*   < > 19  --   --  16   PROTEIN  --   --   10*  --   --   --  Negative Negative  --    LIPASE 59*  --   --   --   --  107  --   --  102    < > = values in this interval not displayed.       I reviewed the patient's new imaging results.        CONSULTATION ASSESSMENT AND PLAN:    Macey Alejandre is a 71 year old female with c/o IBS-C with severe constipation, abdominal pain, and rectal bleeding.  Active Problems:  Hematochezia  Colitis  Hemorrhoids, unspecified hemorrhoid type  IBS-C  Has suffered from IBS-C since childhood with no significant improvement with otc options  Has significant rectal pressure. CT confirms large stool burden and imaging suggestive of sigmoid colitis  Has had protruding hemorrhoids with bleeding when attempting to have a bowel movement with bright red blood  Patient has severe IBS-C and would benefit from Linzess 290. Has likely bleeding hemorrhoids with bearing down and concern for possible ulceration and ischemic colitis based on CT.  Patient would benefit from a colonoscopy to both diagnose and treat constiaption and bleeding    -- Daily Linzess 290 mcg and start today  -- Clear liquid diet  -- Plan colonoscopy tomorrow and to start prep tonight  -- NPO after midnight  -- start dicyclomine 10 mg QID    Heartburn  Belching  Concern for esophageal reflux vs H pylori vs possible gastroparesis due to severe IBS-C    --recommend daily pantoprazole  -- EGD tomorrow        [facility].  Office: [phone]  Cell : [phone] (Dr. Rogers)  Cell: [phone] (Anay Quinones PA-C)

## 2022-08-26 NOTE — PROGRESS NOTES
Orientation/Cognitive: A&Ox4  Observation Goals (Met/ Not Met): not met  Mobility Level/Assist Equipment: Indep  Fall Risk (Y/N): N  Behavior Concerns: N/A  Pain Management: oxycodone & tylenol   Tele/VS/O2: VSS  ABNL Lab/BG: N/A  Diet: Clears NPO at midnight  Bowel/Bladder: Continent  Skin Concerns: bilateral mastectomy scar  Drains/Devices: IV SL  Tests/Procedures for next shift: colonoscopy, EGD and biopsy  Anticipated DC date & active delays: TBD  Patient Stated Goal for Today: to get some abdominal relief

## 2022-08-26 NOTE — H&P
[facility]    History and Physical  Hospitalist       Date of Admission:  8/25/2022    Assessment & Plan   Macey Romero is a 71 year old female who presents with abdominal pain.    Question mild sigmoid colitis  Severe constipation  Abdominal pain most likely due to severe constipation.  History of irritable bowel syndrome  -Patient presenting with abdominal pain, CT was reported as questionable mild sigmoid colitis and was found to have large amount of formed stool throughout the colon  -Suspect her symptoms are most likely due to severe constipation  -Patient does endorse some rectal bleeding.  -Last colonoscopy was in 2017 which showed internal hemorrhoids and diverticulosis.  -Start bowel regimen  -We will give soap suds enema X1  -GI consult.    History of hemorrhoids  -Suspect rectal bleed most likely from hemorrhoids  -Does follow-up with Dr. Wong, colorectal surgery, commend following up with him as outpatient after discharge if she wants definitive management of hemorrhoids.    Moderate persistent asthma  -Not in exacerbation, prior to admission inhalers once verified    CKD stage III  -Probably at baseline    Prediabetes  -Last hemoglobin A1c was 6.0 in June 2022        Macey is a LOW SUSPICION PUI.  Follow these instructions:    If COVID test positive -> continue isolation precautions    If COVID test negative -> discontinue COVID-specific isolation precautions     DVT Prophylaxis: Pneumatic Compression Devices  Code Status: Full Code    Disposition: Expected discharge in < 2 days    Sam Gunderson MD, MD    Primary Care Physician   Deandra Strong    Chief Complaint   Abdominal pain    History is obtained from the patient    History of Present Illness   Macey Romero is a 71 year old female who presents with abdominal pain and rectal bleeding.  Patient reports abdominal pain starting yesterday and had urges to defecate and noticed some bright red bleeding per rectum.   She reports abdominal pain to be intermittent.  At its worst pain was 9/10 in intensity.  She had several urges for bowel movement but just could not defecate.  Last bowel movement was 2 days ago and was a very small one.  Patient historically has had problems with constipation however she is not on regular or scheduled bowel regimen.  She also endorses nausea and abdominal distention.  No vomiting.  No fever or chills.    As far as other pertinent review of system is concerned, patient denies chest pain or dyspnea. Denies dysuria urinary urgency or frequency.  No lightheadedness or dizziness.  No recent fever or chills.   In the emergency room she was found to have possible sigmoid colitis and what appears like severe constipation.  However due to ongoing abdominal pain, patient was very hesitant and reluctant to go home.      Past Medical History    I have reviewed this patient's medical history and updated it with pertinent information if needed.   Past Medical History:   Diagnosis Date     Breast CA in situ 1987     Cancer (H) 1987    Right Breast treated with surgery     Chronic constipation      Fibromyalgia      Malignant neoplasm of female breast, unspecified estrogen receptor status, unspecified laterality, unspecified site of breast (H) 6/17/2021     Meningitis     Several times as an adult     Osteoarthritis 2/1/2011     Osteopenia 2/1/2011     Pneumonia      Pterygium eye 8/26/2013     Reactive airway disease 2/1/2011     Seasonal allergies      Shingles     Prior to 2008 - scalp     Skin cancer     SCC - hand, left nose       Past Surgical History   I have reviewed this patient's surgical history and updated it with pertinent information if needed.  Past Surgical History:   Procedure Laterality Date     anterior c-disc fusion       BLEPHAROPLASTY, BROW LIFT BILATERAL, COMBINED Bilateral 6/18/2018    Procedure: COMBINED BLEPHAROPLASTY, BROW LIFT BILATERAL;  BILATERAL UPPER LID BLEPHAROPLASTY; BILATERAL  BROW PTOSIS REPAIR;  Surgeon: Сергей Walters MD;  Location:  [facility]     CHOLECYSTECTOMY       COLONOSCOPY N/A 10/19/2017    Procedure: COLONOSCOPY;  COLONOSCOPY;  Surgeon: Niko Wong MD;  Location:  [facility]     D & C      Bleeding before hysterectomy     ENDOSCOPY  04/21/08     HYSTERECTOMY, MARCUS      Ovaries and tubes out due to breast cancer     JOINT REPLACEMTN, KNEE RT/LT Right 01/2011    Joint Replacement knee RT, with tibial straightening (2 replacements)     MAMMOPLASTY AUGMENTATION BILATERAL      breast ca      MASTECTOMY, SIMPLE RT/LT/CLAIRE Bilateral 1989    Mastectomy Simple RT/LT/CLAIRE     MOHS MICROGRAPHIC PROCEDURE      Left lateral nose     OPEN REDUCTION INTERNAL FIXATION ANKLE  8/27/2013    Procedure: OPEN REDUCTION INTERNAL FIXATION ANKLE;  RIGHT OPEN REDUCTION INTERNAL FIXATION ANKLE WITH LIGAMENT REPAIR;  Surgeon: Nando Chang MD;  Location:  OR     PTERYGIUM WITH CONJUNCTIVAL AUTOLOGOUS TRANSPLANT Left 8/29/2016    Procedure: PTERYGIUM WITH CONJUNCTIVAL AUTOLOGOUS TRANSPLANT;  Surgeon: Сергей Walters MD;  Location:  [facility]     REPAIR LIGAMENT ANKLE  8/27/2013    Procedure: REPAIR LIGAMENT ANKLE;;  Surgeon: Nando Chang MD;  Location:  OR     SALIVARY GLAND SURGERY Left     Stone removal      TONSILLECTOMY         Prior to Admission Medications   Prior to Admission Medications   Prescriptions Last Dose Informant Patient Reported? Taking?   albuterol (PROAIR HFA/PROVENTIL HFA/VENTOLIN HFA) 108 (90 Base) MCG/ACT inhaler   No No   Sig: Inhale 2 puffs into the lungs every 6 hours   albuterol (PROVENTIL) (2.5 MG/3ML) 0.083% neb solution   No No   Sig: Take 1 vial by nebulization every 4 hours as needed for shortness of breath/dyspnea or wheezing   Patient not taking: Reported on 6/20/2022   budesonide-formoterol (SYMBICORT) 160-4.5 MCG/ACT Inhaler   No No   Sig: Inhale 2 puffs into the lungs daily   Patient taking differently: Inhale 1 puff into the lungs daily   erythromycin with  ethanol (EMGEL) 2 % gel   No No   Sig: Apply topically daily   ketorolac (ACULAR) 0.5 % ophthalmic solution   Yes No   Sig: INSTILL 1 DROP IN RIGHT EYE THREE TIMES DAILY   metroNIDAZOLE (METROCREAM) 0.75 % external cream   Yes No   Sig: APPLY TOPICALLY TO THE AFFECTED AREA TWICE DAILY      Facility-Administered Medications: None     Allergies   Allergies   Allergen Reactions     Levaquin Hives and Itching     Pcn [Penicillins] Hives     Tetanus Toxoids Anaphylaxis     Erythromycin GI Disturbance     Silicone Rash       Social History   I have reviewed this patient's social history and updated it with pertinent information if needed. Macey Romero  reports that she quit smoking about 6 years ago. She has never used smokeless tobacco. She reports that she does not drink alcohol and does not use drugs.    Family History   I have reviewed this patient's family history and updated it with pertinent information if needed.   Family History   Problem Relation Age of Onset     Respiratory Father      Cancer Brother         sarcoidosis     Diabetes Brother      Cerebrovascular Disease Brother      Liver Disease Brother        Review of Systems   The 10 point Review of Systems is negative other than noted in the HPI or here.     Physical Exam   Temp: 97.1  F (36.2  C) Temp src: Temporal BP: (!) 140/66 Pulse: 76   Resp: 11 SpO2: 95 % O2 Device: None (Room air)    Vital Signs with Ranges  Temp:  [97.1  F (36.2  C)] 97.1  F (36.2  C)  Pulse:  [] 76  Resp:  [8-22] 11  BP: (140-156)/(66-72) 140/66  SpO2:  [95 %-98 %] 95 %  155 lbs 0 oz    Gen: AAOX3, NAD, comfortable  HEENT: Moist oral mucosa, no pallor or icterus  Neck: Supple neck, good ROM, no stridor  Resp: CTA B/L, normal WOB; no crackles; no wheezes  CVS- RRR, no murmur, no edema  Abd/GI: Soft, mild left lower quadrant tenderness.  No guarding or rebound.  BS- normoactive  Skin- Warm, dry, no rashes  MSK: No joint deformity; no edema  Neuro- CN- intact. Moving X  4    Data   Data reviewed today:  I personally reviewed no images or EKG's today.  Recent Labs   Lab 08/25/22  1456   WBC 8.4   HGB 15.3   MCV 83         POTASSIUM 3.7   CHLORIDE 101   CO2 29   BUN 14   CR 1.10*   ANIONGAP 5   DONNA 9.9   *   ALBUMIN 3.9   PROTTOTAL 7.5   BILITOTAL 0.7   ALKPHOS 81   ALT 18   AST 17   LIPASE 59*       Recent Results (from the past 24 hour(s))   CT Abdomen Pelvis w Contrast    Narrative    EXAM: CT ABDOMEN PELVIS W CONTRAST  LOCATION: [facility]  DATE/TIME: 8/25/2022 5:17 PM    INDICATION: acute abd pain, vomiting, prior appy chantell, feels like prior SBO, also rectal prolapse hemorrhoids bleeding?  COMPARISON: CT 10/24/2017  TECHNIQUE: CT scan of the abdomen and pelvis was performed following injection of IV contrast. Multiplanar reformats were obtained. Dose reduction techniques were used.  CONTRAST: 78mL Isovue 370        FINDINGS:   LOWER CHEST: Small hiatal hernia. Otherwise unremarkable.    HEPATOBILIARY: Prior cholecystectomy.    PANCREAS: Normal.    SPLEEN: Normal.    ADRENAL GLANDS: Normal.    KIDNEYS/BLADDER: No hydronephrosis. Urinary bladder is unremarkable.    BOWEL: Large amount of formed stool throughout the colon. Questionable mild sigmoid colonic wall thickening with some trace adjacent fat stranding and fluid. No evidence of bowel obstruction. Prior appendectomy.    LYMPH NODES: Normal.    VASCULATURE: Scattered calcified atherosclerosis.    PELVIC ORGANS: Hysterectomy.    MUSCULOSKELETAL: No acute bony abnormality.      Impression    IMPRESSION:   1.  Questionable mild sigmoid colitis.  2.  Findings suggestive of constipation.  3.  No additional visualized explanation for patient's symptoms.

## 2022-08-26 NOTE — PLAN OF CARE
Goal Outcome Evaluation:       Observation goals  PRIOR TO DISCHARGE        Comments:   -diagnostic tests and consults completed and resulted :unmet, pending  consults   -vital signs normal or at patient baseline :VSS on RA, pain at baseline   -tolerating oral intake to maintain hydration :met  Nurse to notify provider when observation goals have been met and patient is ready for discharge.

## 2022-08-26 NOTE — ED NOTES
[facility]  ED Nurse Handoff Report    ED Chief complaint: Abdominal Pain      ED Diagnosis:   Final diagnoses:   Colitis   Hemorrhoids, unspecified hemorrhoid type   Hematochezia       Code Status: Full Code    Allergies:   Allergies   Allergen Reactions     Levaquin Hives and Itching     Pcn [Penicillins] Hives     Tetanus Toxoids Anaphylaxis     Erythromycin GI Disturbance     Silicone Rash       Patient Story: Patient presents for complaints of abdominal pain that has felt similar to SBO that she has had in the past. Intermittent lower abdominal pain and nausea.     Focused Assessment:  The patient reports having a sudden onset of severe abdominal pain last night. She denies any emesis and has not had any PO intake since her pain started until just prior to presentation. She also notes having a recent bowel movement in which there was only a cluster of hemorrhoids and no stool. The patient has had 2 bowel obstructions in the past and has had a cholecystectomy, appendectomy, and 4 c-sections.    Treatments and/or interventions provided:   Labs Ordered and Resulted from Time of ED Arrival to Time of ED Departure   COMPREHENSIVE METABOLIC PANEL - Abnormal       Result Value    Sodium 135      Potassium 3.7      Chloride 101      Carbon Dioxide (CO2) 29      Anion Gap 5      Urea Nitrogen 14      Creatinine 1.10 (*)     Calcium 9.9      Glucose 150 (*)     Alkaline Phosphatase 81      AST 17      ALT 18      Protein Total 7.5      Albumin 3.9      Bilirubin Total 0.7      GFR Estimate 53 (*)    LIPASE - Abnormal    Lipase 59 (*)    CBC WITH PLATELETS AND DIFFERENTIAL - Abnormal    WBC Count 8.4      RBC Count 5.53 (*)     Hemoglobin 15.3      Hematocrit 46.1      MCV 83      MCH 27.7      MCHC 33.2      RDW 12.4      Platelet Count 230      % Neutrophils 46      % Lymphocytes 42      % Monocytes 8      % Eosinophils 3      % Basophils 1      % Immature Granulocytes 0      NRBCs per 100 WBC 0       Absolute Neutrophils 3.9      Absolute Lymphocytes 3.5      Absolute Monocytes 0.7      Absolute Eosinophils 0.2      Absolute Basophils 0.0      Absolute Immature Granulocytes 0.0      Absolute NRBCs 0.0     ISTAT GASES LACTATE VENOUS POCT - Abnormal    Lactic Acid POCT 0.7      Bicarbonate Venous POCT 31 (*)     O2 Sat, Venous POCT 33 (*)     pCO2V Venous POCT 52 (*)     pH Venous POCT 7.37      pO2 Venous POCT 21 (*)    COVID-19 VIRUS (CORONAVIRUS) BY PCR     CT Abdomen Pelvis w Contrast   Final Result   IMPRESSION:    1.  Questionable mild sigmoid colitis.   2.  Findings suggestive of constipation.   3.  No additional visualized explanation for patient's symptoms.          Does this patient have any cognitive concerns?: None    Activity level - Baseline/Home:  Independent  Activity Level - Current:   Stand with Assist    Patient's Preferred language: English   Needed?: No    Isolation: None  Infection: Not Applicable  Patient tested for COVID 19 prior to admission: YES  Bariatric?: No    Vital Signs:   Vitals:    08/25/22 1800 08/25/22 1830 08/25/22 1900 08/25/22 1915   BP: (!) 140/66      Pulse: 82 94 77 76   Resp: 8 20 18 11   Temp:       TempSrc:       SpO2: 96% 97% 98% 95%   Weight:           Cardiac Rhythm:     Was the PSS-3 completed:   Yes  What interventions are required if any?      Family Comments: Family updated at bedside  OBS brochure/video discussed/provided to patient/family: No    For the majority of the shift this patient's behavior was Green.   Behavioral interventions performed were Care explained .    ED NURSE PHONE NUMBER: [phone]

## 2022-08-26 NOTE — PROVIDER NOTIFICATION
MD Notification    Notified Person: MD    Notified Person Name: David Herron     Notification Date/Time: 8/26/22 0207h     Notification Interaction: AMCOM     Purpose of Notification: FYI, pt has been c/o severe abdominal cramping. All pain meds given w/o beenfit. Can we trial Hyoscine or Simethicone? Thank you!    Orders Received: hyoscyamine (Levsin) 125mcg PO q4hrs PRN for cramping     Comments:

## 2022-08-26 NOTE — PROGRESS NOTES
RECEIVING UNIT ED HANDOFF REVIEW    ED Nurse Handoff Report was reviewed by: Ry Palmer, KAUR on August 25, 2022 at 9:24 PM

## 2022-08-27 ENCOUNTER — TRANSFERRED RECORDS (OUTPATIENT)
Dept: FAMILY MEDICINE | Facility: CLINIC | Age: 72
End: 2022-08-27

## 2022-08-27 LAB
ANION GAP SERPL CALCULATED.3IONS-SCNC: 5 MMOL/L (ref 3–14)
BUN SERPL-MCNC: 8 MG/DL (ref 7–30)
CALCIUM SERPL-MCNC: 8.6 MG/DL (ref 8.5–10.1)
CHLORIDE BLD-SCNC: 106 MMOL/L (ref 94–109)
CO2 SERPL-SCNC: 26 MMOL/L (ref 20–32)
COLONOSCOPY: NORMAL
CREAT SERPL-MCNC: 0.9 MG/DL (ref 0.52–1.04)
GFR SERPL CREATININE-BSD FRML MDRD: 68 ML/MIN/1.73M2
GLUCOSE BLD-MCNC: 98 MG/DL (ref 70–99)
POTASSIUM BLD-SCNC: 3.9 MMOL/L (ref 3.4–5.3)
SODIUM SERPL-SCNC: 137 MMOL/L (ref 133–144)
UPPER GI ENDOSCOPY: NORMAL

## 2022-08-27 PROCEDURE — 250N000013 HC RX MED GY IP 250 OP 250 PS 637: Performed by: INTERNAL MEDICINE

## 2022-08-27 PROCEDURE — G0378 HOSPITAL OBSERVATION PER HR: HCPCS

## 2022-08-27 PROCEDURE — 250N000009 HC RX 250: Performed by: INTERNAL MEDICINE

## 2022-08-27 PROCEDURE — 0DB78ZX EXCISION OF STOMACH, PYLORUS, VIA NATURAL OR ARTIFICIAL OPENING ENDOSCOPIC, DIAGNOSTIC: ICD-10-PCS | Performed by: INTERNAL MEDICINE

## 2022-08-27 PROCEDURE — 0DBN8ZX EXCISION OF SIGMOID COLON, VIA NATURAL OR ARTIFICIAL OPENING ENDOSCOPIC, DIAGNOSTIC: ICD-10-PCS | Performed by: INTERNAL MEDICINE

## 2022-08-27 PROCEDURE — 250N000011 HC RX IP 250 OP 636: Performed by: INTERNAL MEDICINE

## 2022-08-27 PROCEDURE — 80048 BASIC METABOLIC PNL TOTAL CA: CPT | Performed by: INTERNAL MEDICINE

## 2022-08-27 PROCEDURE — G0500 MOD SEDAT ENDO SERVICE >5YRS: HCPCS | Performed by: INTERNAL MEDICINE

## 2022-08-27 PROCEDURE — 250N000013 HC RX MED GY IP 250 OP 250 PS 637: Performed by: PHYSICIAN ASSISTANT

## 2022-08-27 PROCEDURE — 99226 PR SUBSEQUENT OBSERVATION CARE,LEVEL III: CPT | Performed by: INTERNAL MEDICINE

## 2022-08-27 PROCEDURE — 43239 EGD BIOPSY SINGLE/MULTIPLE: CPT | Performed by: INTERNAL MEDICINE

## 2022-08-27 PROCEDURE — 88305 TISSUE EXAM BY PATHOLOGIST: CPT | Mod: TC | Performed by: INTERNAL MEDICINE

## 2022-08-27 PROCEDURE — 36415 COLL VENOUS BLD VENIPUNCTURE: CPT | Performed by: INTERNAL MEDICINE

## 2022-08-27 PROCEDURE — 45380 COLONOSCOPY AND BIOPSY: CPT | Performed by: INTERNAL MEDICINE

## 2022-08-27 RX ORDER — PANTOPRAZOLE SODIUM 40 MG/1
40 TABLET, DELAYED RELEASE ORAL
Status: DISCONTINUED | OUTPATIENT
Start: 2022-08-27 | End: 2022-08-29

## 2022-08-27 RX ORDER — FENTANYL CITRATE 50 UG/ML
INJECTION, SOLUTION INTRAMUSCULAR; INTRAVENOUS PRN
Status: COMPLETED | OUTPATIENT
Start: 2022-08-27 | End: 2022-08-27

## 2022-08-27 RX ORDER — FLUMAZENIL 0.1 MG/ML
0.2 INJECTION, SOLUTION INTRAVENOUS
Status: CANCELLED | OUTPATIENT
Start: 2022-08-27 | End: 2022-08-27

## 2022-08-27 RX ADMIN — POTASSIUM CHLORIDE AND SODIUM CHLORIDE: 450; 150 INJECTION, SOLUTION INTRAVENOUS at 11:57

## 2022-08-27 RX ADMIN — DICYCLOMINE HYDROCHLORIDE 10 MG: 10 CAPSULE ORAL at 11:52

## 2022-08-27 RX ADMIN — FENTANYL CITRATE 100 MCG: 50 INJECTION, SOLUTION INTRAMUSCULAR; INTRAVENOUS at 09:05

## 2022-08-27 RX ADMIN — SENNOSIDES AND DOCUSATE SODIUM 2 TABLET: 50; 8.6 TABLET ORAL at 07:58

## 2022-08-27 RX ADMIN — MIDAZOLAM 2 MG: 1 INJECTION INTRAMUSCULAR; INTRAVENOUS at 09:07

## 2022-08-27 RX ADMIN — DICYCLOMINE HYDROCHLORIDE 10 MG: 10 CAPSULE ORAL at 15:50

## 2022-08-27 RX ADMIN — LINACLOTIDE 290 MCG: 290 CAPSULE, GELATIN COATED ORAL at 06:43

## 2022-08-27 RX ADMIN — MIDAZOLAM 2 MG: 1 INJECTION INTRAMUSCULAR; INTRAVENOUS at 09:05

## 2022-08-27 RX ADMIN — PANTOPRAZOLE SODIUM 40 MG: 40 TABLET, DELAYED RELEASE ORAL at 06:43

## 2022-08-27 RX ADMIN — TOPICAL ANESTHETIC 1 EACH: 200 SPRAY DENTAL; PERIODONTAL at 09:05

## 2022-08-27 RX ADMIN — DICYCLOMINE HYDROCHLORIDE 10 MG: 10 CAPSULE ORAL at 19:40

## 2022-08-27 RX ADMIN — HYOSCYAMINE SULFATE 125 MCG: 0.12 TABLET ORAL at 15:49

## 2022-08-27 RX ADMIN — DICYCLOMINE HYDROCHLORIDE 10 MG: 10 CAPSULE ORAL at 07:58

## 2022-08-27 RX ADMIN — PANTOPRAZOLE SODIUM 40 MG: 40 TABLET, DELAYED RELEASE ORAL at 15:50

## 2022-08-27 ASSESSMENT — ACTIVITIES OF DAILY LIVING (ADL)
ADLS_ACUITY_SCORE: 33
ADLS_ACUITY_SCORE: 37
ADLS_ACUITY_SCORE: 33

## 2022-08-27 NOTE — PLAN OF CARE
Goal Outcome Evaluation:           Orientation/Cognitive: AO X4  Observation Goals (Met/ Not Met): Not Met  Mobility Level/Assist Equipment: SBA  Fall Risk (Y/N): N  Behavior Concerns: None  Pain Management:  Oxycodone and Tylenol available  Tele/VS/O2: VSS on RA  ABNL Lab/BG: CT ABD showed Colitis  Diet: low fiber diet  Bowel/Bladder: Continent  Skin Concerns: bilateral mastectomy scar  Drains/Devices: N/A  Tests/Procedures for next shift: N/A  Colonoscopy, EGD done  Anticipated DC date & active delays: tomorrow  Patient Stated Goal for Today: Feel better.No IV access MD aware.Having diarrhea still .

## 2022-08-27 NOTE — PROGRESS NOTES
PRIOR TO DISCHARGE       Comments:   -diagnostic tests and consults completed and resulted  Met  -vital signs normal or at patient baseline Met  -tolerating oral intake to maintain hydration Met   Nurse to notify provider when observation goals have been met and patient is ready for discharg

## 2022-08-27 NOTE — PROGRESS NOTES
Dr. Aaron - see below request from patient.     Could I change this order for the US Liver/Gallbladder/Pancreas to be ASAP or STAT to see if that could expedite things?    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  She was admitted on 8/25/2022     Problem List:  1.  Severe constipation       H/o irritable bowel syndrome    Started on linzess    8/26/22 and dicyclomine 10mg po qid by GI 8/26/22      2.  LLQ abd pain       Sigmoid colitis    CT of the abd with evidence of sigmoid colitis.   GI consulted; plan for EGD and colonoscopy later today  No leukocytosis or fever - no abx, for now    3.  GERD  On daily PPI - will continue    4.  H/o hemorrhoids  Suspect rectal bleeding PTA was from hemorrhoids and attempt to have BM    5.  H/o glucose intolerance    Last hgbA1c noted to be 6.0 (6/2022)            Diet: NPO per Anesthesia Guidelines for Procedure/Surgery Except for: Meds  -  In preparation for colonoscopy later this am  DVT Prophylaxis: Pneumatic Compression Devices  Boudreaux Catheter: Not present  Code Status: Full Code      Disposition Plan   Awaiting results from EGD and colonoscopy    Entered: Nadine Agosto DO 08/27/2022, 7:24 AM     The patient's care was discussed with the Patient and  Bedside RN present.      We are operating under sub optimal conditions in the setting of a world wide pandemic, hospitals are running at full capacity with limited bed availability. We are providing the best possible patient care with limited resources.    Interval History     Reports that she completed bowel  Prep -   Is having some intermittent small amounts of solid stool with liquid stool which appears cloudy.   Abd pain, overall, is improved.  Abd distension is improved this am.   No CP,SOB, F/C  or HA.   Still with intermittent nausea.      -Data reviewed today: I reviewed all new labs and imaging results over the last 24 hours. I personally reviewed no images or EKG's today.    Physical Exam   Temp: 98.3  F (36.8  C) Temp src: Oral BP: 126/63 Pulse: 76   Resp: 16 SpO2: 95 % O2 Device: None (Room air)    Vitals:    08/25/22 1437 08/25/22 2210   Weight: 70.3 kg (155 lb) 72.7 kg (160 lb 4.8 oz)     Vital Signs with Ranges  Temp:  [98.2  F (36.8  C)-99  F (37.2  C)] 98.3  F (36.8  C)  Pulse:  [72-82] 76  Resp:  [16-18] 16  BP: (109-126)/(57-65) 126/63  SpO2:  [92 %-100 %] 95 %  I/O last 3 completed shifts:  In: 1560 [P.O.:1560]  Out: -     GEN:  Alert, oriented x 3, appears fairly comfortable at rest in the bed, no overt respiratory distress.  HEENT:  Normocephalic/atraumatic, no scleral icterus, no nasal discharge, mouth and membranes appear more  moist  NECK:  No clear thyromegaly or JVD  CV:  Regular rate and rhythm, no loud murmur to ausc.  S1 + S2 noted, no S3 or S4.  LUNGS:  Clear to auscultation ant/lat bilaterally.  No rales/rhonchi/wheezing auscultated bilaterally.  No costal retractions bilaterally.  Symmetric chest rise on inhalation noted.  ABD:  Improved  Bowel  Sounds to ausc exam throughout,  Still with  Mild, reproducible tenderness to the LLQ without clear R/G/R noted.  Less  distension noted throughout today. No obvious masses palpated.  No obvious HSM to exam.  EXT:  No pretibial edema or cyanosis bilaterally. No joint synovitis noted.  No calf-tenderness or asymmetry noted.  SKIN:  Dry to touch, no rashes or jaundice noted.  PSYCH:  noticably anxious about procedures and sedation plan today.  Cooperative.  Maintains direct eye contact.  NEURO:  No tremors at rest, speech is clear and appropriate. CN 2-12 intact to gross testing bilaterally    Data   Labs:  Recent Labs   Lab 08/27/22  0637 08/25/22  1456    135   POTASSIUM 3.9 3.7   CHLORIDE 106 101   CO2  --  29   ANIONGAP  --  5    GLC  --  150*   BUN  --  14   CR  --  1.10*   GFRESTIMATED  --  53*   DONNA  --  9.9     Recent Labs   Lab 08/25/22  1456   WBC 8.4   HGB 15.3   HCT 46.1   MCV 83        Recent Labs   Lab 08/25/22  1456   HGB 15.3      Recent Imaging:   No results found for this or any previous visit (from the past 24 hour(s)).    Medications     0.45% sodium chloride + KCl 20 mEq/L 75 mL/hr at 08/26/22 9527       dicyclomine  10 mg Oral 4x Daily     linaclotide  290 mcg Oral QAM AC     pantoprazole  40 mg Oral QAM AC     [Held by provider] polyethylene glycol  17 g Oral BID     senna-docusate  1 tablet Oral BID    Or     senna-docusate  2 tablet Oral BID

## 2022-08-27 NOTE — PLAN OF CARE
Orientation/Cognitive: AO X4  Observation Goals (Met/ Not Met): Not Met  Mobility Level/Assist Equipment: Independent  Fall Risk (Y/N): N  Behavior Concerns: None  Pain Management:  Oxycodone and Tylenol available  Tele/VS/O2: VSS on RA  ABNL Lab/BG: CT ABD showed Colitis  Diet: NPO   Bowel/Bladder: Continent  Skin Concerns: Santhosh. mastectomy scar  Drains/Devices: Infusing 0.45% sodium chloride + KCl 20 mEq at 75 ml/hr   Tests/Procedures for next shift: Colonoscopy, EGD Today  Anticipated DC date & active delays: TBD  Patient Stated Goal for Today: Feel better    PRIOR TO DISCHARGE       Comments:   -diagnostic tests and consults completed and resulted Not Met  -vital signs normal or at patient baseline Met  -tolerating oral intake to maintain hydration Met   Nurse to notify provider when observation goals have been met and patient is ready for discharge.

## 2022-08-27 NOTE — PROGRESS NOTES
PRIOR TO DISCHARGE       Comments:   -diagnostic tests and consults completed and resulted  Met  -vital signs normal or at patient baseline Met  -tolerating oral intake to maintain hydration Met   Nurse to notify provider when observation goals have been met and patient is ready for discharge.

## 2022-08-28 ENCOUNTER — APPOINTMENT (OUTPATIENT)
Dept: PHYSICAL THERAPY | Facility: CLINIC | Age: 72
DRG: 392 | End: 2022-08-28
Attending: INTERNAL MEDICINE
Payer: MEDICARE

## 2022-08-28 LAB
ANION GAP SERPL CALCULATED.3IONS-SCNC: 4 MMOL/L (ref 3–14)
BUN SERPL-MCNC: 8 MG/DL (ref 7–30)
CALCIUM SERPL-MCNC: 8.7 MG/DL (ref 8.5–10.1)
CHLORIDE BLD-SCNC: 107 MMOL/L (ref 94–109)
CO2 SERPL-SCNC: 29 MMOL/L (ref 20–32)
CREAT SERPL-MCNC: 0.94 MG/DL (ref 0.52–1.04)
GFR SERPL CREATININE-BSD FRML MDRD: 65 ML/MIN/1.73M2
GLUCOSE BLD-MCNC: 106 MG/DL (ref 70–99)
POTASSIUM BLD-SCNC: 3.8 MMOL/L (ref 3.4–5.3)
SODIUM SERPL-SCNC: 140 MMOL/L (ref 133–144)

## 2022-08-28 PROCEDURE — 250N000013 HC RX MED GY IP 250 OP 250 PS 637: Performed by: PHYSICIAN ASSISTANT

## 2022-08-28 PROCEDURE — G0378 HOSPITAL OBSERVATION PER HR: HCPCS

## 2022-08-28 PROCEDURE — 82310 ASSAY OF CALCIUM: CPT | Performed by: INTERNAL MEDICINE

## 2022-08-28 PROCEDURE — 250N000013 HC RX MED GY IP 250 OP 250 PS 637: Performed by: INTERNAL MEDICINE

## 2022-08-28 PROCEDURE — 97161 PT EVAL LOW COMPLEX 20 MIN: CPT | Mod: GP

## 2022-08-28 PROCEDURE — 120N000001 HC R&B MED SURG/OB

## 2022-08-28 PROCEDURE — 36415 COLL VENOUS BLD VENIPUNCTURE: CPT | Performed by: INTERNAL MEDICINE

## 2022-08-28 PROCEDURE — 99233 SBSQ HOSP IP/OBS HIGH 50: CPT | Performed by: INTERNAL MEDICINE

## 2022-08-28 RX ADMIN — HYOSCYAMINE SULFATE 125 MCG: 0.12 TABLET ORAL at 02:03

## 2022-08-28 RX ADMIN — SODIUM PHOSPHATE, DIBASIC AND SODIUM PHOSPHATE, MONOBASIC 1 ENEMA: 7; 19 ENEMA RECTAL at 12:05

## 2022-08-28 RX ADMIN — POLYETHYLENE GLYCOL 3350 17 G: 17 POWDER, FOR SOLUTION ORAL at 20:24

## 2022-08-28 RX ADMIN — PANTOPRAZOLE SODIUM 40 MG: 40 TABLET, DELAYED RELEASE ORAL at 06:44

## 2022-08-28 RX ADMIN — SENNOSIDES AND DOCUSATE SODIUM 2 TABLET: 50; 8.6 TABLET ORAL at 20:24

## 2022-08-28 RX ADMIN — LINACLOTIDE 290 MCG: 290 CAPSULE, GELATIN COATED ORAL at 06:44

## 2022-08-28 RX ADMIN — PANTOPRAZOLE SODIUM 40 MG: 40 TABLET, DELAYED RELEASE ORAL at 15:57

## 2022-08-28 RX ADMIN — ACETAMINOPHEN 650 MG: 325 TABLET ORAL at 02:03

## 2022-08-28 RX ADMIN — SENNOSIDES AND DOCUSATE SODIUM 2 TABLET: 50; 8.6 TABLET ORAL at 07:47

## 2022-08-28 RX ADMIN — OXYCODONE HYDROCHLORIDE 2.5 MG: 5 TABLET ORAL at 17:23

## 2022-08-28 RX ADMIN — DICYCLOMINE HYDROCHLORIDE 10 MG: 10 CAPSULE ORAL at 07:47

## 2022-08-28 ASSESSMENT — ACTIVITIES OF DAILY LIVING (ADL)
ADLS_ACUITY_SCORE: 33

## 2022-08-28 NOTE — CONSULTS
[facility]  Colon and Rectal Surgery Consult Note  Name: Macey Romero    MRN: [id number]  YOB: 1950    Age: 71 year old  Date of admission: 8/25/2022  Primary care provider: Deandra Strong     Requesting Physician:  Dr Collin Walters  Reason for consult: abdominal distention, constipation, sigmoid stricture           History of Present Illness:   Macey Romero is a 71 year old female, seen at the request of Dr. Walters, who presents with an ongoing problem with constipation. She states that she has had always problems with bowel movements but this has gotten markedly worse in the last several years. She has had evaluation with GI it sounds like as an outpatient. Her current bowel regimen is daily miralax and she states that she does not drink much water. Her main complaint is abdominal distention and cramping. She expresses a reluctance towards medical management of her constipation.    She states that she was diagnosed with colitis in the past but has never had diverticulitis per se.  No family history of colorectal cancer.      Colonoscopy History: yesterday - sigmoid stricture biopsies and able to be traversed with adult colonoscope    Past abdominal surgery: C/s x4, lap ccy, appy, MARCUS            Past Medical History:     Past Medical History:   Diagnosis Date     Breast CA in situ 1987     Cancer (H) 1987    Right Breast treated with surgery     Chronic constipation      Fibromyalgia      Malignant neoplasm of female breast, unspecified estrogen receptor status, unspecified laterality, unspecified site of breast (H) 6/17/2021     Meningitis     Several times as an adult     Osteoarthritis 2/1/2011     Osteopenia 2/1/2011     Pneumonia      Pterygium eye 8/26/2013     Reactive airway disease 2/1/2011     Seasonal allergies      Shingles     Prior to 2008 - scalp     Skin cancer     SCC - hand, left nose             Past Surgical History:     Past Surgical History:   Procedure  Laterality Date     anterior c-disc fusion       BLEPHAROPLASTY, BROW LIFT BILATERAL, COMBINED Bilateral 2018    Procedure: COMBINED BLEPHAROPLASTY, BROW LIFT BILATERAL;  BILATERAL UPPER LID BLEPHAROPLASTY; BILATERAL BROW PTOSIS REPAIR;  Surgeon: Сергей Walters MD;  Location:  [facility]     CHOLECYSTECTOMY       COLONOSCOPY N/A 10/19/2017    Procedure: COLONOSCOPY;  COLONOSCOPY;  Surgeon: Niko Wong MD;  Location:  GI     D & C      Bleeding before hysterectomy     ENDOSCOPY  08     HYSTERECTOMY, MARCUS      Ovaries and tubes out due to breast cancer     JOINT REPLACEMTN, KNEE RT/LT Right 2011    Joint Replacement knee RT, with tibial straightening (2 replacements)     MAMMOPLASTY AUGMENTATION BILATERAL      breast ca      MASTECTOMY, SIMPLE RT/LT/CLAIRE Bilateral 1989    Mastectomy Simple RT/LT/CLAIRE     MOHS MICROGRAPHIC PROCEDURE      Left lateral nose     OPEN REDUCTION INTERNAL FIXATION ANKLE  2013    Procedure: OPEN REDUCTION INTERNAL FIXATION ANKLE;  RIGHT OPEN REDUCTION INTERNAL FIXATION ANKLE WITH LIGAMENT REPAIR;  Surgeon: Nando Chang MD;  Location:  OR     PTERYGIUM WITH CONJUNCTIVAL AUTOLOGOUS TRANSPLANT Left 2016    Procedure: PTERYGIUM WITH CONJUNCTIVAL AUTOLOGOUS TRANSPLANT;  Surgeon: Сергей Walters MD;  Location:  [facility]     REPAIR LIGAMENT ANKLE  2013    Procedure: REPAIR LIGAMENT ANKLE;;  Surgeon: Nando Chang MD;  Location:  OR     SALIVARY GLAND SURGERY Left     Stone removal      TONSILLECTOMY                 Social History:     Social History     Tobacco Use     Smoking status: Former Smoker     Quit date: 2016     Years since quittin.6     Smokeless tobacco: Never Used     Tobacco comment: quit but  still smokes, but not in house   Substance Use Topics     Alcohol use: No     Alcohol/week: 0.0 standard drinks             Family History:     Family History   Problem Relation Age of Onset     Respiratory Father      Cancer Brother        "  sarcoidosis     Diabetes Brother      Cerebrovascular Disease Brother      Liver Disease Brother              Allergies:     Allergies   Allergen Reactions     Levaquin Hives and Itching     Pcn [Penicillins] Hives     Tetanus Toxoids Anaphylaxis     Erythromycin GI Disturbance     Silicone Rash             Medications:       linaclotide  290 mcg Oral QAM AC     pantoprazole  40 mg Oral BID AC     polyethylene glycol  17 g Oral BID     senna-docusate  1 tablet Oral BID    Or     senna-docusate  2 tablet Oral BID     sodium chloride (PF)  3 mL Intracatheter Q8H             Review of Systems:   A comprehensive greater than 10 system review of systems was carried out.  Pertinent positives and negatives are noted above.  Otherwise negative for contributory info.            Physical Exam:     Blood pressure (!) 142/67, pulse 69, temperature 98.1  F (36.7  C), temperature source Oral, resp. rate 16, height 1.6 m (5' 3\"), weight 72.7 kg (160 lb 4.8 oz), SpO2 96 %, not currently breastfeeding.  No intake or output data in the 24 hours ending 08/28/22 1239  Exam:  General - Awake alert and oriented, appears her stated age  Pulm - Non-labored breathing with normal respiratory effort  CVS - reg rate and rhythm, no peripheral edema  Abd - Soft minimally distended TTP in suprapubic/LLQ.  No guarding, rigidity or peritoneal signs.   Rectal exam was not performed.   Neuro - CN II-XII grossly intact  Musculoskeletal - extremities with no clubbing, cyanosis or edema; able to ambulate  Psych - responsive, alert, cooperative; oriented x3; appropriate mood and affect  External/skin - inspection reveals no rashes, lesions or ulcers, normal coloring             Data Reviewed:   EGD 8/27  Diffuse moderately erythematous mucosa without bleeding was found in the        gastric antrum. Biopsies were taken with a cold forceps for histology.        Biopsies were taken with a cold forceps for Helicobacter pylori testing.        The cardia and " gastric fundus were normal on retroflexion.        The duodenal bulb, first portion of the duodenum and second portion of        the duodenum were normal.        LA Grade C (one or more mucosal breaks continuous between tops of 2 or        more mucosal folds, less than 75% circumference) esophagitis with no        bleeding was found 37 cm from the incisors.        A mild Schatzki ring was found at the gastroesophageal junction.        A small hiatal hernia was present    Colonoscopy 8/27  The perianal and digital rectal examinations were normal.        The terminal ileum appeared normal.        An extrinsic severe stenosis measuring 6 cm (in length) was found in the        sigmoid colon and was traversed. Biopsies were taken with a cold forceps        for histology.        Extensive amounts of copious quantities of semi-solid solid stool was        found in the descending colon, at the splenic flexure, in the transverse        colon, at the hepatic flexure, in the ascending colon and in the cecum,        making visualization difficult. Lavage of the area was performed using        copious amounts of sterile water, resulting in clearance with good        visualization.        The lumen of the sigmoid colon, descending colon, transverse colon,        hepatic flexure, ascending colon and cecum was significantly dilated.    CT-scan of abdomen and pelvis DATE/TIME: 8/25/2022 5:17 PM  BOWEL: Large amount of formed stool throughout the colon. Questionable mild sigmoid colonic wall thickening with some trace adjacent fat stranding and fluid. No evidence of bowel obstruction. Prior appendectomy.        Lab Results   Component Value Date    WBC 8.4 08/25/2022    WBC 6.2 06/25/2021    WBC 8.9 06/16/2021     Lab Results   Component Value Date    RBC 5.53 08/25/2022    RBC 5.12 06/25/2021    RBC 5.10 06/16/2021     Lab Results   Component Value Date    HGB 15.3 08/25/2022    HGB 14.4 06/25/2021     Lab Results   Component Value  Date    HCT 46.1 08/25/2022    HCT 41.7 06/25/2021     No components found for: MCT  Lab Results   Component Value Date    MCV 83 08/25/2022    MCV 81.4 06/25/2021     Lab Results   Component Value Date    MCH 27.7 08/25/2022    MCH 28.1 06/25/2021     Lab Results   Component Value Date    MCHC 33.2 08/25/2022    MCHC 34.5 06/25/2021     Lab Results   Component Value Date    RDW 12.4 08/25/2022    RDW 12.3 06/16/2021     Lab Results   Component Value Date     08/25/2022     06/25/2021       Last Comprehensive Metabolic Panel:  Sodium   Date Value Ref Range Status   08/28/2022 140 133 - 144 mmol/L Final   06/20/2022 140 134 - 144 mmol/L Final     Potassium   Date Value Ref Range Status   08/28/2022 3.8 3.4 - 5.3 mmol/L Final   06/20/2022 4.0 3.5 - 5.2 mmol/L Final     Chloride   Date Value Ref Range Status   08/28/2022 107 94 - 109 mmol/L Final   06/20/2022 102 96 - 106 mmol/L Final     Carbon Dioxide   Date Value Ref Range Status   01/19/2020 28 20 - 32 mmol/L Final     Carbon Dioxide (CO2)   Date Value Ref Range Status   08/28/2022 29 20 - 32 mmol/L Final     Anion Gap   Date Value Ref Range Status   08/28/2022 4 3 - 14 mmol/L Final   01/19/2020 6 3 - 14 mmol/L Final     Glucose   Date Value Ref Range Status   08/28/2022 106 (H) 70 - 99 mg/dL Final   06/20/2022 99 65 - 99 mg/dL Final     Urea Nitrogen   Date Value Ref Range Status   08/28/2022 8 7 - 30 mg/dL Final   06/20/2022 14 8 - 27 mg/dL Final     BUN/Creatinine Ratio   Date Value Ref Range Status   06/20/2022 14 12 - 28 Final     Creatinine   Date Value Ref Range Status   08/28/2022 0.94 0.52 - 1.04 mg/dL Final   06/20/2022 1.02 (H) 0.57 - 1.00 mg/dL Final     GFR Estimate   Date Value Ref Range Status   08/28/2022 65 >60 mL/min/1.73m2 Final     Comment:     Effective December 21, 2021 eGFRcr in adults is calculated using the 2021 CKD-EPI creatinine equation which includes age and gender (Onur et al., NEJM, DOI: 10.1056/XGGGtg6907188)    01/19/2020 64 >60 mL/min/[1.73_m2] Final     Comment:     Non  GFR Calc  Starting 12/18/2018, serum creatinine based estimated GFR (eGFR) will be   calculated using the Chronic Kidney Disease Epidemiology Collaboration   (CKD-EPI) equation.       Calcium   Date Value Ref Range Status   08/28/2022 8.7 8.5 - 10.1 mg/dL Final   06/20/2022 9.5 8.7 - 10.3 mg/dL Final     Bilirubin Total   Date Value Ref Range Status   08/25/2022 0.7 0.2 - 1.3 mg/dL Final   06/25/2021 0.6 0.0 - 1.2 mg/dL Final     Alkaline Phosphatase   Date Value Ref Range Status   08/25/2022 81 40 - 150 U/L Final   06/25/2021 82 48 - 121 IU/L Final     ALT   Date Value Ref Range Status   08/25/2022 18 0 - 50 U/L Final   06/25/2021 11 0 - 32 IU/L Final     AST   Date Value Ref Range Status   08/25/2022 17 0 - 45 U/L Final   06/25/2021 13 0 - 40 IU/L Final                     Assessment and Plan:   Macey Romero is a 71 year old female who presented with chronic constipation.    Plan:   Admitted to hospitalist   Surgery: No urgent indication for urgent surgery at this time.   Diet: Ok for CLD from surgical perspective   IV Fluids: per hospitalist   Antibiotics: per hospitalist   Medications: Agreed with medical management of constipation. Pt c/o significant reflux, given esophagitis and shatzki ring on EGD, pt may benefit from PPI initiation   Labs:   - Reviewed: Yes   Imaging:   - Dr. Foster and myself have personally viewed: CT abd/pelvis large amount of formed stool throughout abdomen; Colonoscopy  - Ordered: GI suggested GGE to evaluate narrowing today   Activity:  OOB, ambulate as able   DVT prophylaxis: SCD s, dvt ppx per medicine   This plan has been discussed with Dr. Foster and Selena    Patient specific identified risk factors considered as part of today s evaluation include: h/o breast cancer    Additional history obtained from chart review.  Time spent on consultation: 90 minutes, greater than 50% spent on counseling and/or  coordination of care.       Jeri Boston MD  Colorectal Surgery Fellow    [facility]  [address]  [place] [address]  T: [phone]  F: [phone]

## 2022-08-28 NOTE — PROGRESS NOTES
Observation goals  PRIOR TO DISCHARGE       Comments:  -diagnostic tests and consults completed and resulted Met  -vital signs normal or at patient baseline Met  -tolerating oral intake to maintain hydration Met  Nurse to notify provider when observation goals have been met and patient is ready for discharge.

## 2022-08-28 NOTE — PROGRESS NOTES
[facility]  Gastroenterology Progress Note     Macey Romero MRN# [id number]   YOB: 1950 Age: 71 year old          Assessment and Plan:       Hematochezia    Colitis    Hemorrhoids, unspecified hemorrhoid type  Patient continues to have abdominal distention cramps symptoms.  I will recommend the patient to continue on stool softener and MiraLAX findings are consistent with significant stricture in the sigmoid colon I will recommend her to be evaluated with Gastrografin enema tomorrow consider further evaluation and colorectal surgery.  Finding and plan discussed with the hospitalist team.  Await biopsy results            Colitis  Hemorrhoids, unspecified hemorrhoid type  Hematochezia      Interval History:     doing well; no cp, sob, n/v/d, or abd pain.              Review of Systems:     C: NEGATIVE for fever, chills, change in weight  E/M: NEGATIVE for ear, mouth and throat problems  R: NEGATIVE for significant cough or SOB  CV: NEGATIVE for chest pain, palpitations or peripheral edema             Medications:   I have reviewed this patient's current medications    linaclotide  290 mcg Oral QAM AC     pantoprazole  40 mg Oral BID AC     polyethylene glycol  17 g Oral BID     senna-docusate  1 tablet Oral BID    Or     senna-docusate  2 tablet Oral BID     sodium chloride (PF)  3 mL Intracatheter Q8H     sodium phosphate  1 enema Rectal Once                  Physical Exam:   Vitals were reviewed  Vital Signs with Ranges  Temp:  [98  F (36.7  C)-98.3  F (36.8  C)] 98.1  F (36.7  C)  Pulse:  [73-83] 73  Resp:  [16-22] 18  BP: (121-123)/(59-69) 123/69  SpO2:  [92 %-100 %] 100 %  No intake/output data recorded.             Data:   I reviewed the patient's new clinical lab test results.   Recent Labs   Lab Test 08/25/22  1456 12/12/21  2101 06/25/21  1045   WBC 8.4 9.4 6.2   HGB 15.3 14.3 14.4   MCV 83 84 81.4    229 201     Recent Labs   Lab Test 08/28/22  0621  08/27/22  0637 08/25/22  1456   POTASSIUM 3.8 3.9 3.7   CHLORIDE 107 106 101   CO2 29 26 29   BUN 8 8 14   ANIONGAP 4 5 5     Recent Labs   Lab Test 08/25/22  1456 06/25/21  1046 01/19/20  1415 01/19/20  1131 12/15/17  1030 10/24/17  1839 10/24/17  1830 04/07/17  2258 04/07/17  2045   ALBUMIN 3.9 4.4  --  3.6   < > 3.9  --   --  3.6   BILITOTAL 0.7 0.6  --  0.6   < > 0.3  --   --  0.3   ALT 18 11  --  119*   < > 30  --   --  18   AST 17 13  --  60*   < > 19  --   --  16   PROTEIN  --   --  10*  --   --   --  Negative Negative  --    LIPASE 59*  --   --   --   --  107  --   --  102    < > = values in this interval not displayed.       I reviewed the patient's new imaging results.    All laboratory data reviewed  All imaging studies reviewed by me.    Abraham Rogers MD,  8/28/2022  [facility]  Office : [phone]  Cell: [phone]      [facility]  Ph: [phone] Fax: [phone]

## 2022-08-28 NOTE — PROGRESS NOTES
Orientation/Cognitive: A&Ox4  Observation Goals (Met/ Not Met): Inpatient   Mobility Level/Assist Equipment: Indep  Fall Risk (Y/N): N  Behavior Concerns: N/A  Pain Management: oxycodone & tylenol   Tele/VS/O2: VSS  ABNL Lab/BG: N/A  Diet: Full liquid   Bowel/Bladder: Continent  Skin Concerns: bilateral mastectomy scar  Drains/Devices: IV SL  Tests/Procedures for next shift: GGE  Anticipated DC date & active delays: 8/29  Patient Stated Goal for Today: to get some abdominal relief

## 2022-08-28 NOTE — PLAN OF CARE
Orientation/Cognitive: AO X4  Observation Goals (Met/ Not Met): Partially Met  Mobility Level/Assist Equipment: Independent  Fall Risk (Y/N): N  Behavior Concerns: None  Pain Management:  Oxycodone available, Tylenol given x1 a and Levsin for abd cramping given x1  Tele/VS/O2: VSS on RA  ABNL Lab/BG: CT ABD showed Colitis  Diet: Low fiber   Bowel/Bladder: Continent  Skin Concerns: Santhosh. mastectomy scar  Drains/Devices: No IV access MD aware per evening shift RN   Tests/Procedures for next shift: Colonoscopy and EGD done  Anticipated DC date & active delays: Today 8/28  Patient Stated Goal for Today: Feel better    Observation goals  PRIOR TO DISCHARGE       Comments:  -diagnostic tests and consults completed and resulted Met  -vital signs normal or at patient baseline Met  -tolerating oral intake to maintain hydration Met  Nurse to notify provider when observation goals have been met and patient is ready for discharge.

## 2022-08-28 NOTE — PROGRESS NOTES
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  She was admitted on 8/25/2022     Problem List:    Severe constipation and Sigmoid stenosis or Stricture     H/o irritable bowel syndrome   ? Sigmoid Colitis and Lower abdominal pain     Started on linzess    8/26/22 and dicyclomine 10mg po qid by GI 8/26/22,  No BM since yesterday, had only liquid stool after colon prep.   CT of the abd with evidence of sigmoid colitis.   GI consulted; plan for EGD and colonoscopy later today  EGD shows gastritis, and reflux esophagitis, biopsy taken for H pylori   Colonoscopy shows stricture in the sigmoid colon which was biopsied   Stool in the rest of the colon. Terminal ileum was normal.   Awaiting GI follow up and recommendation regarding sigmoid stricture as patient still  Has constipation and lower abdominal pain.   I will stop Bentyl, put her on full liquid diet, will restart Miralax, and give fleet enema.  Will do Gastrografin enema and consult Colorectal surgery today, her symptoms are  Secondary to Significant sigmoid stricture.      GERD  On daily PPI - will continue      H/o hemorrhoids  Suspect rectal bleeding PTA was from hemorrhoids and attempt to have BM,  Likely prolapse with straining, CRS consulted today      H/o glucose intolerance  Last hgbA1c noted to be 6.0 (6/2022)            Diet: Low Fiber Diet  -   DVT Prophylaxis: Pneumatic Compression Devices  Boudreaux Catheter: Not present  Code Status: Full Code      Disposition Plan   Pending CRS evaluation     Entered: Collin Walters MD 08/28/2022, 10:16 AM     The patient's care was discussed  with the Patient and  Bedside RN present.      Interval History   Patient complaints of cramps, lower abdominal pain, constipation and mild nausea. Has some dry mouth as well.     -Data reviewed today: I reviewed all new labs and imaging results over the last 24 hours. I personally reviewed no images or EKG's today.    Physical Exam   Temp: 98.1  F (36.7  C) Temp src: Oral BP: 123/69 Pulse: 73   Resp: 18 SpO2: 100 % O2 Device: None (Room air)    Vitals:    08/25/22 1437 08/25/22 2210   Weight: 70.3 kg (155 lb) 72.7 kg (160 lb 4.8 oz)     Vital Signs with Ranges  Temp:  [97.8  F (36.6  C)-98.3  F (36.8  C)] 98.1  F (36.7  C)  Pulse:  [71-83] 73  Resp:  [16-28] 18  BP: (108-123)/(54-69) 123/69  SpO2:  [92 %-100 %] 100 %  No intake/output data recorded.    GEN:  Alert, oriented x 3, appears fairly comfortable at rest in the bed, no overt respiratory distress.  HEENT:  Normocephalic/atraumatic, no scleral icterus, no nasal discharge, mouth and membranes appear more  moist  NECK:  No clear thyromegaly or JVD  CV:  Regular rate and rhythm, no loud murmur to ausc.  S1 + S2 noted, no S3 or S4.  LUNGS:  Clear to auscultation ant/lat bilaterally.  No rales/rhonchi/wheezing auscultated bilaterally.  No costal retractions bilaterally.  Symmetric chest rise on inhalation noted.  ABD:  Soft, mild distention, mild lower abdominal tenderness. No organomegaly   EXT:  No pretibial edema or cyanosis bilaterally. No joint synovitis noted.  No calf-tenderness or asymmetry noted.  SKIN:  Dry to touch, no rashes or jaundice noted.  PSYCH:  noticably anxious about procedures and sedation plan today.  Cooperative.  Maintains direct eye contact.  NEURO:  No tremors at rest, speech is clear and appropriate. CN 2-12 intact to gross testing bilaterally    Data   Labs:  Recent Labs   Lab 08/28/22  0621 08/27/22  0637 08/25/22  1456    137 135   POTASSIUM 3.8 3.9 3.7   CHLORIDE 107 106 101   CO2 29 26 29   ANIONGAP 4 5 5   * 98 150*    BUN 8 8 14   CR 0.94 0.90 1.10*   GFRESTIMATED 65 68 53*   DONNA 8.7 8.6 9.9     Recent Labs   Lab 08/25/22  1456   WBC 8.4   HGB 15.3   HCT 46.1   MCV 83        Recent Labs   Lab 08/25/22  1456   HGB 15.3      Recent Imaging:   No results found for this or any previous visit (from the past 24 hour(s)).    Medications       dicyclomine  10 mg Oral 4x Daily     linaclotide  290 mcg Oral QAM AC     pantoprazole  40 mg Oral BID AC     [Held by provider] polyethylene glycol  17 g Oral BID     senna-docusate  1 tablet Oral BID    Or     senna-docusate  2 tablet Oral BID

## 2022-08-28 NOTE — PROGRESS NOTES
08/28/22 0907   Quick Adds   Type of Visit Initial PT Evaluation   Living Environment   People in Home spouse   Current Living Arrangements house   Home Accessibility stairs within home   Number of Stairs, Within Home, Primary greater than 10 stairs   Stair Railings, Within Home, Primary railings safe and in good condition   Transportation Anticipated family or friend will provide   Living Environment Comments Pt has two story home but can stay on first floor if needed   Self-Care   Usual Activity Tolerance moderate   Current Activity Tolerance fair   Regular Exercise No   Equipment Currently Used at Home none   Fall history within last six months no   Activity/Exercise/Self-Care Comment Pt is independent at baseline, has 4WW that she uses as needed. Pt has history of previous ortho injuries and surgeries, wears knee brace and ankle boot on R LE, plans to have TKR at the end of September.   General Information   Onset of Illness/Injury or Date of Surgery 08/25/22   Referring Physician Nadine Agosto, DO   Patient/Family Therapy Goals Statement (PT) To get better   Pertinent History of Current Problem (include personal factors and/or comorbidities that impact the POC) Pt is 71 year old female adm on 8/25/22 with c/o abd pain and nausea, adm for management of constipation. Pt is scheduled to have R TKR on 9/26/22.   Existing Precautions/Restrictions no known precautions/restrictions   Cognition   Affect/Mental Status (Cognition) WFL   Orientation Status (Cognition) oriented x 4   Follows Commands (Cognition) WFL   Pain Assessment   Patient Currently in Pain   (c/o generalized pain from arthritis)   Posture    Posture Forward head position   Range of Motion (ROM)   ROM Comment B LE ROM WFL   Strength (Manual Muscle Testing)   Strength Comments B LE strength grossly 3/5   Bed Mobility   Bed Mobility no deficits identified   Transfers   Transfers no deficits identified   Gait/Stairs (Locomotion)    Dooly Level (Gait) modified independence   Assistive Device (Gait) walker, front-wheeled   Distance in Feet (Required for LE Total Joints) 300'   Comment, (Gait/Stairs) Pt ambulates with steady gait, no LOB, able to ambulate short distances without assistive device, uses walker for longer distances.   Balance   Balance Comments No LOB with functional tasks including ambulation, reaching tasks to perform ADL   Clinical Impression   Criteria for Skilled Therapeutic Intervention Evaluation only;No problems identified which require skilled intervention   Clinical Presentation (PT Evaluation Complexity) Stable/Uncomplicated   Clinical Presentation Rationale Current presentation, [facility]   Clinical Decision Making (Complexity) low complexity   Risk & Benefits of therapy have been explained evaluation/treatment results reviewed;care plan/treatment goals reviewed;risks/benefits reviewed;current/potential barriers reviewed;participants voiced agreement with care plan;participants included;patient   Clinical Impression Comments Pt is independent with mobility currently including donning/doffing R knee and ankle braces. Education provided on completing LE exercises prior to knee surgery. Pt appears quite anxious over upcoming TKR surgery, education provided on role of PT after surgery both inpatient and outpatient. Reassurance and encouragement provided.   PT Discharge Planning   PT Discharge Recommendation (DC Rec) home   PT Rationale for DC Rec Pt appears to be at baseline level of function, no skilled PT indicated at this time, will benefit from skilled PT after TKR surgery in September   PT Brief overview of current status Independent to mod I with FWW   Total Evaluation Time   Total Evaluation Time (Minutes) 34

## 2022-08-28 NOTE — PROGRESS NOTES
[facility]  Gastroenterology Progress Note     Macey Romero MRN# [id number]   YOB: 1950 Age: 71 year old          Assessment and Plan:       Hematochezia    Colitis    Hemorrhoids, unspecified hemorrhoid type  Patient continues to have abdominal pain.  Patient had EGD and colonoscopy done today EGD findings were consistent with gastritis otherwise fairly unremarkable.  Colonoscopy findings consistent with tight stricture in the sigmoid colon about 20 cm from anal verge stricture was about 6 cm long stricture finding consistent with external compression adhesion scars possible old diverticulitis patient has distended colon proximal to stricture with extensive solid and liquid stool along with dilated lumen.  Patient's colon was decompressed.  Patient did very well biopsies were taken from stricture area.  I will recommend the patient to continue on MiraLAX low fiber diet and stool softener further evaluation and colorectal surgery await biopsy result patient would most likely need surgical resection of the strictured area which is contributing to her symptoms of constipation and abdominal cramps significant pressure and straining is leading to her hemorrhoids patient has had multiple hemorrhoid resections with recurrent problem which is due to her excessive straining finding and plan discussed in detail with patient and explained to the patient.            Colitis  Hemorrhoids, unspecified hemorrhoid type  Hematochezia      Interval History:     doing well; no cp, sob, n/v/d, or abd pain.              Review of Systems:     C: NEGATIVE for fever, chills, change in weight  E/M: NEGATIVE for ear, mouth and throat problems  R: NEGATIVE for significant cough or SOB  CV: NEGATIVE for chest pain, palpitations or peripheral edema             Medications:   I have reviewed this patient's current medications    linaclotide  290 mcg Oral QAM AC     pantoprazole  40 mg Oral BID AC      polyethylene glycol  17 g Oral BID     senna-docusate  1 tablet Oral BID    Or     senna-docusate  2 tablet Oral BID     sodium chloride (PF)  3 mL Intracatheter Q8H     sodium phosphate  1 enema Rectal Once                  Physical Exam:   Vitals were reviewed  Vital Signs with Ranges  Temp:  [98  F (36.7  C)-98.3  F (36.8  C)] 98.1  F (36.7  C)  Pulse:  [73-83] 73  Resp:  [16-22] 18  BP: (121-123)/(59-69) 123/69  SpO2:  [92 %-100 %] 100 %  No intake/output data recorded.  Cardiovascular: negative, PMI normal. No lifts, heaves, or thrills. RRR. No murmurs, clicks gallops or rub  Respiratory: negative, Percussion normal. Good diaphragmatic excursion. Lungs clear  Head: Normocephalic. No masses, lesions, tenderness or abnormalities  Neck: Neck supple. No adenopathy. Thyroid symmetric, normal size,, Carotids without bruits.  Abdomen: Abdomen soft, non-tender. BS normal. No masses, organomegaly  SKIN: no suspicious lesions or rashes             Data:   I reviewed the patient's new clinical lab test results.   Recent Labs   Lab Test 08/25/22  1456 12/12/21  2101 06/25/21  1045   WBC 8.4 9.4 6.2   HGB 15.3 14.3 14.4   MCV 83 84 81.4    229 201     Recent Labs   Lab Test 08/28/22  0621 08/27/22  0637 08/25/22  1456   POTASSIUM 3.8 3.9 3.7   CHLORIDE 107 106 101   CO2 29 26 29   BUN 8 8 14   ANIONGAP 4 5 5     Recent Labs   Lab Test 08/25/22  1456 06/25/21  1046 01/19/20  1415 01/19/20  1131 12/15/17  1030 10/24/17  1839 10/24/17  1830 04/07/17  2258 04/07/17  2045   ALBUMIN 3.9 4.4  --  3.6   < > 3.9  --   --  3.6   BILITOTAL 0.7 0.6  --  0.6   < > 0.3  --   --  0.3   ALT 18 11  --  119*   < > 30  --   --  18   AST 17 13  --  60*   < > 19  --   --  16   PROTEIN  --   --  10*  --   --   --  Negative Negative  --    LIPASE 59*  --   --   --   --  107  --   --  102    < > = values in this interval not displayed.       I reviewed the patient's new imaging results.    All laboratory data reviewed  All imaging studies  reviewed by me.    Abraham Rogers MD,  8/28/2022  [facility]  Office : [phone]  Cell: [phone]      [facility]  Ph: [phone] Fax: [phone]

## 2022-08-28 NOTE — UTILIZATION REVIEW
Admission Status; Secondary Review Determination       Under the authority of the Utilization Management Committee, the utilization review process indicated a secondary review on the above patient. The review outcome is based on review of the medical records, discussions with staff, and applying clinical experience noted on the date of the review.     (x) Inpatient Status Appropriate - This patient's medical care is consistent with medical management for inpatient care and reasonable inpatient medical practice.     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   Patient requires inpatient admission versus short stay observation or outpatient treatment for the following reasons: Initially on observation with concern for severe constipation, she has been receiving observation care for 3 midnights, continues to be significantly symptomatic, there is concern for sigmoid colitis and sigmoid stricture gastroenterology recommending colorectal surgery evaluation.  Patient continues to require ongoing hospital care, has Medicare and is appropriate for inpatient admission for ongoing evaluation and management.  Dr. Walters notified.      The expected length of stay at the time of admission was more than 2 nights because of the severity of illness, intensity of service provided, and risk for adverse outcome. Inpatient admission is appropriate.         This document was produced using voice recognition software       The information on this document is developed by the utilization review team in order for the business office to ensure compliance. This only denotes the appropriateness of proper admission status and does not reflect the quality of care rendered.   The definitions of Inpatient Status and Observation Status used in making the  determination above are those provided in the CMS Coverage Manual, Chapter 1 and Chapter 6, section 70.4.   Sincerely,   FAITH MENDENHALL MD   System Medical Director   Utilization Management   [facility].

## 2022-08-28 NOTE — PROVIDER NOTIFICATION
MD Notification    Notified Person: MD    Notified Person Name: Selena    Notification Date/Time: 10:18am    Notification Interaction: [employer] messaging     Purpose of Notification: patient complaining of abdominal distention, fullness and constipation    Orders Received:    Comments:

## 2022-08-29 ENCOUNTER — APPOINTMENT (OUTPATIENT)
Dept: GENERAL RADIOLOGY | Facility: CLINIC | Age: 72
DRG: 392 | End: 2022-08-29
Attending: INTERNAL MEDICINE
Payer: MEDICARE

## 2022-08-29 PROCEDURE — 74270 X-RAY XM COLON 1CNTRST STD: CPT

## 2022-08-29 PROCEDURE — 250N000013 HC RX MED GY IP 250 OP 250 PS 637: Performed by: PHYSICIAN ASSISTANT

## 2022-08-29 PROCEDURE — 120N000001 HC R&B MED SURG/OB

## 2022-08-29 PROCEDURE — 99232 SBSQ HOSP IP/OBS MODERATE 35: CPT | Performed by: STUDENT IN AN ORGANIZED HEALTH CARE EDUCATION/TRAINING PROGRAM

## 2022-08-29 PROCEDURE — 258N000003 HC RX IP 258 OP 636: Performed by: COLON & RECTAL SURGERY

## 2022-08-29 PROCEDURE — 250N000013 HC RX MED GY IP 250 OP 250 PS 637: Performed by: STUDENT IN AN ORGANIZED HEALTH CARE EDUCATION/TRAINING PROGRAM

## 2022-08-29 PROCEDURE — 250N000013 HC RX MED GY IP 250 OP 250 PS 637: Performed by: INTERNAL MEDICINE

## 2022-08-29 RX ORDER — PANTOPRAZOLE SODIUM 40 MG/1
40 TABLET, DELAYED RELEASE ORAL
Status: DISCONTINUED | OUTPATIENT
Start: 2022-08-30 | End: 2022-08-30 | Stop reason: HOSPADM

## 2022-08-29 RX ORDER — OXYCODONE HYDROCHLORIDE 5 MG/1
5 TABLET ORAL EVERY 4 HOURS PRN
Status: DISCONTINUED | OUTPATIENT
Start: 2022-08-29 | End: 2022-08-30 | Stop reason: HOSPADM

## 2022-08-29 RX ORDER — SODIUM CHLORIDE, SODIUM LACTATE, POTASSIUM CHLORIDE, CALCIUM CHLORIDE 600; 310; 30; 20 MG/100ML; MG/100ML; MG/100ML; MG/100ML
INJECTION, SOLUTION INTRAVENOUS CONTINUOUS
Status: DISCONTINUED | OUTPATIENT
Start: 2022-08-29 | End: 2022-08-30 | Stop reason: HOSPADM

## 2022-08-29 RX ADMIN — PANTOPRAZOLE SODIUM 40 MG: 40 TABLET, DELAYED RELEASE ORAL at 06:44

## 2022-08-29 RX ADMIN — FLUTICASONE FUROATE AND VILANTEROL TRIFENATATE 1 PUFF: 200; 25 POWDER RESPIRATORY (INHALATION) at 21:17

## 2022-08-29 RX ADMIN — OXYCODONE HYDROCHLORIDE 2.5 MG: 5 TABLET ORAL at 07:55

## 2022-08-29 RX ADMIN — OXYCODONE HYDROCHLORIDE 5 MG: 5 TABLET ORAL at 21:13

## 2022-08-29 RX ADMIN — LINACLOTIDE 290 MCG: 290 CAPSULE, GELATIN COATED ORAL at 06:43

## 2022-08-29 RX ADMIN — SODIUM CHLORIDE, POTASSIUM CHLORIDE, SODIUM LACTATE AND CALCIUM CHLORIDE: 600; 310; 30; 20 INJECTION, SOLUTION INTRAVENOUS at 15:18

## 2022-08-29 RX ADMIN — SENNOSIDES AND DOCUSATE SODIUM 2 TABLET: 50; 8.6 TABLET ORAL at 07:51

## 2022-08-29 RX ADMIN — POLYETHYLENE GLYCOL 3350 17 G: 17 POWDER, FOR SOLUTION ORAL at 21:14

## 2022-08-29 RX ADMIN — POLYETHYLENE GLYCOL 3350 17 G: 17 POWDER, FOR SOLUTION ORAL at 07:51

## 2022-08-29 RX ADMIN — DIATRIZOATE MEGLUMINE AND DIATRIZOATE SODIUM 480 ML: 660; 100 SOLUTION ORAL; RECTAL at 09:51

## 2022-08-29 ASSESSMENT — ACTIVITIES OF DAILY LIVING (ADL)
ADLS_ACUITY_SCORE: 33

## 2022-08-29 NOTE — PROGRESS NOTES
Colon and Rectal Surgery Progress Note          Assessment and Plan:     70 yo F who presents with crampy abdominal pain and constipation.  C scope recently concerning for stricture.  Stricture not seen on CT or GGE.  Also has issues with severe reflux which is limiting her water intake.  Also reports bulge in vagina with bearing down.  Has hard ball like stools.   Symptoms are likely multifactorial.  Needs to increase water intake as well as either be on a fiber supplement or daily miralax.  Will defer to GI for management and eval of upper GI symptoms.    Needs outpatient pelvic floor testing to evaluate for rectocele.  Will arrange for follow up once discharged.    I have recommended a repeat flex sig with me to evaluate sigmoid colon stricture as this not well seen on CT or GGE..  Plan for flex sig tomorrow.    Poor oral intake today.  Feels dehydrated.  Will start some IV fluids      Niko Wong MD FACS FASCRS  Colorectal Surgeon       [facility]  [address], [address]  [place] [address]  T: [phone]  F: [phone]  Pager: [phone]  [email or web address]                 Interval History:     Reports long history of high sugar diet.  Is not on fiber or miralax.  Has seen us previously for hemorrhoids only.  Called the office last week with crampy abdominal pain.  Advised to present to ED.  CT scan, EGD, and c scope done.  Findings noted.  Continues to have abdominal pain in mid abdomen, crampy.  Feels dehydrated. Poor oral intake              Physical Exam:   Vitals were reviewed  Patient Vitals for the past 24 hrs:   BP Temp Temp src Pulse Resp SpO2   08/29/22 0746 138/68 97.5  F (36.4  C) Oral 76 18 93 %   08/29/22 0331 131/62 98  F (36.7  C) Oral 73 16 93 %   08/28/22 2242 139/66 98.9  F (37.2  C) Oral 77 16 92 %   08/28/22 2003 121/67 98.4  F (36.9  C) Oral 74 18 95 %   08/28/22 1715 139/69 98.5  F (36.9  C) Oral 72 18 92 %         Intake/Output Summary (Last 24 hours)  at 8/29/2022 1433  Last data filed at 8/28/2022 1843  Gross per 24 hour   Intake 360 ml   Output --   Net 360 ml       Head - Nomocephalic atraumatic  Sclera anicteric  Extremities warm and well perfused  Lungs - breathing non labored,   Abdomen - soft, flat, uncomfortable with palpation  Rectal exam - deferred  Skin - no rash  Psych - affect appropriate  Neuro - no focal deficits             Data:   All laboratory and imaging data in the past 24 hours reviewed    CBC  Lab Results   Component Value Date    WBC 8.4 08/25/2022    WBC 9.4 12/12/2021    WBC 6.2 06/25/2021    HGB 15.3 08/25/2022    HGB 14.3 12/12/2021    HGB 14.4 06/25/2021    HCT 46.1 08/25/2022    HCT 43.9 12/12/2021    HCT 41.7 06/25/2021     08/25/2022     12/12/2021     06/25/2021       BMP  Recent Labs   Lab Test 08/28/22  0621 08/27/22  0637    137   POTASSIUM 3.8 3.9   CHLORIDE 107 106   CO2 29 26   ANIONGAP 4 5   * 98   BUN 8 8   CR 0.94 0.90   DONNA 8.7 8.6       Liver Function Studies -   Recent Labs   Lab Test 08/25/22  1456   PROTTOTAL 7.5   ALBUMIN 3.9   BILITOTAL 0.7   ALKPHOS 81   AST 17   ALT 18                    Medications:     Current Facility-Administered Medications Ordered in Epic   Medication Dose Route Frequency Last Rate Last Admin     acetaminophen (TYLENOL) tablet 650 mg  650 mg Oral Q6H PRN   650 mg at 08/28/22 0203    Or     acetaminophen (TYLENOL) Suppository 650 mg  650 mg Rectal Q6H PRN         bisacodyl (DULCOLAX) suppository 10 mg  10 mg Rectal Daily PRN         fluticasone-vilanterol (BREO ELLIPTA) 200-25 MCG/INH inhaler 1 puff  1 puff Inhalation Daily         hyoscyamine (LEVSIN) tablet 125 mcg  125 mcg Oral Q4H PRN   125 mcg at 08/28/22 0203     linaclotide (LINZESS) capsule 290 mcg  290 mcg Oral QAM AC   290 mcg at 08/29/22 0643     melatonin tablet 1 mg  1 mg Oral At Bedtime PRN         naloxone (NARCAN) injection 0.2 mg  0.2 mg Intravenous Q2 Min PRN        Or     naloxone (NARCAN)  injection 0.4 mg  0.4 mg Intravenous Q2 Min PRN        Or     naloxone (NARCAN) injection 0.2 mg  0.2 mg Intramuscular Q2 Min PRN        Or     naloxone (NARCAN) injection 0.4 mg  0.4 mg Intramuscular Q2 Min PRN         ondansetron (ZOFRAN ODT) ODT tab 4 mg  4 mg Oral Q6H PRN   4 mg at 08/26/22 1815    Or     ondansetron (ZOFRAN) injection 4 mg  4 mg Intravenous Q6H PRN   4 mg at 08/26/22 0155     oxyCODONE (ROXICODONE) tablet 5 mg  5 mg Oral Q4H PRN         [START ON 8/30/2022] pantoprazole (PROTONIX) EC tablet 40 mg  40 mg Oral QAM AC         polyethylene glycol (MIRALAX) Packet 17 g  17 g Oral BID   17 g at 08/29/22 0751     senna-docusate (SENOKOT-S/PERICOLACE) 8.6-50 MG per tablet 1 tablet  1 tablet Oral BID        Or     senna-docusate (SENOKOT-S/PERICOLACE) 8.6-50 MG per tablet 2 tablet  2 tablet Oral BID   2 tablet at 08/29/22 0751     sodium chloride (PF) 0.9% PF flush 3 mL  3 mL Intracatheter Q8H   3 mL at 08/29/22 1038     sodium chloride (PF) 0.9% PF flush 3 mL  3 mL Intracatheter q1 min prn         No current Epic-ordered outpatient medications on file.

## 2022-08-29 NOTE — PROGRESS NOTES
Orientation/Cognitive: A&Ox4  Observation Goals (Met/ Not Met): Inpatient   Mobility Level/Assist Equipment: Indep  Fall Risk (Y/N): N  Behavior Concerns: N/A  Pain Management: oxycodone & tylenol   Tele/VS/O2: VSS  ABNL Lab/BG: N/A  Diet: Full liquid   Bowel/Bladder: Continent  Skin Concerns: bilateral mastectomy scar  Drains/Devices: IV infusing at 75mL/hr  Tests/Procedures for next shift: flex sig   Anticipated DC date & active delays: 8/30  Patient Stated Goal for Today: to get some abdominal relief

## 2022-08-29 NOTE — PROGRESS NOTES
CROSS COVER:    Paged regarding Colorectal progress note deferring IV fluids to Hospitalist.    *Patient currently on Full Liquid diet but continued abdominal discomfort with PO intake.  BMP with renal function that is within normal limits.    - No IV Fluids at this time.      Harley Munoz PA-C

## 2022-08-29 NOTE — PROGRESS NOTES
[facility]  Gastroenterology Progress Note     Macey Romero MRN# [id number]   YOB: 1950 Age: 71 year old          Assessment and Plan:   Macey Alejandre is a 71 year old female with c/o IBS-C with severe constipation, abdominal pain, and rectal bleeding.    Hematochezia  Colitis  Hemorrhoids, unspecified hemorrhoid type  IBS-C  Sigmoid Stricture  Has suffered from IBS-C since childhood with no significant improvement with otc options  Has significant rectal pressure. CT confirms large stool burden and imaging suggestive of sigmoid colitis  Has had protruding hemorrhoids with bleeding when attempting to have a bowel movement with bright red blood  Patient has severe IBS-C and would benefit from Linzess 290. Has likely bleeding hemorrhoids with bearing down and concern for possible ulceration and ischemic colitis based on CT.  8/27 Colonoscopy revealed extrinsic severe stenosis 6 cm in length in the sigmoid colon. Dilated.  Copious amounts of stool seen in entire colon  Xray of colon with gastrografin ordered for this a.m.-results pending     --Colorectal consulted  -- Continue stool softener and miralax  -- Tolerating full liquid     Heartburn  Esophagitis  Concern for esophageal reflux vs H pylori vs possible gastroparesis due to severe IBS-C  8/27/22 EGD revealed diffuse moderately erythematous mucosa. H pylori testing performed. Grade C esophagitis and mild Schatzki's ring and small hiatal hernia seen    -- recommend daily pantoprazole                  Interval History:   no new complaints and doing well; no cp, sob, n/v/d, or abd pain.              Review of Systems:   C: NEGATIVE for fever, chills, change in weight  E/M: NEGATIVE for ear, mouth and throat problems  R: NEGATIVE for significant cough or SOB  CV: NEGATIVE for chest pain, palpitations or peripheral edema             Medications:   I have reviewed this patient's current medications    linaclotide  290 mcg Oral  QAM AC     pantoprazole  40 mg Oral BID AC     polyethylene glycol  17 g Oral BID     senna-docusate  1 tablet Oral BID    Or     senna-docusate  2 tablet Oral BID     sodium chloride (PF)  3 mL Intracatheter Q8H                  Physical Exam:   Vitals were reviewed  Vital Signs with Ranges  Temp:  [97.5  F (36.4  C)-98.9  F (37.2  C)] 97.5  F (36.4  C)  Pulse:  [69-77] 76  Resp:  [16-18] 18  BP: (121-142)/(62-69) 138/68  SpO2:  [92 %-96 %] 93 %  I/O last 3 completed shifts:  In: 360 [P.O.:360]  Out: -   Constitutional: healthy, alert and no distress   Cardiovascular: negative, PMI normal. No lifts, heaves, or thrills. RRR. No murmurs, clicks gallops or rub  Respiratory: negative, Percussion normal. Good diaphragmatic excursion. Lungs clear  Abdomen: Abdomen soft, non-tender. BS normal. No masses, organomegaly           Data:   I reviewed the patient's new clinical lab test results.   Recent Labs   Lab Test 08/25/22  1456 12/12/21  2101 06/25/21  1045   WBC 8.4 9.4 6.2   HGB 15.3 14.3 14.4   MCV 83 84 81.4    229 201     Recent Labs   Lab Test 08/28/22  0621 08/27/22  0637 08/25/22  1456   POTASSIUM 3.8 3.9 3.7   CHLORIDE 107 106 101   CO2 29 26 29   BUN 8 8 14   ANIONGAP 4 5 5     Recent Labs   Lab Test 08/25/22  1456 06/25/21  1046 01/19/20  1415 01/19/20  1131 12/15/17  1030 10/24/17  1839 10/24/17  1830 04/07/17  2258 04/07/17  2045   ALBUMIN 3.9 4.4  --  3.6   < > 3.9  --   --  3.6   BILITOTAL 0.7 0.6  --  0.6   < > 0.3  --   --  0.3   ALT 18 11  --  119*   < > 30  --   --  18   AST 17 13  --  60*   < > 19  --   --  16   PROTEIN  --   --  10*  --   --   --  Negative Negative  --    LIPASE 59*  --   --   --   --  107  --   --  102    < > = values in this interval not displayed.       I reviewed the patient's new imaging results.    All laboratory data reviewed  All imaging studies reviewed by me.    Anay Quinones PA-C,  8/29/2022  [facility]  Office : [phone]  Cell: [phone] (Dr. Rogers)  Cell: [phone] (Anay Quinones PA-C)

## 2022-08-29 NOTE — PROGRESS NOTES
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  She was admitted on 8/25/2022     Problem List:    Severe constipation and Sigmoid stenosis or Stricture     H/o irritable bowel syndrome   ? Sigmoid Colitis and Lower abdominal pain   Assessment: Started on linzess    8/26/22 and dicyclomine 10mg po qid by GI 8/26/22,  No BM since yesterday, had only liquid stool after colon prep.   CT of the abd with evidence of sigmoid colitis.   GI consulted; s/p colonoscopy -> 8/27  EGD shows gastritis, and reflux esophagitis, biopsy taken for H pylori. Colonoscopy shows stricture in the sigmoid colon which was biopsied   Stool in the rest of the colon. Terminal ileum was normal.   Awaiting GI follow up and recommendation regarding sigmoid stricture as patient still  Has constipation and lower abdominal pain.  Colorectal consulted -> Gastrografin enema completed  Secondary to Significant sigmoid stricture.      GERD  On daily PPI - will continue      H/o hemorrhoids  Suspect rectal bleeding PTA was from hemorrhoids and attempt to have BM,  Likely prolapse with straining, CRS consulted today      H/o glucose intolerance  Last hgbA1c noted to be 6.0 (6/2022)            Diet: Full Liquid Diet  -   DVT Prophylaxis: Pneumatic Compression Devices  Boudreaux Catheter: Not present  Code Status: Full Code      Disposition Plan   Pending CRS evaluation     Entered: Lopez Puente MD 08/29/2022, 1:14 PM     The patient's care was discussed with the Bedside Nurse and Patient    Interval History      No acute events overnight  Patient continues to have complaints of lower abdominal  cramps, lower abdominal pain, constipation and mild nausea. Has some dry mouth as well.   No fevers or chills  No CP/SOB  No new complaints    -Data reviewed today: I reviewed all new labs and imaging results over the last 24 hours. I personally reviewed no images or EKG's today.    Physical Exam   Temp: 97.5  F (36.4  C) Temp src: Oral BP: 138/68 Pulse: 76   Resp: 18 SpO2: 93 % O2 Device: None (Room air)    Vitals:    08/25/22 1437 08/25/22 2210   Weight: 70.3 kg (155 lb) 72.7 kg (160 lb 4.8 oz)     Vital Signs with Ranges  Temp:  [97.5  F (36.4  C)-98.9  F (37.2  C)] 97.5  F (36.4  C)  Pulse:  [72-77] 76  Resp:  [16-18] 18  BP: (121-139)/(62-69) 138/68  SpO2:  [92 %-95 %] 93 %  I/O last 3 completed shifts:  In: 360 [P.O.:360]  Out: -     GEN:  Alert, oriented x 3, appears fairly comfortable at rest in the bed, no overt respiratory distress.  HEENT:  Normocephalic/atraumatic, no scleral icterus, no nasal discharge, mouth and membranes appear more  moist  NECK:  No clear thyromegaly or JVD  CV:  Regular rate and rhythm, no loud murmur to ausc.  S1 + S2 noted, no S3 or S4.  LUNGS:  Clear to auscultation ant/lat bilaterally.  No rales/rhonchi/wheezing auscultated bilaterally.  No costal retractions bilaterally.  Symmetric chest rise on inhalation noted.  ABD:  Soft, mild distention, mild lower abdominal tenderness. No organomegaly   EXT:  No pretibial edema or cyanosis bilaterally. No joint synovitis noted.  No calf-tenderness or asymmetry noted.  SKIN:  Dry to touch, no rashes or jaundice noted.  PSYCH:  noticably anxious about procedures and sedation plan today.  Cooperative.  Maintains direct eye contact.  NEURO:  No tremors at rest, speech is clear and appropriate. CN 2-12 intact to gross testing bilaterally    Data   Labs:  Recent Labs   Lab 08/28/22  0621 08/27/22  0637 08/25/22  1456    137 135   POTASSIUM 3.8 3.9 3.7   CHLORIDE 107 106 101   CO2 29 26 29   ANIONGAP 4 5 5   * 98 150*   BUN 8 8 14    CR 0.94 0.90 1.10*   GFRESTIMATED 65 68 53*   DONNA 8.7 8.6 9.9     Recent Labs   Lab 08/25/22  1456   WBC 8.4   HGB 15.3   HCT 46.1   MCV 83        Recent Labs   Lab 08/25/22  1456   HGB 15.3      Recent Imaging:   Recent Results (from the past 24 hour(s))   XR Colon Water Soluble Diagnostic    Narrative    COLON WATER SOLUBLE  8/29/2022 9:52 AM     HISTORY: Sigmoid stricture    COMPARISON: None.    TECHNIQUE: Water soluble contrast was introduced into the colon  through a rectal tube under gravity.      FLUOROSCOPY TIME: 1.9 minutes.    SPOT FILMS: 7    FINDINGS: Contrast flowed easily into the colon.  The colon is widely  patent from the rectum to the cecum. Moderate amount of stool. There  was no reflux of contrast into the terminal ileum. There were no  filling defects.      Impression    IMPRESSION: No obstruction demonstrated.        Medications       fluticasone-vilanterol  1 puff Inhalation Daily     linaclotide  290 mcg Oral QAM AC     [START ON 8/30/2022] pantoprazole  40 mg Oral QAM AC     polyethylene glycol  17 g Oral BID     senna-docusate  1 tablet Oral BID    Or     senna-docusate  2 tablet Oral BID     sodium chloride (PF)  3 mL Intracatheter Q8H

## 2022-08-29 NOTE — PLAN OF CARE
Orientation/Cognitive: AO X4  Observation Goals (Met/ Not Met): Inpatient   Mobility Level/Assist Equipment: Independent  Fall Risk (Y/N): N  Behavior Concerns: None  Pain Management: Oxycodone & Tylenol available  Tele/VS/O2: VSS on RA  ABNL Lab/BG: None  Diet: Full Liquid   Bowel/Bladder: Continent  Skin Concerns: bilateral mastectomy scar  Drains/Devices: IV SL  Tests/Procedures for next shift: GGE today  Anticipated DC date & active delays: TBD  Patient Stated Goal for Today: Abdominal relief

## 2022-08-30 VITALS
HEIGHT: 63 IN | RESPIRATION RATE: 14 BRPM | BODY MASS INDEX: 28.4 KG/M2 | OXYGEN SATURATION: 95 % | DIASTOLIC BLOOD PRESSURE: 67 MMHG | WEIGHT: 160.3 LBS | TEMPERATURE: 97.7 F | HEART RATE: 66 BPM | SYSTOLIC BLOOD PRESSURE: 137 MMHG

## 2022-08-30 PROBLEM — K92.1 HEMATOCHEZIA: Status: RESOLVED | Noted: 2022-08-25 | Resolved: 2022-08-30

## 2022-08-30 PROBLEM — K64.9 HEMORRHOIDS, UNSPECIFIED HEMORRHOID TYPE: Status: RESOLVED | Noted: 2022-08-25 | Resolved: 2022-08-30

## 2022-08-30 LAB
ANION GAP SERPL CALCULATED.3IONS-SCNC: 8 MMOL/L (ref 3–14)
BUN SERPL-MCNC: 6 MG/DL (ref 7–30)
CALCIUM SERPL-MCNC: 8.5 MG/DL (ref 8.5–10.1)
CHLORIDE BLD-SCNC: 106 MMOL/L (ref 94–109)
CO2 SERPL-SCNC: 26 MMOL/L (ref 20–32)
CREAT SERPL-MCNC: 0.81 MG/DL (ref 0.52–1.04)
ERYTHROCYTE [DISTWIDTH] IN BLOOD BY AUTOMATED COUNT: 12.5 % (ref 10–15)
FLEXIBLE SIGMOIDOSCOPY: NORMAL
GFR SERPL CREATININE-BSD FRML MDRD: 77 ML/MIN/1.73M2
GLUCOSE BLD-MCNC: 96 MG/DL (ref 70–99)
HCT VFR BLD AUTO: 41.3 % (ref 35–47)
HGB BLD-MCNC: 13.7 G/DL (ref 11.7–15.7)
MCH RBC QN AUTO: 27.7 PG (ref 26.5–33)
MCHC RBC AUTO-ENTMCNC: 33.2 G/DL (ref 31.5–36.5)
MCV RBC AUTO: 83 FL (ref 78–100)
PATH REPORT.COMMENTS IMP SPEC: NORMAL
PATH REPORT.FINAL DX SPEC: NORMAL
PATH REPORT.GROSS SPEC: NORMAL
PATH REPORT.MICROSCOPIC SPEC OTHER STN: NORMAL
PATH REPORT.RELEVANT HX SPEC: NORMAL
PHOTO IMAGE: NORMAL
PLATELET # BLD AUTO: 196 10E3/UL (ref 150–450)
POTASSIUM BLD-SCNC: 3.7 MMOL/L (ref 3.4–5.3)
RBC # BLD AUTO: 4.95 10E6/UL (ref 3.8–5.2)
SODIUM SERPL-SCNC: 140 MMOL/L (ref 133–144)
WBC # BLD AUTO: 5.5 10E3/UL (ref 4–11)

## 2022-08-30 PROCEDURE — 36415 COLL VENOUS BLD VENIPUNCTURE: CPT | Performed by: STUDENT IN AN ORGANIZED HEALTH CARE EDUCATION/TRAINING PROGRAM

## 2022-08-30 PROCEDURE — G0500 MOD SEDAT ENDO SERVICE >5YRS: HCPCS | Performed by: COLON & RECTAL SURGERY

## 2022-08-30 PROCEDURE — 88305 TISSUE EXAM BY PATHOLOGIST: CPT | Mod: 26 | Performed by: PATHOLOGY

## 2022-08-30 PROCEDURE — 80048 BASIC METABOLIC PNL TOTAL CA: CPT | Performed by: STUDENT IN AN ORGANIZED HEALTH CARE EDUCATION/TRAINING PROGRAM

## 2022-08-30 PROCEDURE — 99239 HOSP IP/OBS DSCHRG MGMT >30: CPT | Performed by: NURSE PRACTITIONER

## 2022-08-30 PROCEDURE — 250N000011 HC RX IP 250 OP 636: Performed by: COLON & RECTAL SURGERY

## 2022-08-30 PROCEDURE — 258N000003 HC RX IP 258 OP 636: Performed by: COLON & RECTAL SURGERY

## 2022-08-30 PROCEDURE — 85027 COMPLETE CBC AUTOMATED: CPT | Performed by: STUDENT IN AN ORGANIZED HEALTH CARE EDUCATION/TRAINING PROGRAM

## 2022-08-30 PROCEDURE — 0DJD8ZZ INSPECTION OF LOWER INTESTINAL TRACT, VIA NATURAL OR ARTIFICIAL OPENING ENDOSCOPIC: ICD-10-PCS | Performed by: COLON & RECTAL SURGERY

## 2022-08-30 PROCEDURE — 45330 DIAGNOSTIC SIGMOIDOSCOPY: CPT | Performed by: COLON & RECTAL SURGERY

## 2022-08-30 PROCEDURE — 250N000013 HC RX MED GY IP 250 OP 250 PS 637: Performed by: STUDENT IN AN ORGANIZED HEALTH CARE EDUCATION/TRAINING PROGRAM

## 2022-08-30 PROCEDURE — 250N000013 HC RX MED GY IP 250 OP 250 PS 637: Performed by: INTERNAL MEDICINE

## 2022-08-30 RX ORDER — POLYETHYLENE GLYCOL 3350 17 G/17G
17 POWDER, FOR SOLUTION ORAL 2 TIMES DAILY
Qty: 90 EACH | Refills: 1 | Status: SHIPPED | OUTPATIENT
Start: 2022-08-30 | End: 2023-10-24

## 2022-08-30 RX ORDER — OXYCODONE HYDROCHLORIDE 5 MG/1
5 TABLET ORAL EVERY 4 HOURS PRN
Qty: 10 TABLET | Refills: 0 | Status: SHIPPED | OUTPATIENT
Start: 2022-08-30 | End: 2022-09-01

## 2022-08-30 RX ORDER — FENTANYL CITRATE 50 UG/ML
INJECTION, SOLUTION INTRAMUSCULAR; INTRAVENOUS PRN
Status: COMPLETED | OUTPATIENT
Start: 2022-08-30 | End: 2022-08-30

## 2022-08-30 RX ORDER — PANTOPRAZOLE SODIUM 40 MG/1
40 TABLET, DELAYED RELEASE ORAL
Qty: 90 TABLET | Refills: 1 | Status: SHIPPED | OUTPATIENT
Start: 2022-08-31 | End: 2022-09-01

## 2022-08-30 RX ORDER — ONDANSETRON 2 MG/ML
INJECTION INTRAMUSCULAR; INTRAVENOUS PRN
Status: COMPLETED | OUTPATIENT
Start: 2022-08-30 | End: 2022-08-30

## 2022-08-30 RX ADMIN — SENNOSIDES AND DOCUSATE SODIUM 2 TABLET: 50; 8.6 TABLET ORAL at 07:55

## 2022-08-30 RX ADMIN — MIDAZOLAM 1 MG: 1 INJECTION INTRAMUSCULAR; INTRAVENOUS at 13:18

## 2022-08-30 RX ADMIN — PANTOPRAZOLE SODIUM 40 MG: 40 TABLET, DELAYED RELEASE ORAL at 07:55

## 2022-08-30 RX ADMIN — FENTANYL CITRATE 50 MCG: 50 INJECTION, SOLUTION INTRAMUSCULAR; INTRAVENOUS at 13:21

## 2022-08-30 RX ADMIN — SODIUM CHLORIDE, POTASSIUM CHLORIDE, SODIUM LACTATE AND CALCIUM CHLORIDE: 600; 310; 30; 20 INJECTION, SOLUTION INTRAVENOUS at 02:07

## 2022-08-30 RX ADMIN — FENTANYL CITRATE 100 MCG: 50 INJECTION, SOLUTION INTRAMUSCULAR; INTRAVENOUS at 13:16

## 2022-08-30 RX ADMIN — ONDANSETRON 4 MG: 2 INJECTION INTRAMUSCULAR; INTRAVENOUS at 13:44

## 2022-08-30 RX ADMIN — ACETAMINOPHEN 650 MG: 325 TABLET ORAL at 01:26

## 2022-08-30 RX ADMIN — MIDAZOLAM 1 MG: 1 INJECTION INTRAMUSCULAR; INTRAVENOUS at 13:16

## 2022-08-30 ASSESSMENT — ACTIVITIES OF DAILY LIVING (ADL)
ADLS_ACUITY_SCORE: 33

## 2022-08-30 NOTE — DISCHARGE SUMMARY
[facility]  Hospitalist Discharge Summary      Date of Admission:  8/25/2022  Date of Discharge:  8/30/2022  Discharging Provider: Nicole Bravo CNP  Discharge Service: Hospitalist Service    Discharge Diagnoses   Hematochezia  Colitis  Hemorrhoids, unspecified hemorrhoid type  IBS-C  Sigmoid Stricture    Follow-ups Needed After Discharge   Dr. Niko Wong outpatient for pelvic floor testing  [facility]    Unresulted Labs Ordered in the Past 30 Days of this Admission     No orders found from 7/26/2022 to 8/26/2022.          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  She was admitted on 8/25/2022     Problem List:    Severe constipation and Sigmoid stenosis or Stricture     H/o irritable bowel syndrome   ? Sigmoid Colitis and Lower abdominal pain   Assessment: Started on linzess    8/26/22 and dicyclomine 10mg po qid by GI 8/26/22,  No BM since yesterday, had only liquid stool after colon prep.   CT of the abd with evidence of sigmoid colitis.   GI consulted; s/p colonoscopy -> 8/27  EGD shows gastritis, and reflux esophagitis, biopsy taken for H pylori. Colonoscopy shows stricture in the sigmoid colon which was biopsied   Stool in the rest of the colon. Terminal ileum was normal.   Awaiting GI follow up and recommendation regarding sigmoid stricture as patient still  Has constipation and lower abdominal pain.  Colorectal consulted -> Gastrografin enema completed  Secondary to Significant sigmoid stricture.  Repeat fle sig 8/30/22        colorectal surg recs:  Repeat flex sig  demonstrates angulation in distal sigmoid colon with mild to moderate stricturing here.  Stricture passed  with some difficulty with pediatric colonoscopy but easily passed with gastroscope. This is often seen with recurrent diverticulitis.     Overall I think her chronic symptoms are multifactorial:  Clearly needs laxatives daily (miralax 1 cap full daily indefinitely) given her history of hard ball like stools.  Also needs outpatient pelvic floor evaluation. Stricture may be playing a role in her chronic symptoms but not acutely obstructed.  No plan for urgent surgery.  Defer to GI re: management of reflux symptoms.     GERD  On daily PPI - will continue      H/o hemorrhoids  Suspect rectal bleeding PTA was from hemorrhoids and attempt to have BM,  Likely prolapse with straining, CRS consulted today      H/o glucose intolerance  Last hgbA1c noted to be 6.0 (6/2022)            Diet: Full Liquid Diet  -   DVT Prophylaxis: Pneumatic Compression Devices  Boudreaux Catheter: Not present  Code Status: Full Code          Consultations This Hospital Stay   GASTROENTEROLOGY IP CONSULT  PHYSICAL THERAPY ADULT IP CONSULT  COLORECTAL SURGERY IP CONSULT    Code Status   Full Code    Time Spent on this Encounter   I, Nicole Bravo CNP, personally saw the patient today and spent greater than 30 minutes discharging this patient.       Nicole Bravo CNP  [facility]  [address] [place] [id number]  Phone: [phone]  ______________________________________________________________________    Physical Exam   Vital Signs: Temp: 97.7  F (36.5  C) Temp src: Oral BP: 131/63 Pulse: 67   Resp: 15 SpO2: 95 % O2 Device: None (Room air) Oxygen Delivery: 2 LPM  Weight: 160 lbs 4.8 oz  GEN:  Alert, oriented x 3, appears fairly comfortable at rest in the bed, no overt respiratory distress.  HEENT:  Normocephalic/atraumatic, no scleral icterus, no nasal discharge, mouth and membranes appear more  moist  NECK:  No clear thyromegaly or JVD  CV:  Regular rate and rhythm, no loud murmur  to ausc.  S1 + S2 noted, no S3 or S4.  LUNGS:  Clear to auscultation ant/lat bilaterally.  No rales/rhonchi/wheezing auscultated bilaterally.  No costal retractions bilaterally.  Symmetric chest rise on inhalation noted.  ABD:  Soft, mild distention, mild lower abdominal tenderness. No organomegaly   EXT:  No pretibial edema or cyanosis bilaterally. No joint synovitis noted.  No calf-tenderness or asymmetry noted.  SKIN:  Dry to touch, no rashes or jaundice noted.  PSYCH:  noticably anxious about procedures and sedation plan today.  Cooperative.  Maintains direct eye contact.  NEURO:  No tremors at rest, speech is clear and appropriate. CN 2-12 intact to gross testing bilaterally          Primary Care Physician   Deandra Strong    Discharge Orders      Reason for your hospital stay     Follow-up and recommended labs and tests     Follow up with primary care provider, Deandra Strong, within 7 days for hospital follow- up and .   Follow up with Dr. Niko Wong, his office will call you with appointment for pelvic floor testing to evaluate for rectocele  Follow up with Dr. [facility] Office : [phone] call and make appointment     Activity    Your activity upon discharge: activity as tolerated     Diet    Follow this diet upon discharge: Advance to a regular diet as tolerated       Significant Results and Procedures   Results for orders placed or performed during the hospital encounter of 08/25/22   CT Abdomen Pelvis w Contrast    Narrative    EXAM: CT ABDOMEN PELVIS W CONTRAST  LOCATION: [facility]  DATE/TIME: 8/25/2022 5:17 PM    INDICATION: acute abd pain, vomiting, prior appy chantell, feels like prior SBO, also rectal prolapse hemorrhoids bleeding?  COMPARISON: CT 10/24/2017  TECHNIQUE: CT scan of the abdomen and pelvis was performed following injection of IV contrast. Multiplanar reformats were obtained. Dose reduction techniques were used.  CONTRAST: 78mL Isovue 370         FINDINGS:   LOWER CHEST: Small hiatal hernia. Otherwise unremarkable.    HEPATOBILIARY: Prior cholecystectomy.    PANCREAS: Normal.    SPLEEN: Normal.    ADRENAL GLANDS: Normal.    KIDNEYS/BLADDER: No hydronephrosis. Urinary bladder is unremarkable.    BOWEL: Large amount of formed stool throughout the colon. Questionable mild sigmoid colonic wall thickening with some trace adjacent fat stranding and fluid. No evidence of bowel obstruction. Prior appendectomy.    LYMPH NODES: Normal.    VASCULATURE: Scattered calcified atherosclerosis.    PELVIC ORGANS: Hysterectomy.    MUSCULOSKELETAL: No acute bony abnormality.      Impression    IMPRESSION:   1.  Questionable mild sigmoid colitis.  2.  Findings suggestive of constipation.  3.  No additional visualized explanation for patient's symptoms.   XR Colon Water Soluble Diagnostic    Narrative    COLON WATER SOLUBLE  8/29/2022 9:52 AM     HISTORY: Sigmoid stricture    COMPARISON: None.    TECHNIQUE: Water soluble contrast was introduced into the colon  through a rectal tube under gravity.      FLUOROSCOPY TIME: 1.9 minutes.    SPOT FILMS: 7    FINDINGS: Contrast flowed easily into the colon.  The colon is widely  patent from the rectum to the cecum. Moderate amount of stool. There  was no reflux of contrast into the terminal ileum. There were no  filling defects.      Impression    IMPRESSION: No obstruction demonstrated.     SHYANN GERARD MD         SYSTEM ID:  [id number]       Discharge Medications   Current Discharge Medication List      START taking these medications    Details   linaclotide (LINZESS) 290 MCG capsule Take 1 capsule (290 mcg) by mouth every morning (before breakfast)  Qty: 90 capsule, Refills: 1    Associated Diagnoses: Irritable bowel syndrome with both constipation and diarrhea      oxyCODONE (ROXICODONE) 5 MG tablet Take 1 tablet (5 mg) by mouth every 4 hours as needed for moderate to severe pain  Qty: 10 tablet, Refills: 0    Associated  Diagnoses: Irritable bowel syndrome with both constipation and diarrhea      pantoprazole (PROTONIX) 40 MG EC tablet Take 1 tablet (40 mg) by mouth every morning (before breakfast)  Qty: 90 tablet, Refills: 1    Associated Diagnoses: Irritable bowel syndrome with both constipation and diarrhea      polyethylene glycol (MIRALAX) 17 g packet Take 17 g by mouth 2 times daily  Qty: 90 each, Refills: 1    Associated Diagnoses: Irritable bowel syndrome with both constipation and diarrhea         CONTINUE these medications which have NOT CHANGED    Details   albuterol (PROAIR HFA/PROVENTIL HFA/VENTOLIN HFA) 108 (90 Base) MCG/ACT inhaler Inhale 2 puffs into the lungs every 6 hours  Qty: 18 g, Refills: 1    Comments: Pharmacy may dispense brand covered by insurance (Proair, or proventil or ventolin or generic albuterol inhaler)  Associated Diagnoses: Reactive airway disease, moderate persistent, with acute exacerbation      CALCIUM PO Take 1 tablet by mouth daily      CYANOCOBALAMIN PO Take by mouth daily      hydrocortisone 2.5 % cream Apply topically every morning Apply to face      metroNIDAZOLE (METROCREAM) 0.75 % external cream Apply topically every evening      Vitamin D, Cholecalciferol, 25 MCG (1000 UT) TABS Take 2 tablets by mouth daily      albuterol (PROVENTIL) (2.5 MG/3ML) 0.083% neb solution Take 1 vial by nebulization every 4 hours as needed for shortness of breath/dyspnea or wheezing  Qty: 1 Box, Refills: 3    Associated Diagnoses: Mild intermittent reactive airway disease without complication      budesonide-formoterol (SYMBICORT) 160-4.5 MCG/ACT Inhaler Inhale 2 puffs into the lungs daily  Qty: 10.2 g, Refills: 3    Associated Diagnoses: Reactive airway disease, moderate persistent, with acute exacerbation      erythromycin with ethanol (EMGEL) 2 % gel Apply topically daily  Qty: 60 g, Refills: 1    Associated Diagnoses: Rosacea           Allergies   Allergies   Allergen Reactions     Levaquin Hives and  Itching     Pcn [Penicillins] Hives     Tetanus Toxoids Anaphylaxis     Erythromycin GI Disturbance     Silicone Rash

## 2022-08-30 NOTE — PLAN OF CARE
Patient has been discharged August 30, 2022 6:05 PM. Discharge instructions and education reviewed, medication schedule and follow up appts discussed. Pt had no questions. All belongings sent with pt.  Prescriptions sent to preferred pharmacy; hard script send with patient upon leaving facility.

## 2022-08-30 NOTE — PROGRESS NOTES
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  She was admitted on 8/25/2022     Problem List:    Severe constipation and Sigmoid stenosis or Stricture     H/o irritable bowel syndrome   ? Sigmoid Colitis and Lower abdominal pain   Assessment: Started on linzess    8/26/22 and dicyclomine 10mg po qid by GI 8/26/22,  No BM since yesterday, had only liquid stool after colon prep.   CT of the abd with evidence of sigmoid colitis.   GI consulted; s/p colonoscopy -> 8/27  EGD shows gastritis, and reflux esophagitis, biopsy taken for H pylori. Colonoscopy shows stricture in the sigmoid colon which was biopsied   Stool in the rest of the colon. Terminal ileum was normal.   Awaiting GI follow up and recommendation regarding sigmoid stricture as patient still  Has constipation and lower abdominal pain.  Colorectal consulted -> Gastrografin enema completed  Secondary to Significant sigmoid stricture.      GERD  On daily PPI - will continue      H/o hemorrhoids  Suspect rectal bleeding PTA was from hemorrhoids and attempt to have BM,  Likely prolapse with straining, CRS consulted today      H/o glucose intolerance  Last hgbA1c noted to be 6.0 (6/2022)            Diet: Full Liquid Diet  -   DVT Prophylaxis: Pneumatic Compression Devices  Boudreaux Catheter: Not present  Code Status: Full Code             Disposition Plan     Felxx sig today  Expected Discharge Date: 08/30/2022,  6:00 PM      Discharge Comments: GI and  colorectal following.        The patient's care was discussed with the Attending Physician, Dr. Hein.    Nicole Bravo, CNP  Hospitalist Service  [facility]  "Hospital  Securely message with the Vocera Web Console (learn more here)  Text page via [employer] Paging/Directory         Clinically Significant Risk Factors Present on Admission               # Overweight: Estimated body mass index is 28.4 kg/m  as calculated from the following:    Height as of this encounter: 1.6 m (5' 3\").    Weight as of this encounter: 72.7 kg (160 lb 4.8 oz).        ______________________________________________________________________    Interval History   No acute events overnight  Patient continues to have complaints of lower abdominal cramps, lower abdominal pain, constipation and mild nausea  No new complaints    Data reviewed today: I reviewed all medications, new labs and imaging results over the last 24 hours. I personally reviewed     Physical Exam   Vital Signs: Temp: 97.7  F (36.5  C) Temp src: Oral BP: (!) 148/69 Pulse: 68   Resp: 16 SpO2: 96 % O2 Device: None (Room air)    Weight: 160 lbs 4.8 oz  GEN:  Alert, oriented x 3, appears fairly comfortable at rest in the bed, no overt respiratory distress.  HEENT:  Normocephalic/atraumatic, no scleral icterus, no nasal discharge, mouth and membranes appear more  moist  NECK:  No clear thyromegaly or JVD  CV:  Regular rate and rhythm, no loud murmur to ausc.  S1 + S2 noted, no S3 or S4.  LUNGS:  Clear to auscultation ant/lat bilaterally.  No rales/rhonchi/wheezing auscultated bilaterally.  No costal retractions bilaterally.  Symmetric chest rise on inhalation noted.  ABD:  Soft, mild distention, mild lower abdominal tenderness. No organomegaly   EXT:  No pretibial edema or cyanosis bilaterally. No joint synovitis noted.  No calf-tenderness or asymmetry noted.  SKIN:  Dry to touch, no rashes or jaundice noted.  PSYCH:  noticably anxious about procedures and sedation plan today.  Cooperative.  Maintains direct eye contact.  NEURO:  No tremors at rest, speech is clear and appropriate. CN 2-12 intact to gross testing bilaterally          Data "   Recent Labs   Lab 08/30/22  0732 08/28/22  0621 08/27/22  0637 08/25/22  1456   WBC 5.5  --   --  8.4   HGB 13.7  --   --  15.3   MCV 83  --   --  83     --   --  230    140 137 135   POTASSIUM 3.7 3.8 3.9 3.7   CHLORIDE 106 107 106 101   CO2 26 29 26 29   BUN 6* 8 8 14   CR 0.81 0.94 0.90 1.10*   ANIONGAP 8 4 5 5   DONNA 8.5 8.7 8.6 9.9   GLC 96 106* 98 150*   ALBUMIN  --   --   --  3.9   PROTTOTAL  --   --   --  7.5   BILITOTAL  --   --   --  0.7   ALKPHOS  --   --   --  81   ALT  --   --   --  18   AST  --   --   --  17   LIPASE  --   --   --  59*

## 2022-08-30 NOTE — PROGRESS NOTES
[facility]  Gastroenterology Progress Note     Macey Romero MRN# [id number]   YOB: 1950 Age: 71 year old          Assessment and Plan:     Macey Alejandre is a 71 year old female with c/o IBS-C with severe constipation, abdominal pain, and rectal bleeding.    Hematochezia  Colitis  Hemorrhoids, unspecified hemorrhoid type  IBS-C  Sigmoid Stricture  Has suffered from IBS-C since childhood with no significant improvement with otc options  Has significant rectal pressure. CT confirms large stool burden and imaging suggestive of sigmoid colitis  Has had protruding hemorrhoids with bleeding when attempting to have a bowel movement with bright red blood  Patient has severe IBS-C and would benefit from Linzess 290. Has likely bleeding hemorrhoids with bearing down and concern for possible ulceration and ischemic colitis based on CT.  8/27 Colonoscopy revealed extrinsic severe stenosis 6 cm in length in the sigmoid colon. Dilated.  Copious amounts of stool seen in entire colon  Xray of colon with gastrografin ordered for this a.m.-no obstruction demonstrated     --Colorectal consulted (recommend increase fluid intake, fiber supplement and or daily mirlax, outpatient pelvic floor testing to evalute for rectocele)- planning flex sigmoidoscopy today  -- Continue stool softener and miralax  -- Tolerating full liquid     Heartburn  Esophagitis  Concern for esophageal reflux vs H pylori vs possible gastroparesis due to severe IBS-C  8/27/22 EGD revealed diffuse moderately erythematous mucosa. H pylori testing performed. Grade C esophagitis and mild Schatzki's ring and small hiatal hernia seen    -- recommend daily pantoprazole                  Interval History:     Has heartburn and ongoing abdominal distention              Review of Systems:     C: NEGATIVE for fever, chills, change in weight  E/M: NEGATIVE for ear, mouth and throat problems  R: NEGATIVE for significant cough or SOB  CV:  NEGATIVE for chest pain, palpitations or peripheral edema             Medications:   I have reviewed this patient's current medications   fluticasone-vilanterol  1 puff Inhalation Daily    linaclotide  290 mcg Oral QAM AC    pantoprazole  40 mg Oral QAM AC    polyethylene glycol  17 g Oral BID    senna-docusate  1 tablet Oral BID    Or    senna-docusate  2 tablet Oral BID    sodium chloride (PF)  3 mL Intracatheter Q8H                  Physical Exam:   Vitals were reviewed  Vital Signs with Ranges  Temp:  [97.7  F (36.5  C)-98.3  F (36.8  C)] 97.7  F (36.5  C)  Pulse:  [68-78] 68  Resp:  [16-19] 16  BP: (129-148)/(62-69) 148/69  SpO2:  [94 %-96 %] 96 %  I/O last 3 completed shifts:  In: 720 [P.O.:120; I.V.:600]  Out: -   Constitutional: healthy, alert and no distress   Cardiovascular: negative, PMI normal. No lifts, heaves, or thrills. RRR. No murmurs, clicks gallops or rub  Respiratory: negative, Percussion normal. Good diaphragmatic excursion. Lungs clear  Abdomen: Abdomen soft, non-tender. BS normal. No masses, organomegaly           Data:   I reviewed the patient's new clinical lab test results.   Recent Labs   Lab Test 08/30/22  0732 08/25/22  1456 12/12/21  2101   WBC 5.5 8.4 9.4   HGB 13.7 15.3 14.3   MCV 83 83 84    230 229     Recent Labs   Lab Test 08/30/22  0732 08/28/22  0621 08/27/22  0637   POTASSIUM 3.7 3.8 3.9   CHLORIDE 106 107 106   CO2 26 29 26   BUN 6* 8 8   ANIONGAP 8 4 5     Recent Labs   Lab Test 08/25/22  1456 06/25/21  1046 01/19/20  1415 01/19/20  1131 12/15/17  1030 10/24/17  1839 10/24/17  1830 04/07/17  2258 04/07/17  2045   ALBUMIN 3.9 4.4  --  3.6   < > 3.9  --   --  3.6   BILITOTAL 0.7 0.6  --  0.6   < > 0.3  --   --  0.3   ALT 18 11  --  119*   < > 30  --   --  18   AST 17 13  --  60*   < > 19  --   --  16   PROTEIN  --   --  10*  --   --   --  Negative Negative  --    LIPASE 59*  --   --   --   --  107  --   --  102    < > = values in this interval not displayed.       I  reviewed the patient's new imaging results.    All laboratory data reviewed  All imaging studies reviewed by me.    Anay Quinones PA-C,  8/29/2022  [facility]  Office : [phone]  Cell: [phone] (Dr. Rogers)  Cell: [phone] (Anay Quinones PA-C)

## 2022-08-30 NOTE — PROGRESS NOTES
CRS Staff    Repeat flex sig today demonstrates angulation in distal sigmoid colon with mild to moderate stricturing here.  Stricture passed with some difficulty with pediatric colonoscopy but easily passed with gastroscope. This is often seen with recurrent diverticulitis.      Overall I think her chronic symptoms are multifactorial:  Clearly needs laxatives daily (miralax 1 cap full daily indefinitely) given her history of hard ball like stools.  Also needs outpatient pelvic floor evaluation. Stricture may be playing a role in her chronic symptoms but not acutely obstructed.  No plan for urgent surgery.  Defer to GI re: management of reflux symptoms.  Can discharge as soon as this evening after monitoring after flex sig.  Will follow.      Niko Wong MD FACS FASCRS  Colorectal Surgeon       [facility]  [address], [address]  [place] [address]  T: [phone]  F: [phone]  Pager: [phone]  [email or web address]

## 2022-08-30 NOTE — PROGRESS NOTES
Pre-Endoscopy History and Physical     Macey Romero MRN# [id number]   YOB: 1950 Age: 71 year old     Date of Procedure: 8/25/2022  Primary care provider: Deandra Strong  Type of Endoscopy: flex sig   Reason for Procedure: stricture  Type of Anesthesia Anticipated: Moderate Sedation    HPI:    Macey is a 71 year old female who will be undergoing the above procedure.      A history and physical has been performed. The patient's medications and allergies have been reviewed. The risks and benefits of the procedure including the risk of bleeding, perforation, and missed lesions as well as the sedation options and risks were discussed with the patient.  All questions were answered and informed consent was obtained.      Allergies   Allergen Reactions     Levaquin Hives and Itching     Pcn [Penicillins] Hives     Tetanus Toxoids Anaphylaxis     Erythromycin GI Disturbance     Silicone Rash        Current Facility-Administered Medications   Medication     acetaminophen (TYLENOL) tablet 650 mg    Or     acetaminophen (TYLENOL) Suppository 650 mg     bisacodyl (DULCOLAX) suppository 10 mg     fentaNYL (PF) (SUBLIMAZE) injection     fluticasone-vilanterol (BREO ELLIPTA) 200-25 MCG/INH inhaler 1 puff     hyoscyamine (LEVSIN) tablet 125 mcg     lactated ringers infusion     linaclotide (LINZESS) capsule 290 mcg     melatonin tablet 1 mg     midazolam (VERSED) injection     naloxone (NARCAN) injection 0.2 mg    Or     naloxone (NARCAN) injection 0.4 mg    Or     naloxone (NARCAN) injection 0.2 mg    Or     naloxone (NARCAN) injection 0.4 mg     ondansetron (ZOFRAN ODT) ODT tab 4 mg    Or     ondansetron (ZOFRAN) injection 4 mg     oxyCODONE (ROXICODONE) tablet 5 mg     pantoprazole (PROTONIX) EC tablet 40 mg     polyethylene glycol (MIRALAX) Packet 17 g     senna-docusate (SENOKOT-S/PERICOLACE) 8.6-50 MG per tablet 1 tablet    Or     senna-docusate (SENOKOT-S/PERICOLACE) 8.6-50 MG per tablet 2 tablet      sodium chloride (PF) 0.9% PF flush 3 mL     sodium chloride (PF) 0.9% PF flush 3 mL       Medications Prior to Admission   Medication Sig Dispense Refill Last Dose     albuterol (PROAIR HFA/PROVENTIL HFA/VENTOLIN HFA) 108 (90 Base) MCG/ACT inhaler Inhale 2 puffs into the lungs every 6 hours 18 g 1      CALCIUM PO Take 1 tablet by mouth daily   Past Month at Unknown time     CYANOCOBALAMIN PO Take by mouth daily   Past Month at Unknown time     hydrocortisone 2.5 % cream Apply topically every morning Apply to face   8/25/2022 at Unknown time     metroNIDAZOLE (METROCREAM) 0.75 % external cream Apply topically every evening   8/24/2022 at Unknown time     Vitamin D, Cholecalciferol, 25 MCG (1000 UT) TABS Take 2 tablets by mouth daily   Past Month at Unknown time     albuterol (PROVENTIL) (2.5 MG/3ML) 0.083% neb solution Take 1 vial by nebulization every 4 hours as needed for shortness of breath/dyspnea or wheezing (Patient not taking: Reported on 6/20/2022) 1 Box 3      budesonide-formoterol (SYMBICORT) 160-4.5 MCG/ACT Inhaler Inhale 2 puffs into the lungs daily (Patient taking differently: Inhale 1 puff into the lungs daily) 10.2 g 3 8/23/2022     erythromycin with ethanol (EMGEL) 2 % gel Apply topically daily 60 g 1        Patient Active Problem List   Diagnosis     Osteoarthritis     Osteopenia     History of breast cancer     IBS (irritable bowel syndrome)     Keloid of skin     Pterygium eye     Chronic pain of both knees     Fibromyalgia     H/O bilateral mastectomy     Memory loss     Moderate persistent asthma without complication     Prediabetes     Hyperlipidemia LDL goal <130     Stage 3a chronic kidney disease (H)     Hematochezia     Colitis     Hemorrhoids, unspecified hemorrhoid type        Past Medical History:   Diagnosis Date     Breast CA in situ 1987     Cancer (H) 1987    Right Breast treated with surgery     Chronic constipation      Fibromyalgia      Malignant neoplasm of female breast,  unspecified estrogen receptor status, unspecified laterality, unspecified site of breast (H) 6/17/2021     Meningitis     Several times as an adult     Osteoarthritis 2/1/2011     Osteopenia 2/1/2011     Pneumonia      Pterygium eye 8/26/2013     Reactive airway disease 2/1/2011     Seasonal allergies      Shingles     Prior to 2008 - scalp     Skin cancer     SCC - hand, left nose        Past Surgical History:   Procedure Laterality Date     anterior c-disc fusion       BLEPHAROPLASTY, BROW LIFT BILATERAL, COMBINED Bilateral 6/18/2018    Procedure: COMBINED BLEPHAROPLASTY, BROW LIFT BILATERAL;  BILATERAL UPPER LID BLEPHAROPLASTY; BILATERAL BROW PTOSIS REPAIR;  Surgeon: Сергей Walters MD;  Location:  [facility]     CHOLECYSTECTOMY       COLONOSCOPY N/A 10/19/2017    Procedure: COLONOSCOPY;  COLONOSCOPY;  Surgeon: Niko Wong MD;  Location:  GI     D & C      Bleeding before hysterectomy     ENDOSCOPY  04/21/08     HYSTERECTOMY, MARCUS      Ovaries and tubes out due to breast cancer     JOINT REPLACEMTN, KNEE RT/LT Right 01/2011    Joint Replacement knee RT, with tibial straightening (2 replacements)     MAMMOPLASTY AUGMENTATION BILATERAL      breast ca      MASTECTOMY, SIMPLE RT/LT/CLAIRE Bilateral 1989    Mastectomy Simple RT/LT/CLAIRE     MOHS MICROGRAPHIC PROCEDURE      Left lateral nose     OPEN REDUCTION INTERNAL FIXATION ANKLE  8/27/2013    Procedure: OPEN REDUCTION INTERNAL FIXATION ANKLE;  RIGHT OPEN REDUCTION INTERNAL FIXATION ANKLE WITH LIGAMENT REPAIR;  Surgeon: Nando Chang MD;  Location:  OR     PTERYGIUM WITH CONJUNCTIVAL AUTOLOGOUS TRANSPLANT Left 8/29/2016    Procedure: PTERYGIUM WITH CONJUNCTIVAL AUTOLOGOUS TRANSPLANT;  Surgeon: Сергей Walters MD;  Location:  [facility]     REPAIR LIGAMENT ANKLE  8/27/2013    Procedure: REPAIR LIGAMENT ANKLE;;  Surgeon: Nando Chang MD;  Location:  OR     SALIVARY GLAND SURGERY Left     Stone removal      TONSILLECTOMY         Social History     Tobacco Use  "    Smoking status: Former Smoker     Quit date: 2016     Years since quittin.6     Smokeless tobacco: Never Used     Tobacco comment: quit but  still smokes, but not in house   Substance Use Topics     Alcohol use: No     Alcohol/week: 0.0 standard drinks       Family History   Problem Relation Age of Onset     Respiratory Father      Cancer Brother         sarcoidosis     Diabetes Brother      Cerebrovascular Disease Brother      Liver Disease Brother          PHYSICAL EXAM:   BP (!) 146/65   Pulse 68   Temp 97.7  F (36.5  C) (Oral)   Resp 16   Ht 1.6 m (5' 3\")   Wt 72.7 kg (160 lb 4.8 oz)   SpO2 98%   BMI 28.40 kg/m   Estimated body mass index is 28.4 kg/m  as calculated from the following:    Height as of this encounter: 1.6 m (5' 3\").    Weight as of this encounter: 72.7 kg (160 lb 4.8 oz).   Mental status - alert and oriented  RESP: lungs clear  CV: RRR  AIRWAY EXAM: Mallampatti Class II (visualization of the soft palate, fauces, and uvula)    IMPRESSION   ASA Class 3 - Severe systemic disease, but not incapacitating      Signed Electronically by: Niko Wong MD  2022    Colorectal Surgery  [phone] (office)  [phone] (pager)  [email or web address]          "

## 2022-08-30 NOTE — PROGRESS NOTES
Orientation/Cognitive: A&Ox4  Observation Goals (Met/ Not Met): Inpatient   Mobility Level/Assist Equipment: Indep  Fall Risk (Y/N): N  Behavior Concerns: N/A  Pain Management: oxycodone & tylenol   Tele/VS/O2: VSS  ABNL Lab/BG: N/A  Diet: Full liquid NPO after 6 am  Bowel/Bladder: Continent  Skin Concerns: no  Drains/Devices: IV infusing at 75mL/hr  Tests/Procedures for next shift: flex sig   Anticipated DC date & active delays: 8/30  Patient Stated Goal for Today: pain control

## 2022-09-01 ENCOUNTER — OFFICE VISIT (OUTPATIENT)
Dept: FAMILY MEDICINE | Facility: CLINIC | Age: 72
End: 2022-09-01

## 2022-09-01 VITALS
DIASTOLIC BLOOD PRESSURE: 60 MMHG | OXYGEN SATURATION: 98 % | RESPIRATION RATE: 16 BRPM | SYSTOLIC BLOOD PRESSURE: 122 MMHG | HEART RATE: 71 BPM

## 2022-09-01 DIAGNOSIS — K58.2 IRRITABLE BOWEL SYNDROME WITH BOTH CONSTIPATION AND DIARRHEA: ICD-10-CM

## 2022-09-01 DIAGNOSIS — M25.562 CHRONIC PAIN OF BOTH KNEES: ICD-10-CM

## 2022-09-01 DIAGNOSIS — Z09 HOSPITAL DISCHARGE FOLLOW-UP: ICD-10-CM

## 2022-09-01 DIAGNOSIS — R73.03 PREDIABETES: ICD-10-CM

## 2022-09-01 DIAGNOSIS — N18.31 STAGE 3A CHRONIC KIDNEY DISEASE (H): ICD-10-CM

## 2022-09-01 DIAGNOSIS — K52.9 COLITIS: ICD-10-CM

## 2022-09-01 DIAGNOSIS — Z01.818 PREOP GENERAL PHYSICAL EXAM: Primary | ICD-10-CM

## 2022-09-01 DIAGNOSIS — G89.29 CHRONIC PAIN OF BOTH KNEES: ICD-10-CM

## 2022-09-01 DIAGNOSIS — E78.5 HYPERLIPIDEMIA LDL GOAL <130: ICD-10-CM

## 2022-09-01 DIAGNOSIS — M25.561 CHRONIC PAIN OF BOTH KNEES: ICD-10-CM

## 2022-09-01 DIAGNOSIS — J45.40 MODERATE PERSISTENT ASTHMA WITHOUT COMPLICATION: ICD-10-CM

## 2022-09-01 PROCEDURE — 99215 OFFICE O/P EST HI 40 MIN: CPT | Performed by: NURSE PRACTITIONER

## 2022-09-01 NOTE — PATIENT INSTRUCTIONS
Patient Instructions   - Nothing to eat/drink starting midnight the night before, unless surgical team tells you otherwise     - Avoid ibuprofen, naproxen (aleve), aspirin, fish oil, vitamin e 1 week prior to surgery     - If you have pain - ok to take Acetaminophen (Tylenol) 650 mg every 4-6 hours as needed.     - If you fall ill prior to surgery - (ie - fever, chills, nausea, vomiting or diarrhea, cough, etc) please let me and your surgeon know asap    Call Dr. Rogers's office to schedule appointment

## 2022-09-01 NOTE — PROGRESS NOTES
Hospital Follow-up Visit:    Hospital/Nursing Home/IP Rehab Facility: [facility]  Date of Admission: 8/25/22  Date of Discharge: 8/30/22  Reason(s) for Admission: IBS with constipation and diarrhea    Was your hospitalization related to COVID-19? No   Problems taking medications regularly:  None  Medication changes since discharge:   START taking these medications     Details   linaclotide (LINZESS) 290 MCG capsule Take 1 capsule (290 mcg) by mouth every morning (before breakfast)  Qty: 90 capsule, Refills: 1     Associated Diagnoses: Irritable bowel syndrome with both constipation and diarrhea       oxyCODONE (ROXICODONE) 5 MG tablet Take 1 tablet (5 mg) by mouth every 4 hours as needed for moderate to severe pain  Qty: 10 tablet, Refills: 0     Associated Diagnoses: Irritable bowel syndrome with both constipation and diarrhea       pantoprazole (PROTONIX) 40 MG EC tablet Take 1 tablet (40 mg) by mouth every morning (before breakfast)  Qty: 90 tablet, Refills: 1     Associated Diagnoses: Irritable bowel syndrome with both constipation and diarrhea       polyethylene glycol (MIRALAX) 17 g packet Take 17 g by mouth 2 times daily  Qty: 90 each, Refills: 1     Associated Diagnoses: Irritable bowel syndrome with both constipation and diarrhea       Problems adhering to non-medication therapy:  Some too expensive.     Summary of hospitalization:  [facility] discharge summary reviewed  Diagnostic Tests/Treatments reviewed.  Follow up needed: none  Other Healthcare Providers Involved in Patient s Care:         Specialist appointment - gastroenterology, pelvic floor testing  Update since discharge: stable.       Post Medication Reconciliation Status: Discharge medications reconciled and changed, see notes/orders        Plan of care communicated with patient             [facility] [address] [place] [address]  Phone: [phone]  Fax:  [phone]  Primary Provider: Deandra Garcia  Pre-op Performing Provider: DEANDRA GARCIA      PREOPERATIVE EVALUATION:  Today's date: 9/1/2022  Preoperative Questionnaire:   Yes - Have you ever had a heart attack or stroke? 2004  No - Have you ever had surgery on your heart or blood vessels, such as a stent, coronary (heart) bypass, or surgery on an artery in the head, neck, heart, or legs?  No - Do you have chest pain when you are physically active?  No - Do you have a history of heart failure?  No - Do you currently have a cold, bronchitis, or symptoms of other respiratory (head and chest) infections?  No - Do you have a cough, shortness of breath, or wheezing?  Yes- Do you or anyone in your family have a history of blood clots? Self 1972  Yes - Do you or anyone in your family have a serious bleeding problem, such as long-lasting bleeding after surgeries or cuts? Mother, daughter  No - Have you ever had anemia or been told to take iron pills?  No - Have you had any abnormal blood loss such as black, tarry or bloody stools, or abnormal vaginal bleeding?  Yes - Have you ever had a blood transfusion? 1972  Yes - Are you willing to have a blood transfusion if it is medically needed before, during, or after your surgery?  No - Have you or anyone in your family ever had problems with anesthesia (sedation for surgery)?  Yes - Do you have sleep apnea, excessive snoring, or daytime drowsiness? Daytime  No - Do you have any artifical heart valves or other implanted medical devices, such as a pacemaker, defibrillator, or continuous glucose monitor?  Yes - Do you have any artifical joints? C Spine, right knee  No - Are you allergic to latex?  No - Is there any chance that you may be pregnant?        Macey Romero is a 71 year old female who presents for a preoperative evaluation.    Surgical Information:  Surgery/Procedure: Left knee replace  Surgery Location: [facility]  Surgeon: Warren  Surgery Date: 9/26/22  Time  of Surgery: 3pm  Where patient plans to recover: At home with family  Fax number for surgical facility: Note does not need to be faxed, will be available electronically in Epic.    Type of Anesthesia Anticipated: to be determined    Assessment & Plan     The proposed surgical procedure is considered INTERMEDIATE risk.    Preop general physical exam  No apparent contraindications to upcoming left knee surgery    Hospital discharge follow-up  See note above. Patient waiting on Linzess & Protonix medications from mail pharmacy. Stopped oxycodone use.     Chronic pain of both knees  Surgery scheduled for later this month    Colitis  Irritable bowel syndrome with both constipation and diarrhea  Recent hospitalization. Instructed to call Dr. Rogers's office to schedule appointment. Start prescribed medications when she receives them    Stage 3a chronic kidney disease (H)  Stable, encourage fluids, no NSAID medications    Moderate persistent asthma without complication  Unable to afford Symbicort. Does not want refill. Feels as though her breathing is fine    Hyperlipidemia LDL goal <130  Uncomplicated    Prediabetes  Not on medication for this    Risks and Recommendations:  The patient has the following additional risks and recommendations for perioperative complications:  Pulmonary:    - Incentive spirometry post-op    Medication Instructions:  Patient is to take all scheduled medications on the day of surgery    RECOMMENDATION:  APPROVAL GIVEN to proceed with proposed procedure, without further diagnostic evaluation.    Subjective     HPI related to upcoming procedure: having left knee surgery. History of prior surgery on this knee. Has chronic pain of both knees. Also wearing walking boot on left foot.     Health Care Directive:  Patient does not have a Health Care Directive or Living Will: Patient states has Advance Directive and will bring in a copy to clinic.    Preoperative Review of :   reviewed - controlled  substances prescribed by other outside provider(s).  PDMP Review       Value Time User    State PDMP site checked  Yes 9/1/2022  7:30 PM Deandra Strong APRN CNP        Status of Chronic Conditions:  ASTHMA - Patient has a longstanding history of moderate-severe Asthma . Patient has been doing well overall noting WHEEZING. Using Ventolin prn without adverse reactions or side effects. Unable to afford Symbicort inhaler    HYPERLIPIDEMIA - Patient has a long history of significant Hyperlipidemia requiring medication for treatment with recent good control. Patient reports no problems or side effects with the medication.     RENAL INSUFFICIENCY - CKD stage 3  Creatinine   Date Value Ref Range Status   08/30/2022 0.81 0.52 - 1.04 mg/dL Final   06/20/2022 1.02 (H) 0.57 - 1.00 mg/dL Final       Review of Systems  Constitutional, neuro, ENT, endocrine, pulmonary, cardiac, gastrointestinal, genitourinary, musculoskeletal, integument and psychiatric systems are negative, except as otherwise noted.    Patient Active Problem List    Diagnosis Date Noted     Colitis 08/25/2022     Priority: Medium     Stage 3a chronic kidney disease (H) 06/22/2022     Priority: Medium     Memory loss 10/27/2021     Priority: Medium     Moderate persistent asthma without complication 10/27/2021     Priority: Medium     Prediabetes 10/27/2021     Priority: Medium     Hyperlipidemia LDL goal <130 10/27/2021     Priority: Medium     H/O bilateral mastectomy 06/17/2021     Priority: Medium     with reconstruction       Fibromyalgia      Priority: Medium     Chronic pain of both knees 03/21/2018     Priority: Medium     Pterygium eye 08/26/2013     Priority: Medium     IBS (irritable bowel syndrome) 06/28/2011     Priority: Medium     Keloid of skin 06/28/2011     Priority: Medium     Osteoarthritis 02/01/2011     Priority: Medium     Osteopenia 02/01/2011     Priority: Medium     History of breast cancer 02/01/2011     Priority: Medium     1987         Past Medical History:   Diagnosis Date     Breast CA in situ 1987     Cancer (H) 1987    Right Breast treated with surgery     Chronic constipation      Fibromyalgia      Malignant neoplasm of female breast, unspecified estrogen receptor status, unspecified laterality, unspecified site of breast (H) 6/17/2021     Meningitis     Several times as an adult     Osteoarthritis 2/1/2011     Osteopenia 2/1/2011     Pneumonia      Pterygium eye 8/26/2013     Reactive airway disease 2/1/2011     Seasonal allergies      Shingles     Prior to 2008 - scalp     Skin cancer     SCC - hand, left nose     Past Surgical History:   Procedure Laterality Date     anterior c-disc fusion       BLEPHAROPLASTY, BROW LIFT BILATERAL, COMBINED Bilateral 6/18/2018    Procedure: COMBINED BLEPHAROPLASTY, BROW LIFT BILATERAL;  BILATERAL UPPER LID BLEPHAROPLASTY; BILATERAL BROW PTOSIS REPAIR;  Surgeon: Сергей Walters MD;  Location:  [facility]     CHOLECYSTECTOMY       COLONOSCOPY N/A 10/19/2017    Procedure: COLONOSCOPY;  COLONOSCOPY;  Surgeon: Niko Wong MD;  Location:  GI     COLONOSCOPY N/A 8/27/2022    Procedure: COLONOSCOPY, WITH POLYPECTOMY AND BIOPSY;  Surgeon: Abraham Rogers MD;  Location:  GI     D & C      Bleeding before hysterectomy     ENDOSCOPY  04/21/08     ESOPHAGOSCOPY, GASTROSCOPY, DUODENOSCOPY (EGD), COMBINED N/A 8/27/2022    Procedure: ESOPHAGOGASTRODUODENOSCOPY, WITH BIOPSY;  Surgeon: Abraham Rogers MD;  Location:  GI     HYSTERECTOMY, MARCUS      Ovaries and tubes out due to breast cancer     JOINT REPLACEMTN, KNEE RT/LT Right 01/2011    Joint Replacement knee RT, with tibial straightening (2 replacements)     MAMMOPLASTY AUGMENTATION BILATERAL      breast ca      MASTECTOMY, SIMPLE RT/LT/CLAIRE Bilateral 1989    Mastectomy Simple RT/LT/CLAIRE     MOHS MICROGRAPHIC PROCEDURE      Left lateral nose     OPEN REDUCTION INTERNAL FIXATION ANKLE  8/27/2013    Procedure: OPEN REDUCTION INTERNAL FIXATION ANKLE;   RIGHT OPEN REDUCTION INTERNAL FIXATION ANKLE WITH LIGAMENT REPAIR;  Surgeon: Nando Chang MD;  Location: [facility]     PTERYGIUM WITH CONJUNCTIVAL AUTOLOGOUS TRANSPLANT Left 8/29/2016    Procedure: PTERYGIUM WITH CONJUNCTIVAL AUTOLOGOUS TRANSPLANT;  Surgeon: Сергей Walters MD;  Location:  [facility]     REPAIR LIGAMENT ANKLE  8/27/2013    Procedure: REPAIR LIGAMENT ANKLE;;  Surgeon: Nando Chang MD;  Location:  OR     SALIVARY GLAND SURGERY Left     Stone removal      SIGMOIDOSCOPY FLEXIBLE N/A 8/30/2022    Procedure: FLEXIBLE SIGMOIDOSCOPY;  Surgeon: Niko Wong MD;  Location:  GI     TONSILLECTOMY       Current Outpatient Medications   Medication Sig Dispense Refill     albuterol (PROAIR HFA/PROVENTIL HFA/VENTOLIN HFA) 108 (90 Base) MCG/ACT inhaler Inhale 2 puffs into the lungs every 6 hours 18 g 1     albuterol (PROVENTIL) (2.5 MG/3ML) 0.083% neb solution Take 1 vial by nebulization every 4 hours as needed for shortness of breath/dyspnea or wheezing 1 Box 3     budesonide-formoterol (SYMBICORT) 160-4.5 MCG/ACT Inhaler Inhale 2 puffs into the lungs daily (Patient taking differently: Inhale 1 puff into the lungs daily) 10.2 g 3     CALCIUM PO Take 1 tablet by mouth daily       CYANOCOBALAMIN PO Take by mouth daily       erythromycin with ethanol (EMGEL) 2 % gel Apply topically daily 60 g 1     hydrocortisone 2.5 % cream Apply topically every morning Apply to face       metroNIDAZOLE (METROCREAM) 0.75 % external cream Apply topically every evening       polyethylene glycol (MIRALAX) 17 g packet Take 17 g by mouth 2 times daily 90 each 1     Vitamin D (Cholecalciferol) 25 MCG (1000 UT) TABS Take 2 tablets by mouth daily       linaclotide (LINZESS) 290 MCG capsule Take 1 capsule (290 mcg) by mouth every morning (before breakfast) (Patient not taking: Reported on 9/1/2022) 90 capsule 1       Allergies   Allergen Reactions     Levaquin Hives and Itching     Pcn [Penicillins] Hives     Tetanus Toxoids  Anaphylaxis     Erythromycin GI Disturbance     Silicone Rash        Social History     Tobacco Use     Smoking status: Former Smoker     Quit date: 2016     Years since quittin.6     Smokeless tobacco: Never Used     Tobacco comment: quit but  still smokes, but not in house   Substance Use Topics     Alcohol use: No     Alcohol/week: 0.0 standard drinks     Family History   Problem Relation Age of Onset     Respiratory Father      Cancer Brother         sarcoidosis     Diabetes Brother      Cerebrovascular Disease Brother      Liver Disease Brother      History   Drug Use No         Objective     /60   Pulse 71   Resp 16   SpO2 98%     Physical Exam    GENERAL APPEARANCE: Elderly female, alert, no acute distress     EYES: Eyes grossly normal to inspection     HENT: nose and mouth without ulcers or lesions, oral mucous membranes moist and oropharynx clear     NECK: no adenopathy and no asymmetry, masses, or scars     RESP: inspiratory wheeze in bilateral upper lobes     CV: regular rates and rhythm, normal S1 S2, no S3 or S4 and no murmur, click or rub     ABDOMEN:  soft, nontender, no HSM or masses and bowel sounds normal     MS: left knee in brace and left foot in walking boot, generalized weakness     SKIN: no suspicious lesions or rashes     NEURO: Normal strength and tone, sensory exam grossly normal, mentation intact and speech normal     PSYCH: depressed mood     LYMPHATICS: No cervical adenopathy    Recent Labs   Lab Test 22  0732 22  0621 22  0637 22  1456 22  0942 21  2101 10/27/21  1037   HGB 13.7  --   --  15.3  --    < >  --      --   --  230  --    < >  --     140   < > 135 140   < > 140   POTASSIUM 3.7 3.8   < > 3.7 4.0   < > 4.4   CR 0.81 0.94   < > 1.10* 1.02*   < > 0.97   A1C  --   --   --   --  6.0*  --  6.1*    < > = values in this interval not displayed.        Diagnostics:  Recent Results (from the past 168 hour(s))    Asymptomatic COVID-19 Virus (Coronavirus) by PCR Nose    Collection Time: 08/25/22  8:32 PM    Specimen: Nose; Swab   Result Value Ref Range    SARS CoV2 PCR Negative Negative   Basic metabolic panel    Collection Time: 08/27/22  6:37 AM   Result Value Ref Range    Sodium 137 133 - 144 mmol/L    Potassium 3.9 3.4 - 5.3 mmol/L    Chloride 106 94 - 109 mmol/L    Carbon Dioxide (CO2) 26 20 - 32 mmol/L    Anion Gap 5 3 - 14 mmol/L    Urea Nitrogen 8 7 - 30 mg/dL    Creatinine 0.90 0.52 - 1.04 mg/dL    Calcium 8.6 8.5 - 10.1 mg/dL    Glucose 98 70 - 99 mg/dL    GFR Estimate 68 >60 mL/min/1.73m2   COLONOSCOPY    Collection Time: 08/27/22  8:42 AM   Result Value Ref Range    COLONOSCOPY       [facility]  [address] [place]  [address]  _______________________________________________________________________________  Patient Name: Macey Romero            Procedure Date: 8/27/2022 8:42 AM  MRN: [id number]                       Account Number: [id number]  YOB: 1950              Admit Type: Outpatient  Age: 71                               Room: 2  Note Status: Supervisor Override      Attending MD: ROSALIA BROOKS MD  Instrument Name: 410 PCF-H190DL Colonoscope   _______________________________________________________________________________     Procedure:                Colonoscopy  Indications:              Generalized abdominal pain, Abnormal CT of the GI                             tract  Providers:                ROSALIA BROOKS MD, Radha Myers RN, Nori Gotti RN  Referring MD:               Medicines:                See the other procedure note for documentation of                              the administered medications  Complications:            No immediate complications.  _______________________________________________________________________________  Procedure:                Pre-Anesthesia Assessment:                            -  Prior to the procedure, a History and Physical                             was performed, and patient medications and                             allergies were reviewed. The patient is competent.                             The risks and benefits of the procedure and the                             sedation options and risks were discussed with the                             patient. All questions were answered and informed                             consent was obtained. Patient identification and                             proposed procedure were verified by the physician.                             Mental Status Examination: normal. Prophylactic                             Antibiotics: The patient does not require                              prophylactic antibiotics. Prior Anticoagulants: The                             patient has taken no anticoagulant or antiplatelet                             agents. After reviewing the risks and benefits, the                             patient was deemed in satisfactory condition to                             undergo the procedure. The anesthesia plan was to                             use minimal sedation / analgesia (anxiolysis).                             Immediately prior to administration of medications,                             the patient was re-assessed for adequacy to receive                             sedatives. The heart rate, respiratory rate, oxygen                             saturations, blood pressure, adequacy of pulmonary                             ventilation, and response to care were monitored                             throughout the procedure. The physical status of                             the patient was re-assessed after the procedure.                             After obtaining informed consent, the colonoscope                             was passed under direct vision. Throughout the                             procedure, the  patient's blood pressure, pulse, and                             oxygen saturations were monitored continuously. The                             Colonoscope was introduced through the anus and                             advanced to the terminal ileum. The colonoscopy was                             performed without difficulty. The patient tolerated                             the procedure well. The quality of the bowel                             preparation was adequate to identify polyps 6 mm                             and larger in size.                                                                                   Findings:       The perianal and digital rectal examinations were normal.       The terminal ileum appeared normal.       An extrinsic severe stenosis measuring 6 cm (in migel gt) was found in the        sigmoid colon and was traversed. Biopsies were taken with a cold forceps        for histology.       Extensive amounts of copious quantities of semi-solid solid stool was        found in the descending colon, at the splenic flexure, in the transverse        colon, at the hepatic flexure, in the ascending colon and in the cecum,        making visualization difficult. Lavage of the area was performed using        copious amounts of sterile water, resulting in clearance with good        visualization.       The lumen of the sigmoid colon, descending colon, transverse colon,        hepatic flexure, ascending colon and cecum was significantly dilated.                                                                                   Impression:               - The examined portion of the ileum was normal.                            - Stricture in the sigmoid colon. Biopsied.                            - Stool in the descending colon, at the splenic                              flexure, in the transverse colon, at the hepatic                             flexure, in the ascending colon and in the  cecum.                            - Dilated in the sigmoid colon, in the descending                             colon, in the transverse colon, at the hepatic                             flexure, in the ascending colon and in the cecum.  Recommendation:           - [facility] to [facility] for [facility] care.                            - Low fiber diet.                            - I will contact patient with Biopsy result in 1-2                             weeks. Please contact our Office at  at                             [facility] if ther is any questions,                                                                                   Procedure Code(s):       --- Professional ---       59933, Colonoscopy, flexible; with biopsy, single or multiple  Diagnosis Code(s):       --- Professional ---       K56.699, Other intes tinal obstruction unspecified as to partial versus        complete obstruction       K59.39, Other megacolon       R10.84, Generalized abdominal pain       R93.3, Abnormal findings on diagnostic imaging of other parts of        digestive tract    CPT copyright 2020 [employer]. All rights reserved.    The codes documented in this report are preliminary and upon  review may   be revised to meet current compliance requirements.    Electronically Signed by Abraham Crockett  ________________________  ABRAHAM BROOKS MD  8/27/2022 9:42:37 AM  I was physically present for the entire viewing portion of the exam.  ABRAHAM BROOKS MD  Number of Addenda: 0    Note Initiated On: 8/27/2022 8:42 AM     UPPER GI ENDOSCOPY    Collection Time: 08/27/22  8:42 AM   Result Value Ref Range    Upper GI Endoscopy       [place] [address] [place]  [address]  _______________________________________________________________________________  Patient Name: Macey Romero            Procedure Date: 8/27/2022 8:42 AM  MRN: [id number]                        Account Number: [id number]  YOB: 1950              Admit Type: Outpatient  Age: 71                               Room: 2  Note Status: Finalized                Attending MD: ROSALIA BROOKS MD  Instrument Name: Isaak PCF-H190DL EGD     _______________________________________________________________________________     Procedure:                Upper GI endoscopy  Indications:              Epigastric abdominal pain, Functional Dyspepsia,                             Heartburn  Providers:                ROSALIA BROOKS MD, Radha Myers RN, Nori Gotti RN  Referring MD:               Medicines:                Fentanyl 100 micrograms IV, Midazolam 4 mg IV,                              Cetacaine spray  Complications:            No immediate complications.  _______________________________________________________________________________  Procedure:                Pre-Anesthesia Assessment:                            - Prior to the procedure, a History and Physical                             was performed, and patient medications and                             allergies were reviewed. The risks and benefits of                             the procedure and the sedation options and risks                             were discussed with the patient. All questions were                             answered and informed consent was obtained. Patient                             identification and proposed procedure were verified                             by the physician. Mental Status Examination:                             normal. Prophylactic Antibiotics: The patient does                             not require prophylactic antibiotics. Prior                              Anticoagulants: The patient has taken no                             anticoagulant or antiplatelet agents. After                             reviewing the risks and benefits, the patient was                              deemed in satisfactory condition to undergo the                             procedure. The anesthesia plan was to use minimal                             sedation / analgesia (anxiolysis). Immediately                             prior to administration of medications, the patient                             was re-assessed for adequacy to receive sedatives.                             The heart rate, respiratory rate, oxygen                             saturations, blood pressure, adequacy of pulmonary                             ventilation, and response to care were monitored                             throughout the procedure. The physical status of                             the patient was re-assessed after the procedure.                            After obtain ing informed consent, the endoscope was                             passed under direct vision. Throughout the                             procedure, the patient's blood pressure, pulse, and                             oxygen saturations were monitored continuously. The                             Colonoscope was introduced through the mouth, and                             advanced to the third part of duodenum. The upper                             GI endoscopy was accomplished without difficulty.                             The patient tolerated the procedure well.                                                                                   Findings:       Diffuse moderately erythematous mucosa without bleeding was found in the        gastric antrum. Biopsies were taken with a cold forceps for histology.        Biopsies were taken with a cold forceps for Helicobacter pylori testing.       The cardia and gastric fundus were normal on retroflexion.       The duodenal bulb, first  portion of the duodenum and second portion of        the duodenum were normal.       LA Grade C (one or more mucosal breaks continuous between  tops of 2 or        more mucosal folds, less than 75% circumference) esophagitis with no        bleeding was found 37 cm from the incisors.       A mild Schatzki ring was found at the gastroesophageal junction.       A small hiatal hernia was present.                                                                                   Impression:               - Erythematous mucosa in the antrum. Biopsied.                            - Normal duodenal bulb, first portion of the                             duodenum and second portion of the duodenum.                            - LA Grade C reflux esophagitis with no bleeding.                            - Mild Schatzki ring.                            - Small hiatal hernia.  Recommendation:           - Please continue to follow with Primary care                             Physician.                             - [facility].                                                                                   Procedure Code(s):       --- Professional ---       54413, Esophagogastroduodenoscopy, flexible, transoral; with biopsy,        single or multiple  Diagnosis Code(s):       --- Professional ---       K31.89, Other diseases of stomach and duodenum       R10.13, Epigastric pain       K30, Functional dyspepsia       R12, Heartburn    CPT copyright 2020 American Medical Association. All rights reserved.    The codes documented in this report are preliminary and upon  review may   be revised to meet current compliance requirements.    Electronically Signed by Abraham Crockett  ________________________  ABRAHAM BROOKS MD  8/27/2022 9:36:36 AM  I was physically present for the entire viewing portion of the exam.  ABRAHAM BROOKS MD  Number of Addenda: 0    Note Initiated On: 8/27/2022 8:42 AM  Total Procedure Duration: 0 hours 1 minute 5 secon ds   Scope In: 9:07:54 AM  Scope Out: 9:08:59 AM     Surgical Pathology Exam    Collection  Time: 08/27/22  9:29 AM   Result Value Ref Range    Case Report       Surgical Pathology Report                         Case: [id number]                                  Authorizing Provider:  Abraham Rogers MD  Collected:           08/27/2022 09:29 AM          Ordering Location:     [facility]          Received:            08/29/2022 08:31 AM                                 [facility]                                                          Pathologist:           Debbie Goode MD PhD                                                      Specimen:    Large Intestine, Colon, Sigmoid                                                            Final Diagnosis       A. Colon, sigmoid, biopsies:  -Active colitis with erosion, and focal ischemic pattern injury (see comment).  -Negative for dysplasia or malignancy        Comment       The constellation of histologic features raise the differential diagnosis of ischemia, prolapse, segmental colitis associated with diverticular disease, acute self-limited infectious and pseudomembranous colitis , drug/toxin induced injury, among other entities.       Clinical Information       Procedure:  COLONOSCOPY, WITH POLYPECTOMY AND BIOPSY  ESOPHAGOGASTRODUODENOSCOPY (EGD)  Pre-op Diagnosis: Rectal bleed [K62.5]  Post-op Diagnosis: K62.5 - Rectal bleed [ICD-10-CM]      Gross Description       A(1). Large Intestine, Colon, Sigmoid, :  The specimen is received in formalin, labeled with the patient's name, medical record number and other identifying information and designated  colon, sigmoid . It consists of 4 tan soft tissue fragments ranging from 0.2-0.5 cm in greatest dimension. Entirely submitted in one cassette.   ([place] Conroy (ASCP))        Microscopic Description       Microscopic examination was performed.      Performing Labs       The technical component of this testing was completed at [facility]      Case Images     Basic metabolic panel    Collection Time: 08/28/22  6:21 AM   Result Value Ref Range    Sodium 140 133 - 144 mmol/L    Potassium 3.8 3.4 - 5.3 mmol/L    Chloride 107 94 - 109 mmol/L    Carbon Dioxide (CO2) 29 20 - 32 mmol/L    Anion Gap 4 3 - 14 mmol/L    Urea Nitrogen 8 7 - 30 mg/dL    Creatinine 0.94 0.52 - 1.04 mg/dL    Calcium 8.7 8.5 - 10.1 mg/dL    Glucose 106 (H) 70 - 99 mg/dL    GFR Estimate 65 >60 mL/min/1.73m2   CBC with platelets    Collection Time: 08/30/22  7:32 AM   Result Value Ref Range    WBC Count 5.5 4.0 - 11.0 10e3/uL    RBC Count 4.95 3.80 - 5.20 10e6/uL    Hemoglobin 13.7 11.7 - 15.7 g/dL    Hematocrit 41.3 35.0 - 47.0 %    MCV 83 78 - 100 fL    MCH 27.7 26.5 - 33.0 pg    MCHC 33.2 31.5 - 36.5 g/dL    RDW 12.5 10.0 - 15.0 %    Platelet Count 196 150 - 450 10e3/uL   Basic metabolic panel    Collection Time: 08/30/22  7:32 AM   Result Value Ref Range    Sodium 140 133 - 144 mmol/L    Potassium 3.7 3.4 - 5.3 mmol/L    Chloride 106 94 - 109 mmol/L    Carbon Dioxide (CO2) 26 20 - 32 mmol/L    Anion Gap 8 3 - 14 mmol/L    Urea Nitrogen 6 (L) 7 - 30 mg/dL    Creatinine 0.81 0.52 - 1.04 mg/dL    Calcium 8.5 8.5 - 10.1 mg/dL    Glucose 96 70 - 99 mg/dL    GFR Estimate 77 >60 mL/min/1.73m2   FLEXIBLE SIGMOIDOSCOPY    Collection Time: 08/30/22 12:03 PM   Result Value Ref Range    [address] [place]  [address]  _______________________________________________________________________________  Patient Name: Macey Romero            Procedure Date: 8/30/2022 12:03 PM  MRN: [id number]                       Account Number: [id number]  YOB: 1950              Admit Type: Inpatient  Age: 71                               Room: 3  Note Status: Finalized                Attending MD: MARCO YOUSIF MD  Instrument Name: 405 GIF- Gastroscope,413 PCF-H190DL Flex Sig    _______________________________________________________________________________     Procedure:                Flexible Sigmoidoscopy  Indications:              Abdominal pain in the left lower quadrant, Pelvic                             pain  Providers:                MARCO YOUSIF MD, Anabel Louise RN  Referring MD:               Medicines:                Midazolam 2 mg IV, Fentanyl 150 micrograms IV  Complications:            No immediate complications.   _______________________________________________________________________________  Procedure:                Pre-Anesthesia Assessment:                            - Prior to the procedure, a History and Physical                             was performed, and patient medications and                             allergies were reviewed. The patient is competent.                             The risks and benefits of the procedure and the                             sedation options and risks were discussed with the                             patient. All questions were answered and informed                             consent was obtained. Patient identification and                             proposed procedure were verified by the physician                             in the endoscopy suite. Mental Status Examination:                             alert and oriented. Airway Examination: normal                             oropharyngeal airway and neck mobility. Respiratory                              Examination: clear to auscultation. CV Examination:                             normal. Prophylactic Antibiotics: The patient does                             not require prophylactic antibiotics. Prior                             Anticoagulants: The patient has taken no                             anticoagulant or antiplatelet agents. ASA Grade                             Assessment: II - A patient with mild systemic                             disease. After  reviewing the risks and benefits,                             the patient was deemed in satisfactory condition to                             undergo the procedure. The anesthesia plan was to                             use moderate sedation / analgesia (conscious                             sedation). Immediately prior to administration of                             medications, the patient was re-assessed for                             adequacy to receive sedatives. The heart rate,                             respir atory rate, oxygen saturations, blood                             pressure, adequacy of pulmonary ventilation, and                             response to care were monitored throughout the                             procedure. The physical status of the patient was                             re-assessed after the procedure.                            After obtaining informed consent, the scope was                             passed under direct vision. The Colonoscope was                             introduced through the anus and advanced to the                             descending colon. The Endoscope was introduced                             through the and advanced to. The flexible                             sigmoidoscopy was somewhat difficult due to                             inadequate bowel prep and restricted mobility of                             the colon. Successful completion of the procedure                             was aided by increasing the dose of sedation                              medication. The quality of the bowel preparation                             was inadequate.                                                                                   Findings:       The perianal and digital rectal examinations were normal. Pertinent        negatives include normal sphincter tone and no palpable rectal lesions.       There was an angulation in the distal sigmoid  colon that creates a        benign-appearing, intrinsic mild stenosis measuring 4 cm (in length) was        found in the distal sigmoid colon and was traversed. IT was passed with        some difficulty with the pediatric scope but easily passed with the        gastroscope. No significant dilation of the bowel upstreatm. Liquid        stool throughout the examined colon and rectum.       The exam was otherwise without abnormality.                                                                                   Impression:               - Preparation of the colon was inadequ ate.                            - Angulation and stricture in the distal sigmoid                             colon.                            - The examination was otherwise normal.                            - No specimens collected.  Recommendation:           - [facility].                            - Advance diet as tolerated.                            - Miralax 1 capful (17 grams) in 8 ounces of water                             PO daily.                                                                                   Procedure Code(s):       --- Professional ---       94997, Sigmoidoscopy, flexible; diagnostic, including collection of        specimen(s) by brushing or washing, when performed (separate procedure)  Diagnosis Code(s):       --- Professional ---       R10.2, Pelvic and perineal pain       K56.699, Other intestinal obstruction unspecified as to partial versus        complete obstruction       R10.32, Left lower quadrant pain     CPT copyright 2020 American Medical Association. All rights reserved.    The codes documented in this report are preliminary and upon  review may   be revised to meet current compliance requirements.      __________________  MARCO YOUSIF MD  8/30/2022 1:46:39 PM  I was physically present for the entire viewing portion of the exam.  MARCO YOUSIF  MD  Number of Addenda: 0    Note Initiated On: 8/30/2022 12:03 PM  Total Procedure Duration: 0 hours 10 minutes 34 seconds   Scope In: 1:18:46 PM  Scope Out: 1:29:20 PM        No EKG this visit, completed in the last 90 days.    Revised Cardiac Risk Index (RCRI):  The patient has the following serious cardiovascular risks for perioperative complications:   - No serious cardiac risks = 0 points     RCRI Interpretation: 0 points: Class I (very low risk - 0.4% complication rate)           Signed Electronically by: SHAHRAM Martel CNP  Copy of this evaluation report is provided to requesting physician.

## 2022-09-06 ENCOUNTER — HOSPITAL ENCOUNTER (OUTPATIENT)
Facility: CLINIC | Age: 72
End: 2022-09-06
Attending: ORTHOPAEDIC SURGERY | Admitting: ORTHOPAEDIC SURGERY
Payer: MEDICARE

## 2022-09-12 DIAGNOSIS — Z23 NEED FOR INFLUENZA VACCINATION: Primary | ICD-10-CM

## 2022-09-12 PROCEDURE — G0008 ADMIN INFLUENZA VIRUS VAC: HCPCS | Performed by: FAMILY MEDICINE

## 2022-09-12 PROCEDURE — 90694 VACC AIIV4 NO PRSRV 0.5ML IM: CPT | Performed by: FAMILY MEDICINE

## 2022-09-21 NOTE — PROGRESS NOTES
Pre-op Total Joint Patient Screening    1. Do you have a ride available to come to the hospital the day after your surgery by 8am with anticipated discharge of 11am? y  2. What is the name of this person? Марина(daughter)  3. Do you have a  set up after surgery? y  4. Will your  be the same person that gives you a ride home after surgery? y  5. Have you received the Joint Replacement Guidebook? y  6. Do you have any questions about your guidebook? n  7. Have you activated your [facility] account? Not yet-pt will try activating today  8. Have you signed up for MY Chart access? y

## 2022-09-22 RX ORDER — CEFAZOLIN SODIUM/WATER 2 G/20 ML
2 SYRINGE (ML) INTRAVENOUS
Status: CANCELLED | OUTPATIENT
Start: 2022-09-22

## 2022-09-22 RX ORDER — CEFAZOLIN SODIUM/WATER 2 G/20 ML
2 SYRINGE (ML) INTRAVENOUS SEE ADMIN INSTRUCTIONS
Status: CANCELLED | OUTPATIENT
Start: 2022-09-22

## 2022-09-22 RX ORDER — ACETAMINOPHEN 325 MG/1
975 TABLET ORAL ONCE
Status: CANCELLED | OUTPATIENT
Start: 2022-09-22 | End: 2022-09-22

## 2022-09-23 ENCOUNTER — LAB (OUTPATIENT)
Dept: URGENT CARE | Facility: URGENT CARE | Age: 72
End: 2022-09-23
Payer: MEDICARE

## 2022-09-23 ENCOUNTER — TRANSFERRED RECORDS (OUTPATIENT)
Dept: FAMILY MEDICINE | Facility: CLINIC | Age: 72
End: 2022-09-23

## 2022-09-23 DIAGNOSIS — Z20.822 ENCOUNTER FOR LABORATORY TESTING FOR COVID-19 VIRUS: ICD-10-CM

## 2022-09-23 LAB — SARS-COV-2 RNA RESP QL NAA+PROBE: NEGATIVE

## 2022-09-23 PROCEDURE — U0005 INFEC AGEN DETEC AMPLI PROBE: HCPCS

## 2022-09-23 PROCEDURE — U0003 INFECTIOUS AGENT DETECTION BY NUCLEIC ACID (DNA OR RNA); SEVERE ACUTE RESPIRATORY SYNDROME CORONAVIRUS 2 (SARS-COV-2) (CORONAVIRUS DISEASE [COVID-19]), AMPLIFIED PROBE TECHNIQUE, MAKING USE OF HIGH THROUGHPUT TECHNOLOGIES AS DESCRIBED BY CMS-2020-01-R: HCPCS

## 2022-09-23 RX ORDER — TRAMADOL HYDROCHLORIDE 50 MG/1
50 TABLET ORAL PRN
COMMUNITY
End: 2022-09-27 | Stop reason: HOSPADM

## 2022-09-23 RX ORDER — PANTOPRAZOLE SODIUM 40 MG/1
40 TABLET, DELAYED RELEASE ORAL EVERY EVENING
COMMUNITY
End: 2022-09-27 | Stop reason: HOSPADM

## 2022-09-23 NOTE — PROGRESS NOTES
PTA medications updated by Medication Scribe prior to surgery via phone call with patient (last doses completed by Nurse)     Medication history sources: Patient, Surescripts and H&P  In the past week, patient estimated taking medication this percent of the time: Greater than 90%  Adherence assessment: N/A Not Observed    Significant changes made to the medication list:  Patient reports no longer taking the following meds (med scribe removed from PTA med list): Albuterol Neb solution, Emgel, Oxycodone      Additional medication history information:   Patient was advised to bring: Albuterol inhaler    Medication reconciliation completed by provider prior to medication history? No    Time spent in this activity: 45 minutes    The information provided in this note is only as accurate as the sources available at the time of update(s)      Prior to Admission medications    Medication Sig Last Dose Taking? Auth Provider Long Term End Date   albuterol (PROAIR HFA/PROVENTIL HFA/VENTOLIN HFA) 108 (90 Base) MCG/ACT inhaler Inhale 2 puffs into the lungs every 6 hours  Patient taking differently: Inhale 2 puffs into the lungs as needed PRN Yes Deandra Strong APRN CNP Yes    budesonide-formoterol (SYMBICORT) 160-4.5 MCG/ACT Inhaler Inhale 2 puffs into the lungs daily  Patient taking differently: Inhale 1 puff into the lungs At Bedtime  at HS Yes Deandra Strong APRN CNP Yes    CALCIUM PO Take 1 tablet by mouth every morning 9/23/2022 at AM Yes Unknown, Entered By History     CYANOCOBALAMIN PO Take by mouth every morning 9/19/2022 at AM Yes Unknown, Entered By History     hydrocortisone 2.5 % cream Apply topically every morning  at AM Yes Unknown, Entered By History     linaclotide (LINZESS) 290 MCG capsule Take 1 capsule (290 mcg) by mouth every morning (before breakfast) 9/18/2022 at AM Yes Nicole Bravo CNP     metroNIDAZOLE (METROCREAM) 0.75 % external cream Apply topically every evening  at PM Yes Reported,  Patient     pantoprazole (PROTONIX) 40 MG EC tablet Take 40 mg by mouth every evening  at PM Yes Reported, Patient     polyethylene glycol (MIRALAX) 17 g packet Take 17 g by mouth 2 times daily  Patient taking differently: Take 17 g by mouth as needed PRN Yes Nicole Bravo, CNP     traMADol (ULTRAM) 50 MG tablet Take 50 mg by mouth as needed for severe pain (7-10) 1 week ago Yes Reported, Patient No    Vitamin D (Cholecalciferol) 25 MCG (1000 UT) TABS Take 1 tablet by mouth every morning  at AM Yes Unknown, Entered By History

## 2022-09-24 ENCOUNTER — NURSE TRIAGE (OUTPATIENT)
Dept: NURSING | Facility: CLINIC | Age: 72
End: 2022-09-24

## 2022-09-24 RX ORDER — SODIUM CHLORIDE, SODIUM LACTATE, POTASSIUM CHLORIDE, CALCIUM CHLORIDE 600; 310; 30; 20 MG/100ML; MG/100ML; MG/100ML; MG/100ML
INJECTION, SOLUTION INTRAVENOUS CONTINUOUS
Status: CANCELLED | OUTPATIENT
Start: 2022-09-24

## 2022-09-24 NOTE — TELEPHONE ENCOUNTER
Patient is scheduled for a knee replacement surgery on Monday.    She was exposed to Covid. She is up to date on her vaccines.    She does not have any symptoms.  She is washing her hands and wearing a mask.    She is wondering what to do?    Patient advised to watch for her symptoms and call her surgeon on Monday.    Elza Mortensen RN on 9/24/2022 at 5:50 PM      Reason for Disposition    [1] CLOSE CONTACT COVID-19 EXPOSURE within last 14 days AND [2] NO symptoms    Additional Information    Negative: COVID-19 lab test positive    Negative: [1] Lives with someone known to have influenza (flu test positive) AND [2] flu-like symptoms (e.g., cough, runny nose, sore throat, SOB; with or without fever)    Negative: [1] Symptoms of COVID-19 (e.g., cough, fever, SOB, or others) AND [2] doctor (or NP/PA) diagnosed COVID-19 based on symptoms    Negative: [1] Symptoms of COVID-19 (e.g., cough, fever, SOB, or others) AND [2] lives in an area with community spread    Negative: [1] Symptoms of COVID-19 (e.g., cough, fever, SOB, or others) AND [2] within 14 days of EXPOSURE (close contact) with diagnosed or suspected COVID-19 patient    Negative: [1] Symptoms of COVID-19 (e.g., cough, fever, SOB, or others) AND [2] within 14 days of travel from high-risk area for COVID-19 community spread (identified by CDC)    Negative: [1] Difficulty breathing (shortness of breath) occurs AND [2] onset > 14 days after COVID-19 EXPOSURE (Close Contact)    Negative: [1] Dry cough occurs AND [2] onset > 14 days after COVID-19 EXPOSURE    Negative: [1] Wet cough (i.e., white-yellow, yellow, green, or wm colored sputum) AND [2] onset > 14 days after COVID-19 EXPOSURE    Negative: [1] Common cold symptoms AND [2] onset > 14 days after COVID-19 EXPOSURE    Negative: COVID-19 vaccine reaction suspected (e.g., fever, headache, muscle aches) occurring during days 1 to 3 after getting vaccine    Negative: COVID-19 vaccine, questions about     Negative: [1] CLOSE CONTACT COVID-19 EXPOSURE within last 14 days AND [2] needs COVID-19 lab test to return to work AND [3] NO symptoms    Negative: [1] CLOSE CONTACT COVID-19 EXPOSURE within last 14 days AND [2] exposed person is a  (e.g., police or paramedic) AND [3] NO symptoms    Negative: [1] CLOSE CONTACT COVID-19 EXPOSURE within last 14 days AND [2] exposed person is a healthcare worker who was NOT using all recommended personal protective equipment (e.g., a respirator-N95 mask, eye protection, gloves, and gown) AND [3] NO symptoms    Negative: [1] Living or working in a correctional facility, long-term care facility, or shelter (i.e., congregate setting; densely populated) AND [2] where an outbreak has occurred AND [3] NO symptoms    Negative: [1] CLOSE CONTACT COVID-19 EXPOSURE within last 14 days AND [2] weak immune system (e.g., HIV positive, cancer chemo, splenectomy, organ transplant, chronic steroids) AND [3] NO symptoms    Protocols used: CORONAVIRUS (COVID-19) EXPOSURE-A- 1.18.2022

## 2022-09-25 ENCOUNTER — NURSE TRIAGE (OUTPATIENT)
Dept: NURSING | Facility: CLINIC | Age: 72
End: 2022-09-25

## 2022-09-25 NOTE — TELEPHONE ENCOUNTER
Pt is phoning stating that pt is scheduled for a knee replacement tomorrow morning and that her  has tested positive for COVID 09/24/2022    Pt states that she tested negative for COVID but that she noticed that she is unable to taste     Pt has sore throat     No coughing or difficulty breathing     Pt states that she will retest for COVID tomorrow and will call if symptoms worsen for triage and to set up a COVID treatment team appt if pt tests positive     Pt does not want to be triaged and is just concerned about canceling her surgery for tomorrow - pt spoke with [facility] who advised pt to call back tomorrow     No triage     Erika Ho RN  [place] Nurse Advisor  10:11 AM 9/25/2022        COVID 19 Nurse Triage Plan/Patient Instructions    Please be aware that novel coronavirus (COVID-19) may be circulating in the community. If you develop symptoms such as fever, cough, or SOB or if you have concerns about the presence of another infection including coronavirus (COVID-19), please contact your health care provider or visit [email or web address].     Disposition/Instructions    Home care recommended. Follow home care protocol based instructions.    Thank you for taking steps to prevent the spread of this virus.  o Limit your contact with others.  o Wear a simple mask to cover your cough.  o Wash your hands well and often.    Resources    [facility] [place]: About COVID-19: [email or web address]    CDC: What to Do If You're Sick: www.cdc.gov/coronavirus/2019-ncov/about/steps-when-sick.html    CDC: Ending Home Isolation: www.cdc.gov/coronavirus/2019-ncov/hcp/disposition-in-home-patients.html     CDC: Caring for Someone: www.cdc.gov/coronavirus/2019-ncov/if-you-are-sick/care-for-someone.html     [facility]: Interim Guidance for Hospital Discharge to Home: www.health.[facility].[place]/diseases/coronavirus/hcp/hospdischarge.pdf    [facility] clinical trials (COVID-19 research  studies): [email or web address]-clinical-trials     Below are the COVID-19 hotlines at the [place] Department [facility] ([facility]). Interpreters are available.   o For health questions: Call 775-582-0249 or 1-945.551.1919 (7 a.m. to 7 p.m.)  o For questions about schools and childcare: Call 906-264-3066 or 1-698.341.7881 (7 a.m. to 7 p.m.)                       Reason for Disposition    Health Information question, no triage required and triager able to answer question    Additional Information    Negative: [1] Caller is not with the adult (patient) AND [2] reporting urgent symptoms    Negative: Lab result questions    Negative: Medication questions    Negative: Caller can't be reached by phone    Negative: Caller has already spoken to PCP or another triager    Negative: RN needs further essential information from caller in order to complete triage    Protocols used: INFORMATION ONLY CALL - NO TRIAGE-A-

## 2022-10-10 ENCOUNTER — OFFICE VISIT (OUTPATIENT)
Dept: FAMILY MEDICINE | Facility: CLINIC | Age: 72
End: 2022-10-10

## 2022-10-10 VITALS
HEART RATE: 78 BPM | SYSTOLIC BLOOD PRESSURE: 122 MMHG | RESPIRATION RATE: 16 BRPM | OXYGEN SATURATION: 98 % | WEIGHT: 149.5 LBS | BODY MASS INDEX: 26.48 KG/M2 | DIASTOLIC BLOOD PRESSURE: 60 MMHG

## 2022-10-10 DIAGNOSIS — E78.5 HYPERLIPIDEMIA LDL GOAL <130: ICD-10-CM

## 2022-10-10 DIAGNOSIS — G89.29 CHRONIC PAIN OF BOTH KNEES: ICD-10-CM

## 2022-10-10 DIAGNOSIS — N18.31 STAGE 3A CHRONIC KIDNEY DISEASE (H): ICD-10-CM

## 2022-10-10 DIAGNOSIS — Z01.818 PREOP GENERAL PHYSICAL EXAM: Primary | ICD-10-CM

## 2022-10-10 DIAGNOSIS — M25.562 CHRONIC PAIN OF BOTH KNEES: ICD-10-CM

## 2022-10-10 DIAGNOSIS — M25.561 CHRONIC PAIN OF BOTH KNEES: ICD-10-CM

## 2022-10-10 DIAGNOSIS — R73.03 PREDIABETES: ICD-10-CM

## 2022-10-10 DIAGNOSIS — J45.40 MODERATE PERSISTENT ASTHMA WITHOUT COMPLICATION: ICD-10-CM

## 2022-10-10 LAB
% GRANULOCYTES: 56.4 % (ref 42.2–75.2)
HCT VFR BLD AUTO: 42.9 % (ref 35–46)
HEMOGLOBIN: 14.6 G/DL (ref 11.8–15.5)
LYMPHOCYTES NFR BLD AUTO: 35.8 % (ref 20.5–51.1)
MCH RBC QN AUTO: 27.8 PG (ref 27–31)
MCHC RBC AUTO-ENTMCNC: 34 G/DL (ref 33–37)
MCV RBC AUTO: 81.9 FL (ref 80–100)
MONOCYTES NFR BLD AUTO: 7.8 % (ref 1.7–9.3)
PLATELET # BLD AUTO: 216 K/UL (ref 140–450)
RBC # BLD AUTO: 5.24 X10/CMM (ref 3.7–5.2)
WBC # BLD AUTO: 7.2 X10/CMM (ref 3.8–11)

## 2022-10-10 PROCEDURE — 99215 OFFICE O/P EST HI 40 MIN: CPT | Performed by: NURSE PRACTITIONER

## 2022-10-10 PROCEDURE — 36415 COLL VENOUS BLD VENIPUNCTURE: CPT | Performed by: NURSE PRACTITIONER

## 2022-10-10 PROCEDURE — 85025 COMPLETE CBC W/AUTO DIFF WBC: CPT | Performed by: NURSE PRACTITIONER

## 2022-10-10 PROCEDURE — 93000 ELECTROCARDIOGRAM COMPLETE: CPT | Performed by: NURSE PRACTITIONER

## 2022-10-10 PROCEDURE — 82044 UR ALBUMIN SEMIQUANTITATIVE: CPT | Performed by: NURSE PRACTITIONER

## 2022-10-10 RX ORDER — OXYCODONE HYDROCHLORIDE 5 MG/1
TABLET ORAL
COMMUNITY
Start: 2022-09-08 | End: 2022-10-10

## 2022-10-10 NOTE — PROGRESS NOTES
[facility] [address] [place] [address]  Phone: [phone]  Fax: [phone]  Primary Provider: Long Ferrari  Pre-op Performing Provider: ROMARIO GARCIA      PREOPERATIVE EVALUATION:  Today's date: 10/10/2022  Macey Romero is a 71 year old female who presents for a preoperative evaluation.    Surgical Information:  Surgery/Procedure: Left Knee Replace  Surgery Location: [facility]  Surgeon: Mimi  Surgery Date: 10/17/22  Time of Surgery: 1pm  Where patient plans to recover: At home with family  Fax number for surgical facility: Note does not need to be faxed, will be available electronically in Epic.    Type of Anesthesia Anticipated: to be determined    Assessment & Plan     The proposed surgical procedure is considered INTERMEDIATE risk.    Preop general physical exam  No contraindications to upcoming surgery  - EKG 12-lead complete w/read - Clinics  - CBC with Diff/Plt ([facility])    Chronic pain of both knees  Having left knee replaced    Moderate persistent asthma without complication  Well controlled. Continue current plan of care with inhalers    Hyperlipidemia LDL goal <130  Not on medication. Lifestyle modifications reviewed  - EKG 12-lead complete w/read - Clinics    Stage 3a chronic kidney disease (H)  Stable, avoid nsaid medications, stay hydrated  - Microalbumin ([facility])  - Renal Function Panel ([facility])    Prediabetes  Stable, no medication indicated    Risks and Recommendations:  The patient has the following additional risks and recommendations for perioperative complications:   - No identified additional risk factors other than previously addressed    Medication Instructions:   - LABA, inhaled corticosteroid, long-acting anticholinergics: Continue without modification.   - rescue Inhaler: Continue PRN. Bring to hospital on the day of surgery.    RECOMMENDATION:  APPROVAL GIVEN to proceed with proposed procedure, without further diagnostic  evaluation.    Subjective     HPI related to upcoming procedure: chronic bilateral knee pain. History of right knee replacement. Now having left knee replaced    Preop Questions 10/6/2022   1. Have you ever had a heart attack or stroke? No   2. Have you ever had surgery on your heart or blood vessels, such as a stent placement, a coronary artery bypass, or surgery on an artery in your head, neck, heart, or legs? No   3. Do you have chest pain with activity? No   4. Do you have a history of  heart failure? No   5. Do you currently have a cold, bronchitis or symptoms of other infection? No   6. Do you have a cough, shortness of breath, or wheezing? YES - has asthma   7. Do you or anyone in your family have previous history of blood clots? YES    8. Do you or does anyone in your family have a serious bleeding problem such as prolonged bleeding following surgeries or cuts? YES    9. Have you ever had problems with anemia or been told to take iron pills? No   10. Have you had any abnormal blood loss such as black, tarry or bloody stools, or abnormal vaginal bleeding? No   11. Have you ever had a blood transfusion? YES    11a. Have you ever had a transfusion reaction? YES - unsure what   12. Are you willing to have a blood transfusion if it is medically needed before, during, or after your surgery? Yes   13. Have you or any of your relatives ever had problems with anesthesia? No   14. Do you have sleep apnea, excessive snoring or daytime drowsiness? No   15. Do you have any artifical heart valves or other implanted medical devices like a pacemaker, defibrillator, or continuous glucose monitor? No   16. Do you have artificial joints? YES - right knee   17. Are you allergic to latex? No       Health Care Directive:  Patient does not have a Health Care Directive or Living Will: Discussed advance care planning with patient; information given to patient to review.    Preoperative Review of :   reviewed - controlled  substances prescribed by other outside provider(s).  PDMP Review       Value Time User    State PDMP site checked  Yes 10/10/2022 12:58 PM Deandra Strong APRN CNP        Status of Chronic Conditions:  ASTHMA - Patient has a longstanding history of moderate-severe Asthma . Patient has been doing well overall noting COUGH and continues on medication regimen consisting of symbicort, albuterol prn without adverse reactions or side effects.     DEPRESSION - Patient has a long history of Depression of moderate severity requiring medication for control with recent symptoms being stable..Current symptoms of depression include depressed mood.     HYPERLIPIDEMIA - stable, not on medication  Recent Labs   Lab Test 10/10/22  1415 10/27/21  1037   CHOL 207* 206*   HDL 45 43   * 134*   TRIG 146 160*     RENAL INSUFFICIENCY - Patient has a longstanding history of moderate-severe chronic renal insufficiency. .     Prediabetes - stable, not on medication  Lab Results   Component Value Date    A1C 6.0 10/10/2022    A1C 6.0 06/20/2022    A1C 6.1 10/27/2021    A1C 5.9 06/25/2021    A1C CANCELED 06/25/2021     Review of Systems  Constitutional, neuro, ENT, endocrine, pulmonary, cardiac, gastrointestinal, genitourinary, musculoskeletal, integument and psychiatric systems are negative, except as otherwise noted.    Patient Active Problem List    Diagnosis Date Noted     Colitis 08/25/2022     Priority: Medium     Stage 3a chronic kidney disease (H) 06/22/2022     Priority: Medium     Memory loss 10/27/2021     Priority: Medium     Moderate persistent asthma without complication 10/27/2021     Priority: Medium     Prediabetes 10/27/2021     Priority: Medium     Hyperlipidemia LDL goal <130 10/27/2021     Priority: Medium     H/O bilateral mastectomy 06/17/2021     Priority: Medium     with reconstruction       Fibromyalgia      Priority: Medium     Chronic pain of both knees 03/21/2018     Priority: Medium     Pterygium eye  08/26/2013     Priority: Medium     IBS (irritable bowel syndrome) 06/28/2011     Priority: Medium     Keloid of skin 06/28/2011     Priority: Medium     Osteoarthritis 02/01/2011     Priority: Medium     Osteopenia 02/01/2011     Priority: Medium     History of breast cancer 02/01/2011     Priority: Medium     1987        Past Medical History:   Diagnosis Date     Breast CA in situ 1987     Cancer (H) 1987    Right Breast treated with surgery     Chronic constipation      Fibromyalgia      Malignant neoplasm of female breast, unspecified estrogen receptor status, unspecified laterality, unspecified site of breast (H) 6/17/2021     Meningitis     Several times as an adult     Osteoarthritis 2/1/2011     Osteopenia 2/1/2011     Pneumonia      Pterygium eye 8/26/2013     Reactive airway disease 2/1/2011     Seasonal allergies      Shingles     Prior to 2008 - scalp     Skin cancer     SCC - hand, left nose     Past Surgical History:   Procedure Laterality Date     anterior c-disc fusion       BLEPHAROPLASTY, BROW LIFT BILATERAL, COMBINED Bilateral 6/18/2018    Procedure: COMBINED BLEPHAROPLASTY, BROW LIFT BILATERAL;  BILATERAL UPPER LID BLEPHAROPLASTY; BILATERAL BROW PTOSIS REPAIR;  Surgeon: Сергей Walters MD;  Location:  [facility]     CHOLECYSTECTOMY       COLONOSCOPY N/A 10/19/2017    Procedure: COLONOSCOPY;  COLONOSCOPY;  Surgeon: Niko Wong MD;  Location:  [facility]     COLONOSCOPY N/A 8/27/2022    Procedure: COLONOSCOPY, WITH POLYPECTOMY AND BIOPSY;  Surgeon: Abraham Rogers MD;  Location:  GI     D & C      Bleeding before hysterectomy     ENDOSCOPY  04/21/08     ESOPHAGOSCOPY, GASTROSCOPY, DUODENOSCOPY (EGD), COMBINED N/A 8/27/2022    Procedure: ESOPHAGOGASTRODUODENOSCOPY, WITH BIOPSY;  Surgeon: Abraham Rogers MD;  Location:  GI     HYSTERECTOMY, MARCUS      Ovaries and tubes out due to breast cancer     JOINT REPLACEMTN, KNEE RT/LT Right 01/2011    Joint Replacement knee RT, with tibial  straightening (2 replacements)     MAMMOPLASTY AUGMENTATION BILATERAL      breast ca      MASTECTOMY, SIMPLE RT/LT/CLAIRE Bilateral 1989    Mastectomy Simple RT/LT/CLAIRE     MOHS MICROGRAPHIC PROCEDURE      Left lateral nose     OPEN REDUCTION INTERNAL FIXATION ANKLE  8/27/2013    Procedure: OPEN REDUCTION INTERNAL FIXATION ANKLE;  RIGHT OPEN REDUCTION INTERNAL FIXATION ANKLE WITH LIGAMENT REPAIR;  Surgeon: Nando Chang MD;  Location: [facility]     PTERYGIUM WITH CONJUNCTIVAL AUTOLOGOUS TRANSPLANT Left 8/29/2016    Procedure: PTERYGIUM WITH CONJUNCTIVAL AUTOLOGOUS TRANSPLANT;  Surgeon: Сергей Walters MD;  Location:  [facility]     REPAIR LIGAMENT ANKLE  8/27/2013    Procedure: REPAIR LIGAMENT ANKLE;;  Surgeon: Nando Chang MD;  Location: [facility] OR     SALIVARY GLAND SURGERY Left     Stone removal      SIGMOIDOSCOPY FLEXIBLE N/A 8/30/2022    Procedure: FLEXIBLE SIGMOIDOSCOPY;  Surgeon: Niko Wong MD;  Location:  [facility]     TONSILLECTOMY       Current Outpatient Medications   Medication Sig Dispense Refill     albuterol (PROAIR HFA/PROVENTIL HFA/VENTOLIN HFA) 108 (90 Base) MCG/ACT inhaler Inhale 2 puffs into the lungs every 6 hours (Patient taking differently: Inhale 2 puffs into the lungs as needed) 18 g 1     budesonide-formoterol (SYMBICORT) 160-4.5 MCG/ACT Inhaler Inhale 2 puffs into the lungs daily (Patient taking differently: Inhale 1 puff into the lungs At Bedtime) 10.2 g 3     CALCIUM PO Take 1 tablet by mouth every morning       hydrocortisone 2.5 % cream Apply topically every morning       linaclotide (LINZESS) 290 MCG capsule Take 1 capsule (290 mcg) by mouth every morning (before breakfast) 90 capsule 1     metroNIDAZOLE (METROCREAM) 0.75 % external cream Apply topically every evening       polyethylene glycol (MIRALAX) 17 g packet Take 17 g by mouth 2 times daily (Patient taking differently: Take 17 g by mouth as needed) 90 each 1     Vitamin D (Cholecalciferol) 25 MCG (1000 UT) TABS Take 1 tablet by  mouth every morning         Allergies   Allergen Reactions     Levaquin Hives and Itching     Pcn [Penicillins] Hives     Tetanus Toxoids Anaphylaxis     Erythromycin GI Disturbance     Silicone Rash        Social History     Tobacco Use     Smoking status: Former     Types: Cigarettes     Quit date: 2016     Years since quittin.7     Smokeless tobacco: Never     Tobacco comments:     quit but  still smokes, but not in house   Substance Use Topics     Alcohol use: No     Alcohol/week: 0.0 standard drinks     Family History   Problem Relation Age of Onset     Respiratory Father      Cancer Brother         sarcoidosis     Diabetes Brother      Cerebrovascular Disease Brother      Liver Disease Brother      History   Drug Use No         Objective     /60   Pulse 78   Resp 16   Wt 67.8 kg (149 lb 8 oz)   SpO2 98%   BMI 26.48 kg/m      Physical Exam    GENERAL APPEARANCE: healthy, alert and no distress     EYES: Eyes grossly normal to inspection     HENT: nose and mouth without ulcers or lesions, oral mucous membranes moist and oropharynx clear     NECK: no adenopathy and no asymmetry, masses, or scars     RESP: lungs clear to auscultation - no rales, rhonchi or wheezes     CV: regular rates and rhythm, normal S1 S2, no S3 or S4 and no murmur, click or rub     ABDOMEN:  soft, nontender, no HSM or masses and bowel sounds normal     MS: extremities normal- no gross deformities noted, no evidence of inflammation in joints, FROM in all extremities.     SKIN: no suspicious lesions or rashes     NEURO: Normal strength and tone, sensory exam grossly normal, mentation intact and speech normal     PSYCH: normal affect & mood     LYMPHATICS: No cervical adenopathy    Recent Labs   Lab Test 22  0732 22  0621 22  0637 22  1456 22  0942 21  2101 10/27/21  1037   HGB 13.7  --   --  15.3  --    < >  --      --   --  230  --    < >  --     140   < > 135 140   < >  140   POTASSIUM 3.7 3.8   < > 3.7 4.0   < > 4.4   CR 0.81 0.94   < > 1.10* 1.02*   < > 0.97   A1C  --   --   --   --  6.0*  --  6.1*    < > = values in this interval not displayed.        Diagnostics:  Recent Results (from the past 48 hour(s))   CBC with Diff/Plt ([facility] [place])    Collection Time: 10/10/22 12:00 AM   Result Value Ref Range    WBC x10/cmm 7.2 3.8 - 11.0 x10/cmm    % Lymphocytes 35.8 20.5 - 51.1 %    % Monocytes 7.8 1.7 - 9.3 %    % Granulocytes 56.4 42.2 - 75.2 %    RBC x10/cmm 5.24 (A) 3.7 - 5.2 x10/cmm    Hemoglobin 14.6 11.8 - 15.5 g/dl    Hematocrit 42.9 35 - 46 %    MCV 81.9 80 - 100 fL    MCH 27.8 27.0 - 31.0 pg    MCHC 34.0 33.0 - 37.0 g/dL    Platelet Count 216 140 - 450 K/uL   Renal Function Panel ([facility])    Collection Time: 10/10/22  2:15 PM   Result Value Ref Range    Glucose 113 (H) 70 - 99 mg/dL    Urea Nitrogen 15 8 - 27 mg/dL    Creatinine 0.92 0.57 - 1.00 mg/dL    eGFR  67 >59 mL/min/1.73    BUN/Creatinine Ratio 16 12 - 28    Sodium 140 134 - 144 mmol/L    Potassium 4.1 3.5 - 5.2 mmol/L    Chloride 99 96 - 106 mmol/L    Total CO2 26 20 - 29 mmol/L    Calcium 9.7 8.7 - 10.3 mg/dL    Phosphorus 4.3 3.0 - 4.3 mg/dL    Albumin 4.6 3.7 - 4.7 g/dL   Hemoglobin A1C ([facility])    Collection Time: 10/10/22  2:15 PM   Result Value Ref Range    Hemoglobin A1C 6.0 (H) 4.8 - 5.6 %   Lipid Panel ([facility])    Collection Time: 10/10/22  2:15 PM   Result Value Ref Range    Cholesterol 207 (H) 100 - 199 mg/dL    Triglycerides 146 0 - 149 mg/dL    HDL Cholesterol 45 >39 mg/dL    VLDL Cholesterol Ruy 26 5 - 40 mg/dL    LDL Cholesterol Calculated 136 (H) 0 - 99 mg/dL    LDL/HDL Ratio 3.0 0.0 - 3.2 ratio      EKG: appears normal, NSR, normal axis, normal intervals, no acute ST/T changes c/w ischemia, no LVH by voltage criteria, unchanged from previous tracings    Revised Cardiac Risk Index (RCRI):  The patient has the following serious cardiovascular risks for perioperative complications:   - No serious cardiac  risks = 0 points     RCRI Interpretation: 0 points: Class I (very low risk - 0.4% complication rate)           Signed Electronically by: SHAHRAM Martel CNP  Copy of this evaluation report is provided to requesting physician.

## 2022-10-10 NOTE — PATIENT INSTRUCTIONS
Preparing for Your Surgery  Getting started  A nurse will call you to review your health history and instructions. They will give you an arrival time based on your scheduled surgery time. Please be ready to share:    Your doctor's clinic name and phone number    Your medical, surgical and anesthesia history    A list of allergies and sensitivities    A list of medicines, including herbal treatments and over-the-counter drugs    Whether the patient has a legal guardian (ask how to send us the papers in advance)  Please tell us if you're pregnant--or if there's any chance you might be pregnant. Some surgeries may injure a fetus (unborn baby), so they require a pregnancy test. Surgeries that are safe for a fetus don't always need a test, and you can choose whether to have one.   If you have a child who's having surgery, please ask for a copy of Preparing for Your Child's Surgery.    Preparing for surgery    Within 10 to 30 days of surgery: Have a pre-op exam (sometimes called an H&P, or History and Physical). This can be done at a clinic or pre-operative center.  ? If you're having a , you may not need this exam. Talk to your care team.    At your pre-op exam, talk to your care team about all medicines you take. If you need to stop any medicines before surgery, ask when to start taking them again.  ? We do this for your safety. Many medicines can make you bleed too much during surgery. Some change how well surgery (anesthesia) drugs work.    Call your insurance company to let them know you're having surgery. (If you don't have insurance, call [phone].)    Call your clinic if there's any change in your health. This includes signs of a cold or flu (sore throat, runny nose, cough, rash, fever). It also includes a scrape or scratch near the surgery site.    If you have questions on the day of surgery, call your hospital or surgery center.  COVID testing  You may need to be tested for COVID-19 before having  surgery. If so, we will give you instructions (or click here).  Eating and drinking guidelines  For your safety: Unless your surgeon tells you otherwise, follow the guidelines below.    Eat and drink as usual until 8 hours before surgery. After that, no food or milk.    Drink clear liquids until 2 hours before surgery. These are liquids you can see through, like water, Gatorade and Propel Water. You may also have black coffee and tea (no cream or milk).    Nothing by mouth within 2 hours of surgery. This includes gum, candy and breath mints.    If you drink alcohol: Stop drinking it the night before surgery.    If your care team tells you to take medicine on the morning of surgery, it's okay to take it with a sip of water.  Preventing infection    Shower or bathe the night before and morning of your surgery. Follow the instructions your clinic gave you. (If no instructions, use regular soap.)    Don't shave or clip hair near your surgery site. We'll remove the hair if needed.    Don't smoke or vape the morning of surgery. You may chew nicotine gum up to 2 hours before surgery. A nicotine patch is okay.  ? Note: Some surgeries require you to completely quit smoking and nicotine. Check with your surgeon.    Your care team will make every effort to keep you safe from infection. We will:  ? Clean our hands often with soap and water (or an alcohol-based hand rub).  ? Clean the skin at your surgery site with a special soap that kills germs.  ? Give you a special gown to keep you warm. (Cold raises the risk of infection.)  ? Wear special hair covers, masks, gowns and gloves during surgery.  ? Give antibiotic medicine, if prescribed. Not all surgeries need antibiotics.  What to bring on the day of surgery    Photo ID and insurance card    Copy of your health care directive, if you have one    Glasses and hearing aides (bring cases)  ? You can't wear contacts during surgery    Inhaler and eye drops, if you use them (tell us  about these when you arrive)    CPAP machine or breathing device, if you use them    A few personal items, if spending the night    If you have . . .  ? A pacemaker, ICD (cardiac defibrillator) or other implant: Bring the ID card.  ? An implanted stimulator: Bring the remote control.  ? A legal guardian: Bring a copy of the certified (court-stamped) guardianship papers.  Please remove any jewelry, including body piercings. Leave jewelry and other valuables at home.  If you're going home the day of surgery    You must have a responsible adult drive you home. They should stay with you overnight as well.    If you don't have someone to stay with you, and you aren't safe to go home alone, we may keep you overnight. Insurance often won't pay for this.  After surgery  If it's hard to control your pain or you need more pain medicine, please call your surgeon's office.  Questions?   If you have any questions for your care team, list them here: _________________________________________________________________________________________________________________________________________________________________________ ____________________________________ ____________________________________ ____________________________________  For informational purposes only. Not to replace the advice of your health care provider. Copyright   2003, 2019 [facility]. All rights reserved. Clinically reviewed by Autumn Valero MD. [employer] [id number] - REV 07/22.

## 2022-10-11 LAB
ALBUMIN SERPL-MCNC: 4.6 G/DL (ref 3.7–4.7)
BUN SERPL-MCNC: 15 MG/DL (ref 8–27)
BUN/CREATININE RATIO: 16 (ref 12–28)
CALCIUM SERPL-MCNC: 9.7 MG/DL (ref 8.7–10.3)
CHLORIDE SERPLBLD-SCNC: 99 MMOL/L (ref 96–106)
CHOLEST SERPL-MCNC: 207 MG/DL (ref 100–199)
CREAT SERPL-MCNC: 0.92 MG/DL (ref 0.57–1)
EGFR: 67 ML/MIN/1.73
GLUCOSE SERPL-MCNC: 113 MG/DL (ref 70–99)
HBA1C MFR BLD: 6 % (ref 4.8–5.6)
HDLC SERPL-MCNC: 45 MG/DL
LDL/HDL RATIO: 3 RATIO (ref 0–3.2)
LDLC SERPL CALC-MCNC: 136 MG/DL (ref 0–99)
PHOSPHATE SERPL-MCNC: 4.3 MG/DL (ref 3–4.3)
POTASSIUM SERPL-SCNC: 4.1 MMOL/L (ref 3.5–5.2)
SODIUM SERPL-SCNC: 140 MMOL/L (ref 134–144)
TOTAL CO2: 26 MMOL/L (ref 20–29)
TRIGL SERPL-MCNC: 146 MG/DL (ref 0–149)
VLDLC SERPL CALC-MCNC: 26 MG/DL (ref 5–40)

## 2022-10-12 LAB
A/C RATIO (RMG): <30
ALBUMIN- RMG: 10 MG/L (ref 0–10)
INTERPRETATION: NORMAL
URINE CREATININE - RMG: 100 MG/DL (ref 0–300)

## 2022-10-14 ENCOUNTER — LAB (OUTPATIENT)
Dept: URGENT CARE | Facility: URGENT CARE | Age: 72
End: 2022-10-14
Payer: MEDICARE

## 2022-10-14 DIAGNOSIS — Z20.822 ENCOUNTER FOR LABORATORY TESTING FOR COVID-19 VIRUS: ICD-10-CM

## 2022-10-14 LAB — SARS-COV-2 RNA RESP QL NAA+PROBE: NEGATIVE

## 2022-10-14 PROCEDURE — U0005 INFEC AGEN DETEC AMPLI PROBE: HCPCS

## 2022-10-14 PROCEDURE — U0003 INFECTIOUS AGENT DETECTION BY NUCLEIC ACID (DNA OR RNA); SEVERE ACUTE RESPIRATORY SYNDROME CORONAVIRUS 2 (SARS-COV-2) (CORONAVIRUS DISEASE [COVID-19]), AMPLIFIED PROBE TECHNIQUE, MAKING USE OF HIGH THROUGHPUT TECHNOLOGIES AS DESCRIBED BY CMS-2020-01-R: HCPCS

## 2022-10-14 NOTE — PROGRESS NOTES
PTA medications updated by Medication Scribe prior to surgery via phone call with patient (last doses completed by Nurse)     Medication history sources: Patient, Surescripts and H&P  In the past week, patient estimated taking medication this percent of the time: Greater than 90%  Adherence assessment: N/A Not Observed    Significant changes made to the medication list:  None      Additional medication history information:   Patient was advised to bring: Albuterol and Symbicort inhalers    Medication reconciliation completed by provider prior to medication history? No    Time spent in this activity: 40 minutes    The information provided in this note is only as accurate as the sources available at the time of update(s)      Prior to Admission medications    Medication Sig Last Dose Taking? Auth Provider Long Term End Date   albuterol (PROAIR HFA/PROVENTIL HFA/VENTOLIN HFA) 108 (90 Base) MCG/ACT inhaler Inhale 2 puffs into the lungs every 6 hours  Patient taking differently: Inhale 2 puffs into the lungs as needed PRN Yes Deandra Strong APRN CNP Yes    budesonide-formoterol (SYMBICORT) 160-4.5 MCG/ACT Inhaler Inhale 2 puffs into the lungs daily  Patient taking differently: Inhale 1 puff into the lungs At Bedtime  at HS Yes Deandra Strong APRN CNP Yes    CALCIUM PO Take 1,000 mg by mouth every morning Gummies  at AM Yes Unknown, Entered By History     hydrocortisone 2.5 % cream Apply topically every morning  at AM Yes Unknown, Entered By History     linaclotide (LINZESS) 290 MCG capsule Take 1 capsule (290 mcg) by mouth every morning (before breakfast)  Patient taking differently: Take 290 mcg by mouth 3 times daily as needed not sure at PRN Yes Nicole Bravo, CNP     metroNIDAZOLE (METROCREAM) 0.75 % external cream Apply topically every evening  at PM Yes Reported, Patient     polyethylene glycol (MIRALAX) 17 g packet Take 17 g by mouth 2 times daily  Patient taking differently: Take 17 g by mouth as  needed Unknown at prn Yes Nicole Bravo, CNP     Vitamin D (Cholecalciferol) 25 MCG (1000 UT) TABS Take 1 tablet by mouth every morning  at AM Yes Unknown, Entered By History

## 2022-10-16 ENCOUNTER — ANESTHESIA EVENT (OUTPATIENT)
Dept: SURGERY | Facility: CLINIC | Age: 72
End: 2022-10-16
Payer: MEDICARE

## 2022-10-17 ENCOUNTER — ANESTHESIA (OUTPATIENT)
Dept: SURGERY | Facility: CLINIC | Age: 72
End: 2022-10-17
Payer: MEDICARE

## 2022-10-17 ENCOUNTER — APPOINTMENT (OUTPATIENT)
Dept: PHYSICAL THERAPY | Facility: CLINIC | Age: 72
End: 2022-10-17
Attending: ORTHOPAEDIC SURGERY
Payer: MEDICARE

## 2022-10-17 ENCOUNTER — HOSPITAL ENCOUNTER (OUTPATIENT)
Facility: CLINIC | Age: 72
Discharge: HOME | End: 2022-10-18
Attending: ORTHOPAEDIC SURGERY | Admitting: ORTHOPAEDIC SURGERY
Payer: MEDICARE

## 2022-10-17 DIAGNOSIS — Z96.652 S/P TOTAL KNEE ARTHROPLASTY, LEFT: Primary | ICD-10-CM

## 2022-10-17 PROCEDURE — 250N000009 HC RX 250: Performed by: ORTHOPAEDIC SURGERY

## 2022-10-17 PROCEDURE — 258N000001 HC RX 258: Performed by: ORTHOPAEDIC SURGERY

## 2022-10-17 PROCEDURE — 97530 THERAPEUTIC ACTIVITIES: CPT | Mod: GP

## 2022-10-17 PROCEDURE — 258N000003 HC RX IP 258 OP 636: Performed by: ANESTHESIOLOGY

## 2022-10-17 PROCEDURE — 250N000011 HC RX IP 250 OP 636

## 2022-10-17 PROCEDURE — 250N000009 HC RX 250

## 2022-10-17 PROCEDURE — 360N000077 HC SURGERY LEVEL 4, PER MIN: Performed by: ORTHOPAEDIC SURGERY

## 2022-10-17 PROCEDURE — 250N000025 HC SEVOFLURANE, PER MIN: Performed by: ORTHOPAEDIC SURGERY

## 2022-10-17 PROCEDURE — 250N000009 HC RX 250: Performed by: ANESTHESIOLOGY

## 2022-10-17 PROCEDURE — 272N000001 HC OR GENERAL SUPPLY STERILE: Performed by: ORTHOPAEDIC SURGERY

## 2022-10-17 PROCEDURE — 250N000011 HC RX IP 250 OP 636: Performed by: PHYSICIAN ASSISTANT

## 2022-10-17 PROCEDURE — 710N000009 HC RECOVERY PHASE 1, LEVEL 1, PER MIN: Performed by: ORTHOPAEDIC SURGERY

## 2022-10-17 PROCEDURE — 258N000003 HC RX IP 258 OP 636: Performed by: PHYSICIAN ASSISTANT

## 2022-10-17 PROCEDURE — 250N000011 HC RX IP 250 OP 636: Performed by: ANESTHESIOLOGY

## 2022-10-17 PROCEDURE — 370N000017 HC ANESTHESIA TECHNICAL FEE, PER MIN: Performed by: ORTHOPAEDIC SURGERY

## 2022-10-17 PROCEDURE — 999N000141 HC STATISTIC PRE-PROCEDURE NURSING ASSESSMENT: Performed by: ORTHOPAEDIC SURGERY

## 2022-10-17 PROCEDURE — C1776 JOINT DEVICE (IMPLANTABLE): HCPCS | Performed by: ORTHOPAEDIC SURGERY

## 2022-10-17 PROCEDURE — 97161 PT EVAL LOW COMPLEX 20 MIN: CPT | Mod: GP

## 2022-10-17 PROCEDURE — 258N000003 HC RX IP 258 OP 636

## 2022-10-17 PROCEDURE — C1713 ANCHOR/SCREW BN/BN,TIS/BN: HCPCS | Performed by: ORTHOPAEDIC SURGERY

## 2022-10-17 PROCEDURE — 250N000013 HC RX MED GY IP 250 OP 250 PS 637: Performed by: PHYSICIAN ASSISTANT

## 2022-10-17 DEVICE — ATTUNE KNEE SYSTEM TIBIAL BASE FIXED BEARING SIZE 4 CEMENTED
Type: IMPLANTABLE DEVICE | Site: KNEE | Status: FUNCTIONAL
Brand: ATTUNE

## 2022-10-17 DEVICE — ATTUNE KNEE SYSTEM FEMORAL POSTERIOR STABILIZED SIZE 4 LEFT CEMENTED
Type: IMPLANTABLE DEVICE | Site: KNEE | Status: FUNCTIONAL
Brand: ATTUNE

## 2022-10-17 DEVICE — ATTUNE PATELLA MEDIALIZED DOME 38MM CEMENTED AOX
Type: IMPLANTABLE DEVICE | Site: KNEE | Status: FUNCTIONAL
Brand: ATTUNE

## 2022-10-17 DEVICE — ATTUNE KNEE SYSTEM TIBIAL INSERT FIXED BEARING POSTERIOR STABILIZED 4 7MM AOX
Type: IMPLANTABLE DEVICE | Site: KNEE | Status: FUNCTIONAL
Brand: ATTUNE

## 2022-10-17 DEVICE — SPEEDSET FULL DOSE ANTIBIOTIC BONE CEMENT, 10 PACK CATALOG NUMBER IS 6192-1-010
Type: IMPLANTABLE DEVICE | Site: KNEE | Status: FUNCTIONAL
Brand: SIMPLEX

## 2022-10-17 RX ORDER — NALOXONE HYDROCHLORIDE 0.4 MG/ML
0.2 INJECTION, SOLUTION INTRAMUSCULAR; INTRAVENOUS; SUBCUTANEOUS
Status: DISCONTINUED | OUTPATIENT
Start: 2022-10-17 | End: 2022-10-18 | Stop reason: HOSPADM

## 2022-10-17 RX ORDER — CLINDAMYCIN PHOSPHATE 900 MG/50ML
900 INJECTION, SOLUTION INTRAVENOUS SEE ADMIN INSTRUCTIONS
Status: DISCONTINUED | OUTPATIENT
Start: 2022-10-17 | End: 2022-10-17 | Stop reason: CLARIF

## 2022-10-17 RX ORDER — GLYCOPYRROLATE 0.2 MG/ML
INJECTION, SOLUTION INTRAMUSCULAR; INTRAVENOUS PRN
Status: DISCONTINUED | OUTPATIENT
Start: 2022-10-17 | End: 2022-10-17

## 2022-10-17 RX ORDER — CLINDAMYCIN PHOSPHATE 900 MG/50ML
900 INJECTION, SOLUTION INTRAVENOUS
Status: DISCONTINUED | OUTPATIENT
Start: 2022-10-17 | End: 2022-10-17 | Stop reason: CLARIF

## 2022-10-17 RX ORDER — ACETAMINOPHEN 500 MG
1000 TABLET ORAL EVERY 8 HOURS PRN
Qty: 90 TABLET | Refills: 0 | Status: ON HOLD | OUTPATIENT
Start: 2022-10-17 | End: 2023-11-12

## 2022-10-17 RX ORDER — SODIUM CHLORIDE, SODIUM LACTATE, POTASSIUM CHLORIDE, CALCIUM CHLORIDE 600; 310; 30; 20 MG/100ML; MG/100ML; MG/100ML; MG/100ML
INJECTION, SOLUTION INTRAVENOUS CONTINUOUS
Status: DISCONTINUED | OUTPATIENT
Start: 2022-10-17 | End: 2022-10-18

## 2022-10-17 RX ORDER — OXYCODONE HYDROCHLORIDE 5 MG/1
5-10 TABLET ORAL EVERY 4 HOURS PRN
Qty: 6 TABLET | Refills: 0 | Status: SHIPPED | OUTPATIENT
Start: 2022-10-17 | End: 2022-10-17

## 2022-10-17 RX ORDER — CELECOXIB 200 MG/1
200 CAPSULE ORAL DAILY
Qty: 14 CAPSULE | Refills: 0 | Status: SHIPPED | OUTPATIENT
Start: 2022-10-17 | End: 2022-10-18

## 2022-10-17 RX ORDER — LIDOCAINE 40 MG/G
CREAM TOPICAL
Status: DISCONTINUED | OUTPATIENT
Start: 2022-10-17 | End: 2022-10-17 | Stop reason: HOSPADM

## 2022-10-17 RX ORDER — ONDANSETRON 2 MG/ML
4 INJECTION INTRAMUSCULAR; INTRAVENOUS EVERY 30 MIN PRN
Status: DISCONTINUED | OUTPATIENT
Start: 2022-10-17 | End: 2022-10-17 | Stop reason: HOSPADM

## 2022-10-17 RX ORDER — EPHEDRINE SULFATE 50 MG/ML
INJECTION, SOLUTION INTRAMUSCULAR; INTRAVENOUS; SUBCUTANEOUS PRN
Status: DISCONTINUED | OUTPATIENT
Start: 2022-10-17 | End: 2022-10-17

## 2022-10-17 RX ORDER — NALOXONE HYDROCHLORIDE 0.4 MG/ML
0.4 INJECTION, SOLUTION INTRAMUSCULAR; INTRAVENOUS; SUBCUTANEOUS
Status: DISCONTINUED | OUTPATIENT
Start: 2022-10-17 | End: 2022-10-18 | Stop reason: HOSPADM

## 2022-10-17 RX ORDER — SODIUM CHLORIDE, SODIUM LACTATE, POTASSIUM CHLORIDE, CALCIUM CHLORIDE 600; 310; 30; 20 MG/100ML; MG/100ML; MG/100ML; MG/100ML
INJECTION, SOLUTION INTRAVENOUS CONTINUOUS
Status: DISCONTINUED | OUTPATIENT
Start: 2022-10-17 | End: 2022-10-17 | Stop reason: HOSPADM

## 2022-10-17 RX ORDER — FENTANYL CITRATE 0.05 MG/ML
50 INJECTION, SOLUTION INTRAMUSCULAR; INTRAVENOUS
Status: COMPLETED | OUTPATIENT
Start: 2022-10-17 | End: 2022-10-17

## 2022-10-17 RX ORDER — AMOXICILLIN 250 MG
1 CAPSULE ORAL 2 TIMES DAILY
Status: DISCONTINUED | OUTPATIENT
Start: 2022-10-17 | End: 2022-10-18 | Stop reason: HOSPADM

## 2022-10-17 RX ORDER — FENTANYL CITRATE 50 UG/ML
INJECTION, SOLUTION INTRAMUSCULAR; INTRAVENOUS PRN
Status: DISCONTINUED | OUTPATIENT
Start: 2022-10-17 | End: 2022-10-17

## 2022-10-17 RX ORDER — CEFAZOLIN SODIUM/WATER 2 G/20 ML
2 SYRINGE (ML) INTRAVENOUS
Status: COMPLETED | OUTPATIENT
Start: 2022-10-17 | End: 2022-10-17

## 2022-10-17 RX ORDER — AMOXICILLIN 250 MG
1-2 CAPSULE ORAL 2 TIMES DAILY
Qty: 30 TABLET | Refills: 0 | Status: ON HOLD | OUTPATIENT
Start: 2022-10-17 | End: 2023-09-22

## 2022-10-17 RX ORDER — OXYCODONE HYDROCHLORIDE 5 MG/1
5-10 TABLET ORAL EVERY 4 HOURS PRN
Qty: 30 TABLET | Refills: 0 | Status: SHIPPED | OUTPATIENT
Start: 2022-10-17 | End: 2023-06-20

## 2022-10-17 RX ORDER — ACETAMINOPHEN 325 MG/1
975 TABLET ORAL EVERY 8 HOURS
Status: DISCONTINUED | OUTPATIENT
Start: 2022-10-17 | End: 2022-10-18 | Stop reason: HOSPADM

## 2022-10-17 RX ORDER — OXYCODONE HYDROCHLORIDE 5 MG/1
5 TABLET ORAL EVERY 4 HOURS PRN
Status: DISCONTINUED | OUTPATIENT
Start: 2022-10-17 | End: 2022-10-18 | Stop reason: HOSPADM

## 2022-10-17 RX ORDER — ONDANSETRON 2 MG/ML
4 INJECTION INTRAMUSCULAR; INTRAVENOUS EVERY 6 HOURS PRN
Status: DISCONTINUED | OUTPATIENT
Start: 2022-10-17 | End: 2022-10-18 | Stop reason: HOSPADM

## 2022-10-17 RX ORDER — DEXAMETHASONE SODIUM PHOSPHATE 4 MG/ML
INJECTION, SOLUTION INTRA-ARTICULAR; INTRALESIONAL; INTRAMUSCULAR; INTRAVENOUS; SOFT TISSUE PRN
Status: DISCONTINUED | OUTPATIENT
Start: 2022-10-17 | End: 2022-10-17

## 2022-10-17 RX ORDER — NEOSTIGMINE METHYLSULFATE 1 MG/ML
VIAL (ML) INJECTION PRN
Status: DISCONTINUED | OUTPATIENT
Start: 2022-10-17 | End: 2022-10-17

## 2022-10-17 RX ORDER — POLYETHYLENE GLYCOL 3350 17 G/17G
17 POWDER, FOR SOLUTION ORAL DAILY
Status: DISCONTINUED | OUTPATIENT
Start: 2022-10-18 | End: 2022-10-18 | Stop reason: HOSPADM

## 2022-10-17 RX ORDER — HYDROXYZINE HYDROCHLORIDE 25 MG/1
25 TABLET, FILM COATED ORAL EVERY 6 HOURS PRN
Qty: 30 TABLET | Refills: 1 | Status: SHIPPED | OUTPATIENT
Start: 2022-10-17 | End: 2023-06-20

## 2022-10-17 RX ORDER — HYDROMORPHONE HCL IN WATER/PF 6 MG/30 ML
0.4 PATIENT CONTROLLED ANALGESIA SYRINGE INTRAVENOUS
Status: DISCONTINUED | OUTPATIENT
Start: 2022-10-17 | End: 2022-10-18 | Stop reason: HOSPADM

## 2022-10-17 RX ORDER — PROPOFOL 10 MG/ML
INJECTION, EMULSION INTRAVENOUS CONTINUOUS PRN
Status: DISCONTINUED | OUTPATIENT
Start: 2022-10-17 | End: 2022-10-17

## 2022-10-17 RX ORDER — CEFAZOLIN SODIUM/WATER 2 G/20 ML
2 SYRINGE (ML) INTRAVENOUS SEE ADMIN INSTRUCTIONS
Status: DISCONTINUED | OUTPATIENT
Start: 2022-10-17 | End: 2022-10-17 | Stop reason: HOSPADM

## 2022-10-17 RX ORDER — PROCHLORPERAZINE MALEATE 5 MG
5 TABLET ORAL EVERY 6 HOURS PRN
Status: DISCONTINUED | OUTPATIENT
Start: 2022-10-17 | End: 2022-10-18 | Stop reason: HOSPADM

## 2022-10-17 RX ORDER — OXYCODONE HYDROCHLORIDE 5 MG/1
10 TABLET ORAL EVERY 4 HOURS PRN
Status: DISCONTINUED | OUTPATIENT
Start: 2022-10-17 | End: 2022-10-18 | Stop reason: HOSPADM

## 2022-10-17 RX ORDER — HYDROMORPHONE HCL IN WATER/PF 6 MG/30 ML
0.2 PATIENT CONTROLLED ANALGESIA SYRINGE INTRAVENOUS
Status: DISCONTINUED | OUTPATIENT
Start: 2022-10-17 | End: 2022-10-18 | Stop reason: HOSPADM

## 2022-10-17 RX ORDER — MAGNESIUM HYDROXIDE 1200 MG/15ML
LIQUID ORAL PRN
Status: DISCONTINUED | OUTPATIENT
Start: 2022-10-17 | End: 2022-10-17 | Stop reason: HOSPADM

## 2022-10-17 RX ORDER — PROPOFOL 10 MG/ML
INJECTION, EMULSION INTRAVENOUS PRN
Status: DISCONTINUED | OUTPATIENT
Start: 2022-10-17 | End: 2022-10-17

## 2022-10-17 RX ORDER — HYDROXYZINE HYDROCHLORIDE 10 MG/1
10 TABLET, FILM COATED ORAL EVERY 6 HOURS PRN
Qty: 30 TABLET | Refills: 0 | Status: SHIPPED | OUTPATIENT
Start: 2022-10-17 | End: 2022-10-17

## 2022-10-17 RX ORDER — LIDOCAINE HYDROCHLORIDE 20 MG/ML
INJECTION, SOLUTION INFILTRATION; PERINEURAL PRN
Status: DISCONTINUED | OUTPATIENT
Start: 2022-10-17 | End: 2022-10-17

## 2022-10-17 RX ORDER — ONDANSETRON 2 MG/ML
INJECTION INTRAMUSCULAR; INTRAVENOUS PRN
Status: DISCONTINUED | OUTPATIENT
Start: 2022-10-17 | End: 2022-10-17

## 2022-10-17 RX ORDER — ONDANSETRON 4 MG/1
4 TABLET, ORALLY DISINTEGRATING ORAL EVERY 30 MIN PRN
Status: DISCONTINUED | OUTPATIENT
Start: 2022-10-17 | End: 2022-10-17 | Stop reason: HOSPADM

## 2022-10-17 RX ORDER — CELECOXIB 200 MG/1
200 CAPSULE ORAL DAILY
Status: DISCONTINUED | OUTPATIENT
Start: 2022-10-17 | End: 2022-10-18 | Stop reason: HOSPADM

## 2022-10-17 RX ORDER — VANCOMYCIN HYDROCHLORIDE 1 G/200ML
1000 INJECTION, SOLUTION INTRAVENOUS
Status: DISCONTINUED | OUTPATIENT
Start: 2022-10-17 | End: 2022-10-17 | Stop reason: CLARIF

## 2022-10-17 RX ORDER — LIDOCAINE 40 MG/G
CREAM TOPICAL
Status: DISCONTINUED | OUTPATIENT
Start: 2022-10-17 | End: 2022-10-18 | Stop reason: HOSPADM

## 2022-10-17 RX ORDER — HYDROXYZINE HYDROCHLORIDE 10 MG/1
10 TABLET, FILM COATED ORAL EVERY 6 HOURS PRN
Status: DISCONTINUED | OUTPATIENT
Start: 2022-10-17 | End: 2022-10-18 | Stop reason: HOSPADM

## 2022-10-17 RX ORDER — FENTANYL CITRATE 50 UG/ML
25 INJECTION, SOLUTION INTRAMUSCULAR; INTRAVENOUS EVERY 5 MIN PRN
Status: DISCONTINUED | OUTPATIENT
Start: 2022-10-17 | End: 2022-10-17 | Stop reason: HOSPADM

## 2022-10-17 RX ORDER — CEFAZOLIN SODIUM 1 G/3ML
1 INJECTION, POWDER, FOR SOLUTION INTRAMUSCULAR; INTRAVENOUS EVERY 8 HOURS
Status: COMPLETED | OUTPATIENT
Start: 2022-10-17 | End: 2022-10-18

## 2022-10-17 RX ORDER — HYDROMORPHONE HCL IN WATER/PF 6 MG/30 ML
0.2 PATIENT CONTROLLED ANALGESIA SYRINGE INTRAVENOUS EVERY 5 MIN PRN
Status: DISCONTINUED | OUTPATIENT
Start: 2022-10-17 | End: 2022-10-17 | Stop reason: HOSPADM

## 2022-10-17 RX ORDER — ACETAMINOPHEN 325 MG/1
650 TABLET ORAL EVERY 4 HOURS PRN
Status: DISCONTINUED | OUTPATIENT
Start: 2022-10-20 | End: 2022-10-18 | Stop reason: HOSPADM

## 2022-10-17 RX ORDER — ACETAMINOPHEN 325 MG/1
975 TABLET ORAL ONCE
Status: COMPLETED | OUTPATIENT
Start: 2022-10-17 | End: 2022-10-17

## 2022-10-17 RX ORDER — BISACODYL 10 MG
10 SUPPOSITORY, RECTAL RECTAL DAILY PRN
Status: DISCONTINUED | OUTPATIENT
Start: 2022-10-17 | End: 2022-10-18 | Stop reason: HOSPADM

## 2022-10-17 RX ORDER — ONDANSETRON 4 MG/1
4 TABLET, ORALLY DISINTEGRATING ORAL EVERY 6 HOURS PRN
Status: DISCONTINUED | OUTPATIENT
Start: 2022-10-17 | End: 2022-10-18 | Stop reason: HOSPADM

## 2022-10-17 RX ADMIN — SODIUM CHLORIDE, POTASSIUM CHLORIDE, SODIUM LACTATE AND CALCIUM CHLORIDE: 600; 310; 30; 20 INJECTION, SOLUTION INTRAVENOUS at 12:57

## 2022-10-17 RX ADMIN — FENTANYL CITRATE 50 MCG: 50 INJECTION, SOLUTION INTRAMUSCULAR; INTRAVENOUS at 13:01

## 2022-10-17 RX ADMIN — FENTANYL CITRATE 50 MCG: 50 INJECTION, SOLUTION INTRAMUSCULAR; INTRAVENOUS at 13:11

## 2022-10-17 RX ADMIN — ACETAMINOPHEN 975 MG: 325 TABLET, FILM COATED ORAL at 19:43

## 2022-10-17 RX ADMIN — PHENYLEPHRINE HYDROCHLORIDE 100 MCG: 10 INJECTION INTRAVENOUS at 13:30

## 2022-10-17 RX ADMIN — PHENYLEPHRINE HYDROCHLORIDE 100 MCG: 10 INJECTION INTRAVENOUS at 13:56

## 2022-10-17 RX ADMIN — FENTANYL CITRATE 50 MCG: 50 INJECTION, SOLUTION INTRAMUSCULAR; INTRAVENOUS at 12:13

## 2022-10-17 RX ADMIN — HYDROMORPHONE HYDROCHLORIDE 0.2 MG: 0.2 INJECTION, SOLUTION INTRAMUSCULAR; INTRAVENOUS; SUBCUTANEOUS at 15:40

## 2022-10-17 RX ADMIN — PROPOFOL 20 MCG/KG/MIN: 10 INJECTION, EMULSION INTRAVENOUS at 13:12

## 2022-10-17 RX ADMIN — GLYCOPYRROLATE 0.6 MG: 0.2 INJECTION, SOLUTION INTRAMUSCULAR; INTRAVENOUS at 14:20

## 2022-10-17 RX ADMIN — FENTANYL CITRATE 25 MCG: 50 INJECTION, SOLUTION INTRAMUSCULAR; INTRAVENOUS at 15:05

## 2022-10-17 RX ADMIN — ONDANSETRON 4 MG: 2 INJECTION INTRAMUSCULAR; INTRAVENOUS at 14:09

## 2022-10-17 RX ADMIN — LIDOCAINE HYDROCHLORIDE 40 MG: 20 INJECTION, SOLUTION INFILTRATION; PERINEURAL at 13:01

## 2022-10-17 RX ADMIN — BUPIVACAINE HYDROCHLORIDE 15 ML: 5 INJECTION, SOLUTION EPIDURAL; INTRACAUDAL at 12:12

## 2022-10-17 RX ADMIN — CEFAZOLIN 1 G: 1 INJECTION, POWDER, FOR SOLUTION INTRAMUSCULAR; INTRAVENOUS at 19:43

## 2022-10-17 RX ADMIN — NEOSTIGMINE METHYLSULFATE 4 MG: 1 INJECTION, SOLUTION INTRAVENOUS at 14:20

## 2022-10-17 RX ADMIN — PHENYLEPHRINE HYDROCHLORIDE 100 MCG: 10 INJECTION INTRAVENOUS at 13:12

## 2022-10-17 RX ADMIN — PHENYLEPHRINE HYDROCHLORIDE 50 MCG: 10 INJECTION INTRAVENOUS at 14:14

## 2022-10-17 RX ADMIN — SODIUM CHLORIDE, POTASSIUM CHLORIDE, SODIUM LACTATE AND CALCIUM CHLORIDE: 600; 310; 30; 20 INJECTION, SOLUTION INTRAVENOUS at 17:28

## 2022-10-17 RX ADMIN — OXYCODONE HYDROCHLORIDE 5 MG: 5 TABLET ORAL at 21:25

## 2022-10-17 RX ADMIN — Medication 2 G: at 12:57

## 2022-10-17 RX ADMIN — HYDROMORPHONE HYDROCHLORIDE 0.2 MG: 0.2 INJECTION, SOLUTION INTRAMUSCULAR; INTRAVENOUS; SUBCUTANEOUS at 15:50

## 2022-10-17 RX ADMIN — ROCURONIUM BROMIDE 40 MG: 50 INJECTION, SOLUTION INTRAVENOUS at 13:01

## 2022-10-17 RX ADMIN — SODIUM CHLORIDE, POTASSIUM CHLORIDE, SODIUM LACTATE AND CALCIUM CHLORIDE: 600; 310; 30; 20 INJECTION, SOLUTION INTRAVENOUS at 11:55

## 2022-10-17 RX ADMIN — HYDROMORPHONE HYDROCHLORIDE 0.2 MG: 0.2 INJECTION, SOLUTION INTRAMUSCULAR; INTRAVENOUS; SUBCUTANEOUS at 17:21

## 2022-10-17 RX ADMIN — Medication 2.5 MG: at 14:04

## 2022-10-17 RX ADMIN — HYDROMORPHONE HYDROCHLORIDE 0.2 MG: 0.2 INJECTION, SOLUTION INTRAMUSCULAR; INTRAVENOUS; SUBCUTANEOUS at 15:20

## 2022-10-17 RX ADMIN — HYDROMORPHONE HYDROCHLORIDE 0.2 MG: 0.2 INJECTION, SOLUTION INTRAMUSCULAR; INTRAVENOUS; SUBCUTANEOUS at 16:10

## 2022-10-17 RX ADMIN — CELECOXIB 200 MG: 200 CAPSULE ORAL at 19:44

## 2022-10-17 RX ADMIN — DEXAMETHASONE SODIUM PHOSPHATE 10 MG: 4 INJECTION, SOLUTION INTRA-ARTICULAR; INTRALESIONAL; INTRAMUSCULAR; INTRAVENOUS; SOFT TISSUE at 13:11

## 2022-10-17 RX ADMIN — PROPOFOL 120 MG: 10 INJECTION, EMULSION INTRAVENOUS at 13:01

## 2022-10-17 RX ADMIN — FENTANYL CITRATE 25 MCG: 50 INJECTION, SOLUTION INTRAMUSCULAR; INTRAVENOUS at 15:10

## 2022-10-17 RX ADMIN — ACETAMINOPHEN 975 MG: 325 TABLET, FILM COATED ORAL at 11:33

## 2022-10-17 RX ADMIN — SODIUM CHLORIDE, POTASSIUM CHLORIDE, SODIUM LACTATE AND CALCIUM CHLORIDE: 600; 310; 30; 20 INJECTION, SOLUTION INTRAVENOUS at 18:37

## 2022-10-17 RX ADMIN — SENNOSIDES AND DOCUSATE SODIUM 1 TABLET: 50; 8.6 TABLET ORAL at 21:25

## 2022-10-17 ASSESSMENT — ACTIVITIES OF DAILY LIVING (ADL)
ADLS_ACUITY_SCORE: 37
ADLS_ACUITY_SCORE: 31
ADLS_ACUITY_SCORE: 31
ADLS_ACUITY_SCORE: 29

## 2022-10-17 ASSESSMENT — LIFESTYLE VARIABLES: TOBACCO_USE: 1

## 2022-10-17 NOTE — BRIEF OP NOTE
[facility]    Brief Operative Note    Pre-operative diagnosis: Degenerative arthritis of left knee [M17.12]  Post-operative diagnosis Same as pre-operative diagnosis    Procedure: Procedure(s):  LEFT TOTAL KNEE ARTHROPLASTY  Surgeon: Surgeon(s) and Role:     * Jax Le MD - Primary  Anesthesia: General with Block   Estimated Blood Loss: 50 mL from 10/17/2022 12:57 PM to 10/17/2022  2:43 PM      Drains: None  Specimens: * No specimens in log *  Findings:   None.  Complications: None.  Implants:   Implant Name Type Inv. Item Serial No.  Lot No. LRB No. Used Action   BONE CEMENT STRK SIMPLEX P SPEEDSET 6192-1-001 - [id number] Cement, Bone BONE CEMENT STRK SIMPLEX P SPEEDSET 6192-1-001  [employer] [id number] Left 1 Implanted   IMP TIB BASE JJ ATTUNE FX BR SYS HILDA SZ4 [id number] - [id number] Total Joint Component/Insert IMP TIB BASE JJ ATTUNE FX BR SYS HILDA SZ4 [id number]  [employer]- [id number] Left 1 Implanted   IMP COMP FEM JJ ATTUNE POST STAB LT HILDA SZ4 [id number] - [id number] Total Joint Component/Insert IMP COMP FEM JJ ATTUNE POST STAB LT HILDA SZ4 [id number]  [employer]- [id number] Left 1 Implanted   IMP INSERT JJ ATTUNE POST STAB FX BR SYS SZ4 7MM [id number] - [id number] Total Joint Component/Insert IMP INSERT JJ ATTUNE POST STAB FX BR SYS SZ4 7MM [id number]  [employer]- [id number] Left 1 Implanted   IMP PATELLA JJ ATTUNE DOME 38MM [id number] - [id number] Total Joint Component/Insert IMP PATELLA JJ ATTUNE DOME 38MM [id number]  [employer]- [id number] Left 1 Implanted

## 2022-10-17 NOTE — OP NOTE
Procedure Date: 10/17/2022    PREOPERATIVE DIAGNOSIS:  Left knee degenerative joint disease.    POSTOPERATIVE DIAGNOSIS:  Left knee degenerative joint disease.    PROCEDURE:  Left total knee arthroplasty.    SURGEON:  Jax Le M.D.    FIRST ASSISTANT:  Silvia Huff PA-C.    ANESTHESIA TYPE:  General with periarticular injection and adductor canal block.    TOURNIQUET TIME:  65 minutes.    IMPLANTS:  Jayy and Jayy Attune size 4 posterior stabilized femoral component, size 4 fixed bearing tibial component, 7 mm highly cross-linked posterior stabilized fixed bearing poly, 38 mm patellar component.    DESCRIPTION OF PROCEDURE:  After identification of the patient, correct extremity, review of informed consent, the patient was brought to the operating room where general anesthesia was induced.  Perioperative antibiotics were given.  A nonsterile tourniquet was applied to the left lower extremity.  Left lower extremity was placed in a low profile leg jeffrey . Right lower extremity was placed in a well-padded leg jeffrey.  Left lower extremity was then prepped and draped in the usual sterile manner.  After observation and surgical pause, leg was exsanguinated and tourniquet was inflated.  A midline incision was then made.  Dissection was taken sharply through subcutaneous tissues.  Medial and lateral skin flaps were elevated.  A medial parapatellar arthrotomy was then performed.  Medial release was performed.  Soft tissue cleared from the distal anterior aspect of the femur.  Retropatellar fat pad was excised.  Patella was then everted, knee was flexed.  A starting reamer was placed in the femoral canal from distal femoral starting point.  Distal femoral cutting guide set at 11 mm resection for a 5-degree valgus cut was then placed and pinned.  Distal femur was resected.  Extramedullary tibial guide was then placed in line with the anteromedial border of the tibia on a 0-degree slope and a 10 mm resection off the  lateral side.  The proximal tibia was then resected.  The extension gap was then balanced with a 7 mm spacer block.  The knee was then flexed and sized to a size 4 femoral component.  The 4-in-1 cutting guide was then pinned in 3 degrees of external rotation.  Anterior and posterior femoral cuts were made.  Flexion gap was balanced with a 7 mm spacer block.  Anterior, posterior and chamfer cuts were made.  A 4-in-1 cutting guide was removed.  Box cutting guide was placed.  Box cut was made.  Medial and lateral meniscus were excised.  Osteophytes were removed from the posterior aspect of the medial and lateral femoral condyles with a curved osteotome.  Tibia was then subluxated anteriorly with 2-prong tibial retractor and sized to a size 4 tibial component.  External rotation was set at junction of medial middle third tibial tubercle.  Proximal tibia was then prepared.  Trial tray was left in place.  Trial femoral component was impacted in place.  The trial 7 mm posterior stabilized fixed bearing poly was then placed.  The knee was reduced.  The knee was noted to have 1 degree of hyperextension, flexion back to 130 degrees and no instability with varus or valgus stress.  The patella tracked centrally.  The patella was then measured and noted to be 22 mm in width.  An 8 mm resection for a 14 mm remnant was then performed.  A 38 mm patellar component was noted to be appropriate size.  Lug holes drilled.  Trial component was placed.  Again, the patella was noted to track centrally.  Trial components were removed.  Cut ends of bone were irrigated with copious normal saline via pulse lavage to remove all marrow elements.  The periarticular injection was performed.  A real size 4 tibial component was then cemented in place.  A real 4 femoral component was cemented in place.  The real 7 mm highly cross-linked posterior stabilized fixed bearing poly was impacted in place and the knee was reduced.  The real 38 mm patellar  component was cemented in place and clamped.  Cement was allowed to harden in Betadine diluted solution.  After adequate hardening of the cement, the knee was again irrigated with copious normal saline via pulse lavage.  The knee was assessed for range of motion, stability, and patellar tracking and it was as previously noted.  The knee was then placed in 90 degrees of flexion.  The arthrotomy was closed with interrupted 0 Vicryl suture.  2-0 Monocryl was placed subcutaneously in the incision sites.  A running self-locking absorbable 3-0 intracuticular stitch was placed.  Sterile dressing was applied.  Tourniquet deflated.  The patient was then recovered briefly in the operating room and transferred to the PACU for further recovery.  The patient tolerated the procedure well.  There were no known complications.    Silvia Huff PA-C was present for the entire portion of procedure.  An assistant was critical in both patient positioning, prepping, draping, deep wound closure, superficial wound closure and dressing placement.  Patient transfer.    Jax Le MD        D: 10/17/2022   T: 10/17/2022   MT: [id number]    Name:     AMISHA HOLDER  MRN:      [id number]        Account:        [id number]   :      1950           Procedure Date: 10/17/2022     Document: [id number]

## 2022-10-17 NOTE — ANESTHESIA POSTPROCEDURE EVALUATION
Patient: Macey Romero    Procedure: Procedure(s):  LEFT TOTAL KNEE ARTHROPLASTY       Anesthesia Type:  General    Note:     Postop Pain Control: Uneventful            Sign Out: Well controlled pain   PONV: No   Neuro/Psych: Uneventful            Sign Out: Acceptable/Baseline neuro status   Airway/Respiratory: Uneventful            Sign Out: Acceptable/Baseline resp. status   CV/Hemodynamics: Uneventful            Sign Out: Acceptable CV status; No obvious hypovolemia; No obvious fluid overload   Other NRE: NONE   DID A NON-ROUTINE EVENT OCCUR? No           Last vitals:  Vitals Value Taken Time   /57 10/17/22 1730   Temp 36.6  C (97.9  F) 10/17/22 1730   Pulse 75 10/17/22 1744   Resp 92 10/17/22 1745   SpO2 93 % 10/17/22 1748   Vitals shown include unvalidated device data.    Electronically Signed By: Reagan Casillas MD  October 17, 2022  6:24 PM

## 2022-10-17 NOTE — ANESTHESIA PROCEDURE NOTES
Femoral (AVA) Procedure Note    Pre-Procedure   Staff -        Anesthesiologist:  Colette Reeder MD       Performed By: anesthesiologist       Location: pre-op       Pre-Anesthestic Checklist: patient identified, site marked, risks and benefits discussed, informed consent, monitors and equipment checked, pre-op evaluation, at physician/surgeon's request and post-op pain management  Timeout:       Correct Patient: Yes        Correct Procedure: Yes        Correct Site: Yes        Correct Laterality: Yes        Site Marked: Yes  Procedure Documentation  Procedure: Femoral (AVA)       Laterality: left       Patient Position: supine       Patient Prep/Sterile Barriers: sterile gloves, mask       Skin prep: Chloraprep       Local skin infiltrated with 5 mL of 1% lidocaine.        Needle Type: short bevel and insulated       Needle Gauge: 21.        Needle Length (millimeters): 90        Ultrasound guided       1. Ultrasound was used to identify targeted nerve, plexus, vascular marker, or fascial plane and place a needle adjacent to it in real-time.       2. Ultrasound was used to visualize the spread of anesthetic in close proximity to the above referenced structure.       3. A permanent image is entered into the patient's record.       4. The visualized anatomic structures appeared normal.       5. There were no apparent abnormal pathologic findings.    Assessment/Narrative         The placement was negative for: blood aspirated, painful injection and site bleeding       Paresthesias: No.       Bolus given via needle..        Secured via.        Insertion/Infusion Method: Single Shot       Complications: none    Medication(s) Administered   Bupivacaine 0.5% w/ 1:400K Epi (Injection) - Injection   15 mL - 10/17/2022 12:12:00 PM   Comments:  Risks IBNLT permanent nerve injury and infection discussed with Patient.    Ultrasound Interpretation, Peripheral Nerve Block    1. Under ultrasound guidance, the needle was inserted  "and placed in close proximity to the target nerve(s).  2. Ultrasound was also used to visualize the spread of the anesthetic in close proximity to the nerve(s) being blocked.  Local anesthetic was administered in incremental doses, with intermittent negative aspiration.    3. The nerve(s) appeared anatomically normal.  4. There were no apparent abnormal pathological findings.  5. A permanent ultrasound image was saved in the patient's record.    Pt tolerated well, talking throughout procedure. No issues. No nerve swelling. Low pressure, no complications.      The surgeon has given a verbal order transferring care of this patient to me for the performance of a regional analgesia block for post-op pain control. It is requested of me because I am uniquely trained and qualified to perform this block and the surgeon is neither trained nor qualified to perform this procedure.    Colette Reeder MD   1:35 PM        FOR [facility] ([facility] [place]:   Pain Team Contact information: please page the Pain Team Via [employer]. Search \"Pain\". During daytime hours, please page the attending first. At night please page the resident first.    "

## 2022-10-17 NOTE — PROGRESS NOTES
DATE & TIME: October 17th 2022 8986-6695    Cognitive Concerns/ Orientation : AOx4   ABNL VS/O2: VSS on 2L NC  MOBILITY: A1 GBW  PAIN MANAGMENT: Scheduled tylenol and prn oxycodone  DIET: Regular   BOWEL/BLADDER: Continent BB  DRAIN/DEVICES: PIV infusing LR @100ml/hr  SKIN: Scattered bruising   D/C DATE: Pending

## 2022-10-17 NOTE — ANESTHESIA PREPROCEDURE EVALUATION
Anesthesia Pre-Procedure Evaluation    Patient: Macey Romero   MRN: [id number] : 1950        Procedure : Procedure(s):  LEFT TOTAL KNEE ARTHROPLASTY          Past Medical History:   Diagnosis Date     Breast CA in situ      Cancer (H)     Right Breast treated with surgery     Chronic constipation      Fibromyalgia      Malignant neoplasm of female breast, unspecified estrogen receptor status, unspecified laterality, unspecified site of breast (H) 2021     Meningitis     Several times as an adult     Osteoarthritis 2011     Osteopenia 2011     Pneumonia      Pterygium eye 2013     Reactive airway disease 2011     Seasonal allergies      Shingles     Prior to  - scalp     Skin cancer     SCC - hand, left nose      Past Surgical History:   Procedure Laterality Date     anterior c-disc fusion       BLEPHAROPLASTY, BROW LIFT BILATERAL, COMBINED Bilateral 2018    Procedure: COMBINED BLEPHAROPLASTY, BROW LIFT BILATERAL;  BILATERAL UPPER LID BLEPHAROPLASTY; BILATERAL BROW PTOSIS REPAIR;  Surgeon: Сергей Walters MD;  Location:  [facility]     CHOLECYSTECTOMY       COLONOSCOPY N/A 10/19/2017    Procedure: COLONOSCOPY;  COLONOSCOPY;  Surgeon: Niko Wong MD;  Location:  GI     COLONOSCOPY N/A 2022    Procedure: COLONOSCOPY, WITH POLYPECTOMY AND BIOPSY;  Surgeon: Abraham Rogers MD;  Location:  GI     D & C      Bleeding before hysterectomy     ENDOSCOPY  08     ESOPHAGOSCOPY, GASTROSCOPY, DUODENOSCOPY (EGD), COMBINED N/A 2022    Procedure: ESOPHAGOGASTRODUODENOSCOPY, WITH BIOPSY;  Surgeon: Abraham Rogers MD;  Location:  GI     HYSTERECTOMY, MARCUS      Ovaries and tubes out due to breast cancer     JOINT REPLACEMTN, KNEE RT/LT Right 2011    Joint Replacement knee RT, with tibial straightening (2 replacements)     MAMMOPLASTY AUGMENTATION BILATERAL      breast ca      MASTECTOMY, SIMPLE RT/LT/CLAIRE Bilateral 1989    Mastectomy Simple RT/LT/CLAIRE  "    MOHS MICROGRAPHIC PROCEDURE      Left lateral nose     OPEN REDUCTION INTERNAL FIXATION ANKLE  2013    Procedure: OPEN REDUCTION INTERNAL FIXATION ANKLE;  RIGHT OPEN REDUCTION INTERNAL FIXATION ANKLE WITH LIGAMENT REPAIR;  Surgeon: Nando Chang MD;  Location:  OR     PTERYGIUM WITH CONJUNCTIVAL AUTOLOGOUS TRANSPLANT Left 2016    Procedure: PTERYGIUM WITH CONJUNCTIVAL AUTOLOGOUS TRANSPLANT;  Surgeon: Сергей Walters MD;  Location:  [facility]     REPAIR LIGAMENT ANKLE  2013    Procedure: REPAIR LIGAMENT ANKLE;;  Surgeon: Nando Chang MD;  Location:  OR     SALIVARY GLAND SURGERY Left     Stone removal      SIGMOIDOSCOPY FLEXIBLE N/A 2022    Procedure: FLEXIBLE SIGMOIDOSCOPY;  Surgeon: Niko Wong MD;  Location:  GI     TONSILLECTOMY        Allergies   Allergen Reactions     Levaquin Hives and Itching     Pcn [Penicillins] Hives     Tetanus Toxoids Anaphylaxis     Erythromycin GI Disturbance     Silicone Rash      Social History     Tobacco Use     Smoking status: Former     Types: Cigarettes     Quit date: 2016     Years since quittin.7     Smokeless tobacco: Never     Tobacco comments:     quit but  still smokes, but not in house   Substance Use Topics     Alcohol use: No     Alcohol/week: 0.0 standard drinks      Wt Readings from Last 1 Encounters:   10/10/22 67.8 kg (149 lb 8 oz)        Anesthesia Evaluation   Pt has had prior anesthetic. Type: General (always amnestic for one day post op).        ROS/MED HX  ENT/Pulmonary:     (+) tobacco use, Intermittent, asthma Last exacerbation: no meds times >2 years ,   (-) sleep apnea   Neurologic: Comment: memory loss , fibromyalgia , HZ, h/o meningitis times 2, vertigo-falls,     (+) peripheral neuropathy, - bilat soles of feet numbness and tingling . CVA, date: , with deficits, - right facial droop, right hand weakness, numbers don't \"make sense\".     Cardiovascular:     (+) Dyslipidemia -----  "   METS/Exercise Tolerance:     Hematologic:       Musculoskeletal: Comment: C6-7 fusion-decreased ROM no neuro sxs.   (+) arthritis,     GI/Hepatic: Comment: Colitis , IBS    (+) GERD, Symptomatic,     Renal/Genitourinary:     (+) renal disease, type: CRI,     Endo: Comment: Pre DM      Psychiatric/Substance Use:     (+) psychiatric history depression     Infectious Disease:       Malignancy: Comment: Right   (+) Malignancy, History of Breast.    Other:      (+) , H/O Chronic Pain,        Physical Exam    Airway        Mallampati: II   TM distance: > 3 FB   Neck ROM: limited   Mouth opening: > 3 cm    Respiratory Devices and Support         Dental       (+) caps      Cardiovascular          Rhythm and rate: regular     Pulmonary           breath sounds clear to auscultation           OUTSIDE LABS:  CBC:   Lab Results   Component Value Date    WBC 7.2 10/10/2022    WBC 5.5 08/30/2022    HGB 14.6 10/10/2022    HGB 13.7 08/30/2022    HCT 42.9 10/10/2022    HCT 41.3 08/30/2022     10/10/2022     08/30/2022     BMP:   Lab Results   Component Value Date     10/10/2022     08/30/2022    POTASSIUM 4.1 10/10/2022    POTASSIUM 3.7 08/30/2022    CHLORIDE 99 10/10/2022    CHLORIDE 106 08/30/2022    CO2 26 08/30/2022    CO2 29 08/28/2022    BUN 15 10/10/2022    BUN 16 10/10/2022    CR 0.92 10/10/2022    CR 0.81 08/30/2022     (H) 10/10/2022    GLC 96 08/30/2022     COAGS:   Lab Results   Component Value Date    PTT 31 09/08/2011    INR 1.00 09/08/2011     POC:   Lab Results   Component Value Date    BGM 96 02/12/2015     HEPATIC:   Lab Results   Component Value Date    ALBUMIN 4.6 10/10/2022    PROTTOTAL 7.5 08/25/2022    ALT 18 08/25/2022    AST 17 08/25/2022    ALKPHOS 81 08/25/2022    BILITOTAL 0.7 08/25/2022     OTHER:   Lab Results   Component Value Date    PH 7.37 08/25/2022    LACT 0.7 08/25/2022    A1C 6.0 (H) 10/10/2022    DONNA 9.7 10/10/2022    PHOS 4.3 10/10/2022    MAG 2.3 09/16/2018     LIPASE 59 (L) 08/25/2022    TSH 1.33 09/27/2008    CRP 3.9 09/24/2015    SED 5 09/24/2015       Anesthesia Plan    ASA Status:  3      Anesthesia Type: General.     - Airway: ETT         Techniques and Equipment:     - Airway: Video-Laryngoscope         Consents    Anesthesia Plan(s) and associated risks, benefits, and realistic alternatives discussed. Questions answered and patient/representative(s) expressed understanding.    - Discussed:     - Discussed with:  Patient         Postoperative Care    Pain management: Peripheral nerve block (Single Shot).   PONV prophylaxis: Ondansetron (or other 5HT-3), Dexamethasone or Solumedrol     Comments:    Other Comments: No NSAIDs, No midazolam     Ms Romero had significant GERD even when NPO, GEA.       Prior to Admission medications    Medication Sig Start Date End Date Taking? Authorizing Provider   albuterol (PROAIR HFA/PROVENTIL HFA/VENTOLIN HFA) 108 (90 Base) MCG/ACT inhaler Inhale 2 puffs into the lungs every 6 hours  Patient taking differently: Inhale 2 puffs into the lungs as needed 4/18/22  Yes Deandra Strong APRN CNP   budesonide-formoterol (SYMBICORT) 160-4.5 MCG/ACT Inhaler Inhale 2 puffs into the lungs daily  Patient taking differently: Inhale 1 puff into the lungs At Bedtime 10/27/21  Yes Deandra Strong APRN CNP   CALCIUM PO Take 1,000 mg by mouth every morning Gummies   Yes Unknown, Entered By History   hydrocortisone 2.5 % cream Apply topically every morning   Yes Unknown, Entered By History   linaclotide (LINZESS) 290 MCG capsule Take 1 capsule (290 mcg) by mouth every morning (before breakfast)  Patient taking differently: Take 290 mcg by mouth 3 times daily as needed 8/31/22  Yes Nicole Bravo CNP   metroNIDAZOLE (METROCREAM) 0.75 % external cream Apply topically every evening 3/8/22  Yes Reported, Patient   polyethylene glycol (MIRALAX) 17 g packet Take 17 g by mouth 2 times daily  Patient taking differently: Take 17 g by mouth as  needed 8/30/22  Yes Nicole Bravo CNP   Vitamin D (Cholecalciferol) 25 MCG (1000 UT) TABS Take 1 tablet by mouth every morning   Yes Unknown, Entered By History     Current Facility-Administered Medications Ordered in Epic   Medication Dose Route Frequency Last Rate Last Admin     ceFAZolin Sodium (ANCEF) injection 2 g  2 g Intravenous Pre-Op/Pre-procedure x 1 dose         ceFAZolin Sodium (ANCEF) injection 2 g  2 g Intravenous See Admin Instructions         clindamycin (CLEOCIN) infusion 900 mg  900 mg Intravenous Pre-Op/Pre-procedure x 1 dose         clindamycin (CLEOCIN) infusion 900 mg  900 mg Intravenous See Admin Instructions         lactated ringers infusion   Intravenous Continuous 25 mL/hr at 10/17/22 1155 New Bag at 10/17/22 1155     lactated ringers infusion   Intravenous Continuous         lactated ringers infusion   Intravenous Continuous         lidocaine (LMX4) cream   Topical Q1H PRN         lidocaine (LMX4) cream   Topical Q1H PRN         lidocaine 1 % 0.1-1 mL  0.1-1 mL Other Q1H PRN         lidocaine 1 % 0.1-1 mL  0.1-1 mL Other Q1H PRN         midazolam (VERSED) injection 2 mg  2 mg Intravenous Pre-Op/Pre-procedure x 1 dose         ropivacaine (NAROPIN) 150 mg, ketorolac (TORADOL) 30 mg, EPINEPHrine (ADRENALIN) 0.6 mg in sodium chloride 0.9 % 50 mL (ORTHO CONTRERAS LOW DOSE)   INTRA-ARTICULAR On Call to OR         sodium chloride (PF) 0.9% PF flush 3 mL  3 mL Intracatheter Q8H         sodium chloride (PF) 0.9% PF flush 3 mL  3 mL Intracatheter q1 min prn         sodium chloride (PF) 0.9% PF flush 3 mL  3 mL Intracatheter Q8H         sodium chloride (PF) 0.9% PF flush 3 mL  3 mL Intracatheter q1 min prn         vancomycin (VANCOCIN) 1000 mg in dextrose 5% 200 mL PREMIX  1,000 mg Intravenous Pre-Op/Pre-procedure x 1 dose         No current [facility]-ordered outpatient medications on file.       lactated ringers 25 mL/hr at 10/17/22 1155     lactated ringers       lactated ringers       No results for  input(s): ABO, RH in the last 24407 hours.  No results for input(s): HCG in the last 08175 hours.  No results found for this or any previous visit (from the past 744 hour(s)).         Colette Reeder MD

## 2022-10-17 NOTE — ANESTHESIA PROCEDURE NOTES
Airway       Patient location during procedure: OR       Procedure Start/Stop Times: 10/17/2022 1:07 PM  Staff -        Anesthesiologist:  Mohan Vasquez MD       CRNA: Kat Tinoco APRN CRNA       Performed By: CRNA  Consent for Airway        Urgency: elective  Indications and Patient Condition       Indications for airway management: maranda-procedural       Induction type:intravenous       Mask difficulty assessment: 1 - vent by mask    Final Airway Details       Final airway type: endotracheal airway       Successful airway: ETT - single  Endotracheal Airway Details        ETT size (mm): 7.0       Successful intubation technique: video laryngoscopy       VL Blade Size: Glidescope 3       Grade View of Cords: 1       Adjucts: stylet       Position: Right       Measured from: lips       Secured at (cm): 21       Bite block used: None    Post intubation assessment        Placement verified by: capnometry and equal breath sounds        Number of attempts at approach: 1       Secured with: pink tape       Ease of procedure: easy       Dentition: Intact and Unchanged    Medication(s) Administered   Medication Administration Time: 10/17/2022 1:07 PM

## 2022-10-18 ENCOUNTER — APPOINTMENT (OUTPATIENT)
Dept: PHYSICAL THERAPY | Facility: CLINIC | Age: 72
End: 2022-10-18
Attending: PHYSICIAN ASSISTANT
Payer: MEDICARE

## 2022-10-18 ENCOUNTER — APPOINTMENT (OUTPATIENT)
Dept: OCCUPATIONAL THERAPY | Facility: CLINIC | Age: 72
End: 2022-10-18
Attending: PHYSICIAN ASSISTANT
Payer: MEDICARE

## 2022-10-18 ENCOUNTER — APPOINTMENT (OUTPATIENT)
Dept: PHYSICAL THERAPY | Facility: CLINIC | Age: 72
End: 2022-10-18
Attending: ORTHOPAEDIC SURGERY
Payer: MEDICARE

## 2022-10-18 VITALS
OXYGEN SATURATION: 95 % | HEIGHT: 63 IN | HEART RATE: 69 BPM | WEIGHT: 161.6 LBS | DIASTOLIC BLOOD PRESSURE: 43 MMHG | BODY MASS INDEX: 28.63 KG/M2 | TEMPERATURE: 98 F | RESPIRATION RATE: 16 BRPM | SYSTOLIC BLOOD PRESSURE: 100 MMHG

## 2022-10-18 LAB
CREAT SERPL-MCNC: 0.77 MG/DL (ref 0.52–1.04)
FASTING STATUS PATIENT QL REPORTED: ABNORMAL
GFR SERPL CREATININE-BSD FRML MDRD: 82 ML/MIN/1.73M2
GLUCOSE BLD-MCNC: 134 MG/DL (ref 70–99)
GLUCOSE BLDC GLUCOMTR-MCNC: 128 MG/DL (ref 70–99)
HGB BLD-MCNC: 12.4 G/DL (ref 11.7–15.7)

## 2022-10-18 PROCEDURE — 97116 GAIT TRAINING THERAPY: CPT | Mod: GP,CQ | Performed by: PHYSICAL THERAPY ASSISTANT

## 2022-10-18 PROCEDURE — 36415 COLL VENOUS BLD VENIPUNCTURE: CPT | Performed by: ORTHOPAEDIC SURGERY

## 2022-10-18 PROCEDURE — 85018 HEMOGLOBIN: CPT | Performed by: PHYSICIAN ASSISTANT

## 2022-10-18 PROCEDURE — 82962 GLUCOSE BLOOD TEST: CPT

## 2022-10-18 PROCEDURE — 97110 THERAPEUTIC EXERCISES: CPT | Mod: GP | Performed by: PHYSICAL THERAPIST

## 2022-10-18 PROCEDURE — 97535 SELF CARE MNGMENT TRAINING: CPT | Mod: GO

## 2022-10-18 PROCEDURE — 36415 COLL VENOUS BLD VENIPUNCTURE: CPT | Performed by: PHYSICIAN ASSISTANT

## 2022-10-18 PROCEDURE — 97530 THERAPEUTIC ACTIVITIES: CPT | Mod: GP | Performed by: PHYSICAL THERAPIST

## 2022-10-18 PROCEDURE — 250N000013 HC RX MED GY IP 250 OP 250 PS 637: Performed by: PHYSICIAN ASSISTANT

## 2022-10-18 PROCEDURE — 82565 ASSAY OF CREATININE: CPT | Performed by: ORTHOPAEDIC SURGERY

## 2022-10-18 PROCEDURE — 97165 OT EVAL LOW COMPLEX 30 MIN: CPT | Mod: GO

## 2022-10-18 PROCEDURE — 97530 THERAPEUTIC ACTIVITIES: CPT | Mod: GP,CQ | Performed by: PHYSICAL THERAPY ASSISTANT

## 2022-10-18 PROCEDURE — 250N000011 HC RX IP 250 OP 636: Performed by: PHYSICIAN ASSISTANT

## 2022-10-18 PROCEDURE — 97110 THERAPEUTIC EXERCISES: CPT | Mod: GP,CQ | Performed by: PHYSICAL THERAPY ASSISTANT

## 2022-10-18 PROCEDURE — 82947 ASSAY GLUCOSE BLOOD QUANT: CPT | Performed by: PHYSICIAN ASSISTANT

## 2022-10-18 PROCEDURE — 97116 GAIT TRAINING THERAPY: CPT | Mod: GP | Performed by: PHYSICAL THERAPIST

## 2022-10-18 RX ORDER — MELOXICAM 7.5 MG/1
7.5 TABLET ORAL DAILY
Qty: 14 TABLET | Refills: 0 | Status: SHIPPED | OUTPATIENT
Start: 2022-10-18 | End: 2023-06-20

## 2022-10-18 RX ADMIN — CEFAZOLIN 1 G: 1 INJECTION, POWDER, FOR SOLUTION INTRAMUSCULAR; INTRAVENOUS at 04:51

## 2022-10-18 RX ADMIN — OXYCODONE HYDROCHLORIDE 5 MG: 5 TABLET ORAL at 13:58

## 2022-10-18 RX ADMIN — SENNOSIDES AND DOCUSATE SODIUM 1 TABLET: 50; 8.6 TABLET ORAL at 08:38

## 2022-10-18 RX ADMIN — ACETAMINOPHEN 975 MG: 325 TABLET, FILM COATED ORAL at 04:51

## 2022-10-18 RX ADMIN — ACETAMINOPHEN 975 MG: 325 TABLET, FILM COATED ORAL at 12:39

## 2022-10-18 RX ADMIN — RIVAROXABAN 10 MG: 10 TABLET, FILM COATED ORAL at 08:38

## 2022-10-18 RX ADMIN — BENZOCAINE 6 MG-MENTHOL 10 MG LOZENGES 1 LOZENGE: at 01:06

## 2022-10-18 RX ADMIN — HYDROXYZINE HYDROCHLORIDE 10 MG: 10 TABLET ORAL at 01:06

## 2022-10-18 RX ADMIN — OXYCODONE HYDROCHLORIDE 10 MG: 5 TABLET ORAL at 02:26

## 2022-10-18 ASSESSMENT — ACTIVITIES OF DAILY LIVING (ADL)
ADLS_ACUITY_SCORE: 31

## 2022-10-18 NOTE — PROGRESS NOTES
10/17/22 1900   Appointment Info   Signing Clinician's Name / Credentials (PT) Ke Pedraza, PT, DPT   Rehab Comments (PT) WBAT, ROM to flexion tolerance   Living Environment   People in Home spouse   Current Living Arrangements house   Home Accessibility stairs to enter home   Number of Stairs, Main Entrance 2   Stair Railings, Main Entrance railings on both sides of stairs   Transportation Anticipated family or friend will provide   Living Environment Comments Pt lives in single level home with spouse who works every day and cannot help 24/7, pt reports daughter will be picking her up and staying with her for 2 days, 2STE then all needs met on first floor   Self-Care   Usual Activity Tolerance moderate   Current Activity Tolerance fair   Equipment Currently Used at Home none   Fall history within last six months yes   Number of times patient has fallen within last six months 3   Activity/Exercise/Self-Care Comment Pt reports not using an AD at baseline but has had recurrent falls in the last year, reports being IND at baseline, has a FWW to use at home after surgery   General Information   Onset of Illness/Injury or Date of Surgery 10/17/22   Referring Physician Silvia Huff, HERIBERTO   Patient/Family Therapy Goals Statement (PT) go home with daughter but pt is unsure if she will be safe when her daughter leaves   Pertinent History of Current Problem (include personal factors and/or comorbidities that impact the POC) Pt is a 71 y.o. female s/p  Left TKA on 10/17/2022, POD#0   Existing Precautions/Restrictions fall;weight bearing;other (see comments)  (ROM 0-flexion tolerance)   Weight-Bearing Status - LLE weight-bearing as tolerated   Cognition   Affect/Mental Status (Cognition) WFL;anxious;low arousal/lethargic   Orientation Status (Cognition) oriented x 4   Follows Commands (Cognition) WNL   Behavioral Issues overwhelmed easily   Cognitive Status Comments pt fairly anxious throughout session, difficulty  maintaining focus on task at hand   Pain Assessment   Patient Currently in Pain   (4/10 left knee)   Integumentary/Edema   Integumentary/Edema Comments left knee covered in ace wrap   Posture    Posture Not impaired   Range of Motion (ROM)   Range of Motion ROM deficits secondary to surgical procedure;ROM deficits secondary to pain   ROM Comment deficits with left knee after TKA, otherwise appears grossly WFL   Strength (Manual Muscle Testing)   Strength (Manual Muscle Testing) Able to perform R SLR;Deficits observed during functional mobility   Strength Comments not formally assessed, deficits with left knee and LLE after TKA   Bed Mobility   Comment, (Bed Mobility) supine<>sit SBA   Transfers   Comment, (Transfers) sit<>stand with FWW Ashlee   Gait/Stairs (Locomotion)   [place] Level (Gait) contact guard;minimum assist (75% patient effort)   Assistive Device (Gait) walker, front-wheeled   Distance in Feet (Required for LE Total Joints) 8'   Distance in Feet (Gait) 8'   Pattern (Gait) step-through   Deviations/Abnormal Patterns (Gait) antalgic;fco decreased;gait speed decreased;stride length decreased;weight shifting decreased   Comment, (Gait/Stairs) ambulated short distance with FWW and Ashlee with pt being unsteady   Sensory Examination   Sensory Perception Comments pt reporting highly diminished sensation in LLE into foot after surgery   Clinical Impression   Criteria for Skilled Therapeutic Intervention Yes, treatment indicated   PT Diagnosis (PT) impaired gait   Influenced by the following impairments pain, deficits with ROM, strength, and balance   Functional limitations due to impairments reduced activity tolerance, bed mobility, transfers, ambulation   Clinical Presentation (PT Evaluation Complexity) Stable/Uncomplicated   Clinical Presentation Rationale clincial judgement   Clinical Decision Making (Complexity) low complexity   Planned Therapy Interventions (PT) balance training;bed mobility  training;cryotherapy;gait training;home exercise program;patient/family education;ROM (range of motion);stair training;strengthening;stretching   Anticipated Equipment Needs at Discharge (PT)   (pt will provide FWW)   Risk & Benefits of therapy have been explained evaluation/treatment results reviewed;care plan/treatment goals reviewed;risks/benefits reviewed;current/potential barriers reviewed;participants voiced agreement with care plan;participants included;patient   PT Total Evaluation Time   PT Eval, Low Complexity Minutes (31854) 10   Plan of Care Review   Plan of Care Reviewed With patient   Physical Therapy Goals   PT Frequency 2x/day   PT Predicted Duration/Target Date for Goal Attainment 10/18/22   PT Goals Bed Mobility;Transfers;Gait;Stairs   PT: Bed Mobility Modified independent;Supine to/from sit;Rolling;Bridging   PT: Transfers Supervision/stand-by assist;Sit to/from stand;Assistive device   PT: Gait Supervision/stand-by assist;Rolling walker;50 feet   PT: Stairs Minimal assist;2 stairs;Rail on both sides   Therapeutic Procedure/Exercise   Ther. Procedure: strength, endurance, ROM, flexibillity Minutes (31768) 1   Symptoms Noted During/After Treatment none   Treatment Detail/Skilled Intervention Educated on circulatory benefits of AP's after surgery and encouraged pt to perform throughout the day. Cued pt to perform x 30 while supine   Therapeutic Activity   Therapeutic Activities: dynamic activities to improve functional performance Minutes (70667) 12   Symptoms Noted During/After Treatment Fatigue;Increased pain   Treatment Detail/Skilled Intervention Greeted pt supine in bed, pt anxious about moving during session needed encouragement at times, reported feeling foggy and lost focus easily. Supine<>sit SBA, cues for sequencing and use of bedrail for assist. Pt desating to low 80's earlier per nursing, sitting EOB pt sating at 97% on 2 LPM NC. NC doffed for activity. Sit<>stand with FWW Ashlee, pt  unsteady needing cues for forward lean and Ashlee to initiate, cues for safe hand placement. Standing cued pt for marches and weight shifting with CGA and use of FWW, pt able to perform well. After activity with pt sitting EOB, sating at 94% on RA, NC with 2LPM donned before therapist left   Gait Training   Gait Training Minutes (59228) 2   Symptoms Noted During/After Treatment (Gait Training) fatigue;increased pain   Treatment Detail/Skilled Intervention pt ambulated in room ~8' using FWW and CGA progressing to Ashlee with increasing fatigue and pain, pt very unsteady having heavy forward lean needing Ashlee to keep upright   [place] Level (Gait Training) minimum assist (75% patient effort)   Physical Assistance Level (Gait Training) verbal cues;nonverbal cues (demo/gestures);1 person assist   Weight Bearing (Gait Training) weight-bearing as tolerated   Assistive Device (Gait Training) rolling walker   Pattern Analysis (Gait Training) swing-through gait   Gait Analysis Deviations decreased fco;increased time in double stance;decreased velocity of limb motion;decreased step length;decreased stride length;increased stride width;decreased weight-shifting ability   Impairments (Gait Analysis/Training) pain;strength decreased;sensation decreased   PT Discharge Planning   PT Plan review/progress HEP, progress gait distance, stairs, bed mobility and transfers   PT Discharge Recommendation (DC Rec)   (defer to ortho)   PT Rationale for DC Rec pt below baseline, limited by pain and decreased sensation in surgical leg. Anticipate pt will progress, to be safe to go home discharge pt should be at/near Mod-I for bed mobility, SBA for transfers and ambulating both with FWW, and Ashlee with steps. Pt will only have 24/7 assist for 2 days from daughter then will be home alone at times due to spouse working every day   PT Brief overview of current status bed mobility SBA, sit<>stand with FWW Ashlee, ambulate short distance with FWW    Total Session Time   Timed Code Treatment Minutes 15   Total Session Time (sum of timed and untimed services) 25

## 2022-10-18 NOTE — PROGRESS NOTES
10/17/22 1900   Appointment Info   Signing Clinician's Name / Credentials (PT) Ke Pedraza, PT, DPT   Rehab Comments (PT) WBAT, ROM to flexion tolerance   Living Environment   People in Home spouse   Current Living Arrangements house   Home Accessibility stairs to enter home   Number of Stairs, Main Entrance 2   Stair Railings, Main Entrance railings on both sides of stairs   Transportation Anticipated family or friend will provide   Living Environment Comments Pt lives in single level home with spouse who works every day and cannot help 24/7, pt reports daughter will be picking her up and staying with her for 2 days, 2STE then all needs met on first floor   Self-Care   Usual Activity Tolerance moderate   Current Activity Tolerance fair   Equipment Currently Used at Home none   Fall history within last six months yes   Number of times patient has fallen within last six months 3   Activity/Exercise/Self-Care Comment Pt reports not using an AD at baseline but has had recurrent falls in the last year, reports being IND at baseline, has a FWW to use at home after surgery   General Information   Onset of Illness/Injury or Date of Surgery 10/17/22   Referring Physician Silvia Huff, HERIBERTO   Patient/Family Therapy Goals Statement (PT) go home with daughter but pt is unsure if she will be safe when her daughter leaves   Pertinent History of Current Problem (include personal factors and/or comorbidities that impact the POC) Pt is a 71 y.o. female s/p  Left TKA on 10/17/2022, POD#0   Existing Precautions/Restrictions fall;weight bearing;other (see comments)  (ROM 0-flexion tolerance)   Weight-Bearing Status - LLE weight-bearing as tolerated   Cognition   Affect/Mental Status (Cognition) WFL;anxious;low arousal/lethargic   Orientation Status (Cognition) oriented x 4   Follows Commands (Cognition) WNL   Behavioral Issues overwhelmed easily   Cognitive Status Comments pt fairly anxious throughout session, difficulty  maintaining focus on task at hand   Pain Assessment   Patient Currently in Pain   (4/10 left knee)   Integumentary/Edema   Integumentary/Edema Comments left knee covered in ace wrap   Posture    Posture Not impaired   Range of Motion (ROM)   Range of Motion ROM deficits secondary to surgical procedure;ROM deficits secondary to pain   ROM Comment deficits with left knee after TKA, otherwise appears grossly WFL   Strength (Manual Muscle Testing)   Strength (Manual Muscle Testing) Able to perform R SLR;Deficits observed during functional mobility   Strength Comments not formally assessed, deficits with left knee and LLE after TKA   Bed Mobility   Comment, (Bed Mobility) supine<>sit SBA   Transfers   Comment, (Transfers) sit<>stand with FWW Ashlee   Gait/Stairs (Locomotion)   [place] Level (Gait) contact guard;minimum assist (75% patient effort)   Assistive Device (Gait) walker, front-wheeled   Distance in Feet (Required for LE Total Joints) 8'   Distance in Feet (Gait) 8'   Pattern (Gait) step-through   Deviations/Abnormal Patterns (Gait) antalgic;fco decreased;gait speed decreased;stride length decreased;weight shifting decreased   Comment, (Gait/Stairs) ambulated short distance with FWW and Ashlee with pt being unsteady   Sensory Examination   Sensory Perception Comments pt reporting highly diminished sensation in LLE into foot after surgery   Clinical Impression   Criteria for Skilled Therapeutic Intervention Yes, treatment indicated   PT Diagnosis (PT) impaired gait   Influenced by the following impairments pain, deficits with ROM, strength, and balance   Functional limitations due to impairments reduced activity tolerance, bed mobility, transfers, ambulation   Clinical Presentation (PT Evaluation Complexity) Stable/Uncomplicated   Clinical Presentation Rationale clincial judgement   Clinical Decision Making (Complexity) low complexity   Planned Therapy Interventions (PT) balance training;bed mobility  training;cryotherapy;gait training;home exercise program;patient/family education;ROM (range of motion);stair training;strengthening;stretching   Anticipated Equipment Needs at Discharge (PT)   (pt will provide FWW)   Risk & Benefits of therapy have been explained evaluation/treatment results reviewed;care plan/treatment goals reviewed;risks/benefits reviewed;current/potential barriers reviewed;participants voiced agreement with care plan;participants included;patient   PT Total Evaluation Time   PT Eval, Low Complexity Minutes (22386) 10   Plan of Care Review   Plan of Care Reviewed With patient   Physical Therapy Goals   PT Frequency 2x/day   PT Predicted Duration/Target Date for Goal Attainment 10/18/22   PT Goals Bed Mobility;Transfers;Gait;Stairs   PT: Bed Mobility Modified independent;Supine to/from sit;Rolling;Bridging   PT: Transfers Supervision/stand-by assist;Sit to/from stand;Assistive device   PT: Gait Supervision/stand-by assist;Rolling walker;50 feet   PT: Stairs Minimal assist;2 stairs;Rail on both sides   Therapeutic Procedure/Exercise   Ther. Procedure: strength, endurance, ROM, flexibillity Minutes (71971) 1   Symptoms Noted During/After Treatment none   Treatment Detail/Skilled Intervention Educated on circulatory benefits of AP's after surgery and encouraged pt to perform throughout the day. Cued pt to perform x 30 while supine   Therapeutic Activity   Therapeutic Activities: dynamic activities to improve functional performance Minutes (70254) 12   Symptoms Noted During/After Treatment Fatigue;Increased pain   Treatment Detail/Skilled Intervention Greeted pt supine in bed, pt anxious about moving during session needed encouragement at times, reported feeling foggy and lost focus easily. Supine<>sit SBA, cues for sequencing and use of bedrail for assist. Pt desating to low 80's earlier per nursing, sitting EOB pt sating at 97% on 2 LPM NC. NC doffed for activity. Sit<>stand with FWW Ashlee, pt  unsteady needing cues for forward lean and Ashlee to initiate, cues for safe hand placement. Standing cued pt for marches and weight shifting with CGA and use of FWW, pt able to perform well. After activity with pt sitting EOB, sating at 94% on RA, NC with 2LPM donned before therapist left   Gait Training   Gait Training Minutes (31670) 2   Symptoms Noted During/After Treatment (Gait Training) fatigue;increased pain   Treatment Detail/Skilled Intervention pt ambulated in room ~8' using FWW and CGA progressing to Ashlee with increasing fatigue and pain, pt very unsteady having heavy forward lean needing Ashlee to keep upright   [place] Level (Gait Training) contact guard   Physical Assistance Level (Gait Training) verbal cues;nonverbal cues (demo/gestures);1 person assist   Weight Bearing (Gait Training) weight-bearing as tolerated   Assistive Device (Gait Training) rolling walker   Pattern Analysis (Gait Training) swing-through gait   Gait Analysis Deviations decreased fco;increased time in double stance;decreased velocity of limb motion;decreased step length;decreased stride length;increased stride width;decreased weight-shifting ability   Impairments (Gait Analysis/Training) pain;strength decreased;sensation decreased   PT Discharge Planning   PT Plan review/progress HEP, progress gait distance, stairs, bed mobility and transfers   PT Discharge Recommendation (DC Rec)   (defer to ortho)   PT Rationale for DC Rec pt below baseline, limited by pain and decreased sensation in surgical leg. Anticipate pt will progress, to be safe to go home discharge pt should be at/near Mod-I for bed mobility, SBA for transfers and ambulating both with FWW, and Ashlee with steps. Pt will only have 24/7 assist for 2 days from daughter then will be home alone at times due to spouse working every day   Total Session Time   Timed Code Treatment Minutes 15   Total Session Time (sum of timed and untimed services) 25

## 2022-10-18 NOTE — PLAN OF CARE
Goal Outcome Evaluation:         10/17/22 8524-7712  Pt A/O x4. VSS on 1L NC. Pain managed w/ PRN oxy and sched tylenol. PIV infusing 100 mL LR w/ int abx. Cont B/B, up to BSC w/ A2 GB +W. L knee inc, dressing CDI. Tolerating regular diet w/ good appetite. Discharge pending.

## 2022-10-18 NOTE — PROGRESS NOTES
[facility]  Orthopedic Progress Note    Chief Complaint:  POD#1 s/p L TKA         Interval History:   Patient is doing well; no cp, sob, n/v/d, or abd pain.              Medications:     Current Facility-Administered Medications Ordered in Epic   Medication Dose Route Frequency Last Rate Last Admin     [START ON 10/20/2022] acetaminophen (TYLENOL) tablet 650 mg  650 mg Oral Q4H PRN         acetaminophen (TYLENOL) tablet 975 mg  975 mg Oral Q8H   975 mg at 10/18/22 0451     benzocaine-menthol (CHLORASEPTIC) 6-10 MG lozenge 1 lozenge  1 lozenge Buccal Q1H PRN   1 lozenge at 10/18/22 0106     bisacodyl (DULCOLAX) suppository 10 mg  10 mg Rectal Daily PRN         celecoxib (celeBREX) capsule 200 mg  200 mg Oral Daily   200 mg at 10/17/22 1944     HYDROmorphone (DILAUDID) injection 0.2 mg  0.2 mg Intravenous Q2H PRN        Or     HYDROmorphone (DILAUDID) injection 0.4 mg  0.4 mg Intravenous Q2H PRN         hydrOXYzine (ATARAX) tablet 10 mg  10 mg Oral Q6H PRN   10 mg at 10/18/22 0106     lidocaine (LMX4) cream   Topical Q1H PRN         lidocaine 1 % 0.1-1 mL  0.1-1 mL Other Q1H PRN         magnesium hydroxide (MILK OF MAGNESIA) suspension 30 mL  30 mL Oral Daily PRN         naloxone (NARCAN) injection 0.2 mg  0.2 mg Intravenous Q2 Min PRN        Or     naloxone (NARCAN) injection 0.4 mg  0.4 mg Intravenous Q2 Min PRN        Or     naloxone (NARCAN) injection 0.2 mg  0.2 mg Intramuscular Q2 Min PRN        Or     naloxone (NARCAN) injection 0.4 mg  0.4 mg Intramuscular Q2 Min PRN         ondansetron (ZOFRAN ODT) ODT tab 4 mg  4 mg Oral Q6H PRN        Or     ondansetron (ZOFRAN) injection 4 mg  4 mg Intravenous Q6H PRN         oxyCODONE (ROXICODONE) tablet 5 mg  5 mg Oral Q4H PRN   5 mg at 10/17/22 2125    Or     oxyCODONE (ROXICODONE) tablet 10 mg  10 mg Oral Q4H PRN   10 mg at 10/18/22 0226     polyethylene glycol (MIRALAX) Packet 17 g  17 g Oral Daily         prochlorperazine (COMPAZINE) injection 5 mg   "5 mg Intravenous Q6H PRN        Or     prochlorperazine (COMPAZINE) tablet 5 mg  5 mg Oral Q6H PRN         rivaroxaban ANTICOAGULANT (XARELTO) tablet 10 mg  10 mg Oral Daily         senna-docusate (SENOKOT-S/PERICOLACE) 8.6-50 MG per tablet 1 tablet  1 tablet Oral BID   1 tablet at 10/17/22 2125     sodium chloride (PF) 0.9% PF flush 3 mL  3 mL Intracatheter Q8H         sodium chloride (PF) 0.9% PF flush 3 mL  3 mL Intracatheter q1 min prn         Current Outpatient Medications Ordered in Epic   Medication     acetaminophen (TYLENOL) 500 MG tablet     celecoxib (CELEBREX) 200 MG capsule     hydrOXYzine (ATARAX) 25 MG tablet     oxyCODONE (ROXICODONE) 5 MG tablet     rivaroxaban ANTICOAGULANT (XARELTO) 10 MG TABS tablet     senna-docusate (SENOKOT-S/PERICOLACE) 8.6-50 MG tablet                  Physical Exam:   Blood pressure 116/59, pulse 65, temperature 98  F (36.7  C), temperature source Oral, resp. rate 14, height 1.6 m (5' 3\"), weight 73.3 kg (161 lb 9.6 oz), SpO2 96 %, not currently breastfeeding.        Vital Sign Ranges  Temperature Temp  Av.1  F (36.7  C)  Min: 97.7  F (36.5  C)  Max: 98.4  F (36.9  C)   Blood pressure Systolic (24hrs), Av , Min:101 , Max:125        Diastolic (24hrs), Av, Min:43, Max:81      Pulse Pulse  Av.2  Min: 63  Max: 86   Respirations Resp  Av.4  Min: 8  Max: 44   Pulse oximetry SpO2  Av.5 %  Min: 91 %  Max: 99 %         Intake/Output Summary (Last 24 hours) at 10/18/2022 0713  Last data filed at 10/18/2022 0200  Gross per 24 hour   Intake 1160 ml   Output 450 ml   Net 710 ml       AA&Ox3, NAD  Non-labored breathing  DP and PT pulses 2+  Left knee with dressing c/d/i  Calves soft and nontender  EHL and FHL intact  NVI distally, SILT               Data:   No results found for this or any previous visit (from the past 24 hour(s)).    Hgb pending         Assessment and Plan:         -- Pain well controlled with current regimen  -- DVT ppx: xarelto x14 " days  -- Bowel ppx in place, regular diet as tolerated  -- PT to continue per protocol  -- Dispo: home today with scheduled follow up in 2 weeks at [facility]    Silvia Huff PA-C  M: [phone]

## 2022-10-18 NOTE — PROGRESS NOTES
10/18/22 0715   Appointment Info   Signing Clinician's Name / Credentials (OT) AZUL Tineo   Student Supervision Therapy services provided with the co-signing licensed therapist guiding and directing the services, and providing the skilled judgement and assessment throughout the session   Living Environment   People in Home spouse   Current Living Arrangements house   Home Accessibility stairs to enter home   Number of Stairs, Main Entrance 2   Stair Railings, Main Entrance railings on both sides of stairs   Number of Stairs, Within Home, Primary two  (wont be using them. Everything she needs on main floor)   Transportation Anticipated family or friend will provide   Living Environment Comments Daughter will stay with patient for 2 days.   Self-Care   Usual Activity Tolerance good   Current Activity Tolerance moderate   Regular Exercise No   Equipment Currently Used at Home walker, rolling   Fall history within last six months yes   Number of times patient has fallen within last six months 3   Activity/Exercise/Self-Care Comment Has rolling walker with breaks. Independent in all ADLs at baseline.   Instrumental Activities of Daily Living (IADL)   Previous Responsibilities meal prep;housekeeping;laundry;driving;shopping;medication management   General Information   Onset of Illness/Injury or Date of Surgery 10/17/22   Referring Physician GENI Chew PA-C   Patient/Family Therapy Goal Statement (OT) return home   Additional Occupational Profile Info/Pertinent History of Current Problem left total knee arthroplasty POD #1. See chart for details.   Existing Precautions/Restrictions fall;weight bearing   Left Lower Extremity (Weight-bearing Status) weight-bearing as tolerated (WBAT)   Cognitive Status Examination   Orientation Status orientation to person, place and time   Attention Deficit requires cues/redirection to task   Toilet Transfer   Type (Toilet Transfer) sit-stand;stand-sit   [place] Level  (Toilet Transfer) contact guard   Assistive Device (Toilet Transfer) raised toilet seat;grab bars/safety frame   Activities of Daily Living   BADL Assessment/Intervention upper body dressing;lower body dressing;toileting   Upper Body Dressing Assessment/Training   [place] Level (Upper Body Dressing) modified independence;set up   Lower Body Dressing Assessment/Training   [place] Level (Lower Body Dressing) modified independence;set up;verbal cues   Toileting   Assistive Devices (Toileting) grab bar, toilet   [place] Level (Toileting) contact guard assist   Clinical Impression   Criteria for Skilled Therapeutic Interventions Met (OT) Yes, treatment indicated   OT Diagnosis Decreased independence in I/ADLs   Influenced by the following impairments Decreased independence in I/ADLs   OT Problem List-Impairments impacting ADL problems related to;activity tolerance impaired;pain  (fear of knee buckling/falls)   Assessment of Occupational Performance 1-3 Performance Deficits   Identified Performance Deficits Decreased independence in I/ADLs (dressing, bathing, toileting)   Planned Therapy Interventions (OT) ADL retraining;IADL retraining  (energy conservation)   Clinical Decision Making Complexity (OT) low complexity   Anticipated Equipment Needs Upon Discharge (OT) shower chair   Risk & Benefits of therapy have been explained evaluation/treatment results reviewed;care plan/treatment goals reviewed;risks/benefits reviewed;patient   OT Total Evaluation Time   OT Eval, Low Complexity Minutes (43961) 10   Therapy Certification   Start of Care Date 10/18/22   Medical Diagnosis TKA   OT Goals   Therapy Frequency (OT) One time eval and treatment   OT Predicted Duration/Target Date for Goal Attainment 10/18/22   OT Goals Lower Body Dressing;Toilet Transfer/Toileting;Upper Body Dressing;OT Goal 1   OT: Lower Body Dressing Modified independent   OT: Toilet Transfer/Toileting Modified independent   OT: Goal 1 Pt will  verbalize understanding of 3 learned compensatory techniques for I/ADLs, in order to increase independence in I/ADLs.   Self-Care/Home Management   Self-Care/Home Mgmt/ADL, Compensatory, Meal Prep Minutes (54298) 35   Symptoms Noted During/After Treatment (Meal Preparation/Planning Training) dizziness;increased pain   Treatment Detail/Skilled Intervention Pt in bed upon arrival, agreeable and motivated for OT session. Educated on LE dressing with compensatory techniques and AE. Pt completed total body dressing Mod I seated EOB; doffed gown and donned underwear/socks/pants/shirt/shoes with Mod I after education and with set up. Pt noted dizziness so OT checked vitals, WNL. Pt completed sit<>stand and ambulated to/from toilet CGA with FWW. Pt completed toilet transfer CGA with safety frame. Educated pt on safe shower transfers and options for shower chair. Pt in chair at end of session, alarm on and call light within reach.Educated on where to attain DME/AE. Pt reported that family will assist upon discharge.   OT Discharge Planning   OT Rationale for DC Rec Assist in ADLs as needed. CGA for toilet transfer. Assist in heavy IADLs including home management tasks.   OT Brief overview of current status SBA UB and LB dressing. CGA toilet transfer   Total Session Time   Timed Code Treatment Minutes 35   Total Session Time (sum of timed and untimed services) 45

## 2022-10-18 NOTE — DISCHARGE SUMMARY
"Discharge Summary    Macey Romero MRN# [id number]   YOB: 1950 Age: 71 year old     Date of Admission: 10/17/2022    Date of Discharge: 10/18/2022    Reason for Admission: Macey Romero is an 71 year old female who was admitted to the hospital following surgery.    Preoperative Diagnosis: Degenerative arthritis of left knee [M17.12]    Postoperative Diagnosis: Degenerative arthritis of left knee [M17.12]    Procedure Completed:  Left total knee arthroplasty    Hospital Course:  Ms. Romero was admitted and underwent the above procedure. The patient tolerated the procedure well. There were no complications. Following surgery she was admitted to the floor.  During her stay she did not require any blood transfusions. Her pain was controlled with oral pain medication.  During her stay she progressed well in physical therapy and all the therapy goals were met.     Discharge Physical Exam:  /45 (BP Location: Right arm)   Pulse 50   Temp 98.5  F (36.9  C) (Oral)   Resp 16   Ht 1.6 m (5' 3\")   Wt 73.3 kg (161 lb 9.6 oz)   SpO2 100%   BMI 28.63 kg/m    Neurovascularly intact, distal pulses present bilaterally.  Calves are negative bilaterally, both soft and nontender.    Assessment: Ms. Romero is stable and doing well status post left total knee arthroplasty.    Plan: We will discharge her home on analgesics and deep venous thrombosis prophylaxis.  She will follow-up with me approximately 2 weeks from surgery.  She may call [phone] to schedule an appointment.    Meds:     Medication List      Started    acetaminophen 500 MG tablet  Commonly known as: TYLENOL  1,000 mg, Oral, EVERY 8 HOURS PRN     hydrOXYzine 25 MG tablet  Commonly known as: ATARAX  25 mg, Oral, EVERY 6 HOURS PRN     meloxicam 7.5 MG tablet  Commonly known as: MOBIC  7.5 mg, Oral, DAILY     oxyCODONE 5 MG tablet  Commonly known as: ROXICODONE  5-10 mg, Oral, EVERY 4 HOURS PRN     rivaroxaban ANTICOAGULANT 10 MG Tabs " tablet  Commonly known as: XARELTO  10 mg, Oral, DAILY     senna-docusate 8.6-50 MG tablet  Commonly known as: SENOKOT-S/PERICOLACE  1-2 tablets, Oral, 2 TIMES DAILY, Take while on oral narcotics to prevent or treat constipation.        Modified    albuterol 108 (90 Base) MCG/ACT inhaler  Commonly known as: PROAIR HFA/PROVENTIL HFA/VENTOLIN HFA  2 puffs, Inhalation, EVERY 6 HOURS  What changed:     when to take this    reasons to take this     budesonide-formoterol 160-4.5 MCG/ACT Inhaler  Commonly known as: Symbicort  2 puffs, Inhalation, DAILY  What changed:     how much to take    when to take this     linaclotide 290 MCG capsule  Commonly known as: LINZESS  290 mcg, Oral, EVERY MORNING BEFORE BREAKFAST  What changed:     when to take this    reasons to take this     polyethylene glycol 17 g packet  Commonly known as: MIRALAX  17 g, Oral, 2 TIMES DAILY  What changed:     when to take this    reasons to take this

## 2022-10-19 NOTE — PLAN OF CARE
Goal Outcome Evaluation:  Patient vital signs are at baseline: Yes  Patient able to ambulate as they were prior to admission or with assist devices provided by therapies during their stay:  Yes  Patient MUST void prior to discharge:  Yes per BR.  Patient able to tolerate oral intake:  Yes- has good appetite.  Pain has adequate pain control using Oral analgesics:  Yes with PRN Oxycodone and scheduled Tylenol.  Does patient have an identified :  Yes  Has goal D/C date and time been discussed with patient:  Yes    A/Ox4. VSS except intermittent bradycardia that was resolved upon discharge. Up 1-A w/ GB and walker. CMS intact except baseline numbness. R knee dressing CDI. Ace wrap to be removed POD#2, checked skin and rewrapped it. Instructed pt and daughter to remove ace wrap at home tomorrow. Knee immobilizer on to help with her mobility. Discharge was delayed in the afternoon d/t not doing well with PT in AM. AVS given and signed. Teach back done. All belongings checked and sent. Discharged to home with daughter to help. Called on call surgeon for pt refusing Meloxicam rx as she's not taking NSAIDS d/t hx of liver issues and GI. Returned the above med to pharmacy.

## 2022-10-19 NOTE — PLAN OF CARE
Physical Therapy Discharge Summary    Reason for therapy discharge:    Discharged to home with outpatient therapy.    Progress towards therapy goal(s). See goals on Care Plan in [facility] electronic health record for goal details.  Goals partially met.  Barriers to achieving goals:   discharge from facility.    Therapy recommendation(s):    Continued therapy is recommended.  Rationale/Recommendations:  OP PT per ortho MD/PA protocol.

## 2022-10-27 ENCOUNTER — TRANSFERRED RECORDS (OUTPATIENT)
Dept: FAMILY MEDICINE | Facility: CLINIC | Age: 72
End: 2022-10-27

## 2022-10-31 ENCOUNTER — TRANSFERRED RECORDS (OUTPATIENT)
Dept: FAMILY MEDICINE | Facility: CLINIC | Age: 72
End: 2022-10-31

## 2022-11-07 NOTE — PROGRESS NOTES
Hospital Follow-up Visit:    Hospital/Nursing Home/IP Rehab Facility: [facility]  Date of Admission: 10/17/22  Date of Discharge: 10/18/22  Reason(s) for Admission:  Left total knee arthroplasty    Was your hospitalization related to COVID-19? No   Problems taking medications regularly:  None  Medication changes since discharge:   Started    acetaminophen 500 MG tablet  Commonly known as: TYLENOL  1,000 mg, Oral, EVERY 8 HOURS PRN      hydrOXYzine 25 MG tablet  Commonly known as: ATARAX  25 mg, Oral, EVERY 6 HOURS PRN      meloxicam 7.5 MG tablet  Commonly known as: MOBIC  7.5 mg, Oral, DAILY      oxyCODONE 5 MG tablet  Commonly known as: ROXICODONE  5-10 mg, Oral, EVERY 4 HOURS PRN      rivaroxaban ANTICOAGULANT 10 MG Tabs tablet  Commonly known as: XARELTO  10 mg, Oral, DAILY      senna-docusate 8.6-50 MG tablet  Commonly known as: SENOKOT-S/PERICOLACE  1-2 tablets, Oral, 2 TIMES DAILY, Take while on oral narcotics to prevent or treat constipation.          Modified    albuterol 108 (90 Base) MCG/ACT inhaler  Commonly known as: PROAIR HFA/PROVENTIL HFA/VENTOLIN HFA  2 puffs, Inhalation, EVERY 6 HOURS  What changed:     when to take this    reasons to take this      budesonide-formoterol 160-4.5 MCG/ACT Inhaler  Commonly known as: Symbicort  2 puffs, Inhalation, DAILY  What changed:     how much to take    when to take this      linaclotide 290 MCG capsule  Commonly known as: LINZESS  290 mcg, Oral, EVERY MORNING BEFORE BREAKFAST  What changed:     when to take this    reasons to take this      polyethylene glycol 17 g packet  Commonly known as: MIRALAX  17 g, Oral, 2 TIMES DAILY  What changed:     when to take this    reasons to take this     Post TKA   On xarelto and then going on to a baby asa for a couple of months.    Problems adhering to non-medication therapy:  None    Summary of hospitalization:  [facility] discharge summary reviewed  Diagnostic Tests/Treatments  reviewed.  Follow up needed: none  Other Healthcare Providers Involved in Patient s Care:         Physical Therapy  Update since discharge: improved.     Constipation working up with Dr. Rogers, taking sennocot.    Plan of care communicated with patient     Problem(s) Oriented visit      ROS:  General and Resp. completed and negative except as noted above     HISTORY:   reports no history of alcohol use.   reports that she quit smoking about 6 years ago. Her smoking use included cigarettes. She has never used smokeless tobacco.    Past Medical History:   Diagnosis Date     Breast CA in situ 1987     Cancer (H) 1987     Cerebral infarction (H)      Chronic constipation      Fibromyalgia      History of blood transfusion      Malignant neoplasm of female breast, unspecified estrogen receptor status, unspecified laterality, unspecified site of breast (H) 06/17/2021     Meningitis      Osteoarthritis 02/01/2011     Osteopenia 02/01/2011     Pneumonia      Pterygium eye 08/26/2013     Reactive airway disease 02/01/2011     Seasonal allergies      Shingles      Skin cancer      Uncomplicated asthma      Past Surgical History:   Procedure Laterality Date     anterior c-disc fusion       ARTHROPLASTY KNEE Left 10/17/2022    Procedure: LEFT TOTAL KNEE ARTHROPLASTY;  Surgeon: Jax Le MD;  Location:  OR     BLEPHAROPLASTY, BROW LIFT BILATERAL, COMBINED Bilateral 6/18/2018    Procedure: COMBINED BLEPHAROPLASTY, BROW LIFT BILATERAL;  BILATERAL UPPER LID BLEPHAROPLASTY; BILATERAL BROW PTOSIS REPAIR;  Surgeon: Сергей Walters MD;  Location:  EC     CHOLECYSTECTOMY       COLONOSCOPY N/A 10/19/2017    Procedure: COLONOSCOPY;  COLONOSCOPY;  Surgeon: Niko Wong MD;  Location:  GI     COLONOSCOPY N/A 8/27/2022    Procedure: COLONOSCOPY, WITH POLYPECTOMY AND BIOPSY;  Surgeon: Abraham Rogers MD;  Location:  GI     D & C      Bleeding before hysterectomy     ENDOSCOPY  04/21/08     ESOPHAGOSCOPY,  GASTROSCOPY, DUODENOSCOPY (EGD), COMBINED N/A 8/27/2022    Procedure: ESOPHAGOGASTRODUODENOSCOPY, WITH BIOPSY;  Surgeon: Abraham Rogers MD;  Location:  GI     HYSTERECTOMY, MARCUS      Ovaries and tubes out due to breast cancer     JOINT REPLACEMTN, KNEE RT/LT Right 01/2011    Joint Replacement knee RT, with tibial straightening (2 replacements)     MAMMOPLASTY AUGMENTATION BILATERAL      breast ca      MASTECTOMY, SIMPLE RT/LT/CLAIRE Bilateral 1989    Mastectomy Simple RT/LT/CLAIRE     MOHS MICROGRAPHIC PROCEDURE      Left lateral nose     OPEN REDUCTION INTERNAL FIXATION ANKLE  8/27/2013    Procedure: OPEN REDUCTION INTERNAL FIXATION ANKLE;  RIGHT OPEN REDUCTION INTERNAL FIXATION ANKLE WITH LIGAMENT REPAIR;  Surgeon: Nando Chang MD;  Location:  OR     PTERYGIUM WITH CONJUNCTIVAL AUTOLOGOUS TRANSPLANT Left 8/29/2016    Procedure: PTERYGIUM WITH CONJUNCTIVAL AUTOLOGOUS TRANSPLANT;  Surgeon: Сергей Walters MD;  Location:  EC     REPAIR LIGAMENT ANKLE  8/27/2013    Procedure: REPAIR LIGAMENT ANKLE;;  Surgeon: Nando Chang MD;  Location:  OR     SALIVARY GLAND SURGERY Left     Stone removal      SIGMOIDOSCOPY FLEXIBLE N/A 8/30/2022    Procedure: FLEXIBLE SIGMOIDOSCOPY;  Surgeon: Niko Wong MD;  Location:  GI     TONSILLECTOMY         EXAM:  BP: 123/74   Pulse: 82    Temp: 97.5    Wt Readings from Last 2 Encounters:   11/08/22 66.8 kg (147 lb 3.2 oz)   10/18/22 73.3 kg (161 lb 9.6 oz)       BMI= Body mass index is 26.08 kg/m .    EXAM:  APPEARANCE: = Relaxed and in no distress  Neck = No anterior or posterior adenopathy appreciated.  Resp effort = Calm regular breathing  Breath Sounds = Good air movement with no rales or rhonchi in any lung fields  Musculsktl: wound is healing well  SKIN = absent significant rashes or lesions   Recent/Remote Memory = Alert and Oriented x 3      Assessment/Plan:  Macey was seen today for hospital f/u.    Diagnoses and all orders for this visit:    Stage 3a  "chronic kidney disease (H)  -     CBC with Diff/Plt ([facility])    S/P total knee arthroplasty, left  -     CBC with Diff/Plt ([facility])    Moderate persistent asthma without complication    Other orders  -     DEPRESSION ACTION PLAN (DAP)        COUNSELING:   reports that she quit smoking about 6 years ago. Her smoking use included cigarettes. She has never used smokeless tobacco.    Estimated body mass index is 26.08 kg/m  as calculated from the following:    Height as of this encounter: 1.6 m (5' 3\").    Weight as of this encounter: 66.8 kg (147 lb 3.2 oz).       Appropriate preventive services were discussed with this patient, including applicable screening as appropriate for cardiovascular disease, diabetes, osteopenia/osteoporosis, and glaucoma.  As appropriate for age/gender, discussed screening for colorectal cancer, prostate cancer, breast cancer, and cervical cancer. Checklist reviewing preventive services available has been given to the patient.    Reviewed patients plan of care and provided an AVS. The Basic Care Plan (routine screening as documented in Health Maintenance) for Macey meets the Care Plan requirement. This Care Plan has been established and reviewed with the  Patient.      The following health maintenance items are reviewed in Epic and correct as of today:  Health Maintenance   Topic Date Due     DTAP/TDAP/TD IMMUNIZATION (1 - Tdap) Never done     MEDICARE ANNUAL WELLNESS VISIT  09/09/2020     FALL RISK ASSESSMENT  06/17/2022     ZOSTER IMMUNIZATION (2 of 2) 11/18/2021     LUNG CANCER SCREENING  12/12/2022     ASTHMA CONTROL TEST  12/20/2022     PHQ-9  03/01/2023     A1C  04/10/2023     ASTHMA ACTION PLAN  06/20/2023     BMP  08/30/2023     LIPID  10/10/2023     MICROALBUMIN  10/10/2023     HEMOGLOBIN  10/18/2023     DEXA  11/17/2023     ADVANCE CARE PLANNING  06/25/2026     COLORECTAL CANCER SCREENING  08/27/2032     HEPATITIS C SCREENING  Completed     DEPRESSION ACTION PLAN  Completed     INFLUENZA " VACCINE  Completed     Pneumococcal Vaccine: 65+ Years  Completed     URINALYSIS  Completed     COVID-19 Vaccine  Completed     IPV IMMUNIZATION  Aged Out     MENINGITIS IMMUNIZATION  Aged Out     URINE DRUG SCREEN  Discontinued       Long Ferrari  [facility]  For any issues my office # is [phone]

## 2022-11-08 ENCOUNTER — OFFICE VISIT (OUTPATIENT)
Dept: FAMILY MEDICINE | Facility: CLINIC | Age: 72
End: 2022-11-08

## 2022-11-08 VITALS
HEIGHT: 63 IN | OXYGEN SATURATION: 94 % | SYSTOLIC BLOOD PRESSURE: 123 MMHG | TEMPERATURE: 97.5 F | WEIGHT: 147.2 LBS | BODY MASS INDEX: 26.08 KG/M2 | DIASTOLIC BLOOD PRESSURE: 74 MMHG | RESPIRATION RATE: 16 BRPM | HEART RATE: 82 BPM

## 2022-11-08 DIAGNOSIS — Z96.652 S/P TOTAL KNEE ARTHROPLASTY, LEFT: ICD-10-CM

## 2022-11-08 DIAGNOSIS — N18.31 STAGE 3A CHRONIC KIDNEY DISEASE (H): Primary | ICD-10-CM

## 2022-11-08 DIAGNOSIS — J45.40 MODERATE PERSISTENT ASTHMA WITHOUT COMPLICATION: ICD-10-CM

## 2022-11-08 LAB
% GRANULOCYTES: 64.8 % (ref 42.2–75.2)
HCT VFR BLD AUTO: 34.8 % (ref 35–46)
HEMOGLOBIN: 12.1 G/DL (ref 11.8–15.5)
LYMPHOCYTES NFR BLD AUTO: 27.9 % (ref 20.5–51.1)
MCH RBC QN AUTO: 28.9 PG (ref 27–31)
MCHC RBC AUTO-ENTMCNC: 34.9 G/DL (ref 33–37)
MCV RBC AUTO: 82.8 FL (ref 80–100)
MONOCYTES NFR BLD AUTO: 7.3 % (ref 1.7–9.3)
PLATELET # BLD AUTO: 297 K/UL (ref 140–450)
RBC # BLD AUTO: 4.21 X10/CMM (ref 3.7–5.2)
WBC # BLD AUTO: 8.1 X10/CMM (ref 3.8–11)

## 2022-11-08 PROCEDURE — 85025 COMPLETE CBC W/AUTO DIFF WBC: CPT | Performed by: FAMILY MEDICINE

## 2022-11-08 PROCEDURE — 99214 OFFICE O/P EST MOD 30 MIN: CPT | Performed by: FAMILY MEDICINE

## 2022-11-08 PROCEDURE — 36415 COLL VENOUS BLD VENIPUNCTURE: CPT | Performed by: FAMILY MEDICINE

## 2022-11-08 RX ORDER — TRAMADOL HYDROCHLORIDE 50 MG/1
TABLET ORAL
COMMUNITY
Start: 2022-10-27 | End: 2023-06-20

## 2022-11-08 RX ORDER — OXYCODONE HYDROCHLORIDE 5 MG/1
5-10 TABLET ORAL EVERY 4 HOURS PRN
Qty: 30 TABLET | Refills: 0 | Status: CANCELLED | OUTPATIENT
Start: 2022-11-08

## 2022-11-09 ENCOUNTER — TRANSFERRED RECORDS (OUTPATIENT)
Dept: FAMILY MEDICINE | Facility: CLINIC | Age: 72
End: 2022-11-09

## 2022-11-15 ENCOUNTER — TRANSFERRED RECORDS (OUTPATIENT)
Dept: FAMILY MEDICINE | Facility: CLINIC | Age: 72
End: 2022-11-15

## 2022-11-28 ENCOUNTER — TRANSFERRED RECORDS (OUTPATIENT)
Dept: FAMILY MEDICINE | Facility: CLINIC | Age: 72
End: 2022-11-28

## 2022-11-28 PROCEDURE — 88305 TISSUE EXAM BY PATHOLOGIST: CPT | Mod: TC,ORL | Performed by: INTERNAL MEDICINE

## 2022-11-29 ENCOUNTER — LAB REQUISITION (OUTPATIENT)
Dept: LAB | Facility: CLINIC | Age: 72
End: 2022-11-29
Payer: MEDICARE

## 2022-11-29 DIAGNOSIS — R13.10 DYSPHAGIA, UNSPECIFIED: ICD-10-CM

## 2022-11-29 DIAGNOSIS — K22.2 ESOPHAGEAL OBSTRUCTION: ICD-10-CM

## 2022-11-29 DIAGNOSIS — K30 FUNCTIONAL DYSPEPSIA: ICD-10-CM

## 2022-11-29 DIAGNOSIS — R10.13 EPIGASTRIC PAIN: ICD-10-CM

## 2022-11-29 DIAGNOSIS — K44.9 DIAPHRAGMATIC HERNIA WITHOUT OBSTRUCTION OR GANGRENE: ICD-10-CM

## 2022-11-30 LAB
PATH REPORT.COMMENTS IMP SPEC: NORMAL
PATH REPORT.COMMENTS IMP SPEC: NORMAL
PATH REPORT.FINAL DX SPEC: NORMAL
PATH REPORT.GROSS SPEC: NORMAL
PATH REPORT.MICROSCOPIC SPEC OTHER STN: NORMAL
PATH REPORT.RELEVANT HX SPEC: NORMAL
PHOTO IMAGE: NORMAL

## 2022-11-30 PROCEDURE — 88305 TISSUE EXAM BY PATHOLOGIST: CPT | Mod: 26 | Performed by: PATHOLOGY

## 2022-12-20 ENCOUNTER — TRANSFERRED RECORDS (OUTPATIENT)
Dept: FAMILY MEDICINE | Facility: CLINIC | Age: 72
End: 2022-12-20

## 2023-01-16 ENCOUNTER — TRANSFERRED RECORDS (OUTPATIENT)
Dept: FAMILY MEDICINE | Facility: CLINIC | Age: 73
End: 2023-01-16

## 2023-01-19 ENCOUNTER — TRANSFERRED RECORDS (OUTPATIENT)
Dept: FAMILY MEDICINE | Facility: CLINIC | Age: 73
End: 2023-01-19

## 2023-01-30 ENCOUNTER — TRANSFERRED RECORDS (OUTPATIENT)
Dept: FAMILY MEDICINE | Facility: CLINIC | Age: 73
End: 2023-01-30

## 2023-03-13 ENCOUNTER — TRANSFERRED RECORDS (OUTPATIENT)
Dept: FAMILY MEDICINE | Facility: CLINIC | Age: 73
End: 2023-03-13

## 2023-03-25 ENCOUNTER — HEALTH MAINTENANCE LETTER (OUTPATIENT)
Age: 73
End: 2023-03-25

## 2023-04-17 DIAGNOSIS — J45.41 REACTIVE AIRWAY DISEASE, MODERATE PERSISTENT, WITH ACUTE EXACERBATION: ICD-10-CM

## 2023-04-17 RX ORDER — ALBUTEROL SULFATE 90 UG/1
2 AEROSOL, METERED RESPIRATORY (INHALATION) EVERY 6 HOURS
Qty: 18 G | Refills: 1 | Status: ON HOLD | OUTPATIENT
Start: 2023-04-17 | End: 2023-11-12

## 2023-04-19 NOTE — ANESTHESIA CARE TRANSFER NOTE
Patient: Macey Romero    Procedure: Procedure(s):  LEFT TOTAL KNEE ARTHROPLASTY       Diagnosis: Degenerative arthritis of left knee [M17.12]  Diagnosis Additional Information: No value filed.    Anesthesia Type:   General     Note:    Oropharynx: oropharynx clear of all foreign objects and spontaneously breathing  Level of Consciousness: drowsy  Oxygen Supplementation: face mask  Level of Supplemental Oxygen (L/min / FiO2): 6  Independent Airway: airway patency satisfactory and stable  Dentition: dentition unchanged  Vital Signs Stable: post-procedure vital signs reviewed and stable  Report to RN Given: handoff report given  Patient transferred to: PACU    Handoff Report: Identifed the Patient, Identified the Reponsible Provider, Reviewed the pertinent medical history, Discussed the surgical course, Reviewed Intra-OP anesthesia mangement and issues during anesthesia, Set expectations for post-procedure period and Allowed opportunity for questions and acknowledgement of understanding      Vitals:  Vitals Value Taken Time   /61 10/17/22 1447   Temp     Pulse 87 10/17/22 1448   Resp 37 10/17/22 1448   SpO2 99 % 10/17/22 1448   Vitals shown include unvalidated device data.    Electronically Signed By: SHAHRAM Davis CRNA  October 17, 2022  2:50 PM   Rhomboid Transposition Flap Text: The defect edges were debeveled with a #15 scalpel blade. Given the location of the defect and the proximity to free margins a rhomboid transposition flap was deemed most appropriate. Using a sterile surgical marker, an appropriate rhomboid flap was drawn incorporating the defect. The area thus outlined was incised deep to adipose tissue with a #15 scalpel blade. The skin margins were undermined to an appropriate distance in all directions utilizing iris scissors. Following this, the designed flap was carried over into the primary defect and sutured into place.

## 2023-05-10 ENCOUNTER — PATIENT OUTREACH (OUTPATIENT)
Dept: CARE COORDINATION | Facility: CLINIC | Age: 73
End: 2023-05-10
Payer: MEDICARE

## 2023-05-10 NOTE — PROGRESS NOTES
[facility]  ACO Reach Program- Proactive Outreach  [facility]/Voicemail    Background: Patient outreach conducted as part of [facility] and [facility] participating in the CMS Medicare program. RN Care Coordinator making proactive outreach to identified rising risk patients.    Outreach attempted x 1.  Left message on patient's voicemail briefly explaining this is a proactive outreach and does not require any specific action or call back. Patient encouraged to call their primary care clinic with any scheduling needs or questions for their provider.    Padmini, my name is Lissa and I am a Registered Nurse with [facility]. I am sorry to have missed you. This is a proactive outreach and does not require any specific action or call back. First attempt: I will try to reach you once more within the next 1-2 business days. Second attempt: If you have any questions related to your health or need to schedule an appointment, please contact your primary care clinic directly. Thank you.    Plan:  Care Coordinator will try to reach patient again in 1-2 business days.    Sania Peters RN  [facility]

## 2023-05-24 NOTE — PROGRESS NOTES
"Called patient. Frustrated with outcome of knee surgery in October. Not needing walker or cane and was told to use either. Has upcoming physicail therapy appointment scheduled for next week. Feels surgeon touched her knee and \"broke something\". Declines taking narcotic pain medication but is using tylenol, ibuprofen, [place] patches, and elevation as needed for knee pain. Does want to see PCP for check up to recheck lab work for cholesterol and A1C. Advised she is overdue for Medicare wellness visit and can discuss with PCP which type of visit is recommended. 24/7 [facility] nurse triage phone number provided to patient.     "

## 2023-05-27 ENCOUNTER — HEALTH MAINTENANCE LETTER (OUTPATIENT)
Age: 73
End: 2023-05-27

## 2023-06-09 ENCOUNTER — TRANSFERRED RECORDS (OUTPATIENT)
Dept: FAMILY MEDICINE | Facility: CLINIC | Age: 73
End: 2023-06-09

## 2023-06-16 NOTE — PROGRESS NOTES
Answers for HPI/ROS submitted by the patient on 6/13/2023  Frequency of checking blood sugars:: not at all  Diabetic concerns:: low blood sugar, several less than 70 in the past few weeks  Paraesthesia present:: numbness in feet, weight gain  How many servings of fruits and vegetables do you eat daily?: 4 or more  On average, how many sweetened beverages do you drink each day (Examples: soda, juice, sweet tea, etc.  Do NOT count diet or artificially sweetened beverages)?: 0  How many minutes a day do you exercise enough to make your heart beat faster?: 9 or less  How many days a week do you exercise enough to make your heart beat faster?: 3 or less  How many days per week do you miss taking your medication?: 0    She feels that knee was doing great until surgeon manipulated the knee aggressively at a 8 week post op visit, has been painful since,  PT made it hurt more!  Feels like it is wrecked. Very disappointed.    Memory seems worse, was at her reunion hard to remember names...  Hard to remember numbers.  Was tested in neuropsych and everything was average.    Did have a tremor... had an emg with Dr. Holt..      Diet controlled diabetes follow up   Problem(s) Oriented visit      ROS:  General and Resp. completed and negative except as noted above     HISTORY:   reports no history of alcohol use.   reports that she quit smoking about 7 years ago. Her smoking use included cigarettes. She has never used smokeless tobacco.    Past Medical History:   Diagnosis Date     Breast CA in situ 1987     Cancer (H) 1987     Cerebral infarction (H)      Chronic constipation      Fibromyalgia      History of blood transfusion      Malignant neoplasm of female breast, unspecified estrogen receptor status, unspecified laterality, unspecified site of breast (H) 06/17/2021     Meningitis      Osteoarthritis 02/01/2011     Osteopenia 02/01/2011     Pneumonia      Pterygium eye 08/26/2013     Reactive airway disease 02/01/2011      Seasonal allergies      Shingles      Skin cancer      Uncomplicated asthma      Past Surgical History:   Procedure Laterality Date     anterior c-disc fusion       ARTHROPLASTY KNEE Left 10/17/2022    Procedure: LEFT TOTAL KNEE ARTHROPLASTY;  Surgeon: Jax Le MD;  Location:  OR     BLEPHAROPLASTY, BROW LIFT BILATERAL, COMBINED Bilateral 6/18/2018    Procedure: COMBINED BLEPHAROPLASTY, BROW LIFT BILATERAL;  BILATERAL UPPER LID BLEPHAROPLASTY; BILATERAL BROW PTOSIS REPAIR;  Surgeon: Сергей Walters MD;  Location:  [facility]     CHOLECYSTECTOMY       COLONOSCOPY N/A 10/19/2017    Procedure: COLONOSCOPY;  COLONOSCOPY;  Surgeon: Niko Wong MD;  Location:  GI     COLONOSCOPY N/A 8/27/2022    Procedure: COLONOSCOPY, WITH POLYPECTOMY AND BIOPSY;  Surgeon: Abraham Rogers MD;  Location:  GI     D & C      Bleeding before hysterectomy     ENDOSCOPY  04/21/08     ESOPHAGOSCOPY, GASTROSCOPY, DUODENOSCOPY (EGD), COMBINED N/A 8/27/2022    Procedure: ESOPHAGOGASTRODUODENOSCOPY, WITH BIOPSY;  Surgeon: Abraham Rogers MD;  Location:  GI     HYSTERECTOMY, MARCUS      Ovaries and tubes out due to breast cancer     JOINT REPLACEMTN, KNEE RT/LT Right 01/2011    Joint Replacement knee RT, with tibial straightening (2 replacements)     MAMMOPLASTY AUGMENTATION BILATERAL      breast ca      MASTECTOMY, SIMPLE RT/LT/CLAIRE Bilateral 1989    Mastectomy Simple RT/LT/CLAIRE     MOHS MICROGRAPHIC PROCEDURE      Left lateral nose     OPEN REDUCTION INTERNAL FIXATION ANKLE  8/27/2013    Procedure: OPEN REDUCTION INTERNAL FIXATION ANKLE;  RIGHT OPEN REDUCTION INTERNAL FIXATION ANKLE WITH LIGAMENT REPAIR;  Surgeon: Nando Chang MD;  Location:  OR     PTERYGIUM WITH CONJUNCTIVAL AUTOLOGOUS TRANSPLANT Left 8/29/2016    Procedure: PTERYGIUM WITH CONJUNCTIVAL AUTOLOGOUS TRANSPLANT;  Surgeon: Сергей Walters MD;  Location:  [facility]     REPAIR LIGAMENT ANKLE  8/27/2013    Procedure: REPAIR LIGAMENT ANKLE;;  Surgeon:  "Nando Chang MD;  Location:  OR     SALIVARY GLAND SURGERY Left     Stone removal      SIGMOIDOSCOPY FLEXIBLE N/A 8/30/2022    Procedure: FLEXIBLE SIGMOIDOSCOPY;  Surgeon: Niko Wong MD;  Location:  GI     TONSILLECTOMY         EXAM:  BP: 146/81[seca average bp reading[   Pulse: 69    Temp: Data Unavailable    Wt Readings from Last 2 Encounters:   06/20/23 69.2 kg (152 lb 9.6 oz)   11/08/22 66.8 kg (147 lb 3.2 oz)       BMI= Body mass index is 26.61 kg/m .    EXAM:  APPEARANCE: = Relaxed and in no distress  Oropharynx = No leukoplakia, No injection to the tissues, Normal Uvula  Resp effort = Calm regular breathing  Digits and Nails = FROM in all finger joints, no nail dystrophy  Foot exam = Monofilament exam is with appropriate sensation  SKIN = absent significant rashes or lesions   Recent/Remote Memory = Alert and Oriented x 3      Assessment/Plan:  Macey was seen today for diabetes and tremors.    Diagnoses and all orders for this visit:    Prediabetes  Reviewed the labs showing mildly elevated glucose levels consistent with prediabetes.     Discussed \"pre-diabetes\", impaired glucose tolerance, and its part in the dysmetabolic syndrome.   No medications needed at this time.     Discussed the inevitable progression of impaired glucose tolerance toward worsening diabetes mellitus if nothing is changed in the diet, and the need for agressive interventions now to delay and prevent this inevitable progression.    Recommended lower carb diet.  Recommended regular physical activity.   We will continue to monitor this and wiley additional recommendations and treatments as indicated based on the labs.        -     Hemoglobin A1C ([facility])    Stage 3a chronic kidney disease (H)  Chronic kidney disease due to HTN and DM.  Check urine for protein.   Patient is not on ACE/ARB.  Patient is allergic to statins  Told the patient to avoid NSAIDs if at all possible.   Will monitor closely and send to Nephrologist if " "renal function continues to decline.      Chronic pain of left knee  May need second opinion but would prefer no more surgery    COUNSELING:   reports that she quit smoking about 7 years ago. Her smoking use included cigarettes. She has never used smokeless tobacco.    Estimated body mass index is 26.61 kg/m  as calculated from the following:    Height as of this encounter: 1.613 m (5' 3.5\").    Weight as of this encounter: 69.2 kg (152 lb 9.6 oz).       Appropriate preventive services were discussed with this patient, including applicable screening as appropriate for cardiovascular disease, diabetes, osteopenia/osteoporosis, and glaucoma.  As appropriate for age/gender, discussed screening for colorectal cancer, prostate cancer, breast cancer, and cervical cancer. Checklist reviewing preventive services available has been given to the patient.    Reviewed patients plan of care and provided an AVS. The Basic Care Plan (routine screening as documented in Health Maintenance) for Macey meets the Care Plan requirement. This Care Plan has been established and reviewed with the  Patient.      The following health maintenance items are reviewed in Epic and correct as of today:  Health Maintenance   Topic Date Due     DTAP/TDAP/TD IMMUNIZATION (1 - Tdap) Never done     MEDICARE ANNUAL WELLNESS VISIT  09/09/2020     ZOSTER IMMUNIZATION (2 of 2) 11/18/2021     FALL RISK ASSESSMENT  06/17/2022     LUNG CANCER SCREENING  12/12/2022     ASTHMA CONTROL TEST  12/20/2022     COVID-19 Vaccine (6 - Moderna series) 01/29/2023     PHQ-9  03/01/2023     A1C  04/10/2023     ASTHMA ACTION PLAN  06/20/2023     BMP  08/30/2023     LIPID  10/10/2023     MICROALBUMIN  10/10/2023     HEMOGLOBIN  11/08/2023     DEXA  11/17/2023     ADVANCE CARE PLANNING  06/25/2026     COLORECTAL CANCER SCREENING  08/27/2032     HEPATITIS C SCREENING  Completed     DEPRESSION ACTION PLAN  Completed     INFLUENZA VACCINE  Completed     Pneumococcal Vaccine: 65+ " Years  Completed     URINALYSIS  Completed     IPV IMMUNIZATION  Aged Out     MENINGITIS IMMUNIZATION  Aged Out     URINE DRUG SCREEN  Discontinued       Long Ferrari  [facility]  For any issues my office # is [phone]

## 2023-06-20 ENCOUNTER — OFFICE VISIT (OUTPATIENT)
Dept: FAMILY MEDICINE | Facility: CLINIC | Age: 73
End: 2023-06-20

## 2023-06-20 VITALS
BODY MASS INDEX: 26.05 KG/M2 | DIASTOLIC BLOOD PRESSURE: 81 MMHG | WEIGHT: 152.6 LBS | HEIGHT: 64 IN | SYSTOLIC BLOOD PRESSURE: 136 MMHG | HEART RATE: 69 BPM | OXYGEN SATURATION: 98 %

## 2023-06-20 DIAGNOSIS — G89.29 CHRONIC PAIN OF LEFT KNEE: ICD-10-CM

## 2023-06-20 DIAGNOSIS — M25.562 CHRONIC PAIN OF LEFT KNEE: ICD-10-CM

## 2023-06-20 DIAGNOSIS — R73.03 PREDIABETES: Primary | ICD-10-CM

## 2023-06-20 DIAGNOSIS — N18.31 STAGE 3A CHRONIC KIDNEY DISEASE (H): ICD-10-CM

## 2023-06-20 PROCEDURE — 36415 COLL VENOUS BLD VENIPUNCTURE: CPT | Performed by: FAMILY MEDICINE

## 2023-06-20 PROCEDURE — 99214 OFFICE O/P EST MOD 30 MIN: CPT | Performed by: FAMILY MEDICINE

## 2023-06-21 LAB — HBA1C MFR BLD: 5.9 % (ref 4.8–5.6)

## 2023-06-26 NOTE — RESULT ENCOUNTER NOTE
Dear Macey,     I am writing to report that your included test results are as expected.    Many labs contain some results that are slightly outside of the normal range, I have reviewed any of these results and they require no changes at this time.    Long Ferrari MD

## 2023-07-03 ENCOUNTER — TRANSFERRED RECORDS (OUTPATIENT)
Dept: FAMILY MEDICINE | Facility: CLINIC | Age: 73
End: 2023-07-03

## 2023-08-14 ENCOUNTER — TRANSFERRED RECORDS (OUTPATIENT)
Dept: FAMILY MEDICINE | Facility: CLINIC | Age: 73
End: 2023-08-14

## 2023-08-24 ENCOUNTER — TRANSFERRED RECORDS (OUTPATIENT)
Dept: FAMILY MEDICINE | Facility: CLINIC | Age: 73
End: 2023-08-24

## 2023-08-28 ENCOUNTER — TRANSFERRED RECORDS (OUTPATIENT)
Dept: FAMILY MEDICINE | Facility: CLINIC | Age: 73
End: 2023-08-28

## 2023-09-07 ENCOUNTER — MEDICAL CORRESPONDENCE (OUTPATIENT)
Dept: HEALTH INFORMATION MANAGEMENT | Facility: CLINIC | Age: 73
End: 2023-09-07
Payer: MEDICARE

## 2023-09-12 ENCOUNTER — TRANSFERRED RECORDS (OUTPATIENT)
Dept: FAMILY MEDICINE | Facility: CLINIC | Age: 73
End: 2023-09-12

## 2023-09-12 ENCOUNTER — OFFICE VISIT (OUTPATIENT)
Dept: FAMILY MEDICINE | Facility: CLINIC | Age: 73
End: 2023-09-12

## 2023-09-12 VITALS
SYSTOLIC BLOOD PRESSURE: 125 MMHG | BODY MASS INDEX: 26.5 KG/M2 | WEIGHT: 152 LBS | TEMPERATURE: 98.4 F | DIASTOLIC BLOOD PRESSURE: 76 MMHG | HEART RATE: 67 BPM | OXYGEN SATURATION: 100 %

## 2023-09-12 DIAGNOSIS — J45.40 MODERATE PERSISTENT ASTHMA WITHOUT COMPLICATION: ICD-10-CM

## 2023-09-12 DIAGNOSIS — M19.042 PRIMARY OSTEOARTHRITIS OF BOTH HANDS: ICD-10-CM

## 2023-09-12 DIAGNOSIS — M25.561 CHRONIC PAIN OF BOTH KNEES: ICD-10-CM

## 2023-09-12 DIAGNOSIS — Z23 NEED FOR VACCINATION: ICD-10-CM

## 2023-09-12 DIAGNOSIS — R73.03 PREDIABETES: ICD-10-CM

## 2023-09-12 DIAGNOSIS — G89.29 CHRONIC PAIN OF BOTH KNEES: ICD-10-CM

## 2023-09-12 DIAGNOSIS — M25.562 CHRONIC PAIN OF BOTH KNEES: ICD-10-CM

## 2023-09-12 DIAGNOSIS — Z85.3 HISTORY OF BREAST CANCER: ICD-10-CM

## 2023-09-12 DIAGNOSIS — M79.7 FIBROMYALGIA: ICD-10-CM

## 2023-09-12 DIAGNOSIS — Z01.818 PREOP GENERAL PHYSICAL EXAM: Primary | ICD-10-CM

## 2023-09-12 DIAGNOSIS — R41.3 MEMORY LOSS: ICD-10-CM

## 2023-09-12 DIAGNOSIS — K58.2 IRRITABLE BOWEL SYNDROME WITH BOTH CONSTIPATION AND DIARRHEA: ICD-10-CM

## 2023-09-12 DIAGNOSIS — M99.51 INTERVERTEBRAL DISC STENOSIS OF NEURAL CANAL OF CERVICAL REGION: ICD-10-CM

## 2023-09-12 DIAGNOSIS — Z90.13 H/O BILATERAL MASTECTOMY: ICD-10-CM

## 2023-09-12 DIAGNOSIS — M19.041 PRIMARY OSTEOARTHRITIS OF BOTH HANDS: ICD-10-CM

## 2023-09-12 LAB
% GRANULOCYTES: 56.3 % (ref 42.2–75.2)
CHOLESTEROL: 197 MG/DL (ref 100–199)
FASTING?: NO
HCT VFR BLD AUTO: 41.4 % (ref 35–46)
HDL (RMG): 37 MG/DL (ref 40–?)
HEMOGLOBIN: 14 G/DL (ref 11.8–15.5)
LDL CALCULATED (RMG): 128 MG/DL (ref 0–130)
LYMPHOCYTES NFR BLD AUTO: 36.2 % (ref 20.5–51.1)
MCH RBC QN AUTO: 28.3 PG (ref 27–31)
MCHC RBC AUTO-ENTMCNC: 33.8 G/DL (ref 33–37)
MCV RBC AUTO: 83.8 FL (ref 80–100)
MONOCYTES NFR BLD AUTO: 7.5 % (ref 1.7–9.3)
PLATELET # BLD AUTO: 220 K/UL (ref 140–450)
RBC # BLD AUTO: 4.94 X10/CMM (ref 3.7–5.2)
TRIGLYCERIDES (RMG): 163 MG/DL (ref 0–149)
WBC # BLD AUTO: 6.7 X10/CMM (ref 3.8–11)

## 2023-09-12 PROCEDURE — G0008 ADMIN INFLUENZA VIRUS VAC: HCPCS | Mod: 59 | Performed by: FAMILY MEDICINE

## 2023-09-12 PROCEDURE — 36415 COLL VENOUS BLD VENIPUNCTURE: CPT | Performed by: FAMILY MEDICINE

## 2023-09-12 PROCEDURE — 85025 COMPLETE CBC W/AUTO DIFF WBC: CPT | Performed by: FAMILY MEDICINE

## 2023-09-12 PROCEDURE — 82044 UR ALBUMIN SEMIQUANTITATIVE: CPT | Performed by: FAMILY MEDICINE

## 2023-09-12 PROCEDURE — 90694 VACC AIIV4 NO PRSRV 0.5ML IM: CPT | Performed by: FAMILY MEDICINE

## 2023-09-12 PROCEDURE — 80061 LIPID PANEL: CPT | Mod: QW | Performed by: FAMILY MEDICINE

## 2023-09-12 PROCEDURE — 99215 OFFICE O/P EST HI 40 MIN: CPT | Mod: 25 | Performed by: FAMILY MEDICINE

## 2023-09-12 PROCEDURE — 93000 ELECTROCARDIOGRAM COMPLETE: CPT | Performed by: FAMILY MEDICINE

## 2023-09-12 PROCEDURE — 80048 BASIC METABOLIC PNL TOTAL CA: CPT | Mod: ORL | Performed by: FAMILY MEDICINE

## 2023-09-12 ASSESSMENT — ANXIETY QUESTIONNAIRES
1. FEELING NERVOUS, ANXIOUS, OR ON EDGE: NOT AT ALL
3. WORRYING TOO MUCH ABOUT DIFFERENT THINGS: SEVERAL DAYS
GAD7 TOTAL SCORE: 1
7. FEELING AFRAID AS IF SOMETHING AWFUL MIGHT HAPPEN: NOT AT ALL
IF YOU CHECKED OFF ANY PROBLEMS ON THIS QUESTIONNAIRE, HOW DIFFICULT HAVE THESE PROBLEMS MADE IT FOR YOU TO DO YOUR WORK, TAKE CARE OF THINGS AT HOME, OR GET ALONG WITH OTHER PEOPLE: NOT DIFFICULT AT ALL
6. BECOMING EASILY ANNOYED OR IRRITABLE: NOT AT ALL
GAD7 TOTAL SCORE: 1
2. NOT BEING ABLE TO STOP OR CONTROL WORRYING: NOT AT ALL
5. BEING SO RESTLESS THAT IT IS HARD TO SIT STILL: NOT AT ALL

## 2023-09-12 ASSESSMENT — PATIENT HEALTH QUESTIONNAIRE - PHQ9
5. POOR APPETITE OR OVEREATING: NOT AT ALL
SUM OF ALL RESPONSES TO PHQ QUESTIONS 1-9: 0

## 2023-09-12 ASSESSMENT — ASTHMA QUESTIONNAIRES: ACT_TOTALSCORE: 23

## 2023-09-12 NOTE — PATIENT INSTRUCTIONS
For informational purposes only. Not to replace the advice of your health care provider. Copyright   2003,  [facility]. All rights reserved. Clinically reviewed by Autumn Valero MD. [employer] [id number] - REV .  Preparing for Your Surgery  Getting started  A nurse will call you to review your health history and instructions. They will give you an arrival time based on your scheduled surgery time. Please be ready to share:  Your doctor's clinic name and phone number  Your medical, surgical, and anesthesia history  A list of allergies and sensitivities  A list of medicines, including herbal treatments and over-the-counter drugs  Whether the patient has a legal guardian (ask how to send us the papers in advance)  Please tell us if you're pregnant--or if there's any chance you might be pregnant. Some surgeries may injure a fetus (unborn baby), so they require a pregnancy test. Surgeries that are safe for a fetus don't always need a test, and you can choose whether to have one.   If you have a child who's having surgery, please ask for a copy of Preparing for Your Child's Surgery.    Preparing for surgery  Within 10 to 30 days of surgery: Have a pre-op exam (sometimes called an H&P, or History and Physical). This can be done at a clinic or pre-operative center.  If you're having a , you may not need this exam. Talk to your care team.  At your pre-op exam, talk to your care team about all medicines you take. If you need to stop any medicines before surgery, ask when to start taking them again.  We do this for your safety. Many medicines can make you bleed too much during surgery. Some change how well surgery (anesthesia) drugs work.  Call your insurance company to let them know you're having surgery. (If you don't have insurance, call [phone].)  Call your clinic if there's any change in your health. This includes signs of a cold or flu (sore throat, runny nose, cough, rash, fever). It also  includes a scrape or scratch near the surgery site.  If you have questions on the day of surgery, call your hospital or surgery center.  Eating and drinking guidelines  For your safety: Unless your surgeon tells you otherwise, follow the guidelines below.  Eat and drink as usual until 8 hours before you arrive for surgery. After that, no food or milk.  Drink clear liquids until 2 hours before you arrive. These are liquids you can see through, like water, Gatorade, and Propel Water. They also include plain black coffee and tea (no cream or milk), candy, and breath mints. You can spit out gum when you arrive.  If you drink alcohol: Stop drinking it the night before surgery.  If your care team tells you to take medicine on the morning of surgery, it's okay to take it with a sip of water.  Preventing infection  Shower or bathe the night before and morning of your surgery. Follow the instructions your clinic gave you. (If no instructions, use regular soap.)  Don't shave or clip hair near your surgery site. We'll remove the hair if needed.  Don't smoke or vape the morning of surgery. You may chew nicotine gum up to 2 hours before surgery. A nicotine patch is okay.  Note: Some surgeries require you to completely quit smoking and nicotine. Check with your surgeon.  Your care team will make every effort to keep you safe from infection. We will:  Clean our hands often with soap and water (or an alcohol-based hand rub).  Clean the skin at your surgery site with a special soap that kills germs.  Give you a special gown to keep you warm. (Cold raises the risk of infection.)  Wear special hair covers, masks, gowns and gloves during surgery.  Give antibiotic medicine, if prescribed. Not all surgeries need antibiotics.  What to bring on the day of surgery  Photo ID and insurance card  Copy of your health care directive, if you have one  Glasses and hearing aids (bring cases)  You can't wear contacts during surgery  Inhaler and eye  drops, if you use them (tell us about these when you arrive)  CPAP machine or breathing device, if you use them  A few personal items, if spending the night  If you have . . .  A pacemaker, ICD (cardiac defibrillator) or other implant: Bring the ID card.  An implanted stimulator: Bring the remote control.  A legal guardian: Bring a copy of the certified (court-stamped) guardianship papers.  Please remove any jewelry, including body piercings. Leave jewelry and other valuables at home.  If you're going home the day of surgery  You must have a responsible adult drive you home. They should stay with you overnight as well.  If you don't have someone to stay with you, and you aren't safe to go home alone, we may keep you overnight. Insurance often won't pay for this.  After surgery  If it's hard to control your pain or you need more pain medicine, please call your surgeon's office.  Questions?   If you have any questions for your care team, list them here: _________________________________________________________________________________________________________________________________________________________________________ ____________________________________ ____________________________________ ____________________________________    How to Take Your Medication Before Surgery  - Take all of your medications before surgery as usual

## 2023-09-12 NOTE — PROGRESS NOTES
[facility] [address] [place] [address]  Phone: [phone]  Fax: [phone]  Primary Provider: Lissy Ferrari  Pre-op Performing Provider: LISSY FERRARI      PREOPERATIVE EVALUATION:  Today's date: 9/12/2023    Macey Romero is a 72 year old female who presents for a preoperative evaluation.    Surgical Information:  Surgery/Procedure: ANTERIOR CERVICAL 5 TO CERVICAL 6 DISCECTOMY AND FUSION   Surgery Location: [facility]  Surgeon: Alex Galindo MD   Surgery Date: 9/20/23  Time of Surgery: 1230  Where patient plans to recover: At home with family  Fax number for surgical facility: Note does not need to be faxed, will be available electronically in Epic.    Assessment & Plan     The proposed surgical procedure is considered INTERMEDIATE risk.    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    - CBC with Diff/Plt ([facility])    Fibromyalgia  Well controlled recently    History of breast cancer  At 37 years old    Moderate persistent asthma without complication  On inhalers and doing well    H/O bilateral mastectomy  With implants     Chronic pain of both knees  Better recently  Irritable bowel syndrome with both constipation and diarrhea  Current meds working well   Basic metabolic panel    Primary osteoarthritis of both hands  Stable     Prediabetes  Last A1c was under 6 in June of 2023  - Microalbumin ([facility])  - EKG 12-lead complete w/read - Clinics  - Lipid Profile ([facility])    Early memory loss  Very minor    Need for vaccination  - INFLUENZA VACCINE 65+ (FLUAD)              - No identified additional risk factors other than previously addressed    Antiplatelet or Anticoagulation Medication Instructions:   - Patient is on no antiplatelet or anticoagulation medications.    Additional Medication Instructions:  Patient is to take all scheduled medications on the day of  surgery    RECOMMENDATION:  APPROVAL GIVEN to proceed with proposed procedure, without further diagnostic evaluation.    Review of prior external note(s) from - [facility]where [facility] from [facility] reviewed        Subjective       HPI related to upcoming procedure:radicular pain from cervical disc that needs repair.        9/7/2023     5:34 PM   Preop Questions   1. Have you ever had a heart attack or stroke? YES - TIA,2005   2. Have you ever had surgery on your heart or blood vessels, such as a stent placement, a coronary artery bypass, or surgery on an artery in your head, neck, heart, or legs? No   3. Do you have chest pain with activity? No   4. Do you have a history of  heart failure? No   5. Do you currently have a cold, bronchitis or symptoms of other infection? No   6. Do you have a cough, shortness of breath, or wheezing? No   7. Do you or anyone in your family have previous history of blood clots? No   8. Do you or does anyone in your family have a serious bleeding problem such as prolonged bleeding following surgeries or cuts? No   9. Have you ever had problems with anemia or been told to take iron pills? No   10. Have you had any abnormal blood loss such as black, tarry or bloody stools, or abnormal vaginal bleeding? No   11. Have you ever had a blood transfusion? YES - 1972   11a. Have you ever had a transfusion reaction? YES - first unit of blood was fine, second unit of blood have chills and fever    12. Are you willing to have a blood transfusion if it is medically needed before, during, or after your surgery? Yes   13. Have you or any of your relatives ever had problems with anesthesia? No   14. Do you have sleep apnea, excessive snoring or daytime drowsiness? No   15. Do you have any artifical heart valves or other implanted medical devices like a pacemaker, defibrillator, or continuous glucose monitor? No   16. Do you have artificial joints? YES - bilateral knees , spacer in spine    17. Are you  allergic to latex? No       Health Care Directive:  Patient does not have a Health Care Directive or Living Will: As is reasonable care for most folks, In the short term, she wants usual aggressive medical care.   No desire for long term prolongation of life through artificial means if no hope to bring back to a reasonable status.      Preoperative Review of :   reviewed - no record of controlled substances prescribed.      Status of Chronic Conditions:  See problem list for active medical problems.  Problems all longstanding and stable, except as noted/documented.  See ROS for pertinent symptoms related to these conditions.    Review of Systems  Constitutional, neuro, ENT, endocrine, pulmonary, cardiac, gastrointestinal, genitourinary, musculoskeletal, integument and psychiatric systems are negative, except as otherwise noted.    Patient Active Problem List    Diagnosis Date Noted    Colitis 08/25/2022     Priority: Medium    Stage 3a chronic kidney disease (H) 06/22/2022     Priority: Medium    Memory loss 10/27/2021     Priority: Medium    Moderate persistent asthma without complication 10/27/2021     Priority: Medium    Prediabetes 10/27/2021     Priority: Medium    Hyperlipidemia LDL goal <130 10/27/2021     Priority: Medium    H/O bilateral mastectomy 06/17/2021     Priority: Medium     with reconstruction      Fibromyalgia      Priority: Medium    Chronic pain of both knees 03/21/2018     Priority: Medium    Pterygium eye 08/26/2013     Priority: Medium    IBS (irritable bowel syndrome) 06/28/2011     Priority: Medium    Keloid of skin 06/28/2011     Priority: Medium    Osteoarthritis 02/01/2011     Priority: Medium    Osteopenia 02/01/2011     Priority: Medium    History of breast cancer 02/01/2011     Priority: Medium     1987        Past Medical History:   Diagnosis Date    Breast CA in situ 1987    Cancer (H) 1987    Right Breast treated with surgery    Cerebral infarction (H)     Chronic  constipation     Fibromyalgia     History of blood transfusion     Malignant neoplasm of female breast, unspecified estrogen receptor status, unspecified laterality, unspecified site of breast (H) 06/17/2021    Meningitis     Several times as an adult    Osteoarthritis 02/01/2011    Osteopenia 02/01/2011    Pneumonia     Pterygium eye 08/26/2013    Reactive airway disease 02/01/2011    Seasonal allergies     Shingles     Prior to 2008 - scalp    Skin cancer     SCC - hand, left nose    Uncomplicated asthma      Past Surgical History:   Procedure Laterality Date    anterior c-disc fusion      ARTHROPLASTY KNEE Left 10/17/2022    Procedure: LEFT TOTAL KNEE ARTHROPLASTY;  Surgeon: Jax Le MD;  Location:  OR    BLEPHAROPLASTY, BROW LIFT BILATERAL, COMBINED Bilateral 6/18/2018    Procedure: COMBINED BLEPHAROPLASTY, BROW LIFT BILATERAL;  BILATERAL UPPER LID BLEPHAROPLASTY; BILATERAL BROW PTOSIS REPAIR;  Surgeon: Сергей Walters MD;  Location:  EC    CHOLECYSTECTOMY      COLONOSCOPY N/A 10/19/2017    Procedure: COLONOSCOPY;  COLONOSCOPY;  Surgeon: Niko Wong MD;  Location:  GI    COLONOSCOPY N/A 8/27/2022    Procedure: COLONOSCOPY, WITH POLYPECTOMY AND BIOPSY;  Surgeon: Abraham Rogers MD;  Location:  GI    D & C      Bleeding before hysterectomy    ENDOSCOPY  04/21/08    ESOPHAGOSCOPY, GASTROSCOPY, DUODENOSCOPY (EGD), COMBINED N/A 8/27/2022    Procedure: ESOPHAGOGASTRODUODENOSCOPY, WITH BIOPSY;  Surgeon: Abraham Rogers MD;  Location:  GI    HYSTERECTOMY, MARCUS      Ovaries and tubes out due to breast cancer    JOINT REPLACEMTN, KNEE RT/LT Right 01/2011    Joint Replacement knee RT, with tibial straightening (2 replacements)    MAMMOPLASTY AUGMENTATION BILATERAL      breast ca     MASTECTOMY, SIMPLE RT/LT/CLAIRE Bilateral 1989    Mastectomy Simple RT/LT/CLAIRE    MOHS MICROGRAPHIC PROCEDURE      Left lateral nose    OPEN REDUCTION INTERNAL FIXATION ANKLE  8/27/2013    Procedure: OPEN  REDUCTION INTERNAL FIXATION ANKLE;  RIGHT OPEN REDUCTION INTERNAL FIXATION ANKLE WITH LIGAMENT REPAIR;  Surgeon: Nando Chang MD;  Location: [facility] OR    PTERYGIUM WITH CONJUNCTIVAL AUTOLOGOUS TRANSPLANT Left 8/29/2016    Procedure: PTERYGIUM WITH CONJUNCTIVAL AUTOLOGOUS TRANSPLANT;  Surgeon: Сергей Walters MD;  Location: [facility]    REPAIR LIGAMENT ANKLE  8/27/2013    Procedure: REPAIR LIGAMENT ANKLE;;  Surgeon: Nando Chang MD;  Location: [facility] OR    SALIVARY GLAND SURGERY Left     Stone removal     SIGMOIDOSCOPY FLEXIBLE N/A 8/30/2022    Procedure: FLEXIBLE SIGMOIDOSCOPY;  Surgeon: Niko Wong MD;  Location:  GI    TONSILLECTOMY       Current Outpatient Medications   Medication Sig Dispense Refill    acetaminophen (TYLENOL) 500 MG tablet Take 2 tablets (1,000 mg) by mouth every 8 hours as needed for pain (mild pain) (Patient taking differently: Take 1,000 mg by mouth every 8 hours as needed for pain (mild pain) Extra strength) 90 tablet 0    albuterol (PROAIR HFA/PROVENTIL HFA/VENTOLIN HFA) 108 (90 Base) MCG/ACT inhaler Inhale 2 puffs into the lungs every 6 hours 18 g 1    budesonide-formoterol (SYMBICORT) 160-4.5 MCG/ACT Inhaler Inhale 2 puffs into the lungs daily 10.2 g 3    hydrocortisone 2.5 % cream Apply topically every morning      linaclotide (LINZESS) 290 MCG capsule Take 1 capsule (290 mcg) by mouth every morning (before breakfast) 90 capsule 1    metroNIDAZOLE (METROCREAM) 0.75 % external cream Apply topically every evening      polyethylene glycol (MIRALAX) 17 g packet Take 17 g by mouth 2 times daily 90 each 1    senna-docusate (SENOKOT-S/PERICOLACE) 8.6-50 MG tablet Take 1-2 tablets by mouth 2 times daily Take while on oral narcotics to prevent or treat constipation. 30 tablet 0       Allergies   Allergen Reactions    Levaquin Hives and Itching    Pcn [Penicillins] Hives    Tetanus Toxoid Anaphylaxis    Tetanus Toxoids Anaphylaxis    Erythromycin GI Disturbance    Duloxetine Nausea      Other reaction(s): Jittery    Silicone Rash        Social History     Tobacco Use    Smoking status: Former     Types: Cigarettes     Quit date: 2016     Years since quittin.7    Smokeless tobacco: Never    Tobacco comments:     quit but  still smokes, but not in house   Substance Use Topics    Alcohol use: No     Alcohol/week: 0.0 standard drinks of alcohol     Family History   Problem Relation Age of Onset    Respiratory Father     Cancer Brother         sarcoidosis    Diabetes Brother     Cerebrovascular Disease Brother     Liver Disease Brother      History   Drug Use No         Objective     /76   Pulse 67   Temp 98.4  F (36.9  C) (Oral)   Wt 68.9 kg (152 lb)   SpO2 100%   BMI 26.50 kg/m      Physical Exam    GENERAL APPEARANCE: healthy, alert and no distress     EYES: EOMI, PERRL     HENT: ear canals and TM's normal and nose and mouth without ulcers or lesions     NECK: no adenopathy, no asymmetry, masses, or scars and thyroid normal to palpation     RESP: lungs clear to auscultation - no rales, rhonchi or wheezes     CV: regular rates and rhythm, normal S1 S2, no S3 or S4 and no murmur, click or rub     ABDOMEN:  soft, nontender, no HSM or masses and bowel sounds normal     MS: extremities normal- no gross deformities noted, no evidence of inflammation in joints, FROM in all extremities.     SKIN: no suspicious lesions or rashes     NEURO: Normal strength and tone, sensory exam grossly normal, mentation intact and speech normal     PSYCH: mentation appears normal. and affect normal/bright     LYMPHATICS: No cervical adenopathy    Recent Labs   Lab Test 23  1131 22  1518 10/18/22  0748 10/18/22  0649 10/10/22  1415 10/10/22  0000 22  0732   HGB  --  12.1 12.4  --   --  14.6 13.7   PLT  --  297  --   --   --  216 196   NA  --   --   --   --  140  --  140   POTASSIUM  --   --   --   --  4.1  --  3.7   CR  --   --   --  0.77 0.92  --  0.81   A1C 5.9*  --   --   --  6.0*   --   --         Diagnostics:  Labs pending at this time.  Results will be reviewed when available.   EKG: appears normal, NSR, normal axis, normal intervals, no acute ST/T changes c/w ischemia, no LVH by voltage criteria, unchanged from previous tracings    Revised Cardiac Risk Index (RCRI):  The patient has the following serious cardiovascular risks for perioperative complications:   - No serious cardiac risks = 0 points     RCRI Interpretation: 0 points: Class I (very low risk - 0.4% complication rate)         Signed Electronically by: Long Ferrari MD  Copy of this evaluation report is provided to requesting physician.

## 2023-09-13 LAB
A/C RATIO (RMG): ABNORMAL
ALBUMIN- RMG: 10 (ref 0–10)
ANION GAP SERPL CALCULATED.3IONS-SCNC: 11 MMOL/L (ref 7–15)
BUN SERPL-MCNC: 14.8 MG/DL (ref 8–23)
CALCIUM SERPL-MCNC: 9.5 MG/DL (ref 8.8–10.2)
CHLORIDE SERPL-SCNC: 102 MMOL/L (ref 98–107)
CREAT SERPL-MCNC: 0.91 MG/DL (ref 0.51–0.95)
DEPRECATED HCO3 PLAS-SCNC: 26 MMOL/L (ref 22–29)
EGFRCR SERPLBLD CKD-EPI 2021: 67 ML/MIN/1.73M2
GLUCOSE SERPL-MCNC: 97 MG/DL (ref 70–99)
INTERPRETATION: ABNORMAL
POTASSIUM SERPL-SCNC: 4.2 MMOL/L (ref 3.4–5.3)
SODIUM SERPL-SCNC: 139 MMOL/L (ref 136–145)
URINE CREATININE - RMG: 10 (ref 0–300)

## 2023-09-18 ENCOUNTER — OFFICE VISIT (OUTPATIENT)
Dept: FAMILY MEDICINE | Facility: CLINIC | Age: 73
End: 2023-09-18

## 2023-09-18 VITALS
SYSTOLIC BLOOD PRESSURE: 147 MMHG | DIASTOLIC BLOOD PRESSURE: 95 MMHG | WEIGHT: 152 LBS | HEART RATE: 78 BPM | BODY MASS INDEX: 26.5 KG/M2 | OXYGEN SATURATION: 100 %

## 2023-09-18 DIAGNOSIS — E55.9 VITAMIN D DEFICIENCY: ICD-10-CM

## 2023-09-18 DIAGNOSIS — D47.2 MONOCLONAL PARAPROTEINEMIA: Primary | ICD-10-CM

## 2023-09-18 PROCEDURE — 99214 OFFICE O/P EST MOD 30 MIN: CPT | Performed by: FAMILY MEDICINE

## 2023-09-18 RX ORDER — DOXYCYCLINE HYCLATE 50 MG/1
1 TABLET, FILM COATED ORAL EVERY EVENING
COMMUNITY
Start: 2023-09-15 | End: 2023-10-24

## 2023-09-18 RX ORDER — CHOLECALCIFEROL (VITAMIN D3) 50 MCG
1 TABLET ORAL DAILY
COMMUNITY
End: 2024-03-13

## 2023-09-18 NOTE — PROGRESS NOTES
Having surgery in two days. Also working with neurology and they did blood work  Immunofixation shows IgA monoclonal protein with kappa light chain  specificity.  Immunofixation detected an M-spike in the beta fraction of the  Protein Electrophoresis; unable to quantify due to the co-migration  with other proteins.  This monoclonal protein is observed in the beta region    From up to date:  A retrospective analysis of 1027 sequential patients diagnosed with MM at a single institution found the following symptoms and signs at presentation [9]:  ?Anemia - 73 percent  ?Bone pain - 58 percent  ?Elevated creatinine - 48 percent  ?Fatigue/generalized weakness - 32 percentWhich is the only feeling she has.  ?Hypercalcemia - 28 percent  ?Weight loss - 24 percent, one-half of whom had lost ?9 kg   Assessment/Plan:  Macey was seen today for results.    Diagnoses and all orders for this visit:    Monoclonal paraproteinemia  -     Adult Oncology/Hematology  Referral - To a [facility] [place] [place] Location (Use POS/Location); Future    Vitamin D deficiency    If she has MM it seems early, her pain from her spine is disabling she is still cleared for surgery  Long Ferrari MD   [facility]  [phone]          Answers submitted by the patient for this visit:  General Questionnaire (Submitted on 9/15/2023)  Chief Complaint: Chronic problems general questions HPI Form  What is the reason for your visit today? : Go over test results.  How many servings of fruits and vegetables do you eat daily?: 4 or more  On average, how many sweetened beverages do you drink each day (Examples: soda, juice, sweet tea, etc.  Do NOT count diet or artificially sweetened beverages)?: 0  How many minutes a day do you exercise enough to make your heart beat faster?: 9 or less  How many days a week do you exercise enough to make your heart beat faster?: 3 or less  How many days per week do you miss taking your  medication?: 2  What makes it hard for you to take your medication every day?: remembering to take

## 2023-09-19 RX ORDER — ERYTHROMYCIN 5 MG/G
OINTMENT OPHTHALMIC EVERY EVENING
COMMUNITY
End: 2023-10-24

## 2023-09-19 NOTE — PROGRESS NOTES
PTA medications updated by Medication Scribe prior to surgery via phone call with patient (last doses completed by Nurse)     Medication history sources: Patient, Surescripts, and H&P  In the past week, patient estimated taking medication this percent of the time: Greater than 90%      Significant changes made to the medication list:  None      Additional medication history information:   Patient was advised to bring: ALBUTEROL & SYMBICORT INHALERS    Medication reconciliation completed by provider prior to medication history? No    Time spent in this activity: 35 minutes    The information provided in this note is only as accurate as the sources available at the time of update(s)      Prior to Admission medications    Medication Sig Last Dose Taking? Auth Provider Long Term End Date   acetaminophen (TYLENOL) 500 MG tablet Take 2 tablets (1,000 mg) by mouth every 8 hours as needed for pain (mild pain) Unknown at PRN Yes Silvia Huff PA-C     albuterol (PROAIR HFA/PROVENTIL HFA/VENTOLIN HFA) 108 (90 Base) MCG/ACT inhaler Inhale 2 puffs into the lungs every 6 hours Unknown at PRN Yes Long Ferrari MD Yes    budesonide-formoterol (SYMBICORT) 160-4.5 MCG/ACT Inhaler Inhale 2 puffs into the lungs daily  at PM Yes Deandra Strong APRN CNP Yes    Doxycycline Hyclate 50 MG TABS Take 1 tablet by mouth every evening  at PM Yes Reported, Patient     erythromycin (ROMYCIN) 5 MG/GM ophthalmic ointment Place into both eyes every evening  at PM Yes Reported, Patient     hydrocortisone 2.5 % cream Apply topically daily as needed Unknown at PRN Yes Unknown, Entered By History     linaclotide (LINZESS) 290 MCG capsule Take 1 capsule (290 mcg) by mouth every morning (before breakfast) 9/19/2023 at AM Yes Nicole Bravo CNP     metroNIDAZOLE (METROCREAM) 0.75 % external cream Apply topically every evening as needed Unknown at PRN Yes Reported, Patient     polyethylene glycol (MIRALAX) 17 g packet Take 17 g by mouth 2  times daily Unknown at PRN Yes Nicole Bravo, CNP     senna-docusate (SENOKOT-S/PERICOLACE) 8.6-50 MG tablet Take 1-2 tablets by mouth 2 times daily Take while on oral narcotics to prevent or treat constipation. Unknown at PRN Yes Silvia Huff PA-C     vitamin D3 (CHOLECALCIFEROL) 50 mcg (2000 units) tablet Take 1 tablet by mouth daily  at PM Yes Reported, Patient

## 2023-09-20 ENCOUNTER — ANESTHESIA EVENT (OUTPATIENT)
Dept: SURGERY | Facility: CLINIC | Age: 73
DRG: 472 | End: 2023-09-20
Payer: MEDICARE

## 2023-09-20 ENCOUNTER — APPOINTMENT (OUTPATIENT)
Dept: GENERAL RADIOLOGY | Facility: CLINIC | Age: 73
DRG: 472 | End: 2023-09-20
Attending: ORTHOPAEDIC SURGERY
Payer: MEDICARE

## 2023-09-20 ENCOUNTER — ANESTHESIA (OUTPATIENT)
Dept: SURGERY | Facility: CLINIC | Age: 73
DRG: 472 | End: 2023-09-20
Payer: MEDICARE

## 2023-09-20 ENCOUNTER — HOSPITAL ENCOUNTER (INPATIENT)
Facility: CLINIC | Age: 73
LOS: 1 days | Discharge: HOME | DRG: 472 | End: 2023-09-23
Attending: ORTHOPAEDIC SURGERY | Admitting: ORTHOPAEDIC SURGERY
Payer: MEDICARE

## 2023-09-20 DIAGNOSIS — R13.10 DYSPHAGIA, UNSPECIFIED TYPE: ICD-10-CM

## 2023-09-20 DIAGNOSIS — Z98.1 S/P CERVICAL SPINAL FUSION: Primary | ICD-10-CM

## 2023-09-20 LAB — GLUCOSE BLDC GLUCOMTR-MCNC: 76 MG/DL (ref 70–99)

## 2023-09-20 PROCEDURE — 710N000009 HC RECOVERY PHASE 1, LEVEL 1, PER MIN: Performed by: ORTHOPAEDIC SURGERY

## 2023-09-20 PROCEDURE — 272N000001 HC OR GENERAL SUPPLY STERILE: Performed by: ORTHOPAEDIC SURGERY

## 2023-09-20 PROCEDURE — 999N000141 HC STATISTIC PRE-PROCEDURE NURSING ASSESSMENT: Performed by: ORTHOPAEDIC SURGERY

## 2023-09-20 PROCEDURE — 250N000013 HC RX MED GY IP 250 OP 250 PS 637: Performed by: ORTHOPAEDIC SURGERY

## 2023-09-20 PROCEDURE — 250N000011 HC RX IP 250 OP 636: Mod: JZ | Performed by: STUDENT IN AN ORGANIZED HEALTH CARE EDUCATION/TRAINING PROGRAM

## 2023-09-20 PROCEDURE — 360N000085 HC SURGERY LEVEL 5 W/ FLUORO, PER MIN: Performed by: ORTHOPAEDIC SURGERY

## 2023-09-20 PROCEDURE — 250N000013 HC RX MED GY IP 250 OP 250 PS 637: Performed by: STUDENT IN AN ORGANIZED HEALTH CARE EDUCATION/TRAINING PROGRAM

## 2023-09-20 PROCEDURE — 258N000003 HC RX IP 258 OP 636: Performed by: STUDENT IN AN ORGANIZED HEALTH CARE EDUCATION/TRAINING PROGRAM

## 2023-09-20 PROCEDURE — 370N000017 HC ANESTHESIA TECHNICAL FEE, PER MIN: Performed by: ORTHOPAEDIC SURGERY

## 2023-09-20 PROCEDURE — 250N000025 HC SEVOFLURANE, PER MIN: Performed by: ORTHOPAEDIC SURGERY

## 2023-09-20 PROCEDURE — 999N000179 XR SURGERY CARM FLUORO LESS THAN 5 MIN W STILLS

## 2023-09-20 PROCEDURE — 0RT30ZZ RESECTION OF CERVICAL VERTEBRAL DISC, OPEN APPROACH: ICD-10-PCS | Performed by: ORTHOPAEDIC SURGERY

## 2023-09-20 PROCEDURE — 250N000011 HC RX IP 250 OP 636: Performed by: NURSE ANESTHETIST, CERTIFIED REGISTERED

## 2023-09-20 PROCEDURE — C1713 ANCHOR/SCREW BN/BN,TIS/BN: HCPCS | Performed by: ORTHOPAEDIC SURGERY

## 2023-09-20 PROCEDURE — 0RG10A0 FUSION OF CERVICAL VERTEBRAL JOINT WITH INTERBODY FUSION DEVICE, ANTERIOR APPROACH, ANTERIOR COLUMN, OPEN APPROACH: ICD-10-PCS | Performed by: ORTHOPAEDIC SURGERY

## 2023-09-20 PROCEDURE — 250N000011 HC RX IP 250 OP 636: Performed by: ORTHOPAEDIC SURGERY

## 2023-09-20 PROCEDURE — 250N000009 HC RX 250: Performed by: ORTHOPAEDIC SURGERY

## 2023-09-20 PROCEDURE — 0PP304Z REMOVAL OF INTERNAL FIXATION DEVICE FROM CERVICAL VERTEBRA, OPEN APPROACH: ICD-10-PCS | Performed by: ORTHOPAEDIC SURGERY

## 2023-09-20 PROCEDURE — 82962 GLUCOSE BLOOD TEST: CPT

## 2023-09-20 PROCEDURE — 250N000009 HC RX 250: Performed by: NURSE ANESTHETIST, CERTIFIED REGISTERED

## 2023-09-20 PROCEDURE — 01N10ZZ RELEASE CERVICAL NERVE, OPEN APPROACH: ICD-10-PCS | Performed by: ORTHOPAEDIC SURGERY

## 2023-09-20 PROCEDURE — 250N000009 HC RX 250: Performed by: STUDENT IN AN ORGANIZED HEALTH CARE EDUCATION/TRAINING PROGRAM

## 2023-09-20 PROCEDURE — 250N000011 HC RX IP 250 OP 636: Performed by: STUDENT IN AN ORGANIZED HEALTH CARE EDUCATION/TRAINING PROGRAM

## 2023-09-20 PROCEDURE — 258N000001 HC RX 258: Performed by: STUDENT IN AN ORGANIZED HEALTH CARE EDUCATION/TRAINING PROGRAM

## 2023-09-20 DEVICE — IMPLANTABLE DEVICE: Type: IMPLANTABLE DEVICE | Site: SPINE CERVICAL | Status: FUNCTIONAL

## 2023-09-20 DEVICE — MAGNETOS EASYPACK PUTTY 1.5CC 1-2MM USA
Type: IMPLANTABLE DEVICE | Site: SPINE CERVICAL | Status: FUNCTIONAL
Brand: MAGNETOS

## 2023-09-20 RX ORDER — FENTANYL CITRATE 50 UG/ML
50 INJECTION, SOLUTION INTRAMUSCULAR; INTRAVENOUS EVERY 5 MIN PRN
Status: DISCONTINUED | OUTPATIENT
Start: 2023-09-20 | End: 2023-09-20 | Stop reason: HOSPADM

## 2023-09-20 RX ORDER — FENTANYL CITRATE 50 UG/ML
25 INJECTION, SOLUTION INTRAMUSCULAR; INTRAVENOUS EVERY 5 MIN PRN
Status: DISCONTINUED | OUTPATIENT
Start: 2023-09-20 | End: 2023-09-20 | Stop reason: HOSPADM

## 2023-09-20 RX ORDER — HYDROMORPHONE HCL IN WATER/PF 6 MG/30 ML
0.2 PATIENT CONTROLLED ANALGESIA SYRINGE INTRAVENOUS EVERY 5 MIN PRN
Status: DISCONTINUED | OUTPATIENT
Start: 2023-09-20 | End: 2023-09-20 | Stop reason: HOSPADM

## 2023-09-20 RX ORDER — NEOSTIGMINE METHYLSULFATE 1 MG/ML
VIAL (ML) INJECTION PRN
Status: DISCONTINUED | OUTPATIENT
Start: 2023-09-20 | End: 2023-09-20

## 2023-09-20 RX ORDER — CEFAZOLIN SODIUM/WATER 2 G/20 ML
2 SYRINGE (ML) INTRAVENOUS SEE ADMIN INSTRUCTIONS
Status: DISCONTINUED | OUTPATIENT
Start: 2023-09-20 | End: 2023-09-20 | Stop reason: HOSPADM

## 2023-09-20 RX ORDER — NALOXONE HYDROCHLORIDE 0.4 MG/ML
0.2 INJECTION, SOLUTION INTRAMUSCULAR; INTRAVENOUS; SUBCUTANEOUS
Status: DISCONTINUED | OUTPATIENT
Start: 2023-09-20 | End: 2023-09-23 | Stop reason: HOSPADM

## 2023-09-20 RX ORDER — ONDANSETRON 4 MG/1
4 TABLET, ORALLY DISINTEGRATING ORAL EVERY 30 MIN PRN
Status: DISCONTINUED | OUTPATIENT
Start: 2023-09-20 | End: 2023-09-20 | Stop reason: HOSPADM

## 2023-09-20 RX ORDER — HYDROMORPHONE HCL IN WATER/PF 6 MG/30 ML
0.2 PATIENT CONTROLLED ANALGESIA SYRINGE INTRAVENOUS
Status: DISCONTINUED | OUTPATIENT
Start: 2023-09-20 | End: 2023-09-23 | Stop reason: HOSPADM

## 2023-09-20 RX ORDER — SODIUM CHLORIDE, SODIUM LACTATE, POTASSIUM CHLORIDE, CALCIUM CHLORIDE 600; 310; 30; 20 MG/100ML; MG/100ML; MG/100ML; MG/100ML
INJECTION, SOLUTION INTRAVENOUS CONTINUOUS
Status: DISCONTINUED | OUTPATIENT
Start: 2023-09-20 | End: 2023-09-20 | Stop reason: HOSPADM

## 2023-09-20 RX ORDER — OXYCODONE HYDROCHLORIDE 5 MG/1
5 TABLET ORAL EVERY 4 HOURS PRN
Status: DISCONTINUED | OUTPATIENT
Start: 2023-09-20 | End: 2023-09-23 | Stop reason: HOSPADM

## 2023-09-20 RX ORDER — POLYETHYLENE GLYCOL 3350 17 G/17G
17 POWDER, FOR SOLUTION ORAL DAILY
Status: DISCONTINUED | OUTPATIENT
Start: 2023-09-21 | End: 2023-09-23 | Stop reason: HOSPADM

## 2023-09-20 RX ORDER — GABAPENTIN 100 MG/1
100 CAPSULE ORAL
Status: COMPLETED | OUTPATIENT
Start: 2023-09-20 | End: 2023-09-20

## 2023-09-20 RX ORDER — HYDROMORPHONE HYDROCHLORIDE 1 MG/ML
INJECTION, SOLUTION INTRAMUSCULAR; INTRAVENOUS; SUBCUTANEOUS PRN
Status: DISCONTINUED | OUTPATIENT
Start: 2023-09-20 | End: 2023-09-20

## 2023-09-20 RX ORDER — GLYCOPYRROLATE 0.2 MG/ML
INJECTION, SOLUTION INTRAMUSCULAR; INTRAVENOUS PRN
Status: DISCONTINUED | OUTPATIENT
Start: 2023-09-20 | End: 2023-09-20

## 2023-09-20 RX ORDER — ACETAMINOPHEN 325 MG/1
975 TABLET ORAL EVERY 8 HOURS
Status: COMPLETED | OUTPATIENT
Start: 2023-09-20 | End: 2023-09-23

## 2023-09-20 RX ORDER — DEXTROSE MONOHYDRATE 25 G/50ML
25 INJECTION, SOLUTION INTRAVENOUS
Status: COMPLETED | OUTPATIENT
Start: 2023-09-20 | End: 2023-09-20

## 2023-09-20 RX ORDER — HYDROMORPHONE HCL IN WATER/PF 6 MG/30 ML
0.4 PATIENT CONTROLLED ANALGESIA SYRINGE INTRAVENOUS EVERY 5 MIN PRN
Status: DISCONTINUED | OUTPATIENT
Start: 2023-09-20 | End: 2023-09-20 | Stop reason: HOSPADM

## 2023-09-20 RX ORDER — FENTANYL CITRATE 50 UG/ML
INJECTION, SOLUTION INTRAMUSCULAR; INTRAVENOUS PRN
Status: DISCONTINUED | OUTPATIENT
Start: 2023-09-20 | End: 2023-09-20

## 2023-09-20 RX ORDER — NALOXONE HYDROCHLORIDE 0.4 MG/ML
0.4 INJECTION, SOLUTION INTRAMUSCULAR; INTRAVENOUS; SUBCUTANEOUS
Status: DISCONTINUED | OUTPATIENT
Start: 2023-09-20 | End: 2023-09-23 | Stop reason: HOSPADM

## 2023-09-20 RX ORDER — MAGNESIUM HYDROXIDE/ALUMINUM HYDROXICE/SIMETHICONE 120; 1200; 1200 MG/30ML; MG/30ML; MG/30ML
30 SUSPENSION ORAL EVERY 4 HOURS PRN
Status: CANCELLED | OUTPATIENT
Start: 2023-09-20

## 2023-09-20 RX ORDER — METHOCARBAMOL 500 MG/1
500 TABLET, FILM COATED ORAL EVERY 6 HOURS PRN
Status: DISCONTINUED | OUTPATIENT
Start: 2023-09-20 | End: 2023-09-23 | Stop reason: HOSPADM

## 2023-09-20 RX ORDER — LIDOCAINE HYDROCHLORIDE 20 MG/ML
INJECTION, SOLUTION INFILTRATION; PERINEURAL PRN
Status: DISCONTINUED | OUTPATIENT
Start: 2023-09-20 | End: 2023-09-20

## 2023-09-20 RX ORDER — ONDANSETRON 2 MG/ML
4 INJECTION INTRAMUSCULAR; INTRAVENOUS EVERY 30 MIN PRN
Status: DISCONTINUED | OUTPATIENT
Start: 2023-09-20 | End: 2023-09-20 | Stop reason: HOSPADM

## 2023-09-20 RX ORDER — AMOXICILLIN 250 MG
1 CAPSULE ORAL 2 TIMES DAILY
Status: DISCONTINUED | OUTPATIENT
Start: 2023-09-20 | End: 2023-09-23 | Stop reason: HOSPADM

## 2023-09-20 RX ORDER — DEXAMETHASONE SODIUM PHOSPHATE 4 MG/ML
4 INJECTION, SOLUTION INTRA-ARTICULAR; INTRALESIONAL; INTRAMUSCULAR; INTRAVENOUS; SOFT TISSUE EVERY 6 HOURS
Status: COMPLETED | OUTPATIENT
Start: 2023-09-20 | End: 2023-09-21

## 2023-09-20 RX ORDER — BISACODYL 10 MG
10 SUPPOSITORY, RECTAL RECTAL DAILY PRN
Status: DISCONTINUED | OUTPATIENT
Start: 2023-09-20 | End: 2023-09-23 | Stop reason: HOSPADM

## 2023-09-20 RX ORDER — SODIUM CHLORIDE 9 MG/ML
INJECTION, SOLUTION INTRAVENOUS CONTINUOUS
Status: DISCONTINUED | OUTPATIENT
Start: 2023-09-20 | End: 2023-09-23 | Stop reason: HOSPADM

## 2023-09-20 RX ORDER — MAGNESIUM HYDROXIDE 1200 MG/15ML
LIQUID ORAL PRN
Status: DISCONTINUED | OUTPATIENT
Start: 2023-09-20 | End: 2023-09-20 | Stop reason: HOSPADM

## 2023-09-20 RX ORDER — OXYCODONE HYDROCHLORIDE 5 MG/1
10 TABLET ORAL EVERY 4 HOURS PRN
Status: DISCONTINUED | OUTPATIENT
Start: 2023-09-20 | End: 2023-09-23 | Stop reason: HOSPADM

## 2023-09-20 RX ORDER — LIDOCAINE 40 MG/G
CREAM TOPICAL
Status: DISCONTINUED | OUTPATIENT
Start: 2023-09-20 | End: 2023-09-23 | Stop reason: HOSPADM

## 2023-09-20 RX ORDER — ERYTHROMYCIN 5 MG/G
OINTMENT OPHTHALMIC EVERY EVENING
Status: DISCONTINUED | OUTPATIENT
Start: 2023-09-20 | End: 2023-09-23 | Stop reason: HOSPADM

## 2023-09-20 RX ORDER — ACETAMINOPHEN 325 MG/1
650 TABLET ORAL EVERY 4 HOURS PRN
Status: DISCONTINUED | OUTPATIENT
Start: 2023-09-23 | End: 2023-09-23 | Stop reason: HOSPADM

## 2023-09-20 RX ORDER — HYDROMORPHONE HCL IN WATER/PF 6 MG/30 ML
0.4 PATIENT CONTROLLED ANALGESIA SYRINGE INTRAVENOUS
Status: DISCONTINUED | OUTPATIENT
Start: 2023-09-20 | End: 2023-09-23 | Stop reason: HOSPADM

## 2023-09-20 RX ORDER — SODIUM CHLORIDE, SODIUM LACTATE, POTASSIUM CHLORIDE, CALCIUM CHLORIDE 600; 310; 30; 20 MG/100ML; MG/100ML; MG/100ML; MG/100ML
INJECTION, SOLUTION INTRAVENOUS CONTINUOUS PRN
Status: DISCONTINUED | OUTPATIENT
Start: 2023-09-20 | End: 2023-09-20

## 2023-09-20 RX ORDER — CEFAZOLIN SODIUM/WATER 2 G/20 ML
2 SYRINGE (ML) INTRAVENOUS
Status: COMPLETED | OUTPATIENT
Start: 2023-09-20 | End: 2023-09-20

## 2023-09-20 RX ORDER — DEXAMETHASONE SODIUM PHOSPHATE 10 MG/ML
10 INJECTION, SOLUTION INTRAMUSCULAR; INTRAVENOUS ONCE
Status: DISCONTINUED | OUTPATIENT
Start: 2023-09-20 | End: 2023-09-20 | Stop reason: HOSPADM

## 2023-09-20 RX ORDER — PROCHLORPERAZINE MALEATE 5 MG
5 TABLET ORAL EVERY 6 HOURS PRN
Status: DISCONTINUED | OUTPATIENT
Start: 2023-09-20 | End: 2023-09-23 | Stop reason: HOSPADM

## 2023-09-20 RX ORDER — ONDANSETRON 2 MG/ML
INJECTION INTRAMUSCULAR; INTRAVENOUS PRN
Status: DISCONTINUED | OUTPATIENT
Start: 2023-09-20 | End: 2023-09-20

## 2023-09-20 RX ORDER — ONDANSETRON 4 MG/1
4 TABLET, ORALLY DISINTEGRATING ORAL EVERY 6 HOURS PRN
Status: DISCONTINUED | OUTPATIENT
Start: 2023-09-20 | End: 2023-09-23 | Stop reason: HOSPADM

## 2023-09-20 RX ORDER — ONDANSETRON 2 MG/ML
4 INJECTION INTRAMUSCULAR; INTRAVENOUS EVERY 6 HOURS PRN
Status: DISCONTINUED | OUTPATIENT
Start: 2023-09-20 | End: 2023-09-23 | Stop reason: HOSPADM

## 2023-09-20 RX ORDER — HYDROXYZINE HYDROCHLORIDE 10 MG/1
10 TABLET, FILM COATED ORAL EVERY 6 HOURS PRN
Status: DISCONTINUED | OUTPATIENT
Start: 2023-09-20 | End: 2023-09-23 | Stop reason: HOSPADM

## 2023-09-20 RX ORDER — PROPOFOL 10 MG/ML
INJECTION, EMULSION INTRAVENOUS PRN
Status: DISCONTINUED | OUTPATIENT
Start: 2023-09-20 | End: 2023-09-20

## 2023-09-20 RX ORDER — CEFAZOLIN SODIUM 1 G/3ML
1 INJECTION, POWDER, FOR SOLUTION INTRAMUSCULAR; INTRAVENOUS EVERY 8 HOURS
Status: COMPLETED | OUTPATIENT
Start: 2023-09-20 | End: 2023-09-21

## 2023-09-20 RX ORDER — DOXYCYCLINE HYCLATE 50 MG/1
50 CAPSULE ORAL EVERY EVENING
Status: DISCONTINUED | OUTPATIENT
Start: 2023-09-20 | End: 2023-09-23 | Stop reason: HOSPADM

## 2023-09-20 RX ORDER — DEXAMETHASONE SODIUM PHOSPHATE 4 MG/ML
INJECTION, SOLUTION INTRA-ARTICULAR; INTRALESIONAL; INTRAMUSCULAR; INTRAVENOUS; SOFT TISSUE PRN
Status: DISCONTINUED | OUTPATIENT
Start: 2023-09-20 | End: 2023-09-20

## 2023-09-20 RX ADMIN — ACETAMINOPHEN 975 MG: 325 TABLET, FILM COATED ORAL at 20:22

## 2023-09-20 RX ADMIN — FENTANYL CITRATE 100 MCG: 50 INJECTION, SOLUTION INTRAMUSCULAR; INTRAVENOUS at 13:38

## 2023-09-20 RX ADMIN — GABAPENTIN 100 MG: 100 CAPSULE ORAL at 13:05

## 2023-09-20 RX ADMIN — GLYCOPYRROLATE 0.8 MG: 0.2 INJECTION, SOLUTION INTRAMUSCULAR; INTRAVENOUS at 15:48

## 2023-09-20 RX ADMIN — DEXAMETHASONE SODIUM PHOSPHATE 4 MG: 4 INJECTION, SOLUTION INTRA-ARTICULAR; INTRALESIONAL; INTRAMUSCULAR; INTRAVENOUS; SOFT TISSUE at 13:38

## 2023-09-20 RX ADMIN — ROCURONIUM BROMIDE 10 MG: 50 INJECTION, SOLUTION INTRAVENOUS at 14:36

## 2023-09-20 RX ADMIN — ONDANSETRON 4 MG: 2 INJECTION INTRAMUSCULAR; INTRAVENOUS at 13:38

## 2023-09-20 RX ADMIN — FENTANYL CITRATE 25 MCG: 50 INJECTION, SOLUTION INTRAMUSCULAR; INTRAVENOUS at 16:32

## 2023-09-20 RX ADMIN — DEXTROSE MONOHYDRATE 25 ML: 25 INJECTION, SOLUTION INTRAVENOUS at 12:57

## 2023-09-20 RX ADMIN — METHOCARBAMOL 500 MG: 500 TABLET ORAL at 20:16

## 2023-09-20 RX ADMIN — Medication 2 G: at 13:52

## 2023-09-20 RX ADMIN — HYDROMORPHONE HYDROCHLORIDE 0.4 MG: 0.2 INJECTION, SOLUTION INTRAMUSCULAR; INTRAVENOUS; SUBCUTANEOUS at 21:49

## 2023-09-20 RX ADMIN — CEFAZOLIN 1 G: 1 INJECTION, POWDER, FOR SOLUTION INTRAMUSCULAR; INTRAVENOUS at 21:49

## 2023-09-20 RX ADMIN — HYDROMORPHONE HYDROCHLORIDE 0.2 MG: 0.2 INJECTION, SOLUTION INTRAMUSCULAR; INTRAVENOUS; SUBCUTANEOUS at 17:07

## 2023-09-20 RX ADMIN — LIDOCAINE HYDROCHLORIDE 100 MG: 20 INJECTION, SOLUTION INFILTRATION; PERINEURAL at 13:38

## 2023-09-20 RX ADMIN — SODIUM CHLORIDE: 9 INJECTION, SOLUTION INTRAVENOUS at 18:50

## 2023-09-20 RX ADMIN — SODIUM CHLORIDE, POTASSIUM CHLORIDE, SODIUM LACTATE AND CALCIUM CHLORIDE: 600; 310; 30; 20 INJECTION, SOLUTION INTRAVENOUS at 15:12

## 2023-09-20 RX ADMIN — SENNOSIDES AND DOCUSATE SODIUM 1 TABLET: 50; 8.6 TABLET ORAL at 20:16

## 2023-09-20 RX ADMIN — PHENYLEPHRINE HYDROCHLORIDE 100 MCG: 10 INJECTION INTRAVENOUS at 13:38

## 2023-09-20 RX ADMIN — ERYTHROMYCIN: 5 OINTMENT OPHTHALMIC at 21:29

## 2023-09-20 RX ADMIN — ROCURONIUM BROMIDE 50 MG: 50 INJECTION, SOLUTION INTRAVENOUS at 13:38

## 2023-09-20 RX ADMIN — HYDROMORPHONE HYDROCHLORIDE 0.2 MG: 0.2 INJECTION, SOLUTION INTRAMUSCULAR; INTRAVENOUS; SUBCUTANEOUS at 16:53

## 2023-09-20 RX ADMIN — PHENYLEPHRINE HYDROCHLORIDE 100 MCG: 10 INJECTION INTRAVENOUS at 14:38

## 2023-09-20 RX ADMIN — HYDROMORPHONE HYDROCHLORIDE 0.4 MG: 0.2 INJECTION, SOLUTION INTRAMUSCULAR; INTRAVENOUS; SUBCUTANEOUS at 18:53

## 2023-09-20 RX ADMIN — PROPOFOL 150 MG: 10 INJECTION, EMULSION INTRAVENOUS at 13:38

## 2023-09-20 RX ADMIN — DEXAMETHASONE SODIUM PHOSPHATE 4 MG: 4 INJECTION, SOLUTION INTRAMUSCULAR; INTRAVENOUS at 18:55

## 2023-09-20 RX ADMIN — DEXMEDETOMIDINE HYDROCHLORIDE 8 MCG: 100 INJECTION, SOLUTION INTRAVENOUS at 14:24

## 2023-09-20 RX ADMIN — PHENYLEPHRINE HYDROCHLORIDE 100 MCG: 10 INJECTION INTRAVENOUS at 15:06

## 2023-09-20 RX ADMIN — NEOSTIGMINE METHYLSULFATE 5 MG: 1 INJECTION, SOLUTION INTRAVENOUS at 15:48

## 2023-09-20 RX ADMIN — DEXMEDETOMIDINE HYDROCHLORIDE 12 MCG: 100 INJECTION, SOLUTION INTRAVENOUS at 14:18

## 2023-09-20 RX ADMIN — DOXYCYCLINE HYCLATE 50 MG: 50 CAPSULE ORAL at 21:28

## 2023-09-20 RX ADMIN — FENTANYL CITRATE 25 MCG: 50 INJECTION, SOLUTION INTRAMUSCULAR; INTRAVENOUS at 16:27

## 2023-09-20 RX ADMIN — HYDROMORPHONE HYDROCHLORIDE 0.4 MG: 0.2 INJECTION, SOLUTION INTRAMUSCULAR; INTRAVENOUS; SUBCUTANEOUS at 17:23

## 2023-09-20 RX ADMIN — SODIUM CHLORIDE, POTASSIUM CHLORIDE, SODIUM LACTATE AND CALCIUM CHLORIDE: 600; 310; 30; 20 INJECTION, SOLUTION INTRAVENOUS at 13:33

## 2023-09-20 RX ADMIN — HYDROMORPHONE HYDROCHLORIDE 0.5 MG: 1 INJECTION, SOLUTION INTRAMUSCULAR; INTRAVENOUS; SUBCUTANEOUS at 14:24

## 2023-09-20 ASSESSMENT — ACTIVITIES OF DAILY LIVING (ADL)
ADLS_ACUITY_SCORE: 37
ADLS_ACUITY_SCORE: 37
ADLS_ACUITY_SCORE: 33
ADLS_ACUITY_SCORE: 37
ADLS_ACUITY_SCORE: 33

## 2023-09-20 NOTE — ANESTHESIA CARE TRANSFER NOTE
Patient: Macey Romero    Procedure: Procedure(s):  ANTERIOR CERVICAL 5 TO CERVICAL 6 DISCECTOMY AND FUSION       Diagnosis: Cervical myelopathy (H) [G95.9]  Diagnosis Additional Information: No value filed.    Anesthesia Type:   General     Note:    Oropharynx: oropharynx clear of all foreign objects  Level of Consciousness: awake  Oxygen Supplementation: face mask  Level of Supplemental Oxygen (L/min / FiO2): 6  Independent Airway: airway patency satisfactory and stable  Dentition: dentition unchanged  Vital Signs Stable: post-procedure vital signs reviewed and stable  Report to RN Given: handoff report given  Patient transferred to: PACU    Handoff Report: Identifed the Patient, Identified the Reponsible Provider, Reviewed the pertinent medical history, Discussed the surgical course, Reviewed Intra-OP anesthesia mangement and issues during anesthesia, Set expectations for post-procedure period and Allowed opportunity for questions and acknowledgement of understanding  Vitals:  Vitals Value Taken Time   BP     Temp     Pulse     Resp     SpO2         Electronically Signed By: SHAHRAM Morfin CRNA  September 20, 2023  3:58 PM

## 2023-09-20 NOTE — BRIEF OP NOTE
[facility]    Brief Operative Note    Pre-operative diagnosis: Cervical myelopathy (H) [G95.9]  Post-operative diagnosis Same as pre-operative diagnosis    Procedure: Procedure(s):  ANTERIOR CERVICAL 5 TO CERVICAL 6 DISCECTOMY AND FUSION  Surgeon: Surgeon(s) and Role:     * Alex Galindo MD - Primary     * Sheeba Blankenship PA-C - Assisting  Anesthesia: General   Estimated Blood Loss: 40 cc    Drains: [place]  Specimens: * No specimens in log *  Findings:   Poor bone quality consistent with osteoporosis .  Complications: None.  Implants:   Implant Name Type Inv. Item Serial No.  Lot No. LRB No. Used Action   BONE GRAFT PUTTY MAGNETOS EASYPACK PUTTY 1.5CC [id number] - [id number]  BONE GRAFT PUTTY MAGNETOS EASYPACK PUTTY 1.5CC [id number]  [employer]   1 Implanted   NUVASIVE COHERE CERVICAL IMPALNT, [id number], 7 DEGREE Metallic Hardware/[place]  N/A NUVASIVE [id number]  1 Implanted   ACP 1.6V PLATE, 22MM, 1 LEVEL Metallic Hardware/[place]   NUVASIVE   1 Implanted   ACP SCREW, 3.5MM X 15MM Metallic Hardware/[place]   NUVASIVE   1 Implanted

## 2023-09-20 NOTE — ANESTHESIA PREPROCEDURE EVALUATION
Anesthesia Pre-Procedure Evaluation    Patient: Macey Romero   MRN: [id number] : 1950        Procedure : Procedure(s):  ANTERIOR CERVICAL 5 TO CERVICAL 6 DISCECTOMY AND FUSION          Past Medical History:   Diagnosis Date    Breast CA in situ     Cancer (H)     Right Breast treated with surgery    Cerebral infarction (H)     Chronic constipation     Fibromyalgia     History of blood transfusion     Malignant neoplasm of female breast, unspecified estrogen receptor status, unspecified laterality, unspecified site of breast (H) 2021    Meningitis     Several times as an adult    Osteoarthritis 2011    Osteopenia 2011    Pneumonia     Pterygium eye 2013    Reactive airway disease 2011    Seasonal allergies     Shingles     Prior to  - scalp    Skin cancer     SCC - hand, left nose    Uncomplicated asthma       Past Surgical History:   Procedure Laterality Date    anterior c-disc fusion      ARTHROPLASTY KNEE Left 10/17/2022    Procedure: LEFT TOTAL KNEE ARTHROPLASTY;  Surgeon: Jax Le MD;  Location:  OR    BLEPHAROPLASTY, BROW LIFT BILATERAL, COMBINED Bilateral 2018    Procedure: COMBINED BLEPHAROPLASTY, BROW LIFT BILATERAL;  BILATERAL UPPER LID BLEPHAROPLASTY; BILATERAL BROW PTOSIS REPAIR;  Surgeon: Сергей Walters MD;  Location:  [facility]    CHOLECYSTECTOMY      COLONOSCOPY N/A 10/19/2017    Procedure: COLONOSCOPY;  COLONOSCOPY;  Surgeon: Niko Wong MD;  Location:  GI    COLONOSCOPY N/A 2022    Procedure: COLONOSCOPY, WITH POLYPECTOMY AND BIOPSY;  Surgeon: Abraham Rogers MD;  Location:  GI    D & C      Bleeding before hysterectomy    ENDOSCOPY  08    ESOPHAGOSCOPY, GASTROSCOPY, DUODENOSCOPY (EGD), COMBINED N/A 2022    Procedure: ESOPHAGOGASTRODUODENOSCOPY, WITH BIOPSY;  Surgeon: Abraham Rogers MD;  Location:  GI    HYSTERECTOMY, MARCUS      Ovaries and tubes out due to breast cancer    JOINT REPLACEMTN,  KNEE RT/LT Right 2011    Joint Replacement knee RT, with tibial straightening (2 replacements)    MAMMOPLASTY AUGMENTATION BILATERAL      breast ca     MASTECTOMY, SIMPLE RT/LT/CLAIRE Bilateral 1989    Mastectomy Simple RT/LT/CLAIRE    MOHS MICROGRAPHIC PROCEDURE      Left lateral nose    OPEN REDUCTION INTERNAL FIXATION ANKLE  2013    Procedure: OPEN REDUCTION INTERNAL FIXATION ANKLE;  RIGHT OPEN REDUCTION INTERNAL FIXATION ANKLE WITH LIGAMENT REPAIR;  Surgeon: Nando Chang MD;  Location: [facility]    PTERYGIUM WITH CONJUNCTIVAL AUTOLOGOUS TRANSPLANT Left 2016    Procedure: PTERYGIUM WITH CONJUNCTIVAL AUTOLOGOUS TRANSPLANT;  Surgeon: Сергей Walters MD;  Location:  [facility]    REPAIR LIGAMENT ANKLE  2013    Procedure: REPAIR LIGAMENT ANKLE;;  Surgeon: Nando Chang MD;  Location: [facility]    SALIVARY GLAND SURGERY Left     Stone removal     SIGMOIDOSCOPY FLEXIBLE N/A 2022    Procedure: FLEXIBLE SIGMOIDOSCOPY;  Surgeon: Niko Wong MD;  Location:  GI    TONSILLECTOMY        Allergies   Allergen Reactions    Levaquin Hives and Itching    Pcn [Penicillins] Hives    Tetanus Toxoid Anaphylaxis    Tetanus Toxoids Anaphylaxis    Erythromycin GI Disturbance    Duloxetine Nausea     Other reaction(s): Jittery    Silicone Rash      Social History     Tobacco Use    Smoking status: Some Days     Types: Cigarettes     Last attempt to quit: 2016     Years since quittin.7    Smokeless tobacco: Never    Tobacco comments:     quit but  still smokes, but not in house   Substance Use Topics    Alcohol use: No     Alcohol/week: 0.0 standard drinks of alcohol      Wt Readings from Last 1 Encounters:   23 68.9 kg (152 lb)        Anesthesia Evaluation   Pt has had prior anesthetic.     No history of anesthetic complications       ROS/MED HX  ENT/Pulmonary:     (+)                     asthma        recent URI,       (-) sleep apnea   Neurologic:     (+)          CVA,  without deficits,                  : 2005.   Cardiovascular:    (-) hypertension, CAD, pacemaker, stent, pacemaker and ICD   METS/Exercise Tolerance: >4 METS    Hematologic:  - neg hematologic  ROS     Musculoskeletal:   (+)  arthritis,             GI/Hepatic:    (-) GERD   Renal/Genitourinary:     (+) renal disease,             Endo:    (-) Type II DM and obesity   Psychiatric/Substance Use:     (+) psychiatric history anxiety       Infectious Disease:  - neg infectious disease ROS     Malignancy:       Other:  - neg other ROS    (+)  , H/O Chronic Pain,         Physical Exam    Airway        Mallampati: II   TM distance: > 3 FB   Neck ROM: limited   Mouth opening: > 3 cm    Respiratory Devices and Support         Dental       (+) Modest Abnormalities - crowns, retainers, 1 or 2 missing teeth      Cardiovascular          Rhythm and rate: regular and normal     Pulmonary           breath sounds clear to auscultation           OUTSIDE LABS:  CBC:   Lab Results   Component Value Date    WBC 6.7 09/12/2023    WBC 8.1 11/08/2022    HGB 14.0 09/12/2023    HGB 12.1 11/08/2022    HCT 41.4 09/12/2023    HCT 34.8 (A) 11/08/2022     09/12/2023     11/08/2022     BMP:   Lab Results   Component Value Date     09/12/2023     10/10/2022    POTASSIUM 4.2 09/12/2023    POTASSIUM 4.1 10/10/2022    CHLORIDE 102 09/12/2023    CHLORIDE 99 10/10/2022    CO2 26 09/12/2023    CO2 26 08/30/2022    BUN 14.8 09/12/2023    BUN 15 10/10/2022    BUN 16 10/10/2022    CR 0.91 09/12/2023    CR 0.77 10/18/2022    GLC 97 09/12/2023     (H) 10/18/2022     COAGS:   Lab Results   Component Value Date    PTT 31 09/08/2011    INR 1.00 09/08/2011     POC:   Lab Results   Component Value Date    BGM 96 02/12/2015     HEPATIC:   Lab Results   Component Value Date    ALBUMIN 4.6 10/10/2022    PROTTOTAL 7.5 08/25/2022    ALT 18 08/25/2022    AST 17 08/25/2022    ALKPHOS 81 08/25/2022    BILITOTAL 0.7 08/25/2022     OTHER:   Lab Results   Component  Value Date    PH 7.37 08/25/2022    LACT 0.7 08/25/2022    A1C 5.9 (H) 06/20/2023    DONNA 9.5 09/12/2023    PHOS 4.3 10/10/2022    MAG 2.3 09/16/2018    LIPASE 59 (L) 08/25/2022    TSH 1.33 09/27/2008    CRP 3.9 09/24/2015    SED 5 09/24/2015       Anesthesia Plan    ASA Status:  3       Anesthesia Type: General.     - Airway: ETT   Induction: Propofol.   Maintenance: Balanced.   Techniques and Equipment:     - Airway: Video-Laryngoscope     - Lines/Monitors: 2nd IV     Consents    Anesthesia Plan(s) and associated risks, benefits, and realistic alternatives discussed. Questions answered and patient/representative(s) expressed understanding.     - Discussed: Risks, Benefits and Alternatives for the PROCEDURE were discussed     - Discussed with:  Patient            Postoperative Care    Pain management: IV analgesics, Oral pain medications.   PONV prophylaxis: Ondansetron (or other 5HT-3), Dexamethasone or Solumedrol     Comments:                Sagar Delacruz MD

## 2023-09-20 NOTE — OP NOTE
Orthopedic Surgery Operative Report    Patient:   Macey Romero  MRN:   [id number]   :  1950  Facility: [facility]   Date:  23         PREOPERATIVE DIAGNOSIS:    Cervical myelopathy    POSTOPERATIVE DIAGNOSIS:    Cervical myelopathy    PROCEDURE PERFORMED:    Anterior cervical discectomy and fusion C5-6  Placement of interbody cage C5-6  Application of anterior plate C5-6  Removal of anterior plate C6-7  Exploration of fusion C6-7  Use of nonstructural allograft and local autograft    SURGEON:    Alex Galindo MD    SURGICAL ASSISTANT:    Sheeba Blankenship PA-C     ANESTHESIA:  General    FINDINGS:    Solid fusion C6-7. Poor bone quality consistent with osteoporosis.    COMPLICATIONS:     None    SPECIMENS:    None     ESTIMATED BLOOD LOSS:  40 ml     IMPLANTS:    Nuvasive Cohere interbody cage:  16 mm x 14 mm footprint, 7 mm height, 7 degree  Nuvasive ACP 1.6H 24 mm plate, 3.5 mm screws in C5, 4.0 mm screws in C6  MagnetOS nonstructural bone graft material    INDICATION FOR OPERATION:  The patient is a 72 year old female with a history of 2002 ACDF C6-7 for disc herniation who developed new progressive myelopathic symptoms due to central stenosis at C5-6.  I discussed with her the risks, benefits, and alternatives of the above operation and she wished to proceed.    DESCRIPTION OF PROCEDURE:    The patient was met in the preoperative holding area and the operative site was confirmed and marked.  We once again reviewed the risks, benefits, and alternatives of the operation and the patient wished to proceed with the surgery.    The patient was taken back to the operating room and induced under general anesthesia.  The patient was positioned supine with all bony prominences well padded.  The surgical site was prepped and draped in the usual standard fashion.    I radiographically localized an appropriate site for the incision with a spinal needle laid on the skin.  I then  incised the skin transversely in a skin fold on the left side of the neck medial to the sternocleidomastoid and performed a standard Lynn-Mortenesn approach.  I undermined the platysma and then incised it transversely.  I then opened the fascia medial to the sternocleidomastoid and then bluntly dissected down to the anterior spine, taking great care to make sure that the esophagus and trachea were not damaged medially, and the carotid sheath remained lateral to me.  There was significant scarring present within this field, however I was able to ultimately define the plane safely.  I did not encounter the recurrent laryngeal nerve in the field.  After retracting the esophagus medially, I cleared off the tissue in the midline of the spine, thereby exposing the vertebral bodies and disc space.  I confirmed the level by the presence of the anterior plate at C6-7.    The anterior plate and screws at C6-7 were removed, and the fusion status of C6-7 was confirmed.  I then continued my exposure to the C5 vertebral body above and below the operative disc C5-6.  I elevated the longus coli bilaterally and placed my Trimline retractor below them.    I placed Young Harris pins into the vertebral bodies adjacent to the disc and distracted across the disc space.  I performed an annulotomy.  I then removed the disc with a combination of curettes and Kerrisons.  I used a bur along the posterior osteophytes to get down to the posterior longitudinal ligament.  I used a darwin also on the endplates to contour them appropriately for my cage.   I took down the posterior longitudinal ligament and visualized dura.  I performed a foraminotomy on both sides to ensure adequate space for the nerve roots.  After the foraminotomies I could easily pass a nerve hook out the foramina without resistance, confirming adequate decompression.    I next trialed for an selected a Nuvasive Cohere porous PEEK interbody cage.  A 14 mm x 16 mm footprint 7 degree cage  was most appropriate.  Cage height of 7 mm was most appropriate.  This cage was selected and packed with MagnetOS graft combined with local autograft.  The cage was placed and found to have excellent stability with friction fit against the endplates.     I contoured the anterior osteophytes on the vertebral bodies to ensure that my plate would be able to sit flush.  I then selected a 24 mm Nuvasive ACP 1.6V plate and placed it on the anterior aspect of the operative vertebrae.  I used fluoroscopy to confirm appropriate position of this, and then placed screws into the vertebral bodies.  The bone quality was poor especially in C6, and therefore I used 4.0 mm screws in C6.  I deployed the locking mechanisms on the plate.    I achieved final hemostasis and thoroughly irrigated the surgical site.  I then began closure.  A deep 10 Bengali JOCELYN drain was placed.  I closed the platysma with a running 3-0 Vicryl.  Subcutaneous tissues were closed with 4-0 Vicryl loosely, and then a running 4-0 Monocryl.  A sterile dressing and Tegaderm were placed over the incision.  A Tegaderm was placed over the drain site.    The patient was allowed to emerge from anesthesia which occurred without incident and was transported to PACU in stable condition.      A surgical assistant was critical for this case to assist in retraction of the soft tissues and evacuating blood from the surgical field to facilitate a safer operation with improved visualization and less time under anesthesia.     All sponge and needle counts were correct at case conclusion.      POSTOPERATIVE PLAN:  -Activity:    Up with assist  -Weight Bearing Status:  WBAT   -Bracing:   Soft collar.  May remove for sleeping, and briefly remove for eating and hygiene if desired.  -Antibiotics:     Ancef x24h  -Anticoagulation:   SCDs  -Steroids:   Decadron 4 mg IV q6h x24h  -Pain control:    IV and PO, wean to PO as able  -Drain:     monitor and chart output  -Dressing:    Ok to  shower with dressing in place, dressing ok to get wet.  -Diet:     ADAT  -Labs:     AM Hgb, BMP    -Disposition:    Pending medical stability, PT, anticipate discharge around POD 1-2  -Follow up:     2 weeks in my clinic        Alex Galindo MD

## 2023-09-21 ENCOUNTER — APPOINTMENT (OUTPATIENT)
Dept: GENERAL RADIOLOGY | Facility: CLINIC | Age: 73
DRG: 472 | End: 2023-09-21
Attending: STUDENT IN AN ORGANIZED HEALTH CARE EDUCATION/TRAINING PROGRAM
Payer: MEDICARE

## 2023-09-21 ENCOUNTER — APPOINTMENT (OUTPATIENT)
Dept: PHYSICAL THERAPY | Facility: CLINIC | Age: 73
DRG: 472 | End: 2023-09-21
Attending: STUDENT IN AN ORGANIZED HEALTH CARE EDUCATION/TRAINING PROGRAM
Payer: MEDICARE

## 2023-09-21 LAB
ANION GAP SERPL CALCULATED.3IONS-SCNC: 11 MMOL/L (ref 7–15)
BUN SERPL-MCNC: 10.6 MG/DL (ref 8–23)
CALCIUM SERPL-MCNC: 9 MG/DL (ref 8.8–10.2)
CHLORIDE SERPL-SCNC: 102 MMOL/L (ref 98–107)
CREAT SERPL-MCNC: 0.79 MG/DL (ref 0.51–0.95)
DEPRECATED HCO3 PLAS-SCNC: 24 MMOL/L (ref 22–29)
EGFRCR SERPLBLD CKD-EPI 2021: 79 ML/MIN/1.73M2
GLUCOSE SERPL-MCNC: 155 MG/DL (ref 70–99)
HGB BLD-MCNC: 13.5 G/DL (ref 11.7–15.7)
POTASSIUM SERPL-SCNC: 4.5 MMOL/L (ref 3.4–5.3)
SODIUM SERPL-SCNC: 137 MMOL/L (ref 136–145)

## 2023-09-21 PROCEDURE — 999N000065 XR CERVICAL SPINE 2/3 VIEWS

## 2023-09-21 PROCEDURE — 85018 HEMOGLOBIN: CPT | Performed by: STUDENT IN AN ORGANIZED HEALTH CARE EDUCATION/TRAINING PROGRAM

## 2023-09-21 PROCEDURE — 97116 GAIT TRAINING THERAPY: CPT | Mod: GP | Performed by: PHYSICAL THERAPIST

## 2023-09-21 PROCEDURE — 80048 BASIC METABOLIC PNL TOTAL CA: CPT | Performed by: STUDENT IN AN ORGANIZED HEALTH CARE EDUCATION/TRAINING PROGRAM

## 2023-09-21 PROCEDURE — 258N000003 HC RX IP 258 OP 636: Performed by: STUDENT IN AN ORGANIZED HEALTH CARE EDUCATION/TRAINING PROGRAM

## 2023-09-21 PROCEDURE — 97161 PT EVAL LOW COMPLEX 20 MIN: CPT | Mod: GP | Performed by: PHYSICAL THERAPIST

## 2023-09-21 PROCEDURE — 250N000013 HC RX MED GY IP 250 OP 250 PS 637: Performed by: STUDENT IN AN ORGANIZED HEALTH CARE EDUCATION/TRAINING PROGRAM

## 2023-09-21 PROCEDURE — 36415 COLL VENOUS BLD VENIPUNCTURE: CPT | Performed by: STUDENT IN AN ORGANIZED HEALTH CARE EDUCATION/TRAINING PROGRAM

## 2023-09-21 PROCEDURE — 250N000011 HC RX IP 250 OP 636: Mod: JZ | Performed by: STUDENT IN AN ORGANIZED HEALTH CARE EDUCATION/TRAINING PROGRAM

## 2023-09-21 RX ADMIN — SENNOSIDES AND DOCUSATE SODIUM 1 TABLET: 50; 8.6 TABLET ORAL at 10:10

## 2023-09-21 RX ADMIN — ACETAMINOPHEN 975 MG: 325 TABLET, FILM COATED ORAL at 12:47

## 2023-09-21 RX ADMIN — POLYETHYLENE GLYCOL 3350 17 G: 17 POWDER, FOR SOLUTION ORAL at 10:10

## 2023-09-21 RX ADMIN — DOXYCYCLINE HYCLATE 50 MG: 50 CAPSULE ORAL at 20:40

## 2023-09-21 RX ADMIN — OXYCODONE HYDROCHLORIDE 5 MG: 5 TABLET ORAL at 00:35

## 2023-09-21 RX ADMIN — HYDROXYZINE HYDROCHLORIDE 10 MG: 10 TABLET ORAL at 10:10

## 2023-09-21 RX ADMIN — OXYCODONE HYDROCHLORIDE 10 MG: 5 TABLET ORAL at 14:19

## 2023-09-21 RX ADMIN — ACETAMINOPHEN 975 MG: 325 TABLET, FILM COATED ORAL at 03:57

## 2023-09-21 RX ADMIN — SENNOSIDES AND DOCUSATE SODIUM 1 TABLET: 50; 8.6 TABLET ORAL at 20:40

## 2023-09-21 RX ADMIN — SODIUM CHLORIDE: 9 INJECTION, SOLUTION INTRAVENOUS at 06:44

## 2023-09-21 RX ADMIN — ERYTHROMYCIN: 5 OINTMENT OPHTHALMIC at 20:42

## 2023-09-21 RX ADMIN — DEXAMETHASONE SODIUM PHOSPHATE 4 MG: 4 INJECTION, SOLUTION INTRAMUSCULAR; INTRAVENOUS at 00:35

## 2023-09-21 RX ADMIN — CEFAZOLIN 1 G: 1 INJECTION, POWDER, FOR SOLUTION INTRAMUSCULAR; INTRAVENOUS at 06:45

## 2023-09-21 RX ADMIN — OXYCODONE HYDROCHLORIDE 10 MG: 5 TABLET ORAL at 10:10

## 2023-09-21 RX ADMIN — DEXAMETHASONE SODIUM PHOSPHATE 4 MG: 4 INJECTION, SOLUTION INTRAMUSCULAR; INTRAVENOUS at 06:45

## 2023-09-21 RX ADMIN — ACETAMINOPHEN 975 MG: 325 TABLET, FILM COATED ORAL at 18:15

## 2023-09-21 ASSESSMENT — ACTIVITIES OF DAILY LIVING (ADL)
ADLS_ACUITY_SCORE: 33

## 2023-09-21 NOTE — PLAN OF CARE
Physical Therapy Discharge Summary    Reason for therapy discharge:    All goals and outcomes met, no further needs identified.    Progress towards therapy goal(s). See goals on Care Plan in Epic electronic health record for goal details.  Goals met    Therapy recommendation(s):    Recommend pt continue to ambulate with nursing staff during remainder of hospital stay.

## 2023-09-21 NOTE — PLAN OF CARE
Goal Outcome Evaluation:         Pt A&Ox4. VSS on RA. Full liquid diet overnight, pt reports pain/ discomfort while swallowing. Advanced to regular as of 7:42 am. A1 GB/W, up to BR. Soft collar in place, anterior neck dressing intact with dried drainage. JOCELYN drain in place. Scheduled tylenol, IV dilaudid x2 & Po oxy given for pain. NS running @ 100ml/hr.

## 2023-09-21 NOTE — PLAN OF CARE
"Goal Outcome Evaluation:      Plan of Care Reviewed With: patient        Pt alert and oriented X4. Able to make needs known. Denied SOB, CP, N/V. Urinating appropriately, last BM yesterday. Baseline numbness in all extremities but improved since surgery. RUE 4/5  strength. Updated NP on cervical dressing and ok to replace with sterile drain sponge and tegaderm. Current dressing saturated. Soft collar on through out shift. Pt anxious when discussing her concerns. This morning pt stated \"no one told me I had a stroke and I needed a CT\" explained to pt that it was a routine follow up post surgical X ray. Pt stated \" did anyone tell you I have an infection? The doctor said that my blood sugar was 154 yesterday and I have an infection\" explained to pt that a andria BS can put you at risk of infection but it does not alone mean infection.  SBA with Walker and GB.          "

## 2023-09-21 NOTE — PLAN OF CARE
Occupational Therapy: Orders received. Chart reviewed and discussed with care team.? Occupational Therapy not indicated. Spoke with PT, pt functioning near baseline level for functional mobility. Pt will have daughter to stay with her at discharge to support as needed with I/ADLs.? Defer discharge recommendations to care team.? Will complete orders.

## 2023-09-21 NOTE — PROGRESS NOTES
"Ortho Progress Note     Subjective:  No acute overnight events. Patient found lying in bed this morning. Reports some improvement of LUE tingling and pain. RUE weakness stable. Throat and neck soft tissue soreness as expected; no difficulty breathing or speaking. Swallowing uncomfortable; will start with small bites/soft foods and monitor today.     Objective:  /61   Pulse 74   Temp 98.2  F (36.8  C) (Oral)   Resp 16   Ht 1.626 m (5' 4\")   Wt 68.9 kg (152 lb)   SpO2 93%   BMI 26.09 kg/m    Gen: alert and appropriately interactive, no acute distress  CV: visible skin appears well-perfused, extremities warm to touch   Resp: breathing equal and non-labored, no wheezing  MSK:       Spine:   Skin: Incision and drain site without evidence of infection. Dressings changed today.     Sensation:      R       L    C5:   Intact   Intact    C6:     Intact   Intact    C7:   Intact   Intact    C8:   Intact   Intact     Motor:     R L    C5: Deltoid   5  5    C6:   Biceps    4  5    C7: Triceps    4  5    C8:     3  5    T1: Intrinsics  5 5      Hemoglobin   Date Value Ref Range Status   09/21/2023 13.5 11.7 - 15.7 g/dL Final   09/12/2023 14.0 11.8 - 15.5 g/dl Final   11/08/2022 12.1 11.8 - 15.5 g/dl Final       Drain: 30 mL overnight. To remain in place.     Assessment/Plan:  POD 1 s/p C5-6 ACDF with removal of prior instrumentation C6-7. Recovering as expected.    RUE weakness stable. Discussed expected timeline of nerve recovery. Will work with PT/OT today.     Throat and neck soft tissue soreness as expected; no difficulty breathing or speaking. Swallowing uncomfortable; will monitor today. Begin with small bites, sips. If any persistent issues, will consider SLP consult     Other goals for today: pain control, xrays if able, monitor independent voiding.     -Activity:                                       Up with assist  -Weight Bearing Status:            WBAT   -Bracing:                                      " Soft collar.  May remove for sleeping, and briefly remove for eating and hygiene if desired.  -Antibiotics:                                  Ancef x24h  -Anticoagulation:                        SCDs  -Steroids:                                      Decadron 4 mg IV q6h x24h  -Pain control:                               IV and PO, wean to PO as able  -Drain:                                          monitor and chart output  -Dressing:                                     Ok to shower with dressing in place, dressing ok to get wet.  -Diet:                                              ADAT  -Labs:                               AM Hgb, BMP - reviewed      -Disposition:                                 Pending medical stability, PT, anticipate discharge around POD 2  -Follow up:                                   2 weeks in my clinic    Sheeba Blankenship PA-C  Orthopedic Spine Surgery  [facility]

## 2023-09-21 NOTE — PROGRESS NOTES
Patient vital signs are at baseline: Yes, on RA  Patient able to ambulate as they were prior to admission or with assist devices provided by therapies during their stay:  Yes, up SBA  Patient MUST void prior to discharge:  Yes, BR  Patient able to tolerate oral intake:  Yes, regular diet , tolerating well.  Pain has adequate pain control using Oral analgesics:  Yes  Does patient have an identified :  Yes  Has goal D/C date and time been discussed with patient:  No,  Reason:   TBD.    Patient A&Ox4. CMS intact Ex of BLE toes, and BUE fingers that pt states is getting better. Cervical dressing change per NS, because the dressing was saturated, DCI.  Soft Cervical collar in place at all time.

## 2023-09-21 NOTE — PROGRESS NOTES
09/21/23 0930   Appointment Info   Signing Clinician's Name / Credentials (PT) Juana Bland, SPT   Student Supervision Line of sight supervision provided   Rehab Comments (PT) 1 eval, 1 gait   Quick Adds   Quick Adds Certification       Present no   Living Environment   People in Home spouse   Current Living Arrangements house   Home Accessibility stairs to enter home   Number of Stairs, Main Entrance 2   Stair Railings, Main Entrance railing on left side (ascending)   Transportation Anticipated family or friend will provide   Living Environment Comments Pt lives in house with spouse. 2 JUANITO, no stairs inside that she has to do. Spouse works 16 hours per day out of the home, daughter will be staying with her to provide assistance.   Self-Care   Usual Activity Tolerance good   Current Activity Tolerance moderate   Equipment Currently Used at Home none   Fall history within last six months yes  (Pt reports one fall in last 6 months, able to get up on her own.)   Number of times patient has fallen within last six months 1   Activity/Exercise/Self-Care Comment Pt has tub shower with shower chair, grab bars. Tony bars by toilet. Pt drives. Pt is retired.   General Information   Onset of Illness/Injury or Date of Surgery 09/20/23   Referring Physician Sheeba Blankenship, HERIBERTO   Patient/Family Therapy Goals Statement (PT) To go home   Pertinent History of Current Problem (include personal factors and/or comorbidities that impact the POC) s/p C5-C6 fusion, POD#1   Existing Precautions/Restrictions fall;spinal   Cognition   Cognitive Status Comments Pt upset at communication by medical team, pt congnition WNL   Pain Assessment   Patient Currently in Pain Yes, see Vital Sign flowsheet  (Pt reports 3/10 pain)   Integumentary/Edema   Integumentary/Edema no deficits were identifed   Posture    Posture Not impaired   Range of Motion (ROM)   Range of Motion ROM deficits secondary to surgical procedure    ROM Comment Soft sugical collar   Strength (Manual Muscle Testing)   Strength (Manual Muscle Testing) Able to perform R SLR;Able to perform L SLR;strength is WFL   Strength Comments Able to lift bilat UE against gravity   Bed Mobility   Bed Mobility scooting/bridging;supine-sit;rolling left;rolling right   Rolling Left [place] (Bed Mobility) verbal cues   Rolling Right [place] (Bed Mobility) verbal cues   Scooting/Bridging [place] (Bed Mobility) verbal cues   Supine-Sit [place] (Bed Mobility) supervision   Comment, (Bed Mobility) Bed mobility via logroll, SBA   Transfers   Transfers sit-stand transfer   Sit-Stand Transfer   Sit-Stand [place] (Transfers) supervision   Assistive Device (Sit-Stand Transfers) walker, standard   Comment, (Sit-Stand Transfer) Sit > stand w/ FWW and SBA   Gait/Stairs (Locomotion)   [place] Level (Gait) supervision   Assistive Device (Gait) walker, standard   Distance in Feet 5'   Distance in Feet (Gait) 450'   Pattern (Gait) swing-through   Negotiation (Stairs) number of steps;handrail location;stairs assistive device   Handrail Location (Stairs) left side (ascending)   Number of Steps (Stairs) 2   Comment, (Gait/Stairs) Pt ambulated w/ FWW and SBA + with no AD and SBA. Pt negotiated 1 platform step with FWW and 1 platform step with no AD, all with SBA. Pt negotiated 4 standard stairs with L rail and SBA.   Balance   Balance other (describe)   Balance Comments Pt reports dizziness, not new since surgery.   Sensory Examination   Sensory Perception patient reports no sensory changes   Coordination   Coordination no deficits were identified   Muscle Tone   Muscle Tone no deficits were identified   Clinical Impression   Criteria for Skilled Therapeutic Intervention Yes, treatment indicated   PT Diagnosis (PT) Impaired functional mobility   Influenced by the following impairments Decreased balance   Functional limitations due to impairments Impaired gait    Clinical Presentation (PT Evaluation Complexity) Stable/Uncomplicated   Clinical Presentation Rationale Clinical judgement   Clinical Decision Making (Complexity) low complexity   Planned Therapy Interventions (PT) balance training;bed mobility training;patient/family education;gait training;stair training;transfer training;progressive activity/exercise   Risk & Benefits of therapy have been explained evaluation/treatment results reviewed;care plan/treatment goals reviewed;risks/benefits reviewed;current/potential barriers reviewed;participants voiced agreement with care plan;participants included;patient   PT Total Evaluation Time   PT Eval, Low Complexity Minutes (93946) 20   Plan of Care Review   Plan of Care Reviewed With patient   Therapy Certification   Start of care date 09/21/23   Certification date from 09/21/23   Certification date to 09/26/23   Medical Diagnosis s/p C5-C6 fusion   Physical Therapy Goals   PT Frequency One time eval and treatment only   PT Predicted Duration/Target Date for Goal Attainment 09/28/23   PT Goals Bed Mobility;Transfers;Gait;Stairs   PT: Bed Mobility Supervision/stand-by assist;Goal Met   PT: Transfers Sit to/from stand;Within precautions;Goal Met;Supervision/stand-by assist   PT: Gait Supervision/stand-by assist;Assistive device;Standard walker;150 feet;Goal Met;Within precautions   PT: Stairs Supervision/stand-by assist;Assistive device;Within precautions;2 stairs;Goal Met   Interventions   Interventions Quick Adds Gait Training;Therapeutic Activity   Therapeutic Activity   Therapeutic Activities: dynamic activities to improve functional performance Minutes (65416) 10   Symptoms Noted During/After Treatment Dizziness   Treatment Detail/Skilled Intervention Greeted pt supine in bed, pt agreed to PT. Pt voiced concern that she went down for xray this morning and was unsure why. Discussed with nurse, xray was routine post op. Pt upset with lack of communication. Educated pt on  spinal precautions and AD use for a few days at home, pt voiced understanding. Educated pt on logroll to perform supine > sit, pt performed with SBA and verbal cues for technique. Pt reports slight dizziness sitting EOB, pt states she always is dizzy and this is not new since surgery. Pt performed sit > stand w/ FWW and SBA and was seady on her feet. After ambulaiton, pt performed stand > sit and reverse logroll to go sit > supine with SBA and verbal cues for technique. Pt performed well and within precautions. Pt ended session supine in bed with bed alarm on, call light within reach, and all needs met.   Gait Training   Gait Training Minutes (03460) 10   Symptoms Noted During/After Treatment (Gait Training) none   Treatment Detail/Skilled Intervention Pt ambulated into hallway w/ FWW and CGA progressing to SBA. Pt was very steady on her feet, trialed without AD. Pt had slight R path deviation without LOB, pt states that was normal. Pt able to complete change in gait speed with no AD and CGA as well as side steps with handrail. NLOB with dynamic balance challenges. Pt has walker at home, recommended using this if pt feels unsteady on her feet. Pt voiced agreement. Pt negotiated one platform step with FWW and CGA, and one platform step with no AD and SBA. Pt performed 4 standard steps with L rail and reciprocal pattern with SBA. Pt voiced reciprocal pattern can aggravate her knees, educated on up with the good down with the bad, pt voiced understanding. Pt tolerated activity well and experienced NLOB.   [place] Level (Gait Training) stand-by assist   Physical Assistance Level (Gait Training) verbal cues   Stair Railings present on left side   Physical Assist/Nonphysical Assist (Stairs) verbal cues   Level of [place] (Stairs) stand-by assist   PT Discharge Planning   PT Plan DC   PT Discharge Recommendation (DC Rec) home with assist   PT Rationale for DC Rec Pt is moving very well. Pt is currently completing  all mobility tasks including bed mobility, transfers, ambulation, and stairs with SBA. Pt has this level of support at home. Anticipate at the time of discharge pt will be able to safely return home with assist from daughter and spouse. All inpatient PT goals met.   PT Brief overview of current status Bed mobility, transfers, gait, stairs SBA.   Total Session Time   Timed Code Treatment Minutes 20   Total Session Time (sum of timed and untimed services) 40     [facility]  OUTPATIENT PHYSICAL THERAPY EVALUATION  PLAN OF TREATMENT FOR OUTPATIENT REHABILITATION  (COMPLETE FOR INITIAL CLAIMS ONLY)  Patient's Last Name, First Name, M.I.  YOB: 1950  Macey Romero                        Provider's Name  [facility] Medical Record No.  [id number]                             Onset Date:  (P) 09/20/23   Start of Care Date:  09/21/23   Type:     _X_PT   ___OT   ___SLP Medical Diagnosis:  s/p C5-C6 fusion              PT Diagnosis:  (P) Impaired functional mobility Visits from SOC:  1     See note for plan of treatment, functional goals and certification details    I CERTIFY THE NEED FOR THESE SERVICES FURNISHED UNDER        THIS PLAN OF TREATMENT AND WHILE UNDER MY CARE     (Physician co-signature of this document indicates review and certification of the therapy plan).

## 2023-09-21 NOTE — PROGRESS NOTES
Pt arrived to this unit at ~ 1830. A&O x4, forgetful.VSS ex BP slightly high. On O2 1L. Full liquid diet. Pt tried sips of juice, and a bite of ice cream, stated very painful with swallowing. Pain managed with IV Dilaudid. Wears soft collar. JOCELYN drain in place. NS running at 100 ml/hr. Due to void.

## 2023-09-21 NOTE — ANESTHESIA POSTPROCEDURE EVALUATION
Patient: Macey Romero    Procedure: Procedure(s):  ANTERIOR CERVICAL 5 TO CERVICAL 6 DISCECTOMY AND FUSION       Anesthesia Type:  General    Note:     Postop Pain Control: Uneventful            Sign Out: Well controlled pain   PONV: No   Neuro/Psych: Uneventful            Sign Out: Acceptable/Baseline neuro status   Airway/Respiratory: Uneventful            Sign Out: Acceptable/Baseline resp. status   CV/Hemodynamics: Uneventful            Sign Out: Acceptable CV status; No obvious hypovolemia; No obvious fluid overload   Other NRE: NONE   DID A NON-ROUTINE EVENT OCCUR? No           Last vitals:  Vitals Value Taken Time   /59 09/20/23 1800   Temp 36.2  C (97.2  F) 09/20/23 1800   Pulse 68 09/20/23 1804   Resp 8 09/20/23 1804   SpO2 95 % 09/20/23 1805   Vitals shown include unvalidated device data.    Electronically Signed By: Sonu Ordonez MD  September 20, 2023  7:46 PM

## 2023-09-22 ENCOUNTER — APPOINTMENT (OUTPATIENT)
Dept: SPEECH THERAPY | Facility: CLINIC | Age: 73
DRG: 472 | End: 2023-09-22
Attending: PHYSICIAN ASSISTANT
Payer: MEDICARE

## 2023-09-22 LAB — GLUCOSE BLDC GLUCOMTR-MCNC: 121 MG/DL (ref 70–99)

## 2023-09-22 PROCEDURE — 120N000001 HC R&B MED SURG/OB

## 2023-09-22 PROCEDURE — 92610 EVALUATE SWALLOWING FUNCTION: CPT | Mod: GN | Performed by: SPEECH-LANGUAGE PATHOLOGIST

## 2023-09-22 PROCEDURE — 82962 GLUCOSE BLOOD TEST: CPT

## 2023-09-22 PROCEDURE — 92526 ORAL FUNCTION THERAPY: CPT | Mod: GN | Performed by: SPEECH-LANGUAGE PATHOLOGIST

## 2023-09-22 PROCEDURE — 250N000013 HC RX MED GY IP 250 OP 250 PS 637: Performed by: STUDENT IN AN ORGANIZED HEALTH CARE EDUCATION/TRAINING PROGRAM

## 2023-09-22 RX ORDER — METHOCARBAMOL 500 MG/1
500 TABLET, FILM COATED ORAL EVERY 6 HOURS PRN
Qty: 30 TABLET | Refills: 0 | Status: SHIPPED | OUTPATIENT
Start: 2023-09-22 | End: 2023-10-24

## 2023-09-22 RX ORDER — OXYCODONE HYDROCHLORIDE 5 MG/1
5 TABLET ORAL EVERY 4 HOURS PRN
Qty: 20 TABLET | Refills: 0 | Status: SHIPPED | OUTPATIENT
Start: 2023-09-22 | End: 2023-10-24

## 2023-09-22 RX ORDER — AMOXICILLIN 250 MG
1 CAPSULE ORAL 2 TIMES DAILY
Qty: 28 TABLET | Refills: 0 | Status: SHIPPED | OUTPATIENT
Start: 2023-09-22 | End: 2023-10-06

## 2023-09-22 RX ADMIN — METHOCARBAMOL 500 MG: 500 TABLET ORAL at 12:27

## 2023-09-22 RX ADMIN — SENNOSIDES AND DOCUSATE SODIUM 1 TABLET: 50; 8.6 TABLET ORAL at 20:56

## 2023-09-22 RX ADMIN — ACETAMINOPHEN 975 MG: 325 TABLET, FILM COATED ORAL at 18:40

## 2023-09-22 RX ADMIN — DOXYCYCLINE HYCLATE 50 MG: 50 CAPSULE ORAL at 20:56

## 2023-09-22 RX ADMIN — ACETAMINOPHEN 975 MG: 325 TABLET, FILM COATED ORAL at 02:34

## 2023-09-22 RX ADMIN — OXYCODONE HYDROCHLORIDE 5 MG: 5 TABLET ORAL at 15:17

## 2023-09-22 RX ADMIN — METHOCARBAMOL 500 MG: 500 TABLET ORAL at 18:40

## 2023-09-22 RX ADMIN — OXYCODONE HYDROCHLORIDE 10 MG: 5 TABLET ORAL at 11:10

## 2023-09-22 RX ADMIN — ACETAMINOPHEN 975 MG: 325 TABLET, FILM COATED ORAL at 11:10

## 2023-09-22 ASSESSMENT — ACTIVITIES OF DAILY LIVING (ADL)
ADLS_ACUITY_SCORE: 33

## 2023-09-22 NOTE — PROGRESS NOTES
"Ortho Progress Note     Subjective:  No acute overnight events. Is beginning to have some difficulty swallowing, feels like she has reflux after the swallow. Reports some improvement of LUE tingling and pain. RUE weakness stable. Throat and neck soft tissue soreness as expected. Will ask for SLP eval this am.    Objective:  BP (P) 125/57 (BP Location: Right arm)   Pulse (P) 81   Temp (P) 98.1  F (36.7  C) (Oral)   Resp (P) 16   Ht 1.626 m (5' 4\")   Wt 68.9 kg (152 lb)   SpO2 (P) 94%   BMI 26.09 kg/m    Gen: alert and appropriately interactive, no acute distress  CV: visible skin appears well-perfused, extremities warm to touch   Resp: breathing equal and non-labored, no wheezing  MSK:       Spine:   Skin: Incision and drain site without evidence of infection. Dressings changed today.     Sensation:      R       L    C5:   Intact   Intact    C6:     Intact   Intact    C7:   Intact   Intact    C8:   Intact   Intact     Motor:     R L    C5: Deltoid   5  5    C6:   Biceps    4  5    C7: Triceps    4  5    C8:     3  5    T1: Intrinsics  5 5      Hemoglobin   Date Value Ref Range Status   09/21/2023 13.5 11.7 - 15.7 g/dL Final   09/12/2023 14.0 11.8 - 15.5 g/dl Final   11/08/2022 12.1 11.8 - 15.5 g/dl Final       Drain: dcd    Assessment/Plan:  POD 2 s/p C5-6 ACDF with removal of prior instrumentation C6-7. Recovering as expected.    RUE weakness stable. Discussed expected timeline of nerve recovery. Will work with PT/OT today.     Throat and neck soft tissue soreness as expected; no difficulty breathing or speaking. Swallowing uncomfortable with some reflux.  SLP consult     Discharge possibly if clears swallow eval.    -Activity:                                       Up with assist  -Weight Bearing Status:            WBAT   -Bracing:                                      Soft collar.  May remove for sleeping, and briefly remove for eating and hygiene if desired.  -Antibiotics:                                 "  Ancef x24h  -Anticoagulation:                        SCDs  -Steroids:                                      Decadron 4 mg IV q6h x24h  -Pain control:                               IV and PO, wean to PO as able  -Drain:                                          dcd  -Dressing:                                     Ok to shower with dressing in place, dressing ok to get wet.  -Diet:                                              ADAT  -Labs:                               AM Hgb, BMP - reviewed      -Disposition:                                 Pending medical stability, PT, anticipate discharge around POD 2 if swallowing eval clears  -Follow up:                                   2 weeks in clinic    Kjerstin Foss PA-C [facility]

## 2023-09-22 NOTE — UTILIZATION REVIEW
Admission Status; Secondary Review Determination       Under the authority of the Utilization Management Committee, the utilization review process indicated a secondary review on the above patient. The review outcome is based on review of the medical records, discussions with staff, and applying clinical experience noted on the date of the review.     (x) Inpatient Status Appropriate - This patient's medical care is consistent with medical management for inpatient care and reasonable inpatient medical practice.     RATIONALE FOR DETERMINATION    72-year-old woman, post-operative day 2 following a C5-6 anterior cervical discectomy and fusion (ACDF) procedure with removal of prior instrumentation at C6-7, is having posy op dysphagia. There is right upper extremity weakness, and the patient was informed about the anticipated timeline for nerve recovery, with plans for physical and occupational therapy. She experiences the anticipated throat and neck soft tissue soreness but has no issues with breathing or speaking. Swallowing is uncomfortable, accompanied by some reflux symptoms, leading to a speech-language pathology (SLP) consultation for further evaluation and management. Per attending note:Worsening challenges with swallowing today.  Patient has been receiving appropriate medically necessary hospital level of care for more than 2 midnights.  She has Medicare.  CLEVELAND Owens notified          This document was produced using voice recognition software       The information on this document is developed by the utilization review team in order for the business office to ensure compliance. This only denotes the appropriateness of proper admission status and does not reflect the quality of care rendered.   The definitions of Inpatient Status and Observation Status used in making the determination above are those provided in the CMS Coverage Manual, Chapter 1 and Chapter 6, section 70.4.   Sincerely,   FAITH MENDENHALL MD    System Medical Director   Utilization Management   [facility].

## 2023-09-22 NOTE — PROGRESS NOTES
Seen this afternoon.  Worsening challenges with swallowing today, SLP consulted - appreciate recs.    Patient notes improvement in preop burning sensations in bilateral hands.  Ongoing R hand tremor, unchanged.      Surgical site with no hematoma or pathologic swelling present.  Minimal surgical pain, current predominant pain is esophageal mediated with onset with swallowing.    Will remain in house tonight for ongoing SLP eval and cares.      Alex Galindo MD  Orthopaedic Spine Surgery  [facility]

## 2023-09-22 NOTE — PROGRESS NOTES
CLINICAL SWALLOW EVALUATION       09/22/23 1055   Appointment Info   Signing Clinician's Name / Credentials (SLP) Melonie Greenwood M.A., CCC-SLP, BCS-S   General Information   Onset of Illness/Injury or Date of Surgery 09/20/23   Referring Physician Kjerstin Foss, PA-C   Patient/Family Therapy Goal Statement (SLP) Patient would like to improve her ability to eat.   Pertinent History of Current Problem Patient's PMH is significant for breast CA, C6-7 fixation, esophageal and intestinal stenosis.  EGD in 8/2022 indicated reflux esophagitis, a small hiatal hernia, and mild Schatzki's ring. Patient underwent C5-6 ACDF on 9/20/23.  She recalls having esophageal dysphagia and reflux symptoms increased recently, but she had been able to eat some solid foods post operatively until last night.  She feels like she swallows now and some of the food or liquid refluxes back up.   General Observations Patient alert, positioned upright.   Pain Assessment   Patient Currently in Pain Yes, see Vital Sign flowsheet   Type of Evaluation   Type of Evaluation Swallow Evaluation   Oral Motor   Oral Musculature generally intact   Structural Abnormalities none present   Mucosal Quality good   Dentition (Oral Motor)   Dentition (Oral Motor) natural dentition   Facial Symmetry (Oral Motor)   Facial Symmetry (Oral Motor) WNL   Lip Function (Oral Motor)   Lip Range of Motion (Oral Motor) WNL   Lip Sensitivity (Oral Motor) intact   Lip Strength (Oral Motor) WNL   Tongue Function (Oral Motor)   Tongue Sensitivity (Oral Motor) intact   Tongue Strength (Oral Motor) WNL   Tongue Coordination/Speed (Oral Motor) WNL   Tongue ROM (Oral Motor) WNL   Jaw Function (Oral Motor)   Jaw Function (Oral Motor) WNL;other (see comments)  (Soreness reported)   Cough/Swallow/Gag Reflex (Oral Motor)   Soft Palate/Velum (Oral Motor) WNL   Gag Reflex (Oral Motor) WNL   Volitional Throat Clear/Cough (Oral Motor) WNL   Volitional Swallow (Oral Motor) mildly  delayed;xerostomia   Vocal Quality/Secretion Management (Oral Motor)   Vocal Quality (Oral Motor) WNL   Secretion Management (Oral Motor) WNL   General Swallowing Observations   Past History of Dysphagia Reflux and Schatzki's ring   Respiratory Support (General Swallowing Observations) none   Current Diet/Method of Nutritional Intake (General Swallowing Observations, NIS) NPO   Swallowing Evaluation Clinical swallow evaluation   Clinical Swallow Evaluation   Feeding Assistance set up only required   Additional evaluation(s) completed today Recommended   Rationale for completing additional evaluation Pending tolerance of increased consistencies, would recommend consideration for esophagram vs. video swallow in OP setting likely   Clinical Swallow Evaluation Textures Trialed thin liquids;mildly thick liquids;pureed   Clinical Swallow Eval: Thin Liquid Texture Trial   Mode of Presentation, Thin Liquids cup;self-fed;spoon   Volume of Liquid or Food Presented 4 oz   Oral Phase of Swallow delayed AP movement;premature pharyngeal entry   Pharyngeal Phase of Swallow impaired;regurgitation of food/liquids;repeated swallows   Successful Strategies Trialed During Procedure hard swallow   Diagnostic Statement Belching and feeling of regurgitation   Clinical Swallow Eval: Mildly Thick Liquids   Mode of Presentation cup;self-fed   Volume Presented 2 tsp   Oral Phase delayed AP movement   Pharyngeal Phase impaired;repeated swallows   Successful Strategies Trialed During Procedure hard swallow   Diagnostic Statement Belching and feeling of regurgitation   Clinical Swallow Evaluation: Puree Solid Texture Trial   Mode of Presentation, Puree spoon;self-fed   Volume of Puree Presented 3 oz   Oral Phase, Puree WFL   Pharyngeal Phase, Puree impaired;repeated swallows;feeling of something stuck in throat   Successful Strategies Trialed During Procedure, Puree hard swallow   Diagnostic Statement Feeling of stasis, slow motility    Esophageal Phase of Swallow   Patient reports or presents with symptoms of esophageal dysphagia Yes   Esophageal comments Known reflux hx and hiatal hernia, Schatzki's ring in 8/2022   Swallowing Recommendations   Diet Consistency Recommendations thin liquids (level 0);full liquid diet   Supervision Level for Intake distant supervision needed   Mode of Delivery Recommendations bolus size, small;slow rate of intake   Swallowing Maneuver Recommendations double dry swallow;extra swallow;effortful (hard) swallow;alternate food and liquid intake   Monitoring/Assistance Required (Eating/Swallowing) monitor for cough or change in vocal quality with intake;stop eating activities when fatigue is present   Recommended Feeding/Eating Techniques (Swallow Eval) maintain upright sitting position for eating;maintain upright posture during/after eating for 30 minutes;provide 6 smaller meals throughout day   Medication Administration Recommendations, Swallowing (SLP) Whole small pills or crushed meds with puree   Instrumental Assessment Recommendations VFSS (videofluoroscopic swallowing study)   General Therapy Interventions   Planned Therapy Interventions Dysphagia Treatment   Dysphagia treatment Instruction of safe swallow strategies;Compensatory strategies for swallowing   Clinical Impression   Criteria for Skilled Therapeutic Interventions Met (SLP Eval) Yes, treatment indicated   SLP Diagnosis Moderate pharyngoesophageal dysphagia   Risks & Benefits of therapy have been explained evaluation/treatment results reviewed;patient;care plan/treatment goals reviewed;risks/benefits reviewed   SLP Total Evaluation Time   Eval: oral/pharyngeal swallow function, clinical swallow Minutes (64183) 40   SLP Goals   Therapy Frequency (SLP Eval) 4 times/wk   SLP Predicted Duration/Target Date for Goal Attainment 09/29/23   SLP Goals Swallow   SLP: Safely tolerate diet without signs/symptoms of aspiration Pureed diet;Thin liquids;With use of  swallow precautions;With use of compensatory swallow strategies;Independently   Interventions   Interventions Quick Adds Swallowing Dysfunction   Swallowing Dysfunction &/or Oral Function for Feeding   Treatment of Swallowing Dysfunction &/or Oral Function for Feeding Minutes (46065) 20   Symptoms Noted During/After Treatment Pain increased;Fatigue   Treatment Detail/Skilled Intervention Patient trained re: esophageal motility and reflux prevention strategies, as well as aspiration precautions and pharyngeal residue reduction with expected post-op swelling. Patient verbalized comprehension of strategies and demonstrated these requiring mild to moderate cueing during session, written strategies posted at bedside.   SLP Discharge Planning   SLP Plan Meal follow up with puree diet upgrade, double swallow.   SLP Discharge Recommendation home with outpatient speech therapy   SLP Rationale for DC Rec Recommend consider OP SLP follow up for dysphagia intervention, as patient will likely discharge home below baseline intake.   SLP Brief overview of current status  Clinical swallow evaluation completed and treatment initiated: recommend full liquid diet (thin liquids) with very strict swallow strategies: upright at 90 degrees, very small bites/sips, 2-3 swallows for each bite/sip, remain upright 1-2 hours after intake.  Small total amounts frequently.  Crush meds with puree and liquid wash, as able.  Recommend hold any large pills that cannot be crushed at this time.  Patient may require esophageal imaging for dysmotility and known Schatzki's ring pending resolution of this belching and regurgitation.   Total Session Time   Total Session Time (sum of timed and untimed services) 60                                                                                  [facility]      OUTPATIENT SPEECH LANGUAGE PATHOLOGY EVALUATION  PLAN OF TREATMENT FOR OUTPATIENT REHABILITATION  (COMPLETE FOR INITIAL  CLAIMS ONLY)  Patient's Last Name, First Name, M.I.  YOB: 1950  Macey Romero                        Provider's Name  [facility] Medical Record No.  [id number]                               Onset Date:  09/20/23  Start of Care Date:      Type:     ___PT   ___OT   _X_SLP Medical Diagnosis:            SLP Diagnosis:  Moderate pharyngoesophageal dysphagia  Visits from SOC:  1   ________________________________________________________________  Plan of Treatment/Functional Goals    Planned Interventions:   Dysphagia Treatment       Instruction of safe swallow strategies, Compensatory strategies for swallowing        Goals: See Speech Language Pathology Goals on Care Plan in [employer] electronic health record.    Therapy Frequency:4 times/wk   Predicted Duration of Therapy Intervention: 09/29/23  ________________________________________________________________________________    I CERTIFY THE NEED FOR THESE SERVICES FURNISHED UNDER        THIS PLAN OF TREATMENT AND WHILE UNDER MY CARE     (Physician attestation of this document indicates review and certification of the therapy plan).                     Referring Physician: Kjerstin Foss, PA-C           Initial Assessment        See Speech Language Pathology documentation in Epic electronic health record, evaluation dated

## 2023-09-22 NOTE — PROVIDER NOTIFICATION
Left VM for ALEXANDER Blankenship at 0900 regarding patient having difficulty swallowing water and experiencing reflux as a result. Asking if they want SLP involved at this time. Keeping patient NPO until call back.    Spoke with CLEVELAND Harmon in room and received order for Adult SLP eval. Order placed.

## 2023-09-22 NOTE — PROGRESS NOTES
Patient vital signs are at baseline: Yes VSS on RA. Ex of DAVID toes and BUE fingers.  Patient able to ambulate as they were prior to admission or with assist devices provided by therapies during their stay:  Yes Up by SBA  Patient MUST void prior to discharge:  Yes  Patient able to tolerate oral intake:  Yes  Pain has adequate pain control using Oral analgesics:  Yes using current regimen.  Does patient have an identified :  Yes  Has goal D/C date and time been discussed with patient:  No,  Reason: TBD    Aox4, VSS on RA,  Dressing CDI, JOCELYN in place,   Reg diet,  Up/ SBA,  Soft Cervical in place, pain managed with current regimen.

## 2023-09-23 ENCOUNTER — APPOINTMENT (OUTPATIENT)
Dept: SPEECH THERAPY | Facility: CLINIC | Age: 73
DRG: 472 | End: 2023-09-23
Attending: ORTHOPAEDIC SURGERY
Payer: MEDICARE

## 2023-09-23 VITALS
HEIGHT: 64 IN | DIASTOLIC BLOOD PRESSURE: 68 MMHG | SYSTOLIC BLOOD PRESSURE: 144 MMHG | WEIGHT: 152 LBS | HEART RATE: 69 BPM | RESPIRATION RATE: 16 BRPM | BODY MASS INDEX: 25.95 KG/M2 | OXYGEN SATURATION: 92 % | TEMPERATURE: 98.6 F

## 2023-09-23 PROCEDURE — 92526 ORAL FUNCTION THERAPY: CPT | Mod: GN | Performed by: SPEECH-LANGUAGE PATHOLOGIST

## 2023-09-23 PROCEDURE — 250N000013 HC RX MED GY IP 250 OP 250 PS 637: Performed by: STUDENT IN AN ORGANIZED HEALTH CARE EDUCATION/TRAINING PROGRAM

## 2023-09-23 RX ADMIN — OXYCODONE HYDROCHLORIDE 5 MG: 5 TABLET ORAL at 11:56

## 2023-09-23 RX ADMIN — METHOCARBAMOL 500 MG: 500 TABLET ORAL at 07:52

## 2023-09-23 RX ADMIN — ACETAMINOPHEN 975 MG: 325 TABLET, FILM COATED ORAL at 03:24

## 2023-09-23 RX ADMIN — OXYCODONE HYDROCHLORIDE 5 MG: 5 TABLET ORAL at 12:07

## 2023-09-23 RX ADMIN — ACETAMINOPHEN 975 MG: 325 TABLET, FILM COATED ORAL at 11:56

## 2023-09-23 RX ADMIN — METHOCARBAMOL 500 MG: 500 TABLET ORAL at 15:10

## 2023-09-23 RX ADMIN — SENNOSIDES AND DOCUSATE SODIUM 1 TABLET: 50; 8.6 TABLET ORAL at 09:39

## 2023-09-23 ASSESSMENT — ACTIVITIES OF DAILY LIVING (ADL)
ADLS_ACUITY_SCORE: 33

## 2023-09-23 NOTE — PROGRESS NOTES
"Ortho Progress Note     Subjective:  No acute overnight events. Swallowing applesauce without issue.  Still with some issues with small pills.  Discussed plans for possibly home today pending SLP agreement.  She feels comfortable with leaving and has her daughter to stay with her and help.      Objective:  BP (!) 144/68 (BP Location: Left arm)   Pulse 69   Temp 98.6  F (37  C) (Oral)   Resp 16   Ht 1.626 m (5' 4\")   Wt 68.9 kg (152 lb)   SpO2 92%   BMI 26.09 kg/m    Gen: alert and appropriately interactive, no acute distress  CV: visible skin appears well-perfused, extremities warm to touch   Resp: breathing equal and non-labored, no wheezing  MSK:       Spine:   Skin: Incision and drain site without evidence of infection. Dressings changed today.     Sensation:      R       L    C5:   Intact   Intact    C6:     Intact   Intact    C7:   Intact   Intact    C8:   Intact   Intact     Motor:     R L    C5: Deltoid   5  5    C6:   Biceps    4  5    C7: Triceps    4  5    C8:     3  5    T1: Intrinsics  5 5      Hemoglobin   Date Value Ref Range Status   09/21/2023 13.5 11.7 - 15.7 g/dL Final   09/12/2023 14.0 11.8 - 15.5 g/dl Final   11/08/2022 12.1 11.8 - 15.5 g/dl Final       Drain: dcd    Assessment/Plan:    POD 3 s/p C5-6 ACDF with removal of prior instrumentation C6-7. Recovering as expected. RUE weakness stable.  Throat and neck soft tissue soreness as expected; no difficulty breathing or speaking.      Dysphagia - thin liquids;mildly thick liquids;pureed as per SLP recs    Discharge possibly if okay with SLP    -Activity:                                       Up with assist  -Weight Bearing Status:            WBAT   -Bracing:                                      Soft collar.  May remove for sleeping, and briefly remove for eating and hygiene if desired.  -Antibiotics:                                  Ancef x24h  -Anticoagulation:                        SCDs  -Steroids:                                    "   Decadron 4 mg IV q6h x24h  -Pain control:                               IV and PO, wean to PO as able  -Drain:                                          dcd  -Dressing:                                     Ok to shower with dressing in place, dressing ok to get wet.  -Diet:                                              thin liquids;mildly thick liquids;pureed as per SLP recs  -Labs:                                    -Disposition:                                 Pending medical slp, anticipate discharge this afternoon  -Follow up:                                   2 weeks in clinic, 10/5    Kjerstin Foss PA-C [facility]

## 2023-09-23 NOTE — PROGRESS NOTES
Pt here with Cervical myelopathy (H) [G95.9]  S/P cervical spinal fusion [Z98.1].      Cognitive/Mentation: A& 4  VS:  within defines limits for pt, on room air    Activity: Up with SBA /GB/W    Diet: Soft/full liquid diet, crush medications or whole    Pulmonary: WDL, on room air, afebrile    Pain: Requests to have pain meds as scheduled. Informed pt that she will need to ask for PRN meds between scheduled tylenol for breakthrough pain    GI: BS +, + flatus.    : Voids in bathroom    IVs: PIV SL    Drains:  none    Intergument, baseline numbness in all extremities. Soft cervical collar in place. Old superficial scattered bruising all over upper extremities and on left forehead    DischargeTBD    shift summary PRN Tylenol, crushed. Ambulates SBA

## 2023-09-23 NOTE — PLAN OF CARE
Up w/ SBA. Pain managed with tylenol, oxycodone, robaxin, and heat packs. Soft collar on. Adequate I/O. Discharged home this afternoon w/ instructions to f/up w/ Speech therapy as OP. Emphasized importance of following speech therapy diet recommendations.

## 2023-09-23 NOTE — PLAN OF CARE
A&Ox4. Up w/ SBA GB/W. C-collar in place. Adequate I/O. SLP cleared for Full liquid diet. Takes medications crushed w/ apple sauce. Pain managed w/ tylenol, oxycodone, and robaxin.

## 2023-09-24 NOTE — DISCHARGE SUMMARY
ORTHOPAEDIC DISCHARGE SUMMARY     Date of Admission: 9/20/2023  Date of Discharge: 9/23/2023  4:11 PM  Disposition: Home  Surgeon: Alex Galindo MD      DISCHARGE DIAGNOSIS:  Cervical myelopathy (H) [G95.9]    PROCEDURES: Procedure(s):  ANTERIOR CERVICAL 5 TO CERVICAL 6 DISCECTOMY AND FUSION on 9/20/2023    BRIEF HISTORY:  This was a planned admission after the above elective procedure.    HOSPITAL COURSE:    Surgery was uncomplicated. Macey Romero has done well post-operatively.     SLP was consulted post operatively in the setting of mild post-operative dysphagia and reflux. Some improvement since onset. Patient has been cleared for discharge with recommendation to consider OP SLP follow up for dysphagia intervention, as patient will discharge home below baseline intake. Will pursue as necessary in the outpatient setting.     The patient received routine nursing cares and is medically stable. Vital signs are stable. The surgical drain was removed 9/22 without issue. The patient is tolerating a regular diet without GI distress/nausea or vomiting. Voiding spontaneously. All PT/OT goals have been met for safe mobility. Pain is now controlled on oral medications which will be available on discharge. Stool softeners have been used while taking pain medications to help prevent constipation. Macey Romero is deemed medically safe to discharge.     Antibiotics:  Given periop and 24 hours postop.  PT Progress:  Has met PT/OT goals for safe mobility.   Pain Meds:  Weaned off all IV pain meds by discharge.  Inpatient Events: No significant events or complications.     Discharge orders and instructions as below.    FOLLOWUP:    Follow up in 2 weeks in Dr. Galindo's clinic  Possible outpatient SLP follow up pending progress  Follow up with PCP as needed for post-operative check in    PLANNED DISCHARGE ORDERS:     DVT Prophylaxis: Mobilization        Discharge Medication List as of 9/23/2023  2:43 PM        START taking these  medications    Details   methocarbamol (ROBAXIN) 500 MG tablet Take 1 tablet (500 mg) by mouth every 6 hours as needed for muscle spasms, Disp-30 tablet, R-0, E-Prescribe      oxyCODONE (ROXICODONE) 5 MG tablet Take 1 tablet (5 mg) by mouth every 4 hours as needed for moderate to severe pain or severe pain, Disp-20 tablet, R-0, E-Prescribe           CONTINUE these medications which have CHANGED    Details   senna-docusate (SENOKOT-S/PERICOLACE) 8.6-50 MG tablet Take 1 tablet by mouth 2 times daily for 14 days, Disp-28 tablet, R-0, E-Prescribe           CONTINUE these medications which have NOT CHANGED    Details   acetaminophen (TYLENOL) 500 MG tablet Take 2 tablets (1,000 mg) by mouth every 8 hours as needed for pain (mild pain), Disp-90 tablet, R-0, E-Prescribe      albuterol (PROAIR HFA/PROVENTIL HFA/VENTOLIN HFA) 108 (90 Base) MCG/ACT inhaler Inhale 2 puffs into the lungs every 6 hours, Disp-18 g, R-1, E-PrescribePharmacy may dispense brand covered by insurance (Proair, or proventil or ventolin or generic albuterol inhaler)      budesonide-formoterol (SYMBICORT) 160-4.5 MCG/ACT Inhaler Inhale 2 puffs into the lungs daily, Disp-10.2 g, R-3, E-Prescribe      Doxycycline Hyclate 50 MG TABS Take 1 tablet by mouth every evening, Historical      erythromycin (ROMYCIN) 5 MG/GM ophthalmic ointment Place into both eyes every eveningHistorical      hydrocortisone 2.5 % cream Apply topically daily as neededHistorical      linaclotide (LINZESS) 290 MCG capsule Take 1 capsule (290 mcg) by mouth every morning (before breakfast), Disp-90 capsule, R-1, E-Prescribe      metroNIDAZOLE (METROCREAM) 0.75 % external cream Apply topically every evening as neededHistorical      polyethylene glycol (MIRALAX) 17 g packet Take 17 g by mouth 2 times daily, Disp-90 each, R-1, E-Prescribe      vitamin D3 (CHOLECALCIFEROL) 50 mcg (2000 units) tablet Take 1 tablet by mouth daily, Historical               Discharge Procedure Orders   XR Video  Swallow with SLP or OT - Order with Speech Therapy Referral   Standing Status: Future Standing Exp. Date: 09/23/24     Order Specific Question Answer Comments   Priority Routine      Speech Therapy Referral   Standing Status: Future   Referral Priority: Routine Referral Type: Therapeutic Services   Number of Visits Requested: 1     Speech Therapy Referral   Standing Status: Future   Referral Priority: Routine: Next available opening Referral Type: Therapeutic Services   Number of Visits Requested: 1     Notify Provider   Order Comments: Signs and symptoms of infection: Fever greater than 101, redness, swelling, heat at site, drainage, or pus     Discharge Instructions - Contact surgery team   Order Comments: Contact surgical team with any new swelling or tightness to the throat/incision site if it is causing discomfort.     Call 911 if it becomes difficult to breath as this is a medical emergency.     Discharge Instructions - Shower with incision NOT covered   Order Comments: You may shower 1 day after surgery.  You do not need to cover your surgical incision in the shower and may allow water and soap to run over top of the incision. Do not soak or submerge the incision underwater.     Discharge Instructions - No tub bathing   Order Comments: Tub bathing, swimming, or any other activities that will cause your incision to be submerged in water should be avoided until allowed by your Provider.     Discharge Instructions - Lifting Limit (specify)   Order Comments: Lifting limit of 10 pounds until seen at Post-op follow up appointment.     Return to Clinic - in 2 weeks   Order Comments: Return to Clinic in 2 weeks     No Aspirin or NSAID products   Order Comments: No aspirin or non-steroidal anti-inflammatory drugs (NSAIDs) such as ibuprofen or naproxen until cleared at post-operative appointment     No driving or operating machinery while in a cervical collar   Order Comments: Until follow-up appointment.     No  Alcohol   Order Comments: No Alcohol for 24 hours after procedure or while taking narcotic pain medication     Reason for your hospital stay   Order Comments: C5-6 anterior cervical discectomy and fusion with removal of prior instrumentation C6-7.     Follow-up and recommended labs and tests    Order Comments: Follow up in 2 weeks in Dr. Galindo's clinic   Follow up with PCP for post-operative check in as needed     Activity   Order Comments: Your activity upon discharge: weight bear as tolerated. See formal activity restrictions in discharge instructions.     Order Specific Question Answer Comments   Is discharge order? Yes      Discharge Instructions   Order Comments: Care after Spine Surgery - Dr. Alex Galindo    The following information will help you through your recovery at home.    Pain  - It is normal for you to experience some pain in the area of your incision after your surgery, and often between the shoulder blades.  This will improve over the coming couple of weeks. You may use ice and/or heat on your shoulders as tolerated. Do not place ice or heat directly on your incision or near your incision site.    - You should call Dr. Galindo's office if arm pain returns suddenly and does not improve over 24 hours.    Pain Management  - Take your prescribed pain medication as needed and directed.  You may use Tylenol and methocarbamol for baseline pain control. You may use the oxycodone for breakthrough pain. Plan to wean off of oxycodone over the next several days. You may continue to use Tylenol and methocarbamol for your discomfort when you no longer need the narcotic pain medication.     - Do not use ibuprofen, diclofenac (Voltaren), meloxicam, naproxen, or other NSAIDs until cleared to do so by Dr. Galindo's team. Use of these types of medications can inhibit your body's ability to fuse properly.     - If you need a refill on your pain medication call [phone]. Please allow 24 hours for your prescription to be  refilled. Dr. Galindo's office does not refill pain medications on Friday afternoons.    Bracing:  - You should have received a soft cervical collar during the hospital.  You should wear this most of the time for the first two weeks after surgery, but you may remove it briefly for showering, hygiene, and eating. Try your best to keep your neck in neutral position during periods when the collar is removed.     Home Medications  - If you take a blood thinner (e.g. aspirin, warfarin, Xeralto, Eliquis, etc...) at baseline, wait until two days after the operation to start taking it again.  This is to lower the risk of a blood collection pushing on the nerves in the surgery site.  If after starting your blood thinner again, if you notice severe worsening neck swelling or arm pain, contact Dr Galindo's office immediately, as this could represent a blood collection forming.    Activity  - You may increase your activity as tolerated; walking is the best form of exercise after spine surgery.    - For six weeks after surgery avoid bending, lifting, and twisting of the neck (BLT).  Avoid activities such as vacuuming, raking and shoveling.   - Try to limit your lifting to 5-10 lbs during the first 2 weeks and then increase to 20-25lbs over the next 6 weeks.  - Do not perform any activities which place strain on your neck, chest, shoulders, or upper back   - You may return to work approximately 1- 2 weeks after your surgery if you have a sedentary or desk type job.  If you have a physical job Dr. Galindo and his staff will help you determine a return to work plan.    - You may resume sexual activity when you feel ready.  Stop if you have pain.    Driving  - You may drive if you feel strong enough, can comfortably rotate your neck, and are not taking any narcotic pain medications. If uncertain, wait until your 2-week post operative assessment.     Incision Site   - Keep a dressing over your incision for the first week.  If the dressing you  left the hospital with remains intact and in place, with no water beneath it, you may leave this on.  If water gets underneath it, you should remove it and replace it with a dry gauze dressing (can place a tegaderm or tape over the top of the gauze).  Wash hands prior to changing the dressing. If after one week there is no drainage on dressing, you may leave the incision open to air.  If there is ongoing drainage five days after surgery, please contact Dr Galindo's office.    Diet   - Start with eating soft foods and taking small bites. Gradually progress your diet as tolerated.   - Eat a healthy diet; this will help your recovery.  - Drink plenty of fluids, water, milk or juice.  - Take your prescribed stool softener as directed.   - If you have trouble with constipation you should eat more fiber, drink more fluids, increase your walking or try an over the counter laxative.    Follow-up Visits  - You will see Sheeba Blankenship PA-C for your 2-week post-operative follow up appointment. You will see Dr. Galindo for your 6-week post-operative follow up appointment. You should have had both of these appointments scheduled for you at the same time your surgery was scheduled. If you did not, please call Dr. Galindo's office when you get home from your surgery.  - Write down any questions you have about your surgery, recovery, return to work and other topics you wished to be covered at your post-op visit.  This way, we will be able to address all of your questions at your next visit.  - Call Dr. Galindo's office if you have any questions or concerns.    When to Call your Doctor:  - If you have any redness, warmth or swelling at the incision site.  - If your incision opens up.  - If you have increasing drainage from your incision.  - If you have a temperature greater than 100.5 degrees Fahrenheit.  - If you develop dramatic swelling in the front of your neck.  If this swelling is making it difficult to breathe, go immediately to the  emergency department, by ambulance if necessary.     Discharge Instructions - Diet   Order Comments: Diet as per SLP recommendations     Diet   Order Comments: Follow this diet upon discharge:     Start with small bites, soft foods, and liquids and slowly progress your diet as tolerated.     Order Specific Question Answer Comments   Is discharge order? Yes          Sheeba Blankenship PA-C  Orthopaedic Spine Surgery  [facility]

## 2023-09-29 ENCOUNTER — OFFICE VISIT (OUTPATIENT)
Dept: FAMILY MEDICINE | Facility: CLINIC | Age: 73
End: 2023-09-29

## 2023-09-29 VITALS
DIASTOLIC BLOOD PRESSURE: 74 MMHG | WEIGHT: 152 LBS | HEART RATE: 75 BPM | SYSTOLIC BLOOD PRESSURE: 138 MMHG | OXYGEN SATURATION: 93 % | BODY MASS INDEX: 26.09 KG/M2

## 2023-09-29 DIAGNOSIS — B37.0 ORAL THRUSH: Primary | ICD-10-CM

## 2023-09-29 PROBLEM — E78.5 HYPERLIPIDEMIA LDL GOAL <130: Status: RESOLVED | Noted: 2021-10-27 | Resolved: 2023-09-29

## 2023-09-29 PROBLEM — R41.3 MEMORY LOSS: Status: RESOLVED | Noted: 2021-10-27 | Resolved: 2023-09-29

## 2023-09-29 PROCEDURE — 99213 OFFICE O/P EST LOW 20 MIN: CPT | Performed by: FAMILY MEDICINE

## 2023-09-29 RX ORDER — CLOTRIMAZOLE 10 MG/1
10 LOZENGE ORAL
Qty: 35 LOZENGE | Refills: 0 | Status: SHIPPED | OUTPATIENT
Start: 2023-09-29 | End: 2023-10-06

## 2023-09-29 NOTE — PROGRESS NOTES
SUBJECTIVE:    Macey Romero, is a 72 year old female presenting for the below:     1. 3 days h/o mouth irritation and soreness with sensation of 'a film' in the mouth. Does not wear dentures.     OBJECTIVE:  Vitals:    09/29/23 1627   BP: 138/74   Pulse: 75   SpO2: 93%   Weight: 68.9 kg (152 lb)    Body mass index is 26.09 kg/m .    Throat: moist mucous membranes, no tonsillar exudate or hypertrophy, posterior oral pharynx non-erythematous without lesions. Roof of mouth and tongue erythematous with scant white patches.       ASSESSMENT / PLAN:      Oral thrush  -     clotrimazole (MYCELEX) 10 MG lozenge; Place 1 lozenge (10 mg) inside cheek 5 times daily for 7 days

## 2023-10-05 ENCOUNTER — TRANSFERRED RECORDS (OUTPATIENT)
Dept: FAMILY MEDICINE | Facility: CLINIC | Age: 73
End: 2023-10-05

## 2023-10-05 ENCOUNTER — TRANSCRIBE ORDERS (OUTPATIENT)
Dept: FAMILY MEDICINE | Facility: CLINIC | Age: 73
End: 2023-10-05

## 2023-10-13 ENCOUNTER — TRANSFERRED RECORDS (OUTPATIENT)
Dept: FAMILY MEDICINE | Facility: CLINIC | Age: 73
End: 2023-10-13

## 2023-10-16 ENCOUNTER — TRANSFERRED RECORDS (OUTPATIENT)
Dept: FAMILY MEDICINE | Facility: CLINIC | Age: 73
End: 2023-10-16

## 2023-10-24 ENCOUNTER — OFFICE VISIT (OUTPATIENT)
Dept: FAMILY MEDICINE | Facility: CLINIC | Age: 73
End: 2023-10-24

## 2023-10-24 VITALS
DIASTOLIC BLOOD PRESSURE: 62 MMHG | TEMPERATURE: 97.6 F | HEART RATE: 91 BPM | OXYGEN SATURATION: 98 % | SYSTOLIC BLOOD PRESSURE: 128 MMHG

## 2023-10-24 DIAGNOSIS — R39.9 LOWER URINARY TRACT SYMPTOMS (LUTS): Primary | ICD-10-CM

## 2023-10-24 LAB
BACTERIA (RMG): ABNORMAL
BILIRUBIN (RMG): NEGATIVE
BLOOD (RMG): ABNORMAL
CASTS (RMG): ABNORMAL
COLOR UR: YELLOW
CRYSTAL (RMG): ABNORMAL
EPITHELIAL (RMG): ABNORMAL
GLUCOSE (RMG): NEGATIVE
KETONE (RMG): NEGATIVE
LEUKOCYTE (RMG): ABNORMAL
MUCOUS (RMG): ABNORMAL
NITRITE (RMG): NEGATIVE
PH UR STRIP: 5.5 PH (ref 5–7)
PROTEIN (RMG): ABNORMAL
RBC (RMG): ABNORMAL
SP GR UR STRIP: 1.01
UROBILINOGEN (RMG): 0.2
WBC (RMG): ABNORMAL

## 2023-10-24 PROCEDURE — 87186 SC STD MICRODIL/AGAR DIL: CPT | Mod: ORL | Performed by: FAMILY MEDICINE

## 2023-10-24 PROCEDURE — 81003 URINALYSIS AUTO W/O SCOPE: CPT | Performed by: FAMILY MEDICINE

## 2023-10-24 PROCEDURE — 87088 URINE BACTERIA CULTURE: CPT | Mod: ORL | Performed by: FAMILY MEDICINE

## 2023-10-24 PROCEDURE — 99213 OFFICE O/P EST LOW 20 MIN: CPT | Performed by: FAMILY MEDICINE

## 2023-10-24 RX ORDER — SULFAMETHOXAZOLE/TRIMETHOPRIM 800-160 MG
1 TABLET ORAL 2 TIMES DAILY
Qty: 14 TABLET | Refills: 1 | Status: SHIPPED | OUTPATIENT
Start: 2023-10-24 | End: 2023-10-30 | Stop reason: ALTCHOICE

## 2023-10-24 NOTE — PROGRESS NOTES
SUBJECTIVE: Macey Romero is a 72 year old female who complains of urinary frequency, urgency and dysuria x 4 days, without flank pain, fever, chills, or abnormal vaginal discharge or bleeding.     OBJECTIVE: Appears well, in no apparent distress.  Vital signs are normal. The abdomen is soft without tenderness, guarding, mass, rebound or organomegaly. No CVA tenderness or inguinal adenopathy noted. Urine dipstick shows positive for WBC's.  Micro exam: greater then 100 WBC's per HPF and greater then 100 RBC's per HPF.     ASSESSMENT: UTI uncomplicated without evidence of pyelonephritis    PLAN: Treatment per orders - also push fluids, may use Pyridium OTC prn. Call or return to clinic prn if these symptoms worsen or fail to improve as anticipated.    Answers submitted by the patient for this visit:  General Questionnaire (Submitted on 10/24/2023)  Chief Complaint: Chronic problems general questions HPI Form  How many servings of fruits and vegetables do you eat daily?: 4 or more  How many minutes a day do you exercise enough to make your heart beat faster?: 9 or less  How many days a week do you exercise enough to make your heart beat faster?: 3 or less  How many days per week do you miss taking your medication?: 0  General Concern (Submitted on 10/24/2023)  Chief Complaint: Chronic problems general questions HPI Form  What is the reason for your visit today?: Possible uti  When did your symptoms begin?: 3-7 days ago  What are your symptoms?: Urgency. Frequency. Bladder spasm  How would you describe these symptoms?: Moderate  Are your symptoms:: Worsening  Have you had these symptoms before?: Yes  Is there anything that makes you feel worse?: No  Is there anything that makes you feel better?: No

## 2023-10-27 LAB — BACTERIA UR CULT: ABNORMAL

## 2023-10-30 ENCOUNTER — TELEPHONE (OUTPATIENT)
Dept: FAMILY MEDICINE | Facility: CLINIC | Age: 73
End: 2023-10-30

## 2023-10-30 DIAGNOSIS — N39.0 URINARY TRACT INFECTION: Primary | ICD-10-CM

## 2023-10-30 DIAGNOSIS — R39.9 LOWER URINARY TRACT SYMPTOMS (LUTS): ICD-10-CM

## 2023-10-30 RX ORDER — NITROFURANTOIN MACROCRYSTAL 100 MG
100 CAPSULE ORAL 2 TIMES DAILY
Qty: 20 CAPSULE | Refills: 0 | Status: ON HOLD | OUTPATIENT
Start: 2023-10-30 | End: 2023-11-12

## 2023-10-30 NOTE — TELEPHONE ENCOUNTER
Dr. Ferrari reviewed  10/24/23 urine culture results today and recommends change antibiotics to nitrofurantoin 100mg BID x 10 days.   Patient agrees to plan and reports symptoms not  improved since began taking Bactrim 10/24.   Denies fever,chills, flank pain, hematuria, nausea/vomiting/abdominal pain.   Patient agrees to plan and will make this change today.  Encouraged plenty of water.   Discussed signs and symptoms of concern and needs eval if these present.  Luz Wilkinson RN

## 2023-11-07 ENCOUNTER — OFFICE VISIT (OUTPATIENT)
Dept: FAMILY MEDICINE | Facility: CLINIC | Age: 73
End: 2023-11-07

## 2023-11-07 VITALS
BODY MASS INDEX: 26.09 KG/M2 | SYSTOLIC BLOOD PRESSURE: 139 MMHG | TEMPERATURE: 99.1 F | WEIGHT: 152 LBS | HEART RATE: 78 BPM | DIASTOLIC BLOOD PRESSURE: 60 MMHG | OXYGEN SATURATION: 98 %

## 2023-11-07 DIAGNOSIS — B95.2 ENTEROCOCCUS UTI: ICD-10-CM

## 2023-11-07 DIAGNOSIS — R51.9 ACUTE NONINTRACTABLE HEADACHE, UNSPECIFIED HEADACHE TYPE: ICD-10-CM

## 2023-11-07 DIAGNOSIS — D47.2 MGUS (MONOCLONAL GAMMOPATHY OF UNKNOWN SIGNIFICANCE): Primary | ICD-10-CM

## 2023-11-07 DIAGNOSIS — N39.0 ENTEROCOCCUS UTI: ICD-10-CM

## 2023-11-07 LAB
% GRANULOCYTES: 72.3 % (ref 42.2–75.2)
ALBUMIN SERPL BCG-MCNC: 3.7 G/DL (ref 3.5–5.2)
ALP SERPL-CCNC: 346 U/L (ref 35–104)
ALT SERPL W P-5'-P-CCNC: 209 U/L (ref 0–50)
AMORPHOUS PHOSPHATES: PRESENT
ANION GAP SERPL CALCULATED.3IONS-SCNC: 13 MMOL/L (ref 7–15)
AST SERPL W P-5'-P-CCNC: 104 U/L (ref 0–45)
BACTERIA (RMG): ABNORMAL
BILIRUB SERPL-MCNC: 0.7 MG/DL
BILIRUBIN (RMG): NEGATIVE
BLOOD (RMG): ABNORMAL
BUN SERPL-MCNC: 9.5 MG/DL (ref 8–23)
CALCIUM SERPL-MCNC: 9.4 MG/DL (ref 8.8–10.2)
CASTS (RMG): ABNORMAL
CHLORIDE SERPL-SCNC: 100 MMOL/L (ref 98–107)
COLOR UR: YELLOW
CREAT SERPL-MCNC: 0.85 MG/DL (ref 0.51–0.95)
CRP SERPL-MCNC: 44.68 MG/L
CRYSTAL (RMG): ABNORMAL
DEPRECATED HCO3 PLAS-SCNC: 25 MMOL/L (ref 22–29)
EGFRCR SERPLBLD CKD-EPI 2021: 72 ML/MIN/1.73M2
EPITHELIAL (RMG): ABNORMAL
GLUCOSE (RMG): NEGATIVE
GLUCOSE SERPL-MCNC: 105 MG/DL (ref 70–99)
HCT VFR BLD AUTO: 43.7 % (ref 35–46)
HEMOGLOBIN: 14.1 G/DL (ref 11.8–15.5)
INFLUENZA A (RMG): NEGATIVE
INFLUENZA B (RMG): NEGATIVE
KETONE (RMG): NEGATIVE
LEUKOCYTE (RMG): ABNORMAL
LYMPHOCYTES NFR BLD AUTO: 22.6 % (ref 20.5–51.1)
MCH RBC QN AUTO: 28 PG (ref 27–31)
MCHC RBC AUTO-ENTMCNC: 32.2 G/DL (ref 33–37)
MCV RBC AUTO: 86.7 FL (ref 80–100)
MONOCYTES NFR BLD AUTO: 5.1 % (ref 1.7–9.3)
MUCOUS (RMG): ABNORMAL
NITRITE (RMG): NEGATIVE
PH UR STRIP: 6 PH (ref 5–7)
PLATELET # BLD AUTO: 168 K/UL (ref 140–450)
POTASSIUM SERPL-SCNC: 3.9 MMOL/L (ref 3.4–5.3)
PROT SERPL-MCNC: 7.2 G/DL (ref 6.4–8.3)
PROTEIN (RMG): NEGATIVE
RBC # BLD AUTO: 5.04 X10/CMM (ref 3.7–5.2)
RBC (RMG): ABNORMAL
SARS ANTIGEN (RMG): NEGATIVE
SODIUM SERPL-SCNC: 138 MMOL/L (ref 135–145)
SP GR UR STRIP: 1
UROBILINOGEN (RMG): 0.2
WBC # BLD AUTO: 4.8 X10/CMM (ref 3.8–11)
WBC (RMG): ABNORMAL

## 2023-11-07 PROCEDURE — 36415 COLL VENOUS BLD VENIPUNCTURE: CPT | Performed by: FAMILY MEDICINE

## 2023-11-07 PROCEDURE — 87428 SARSCOV & INF VIR A&B AG IA: CPT | Mod: QW | Performed by: FAMILY MEDICINE

## 2023-11-07 PROCEDURE — 81003 URINALYSIS AUTO W/O SCOPE: CPT | Performed by: FAMILY MEDICINE

## 2023-11-07 PROCEDURE — 99215 OFFICE O/P EST HI 40 MIN: CPT | Performed by: FAMILY MEDICINE

## 2023-11-07 PROCEDURE — 87086 URINE CULTURE/COLONY COUNT: CPT | Mod: ORL | Performed by: FAMILY MEDICINE

## 2023-11-07 PROCEDURE — 85025 COMPLETE CBC W/AUTO DIFF WBC: CPT | Performed by: FAMILY MEDICINE

## 2023-11-07 PROCEDURE — 86140 C-REACTIVE PROTEIN: CPT | Mod: ORL | Performed by: FAMILY MEDICINE

## 2023-11-07 PROCEDURE — 80053 COMPREHEN METABOLIC PANEL: CPT | Mod: ORL | Performed by: FAMILY MEDICINE

## 2023-11-07 NOTE — PROGRESS NOTES
Gustavo Choudhury is a 72 year old patient who presents to clinic for evaluation.  She has not been feeling well for 2 weeks.  Seen 10/24 and diagnosed with UTI.  Put on bactrim.  Culture showed enterococcus and switched to macrobid.  Reports does not feel improving.  Past 5 days bad headache, subjective fever,  myalgias.  Ongoing dysuria.  No flank pain.  No abd pain, chest pain, cough, congestion, rash, SOB, vision changes or other neurologic changes.      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      Objective    /60   Pulse 78   Temp 99.1  F (37.3  C) (Oral)   Wt 68.9 kg (152 lb)   SpO2 98%   BMI 26.09 kg/m      General: Well appearing, NAD  HEENT: Clear  Heart: RRR, no murmur  Chest: Lungs CTAB  Abd: soft  Skin: Clear  MSK: no joint swelling  Psych: normal mood and affect        Results for orders placed or performed in visit on 11/07/23 (from the past 24 hour(s))   Urinalysis ([facility])   Result Value Ref Range    Color Urine Yellow (A)     pH Urine 6.0 pH    Specific Gravity Urine 1.005     PROTEIN ([facility]) Negative Negative    GLUCOSE ([facility]) Negative Negative    KETONE ([facility]) Negative Negative    LEUKOCYTE ([facility]) 1+ (A) Negative    BLOOD ([facility]) 1+ (A) Negative    Nitrite ([facility]) Negative Negative, Positive    BILIRUBIN ([facility]) Negative Negative    UROBILINOGEN ([facility]) 0.2 0.2 - 1    WBC ([facility]) 3-5 (A)     RBC ([facility]) 0-3     CRYSTAL ([facility]) None     BACTERIA ([facility]) Few (A)     MUCOUS ([facility]) None     CASTS ([facility]) None     EPITHELIAL ([facility]) Moderate     Amorphous Phosphates present (A)    CBC with Diff/Plt ([facility])   Result Value Ref Range    WBC x10/cmm 4.8 3.8 - 11.0 x10/cmm    % Lymphocytes 22.6 20.5 - 51.1 %    % Monocytes 5.1 1.7 - 9.3 %    % Granulocytes 72.3 42.2 - 75.2 %    RBC x10/cmm 5.04 3.7 - 5.2 x10/cmm    Hemoglobin 14.1 11.8 -  15.5 g/dl    Hematocrit 43.7 35 - 46 %    MCV 86.7 80 - 100 fL    MCH 28.0 27.0 - 31.0 pg    MCHC 32.2 (A) 33.0 - 37.0 g/dL    Platelet Count 168 140 - 450 K/uL   Influenza + SARS Antigen ([facility])   Result Value Ref Range    INFLUENZA A ([facility]) Negative Negative    INFLUENZA B ([facility]) Negative Negative    SARS ANTIGEN ([facility]) Negative Negative       MGUS (monoclonal gammopathy of unknown significance)  Cont follow up with Dr Dreyer    Enterococcus UTI  Uncertain if this is contributing to symptoms.  Had very lengthy discussion.  She is unable to take PCN and vanco would need to be IV.  UA improved.  Discussed uncertain cause of her symptoms.  CBC reassuring.  Flu and covid negative.  Discussed going to ER but she declines.  Vitals and exam reassuring.  Recommend close follow up  - Urinalysis ([facility])  - CBC with Diff/Plt ([facility])  - Comprehensive metabolic panel; Future  - CRP inflammation; Future  - Urine Culture; Future  - Comprehensive metabolic panel  - CRP inflammation  - Urine Culture    Acute nonintractable headache, unspecified headache type  Uncertain cause.  Reassuring exam.  Spinal fusion surgery in September noted.  Advise ER if worsens  - CBC with Diff/Plt ([facility])  - Comprehensive metabolic panel; Future  - CRP inflammation; Future  - Influenza + SARS Antigen ([facility])  - Comprehensive metabolic panel  - CRP inflammation    45 minutes total time including chart review, exam and face to face time, and documentation.     Patient is agreement with the assessment and plan as outlined above.  All questions answered.  Red flag symptoms that should prompt emergent evaluation discussed and understood.

## 2023-11-08 ENCOUNTER — TELEPHONE (OUTPATIENT)
Dept: FAMILY MEDICINE | Facility: CLINIC | Age: 73
End: 2023-11-08

## 2023-11-08 ENCOUNTER — APPOINTMENT (OUTPATIENT)
Dept: CT IMAGING | Facility: CLINIC | Age: 73
DRG: 069 | End: 2023-11-08
Attending: EMERGENCY MEDICINE
Payer: MEDICARE

## 2023-11-08 ENCOUNTER — HOSPITAL ENCOUNTER (INPATIENT)
Facility: CLINIC | Age: 73
LOS: 3 days | Discharge: HOME | DRG: 069 | End: 2023-11-12
Attending: EMERGENCY MEDICINE | Admitting: STUDENT IN AN ORGANIZED HEALTH CARE EDUCATION/TRAINING PROGRAM
Payer: MEDICARE

## 2023-11-08 DIAGNOSIS — N39.0 COMPLICATED UTI (URINARY TRACT INFECTION): ICD-10-CM

## 2023-11-08 DIAGNOSIS — G89.18 WORSENING OF NECK PAIN FOLLOWING SURGERY: ICD-10-CM

## 2023-11-08 DIAGNOSIS — N39.0 RECURRENT UTI: ICD-10-CM

## 2023-11-08 DIAGNOSIS — B17.9 ACUTE HEPATITIS: ICD-10-CM

## 2023-11-08 DIAGNOSIS — J45.41 REACTIVE AIRWAY DISEASE, MODERATE PERSISTENT, WITH ACUTE EXACERBATION: ICD-10-CM

## 2023-11-08 DIAGNOSIS — G45.9 TIA (TRANSIENT ISCHEMIC ATTACK): Primary | ICD-10-CM

## 2023-11-08 DIAGNOSIS — R52 PAIN: ICD-10-CM

## 2023-11-08 LAB
ALBUMIN SERPL BCG-MCNC: 3.9 G/DL (ref 3.5–5.2)
ALBUMIN UR-MCNC: NEGATIVE MG/DL
ALP SERPL-CCNC: 327 U/L (ref 35–104)
ALT SERPL W P-5'-P-CCNC: 149 U/L (ref 0–50)
ANION GAP SERPL CALCULATED.3IONS-SCNC: 14 MMOL/L (ref 7–15)
APPEARANCE UR: CLEAR
AST SERPL W P-5'-P-CCNC: 64 U/L (ref 0–45)
BASOPHILS # BLD AUTO: 0 10E3/UL (ref 0–0.2)
BASOPHILS NFR BLD AUTO: 0 %
BILIRUB SERPL-MCNC: 0.6 MG/DL
BILIRUB UR QL STRIP: NEGATIVE
BUN SERPL-MCNC: 13 MG/DL (ref 8–23)
CALCIUM SERPL-MCNC: 9.1 MG/DL (ref 8.8–10.2)
CHLORIDE SERPL-SCNC: 96 MMOL/L (ref 98–107)
COLOR UR AUTO: ABNORMAL
CREAT SERPL-MCNC: 1.05 MG/DL (ref 0.51–0.95)
CRP SERPL-MCNC: 57.89 MG/L
DEPRECATED HCO3 PLAS-SCNC: 25 MMOL/L (ref 22–29)
EGFRCR SERPLBLD CKD-EPI 2021: 56 ML/MIN/1.73M2
EOSINOPHIL # BLD AUTO: 0.3 10E3/UL (ref 0–0.7)
EOSINOPHIL NFR BLD AUTO: 3 %
ERYTHROCYTE [DISTWIDTH] IN BLOOD BY AUTOMATED COUNT: 12.5 % (ref 10–15)
GLUCOSE SERPL-MCNC: 119 MG/DL (ref 70–99)
GLUCOSE UR STRIP-MCNC: NEGATIVE MG/DL
HCO3 BLDV-SCNC: 29 MMOL/L (ref 21–28)
HCT VFR BLD AUTO: 42.5 % (ref 35–47)
HGB BLD-MCNC: 13.9 G/DL (ref 11.7–15.7)
HGB UR QL STRIP: ABNORMAL
HOLD SPECIMEN: NORMAL
IMM GRANULOCYTES # BLD: 0 10E3/UL
IMM GRANULOCYTES NFR BLD: 0 %
KETONES UR STRIP-MCNC: ABNORMAL MG/DL
LACTATE BLD-SCNC: 1.6 MMOL/L
LEUKOCYTE ESTERASE UR QL STRIP: ABNORMAL
LYMPHOCYTES # BLD AUTO: 1 10E3/UL (ref 0.8–5.3)
LYMPHOCYTES NFR BLD AUTO: 13 %
MCH RBC QN AUTO: 28 PG (ref 26.5–33)
MCHC RBC AUTO-ENTMCNC: 32.7 G/DL (ref 31.5–36.5)
MCV RBC AUTO: 86 FL (ref 78–100)
MONOCYTES # BLD AUTO: 0.5 10E3/UL (ref 0–1.3)
MONOCYTES NFR BLD AUTO: 7 %
MUCOUS THREADS #/AREA URNS LPF: PRESENT /LPF
NEUTROPHILS # BLD AUTO: 6 10E3/UL (ref 1.6–8.3)
NEUTROPHILS NFR BLD AUTO: 77 %
NITRATE UR QL: NEGATIVE
NRBC # BLD AUTO: 0 10E3/UL
NRBC BLD AUTO-RTO: 0 /100
PCO2 BLDV: 47 MM HG (ref 40–50)
PH BLDV: 7.4 [PH] (ref 7.32–7.43)
PH UR STRIP: 5.5 [PH] (ref 5–7)
PLATELET # BLD AUTO: 181 10E3/UL (ref 150–450)
PO2 BLDV: 21 MM HG (ref 25–47)
POTASSIUM SERPL-SCNC: 3.7 MMOL/L (ref 3.4–5.3)
PROCALCITONIN SERPL IA-MCNC: 0.26 NG/ML
PROT SERPL-MCNC: 7.5 G/DL (ref 6.4–8.3)
RBC # BLD AUTO: 4.96 10E6/UL (ref 3.8–5.2)
RBC URINE: 2 /HPF
SAO2 % BLDV: 33 % (ref 94–100)
SODIUM SERPL-SCNC: 135 MMOL/L (ref 135–145)
SP GR UR STRIP: 1.01 (ref 1–1.03)
SQUAMOUS EPITHELIAL: 3 /HPF
UROBILINOGEN UR STRIP-MCNC: NORMAL MG/DL
WBC # BLD AUTO: 7.9 10E3/UL (ref 4–11)
WBC URINE: 5 /HPF

## 2023-11-08 PROCEDURE — 87086 URINE CULTURE/COLONY COUNT: CPT | Performed by: EMERGENCY MEDICINE

## 2023-11-08 PROCEDURE — 72125 CT NECK SPINE W/O DYE: CPT | Mod: MA

## 2023-11-08 PROCEDURE — 83880 ASSAY OF NATRIURETIC PEPTIDE: CPT | Performed by: STUDENT IN AN ORGANIZED HEALTH CARE EDUCATION/TRAINING PROGRAM

## 2023-11-08 PROCEDURE — 84484 ASSAY OF TROPONIN QUANT: CPT | Performed by: STUDENT IN AN ORGANIZED HEALTH CARE EDUCATION/TRAINING PROGRAM

## 2023-11-08 PROCEDURE — 36415 COLL VENOUS BLD VENIPUNCTURE: CPT | Performed by: EMERGENCY MEDICINE

## 2023-11-08 PROCEDURE — 250N000011 HC RX IP 250 OP 636: Mod: JZ | Performed by: EMERGENCY MEDICINE

## 2023-11-08 PROCEDURE — 83605 ASSAY OF LACTIC ACID: CPT

## 2023-11-08 PROCEDURE — 84443 ASSAY THYROID STIM HORMONE: CPT | Performed by: STUDENT IN AN ORGANIZED HEALTH CARE EDUCATION/TRAINING PROGRAM

## 2023-11-08 PROCEDURE — 81001 URINALYSIS AUTO W/SCOPE: CPT | Performed by: EMERGENCY MEDICINE

## 2023-11-08 PROCEDURE — 70450 CT HEAD/BRAIN W/O DYE: CPT | Mod: MA

## 2023-11-08 PROCEDURE — 86140 C-REACTIVE PROTEIN: CPT | Performed by: EMERGENCY MEDICINE

## 2023-11-08 PROCEDURE — 96374 THER/PROPH/DIAG INJ IV PUSH: CPT | Mod: 59

## 2023-11-08 PROCEDURE — 87040 BLOOD CULTURE FOR BACTERIA: CPT | Performed by: EMERGENCY MEDICINE

## 2023-11-08 PROCEDURE — 258N000003 HC RX IP 258 OP 636: Performed by: EMERGENCY MEDICINE

## 2023-11-08 PROCEDURE — 82803 BLOOD GASES ANY COMBINATION: CPT

## 2023-11-08 PROCEDURE — 99285 EMERGENCY DEPT VISIT HI MDM: CPT | Mod: 25

## 2023-11-08 PROCEDURE — 84145 PROCALCITONIN (PCT): CPT | Performed by: EMERGENCY MEDICINE

## 2023-11-08 PROCEDURE — 96375 TX/PRO/DX INJ NEW DRUG ADDON: CPT

## 2023-11-08 PROCEDURE — 96361 HYDRATE IV INFUSION ADD-ON: CPT

## 2023-11-08 PROCEDURE — 85025 COMPLETE CBC W/AUTO DIFF WBC: CPT | Performed by: EMERGENCY MEDICINE

## 2023-11-08 PROCEDURE — 83036 HEMOGLOBIN GLYCOSYLATED A1C: CPT | Performed by: STUDENT IN AN ORGANIZED HEALTH CARE EDUCATION/TRAINING PROGRAM

## 2023-11-08 PROCEDURE — 85610 PROTHROMBIN TIME: CPT | Performed by: STUDENT IN AN ORGANIZED HEALTH CARE EDUCATION/TRAINING PROGRAM

## 2023-11-08 PROCEDURE — 250N000013 HC RX MED GY IP 250 OP 250 PS 637: Performed by: STUDENT IN AN ORGANIZED HEALTH CARE EDUCATION/TRAINING PROGRAM

## 2023-11-08 PROCEDURE — 80053 COMPREHEN METABOLIC PANEL: CPT | Performed by: EMERGENCY MEDICINE

## 2023-11-08 PROCEDURE — 82533 TOTAL CORTISOL: CPT | Performed by: STUDENT IN AN ORGANIZED HEALTH CARE EDUCATION/TRAINING PROGRAM

## 2023-11-08 RX ORDER — ACETAMINOPHEN 325 MG/1
975 TABLET ORAL ONCE
Status: COMPLETED | OUTPATIENT
Start: 2023-11-08 | End: 2023-11-08

## 2023-11-08 RX ORDER — MORPHINE SULFATE 4 MG/ML
4 INJECTION, SOLUTION INTRAMUSCULAR; INTRAVENOUS
Status: COMPLETED | OUTPATIENT
Start: 2023-11-08 | End: 2023-11-08

## 2023-11-08 RX ORDER — ONDANSETRON 2 MG/ML
4 INJECTION INTRAMUSCULAR; INTRAVENOUS EVERY 30 MIN PRN
Status: DISCONTINUED | OUTPATIENT
Start: 2023-11-08 | End: 2023-11-09

## 2023-11-08 RX ADMIN — ONDANSETRON 4 MG: 2 INJECTION INTRAMUSCULAR; INTRAVENOUS at 23:02

## 2023-11-08 RX ADMIN — MORPHINE SULFATE 4 MG: 4 INJECTION, SOLUTION INTRAMUSCULAR; INTRAVENOUS at 23:03

## 2023-11-08 RX ADMIN — SODIUM CHLORIDE 1000 ML: 9 INJECTION, SOLUTION INTRAVENOUS at 23:00

## 2023-11-08 RX ADMIN — ACETAMINOPHEN 975 MG: 325 TABLET, FILM COATED ORAL at 22:02

## 2023-11-08 ASSESSMENT — ACTIVITIES OF DAILY LIVING (ADL): ADLS_ACUITY_SCORE: 37

## 2023-11-08 NOTE — TELEPHONE ENCOUNTER
----- Message from Philip Feliciano MD sent at 11/8/2023  4:12 PM CST -----  Please call patient with results:    Liver enzymes newly elevated as is CRP (inflammatory marker).  If she feels worse would go to ED.  If not worse would recheck CMP and CRP Friday AM.  If trending down could be viral syndrome and could monitor.  If not improving would obtain CT scan abdomen.      Philip Feliciano MD

## 2023-11-09 ENCOUNTER — APPOINTMENT (OUTPATIENT)
Dept: CT IMAGING | Facility: CLINIC | Age: 73
DRG: 069 | End: 2023-11-09
Attending: STUDENT IN AN ORGANIZED HEALTH CARE EDUCATION/TRAINING PROGRAM
Payer: MEDICARE

## 2023-11-09 ENCOUNTER — APPOINTMENT (OUTPATIENT)
Dept: PHYSICAL THERAPY | Facility: CLINIC | Age: 73
DRG: 069 | End: 2023-11-09
Attending: STUDENT IN AN ORGANIZED HEALTH CARE EDUCATION/TRAINING PROGRAM
Payer: MEDICARE

## 2023-11-09 ENCOUNTER — APPOINTMENT (OUTPATIENT)
Dept: CARDIOLOGY | Facility: CLINIC | Age: 73
DRG: 069 | End: 2023-11-09
Attending: STUDENT IN AN ORGANIZED HEALTH CARE EDUCATION/TRAINING PROGRAM
Payer: MEDICARE

## 2023-11-09 ENCOUNTER — APPOINTMENT (OUTPATIENT)
Dept: MRI IMAGING | Facility: CLINIC | Age: 73
DRG: 069 | End: 2023-11-09
Attending: STUDENT IN AN ORGANIZED HEALTH CARE EDUCATION/TRAINING PROGRAM
Payer: MEDICARE

## 2023-11-09 ENCOUNTER — APPOINTMENT (OUTPATIENT)
Dept: GENERAL RADIOLOGY | Facility: CLINIC | Age: 73
DRG: 069 | End: 2023-11-09
Attending: STUDENT IN AN ORGANIZED HEALTH CARE EDUCATION/TRAINING PROGRAM
Payer: MEDICARE

## 2023-11-09 ENCOUNTER — APPOINTMENT (OUTPATIENT)
Dept: ULTRASOUND IMAGING | Facility: CLINIC | Age: 73
DRG: 069 | End: 2023-11-09
Attending: STUDENT IN AN ORGANIZED HEALTH CARE EDUCATION/TRAINING PROGRAM
Payer: MEDICARE

## 2023-11-09 ENCOUNTER — APPOINTMENT (OUTPATIENT)
Dept: MRI IMAGING | Facility: CLINIC | Age: 73
DRG: 069 | End: 2023-11-09
Attending: EMERGENCY MEDICINE
Payer: MEDICARE

## 2023-11-09 ENCOUNTER — APPOINTMENT (OUTPATIENT)
Dept: OCCUPATIONAL THERAPY | Facility: CLINIC | Age: 73
DRG: 069 | End: 2023-11-09
Attending: STUDENT IN AN ORGANIZED HEALTH CARE EDUCATION/TRAINING PROGRAM
Payer: MEDICARE

## 2023-11-09 PROBLEM — N39.0 COMPLICATED UTI (URINARY TRACT INFECTION): Status: ACTIVE | Noted: 2023-11-09

## 2023-11-09 PROBLEM — B17.9 ACUTE HEPATITIS: Status: ACTIVE | Noted: 2023-11-09

## 2023-11-09 PROBLEM — G89.18: Status: ACTIVE | Noted: 2023-11-09

## 2023-11-09 LAB
ALBUMIN SERPL BCG-MCNC: 3.2 G/DL (ref 3.5–5.2)
ALP SERPL-CCNC: 260 U/L (ref 35–104)
ALT SERPL W P-5'-P-CCNC: 106 U/L (ref 0–50)
ANION GAP SERPL CALCULATED.3IONS-SCNC: 10 MMOL/L (ref 7–15)
ANION GAP SERPL CALCULATED.3IONS-SCNC: 12 MMOL/L (ref 7–15)
APTT PPP: 30 SECONDS (ref 22–38)
AST SERPL W P-5'-P-CCNC: 47 U/L (ref 0–45)
BACTERIA UR CULT: NORMAL
BASOPHILS # BLD AUTO: 0 10E3/UL (ref 0–0.2)
BASOPHILS NFR BLD AUTO: 0 %
BILIRUB SERPL-MCNC: 0.6 MG/DL
BUN SERPL-MCNC: 9.3 MG/DL (ref 8–23)
BUN SERPL-MCNC: 9.8 MG/DL (ref 8–23)
CALCIUM SERPL-MCNC: 8.2 MG/DL (ref 8.8–10.2)
CALCIUM SERPL-MCNC: 8.3 MG/DL (ref 8.8–10.2)
CHLORIDE SERPL-SCNC: 100 MMOL/L (ref 98–107)
CHLORIDE SERPL-SCNC: 103 MMOL/L (ref 98–107)
CORTIS SERPL-MCNC: 30.8 UG/DL
CREAT SERPL-MCNC: 0.77 MG/DL (ref 0.51–0.95)
CREAT SERPL-MCNC: 0.88 MG/DL (ref 0.51–0.95)
DEPRECATED HCO3 PLAS-SCNC: 22 MMOL/L (ref 22–29)
DEPRECATED HCO3 PLAS-SCNC: 23 MMOL/L (ref 22–29)
EGFRCR SERPLBLD CKD-EPI 2021: 69 ML/MIN/1.73M2
EGFRCR SERPLBLD CKD-EPI 2021: 81 ML/MIN/1.73M2
EOSINOPHIL # BLD AUTO: 0.2 10E3/UL (ref 0–0.7)
EOSINOPHIL NFR BLD AUTO: 4 %
ERYTHROCYTE [DISTWIDTH] IN BLOOD BY AUTOMATED COUNT: 12.8 % (ref 10–15)
ERYTHROCYTE [SEDIMENTATION RATE] IN BLOOD BY WESTERGREN METHOD: 12 MM/HR (ref 0–30)
GLUCOSE BLDC GLUCOMTR-MCNC: 115 MG/DL (ref 70–99)
GLUCOSE SERPL-MCNC: 116 MG/DL (ref 70–99)
GLUCOSE SERPL-MCNC: 136 MG/DL (ref 70–99)
HBA1C MFR BLD: 5.7 %
HCT VFR BLD AUTO: 37.5 % (ref 35–47)
HGB BLD-MCNC: 12 G/DL (ref 11.7–15.7)
IMM GRANULOCYTES # BLD: 0 10E3/UL
IMM GRANULOCYTES NFR BLD: 0 %
INR PPP: 0.92 (ref 0.85–1.15)
LACTATE SERPL-SCNC: 0.8 MMOL/L (ref 0.7–2)
LVEF ECHO: NORMAL
LYMPHOCYTES # BLD AUTO: 1.2 10E3/UL (ref 0.8–5.3)
LYMPHOCYTES NFR BLD AUTO: 25 %
MCH RBC QN AUTO: 27.8 PG (ref 26.5–33)
MCHC RBC AUTO-ENTMCNC: 32 G/DL (ref 31.5–36.5)
MCV RBC AUTO: 87 FL (ref 78–100)
MONOCYTES # BLD AUTO: 0.6 10E3/UL (ref 0–1.3)
MONOCYTES NFR BLD AUTO: 12 %
NEUTROPHILS # BLD AUTO: 3 10E3/UL (ref 1.6–8.3)
NEUTROPHILS NFR BLD AUTO: 59 %
NRBC # BLD AUTO: 0 10E3/UL
NRBC BLD AUTO-RTO: 0 /100
NT-PROBNP SERPL-MCNC: 2186 PG/ML (ref 0–900)
PLATELET # BLD AUTO: 187 10E3/UL (ref 150–450)
POTASSIUM SERPL-SCNC: 3.8 MMOL/L (ref 3.4–5.3)
POTASSIUM SERPL-SCNC: 4 MMOL/L (ref 3.4–5.3)
PROCALCITONIN SERPL IA-MCNC: 0.33 NG/ML
PROT SERPL-MCNC: 6 G/DL (ref 6.4–8.3)
RBC # BLD AUTO: 4.32 10E6/UL (ref 3.8–5.2)
SARS-COV-2 RNA RESP QL NAA+PROBE: NEGATIVE
SODIUM SERPL-SCNC: 134 MMOL/L (ref 135–145)
SODIUM SERPL-SCNC: 136 MMOL/L (ref 135–145)
TROPONIN T SERPL HS-MCNC: 12 NG/L
TSH SERPL DL<=0.005 MIU/L-ACNC: 2.41 UIU/ML (ref 0.3–4.2)
WBC # BLD AUTO: 5.1 10E3/UL (ref 4–11)

## 2023-11-09 PROCEDURE — 99418 PROLNG IP/OBS E/M EA 15 MIN: CPT | Performed by: STUDENT IN AN ORGANIZED HEALTH CARE EDUCATION/TRAINING PROGRAM

## 2023-11-09 PROCEDURE — 93306 TTE W/DOPPLER COMPLETE: CPT

## 2023-11-09 PROCEDURE — 250N000009 HC RX 250: Performed by: STUDENT IN AN ORGANIZED HEALTH CARE EDUCATION/TRAINING PROGRAM

## 2023-11-09 PROCEDURE — 84145 PROCALCITONIN (PCT): CPT | Performed by: NURSE PRACTITIONER

## 2023-11-09 PROCEDURE — 250N000011 HC RX IP 250 OP 636: Performed by: STUDENT IN AN ORGANIZED HEALTH CARE EDUCATION/TRAINING PROGRAM

## 2023-11-09 PROCEDURE — 76705 ECHO EXAM OF ABDOMEN: CPT

## 2023-11-09 PROCEDURE — 255N000002 HC RX 255 OP 636: Performed by: EMERGENCY MEDICINE

## 2023-11-09 PROCEDURE — 87635 SARS-COV-2 COVID-19 AMP PRB: CPT | Performed by: STUDENT IN AN ORGANIZED HEALTH CARE EDUCATION/TRAINING PROGRAM

## 2023-11-09 PROCEDURE — 258N000003 HC RX IP 258 OP 636: Performed by: EMERGENCY MEDICINE

## 2023-11-09 PROCEDURE — 70553 MRI BRAIN STEM W/O & W/DYE: CPT | Mod: MG

## 2023-11-09 PROCEDURE — A9585 GADOBUTROL INJECTION: HCPCS | Performed by: STUDENT IN AN ORGANIZED HEALTH CARE EDUCATION/TRAINING PROGRAM

## 2023-11-09 PROCEDURE — 70498 CT ANGIOGRAPHY NECK: CPT | Mod: MG

## 2023-11-09 PROCEDURE — 71045 X-RAY EXAM CHEST 1 VIEW: CPT

## 2023-11-09 PROCEDURE — 97165 OT EVAL LOW COMPLEX 30 MIN: CPT | Mod: GO

## 2023-11-09 PROCEDURE — 99223 1ST HOSP IP/OBS HIGH 75: CPT | Mod: AI | Performed by: STUDENT IN AN ORGANIZED HEALTH CARE EDUCATION/TRAINING PROGRAM

## 2023-11-09 PROCEDURE — 99207 PR NO BILLABLE SERVICE THIS VISIT: CPT | Performed by: NURSE PRACTITIONER

## 2023-11-09 PROCEDURE — 258N000003 HC RX IP 258 OP 636: Performed by: INTERNAL MEDICINE

## 2023-11-09 PROCEDURE — 99207 PR APP CREDIT; MD BILLING SHARED VISIT: CPT | Performed by: INTERNAL MEDICINE

## 2023-11-09 PROCEDURE — 85025 COMPLETE CBC W/AUTO DIFF WBC: CPT | Performed by: NURSE PRACTITIONER

## 2023-11-09 PROCEDURE — 120N000001 HC R&B MED SURG/OB

## 2023-11-09 PROCEDURE — 87040 BLOOD CULTURE FOR BACTERIA: CPT | Performed by: EMERGENCY MEDICINE

## 2023-11-09 PROCEDURE — 250N000011 HC RX IP 250 OP 636: Mod: JZ | Performed by: INTERNAL MEDICINE

## 2023-11-09 PROCEDURE — 36415 COLL VENOUS BLD VENIPUNCTURE: CPT | Performed by: STUDENT IN AN ORGANIZED HEALTH CARE EDUCATION/TRAINING PROGRAM

## 2023-11-09 PROCEDURE — 85730 THROMBOPLASTIN TIME PARTIAL: CPT | Performed by: STUDENT IN AN ORGANIZED HEALTH CARE EDUCATION/TRAINING PROGRAM

## 2023-11-09 PROCEDURE — 93306 TTE W/DOPPLER COMPLETE: CPT | Mod: 26 | Performed by: INTERNAL MEDICINE

## 2023-11-09 PROCEDURE — 70450 CT HEAD/BRAIN W/O DYE: CPT | Mod: MG

## 2023-11-09 PROCEDURE — 70496 CT ANGIOGRAPHY HEAD: CPT | Mod: MG

## 2023-11-09 PROCEDURE — 250N000013 HC RX MED GY IP 250 OP 250 PS 637: Performed by: STUDENT IN AN ORGANIZED HEALTH CARE EDUCATION/TRAINING PROGRAM

## 2023-11-09 PROCEDURE — 36415 COLL VENOUS BLD VENIPUNCTURE: CPT | Performed by: NURSE PRACTITIONER

## 2023-11-09 PROCEDURE — 72156 MRI NECK SPINE W/O & W/DYE: CPT | Mod: MA

## 2023-11-09 PROCEDURE — 82310 ASSAY OF CALCIUM: CPT | Performed by: NURSE PRACTITIONER

## 2023-11-09 PROCEDURE — A9585 GADOBUTROL INJECTION: HCPCS | Performed by: EMERGENCY MEDICINE

## 2023-11-09 PROCEDURE — 99291 CRITICAL CARE FIRST HOUR: CPT | Performed by: NURSE PRACTITIONER

## 2023-11-09 PROCEDURE — 250N000011 HC RX IP 250 OP 636: Performed by: INTERNAL MEDICINE

## 2023-11-09 PROCEDURE — 255N000002 HC RX 255 OP 636: Performed by: STUDENT IN AN ORGANIZED HEALTH CARE EDUCATION/TRAINING PROGRAM

## 2023-11-09 PROCEDURE — 85652 RBC SED RATE AUTOMATED: CPT | Performed by: PSYCHIATRY & NEUROLOGY

## 2023-11-09 PROCEDURE — 250N000011 HC RX IP 250 OP 636: Performed by: EMERGENCY MEDICINE

## 2023-11-09 PROCEDURE — 82435 ASSAY OF BLOOD CHLORIDE: CPT | Performed by: STUDENT IN AN ORGANIZED HEALTH CARE EDUCATION/TRAINING PROGRAM

## 2023-11-09 PROCEDURE — 97535 SELF CARE MNGMENT TRAINING: CPT | Mod: GO

## 2023-11-09 PROCEDURE — 97161 PT EVAL LOW COMPLEX 20 MIN: CPT | Mod: GP

## 2023-11-09 PROCEDURE — 83605 ASSAY OF LACTIC ACID: CPT | Performed by: NURSE PRACTITIONER

## 2023-11-09 PROCEDURE — G1010 CDSM STANSON: HCPCS

## 2023-11-09 PROCEDURE — 97530 THERAPEUTIC ACTIVITIES: CPT | Mod: GP

## 2023-11-09 RX ORDER — NALOXONE HYDROCHLORIDE 0.4 MG/ML
0.4 INJECTION, SOLUTION INTRAMUSCULAR; INTRAVENOUS; SUBCUTANEOUS
Status: DISCONTINUED | OUTPATIENT
Start: 2023-11-09 | End: 2023-11-12 | Stop reason: HOSPADM

## 2023-11-09 RX ORDER — ONDANSETRON 2 MG/ML
4 INJECTION INTRAMUSCULAR; INTRAVENOUS EVERY 6 HOURS PRN
Status: DISCONTINUED | OUTPATIENT
Start: 2023-11-09 | End: 2023-11-12 | Stop reason: HOSPADM

## 2023-11-09 RX ORDER — AMOXICILLIN 250 MG
1 CAPSULE ORAL 2 TIMES DAILY PRN
Status: CANCELLED | OUTPATIENT
Start: 2023-11-09

## 2023-11-09 RX ORDER — ONDANSETRON 4 MG/1
4 TABLET, ORALLY DISINTEGRATING ORAL EVERY 6 HOURS PRN
Status: DISCONTINUED | OUTPATIENT
Start: 2023-11-09 | End: 2023-11-12 | Stop reason: HOSPADM

## 2023-11-09 RX ORDER — IOPAMIDOL 755 MG/ML
125 INJECTION, SOLUTION INTRAVASCULAR ONCE
Status: COMPLETED | OUTPATIENT
Start: 2023-11-09 | End: 2023-11-09

## 2023-11-09 RX ORDER — OXYCODONE HYDROCHLORIDE 5 MG/1
5 TABLET ORAL EVERY 4 HOURS PRN
Status: DISCONTINUED | OUTPATIENT
Start: 2023-11-09 | End: 2023-11-09

## 2023-11-09 RX ORDER — PROCHLORPERAZINE MALEATE 5 MG
5 TABLET ORAL EVERY 6 HOURS PRN
Status: DISCONTINUED | OUTPATIENT
Start: 2023-11-09 | End: 2023-11-12 | Stop reason: HOSPADM

## 2023-11-09 RX ORDER — NALOXONE HYDROCHLORIDE 0.4 MG/ML
0.2 INJECTION, SOLUTION INTRAMUSCULAR; INTRAVENOUS; SUBCUTANEOUS
Status: DISCONTINUED | OUTPATIENT
Start: 2023-11-09 | End: 2023-11-12 | Stop reason: HOSPADM

## 2023-11-09 RX ORDER — CEFTRIAXONE 2 G/1
2 INJECTION, POWDER, FOR SOLUTION INTRAMUSCULAR; INTRAVENOUS EVERY 12 HOURS
Status: DISCONTINUED | OUTPATIENT
Start: 2023-11-09 | End: 2023-11-09 | Stop reason: ALTCHOICE

## 2023-11-09 RX ORDER — CEFTRIAXONE 2 G/1
2 INJECTION, POWDER, FOR SOLUTION INTRAMUSCULAR; INTRAVENOUS EVERY 24 HOURS
Status: DISCONTINUED | OUTPATIENT
Start: 2023-11-09 | End: 2023-11-09 | Stop reason: DRUGHIGH

## 2023-11-09 RX ORDER — SODIUM CHLORIDE 9 MG/ML
INJECTION, SOLUTION INTRAVENOUS CONTINUOUS
Status: DISCONTINUED | OUTPATIENT
Start: 2023-11-09 | End: 2023-11-11

## 2023-11-09 RX ORDER — SENNOSIDES 8.6 MG
8.6 TABLET ORAL 2 TIMES DAILY
Status: DISCONTINUED | OUTPATIENT
Start: 2023-11-09 | End: 2023-11-12 | Stop reason: HOSPADM

## 2023-11-09 RX ORDER — AMOXICILLIN 250 MG
2 CAPSULE ORAL 2 TIMES DAILY PRN
Status: CANCELLED | OUTPATIENT
Start: 2023-11-09

## 2023-11-09 RX ORDER — IPRATROPIUM BROMIDE AND ALBUTEROL SULFATE 2.5; .5 MG/3ML; MG/3ML
3 SOLUTION RESPIRATORY (INHALATION) EVERY 4 HOURS PRN
Status: DISCONTINUED | OUTPATIENT
Start: 2023-11-09 | End: 2023-11-12 | Stop reason: HOSPADM

## 2023-11-09 RX ORDER — MORPHINE SULFATE 2 MG/ML
2 INJECTION, SOLUTION INTRAMUSCULAR; INTRAVENOUS ONCE
Status: COMPLETED | OUTPATIENT
Start: 2023-11-09 | End: 2023-11-09

## 2023-11-09 RX ORDER — PROCHLORPERAZINE 25 MG
12.5 SUPPOSITORY, RECTAL RECTAL EVERY 12 HOURS PRN
Status: DISCONTINUED | OUTPATIENT
Start: 2023-11-09 | End: 2023-11-12 | Stop reason: HOSPADM

## 2023-11-09 RX ORDER — GADOBUTROL 604.72 MG/ML
7 INJECTION INTRAVENOUS ONCE
Status: COMPLETED | OUTPATIENT
Start: 2023-11-09 | End: 2023-11-09

## 2023-11-09 RX ORDER — ACETAMINOPHEN 325 MG/1
325 TABLET ORAL EVERY 4 HOURS
Status: DISCONTINUED | OUTPATIENT
Start: 2023-11-09 | End: 2023-11-12 | Stop reason: HOSPADM

## 2023-11-09 RX ORDER — GADOBUTROL 604.72 MG/ML
6 INJECTION INTRAVENOUS ONCE
Status: COMPLETED | OUTPATIENT
Start: 2023-11-09 | End: 2023-11-09

## 2023-11-09 RX ADMIN — SODIUM CHLORIDE 1000 ML: 9 INJECTION, SOLUTION INTRAVENOUS at 02:02

## 2023-11-09 RX ADMIN — SODIUM CHLORIDE: 9 INJECTION, SOLUTION INTRAVENOUS at 18:45

## 2023-11-09 RX ADMIN — GADOBUTROL 6 ML: 604.72 INJECTION INTRAVENOUS at 21:20

## 2023-11-09 RX ADMIN — ACETAMINOPHEN 325 MG: 325 TABLET, FILM COATED ORAL at 15:04

## 2023-11-09 RX ADMIN — OXYCODONE HYDROCHLORIDE 5 MG: 5 TABLET ORAL at 09:36

## 2023-11-09 RX ADMIN — ACETAMINOPHEN 325 MG: 325 TABLET, FILM COATED ORAL at 17:37

## 2023-11-09 RX ADMIN — SODIUM CHLORIDE 100 ML: 9 INJECTION, SOLUTION INTRAVENOUS at 16:38

## 2023-11-09 RX ADMIN — ACETAMINOPHEN 325 MG: 325 TABLET, FILM COATED ORAL at 09:33

## 2023-11-09 RX ADMIN — GADOBUTROL 7 ML: 604.72 INJECTION INTRAVENOUS at 01:45

## 2023-11-09 RX ADMIN — MORPHINE SULFATE 2 MG: 2 INJECTION, SOLUTION INTRAMUSCULAR; INTRAVENOUS at 02:01

## 2023-11-09 RX ADMIN — OXYCODONE HYDROCHLORIDE 5 MG: 5 TABLET ORAL at 03:39

## 2023-11-09 RX ADMIN — ACETAMINOPHEN 325 MG: 325 TABLET, FILM COATED ORAL at 21:47

## 2023-11-09 RX ADMIN — IOPAMIDOL 75 ML: 755 INJECTION, SOLUTION INTRAVENOUS at 16:37

## 2023-11-09 RX ADMIN — ACETAMINOPHEN 325 MG: 325 TABLET, FILM COATED ORAL at 05:21

## 2023-11-09 RX ADMIN — SENNOSIDES 8.6 MG: 8.6 TABLET, FILM COATED ORAL at 09:33

## 2023-11-09 RX ADMIN — ACYCLOVIR SODIUM 950 MG: 50 INJECTION, SOLUTION INTRAVENOUS at 22:36

## 2023-11-09 RX ADMIN — ACETAMINOPHEN 325 MG: 325 TABLET, FILM COATED ORAL at 02:01

## 2023-11-09 RX ADMIN — MEROPENEM 2 G: 1 INJECTION, POWDER, FOR SOLUTION INTRAVENOUS at 21:43

## 2023-11-09 ASSESSMENT — ACTIVITIES OF DAILY LIVING (ADL)
ADLS_ACUITY_SCORE: 41
ADLS_ACUITY_SCORE: 37
ADLS_ACUITY_SCORE: 41
PREVIOUS_RESPONSIBILITIES: MEAL PREP;HOUSEKEEPING;LAUNDRY;SHOPPING;MEDICATION MANAGEMENT;DRIVING
ADLS_ACUITY_SCORE: 37
ADLS_ACUITY_SCORE: 37
ADLS_ACUITY_SCORE: 39
ADLS_ACUITY_SCORE: 37
ADLS_ACUITY_SCORE: 41

## 2023-11-09 NOTE — PROGRESS NOTES
11/09/23 1550   Appointment Info   Signing Clinician's Name / Credentials (PT) Neela Gomez DPT   Living Environment   People in Home spouse   Current Living Arrangements house   Home Accessibility stairs to enter home;stairs within home   Number of Stairs, Main Entrance 2   Stair Railings, Main Entrance railings on both sides of stairs   Number of Stairs, Within Home, Primary greater than 10 stairs  (Can stay on main floor)   Stair Railings, Within Home, Primary railings on both sides of stairs   Transportation Anticipated car, drives self;family or friend will provide   Self-Care   Usual Activity Tolerance good   Current Activity Tolerance poor   Equipment Currently Used at Home none   Fall history within last six months yes   Activity/Exercise/Self-Care Comment IND at baseline   General Information   Onset of Illness/Injury or Date of Surgery 11/08/23   Referring Physician Tanya Law MD   Patient/Family Therapy Goals Statement (PT) Return home   Pertinent History of Current Problem (include personal factors and/or comorbidities that impact the POC) Patient is a 73 year old female with history of C5-C6 discetomy, CVA, vit D def, CKD, OA, PreDM, Asthma, constipation, R breast cancer s/p b/l mastectomy, neck injury, and C5-6 and C6-7 fusions. She presneted toe the ED with diffuse myalgia, fevers, headache and neck pain.  Emergency department evaluation showed unremarkable vital signs.  Laboratory evaluation showed mild LFT abnormalities, CRP 57.89, BNP 2186, procalcitonin 0.26, unremarkable CBC, and unremarkable urine analysis.  Blood and urine cultures were sent.  Testing for COVID was negative.  CT of head and C-spine showed no acute process.  Chest x-ray was unremarkable.  MRI of C-spine was unremarkable.  Ultrasound of abdomen was unremarkable.  She was admitted for further cares.  She was seen by neurosurgery and there was not thought to be complication of her previous C-spine surgery.   Echocardiogram is pending   Existing Precautions/Restrictions fall   Cognition   Affect/Mental Status (Cognition) confused   Orientation Status (Cognition) oriented to;person;place   Follows Commands (Cognition) follows one-step commands;75-90% accuracy   Pain Assessment   Patient Currently in Pain   (Pt reports headache)   Integumentary/Edema   Integumentary/Edema no deficits were identifed   Posture    Posture Forward head position;Protracted shoulders   Range of Motion (ROM)   ROM Comment Neck ROM limited by previous surgery   Strength (Manual Muscle Testing)   Strength (Manual Muscle Testing) Deficits observed during functional mobility   Bed Mobility   Comment, (Bed Mobility) Supine to sit SBA   Transfers   Comment, (Transfers) Not tested d/t weakness and fatigue   Gait/Stairs (Locomotion)   Comment, (Gait/Stairs) Not tested d/t weakness and fatigue   Balance   Balance Comments Poor seated balance   Clinical Impression   Criteria for Skilled Therapeutic Intervention Yes, treatment indicated   PT Diagnosis (PT) Impaired functional mobility   Influenced by the following impairments Pain, weakness, decreased activity tolerance, impaired balance   Functional limitations due to impairments Limited functional mobility requiring assist   Clinical Presentation (PT Evaluation Complexity) evolving   Clinical Presentation Rationale Based on PMH, current status, and social support   Clinical Decision Making (Complexity) low complexity   Planned Therapy Interventions (PT) balance training;bed mobility training;patient/family education;gait training;stair training;transfer training;progressive activity/exercise   Risk & Benefits of therapy have been explained evaluation/treatment results reviewed;care plan/treatment goals reviewed;risks/benefits reviewed;current/potential barriers reviewed;participants voiced agreement with care plan;participants included;patient   PT Total Evaluation Time   PT Eval, Low Complexity Minutes  "(45220) 10   Physical Therapy Goals   PT Frequency Daily   PT Predicted Duration/Target Date for Goal Attainment 11/16/23   PT Goals Bed Mobility;Transfers;Gait;Stairs   PT: Bed Mobility Independent;Supine to/from sit   PT: Transfers Modified independent;Sit to/from stand;Assistive device   PT: Gait Supervision/stand-by assist;Assistive device;100 feet   PT: Stairs Supervision/stand-by assist;2 stairs   Interventions   Interventions Quick Adds Therapeutic Activity   Therapeutic Activity   Therapeutic Activities: dynamic activities to improve functional performance Minutes (50798) 25   Symptoms Noted During/After Treatment Fatigue;Dizziness   Treatment Detail/Skilled Intervention OT finishing session upon therapist arrival, pt in bed, agreeable to therapy. Pt w/ mildly disoriented, some confusion, but pt aware of deficits. Pt reporting \"it feels weird to know I'm confused.\" Pt sat up EOB, able to shift hips foward and place feet on floor. Pt reported she is having difficulty seeing out of her L eye. Therapist performed vision screen, appeared to have L field cut, pt reports this is new. Pt's /62. Pt reported feeling mildly dizzy. Pt w/ poor seated balance, unable to hold self up w/out support, returned to supine w/ CGA. Pt in bed, therapist updated RN and code stroke was called. Therapist in room when stroke team arrived to answer questions.   PT Discharge Planning   PT Plan Check status. Progress bed mob, assess transfers, amb. Monitor vitals   PT Discharge Recommendation (DC Rec) Transitional Care Facility   PT Rationale for DC Rec Pt currently Ax1 for mobility, unable to perform transfers and ambulation d/t weakness and fatigue. Currently recommending TCU.   PT Brief overview of current status Ax1   Total Session Time   Timed Code Treatment Minutes 25   Total Session Time (sum of timed and untimed services) 35     "

## 2023-11-09 NOTE — TELEPHONE ENCOUNTER
Called and spoke with patient regarding lab results per Dr Feliciano. Patient reports that she is feeling no better and maybe worse with significant dysuria, urgency and pain and well as headache. She is encourage to be seen in ED for urgent evaluation. Adilene Vale

## 2023-11-09 NOTE — CONSULTS
Orthopedic Surgery Consult / History and Physical    Macey Romero MRN# [id number]   Age: 73 year old YOB: 1950     Date of Admission:   11/8/2023  Date of Consult: 11/09/23   Location:    [facility]             Assessment and Plan:   Assessment:  72 yo female 7 weeks s/p C5-6 ACDF with removal of prior instrumentation C6-7 (DOS 9/20/2023) performed by Dr. Galindo admitted to the hospital for evaluation and management of diffuse myalgia, fevers, headache and neck pain. On exam patient does demonstrate bilateral  strength weakness though no explanatory neurologic compression on MRI - updated cervical MRI and CT reviewed by Dr. Galindo with no concerning or explanatory findings. Suspect alternative reason for generalized weakness. Return of mild RUE paresthesias perhaps inflammatory in nature. Ultimately, no post-op spine-related source identified to explain current symptom profile at this time. Continue workup from Medicine standpoint.      Plan:  No post-op spine-related source identified to explain current symptom profile at this time.   Continue workup from Medicine standpoint.   Notify ortho spine with any progressive neurologic deficits or concerns.  Follow up in Dr. Galindo's clinic for post-operative check in following discharge - will coordinate.              Chief Complaint:   Myalgia, fevers, headache and neck pain 7 weeks s/p C5-6 ACDF with removal of prior instrumentation C6-7 (DOS 9/20/2023) performed by Dr. Galindo.          History of Present Illness:   This patient is a 73 year old female with a significant past medical history of CVA, vit D def, CKD, OA, PreDM, asthma, constipation, R breast cancer s/p b/l mastectomy who is 7 weeks s/p C5-6 ACDF with removal of prior instrumentation C6-7 (DOS 9/20/2023)  admitted to the hospital for evaluation and management of diffuse myalgia, fevers, headache and neck pain.    She was seen in our clinic for her 2 week  post-operative appointment on 10/05/2023 at [facility] at which time she was progressing well with significant improvement of pre-operative nerve pain/tingling, tremor, and UE strength. She did have some residual swallowing difficulty at that time though this was gradually improving.    Upon discussion today, patient reports she began to develop myalgia, fevers, headache and neck pain yesterday and was advised by her PCP office to present to the ER for evaluation of this along with abnormal lab values suggestive of bladder infection.     She reports the majority of her neck pain localized to the posterior cervical and interscapular musculature residual from the procedure, along with some pain in the anterior portion of the neck. Reports mild tingling/paresthesias in the three middle digits of the RUE, though recalls a similar profile pre-operatively. Also notes generalized  weakness. Otherwise denies any new or worsening neurologic deficits or concerns. Denies saddle anesthesia or bowel/bladder incontinence/retention.     She reports swallowing function continues to progress well. No incisional concerns.     Besides this setback she felt she was progressing very well in terms of post-operative recovery during the past few weeks.              Past Medical History:     Past Medical History:   Diagnosis Date    Breast CA in situ 1987    Cancer (H) 1987    Right Breast treated with surgery    Cerebral infarction (H)     Chronic constipation     Fibromyalgia     History of blood transfusion     Malignant neoplasm of female breast, unspecified estrogen receptor status, unspecified laterality, unspecified site of breast (H) 06/17/2021    Meningitis     Several times as an adult    Osteoarthritis 02/01/2011    Osteopenia 02/01/2011    Pneumonia     Pterygium eye 08/26/2013    Reactive airway disease 02/01/2011    Seasonal allergies     Shingles     Prior to 2008 - scalp    Skin cancer     SCC - hand, left nose     Uncomplicated asthma             Past Surgical History:     Past Surgical History:   Procedure Laterality Date    anterior c-disc fusion      ARTHROPLASTY KNEE Left 10/17/2022    Procedure: LEFT TOTAL KNEE ARTHROPLASTY;  Surgeon: Jax Le MD;  Location:  OR    BLEPHAROPLASTY, BROW LIFT BILATERAL, COMBINED Bilateral 6/18/2018    Procedure: COMBINED BLEPHAROPLASTY, BROW LIFT BILATERAL;  BILATERAL UPPER LID BLEPHAROPLASTY; BILATERAL BROW PTOSIS REPAIR;  Surgeon: Сергей Walters MD;  Location:  EC    CHOLECYSTECTOMY      COLONOSCOPY N/A 10/19/2017    Procedure: COLONOSCOPY;  COLONOSCOPY;  Surgeon: Niko Wong MD;  Location:  GI    COLONOSCOPY N/A 8/27/2022    Procedure: COLONOSCOPY, WITH POLYPECTOMY AND BIOPSY;  Surgeon: Abraham Rogers MD;  Location:  GI    D & C      Bleeding before hysterectomy    DISCECTOMY, FUSION CERVICAL ANTERIOR ONE LEVEL, COMBINED N/A 9/20/2023    Procedure: ANTERIOR CERVICAL 5 TO CERVICAL 6 DISCECTOMY AND FUSION;  Surgeon: Alex Galindo MD;  Location:  OR    ENDOSCOPY  04/21/08    ESOPHAGOSCOPY, GASTROSCOPY, DUODENOSCOPY (EGD), COMBINED N/A 8/27/2022    Procedure: ESOPHAGOGASTRODUODENOSCOPY, WITH BIOPSY;  Surgeon: Abraham Rogers MD;  Location:  GI    HYSTERECTOMY, MARCUS      Ovaries and tubes out due to breast cancer    JOINT REPLACEMTN, KNEE RT/LT Right 01/2011    Joint Replacement knee RT, with tibial straightening (2 replacements)    MAMMOPLASTY AUGMENTATION BILATERAL      breast ca     MASTECTOMY, SIMPLE RT/LT/CLAIRE Bilateral 1989    Mastectomy Simple RT/LT/CLAIRE    MOHS MICROGRAPHIC PROCEDURE      Left lateral nose    OPEN REDUCTION INTERNAL FIXATION ANKLE  8/27/2013    Procedure: OPEN REDUCTION INTERNAL FIXATION ANKLE;  RIGHT OPEN REDUCTION INTERNAL FIXATION ANKLE WITH LIGAMENT REPAIR;  Surgeon: Nando Chang MD;  Location:  OR    PTERYGIUM WITH CONJUNCTIVAL AUTOLOGOUS TRANSPLANT Left 8/29/2016    Procedure: PTERYGIUM WITH CONJUNCTIVAL  AUTOLOGOUS TRANSPLANT;  Surgeon: Сергей Walters MD;  Location:  EC    REPAIR LIGAMENT ANKLE  2013    Procedure: REPAIR LIGAMENT ANKLE;;  Surgeon: Nando Chang MD;  Location:  OR    SALIVARY GLAND SURGERY Left     Stone removal     SIGMOIDOSCOPY FLEXIBLE N/A 2022    Procedure: FLEXIBLE SIGMOIDOSCOPY;  Surgeon: Niko Wong MD;  Location:  GI    TONSILLECTOMY              Social History:       Social History     Tobacco Use    Smoking status: Some Days     Types: Cigarettes     Last attempt to quit: 2016     Years since quittin.8    Smokeless tobacco: Never    Tobacco comments:     quit but  still smokes, but not in house   Substance Use Topics    Alcohol use: No     Alcohol/week: 0.0 standard drinks of alcohol            Family History:     Family History   Problem Relation Age of Onset    Respiratory Father     Cancer Brother         sarcoidosis    Diabetes Brother     Cerebrovascular Disease Brother     Liver Disease Brother             Allergies:     Allergies   Allergen Reactions    Levaquin Hives and Itching    Pcn [Penicillins] Hives    Tetanus Toxoid Anaphylaxis    Tetanus Toxoids Anaphylaxis    Erythromycin GI Disturbance    Amoxicillin     Duloxetine Nausea     Other reaction(s): Jittery    Silicone Rash            Medications:     Medications Prior to Admission   Medication Sig Dispense Refill Last Dose    acetaminophen (TYLENOL) 500 MG tablet Take 2 tablets (1,000 mg) by mouth every 8 hours as needed for pain (mild pain) 90 tablet 0 2023    albuterol (PROAIR HFA/PROVENTIL HFA/VENTOLIN HFA) 108 (90 Base) MCG/ACT inhaler Inhale 2 puffs into the lungs every 6 hours (Patient taking differently: Inhale 2 puffs into the lungs every 6 hours as needed for shortness of breath) 18 g 1 prn    budesonide-formoterol (SYMBICORT) 160-4.5 MCG/ACT Inhaler Inhale 2 puffs into the lungs daily (Patient taking differently: Inhale 2 puffs into the lungs at bedtime Pt does not  take year round, only seasonally in the fall) 10.2 g 3 11/7/2023    hydrocortisone 2.5 % cream Apply topically daily as needed   prn    metroNIDAZOLE (METROCREAM) 0.75 % external cream Apply topically every evening as needed   prn    nitroFURantoin macrocrystal (MACRODANTIN) 100 MG capsule Take 1 capsule (100 mg) by mouth 2 times daily for 10 days 20 capsule 0 11/8/2023 at am    vitamin D3 (CHOLECALCIFEROL) 50 mcg (2000 units) tablet Take 1 tablet by mouth daily   11/8/2023             Review of Systems:   A 10 point review of systems was performed, and was negative except as noted in HPI.         Physical Exam:   Patient Vitals for the past 8 hrs:   BP Temp Temp src Pulse Resp SpO2 Weight   11/09/23 0740 105/59 99.5  F (37.5  C) Oral 94 18 92 % --   11/09/23 0656 -- -- -- -- -- -- (!) 161.3 kg (355 lb 9.6 oz)   11/09/23 0600 93/59 99.1  F (37.3  C) Oral 94 18 92 % --       General: awake, alert, cooperative, no apparent distress, appears stated age  HEENT: neck without any notable soft tissue swelling   Respiratory: breathing non-labored  Cardiovascular: skin warm and well perfused  Skin: no rashes or lesions  Lymph:  No abnormal swelling of uninjured extremities  Heme:  No apparent petechiae  Neurological: CN II-XII grossly intact to conversation   Musculoskeletal:           Spine:   Skin: Incision well-healed without evidence of infection    Sensation:      R       L    C5:   Intact   Intact    C6:     Intact   Intact    C7:   Intact   Intact    C8:   Intact   Intact     Motor:     R L    C5: Deltoid   5  5    C6:   Biceps    5  5    C7: Triceps    5  5    C8:     4 4    T1: Intrinsics  5 5         Sensation:      R       L    L3:   Intact   Intact    L4:   Intact   Intact    L5:   Intact   Intact    S1:   Intact   Intact     Motor:     R L    L3: Psoas    5  5    L4:   Quad    5  5    L4: TA   5 5    L5: EHL    5  5    S1: Eversion/PF  5  5            Data:   CRP elevated at 57.89 mg/L. See imaging review  below.     Imaging:  Updated cervical MRI and CT reviewed by Dr. Galindo with no new compressive sources or concerning findings noted. Hardware appears stable.     EXAM: MR CERVICAL SPINE W/O AND W CONTRAST  LOCATION: [facility]  DATE: 11/9/2023                                                         IMPRESSION:  1.  Status post anterior cervical discectomy and fusion at C5-C6 and interbody fusion at the C6-C7 level. No postsurgical complication is identified.  2.  Underlying degenerative changes at multiple levels without central canal or high-grade foraminal stenosis.    EXAM: CT HEAD W/O CONTRAST, CT CERVICAL SPINE W/O CONTRAST  LOCATION: [facility]  DATE: 11/8/2023     CERVICAL SPINE CT:  1.  No CT evidence for acute fracture or post traumatic subluxation.  2.  No high-grade central or foraminal stenosis.            Sheeba Blankenship PA-C  Orthopaedic Spine Surgery  [facility]

## 2023-11-09 NOTE — ED NOTES
"[facility]  ED Nurse Handoff Report    ED Chief complaint:     ED Diagnosis:   Final diagnoses:   Worsening of neck pain following surgery   Complicated UTI (urinary tract infection)   Acute hepatitis       Code Status: Per the admitting provider     Allergies:   Allergies   Allergen Reactions    Levaquin Hives and Itching    Pcn [Penicillins] Hives    Tetanus Toxoid Anaphylaxis    Tetanus Toxoids Anaphylaxis    Erythromycin GI Disturbance    Amoxicillin     Duloxetine Nausea     Other reaction(s): Jittery    Silicone Rash       Patient Story:  Pt reports her PMD  instructed her to come to the ER  to  be evaluated for  abnormal Labs, bladder infection, headache, necl pain and difficulty swallowing for the past 2 weeks and that her headaches  pain start in the front of her head, over the top and radiates to the back of her head. Hx: Cervical supine surgery 1.5 months ago.     Focused Assessment:   A/O x 4,  Airway patent, able to communicate needs to staff without difficulty. Lungs clear bilaterally on auscultation. C/O neck pain and \" It hurts to turn my head side-to-side. Pt denied blurred vision. Pt currently at MRI.     Treatments and/or interventions provided: Labs, Morphine, Zofran, Tylenol, CT  Results for orders placed or performed during the hospital encounter of 11/08/23   CT Head w/o Contrast     Status: None    Narrative    EXAM: CT HEAD W/O CONTRAST, CT CERVICAL SPINE W/O CONTRAST  LOCATION: [facility]  DATE: 11/8/2023    INDICATION: Headache, neck pain  COMPARISON: None.  TECHNIQUE:   1) Routine CT Head without IV contrast. Multiplanar reformats. Dose reduction techniques were used.  2) Routine CT Cervical Spine without IV contrast. Multiplanar reformats. Dose reduction techniques were used.    FINDINGS:   HEAD CT:   INTRACRANIAL CONTENTS: No intracranial hemorrhage, extraaxial collection, or mass effect.  No CT evidence of acute infarct. Mild presumed chronic " small vessel ischemic changes. Mild generalized volume loss. No hydrocephalus.     VISUALIZED ORBITS/SINUSES/MASTOIDS: Prior bilateral cataract surgery. Visualized portions of the orbits are otherwise unremarkable. No paranasal sinus mucosal disease. No middle ear or mastoid effusion.    BONES/SOFT TISSUES: No acute abnormality.    CERVICAL SPINE CT:   VERTEBRA: Normal vertebral body heights and straightened alignment. No fracture or posttraumatic subluxation. Status post ACDF C5-C7, with hardware at C5-C6.     CANAL/FORAMINA: No canal or neural foraminal stenosis.    PARASPINAL: No extraspinal abnormality. Visualized lung fields are clear.      Impression    IMPRESSION:  HEAD CT:  1.  No CT evidence for acute intracranial process.  2.  Mild chronic microvascular ischemic changes as above.    CERVICAL SPINE CT:  1.  No CT evidence for acute fracture or post traumatic subluxation.  2.  No high-grade central or foraminal stenosis.   Cervical spine CT w/o contrast     Status: None    Narrative    EXAM: CT HEAD W/O CONTRAST, CT CERVICAL SPINE W/O CONTRAST  LOCATION: [facility]  DATE: 11/8/2023    INDICATION: Headache, neck pain  COMPARISON: None.  TECHNIQUE:   1) Routine CT Head without IV contrast. Multiplanar reformats. Dose reduction techniques were used.  2) Routine CT Cervical Spine without IV contrast. Multiplanar reformats. Dose reduction techniques were used.    FINDINGS:   HEAD CT:   INTRACRANIAL CONTENTS: No intracranial hemorrhage, extraaxial collection, or mass effect.  No CT evidence of acute infarct. Mild presumed chronic small vessel ischemic changes. Mild generalized volume loss. No hydrocephalus.     VISUALIZED ORBITS/SINUSES/MASTOIDS: Prior bilateral cataract surgery. Visualized portions of the orbits are otherwise unremarkable. No paranasal sinus mucosal disease. No middle ear or mastoid effusion.    BONES/SOFT TISSUES: No acute abnormality.    CERVICAL SPINE CT:   VERTEBRA:  Normal vertebral body heights and straightened alignment. No fracture or posttraumatic subluxation. Status post ACDF C5-C7, with hardware at C5-C6.     CANAL/FORAMINA: No canal or neural foraminal stenosis.    PARASPINAL: No extraspinal abnormality. Visualized lung fields are clear.      Impression    IMPRESSION:  HEAD CT:  1.  No CT evidence for acute intracranial process.  2.  Mild chronic microvascular ischemic changes as above.    CERVICAL SPINE CT:  1.  No CT evidence for acute fracture or post traumatic subluxation.  2.  No high-grade central or foraminal stenosis.   Tyro Draw *Canceled*     Status: None ()    Narrative    The following orders were created for panel order Tyro Draw.  Procedure                               Abnormality         Status                     ---------                               -----------         ------                       Please view results for these tests on the individual orders.   UA with Microscopic reflex to Culture     Status: Abnormal    Specimen: Urine, Midstream   Result Value Ref Range    Color Urine Light Yellow Colorless, Straw, Light Yellow, Yellow    Appearance Urine Clear Clear    Glucose Urine Negative Negative mg/dL    Bilirubin Urine Negative Negative    Ketones Urine Trace (A) Negative mg/dL    Specific Gravity Urine 1.011 1.003 - 1.035    Blood Urine Trace (A) Negative    pH Urine 5.5 5.0 - 7.0    Protein Albumin Urine Negative Negative mg/dL    Urobilinogen Urine Normal Normal, 2.0 mg/dL    Nitrite Urine Negative Negative    Leukocyte Esterase Urine Small (A) Negative    Mucus Urine Present (A) None Seen /LPF    RBC Urine 2 <=2 /HPF    WBC Urine 5 <=5 /HPF    Squamous Epithelials Urine 3 (H) <=1 /HPF    Narrative    Urine Culture not indicated   Tyro Draw     Status: None    Narrative    The following orders were created for panel order Tyro Draw.  Procedure                               Abnormality         Status                      ---------                               -----------         ------                     Extra Blood Culture Bottle[[id number]]                       Final result               Extra Blue Top Tube[[id number]]                              Final result               Extra Red Top Tube[[id number]]                               Final result               Extra Green Top (Lithium...[[id number]]                      Final result               Extra Purple Top Tube[[id number]]                            Final result                 Please view results for these tests on the individual orders.   Extra Blood Culture Bottle     Status: None   Result Value Ref Range    Hold Specimen JIC    Extra Blue Top Tube     Status: None   Result Value Ref Range    Hold Specimen JIC    Extra Red Top Tube     Status: None   Result Value Ref Range    Hold Specimen JIC    Extra Green Top (Lithium Heparin) Tube     Status: None   Result Value Ref Range    Hold Specimen JIC    Extra Purple Top Tube     Status: None   Result Value Ref Range    Hold Specimen JIC    Procalcitonin     Status: Abnormal   Result Value Ref Range    Procalcitonin 0.26 (H) <0.05 ng/mL   CRP inflammation     Status: Abnormal   Result Value Ref Range    CRP Inflammation 57.89 (H) <5.00 mg/L   Comprehensive metabolic panel     Status: Abnormal   Result Value Ref Range    Sodium 135 135 - 145 mmol/L    Potassium 3.7 3.4 - 5.3 mmol/L    Carbon Dioxide (CO2) 25 22 - 29 mmol/L    Anion Gap 14 7 - 15 mmol/L    Urea Nitrogen 13.0 8.0 - 23.0 mg/dL    Creatinine 1.05 (H) 0.51 - 0.95 mg/dL    GFR Estimate 56 (L) >60 mL/min/1.73m2    Calcium 9.1 8.8 - 10.2 mg/dL    Chloride 96 (L) 98 - 107 mmol/L    Glucose 119 (H) 70 - 99 mg/dL    Alkaline Phosphatase 327 (H) 35 - 104 U/L    AST 64 (H) 0 - 45 U/L     (H) 0 - 50 U/L    Protein Total 7.5 6.4 - 8.3 g/dL    Albumin 3.9 3.5 - 5.2 g/dL    Bilirubin Total 0.6 <=1.2 mg/dL   CBC with platelets and differential     Status: None   Result  Value Ref Range    WBC Count 7.9 4.0 - 11.0 10e3/uL    RBC Count 4.96 3.80 - 5.20 10e6/uL    Hemoglobin 13.9 11.7 - 15.7 g/dL    Hematocrit 42.5 35.0 - 47.0 %    MCV 86 78 - 100 fL    MCH 28.0 26.5 - 33.0 pg    MCHC 32.7 31.5 - 36.5 g/dL    RDW 12.5 10.0 - 15.0 %    Platelet Count 181 150 - 450 10e3/uL    % Neutrophils 77 %    % Lymphocytes 13 %    % Monocytes 7 %    % Eosinophils 3 %    % Basophils 0 %    % Immature Granulocytes 0 %    NRBCs per 100 WBC 0 <1 /100    Absolute Neutrophils 6.0 1.6 - 8.3 10e3/uL    Absolute Lymphocytes 1.0 0.8 - 5.3 10e3/uL    Absolute Monocytes 0.5 0.0 - 1.3 10e3/uL    Absolute Eosinophils 0.3 0.0 - 0.7 10e3/uL    Absolute Basophils 0.0 0.0 - 0.2 10e3/uL    Absolute Immature Granulocytes 0.0 <=0.4 10e3/uL    Absolute NRBCs 0.0 10e3/uL   iStat Gases (lactate) venous, POCT     Status: Abnormal   Result Value Ref Range    Lactic Acid POCT 1.6 <=2.0 mmol/L    Bicarbonate Venous POCT 29 (H) 21 - 28 mmol/L    O2 Sat, Venous POCT 33 (L) 94 - 100 %    pCO2 Venous POCT 47 40 - 50 mm Hg    pH Venous POCT 7.40 7.32 - 7.43    pO2 Venous POCT 21 (L) 25 - 47 mm Hg   CBC with platelets differential     Status: None    Narrative    The following orders were created for panel order CBC with platelets differential.  Procedure                               Abnormality         Status                     ---------                               -----------         ------                     CBC with platelets and d...[[id number]]                      Final result                 Please view results for these tests on the individual orders.      Patient's response to treatments and/or interventions: Stable     To be done/followed up on inpatient unit:  See Orders     Does this patient have any cognitive concerns?:  No     Activity level - Baseline/Home:  Independent  Activity Level - Current:   Independent    Patient's Preferred language: English   Needed?: No    Isolation: None  Infection: Not  Applicable  Patient tested for COVID 19 prior to admission: YES  Bariatric?: No    Vital Signs:   Vitals:    11/08/23 2200 11/08/23 2202 11/08/23 2230 11/08/23 2350   BP: 116/58  131/63 111/55   Pulse: 103  102 99   Resp:    20   Temp:  99.6  F (37.6  C)     TempSrc:       SpO2: 99%  99% 93%       Cardiac Rhythm:     Was the PSS-3 completed:   Yes  What interventions are required if any?      Family Comments: Not present in ED   OBS brochure/video discussed/provided to patient/family: Yes              Name of person given brochure if not patient: N/A              Relationship to patient: N/A    For the majority of the shift this patient's behavior was Green.   Behavioral interventions performed were N/A .    ED NURSE PHONE NUMBER: *[phone]

## 2023-11-09 NOTE — PROGRESS NOTES
"   11/09/23 1500   Appointment Info   Signing Clinician's Name / Credentials (OT) Grace Villasenor, OTR/L   Living Environment   People in Home spouse   Current Living Arrangements house   Home Accessibility stairs to enter home;stairs within home   Number of Stairs, Main Entrance 2   Stair Railings, Main Entrance railings on both sides of stairs   Number of Stairs, Within Home, Primary greater than 10 stairs  (Pt has upstairs and basement - pt states she sleeps on main floor/laundry on main floor)   Stair Railings, Within Home, Primary railings on both sides of stairs   Transportation Anticipated car, drives self;family or friend will provide   Living Environment Comments Walk in shower, grab bars, has built in shower bench, tall toilet. Pt reports no AD use baseline.   Self-Care   Usual Activity Tolerance good   Regular Exercise No   Equipment Currently Used at Home other (see comments)  (Pt has sock aide, currently borrowing reacher.)   Fall history within last six months yes   Number of times patient has fallen within last six months   (\"I've fallen a few times\")   Activity/Exercise/Self-Care Comment Ind ADLs baseline.   Instrumental Activities of Daily Living (IADL)   Previous Responsibilities meal prep;housekeeping;laundry;shopping;medication management;driving   General Information   Onset of Illness/Injury or Date of Surgery 11/08/23   Referring Physician Tanya Law MD   Patient/Family Therapy Goal Statement (OT) Not stated   Additional Occupational Profile Info/Pertinent History of Current Problem Per chart: 73F hx of C5-C6 discetomy, CVA, vit D def, CKD, OA, PreDM, Asthma, constipation, R breast cancer s/p b/l mastectomy, presenting with diffuse myalgia, fevers, headache and neck pain.   Existing Precautions/Restrictions fall   Limitations/Impairments safety/cognitive   Cognitive Status Examination   Orientation Status person;place  (year only)   Affect/Mental Status (Cognitive) confused   Follows " Commands follows one-step commands;25-49% accuracy;delayed response/completion;increased processing time needed;physical/tactile prompts required;repetition of directions required;verbal cues/prompting required   Safety Deficit ability to follow commands;problem-solving   Cognitive Status Comments Pt with difficulty following cues throughout session   Visual Perception   Visual Acuity Intact 2 of 2 numbers in each quandrant   Impact of Vision Impairment on Function (Vision) Pt able to see phone to answer when spouse called   Sensory   Sensory Comments Pt does not report numbness/tingling   Pain Assessment   Patient Currently in Pain   (Pt reports headache)   Range of Motion Comprehensive   Comment, General Range of Motion BUEs WFL   Strength Comprehensive (MMT)   Comment, General Manual Muscle Testing (MMT) Assessment [facility]   Coordination   Upper Extremity Coordination No deficits were identified   Coordination Comments Pt able to manage grasping cup/sandwhich   Bed Mobility   Bed Mobility sit-supine   Comment (Bed Mobility) SBA   Transfers   Transfers toilet transfer;sit-stand transfer   Sit-Stand Transfer   Sit/Stand Transfer Comments Min A   Toilet Transfer   Toilet Transfer Comments Min A   Balance   Balance Comments Unsteadiness with mobility in room with FWW   Activities of Daily Living   BADL Assessment/Intervention lower body dressing;toileting   Lower Body Dressing Assessment/Training   Comment, (Lower Body Dressing) Min A   Toileting   Comment, (Toileting) SBA   Clinical Impression   Criteria for Skilled Therapeutic Interventions Met (OT) Yes, treatment indicated   OT Diagnosis Decreased ind with I/ADLs   OT Problem List-Impairments impacting ADL problems related to;activity tolerance impaired;balance;cognition;mobility   Assessment of Occupational Performance 3-5 Performance Deficits   Identified Performance Deficits dressing, bathing, toileting, IADLs   Planned Therapy Interventions (OT) ADL  retraining;IADL retraining;cognition;transfer training   Clinical Decision Making Complexity (OT) problem focused assessment/low complexity   Risk & Benefits of therapy have been explained patient   OT Total Evaluation Time   OT Eval, Low Complexity Minutes (76398) 12   OT Goals   Therapy Frequency (OT) Daily   OT Predicted Duration/Target Date for Goal Attainment 11/16/23   OT Goals Hygiene/Grooming;Upper Body Dressing;Lower Body Dressing;Toilet Transfer/Toileting;Cognition   OT: Hygiene/Grooming modified independent;while standing;using adaptive equipment  (FWW)   OT: Upper Body Dressing Modified independent   OT: Lower Body Dressing Modified independent;using adaptive equipment  (FWW)   OT: Toilet Transfer/Toileting Modified independent;toilet transfer;cleaning and garment management;using adaptive equipment  (FWW)   OT: Cognitive Patient/caregiver will verbalize understanding of cognitive assessment results/recommendations as needed for safe discharge planning   Self-Care/Home Management   Self-Care/Home Mgmt/ADL, Compensatory, Meal Prep Minutes (22615) 23   Symptoms Noted During/After Treatment (Meal Preparation/Planning Training) fatigue   Treatment Detail/Skilled Intervention Pt greeted sitting EOB with RN, agreeable to OT. RN reporting pt mildly confused. Pt agreeable. Pt Ox 2 to place and self, pt able to state year, and state month when given 3 choices and day of week when given 2 choices. Pt with noted difficulty following commands throughout session. Pt stands from EOB with Min A and FWW, unsteadiness noted. Pt ambulates to BR with CGA-Min A and FWW, VC to stop and look to left to avoid running into door. Pt sits on toilet with Min A for slow controlled sit and commode over toilet, VCs to reach back for commode. Pt voids into toilet, SBA, pt with Min A to don brief and doff undergarments. Pt ambulates back bed with Mod VCs and Min A for balance, pt intermittently closing eyes on way back to bed, pt  "denies dizziness. Pt sits EOB with CGA and scoots along edge of bed with CGA and Mod VCs to scoot to the Right towards the head of bed. Pt lays in bed with SBA and HOB raised. Pt with set  up A for meal, able to take bites of sandwhich with VCs. Pt reports \"this is hard when I can't see things\" - OT screening vision, pt able to identify 2 of 2 numbers in each of four quandrants. PT entering session. Pt aware of current confusion - pt reports that her thinking feels weird, pt pass off to PT, spoke with pt's spouse, RN updated.   OT Discharge Planning   OT Plan TB dressing, g/h sinkside, cog screen   OT Discharge Recommendation (DC Rec) Transitional Care Facility   OT Rationale for DC Rec Pt functioning below baseline level with noted impairment in cognition, balance, mobility, activity priyanka, impacting safety/ind with I/ADLs. Pt resides with spouse at baseline. Currently, pt A x 1 for functional mobility in room and requires increased I/ADL assist. Rec skilled TCU for increased I/ADL ind prior to return home.   OT Brief overview of current status See above   Total Session Time   Timed Code Treatment Minutes 23   Total Session Time (sum of timed and untimed services) 35     "

## 2023-11-09 NOTE — PROGRESS NOTES
RECEIVING UNIT ED HANDOFF REVIEW    ED Nurse Handoff Report was reviewed by: Raphael Moon RN on November 9, 2023 at 1:40 AM

## 2023-11-09 NOTE — CODE/RAPID RESPONSE
"[facility]    RRT Note  11/9/2023   Time Called: 1601    Code Status: Full Code    I was called to evaluate Macey Romero, who is a 73 year old female who was admitted on 11/8/2023 for intractable pain s/p C5-C6 disctectomy. PMH includes previous CVA,  retinal tear, CKD, asthma, pre DM.  Patient reported acute onset visual changes, and sensory changes in the face, upper and lower extremities during physical therapy, prompting RRT.    Assessment & Plan     Concern for TIA vs CVA   Acute Onset Vision Changes  Persistent Headache  Fever  Unilateral Sensation changes : On my arrival, the patient is semiupright, in no distress.  She reported to her nurse she was noticing some abnormal vision while working with physical therapy.  She describes her vision as a black semicircle \" that we are all living in\".  Neurological exam is notable for dulled sensation on the left side of her body including face, upper and lower extremities.  Remainder of exam is intact.  She reports a history of a retinal tear resulting in some intermittent diplopia, that she is not currently experiencing.  She is never had any vision changes like this otherwise.  She is at a persistent headache since admission.      Of note, per nursing, low-grade temp of 100.8, treated with 325 mg acetaminophen.  She received 5 mg p.o. oxycodone at 9:36 AM, low likelihood this is resulting from narcotic.    INTERVENTIONS:  -CBC, BMP, procalcitonin, lactic acid  -UA with reflex to culture  - code stroke neuro imaging    Plan:  Due to the unilateral sensory changes associated with vision changes/headache, a code stroke was called.  Neuroimaging is negative for acute etiology to explain symptoms.    -MRI brain with without contrast    Diagnosis: suspected toxic encephalopathy in the setting of possible infection, unclear source    Given low-grade fever in the setting of recent surgery, will obtain initial infectious work-up.   -Discussed with " Dr. Isabel Sanderson who will pursue possible lumbar puncture in the setting of fever, elevated CRP, headache, neck stiffness.  -We will defer further work-up, results and plan to hospitalist physician    Last 24H PRN:     oxyCODONE (ROXICODONE) tablet 5 mg, 5 mg at 11/09/23 0936      At the conclusion of this RRT patient was hemodynamically stable and will remain on current unit. HEr neurological changes had resolved at the conclusion of the RRT.     Discussed with and defer further cares to Dr. Sanderson, Hospitalist      [facility]  Securely message with the Vocera Web Console (learn more here)  Text page via [employer] Paging/Directory        Physical Exam   Vital Signs with Ranges:  Temp:  [98.9  F (37.2  C)-100.8  F (38.2  C)] 98.9  F (37.2  C)  Pulse:  [] 106  Resp:  [18-20] 20  BP: ()/(54-63) 115/56  SpO2:  [91 %-100 %] 91 %  No intake/output data recorded.    Orthostatic:                Physical Exam  Vitals and nursing note reviewed.   Constitutional:       General: She is not in acute distress.     Appearance: Normal appearance. She is not ill-appearing.   HENT:      Head: Atraumatic.      Mouth/Throat:      Mouth: Mucous membranes are moist.   Eyes:      General: No visual field deficit.  Cardiovascular:      Rate and Rhythm: Normal rate.   Pulmonary:      Effort: Pulmonary effort is normal.      Breath sounds: Normal breath sounds.   Abdominal:      General: Abdomen is flat.      Palpations: Abdomen is soft.   Musculoskeletal:         General: Normal range of motion.   Skin:     General: Skin is warm and dry.      Capillary Refill: Capillary refill takes less than 2 seconds.   Neurological:      Mental Status: She is alert and oriented to person, place, and time. Mental status is at baseline.      GCS: GCS eye subscore is 4. GCS verbal subscore is 5. GCS motor subscore is 6.      Cranial Nerves: No dysarthria or facial asymmetry.      Sensory: Sensory  deficit present.      Motor: No weakness, tremor or abnormal muscle tone.        Data     IMAGING: (X-ray/CT/MRI)   Recent Results (from the past 24 hour(s))   CT Head w/o Contrast    Narrative    EXAM: CT HEAD W/O CONTRAST, CT CERVICAL SPINE W/O CONTRAST  LOCATION: [facility]  DATE: 11/8/2023    INDICATION: Headache, neck pain  COMPARISON: None.  TECHNIQUE:   1) Routine CT Head without IV contrast. Multiplanar reformats. Dose reduction techniques were used.  2) Routine CT Cervical Spine without IV contrast. Multiplanar reformats. Dose reduction techniques were used.    FINDINGS:   HEAD CT:   INTRACRANIAL CONTENTS: No intracranial hemorrhage, extraaxial collection, or mass effect.  No CT evidence of acute infarct. Mild presumed chronic small vessel ischemic changes. Mild generalized volume loss. No hydrocephalus.     VISUALIZED ORBITS/SINUSES/MASTOIDS: Prior bilateral cataract surgery. Visualized portions of the orbits are otherwise unremarkable. No paranasal sinus mucosal disease. No middle ear or mastoid effusion.    BONES/SOFT TISSUES: No acute abnormality.    CERVICAL SPINE CT:   VERTEBRA: Normal vertebral body heights and straightened alignment. No fracture or posttraumatic subluxation. Status post ACDF C5-C7, with hardware at C5-C6.     CANAL/FORAMINA: No canal or neural foraminal stenosis.    PARASPINAL: No extraspinal abnormality. Visualized lung fields are clear.      Impression    IMPRESSION:  HEAD CT:  1.  No CT evidence for acute intracranial process.  2.  Mild chronic microvascular ischemic changes as above.    CERVICAL SPINE CT:  1.  No CT evidence for acute fracture or post traumatic subluxation.  2.  No high-grade central or foraminal stenosis.   Cervical spine CT w/o contrast    Narrative    EXAM: CT HEAD W/O CONTRAST, CT CERVICAL SPINE W/O CONTRAST  LOCATION: [facility]  DATE: 11/8/2023    INDICATION: Headache, neck pain  COMPARISON:  None.  TECHNIQUE:   1) Routine CT Head without IV contrast. Multiplanar reformats. Dose reduction techniques were used.  2) Routine CT Cervical Spine without IV contrast. Multiplanar reformats. Dose reduction techniques were used.    FINDINGS:   HEAD CT:   INTRACRANIAL CONTENTS: No intracranial hemorrhage, extraaxial collection, or mass effect.  No CT evidence of acute infarct. Mild presumed chronic small vessel ischemic changes. Mild generalized volume loss. No hydrocephalus.     VISUALIZED ORBITS/SINUSES/MASTOIDS: Prior bilateral cataract surgery. Visualized portions of the orbits are otherwise unremarkable. No paranasal sinus mucosal disease. No middle ear or mastoid effusion.    BONES/SOFT TISSUES: No acute abnormality.    CERVICAL SPINE CT:   VERTEBRA: Normal vertebral body heights and straightened alignment. No fracture or posttraumatic subluxation. Status post ACDF C5-C7, with hardware at C5-C6.     CANAL/FORAMINA: No canal or neural foraminal stenosis.    PARASPINAL: No extraspinal abnormality. Visualized lung fields are clear.      Impression    IMPRESSION:  HEAD CT:  1.  No CT evidence for acute intracranial process.  2.  Mild chronic microvascular ischemic changes as above.    CERVICAL SPINE CT:  1.  No CT evidence for acute fracture or post traumatic subluxation.  2.  No high-grade central or foraminal stenosis.   MR Cervical Spine w/o & w Contrast    Narrative    EXAM: MR CERVICAL SPINE W/O AND W CONTRAST  LOCATION: [facility]  DATE: 11/9/2023    INDICATION: Neck pain post operative.  COMPARISON: CT 11/08/2023, x-ray 09/21/2023.  CONTRAST: 7 mL Gadavist.  TECHNIQUE: MRI Cervical Spine without and with IV contrast.    FINDINGS:   Normal vertebral body heights, alignment and marrow signal. No abnormal cord signal. No extraspinal abnormality.    Craniovertebral junction and C1-C2: Normal.    C2-C3: Normal disc height. No herniation. Normal facets. No spinal canal or neural  foraminal stenosis.     C3-C4: Mild loss of disc height. No herniation. Mild right and mild to moderate left facet arthropathy. No central canal stenosis. Mild bilateral foraminal stenosis.     C4-C5: Mild to moderate loss of disc height. No herniation. Mild facet arthropathy. No central canal stenosis. Mild bilateral foraminal stenosis.     C5-C6: Status post anterior cervical discectomy and fusion. Normal facets. No central canal stenosis. Mild to moderate bilateral foraminal stenosis.     C6-C7: Interbody fusion. Normal facets. No central canal or foraminal stenosis.     C7-T1: Normal disc height. No herniation. Normal facets. No spinal canal or neural foraminal stenosis.      Impression    IMPRESSION:  1.  Status post anterior cervical discectomy and fusion at C5-C6 and interbody fusion at the C6-C7 level. No postsurgical complication is identified.  2.  Underlying degenerative changes at multiple levels without central canal or high-grade foraminal stenosis.     XR Chest Port 1 View    Narrative    EXAM: XR CHEST PORT 1 VIEW  LOCATION: [facility]  DATE: 11/9/2023    INDICATION: Inspiratory crackles  COMPARISON: CT and chest x-ray on 12/12/2021      Impression    IMPRESSION: Negative chest.   US Abdomen Limited    Narrative    EXAM: US ABDOMEN LIMITED  LOCATION: [facility]  DATE: 11/9/2023    INDICATION: LFT derangement  COMPARISON: PET/CT 10/27/2023. CT abdomen and pelvis 08/25/2022.  TECHNIQUE: Limited abdominal ultrasound.    FINDINGS:    GALLBLADDER: Postcholecystectomy.    BILE DUCTS: No biliary dilatation. The common duct measures 6 mm.    LIVER: Normal parenchyma with smooth contour. No focal mass.    RIGHT KIDNEY: No hydronephrosis.    PANCREAS: The visualized portions are normal.    No ascites.      Impression    IMPRESSION:  1.  Postcholecystectomy.  2.  No other significant abnormality.           CBC with Diff:  Recent Labs   Lab Test 11/08/23  9755  "  WBC 7.9   HGB 13.9   MCV 86      INR 0.92      No results found for: \"RETICABSCT\"  No results found for: \"RETP\"    Comprehensive Metabolic Panel:  Recent Labs   Lab 11/09/23  1603 11/09/23  0756   NA  --  136   POTASSIUM  --  4.0   CHLORIDE  --  103   CO2  --  23   ANIONGAP  --  10   * 116*   BUN  --  9.8   CR  --  0.77   GFRESTIMATED  --  81   DONNA  --  8.2*   PROTTOTAL  --  6.0*   ALBUMIN  --  3.2*   BILITOTAL  --  0.6   ALKPHOS  --  260*   AST  --  47*   ALT  --  106*         Time Spent on this Encounter     Medical Decision Making             CRITICAL CARE TIME  I spent 31 minutes (0731 - 1632) of critical care time on the unit/floor managing the care of Macey Romero. Upon evaluation, this patient had a high probability of imminent or life-threatening deterioration due to acute neurological change which required my direct attention, intervention, and personal management. 100% of my time was spent at the bedside counseling the patient and/or coordinating care regarding services listed in this note.    "

## 2023-11-09 NOTE — PHARMACY-ADMISSION MEDICATION HISTORY
Pharmacist Admission Medication History    Admission medication history is complete. The information provided in this note is only as accurate as the sources available at the time of the update.    Information Source(s): Patient and CareEverywhere/SureScripts via in-person    Pertinent Information: Pt takes Symbicort inhaler only in the fall and believes her current inhaler is likely .  No history of filling in SureScripts in the last year.    Changes made to PTA medication list:  Added: None  Deleted: None  Changed: Symbicort     Medication History Completed By: Deandra Haley RPH 2023 8:47 AM    Prior to Admission medications    Medication Sig Last Dose Taking? Auth Provider Long Term End Date   acetaminophen (TYLENOL) 500 MG tablet Take 2 tablets (1,000 mg) by mouth every 8 hours as needed for pain (mild pain) 2023 Yes Silvia Huff PA-C     albuterol (PROAIR HFA/PROVENTIL HFA/VENTOLIN HFA) 108 (90 Base) MCG/ACT inhaler Inhale 2 puffs into the lungs every 6 hours  Patient taking differently: Inhale 2 puffs into the lungs every 6 hours as needed for shortness of breath prn Yes Long Ferrari MD Yes    budesonide-formoterol (SYMBICORT) 160-4.5 MCG/ACT Inhaler Inhale 2 puffs into the lungs daily  Patient taking differently: Inhale 2 puffs into the lungs at bedtime Pt does not take year round, only seasonally in the fall 2023 Yes Deandra Strong, APRN CNP Yes    hydrocortisone 2.5 % cream Apply topically daily as needed prn Yes Unknown, Entered By History     metroNIDAZOLE (METROCREAM) 0.75 % external cream Apply topically every evening as needed prn Yes Reported, Patient     nitroFURantoin macrocrystal (MACRODANTIN) 100 MG capsule Take 1 capsule (100 mg) by mouth 2 times daily for 10 days 2023 at am Yes Long Ferrari MD  23   vitamin D3 (CHOLECALCIFEROL) 50 mcg (2000 units) tablet Take 1 tablet by mouth daily 2023 Yes Reported, Patient

## 2023-11-09 NOTE — PROGRESS NOTES
A/OX4, VSS on RA, denies CP, SOB this shift, able to make needs known to staff without any difficulties, Up 1GB/W,  CMS intact ex Numbness on R hand at baseline, pain managed with PRN oxycodone.

## 2023-11-09 NOTE — PROGRESS NOTES
[facility]    Medicine Progress Note - Hospitalist Service    Date of Admission:  11/8/2023    Interval History:   Patient complaining of a lot of pain in her neck going up the back up of her head and into her forehead. Behind her eyes.   She has not done any therapy since surgery as thought she was to wait for 6 weeks. Started to have headaches at the time of changing her antibiotics for her urine and not sure if the Abx.   Extremely stiff and bending her neck forward makes pain worse in back of her neck. Also feels light makes headaches worse,   Getting more tingling into her left arm after surgery Reports pain prior now more tingling and numbness.       Assessment & Plan   73F hx of C5-C6 discetomy, CVA, vit D def, CKD, OA, PreDM, Asthma, constipation, R breast cancer s/p b/l mastectomy, presenting with diffuse myalgia, fevers, headache and neck pain.     Headaches since 10/24   Diffuse myalgia  S/p C5-C6 anterior discectomy 9/2023  History 11/9 that headaches noted since in the clinic with UTI which was 10/24. Reported that Abx got switched and not sure if this added to her headaches or making her feel worse.   She describes headaches up the back of her neck, over head and into forehead. (Behind bilateral eyes) worse with moving chin to chest  and light. (No history of migraines)  + ROS in HPI as noted developed severe leg pain/weakness that spread to the entire body, associated with severe neck/headpain, dry mouth, chest pain,SOB, fever, decreased appetite and posterior neck fullness. Has been taking tylenol/advil with no improvement. Associated with dry mouth.  On exam she is extremely stiff in her neck. Does not even turn her head. Muscles are tight all through her neck and into shoulders.   11/8 CT head negative and CT cervical spine with no acute fracture or post traumatic subluxation   11/9 MRI cervical spine. S/p anterior cervical discectomy and fusion at C5-C6 and interbody  "fusion at the C6-C7 level. No post surgical complications.   She has not done therapy post surgery as thought she was to wait 6 weeks.   Ortho consult to reassess   ? If headache myofascial pain and stiffness given imaging not revealing for acute process.   11/9 Neurology consult for headache initially plan  11/9 orthopedic consult.  No postop source identified to explain her profile of symptoms.  Continue medicine work-up.  Further evaluation 11/9 secondary to stroke symptoms concern    STROKE TEAM/symptoms   RRT at approximately 4:44 PM.  Patient reported that she was having abnormal vision while working with physical therapy.  Described a black semicervical.  Neurologically appeared to have a dull sensation of the left side of her body with remained in tact.   11/9 stroke team called.  Work-up completed>> discussion of CT findings at the time completed of her CTA with stroke neurology further input based on  review.   At approximately the same time she also developed a fever of 100.8 this afternoon.  11/9 went back to talk with patient regarding her temp of 100.8.  Her daughter Марина was on the phone and went through the findings thus far at her hospital stay  Not clear that we have a source of her fever but additionally her focal symptoms are in her neck with neck stiffness and recent eye symptoms  When discussing with the patient she states that she had a cold sore on her lip.  She reports having \"2 prior episodes of meningitis\" uncertain of time date or organism  At this time will proceed with ordering LP and cover in the interim with acyclovir, she can tolerate ceftriaxone and vancomycin  Her CRP is also elevated.  It is possible she could still have a coinciding other infection however in her presentation further diagnostic studies recommended with risks benefits and patient and her daughter are understanding of this and in agreement.     Reported fevers  Elevated inflammatory marker  Tmax 99.5  WBC " "normal  Recent UTI with enterococcus.   UA on admit blood trace. Leuk small  CXR no infiltrate   Tmax 100.8 this afternoon.  Aware of fever at the time of her stroke symptoms  11/9 COVID-negative  CRP elevated at 57  Procalcitonin 0.26  See conversation as noted above  Liver test slightly elevated but improving    Elevated liver test  Labs improving since admission  >>64>>47  >> 149>>  Bilirubin remain normal  Alk phos 346>> 327>> 260  Right upper quadrant ultrasound post cholecystectomy no significant abnormality    Abnormal lungs sounds  On exam has crackles in the left anterior chest area    ?? Do CT of chest.   CXR negative     Cardiac murmur   Cardiac murmur with 2/6 in LSB  11/10 echo with normal EF.  No wall motion abnormality. Grade 1 or early diastolic dysfunction.   CXR with no acute findings    BNP elevated     Vitamin D def  --resume supplements on discharge     Urinary frequency   Cystitis  patient has been urgency, frequency and dysurea for 1 month, despite being on Bactrim followed by Macrobid  UA shows only WBC 5, and small Leuk esterase, with no nitrites.  Discussion regarding patient may not have acute infection but interstitial cystitis  Outpatient urology vs GYN     Hx CVA, no residual deficits  Had R weakness and slurred speech in 2005  Unknown why patient not on aspirin/statin  See stroke assessment as above      IgA MGUS  manages as an outpatient. 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 Monitor.            Diet: Combination Diet Renal Diet (non-dialysis); Moderate Consistent Carb (60 g CHO per Meal) Diet    DVT Prophylaxis: Pneumatic Compression Devices  Boudreaux Catheter: Not present  Lines: None     Cardiac Monitoring: None  Code Status: Full Code      Clinically Significant Risk Factors Present on Admission          # Hypocalcemia: Lowest Ca = 8.2 mg/dL in last 2 days, will monitor and replace as appropriate     # Hypoalbuminemia: Lowest albumin = 3.2 g/dL at 11/9/2023  7:56 AM, will monitor as appropriate              # Asthma: noted on problem list        Disposition Plan      Expected Discharge Date: 11/10/2023                    MYRON GARCIA MD  Hospitalist Service  [facility]  Securely message with [employer] (more info)  Text page via [employer] Paging/Directory   ______________________________________________________________________    Physical Exam   Vital Signs: Temp: 99.5  F (37.5  C) Temp src: Oral BP: 105/59 Pulse: 94   Resp: 18 SpO2: 92 % O2 Device: None (Room air)    Weight: 355 lbs 9.63 oz    General Appearance: Awake and alert.   Stiffness in the cervical area   Unable to turn her neck: stiffness with flexion   Respiratory: Clear to auscultation bilaterally   Cardiovascular: Regular rate with no gallop or rub  GI: + BS soft, non tender  Skin: No rashes       Medical Decision Making       55 MINUTES SPENT BY ME on the date of service doing chart review, history, exam, documentation & further activities per the note.      Data     I have personally reviewed the following data over the past 24 hrs:    5.1  \   12.0   / 187     134 (L) 100 9.3 /  136 (H)   3.8 22 0.88 \     ALT: 106 (H) AST: 47 (H) AP: 260 (H) TBILI: 0.6   ALB: 3.2 (L) TOT PROTEIN: 6.0 (L) LIPASE: N/A     Trop: 12 BNP: 2,186 (H)     TSH: 2.41 T4: N/A A1C: 5.7 (H)     Procal: 0.33 (H) CRP: 57.89 (H) Lactic Acid: 0.8       INR:  0.92 PTT:  30   D-dimer:  N/A Fibrinogen:  N/A        Imaging results reviewed over the past 24 hrs:   Recent Results (from the past 24 hour(s))   CT Head w/o Contrast    Narrative    EXAM: CT HEAD W/O CONTRAST, CT CERVICAL SPINE W/O CONTRAST  LOCATION: [facility]  DATE: 11/8/2023    INDICATION: Headache, neck pain  COMPARISON: None.  TECHNIQUE:   1) Routine CT Head without IV contrast. Multiplanar reformats. Dose reduction techniques were used.  2) Routine CT Cervical Spine without IV contrast. Multiplanar reformats. Dose reduction techniques were used.    FINDINGS:   HEAD CT:   INTRACRANIAL CONTENTS: No intracranial hemorrhage, extraaxial collection, or mass effect.  No CT evidence of acute infarct. Mild presumed chronic small vessel ischemic changes. Mild generalized volume loss. No hydrocephalus.     VISUALIZED ORBITS/SINUSES/MASTOIDS: Prior bilateral cataract surgery. Visualized portions of the orbits are otherwise unremarkable. No paranasal sinus mucosal disease. No middle ear or mastoid effusion.    BONES/SOFT TISSUES: No acute abnormality.    CERVICAL SPINE CT:   VERTEBRA: Normal vertebral body heights and straightened alignment. No fracture or posttraumatic subluxation. Status post ACDF C5-C7, with hardware at C5-C6.     CANAL/FORAMINA: No canal or neural foraminal stenosis.    PARASPINAL: No extraspinal abnormality. Visualized lung fields are clear.      Impression    IMPRESSION:  HEAD CT:  1.  No CT evidence for acute intracranial process.  2.  Mild chronic microvascular ischemic changes as above.    CERVICAL SPINE CT:  1.  No CT evidence for acute fracture or post traumatic subluxation.  2.  No high-grade central or foraminal stenosis.   Cervical spine CT w/o contrast    Narrative    EXAM: CT HEAD W/O CONTRAST, CT CERVICAL SPINE W/O CONTRAST  LOCATION: [facility]  DATE: 11/8/2023    INDICATION: Headache, neck pain  COMPARISON: None.  TECHNIQUE:   1) Routine CT Head without IV contrast. Multiplanar  reformats. Dose reduction techniques were used.  2) Routine CT Cervical Spine without IV contrast. Multiplanar reformats. Dose reduction techniques were used.    FINDINGS:   HEAD CT:   INTRACRANIAL CONTENTS: No intracranial hemorrhage, extraaxial collection, or mass effect.  No CT evidence of acute infarct. Mild presumed chronic small vessel ischemic changes. Mild generalized volume loss. No hydrocephalus.     VISUALIZED ORBITS/SINUSES/MASTOIDS: Prior bilateral cataract surgery. Visualized portions of the orbits are otherwise unremarkable. No paranasal sinus mucosal disease. No middle ear or mastoid effusion.    BONES/SOFT TISSUES: No acute abnormality.    CERVICAL SPINE CT:   VERTEBRA: Normal vertebral body heights and straightened alignment. No fracture or posttraumatic subluxation. Status post ACDF C5-C7, with hardware at C5-C6.     CANAL/FORAMINA: No canal or neural foraminal stenosis.    PARASPINAL: No extraspinal abnormality. Visualized lung fields are clear.      Impression    IMPRESSION:  HEAD CT:  1.  No CT evidence for acute intracranial process.  2.  Mild chronic microvascular ischemic changes as above.    CERVICAL SPINE CT:  1.  No CT evidence for acute fracture or post traumatic subluxation.  2.  No high-grade central or foraminal stenosis.   MR Cervical Spine w/o & w Contrast    Narrative    EXAM: MR CERVICAL SPINE W/O AND W CONTRAST  LOCATION: [facility]  DATE: 11/9/2023    INDICATION: Neck pain post operative.  COMPARISON: CT 11/08/2023, x-ray 09/21/2023.  CONTRAST: 7 mL Gadavist.  TECHNIQUE: MRI Cervical Spine without and with IV contrast.    FINDINGS:   Normal vertebral body heights, alignment and marrow signal. No abnormal cord signal. No extraspinal abnormality.    Craniovertebral junction and C1-C2: Normal.    C2-C3: Normal disc height. No herniation. Normal facets. No spinal canal or neural foraminal stenosis.     C3-C4: Mild loss of disc height. No herniation. Mild  right and mild to moderate left facet arthropathy. No central canal stenosis. Mild bilateral foraminal stenosis.     C4-C5: Mild to moderate loss of disc height. No herniation. Mild facet arthropathy. No central canal stenosis. Mild bilateral foraminal stenosis.     C5-C6: Status post anterior cervical discectomy and fusion. Normal facets. No central canal stenosis. Mild to moderate bilateral foraminal stenosis.     C6-C7: Interbody fusion. Normal facets. No central canal or foraminal stenosis.     C7-T1: Normal disc height. No herniation. Normal facets. No spinal canal or neural foraminal stenosis.      Impression    IMPRESSION:  1.  Status post anterior cervical discectomy and fusion at C5-C6 and interbody fusion at the C6-C7 level. No postsurgical complication is identified.  2.  Underlying degenerative changes at multiple levels without central canal or high-grade foraminal stenosis.     XR Chest Port 1 View    Narrative    EXAM: XR CHEST PORT 1 VIEW  LOCATION: [facility]  DATE: 11/9/2023    INDICATION: Inspiratory crackles  COMPARISON: CT and chest x-ray on 12/12/2021      Impression    IMPRESSION: Negative chest.   US Abdomen Limited    Narrative    EXAM: US ABDOMEN LIMITED  LOCATION: [facility]  DATE: 11/9/2023    INDICATION: LFT derangement  COMPARISON: PET/CT 10/27/2023. CT abdomen and pelvis 08/25/2022.  TECHNIQUE: Limited abdominal ultrasound.    FINDINGS:    GALLBLADDER: Postcholecystectomy.    BILE DUCTS: No biliary dilatation. The common duct measures 6 mm.    LIVER: Normal parenchyma with smooth contour. No focal mass.    RIGHT KIDNEY: No hydronephrosis.    PANCREAS: The visualized portions are normal.    No ascites.      Impression    IMPRESSION:  1.  Postcholecystectomy.  2.  No other significant abnormality.       Echocardiogram Complete   Result Value    LVEF  60-65%    Narrative    [id number]  [id number]  [id number]  [id number]^OMAR^PAULETTE      [facility]  Echocardiography Laboratory  [address] [place] [address]     Name: AMISHA HOLDER  MRN: [id number]  : 1950  Study Date: 2023 01:09 PM  Age: 73 yrs  Gender: Female  Patient Location: [facility]  Reason For Study: Cardiac Murmur  Ordering Physician: PAULETTE NELSON  Referring Physician: LISSY PUGA  Performed By: Celena Crawley     BSA: 1.7 m2  Height: 64 in  Weight: 152 lb  HR: 92  BP: 111/55 mmHg  ______________________________________________________________________________  Procedure  Complete Echo Adult.  ______________________________________________________________________________  Interpretation Summary     The left ventricle is normal in size.  Left ventricular systolic function is normal.  The visual ejection fraction is 60-65%.  Normal left ventricular wall motion  No hemodynamically significant valvular abnormalities on 2D or color flow  imaging. There is no comparison study available.  ______________________________________________________________________________  Left Ventricle  The left ventricle is normal in size. There is normal left ventricular wall  thickness. Left ventricular systolic function is normal. Grade I or early  diastolic dysfunction. Diastolic Doppler findings (E/E' ratio and/or other  parameters) suggest left ventricular filling pressures are normal. The visual  ejection fraction is 60-65%. Normal left ventricular wall motion.     Right Ventricle  The right ventricle is normal in structure, function and size.     Atria  Normal left atrial size. Right atrial size is normal. There is no atrial shunt  seen.     Mitral Valve  The mitral valve is normal in structure and function. There is trace mitral  regurgitation.     Tricuspid Valve  The tricuspid valve is normal in structure and function. The right ventricular  systolic pressure is approximated at 20mmHg plus the right atrial pressure.  Right ventricle systolic pressure  estimate normal. There is trace tricuspid  regurgitation. IVC diameter <2.1 cm collapsing >50% with sniff suggests a  normal RA pressure of 3 mmHg.     Aortic Valve  The aortic valve is normal in structure and function. No aortic regurgitation  is present. No aortic stenosis is present.     Pulmonic Valve  The pulmonic valve is not well seen, but is grossly normal. There is trace  pulmonic valvular regurgitation.     Vessels  Normal size aorta.     Pericardium  There is no pericardial effusion.     Rhythm  Sinus rhythm was noted.  ______________________________________________________________________________  MMode/2D Measurements & Calculations  IVSd: 0.89 cm     LVIDd: 4.1 cm  LVIDs: 2.9 cm  LVPWd: 0.89 cm  FS: 27.8 %  LV mass(C)d: 110.7 grams  LV mass(C)dI: 63.6 grams/m2  Ao root diam: 2.8 cm  asc Aorta Diam: 2.6 cm  LVOT diam: 2.0 cm  LVOT area: 3.1 cm2  Ao root diam index Ht(cm/m): 1.7  Ao root diam index BSA (cm/m2): 1.6  Asc Ao diam index BSA (cm/m2): 1.5  Asc Ao diam index Ht(cm/m): 1.6  LA Volume (BP): 14.2 ml     LA Volume Index (BP): 8.2 ml/m2  RWT: 0.44     Doppler Measurements & Calculations  MV E max ceasar: 65.6 cm/sec  MV A max ceasar: 81.0 cm/sec  MV E/A: 0.81  MV dec time: 0.18 sec  Ao V2 max: 140.0 cm/sec  Ao max P.0 mmHg  Ao V2 mean: 95.0 cm/sec  Ao mean P.0 mmHg  Ao V2 VTI: 24.2 cm  ODELL(I,D): 2.6 cm2  ODELL(V,D): 2.4 cm2  LV V1 max P.4 mmHg  LV V1 max: 110.0 cm/sec  LV V1 VTI: 20.4 cm  SV(LVOT): 62.3 ml  SI(LVOT): 35.8 ml/m2  PA V2 max: 98.9 cm/sec  PA max PG: 3.9 mmHg  PA acc time: 0.11 sec  AV Ceasar Ratio (DI): 0.79  ODELL Index (cm2/m2): 1.5  E/E' av.5  Lateral E/e': 6.2  Medial E/e': 8.7     RV S Ceasar: 14.9 cm/sec     ______________________________________________________________________________  Report approved by: Kathryn Pop 2023 04:50 PM         CT Head w/o Contrast    Narrative    EXAM: CT HEAD W/O CONTRAST  LOCATION: [facility]  DATE:  11/9/2023    INDICATION: Altered mental status. Blurred vision. Left-sided tingling.  COMPARISON: Head CT 11/08/2023  TECHNIQUE: Routine CT Head without IV contrast. Multiplanar reformats. Dose reduction techniques were used.    FINDINGS:  INTRACRANIAL CONTENTS: No intracranial hemorrhage, extraaxial collection, or mass effect.  No CT evidence of acute infarct. Normal parenchymal attenuation. Normal ventricles and sulci.     VISUALIZED ORBITS/SINUSES/MASTOIDS: No intraorbital abnormality. No paranasal sinus mucosal disease. No middle ear or mastoid effusion.    BONES/SOFT TISSUES: No acute abnormality.      Impression    IMPRESSION: Normal head CT.   CTA Head Neck w Contrast    Narrative    EXAM: CTA HEAD NECK W CONTRAST  LOCATION: [facility]  DATE: 11/9/2023    INDICATION: Altered mental status. Vision changes. Left-sided tingling.  COMPARISON: None.  CONTRAST: 75 mL Isovue 370  TECHNIQUE: Head and neck CT angiogram with IV contrast. Axial helical CT images of the head and neck vessels obtained during the arterial phase of intravenous contrast administration. Axial 2D reconstructed images and multiplanar 3D MIP reconstructed   images of the head and neck vessels were performed by the technologist. Dose reduction techniques were used. All stenosis measurements made according to NASCET criteria unless otherwise specified.    FINDINGS:   HEAD CTA:  ANTERIOR CIRCULATION: No stenosis/occlusion, aneurysm, or high flow vascular malformation. Standard [place] anatomy.    POSTERIOR CIRCULATION: The proximal P2 segment of the right posterior cerebral artery is difficult to visualize and could be stenotic or partially occluded. The distal P2 segment and branches of the right posterior cerebral artery are patent. The left   posterior cerebral artery is patent and unremarkable. Balanced vertebral arteries supply a normal basilar artery.    NECK CTA:  RIGHT CAROTID: No measurable stenosis or  dissection.    LEFT CAROTID: No measurable stenosis or dissection.    VERTEBRAL ARTERIES: No focal stenosis or dissection. Balanced vertebral arteries.    AORTIC ARCH: Classic aortic arch anatomy with no significant stenosis at the origin of the great vessels.    NONVASCULAR STRUCTURES: Unremarkable.      Impression    IMPRESSION:   HEAD CTA:   1.  Poor visualization of the proximal P2 segment of the right posterior cerebral artery could represent stenosis or partial occlusion. The distal P2 segment and branches of the right posterior cervical artery are patent and unremarkable.  2.  Otherwise, normal head CTA.    NECK CTA:  1.  Normal neck CTA.

## 2023-11-09 NOTE — ED PROVIDER NOTES
History     Chief Complaint:  Abnormal Labs (Pt states she was sent in by PMD  to receive iv abx for a bladder infection and abnormal labs. Pt reports elevated liver tests. Also c/o excruciating headache for last week. Hx of cervical spine surgery about 1.5 months ago.)       HPI   Macey Romero is a 73 year old female who presents to the Ed for an evaluation of abnormal labs. Patient reports she was sent it by PMD to receive fluids and for a bladder infection. Adds she's been having headaches for the past 2 weeks. She states her headaches start in the front of her head, over the top and radiates to the back. Patient was on bactrum medication for 3 weeks but it didn't help with her pain. She has an appointment tomorrow for her headache. Recalls taking tylenol and also advil which she states wasn't suppose to take because of her liver situation. Adds she had a fever of 100 this morning. This morning she she looked herself in the mirror, her body was bright red and warm. Mouth feels dry. Does have loss of control over bladder urination. Has weakness and numbness on left hand before her surgery. Also has numbness and weakness on right hand that she dropped two glass cups on floor. Head and neck pain started 8-9 days and getting worse.     Independent Historian:   None - Patient Only    Review of External Notes:   Telephone encounters yesterday as well as progress note from 11/7/2023      Medications:    Albuterol   Nitrofurantoin   Hydrocortisone e  Metronidazole   Linaclotide   Polyethylene glycol  Senna-docusate     Past Medical History:    Acute hepatitis   UTI   Osteoarthritis   Osteopenia   Breast cancer   IBS   Keloid of skin   Fibromyalgia   Bilateral mastectomy   Asthma   Prediabetes   Stage 3 chronic kidney disease   Colitis   Cervical spinal fusion   Pneumonia   Skin cancer     Past Surgical History:    Left total knee arthroplasty   Blepharoplasty, brow lift bilateral   Cholecystectomy   Endoscopy   EGD    Right joint knee replacement   Mastectomy   Hysterectomy   Repair ligament   Tonsillectomy      Physical Exam   Patient Vitals for the past 24 hrs:   BP Temp Temp src Pulse Resp SpO2   11/08/23 2350 111/55 -- -- 99 20 93 %   11/08/23 2230 131/63 -- -- 102 -- 99 %   11/08/23 2202 -- 99.6  F (37.6  C) -- -- -- --   11/08/23 2200 116/58 -- -- 103 -- 99 %   11/08/23 2104 -- 98.9  F (37.2  C) Oral -- -- --   11/08/23 2101 (!) 158/58 -- -- 89 18 100 %        Physical Exam  General: Patient is alert and uncomfortable appearing  HEENT: Head atraumatic    Eyes: pupils equal and reactive. Conjunctiva clear   Nares: patent   Oropharynx: no lesions, uvula midline, no palatal draping, normal voice, no trismus  Neck: Supple without lymphadenopathy, no meningismus  Chest: Heart regular rate and rhythm.   Lungs: Equal clear to auscultation with no wheeze or rales  Abdomen: Soft, non tender, nondistended, normal bowel sounds  Back: No costovertebral angle tenderness, patient with midline and paraspinal cervical tenderness to palpation posteriorly.  No anterior tenderness to palpation or crepitus  Neuro: Grossly nonfocal, normal speech, strength equal bilaterally, CN 2-12 intact  Extremities: No deformities, equal radial and DP pulses. No clubbing, cyanosis.  No edema  Skin: Warm and dry with no rash.       Emergency Department Course   ECG  ECG taken at 2105, ECG read at 1021  Normal sinus rhythm    Rate 90 bpm. WA interval 128 ms. QRS duration 74 ms. QT/QTc 370/452 ms. P-R-T axes 79 55 49.     Imaging:  CT Head w/o Contrast   Final Result   IMPRESSION:   HEAD CT:   1.  No CT evidence for acute intracranial process.   2.  Mild chronic microvascular ischemic changes as above.      CERVICAL SPINE CT:   1.  No CT evidence for acute fracture or post traumatic subluxation.   2.  No high-grade central or foraminal stenosis.      Cervical spine CT w/o contrast   Final Result   IMPRESSION:   HEAD CT:   1.  No CT evidence for acute  intracranial process.   2.  Mild chronic microvascular ischemic changes as above.      CERVICAL SPINE CT:   1.  No CT evidence for acute fracture or post traumatic subluxation.   2.  No high-grade central or foraminal stenosis.      MR Cervical Spine w/o & w Contrast    (Results Pending)   US Abdomen Limited    (Results Pending)   Echocardiogram Complete    (Results Pending)   XR Chest Port 1 View    (Results Pending)          Laboratory:  Labs Ordered and Resulted from Time of ED Arrival to Time of ED Departure   ROUTINE UA WITH MICROSCOPIC REFLEX TO CULTURE - Abnormal       Result Value    Color Urine Light Yellow      Appearance Urine Clear      Glucose Urine Negative      Bilirubin Urine Negative      Ketones Urine Trace (*)     Specific Gravity Urine 1.011      Blood Urine Trace (*)     pH Urine 5.5      Protein Albumin Urine Negative      Urobilinogen Urine Normal      Nitrite Urine Negative      Leukocyte Esterase Urine Small (*)     Mucus Urine Present (*)     RBC Urine 2      WBC Urine 5      Squamous Epithelials Urine 3 (*)    PROCALCITONIN - Abnormal    Procalcitonin 0.26 (*)    CRP INFLAMMATION - Abnormal    CRP Inflammation 57.89 (*)    COMPREHENSIVE METABOLIC PANEL - Abnormal    Sodium 135      Potassium 3.7      Carbon Dioxide (CO2) 25      Anion Gap 14      Urea Nitrogen 13.0      Creatinine 1.05 (*)     GFR Estimate 56 (*)     Calcium 9.1      Chloride 96 (*)     Glucose 119 (*)     Alkaline Phosphatase 327 (*)     AST 64 (*)      (*)     Protein Total 7.5      Albumin 3.9      Bilirubin Total 0.6     ISTAT GASES LACTATE VENOUS POCT - Abnormal    Lactic Acid POCT 1.6      Bicarbonate Venous POCT 29 (*)     O2 Sat, Venous POCT 33 (*)     pCO2 Venous POCT 47      pH Venous POCT 7.40      pO2 Venous POCT 21 (*)    NT PROBNP INPATIENT - Abnormal    N terminal Pro BNP Inpatient 2,186 (*)    HEMOGLOBIN A1C - Abnormal    Hemoglobin A1C 5.7 (*)    TROPONIN T, HIGH SENSITIVITY - Normal    Troponin T,  High Sensitivity 12     INR - Normal    INR 0.92     CBC WITH PLATELETS AND DIFFERENTIAL    WBC Count 7.9      RBC Count 4.96      Hemoglobin 13.9      Hematocrit 42.5      MCV 86      MCH 28.0      MCHC 32.7      RDW 12.5      Platelet Count 181      % Neutrophils 77      % Lymphocytes 13      % Monocytes 7      % Eosinophils 3      % Basophils 0      % Immature Granulocytes 0      NRBCs per 100 WBC 0      Absolute Neutrophils 6.0      Absolute Lymphocytes 1.0      Absolute Monocytes 0.5      Absolute Eosinophils 0.3      Absolute Basophils 0.0      Absolute Immature Granulocytes 0.0      Absolute NRBCs 0.0     PARTIAL THROMBOPLASTIN TIME   CORTISOL   TSH WITH FREE T4 REFLEX   BLOOD CULTURE   BLOOD CULTURE   URINE CULTURE        Procedures   none    Emergency Department Course & Assessments:             Interventions:  Medications   ondansetron (ZOFRAN) injection 4 mg (4 mg Intravenous $Given 11/8/23 2302)   sodium chloride 0.9% BOLUS 1,000 mL (has no administration in time range)   morphine (PF) injection 2 mg (has no administration in time range)   acetaminophen (TYLENOL) tablet 975 mg (975 mg Oral $Given 11/8/23 2202)   sodium chloride 0.9% BOLUS 1,000 mL (1,000 mLs Intravenous $New Bag 11/8/23 2300)   morphine (PF) injection 4 mg (4 mg Intravenous $Given 11/8/23 2303)        Assessments:  2221 I obtained history and examined the patient as noted above.     Independent Interpretation (X-rays, CTs, rhythm strip):  CT head with no acute intracranial hemorrhage    Consultations/Discussion of Management or Tests:  0294 I spoke with Dr. Law of the hospitalist service regarding admission.      Social Determinants of Health affecting care:   None    Disposition:  The patient was admitted to the hospital under the care of Dr. Law.     Impression & Plan    CMS Diagnoses: None    Medical Decision Making:  Patient is a 73-year-old female who presents to the emergency department with dysuria, neck pain and  headache.  She states she has been under treatment by her primary doctor for a UTI which initially she received Bactrim but it was not sensitive to this and it was changed to Macrobid.  She has had improvement of her symptoms mildly but still has some UTI symptoms including urgency and frequency.  Patient mostly complains of severe headache and neck pain.  She was seen at clinic yesterday and noted to have have an elevated CRP.  COVID was negative.  Elevated LFTs were noticed as well.  Patient had anterior microdiscectomy of her cervical spine approximately 1 month ago.  She was supposed to follow-up with Dr. Galindo with orthopedics last week but he had to cancel her appointment and was going to be seen tomorrow.  Patient in severe pain.  Increasing CRP.  Concern for infectious process.  Blood cultures are pending given recent prolonged UTI course.  MRI of the cervical spine is pending as well to rule out infectious or structural abnormality following recent surgery and severe neck pain.  Less likely to be meningitis at this point as she is quite delayed from surgery.  Pain is improved with morphine here.  If no other source of infection is identified lumbar puncture could be considered.  She has had progressive symptoms over the last week and mostly increased over the last 2 days.  In addition she has had some new tingling and weakness of her right hand with dropping of objects as well.  My concern for cervical spinal process is high enough that I feel she warrants admission for pain control, surgical consultation and MRI.  Need to await blood cultures given recent prolonged UTI course.  Patient is hemodynamically stable at this point.  No evidence of sepsis or severe sepsis.  Patient's LFTs may be medication reaction versus Tylenol induced.  We will continue to trend.  Patient is agreeable with plan for admission for pain control, MRI, surgical consultation and medical optimization.  Hospitalist kindly agrees to  except the patient for admission      Diagnosis:    ICD-10-CM    1. Worsening of neck pain following surgery  G89.18       2. Complicated UTI (urinary tract infection)  N39.0       3. Acute hepatitis  B17.9            Discharge Medications:  New Prescriptions    No medications on file        Scribe Disclosure:  MAYELA, Amaris Byrd, am serving as a scribe at 10:27 PM on 11/8/2023 to document services personally performed by Hailey Henao MD based on my observations and the provider's statements to me.     11/8/2023   Hailey Henao MD Neuner, Maria Bea, MD  11/09/23 0145

## 2023-11-09 NOTE — PROGRESS NOTES
"Dr. Ceja paged-  \"Pt having progressive headache, neck pain. Tylenol/  Oxy given w/ little relief. Are you able to see Pt this morning? TY\"   "

## 2023-11-09 NOTE — UTILIZATION REVIEW
"  Admission Status; Secondary Review Determination         Under the authority of the Utilization Management Committee, the utilization review process indicated a secondary review on the above patient.  The review outcome is based on review of the medical records, discussions with staff, and applying clinical experience noted on the date of the review.          (x) Observation Status Appropriate - This patient does not meet hospital inpatient criteria and is placed in observation status. If this patient's primary payer is Medicare and was admitted as an inpatient, Condition Code 44 should be used and patient status changed to \"observation\".     RATIONALE FOR DETERMINATION     Patient is a 73 year old female with history of C5-C6 discetomy, CVA, vit D def, CKD, OA, PreDM, Asthma, constipation, R breast cancer s/p b/l mastectomy, neck injury, and C5-6 and C6-7 fusions. She presneted toe the ED with diffuse myalgia, fevers, headache and neck pain.  Emergency department evaluation showed unremarkable vital signs.  Laboratory evaluation showed mild LFT abnormalities, CRP 57.89, BNP 2186, procalcitonin 0.26, unremarkable CBC, and unremarkable urine analysis.  Blood and urine cultures were sent.  Testing for COVID was negative.  CT of head and C-spine showed no acute process.  Chest x-ray was unremarkable.  MRI of C-spine was unremarkable.  Ultrasound of abdomen was unremarkable.  She was admitted for further cares.  She was seen by neurosurgery and there was not thought to be complication of her previous C-spine surgery.  Echocardiogram is pending.  She still having some pain but this seems to be managed with Tylenol and oxycodone.    The severity of illness, intensity of service provided, expected LOS and risk for adverse outcome make the care appropriate for further observation; however, doesn't meet criteria for hospital inpatient admission. Dr Madison Sanderson was notified of this determination.    This document was " produced using voice recognition software.      The information on this document is developed by the utilization review team in order for the business office to ensure compliance.  This only denotes the appropriateness of proper admission status and does not reflect the quality of care rendered.         The definitions of Inpatient Status and Observation Status used in making the determination above are those provided in the CMS Coverage Manual, Chapter 1 and Chapter 6, section 70.4.      Sincerely,    Pablo Flores MD    Utilization Review  Physician Advisor  [facility].

## 2023-11-09 NOTE — H&P
Assessment/Plan    73F hx of C5-C6 discetomy, CVA, vit D def, CKD, OA, PreDM, Asthma, constipation, R breast cancer s/p b/l mastectomy, presenting with diffuse myalgia, fevers, headache and neck pain.    Headaches  Diffuse myalgia  SOB, Cough  ``Hx: 1 week prior to presentation, patient developed severe leg pain/weakness that spread to the entire body, associated with severe neck/headpain, dry mouth, chest pain,SOB, fever, decreased appetite and posterior neck fullness. Has been taking tylenol/advil with no improvement. Associated with dry mouth. In the ER, patient states HA improved  ``Vitals/Exam/EKG: Limited Neck ROM, diffuse tenderpoints in midspine, b/l CVA areas, tender buffole hump swelling, LE edema with tenderness. EKG was read as NSR, however appears to have low voltage, with ?LAE. Systolic murmur heard at the base. Vitally stable  ``Labs: AST ALT 64/149, CRP 58, Procal 0.26, Influenza/sars negative, BNP 2200  ``Imaging: CTH/Neck negative  ``ER Course: tylenol, morphine, zofran, 1L bolus given  --CXR  --TTE as systolic murmur is present with elevated BNP, and diffuse illness along with fevers, could suggest possible endocarditis vs new diagnosed cardiomyopathy   --Cortisol level, TSH  --tylneol/oxy  --PT OT  --covid pcr  --No indication for IVF  --Though possibility of viral infection could explain her symptoms, Given diffuse tenderpoints, and elevated inflammatory markers, ruling out fibromyalgia/Rheumatic illness is required. Outpatient Rheumatology followup recommended.     Neck Pain  S/p C5-C6 anterior discectomy 9/2023  Neuropathy  ``had vertebral fracture following chiropractic session. Patient developed neuropathy afterwarsd that is managemed by Neurology.  ``Of note, patient states after the procedure, she developed neck swelling, was given decadron, which was followed by tongue swelling/blue discolouration that required Abx. Chart review suggests she was given clorimazole for a diagnosis of oral  "thrush, likely 2/2 to the decadron  ``States neck pain has worsened over the past week, Had an ortho appt last week that was cancelled by provider, and has another appt tomorrow. Endorses chronic fevers, limited ROM since the procedure.   `CT Neck was unremarkable  --MRI neck  --OrthoSpine Consult  --Pain control    Vitamin D def  --resume supplements on discharge    Cystitis  ``patient has been urgency, frequency and dysurea for 1 month, despite being on Bactrim followed by Macrobid  ``UA shows only WBC 5, and small Leuk esterase, with no nitrites.  ``I do not believe this patient has an active infection and instead believe this could be interstial cysitits  --Hold abx  --outpatient urology vs GYN    Hx CVA, no residual deficits  ``Had R weakness and slurred speech in 2005  ``Unknown why patient not on aspirin/statin  --No acute management    LFT derangement  ``Downtrending over the course of 2 days from AST ALT Of 104/209 to 64/149. Has been taking tyelnol due to pain  --US RUQ  --Trend LFTs  --Obtain coags  --Max tylenol dose should be 2g/day for now    IgA MGUS  ``manages as an outpatient. Completed PET scan recently, informed that it was \"good'  --no acute intervention    Pseudophakia  Extropia  Cataracts s/p removal  Dry eyes  --follows with optho as  outpatient    PreDM  --Mod CHO diet+renal diet  --A1c  --No indication for ISS for now    Asthma  --prn duonebs    Constipation  --daily senna    Hx R breast cancer 1980s s/p mastectomy  Hx SCC of nose 1990s s/p resection  --no intervention    OA  B/l TKR  --Tylenol, Oxy    CKD  --renal diet  --limit contrast, avoid nsaids  --SBP goal<130    Supportive Care  DVT ppx: ambulatory  Diet: Renal  Pain Control: tylenol oxy  Upper GI ppx: zofran  Lower GI ppx: senna  Lines/Catheters: IV  TTE/Dopplers: TTE  Contact Precautions: none  PT/OT/Social/Wound/Palliative Consults: PT OT  Code Status: Full per patient's request    History of Present Illness  Subjective:  week " prior to presentation, patient developed severe leg pain/weakness that spread to the entire body, associated with severe neck/headpain, dry mouth, chest pain,SOB, fever, decreased appetite and posterior neck fullness. Has been taking tylenol/advil with no improvement. Associated with dry mouth. In the ER, patient states HA improved    ROS: 10 point ROS neg other than the symptoms noted above in the HPI.     Environment/ADLs: Independant    Physical Exam  /55   Pulse 99   Temp 99.6  F (37.6  C)   Resp 20   SpO2 93%     Constitutional: Alert and oriented to person, place and time; no apparent distress.   HEENT: Normocephalic, no scleral icterus. Posterior neck swelling that is tender  Back: Midline tenderness, bilateral CVA tenderness  Abdomen: soft, no distention/tenderness/guarding  Lungs: Inspiratory crackles  Heart: Regular s1s2, base systolic murmur  Extremities/Neuro:No focal strength/sensation deficits, LE edema is present +1 pitting  Skin: LE show evidence of venous stasis in the early stages  Psychiatric: appropriate affect, insight and judgment    I have personally spent 88 minutes total time today in preparing to see the patient (eg, review of tests), obtaining and/or reviewing separately obtained history, performing a medically appropriate examination and/or evaluation, counseling and educating the patient/family/caregiver, ordering medications, tests, or procedures, referring and communicating with other health care professionals, documenting clinical information in the electronic or other health record, independently interpreting results and communicating results to the patient/ family/caregiver and care coordination.    EMR History    Past Medical Hx:   Past Medical History:   Diagnosis Date    Breast CA in situ 1987    Cancer (H) 1987    Right Breast treated with surgery    Cerebral infarction (H)     Chronic constipation     Fibromyalgia     History of blood transfusion     Malignant  neoplasm of female breast, unspecified estrogen receptor status, unspecified laterality, unspecified site of breast (H) 06/17/2021    Meningitis     Several times as an adult    Osteoarthritis 02/01/2011    Osteopenia 02/01/2011    Pneumonia     Pterygium eye 08/26/2013    Reactive airway disease 02/01/2011    Seasonal allergies     Shingles     Prior to 2008 - scalp    Skin cancer     SCC - hand, left nose    Uncomplicated asthma         Home Medications: Present in Med Rec    Allergies:   Allergies   Allergen Reactions    Levaquin Hives and Itching    Pcn [Penicillins] Hives    Tetanus Toxoid Anaphylaxis    Tetanus Toxoids Anaphylaxis    Erythromycin GI Disturbance    Amoxicillin     Duloxetine Nausea     Other reaction(s): Jittery    Silicone Rash        Pertinent Family Hx:   Family History   Problem Relation Age of Onset    Respiratory Father     Cancer Brother         sarcoidosis    Diabetes Brother     Cerebrovascular Disease Brother     Liver Disease Brother         Surgical Hx:   Past Surgical History:   Procedure Laterality Date    anterior c-disc fusion      ARTHROPLASTY KNEE Left 10/17/2022    Procedure: LEFT TOTAL KNEE ARTHROPLASTY;  Surgeon: Jax Le MD;  Location:  OR    BLEPHAROPLASTY, BROW LIFT BILATERAL, COMBINED Bilateral 6/18/2018    Procedure: COMBINED BLEPHAROPLASTY, BROW LIFT BILATERAL;  BILATERAL UPPER LID BLEPHAROPLASTY; BILATERAL BROW PTOSIS REPAIR;  Surgeon: Сергей Walters MD;  Location:  EC    CHOLECYSTECTOMY      COLONOSCOPY N/A 10/19/2017    Procedure: COLONOSCOPY;  COLONOSCOPY;  Surgeon: Niko Wong MD;  Location:  GI    COLONOSCOPY N/A 8/27/2022    Procedure: COLONOSCOPY, WITH POLYPECTOMY AND BIOPSY;  Surgeon: Abraham Rogers MD;  Location:  GI    D & C      Bleeding before hysterectomy    DISCECTOMY, FUSION CERVICAL ANTERIOR ONE LEVEL, COMBINED N/A 9/20/2023    Procedure: ANTERIOR CERVICAL 5 TO CERVICAL 6 DISCECTOMY AND FUSION;  Surgeon: Alex Galindo  MD;  Location:  OR    ENDOSCOPY  04/21/08    ESOPHAGOSCOPY, GASTROSCOPY, DUODENOSCOPY (EGD), COMBINED N/A 8/27/2022    Procedure: ESOPHAGOGASTRODUODENOSCOPY, WITH BIOPSY;  Surgeon: Abraham Rogers MD;  Location:  GI    HYSTERECTOMY, MARCUS      Ovaries and tubes out due to breast cancer    JOINT REPLACEMTN, KNEE RT/LT Right 01/2011    Joint Replacement knee RT, with tibial straightening (2 replacements)    MAMMOPLASTY AUGMENTATION BILATERAL      breast ca     MASTECTOMY, SIMPLE RT/LT/CLAIRE Bilateral 1989    Mastectomy Simple RT/LT/CLAIRE    MOHS MICROGRAPHIC PROCEDURE      Left lateral nose    OPEN REDUCTION INTERNAL FIXATION ANKLE  8/27/2013    Procedure: OPEN REDUCTION INTERNAL FIXATION ANKLE;  RIGHT OPEN REDUCTION INTERNAL FIXATION ANKLE WITH LIGAMENT REPAIR;  Surgeon: Nando Chang MD;  Location:  OR    PTERYGIUM WITH CONJUNCTIVAL AUTOLOGOUS TRANSPLANT Left 8/29/2016    Procedure: PTERYGIUM WITH CONJUNCTIVAL AUTOLOGOUS TRANSPLANT;  Surgeon: Сергей Walters MD;  Location:  EC    REPAIR LIGAMENT ANKLE  8/27/2013    Procedure: REPAIR LIGAMENT ANKLE;;  Surgeon: Nando Chang MD;  Location:  OR    SALIVARY GLAND SURGERY Left     Stone removal     SIGMOIDOSCOPY FLEXIBLE N/A 8/30/2022    Procedure: FLEXIBLE SIGMOIDOSCOPY;  Surgeon: Niko Wong MD;  Location:  GI    TONSILLECTOMY          Substance Hx:   History   Drug Use No   ,  Social History    Substance and Sexual Activity      Alcohol use: No        Alcohol/week: 0.0 standard drinks of alcohol  ,   History   Smoking Status    Some Days    Types: Cigarettes    Last attempt to quit: 1/1/2016   Smokeless Tobacco    Never   ,   History   Sexual Activity    Sexual activity: Never              Imaging/Labs    Imaging: CT Head w/o Contrast    Result Date: 11/8/2023  EXAM: CT HEAD W/O CONTRAST, CT CERVICAL SPINE W/O CONTRAST LOCATION: [facility] DATE: 11/8/2023 INDICATION: Headache, neck pain COMPARISON: None.  TECHNIQUE: 1) Routine CT Head without IV contrast. Multiplanar reformats. Dose reduction techniques were used. 2) Routine CT Cervical Spine without IV contrast. Multiplanar reformats. Dose reduction techniques were used. FINDINGS: HEAD CT: INTRACRANIAL CONTENTS: No intracranial hemorrhage, extraaxial collection, or mass effect.  No CT evidence of acute infarct. Mild presumed chronic small vessel ischemic changes. Mild generalized volume loss. No hydrocephalus. VISUALIZED ORBITS/SINUSES/MASTOIDS: Prior bilateral cataract surgery. Visualized portions of the orbits are otherwise unremarkable. No paranasal sinus mucosal disease. No middle ear or mastoid effusion. BONES/SOFT TISSUES: No acute abnormality. CERVICAL SPINE CT: VERTEBRA: Normal vertebral body heights and straightened alignment. No fracture or posttraumatic subluxation. Status post ACDF C5-C7, with hardware at C5-C6. CANAL/FORAMINA: No canal or neural foraminal stenosis. PARASPINAL: No extraspinal abnormality. Visualized lung fields are clear.     IMPRESSION: HEAD CT: 1.  No CT evidence for acute intracranial process. 2.  Mild chronic microvascular ischemic changes as above. CERVICAL SPINE CT: 1.  No CT evidence for acute fracture or post traumatic subluxation. 2.  No high-grade central or foraminal stenosis.    Cervical spine CT w/o contrast    Result Date: 11/8/2023  EXAM: CT HEAD W/O CONTRAST, CT CERVICAL SPINE W/O CONTRAST LOCATION: [facility] DATE: 11/8/2023 INDICATION: Headache, neck pain COMPARISON: None. TECHNIQUE: 1) Routine CT Head without IV contrast. Multiplanar reformats. Dose reduction techniques were used. 2) Routine CT Cervical Spine without IV contrast. Multiplanar reformats. Dose reduction techniques were used. FINDINGS: HEAD CT: INTRACRANIAL CONTENTS: No intracranial hemorrhage, extraaxial collection, or mass effect.  No CT evidence of acute infarct. Mild presumed chronic small vessel ischemic changes. Mild  generalized volume loss. No hydrocephalus. VISUALIZED ORBITS/SINUSES/MASTOIDS: Prior bilateral cataract surgery. Visualized portions of the orbits are otherwise unremarkable. No paranasal sinus mucosal disease. No middle ear or mastoid effusion. BONES/SOFT TISSUES: No acute abnormality. CERVICAL SPINE CT: VERTEBRA: Normal vertebral body heights and straightened alignment. No fracture or posttraumatic subluxation. Status post ACDF C5-C7, with hardware at C5-C6. CANAL/FORAMINA: No canal or neural foraminal stenosis. PARASPINAL: No extraspinal abnormality. Visualized lung fields are clear.     IMPRESSION: HEAD CT: 1.  No CT evidence for acute intracranial process. 2.  Mild chronic microvascular ischemic changes as above. CERVICAL SPINE CT: 1.  No CT evidence for acute fracture or post traumatic subluxation. 2.  No high-grade central or foraminal stenosis.         Recent Labs   Lab Test 11/08/23 2159 11/07/23  1509    138   POTASSIUM 3.7 3.9   CHLORIDE 96* 100   CO2 25 25   ANIONGAP 14 13   * 105*   BUN 13.0 9.5   CR 1.05* 0.85   DONNA 9.1 9.4     CBC RESULTS:   Recent Labs   Lab Test 11/08/23 2159   WBC 7.9   RBC 4.96   HGB 13.9   HCT 42.5   MCV 86   MCH 28.0   MCHC 32.7   RDW 12.5         Liver Function Studies -   Recent Labs   Lab Test 11/08/23 2159   PROTTOTAL 7.5   ALBUMIN 3.9   BILITOTAL 0.6   ALKPHOS 327*   AST 64*   *      Troponin I ES   Date Value Ref Range Status   01/19/2020 <0.015 0.000 - 0.045 ug/L Final     Comment:     The 99th percentile for upper reference range is 0.045 ug/L.  Troponin values   in the range of 0.045 - 0.120 ug/L may be associated with risks of adverse   clinical events.

## 2023-11-09 NOTE — ED TRIAGE NOTES
Elevated CRP, sent in from PCP for CT to rule out kidney infection.   Of note, patient has UA from yesterday with urine culture pending.   Triage Assessment (Adult)       Row Name 11/08/23 1208          Triage Assessment    Airway WDL WDL

## 2023-11-10 ENCOUNTER — APPOINTMENT (OUTPATIENT)
Dept: OCCUPATIONAL THERAPY | Facility: CLINIC | Age: 73
DRG: 069 | End: 2023-11-10
Payer: MEDICARE

## 2023-11-10 ENCOUNTER — APPOINTMENT (OUTPATIENT)
Dept: GENERAL RADIOLOGY | Facility: CLINIC | Age: 73
DRG: 069 | End: 2023-11-10
Attending: INTERNAL MEDICINE
Payer: MEDICARE

## 2023-11-10 ENCOUNTER — APPOINTMENT (OUTPATIENT)
Dept: PHYSICAL THERAPY | Facility: CLINIC | Age: 73
DRG: 069 | End: 2023-11-10
Payer: MEDICARE

## 2023-11-10 LAB
ALBUMIN UR-MCNC: NEGATIVE MG/DL
APPEARANCE CSF: CLEAR
APPEARANCE UR: CLEAR
BACTERIA UR CULT: NORMAL
BILIRUB UR QL STRIP: NEGATIVE
C GATTII+NEOFOR DNA CSF QL NAA+NON-PROBE: NEGATIVE
CMV DNA CSF QL NAA+NON-PROBE: NEGATIVE
COLOR CSF: COLORLESS
COLOR UR AUTO: NORMAL
CREAT SERPL-MCNC: 0.76 MG/DL (ref 0.51–0.95)
E COLI K1 AG CSF QL: NEGATIVE
EGFRCR SERPLBLD CKD-EPI 2021: 82 ML/MIN/1.73M2
EV RNA SPEC QL NAA+PROBE: NEGATIVE
GLUCOSE CSF-MCNC: 56 MG/DL (ref 40–70)
GLUCOSE UR STRIP-MCNC: NEGATIVE MG/DL
GP B STREP DNA CSF QL NAA+NON-PROBE: NEGATIVE
HAEM INFLU DNA CSF QL NAA+NON-PROBE: NEGATIVE
HGB UR QL STRIP: NEGATIVE
HHV6 DNA CSF QL NAA+NON-PROBE: NEGATIVE
HSV1 DNA CSF QL NAA+NON-PROBE: NEGATIVE
HSV2 DNA CSF QL NAA+NON-PROBE: NEGATIVE
KETONES UR STRIP-MCNC: NEGATIVE MG/DL
L MONOCYTOG DNA CSF QL NAA+NON-PROBE: NEGATIVE
LEUKOCYTE ESTERASE UR QL STRIP: NEGATIVE
N MEN DNA CSF QL NAA+NON-PROBE: NEGATIVE
NITRATE UR QL: NEGATIVE
PARECHOVIRUS A RNA CSF QL NAA+NON-PROBE: NEGATIVE
PH UR STRIP: 5.5 [PH] (ref 5–7)
PROT CSF-MCNC: 67.2 MG/DL (ref 15–45)
RBC # CSF MANUAL: 2 /UL (ref 0–2)
RBC URINE: 0 /HPF
S PNEUM DNA CSF QL NAA+NON-PROBE: NEGATIVE
SP GR UR STRIP: 1.02 (ref 1–1.03)
SQUAMOUS EPITHELIAL: 1 /HPF
TUBE # CSF: 4
UROBILINOGEN UR STRIP-MCNC: NORMAL MG/DL
VZV DNA CSF QL NAA+NON-PROBE: NEGATIVE
WBC # CSF MANUAL: 4 /UL (ref 0–5)
WBC URINE: 0 /HPF

## 2023-11-10 PROCEDURE — 272N000432 HC KIT CATH IV 18 OR 20 G, POWERGLIDE BASIC

## 2023-11-10 PROCEDURE — 97530 THERAPEUTIC ACTIVITIES: CPT | Mod: GP

## 2023-11-10 PROCEDURE — 86037 ANCA TITER EACH ANTIBODY: CPT | Performed by: PSYCHIATRY & NEUROLOGY

## 2023-11-10 PROCEDURE — 120N000001 HC R&B MED SURG/OB

## 2023-11-10 PROCEDURE — 62328 DX LMBR SPI PNXR W/FLUOR/CT: CPT

## 2023-11-10 PROCEDURE — 86618 LYME DISEASE ANTIBODY: CPT | Performed by: INTERNAL MEDICINE

## 2023-11-10 PROCEDURE — 86592 SYPHILIS TEST NON-TREP QUAL: CPT | Performed by: INTERNAL MEDICINE

## 2023-11-10 PROCEDURE — 82565 ASSAY OF CREATININE: CPT | Performed by: STUDENT IN AN ORGANIZED HEALTH CARE EDUCATION/TRAINING PROGRAM

## 2023-11-10 PROCEDURE — 87075 CULTR BACTERIA EXCEPT BLOOD: CPT | Performed by: INTERNAL MEDICINE

## 2023-11-10 PROCEDURE — 250N000011 HC RX IP 250 OP 636: Mod: JZ | Performed by: INTERNAL MEDICINE

## 2023-11-10 PROCEDURE — 81001 URINALYSIS AUTO W/SCOPE: CPT | Performed by: NURSE PRACTITIONER

## 2023-11-10 PROCEDURE — 84157 ASSAY OF PROTEIN OTHER: CPT | Performed by: INTERNAL MEDICINE

## 2023-11-10 PROCEDURE — 89050 BODY FLUID CELL COUNT: CPT | Performed by: INTERNAL MEDICINE

## 2023-11-10 PROCEDURE — 87529 HSV DNA AMP PROBE: CPT | Performed by: INTERNAL MEDICINE

## 2023-11-10 PROCEDURE — 86038 ANTINUCLEAR ANTIBODIES: CPT | Performed by: PSYCHIATRY & NEUROLOGY

## 2023-11-10 PROCEDURE — 86617 LYME DISEASE ANTIBODY: CPT | Performed by: INTERNAL MEDICINE

## 2023-11-10 PROCEDURE — 258N000003 HC RX IP 258 OP 636: Performed by: INTERNAL MEDICINE

## 2023-11-10 PROCEDURE — 250N000013 HC RX MED GY IP 250 OP 250 PS 637: Performed by: STUDENT IN AN ORGANIZED HEALTH CARE EDUCATION/TRAINING PROGRAM

## 2023-11-10 PROCEDURE — 250N000011 HC RX IP 250 OP 636: Performed by: INTERNAL MEDICINE

## 2023-11-10 PROCEDURE — 82945 GLUCOSE OTHER FLUID: CPT | Performed by: INTERNAL MEDICINE

## 2023-11-10 PROCEDURE — 36415 COLL VENOUS BLD VENIPUNCTURE: CPT | Performed by: PSYCHIATRY & NEUROLOGY

## 2023-11-10 PROCEDURE — 009U3ZX DRAINAGE OF SPINAL CANAL, PERCUTANEOUS APPROACH, DIAGNOSTIC: ICD-10-PCS | Performed by: PHYSICIAN ASSISTANT

## 2023-11-10 PROCEDURE — 87476 LYME DIS DNA AMP PROBE: CPT | Performed by: INTERNAL MEDICINE

## 2023-11-10 PROCEDURE — 36569 INSJ PICC 5 YR+ W/O IMAGING: CPT

## 2023-11-10 PROCEDURE — 250N000009 HC RX 250: Performed by: INTERNAL MEDICINE

## 2023-11-10 PROCEDURE — 250N000009 HC RX 250: Performed by: PHYSICIAN ASSISTANT

## 2023-11-10 PROCEDURE — 87483 CNS DNA AMP PROBE TYPE 12-25: CPT | Performed by: INTERNAL MEDICINE

## 2023-11-10 PROCEDURE — 99222 1ST HOSP IP/OBS MODERATE 55: CPT | Mod: GC | Performed by: PSYCHIATRY & NEUROLOGY

## 2023-11-10 PROCEDURE — 250N000011 HC RX IP 250 OP 636: Performed by: STUDENT IN AN ORGANIZED HEALTH CARE EDUCATION/TRAINING PROGRAM

## 2023-11-10 PROCEDURE — 258N000003 HC RX IP 258 OP 636: Performed by: STUDENT IN AN ORGANIZED HEALTH CARE EDUCATION/TRAINING PROGRAM

## 2023-11-10 PROCEDURE — 87798 DETECT AGENT NOS DNA AMP: CPT | Performed by: INTERNAL MEDICINE

## 2023-11-10 PROCEDURE — 87205 SMEAR GRAM STAIN: CPT | Performed by: INTERNAL MEDICINE

## 2023-11-10 PROCEDURE — 250N000013 HC RX MED GY IP 250 OP 250 PS 637: Performed by: INTERNAL MEDICINE

## 2023-11-10 PROCEDURE — 97535 SELF CARE MNGMENT TRAINING: CPT | Mod: GO

## 2023-11-10 PROCEDURE — 99233 SBSQ HOSP IP/OBS HIGH 50: CPT | Performed by: INTERNAL MEDICINE

## 2023-11-10 PROCEDURE — 87070 CULTURE OTHR SPECIMN AEROBIC: CPT | Performed by: INTERNAL MEDICINE

## 2023-11-10 RX ORDER — LIDOCAINE HYDROCHLORIDE 10 MG/ML
5 INJECTION, SOLUTION EPIDURAL; INFILTRATION; INTRACAUDAL; PERINEURAL ONCE
Status: COMPLETED | OUTPATIENT
Start: 2023-11-10 | End: 2023-11-10

## 2023-11-10 RX ORDER — ATORVASTATIN CALCIUM 40 MG/1
40 TABLET, FILM COATED ORAL EVERY EVENING
Status: DISCONTINUED | OUTPATIENT
Start: 2023-11-10 | End: 2023-11-12 | Stop reason: HOSPADM

## 2023-11-10 RX ORDER — LIDOCAINE 40 MG/G
CREAM TOPICAL
Status: DISCONTINUED | OUTPATIENT
Start: 2023-11-10 | End: 2023-11-12 | Stop reason: HOSPADM

## 2023-11-10 RX ORDER — ASPIRIN 81 MG/1
81 TABLET ORAL DAILY
Status: DISCONTINUED | OUTPATIENT
Start: 2023-11-10 | End: 2023-11-12 | Stop reason: HOSPADM

## 2023-11-10 RX ORDER — CLOPIDOGREL BISULFATE 75 MG/1
75 TABLET ORAL DAILY
Status: DISCONTINUED | OUTPATIENT
Start: 2023-11-10 | End: 2023-11-12 | Stop reason: HOSPADM

## 2023-11-10 RX ADMIN — VANCOMYCIN HYDROCHLORIDE 2500 MG: 10 INJECTION, POWDER, LYOPHILIZED, FOR SOLUTION INTRAVENOUS at 00:13

## 2023-11-10 RX ADMIN — ACYCLOVIR SODIUM 950 MG: 50 INJECTION, SOLUTION INTRAVENOUS at 21:34

## 2023-11-10 RX ADMIN — VANCOMYCIN HYDROCHLORIDE 2000 MG: 10 INJECTION, POWDER, LYOPHILIZED, FOR SOLUTION INTRAVENOUS at 12:08

## 2023-11-10 RX ADMIN — SENNOSIDES 8.6 MG: 8.6 TABLET, FILM COATED ORAL at 09:30

## 2023-11-10 RX ADMIN — ASPIRIN 81 MG: 81 TABLET, COATED ORAL at 18:52

## 2023-11-10 RX ADMIN — SENNOSIDES 8.6 MG: 8.6 TABLET, FILM COATED ORAL at 21:20

## 2023-11-10 RX ADMIN — ACETAMINOPHEN 325 MG: 325 TABLET, FILM COATED ORAL at 18:52

## 2023-11-10 RX ADMIN — OXYCODONE HYDROCHLORIDE 2.5 MG: 5 TABLET ORAL at 15:29

## 2023-11-10 RX ADMIN — ACETAMINOPHEN 325 MG: 325 TABLET, FILM COATED ORAL at 21:21

## 2023-11-10 RX ADMIN — MEROPENEM 2 G: 1 INJECTION, POWDER, FOR SOLUTION INTRAVENOUS at 05:08

## 2023-11-10 RX ADMIN — MEROPENEM 2 G: 1 INJECTION, POWDER, FOR SOLUTION INTRAVENOUS at 15:07

## 2023-11-10 RX ADMIN — ACYCLOVIR SODIUM 950 MG: 50 INJECTION, SOLUTION INTRAVENOUS at 06:33

## 2023-11-10 RX ADMIN — ACETAMINOPHEN 325 MG: 325 TABLET, FILM COATED ORAL at 09:30

## 2023-11-10 RX ADMIN — LIDOCAINE HYDROCHLORIDE 3 ML: 10 INJECTION, SOLUTION EPIDURAL; INFILTRATION; INTRACAUDAL; PERINEURAL at 14:22

## 2023-11-10 RX ADMIN — LIDOCAINE HYDROCHLORIDE ANHYDROUS 1 ML: 10 INJECTION, SOLUTION INFILTRATION at 20:11

## 2023-11-10 RX ADMIN — OXYCODONE HYDROCHLORIDE 2.5 MG: 5 TABLET ORAL at 02:24

## 2023-11-10 RX ADMIN — ACETAMINOPHEN 325 MG: 325 TABLET, FILM COATED ORAL at 15:12

## 2023-11-10 RX ADMIN — ATORVASTATIN CALCIUM 40 MG: 40 TABLET, FILM COATED ORAL at 21:20

## 2023-11-10 RX ADMIN — ACETAMINOPHEN 325 MG: 325 TABLET, FILM COATED ORAL at 02:17

## 2023-11-10 RX ADMIN — ACETAMINOPHEN 325 MG: 325 TABLET, FILM COATED ORAL at 06:33

## 2023-11-10 RX ADMIN — CLOPIDOGREL BISULFATE 75 MG: 75 TABLET ORAL at 21:20

## 2023-11-10 ASSESSMENT — ACTIVITIES OF DAILY LIVING (ADL)
ADLS_ACUITY_SCORE: 41

## 2023-11-10 NOTE — PROVIDER NOTIFICATION
MD Sanderson paged regarding IR note and needing X ray order. Requested pt PTA creams and a diet change.

## 2023-11-10 NOTE — PLAN OF CARE
Goal Outcome Evaluation:    Reported headache this morning, Tylenol/ Oxy given. Pt rested and awoke more confused. Upon PT assessment, Pt reported L sided visual cut, RRT called, then code stroke called @ approx 1600. Currently being worked up for meningitis rule out. Neurology consulted.    Intermittently confused. Up SBA. Poor intake, minimal appetite. Voiding in BR. No BM. Skin CDI. No acute change to neuro / CMS status; WDL.   Need UA; pending void.

## 2023-11-10 NOTE — DOWNTIME EVENT NOTE
Ceftriaxone changed to meropenem to add coverage for listeria (given her allergy to penicillins w prohibitive risk of using Ampicillin. Other treatments that were considered include Bactrim but dosing would require very high doses given patient's body habitus).    Mohsan Chaudhry MD  [facility]

## 2023-11-10 NOTE — PHARMACY-VANCOMYCIN DOSING SERVICE
Pharmacy Vancomycin Initial Note  Date of Service 2023  Patient's  1950  73 year old, female    Indication: Meningitis    Current estimated CrCl = Estimated Creatinine Clearance: 87.5 mL/min (based on SCr of 0.88 mg/dL).    Creatinine for last 3 days  2023:  3:09 PM Creatinine 0.85 mg/dL  2023:  9:59 PM Creatinine 1.05 mg/dL  2023:  7:56 AM Creatinine 0.77 mg/dL;  5:44 PM Creatinine 0.88 mg/dL    Recent Vancomycin Level(s) for last 3 days  No results found for requested labs within last 3 days.      Vancomycin IV Administrations (past 72 hours)        No vancomycin orders with administrations in past 72 hours.                    Nephrotoxins and other renal medications (From now, onward)      Start     Dose/Rate Route Frequency Ordered Stop    23 193  acyclovir (ZOVIRAX) 950 mg in sodium chloride 0.9 % 269 mL intermittent infusion         10 mg/kg × 97.3 kg (Adjusted)  269 mL/hr over 1 Hours Intravenous EVERY 8 HOURS 23 1845      23 193  vancomycin (VANCOCIN) 2,000 mg in 0.9% NaCl 500 mL intermittent infusion         2,000 mg  over 2 Hours Intravenous EVERY 12 HOURS 23 19123 193  vancomycin (VANCOCIN) 2,500 mg in 0.9% NaCl 500 mL intermittent infusion         2,500 mg  over 120 Minutes Intravenous ONCE 23              Contrast Orders - past 72 hours (72h ago, onward)      Start     Dose/Rate Route Frequency Stop    23 1630  iopamidol (ISOVUE-370) solution 125 mL         125 mL Intravenous ONCE 23 1637    23 0135  gadobutrol (GADAVIST) injection 7 mL         7 mL Intravenous ONCE 23 0145          Plan:  Start vancomycin 2500 mg IV once followed by 2000 mg IV q12h.   Vancomycin monitoring method: Trough (Method 1 = dosing nomogram)  Vancomycin therapeutic monitoring goal: 15-20 mg/L  Pharmacy will check vancomycin levels as appropriate in 1-3 Days.    Serum creatinine levels will be ordered daily for the first  week of therapy and at least twice weekly for subsequent weeks.      Yari Lynn [facility]

## 2023-11-10 NOTE — CONSULTS
IR consult note    IR consulted for a lumbar puncture. IR consult is not the correct or appropriate order for this procedure. This requires simply just an xray order for a lumbar puncture.     Please place actual xray order for this procedure.     XR lumbar puncture.     If you have problems placing this order please call main xray [phone]    Deandra Goff, DO  Interventional Radiology.

## 2023-11-10 NOTE — PROGRESS NOTES
RADIOLOGY PROCEDURE NOTE  Patient name: Macey Romero  MRN: [id number]  : 1950    Pre-procedure diagnosis: Fever and headaches  Post-procedure diagnosis: Same    Procedure Date/Time: November 10, 2023  2:38 PM  Procedure: LP at L3-L4  Estimated blood loss: None  Specimen(s) collected with description: 17 mL of clear CSF  The patient tolerated the procedure well with no immediate complications.  Significant findings:none    See imaging dictation for procedural details.    Provider name: Jose Glass PA-C  Assistant(s):None

## 2023-11-10 NOTE — PROGRESS NOTES
[facility]    Medicine Progress Note - Hospitalist Service    Date of Admission:  11/8/2023    Interval History:   Patient seen prior to her LP.  She was feeling better.  In addition she lost her IV and potentially infiltrated her vancomycin.  Discussion regarding her LP results.  We will stop Merrem and Vanco but continue on her acyclovir.   Received a call from stroke that patient likely had a TIA in addition to all the other things that are going on.  Want to start on Plavix and ASA      Assessment & Plan   73F hx of C5-C6 discetomy, CVA, vit D def, CKD, OA, PreDM, Asthma, constipation, R breast cancer s/p b/l mastectomy, presenting with diffuse myalgia, fevers, headache and neck pain.     Headaches since 10/24   Diffuse myalgia  S/p C5-C6 anterior discectomy 9/2023  History 11/9 that headaches noted since in the clinic with UTI which was 10/24. Reported that Abx got switched and not sure if this added to her headaches or making her feel worse.   She describes headaches up the back of her neck, over head and into forehead. (Behind bilateral eyes) worse with moving chin to chest  and light. (No history of migraines)  + ROS in HPI as noted developed severe leg pain/weakness that spread to the entire body, associated with severe neck/headpain, dry mouth, chest pain,SOB, fever, decreased appetite and posterior neck fullness. Has been taking tylenol/advil with no improvement. Associated with dry mouth.  On exam she is extremely stiff in her neck. Does not even turn her head. Muscles are tight all through her neck and into shoulders.   11/8 CT head negative and CT cervical spine with no acute fracture or post traumatic subluxation   11/9 MRI cervical spine. S/p anterior cervical discectomy and fusion at C5-C6 and interbody fusion at the C6-C7 level. No post surgical complications.   She has not done therapy post surgery as thought she was to wait 6 weeks.   Ortho consult to reassess   ? If  headache myofascial pain and stiffness given imaging not revealing for acute process.   11/9 Neurology consult for headache initially planned  11/9 orthopedic consult.  No postop source identified to explain her profile of symptoms.  Continue medicine work-up.  Further evaluation 11/9 secondary to stroke symptoms concern  11/10 LP with nucleated cells 4 RBCs 2 (Gram stain negative)   Continue only on acyclovir and stop the Merrem and Vanco.  Unlikely to be infectious.    11/9 STROKE TEAM/symptoms   11/9/2023 TIA   RRT at approximately 4:44 PM.  Patient reported that she was having abnormal vision while working with physical therapy.  Described a black semicervical.  Neurologically appeared to have a dull sensation of the left side of her body with remained in tact.   11/9 stroke team called.  Work-up completed>> discussion of CT findings at the time completed of her CTA with stroke neurology further input based on  review.   At approximately the same time she also developed a fever of 100.8 this afternoon.  11/10 stroke team: Patient with left visual field cut, left sided numbness and dysarthria: Diagnosed with TIA   11/10 started on aspirin 81 and Plavix for stroke prevention.  Plan to continue for a total of 21 days and drop Plavix and continue aspirin only  Lipitor 40 mg  30-day event    Reported fevers  Elevated inflammatory marker  Tmax 99.5  WBC normal  Recent UTI with enterococcus.   UA on admit blood trace. Leuk small  CXR no infiltrate   Tmax 100.8 this afternoon.  Aware of fever at the time of her stroke symptoms  11/9 COVID-negative  CRP elevated at 57  Procalcitonin 0.26  See conversation as noted above  Liver test slightly elevated but improving  11/10 LP done given fevers neck stiffness.  Initial results likely negative but continue with HSV tx     Elevated liver test  Labs improving since admission  >>64>>47  >> 149>>  Bilirubin remain normal  Alk phos 346>> 327>> 260  Right  "upper quadrant ultrasound post cholecystectomy no significant abnormality  Liver profile improved    Abnormal lungs sounds  On exam has crackles in the left anterior chest area   CXR negative   11/10 lung sounds better today    Cardiac murmur   Cardiac murmur with 2/6 in LSB  11/10 echo with normal EF.  No wall motion abnormality. Grade 1 or early diastolic dysfunction.   CXR with no acute findings    BNP elevated     Vitamin D def  --resume supplements on discharge     Urinary frequency   Cystitis  patient has been urgency, frequency and dysurea for 1 month, despite being on Bactrim followed by Macrobid  UA shows only WBC 5, and small Leuk esterase, with no nitrites.  Discussion regarding patient may not have acute infection but interstitial cystitis  Outpatient urology vs GYN     Hx CVA, no residual deficits  Had R weakness and slurred speech in 2005  Unknown why patient not on aspirin/statin  See stroke assessment as above      IgA MGUS  manages as an outpatient. Completed PET scan recently, informed that it was \"good\"  no acute intervention     Pseudophakia  Extropia  Cataracts s/p removal  Dry eyes  follows with optho as  outpatient  Baseline with eye symptoms      PreDM  --Mod CHO diet+renal diet  --A1c  --No indication for ISS for now     Asthma  --prn duonebs     Constipation  --daily senna     Hx R breast cancer 1980s s/p mastectomy  Hx SCC of nose 1990s s/p resection  --no intervention     OA  B/l TKR  --Tylenol, Oxy     CKD  renal diet  limit contrast, avoid nsaids  SBP goal<130  Creatinine stable. Monitor.       IV access:   Concern when patient came back from her LP that her IV infiltrated  Had received vancomycin at some point  IV team came to evaluate her IV access  IV had been pulled out and apparently fairly difficult to get blood or IVs.  Patient will unfortunately need a midline      Diet: Regular Diet Adult    DVT Prophylaxis: Pneumatic Compression Devices  Boudreaux Catheter: Not present  Lines: " None     Cardiac Monitoring: None  Code Status: Full Code      Clinically Significant Risk Factors          # Hypocalcemia: Lowest Ca = 8.2 mg/dL in last 2 days, will monitor and replace as appropriate     # Hypoalbuminemia: Lowest albumin = 3.2 g/dL at 11/9/2023  7:56 AM, will monitor as appropriate                # Asthma: noted on problem list        Disposition Plan      Expected Discharge Date: 11/11/2023                    MYRON GARCIA MD  Hospitalist Service  [facility]  Securely message with Vocera (more info)  Text page via [employer] Paging/Directory   ______________________________________________________________________    Physical Exam   Vital Signs: Temp: 98.3  F (36.8  C) Temp src: Oral BP: 128/67 Pulse: 91   Resp: 16 SpO2: 98 % O2 Device: None (Room air)    Weight: 355 lbs 9.63 oz    General Appearance: Awake and alert.   Stiffness in the cervical area   Unable to turn her neck: stiffness with flexion   Respiratory: Clear to auscultation bilaterally   Cardiovascular: Regular rate with no gallop or rub  GI: + BS soft, non tender  Skin: No rashes       Medical Decision Making       55 MINUTES SPENT BY ME on the date of service doing chart review, history, exam, documentation & further activities per the note.      Data     I have personally reviewed the following data over the past 24 hrs:    5.1  \   12.0   / 187     134 (L) 100 9.3 /  136 (H)   3.8 22 0.76 \     Procal: 0.33 (H) CRP: N/A Lactic Acid: 0.8         Imaging results reviewed over the past 24 hrs:   Recent Results (from the past 24 hour(s))   MR Brain w/o & w Contrast    Narrative    EXAM: MR BRAIN W/O and W CONTRAST  LOCATION: [facility]  DATE: 11/9/2023    INDICATION: Transient aphasia and vision changes  COMPARISON: 11/09/2023.  CONTRAST: 6 ml Gadavist  TECHNIQUE: MRI brain without and with contrast.    FINDINGS: On the diffusion-weighted images there is no evidence of acute ischemia or  restricted diffusion. There is no discrete mass lesion or midline shift. There is no acute extra-axial fluid collection or acute intraparenchymal hemorrhage. There are   appropriate flow voids within the cavernous portions of the internal carotid arteries and the basilar artery. On the FLAIR and T2-weighted images there are multiple small foci of high signal within the periventricular and subcortical white matter   consistent with mild small vessel ischemic disease. The ventricular system, basal cisterns and the cortical sulci are consistent with mild diffuse volume loss. Following the administration of contrast no abnormal enhancement is visualized.    There is no evidence of cerebellar tonsillar ectopia. Corpus callosum and the sella region have appropriate configuration and signal intensity for the patient's age. The orbit regions are unremarkable. There is no significant paranasal sinus disease. The   mastoid air cells and the middle ear regions are clear.      Impression    IMPRESSION:  1. No discrete mass lesion, hemorrhage or focal area suggestive of acute ischemia.  2. No abnormal enhancement.  3. Mild age-related changes.

## 2023-11-10 NOTE — CONSULTS
"  This note written by the medical student has been reviewed and has been addended as needed. I agree with the assessment and plan.      Berenice Hunter MD  Vascular Neurology Fellow        [facility]    Stroke Consult Note    Reason for Consult: TIA    Chief Complaint: Abnormal Labs (Pt states she was sent in by PMD  to receive iv abx for a bladder infection and abnormal labs. Pt reports elevated liver tests. Also c/o excruciating headache for last week. Hx of cervical spine surgery about 1.5 months ago.)      HPI  Macey Romero is a 72 yo F current tobacco user with PMH of prediabetes, R breast cancer s/p bilateral mastectomy, OA, IgA MGUS, recent C5-C6 anterior discectomy (9/20) hospitalized for headache, neck pain, myalgias and fever. Stroke code was activated yesterday (11/9) when patient developed left visual field cut, left sided numbness and dysarthria during physical therapy. At that time, patient had fever of 100.8. CT showed no evidence of acute ischemic stroke or intracranial bleed and CTA indicated R P2 stenosis or partial occlusion. Stroke code was deescalated and symptoms resolved within one hour.      On exam today, the patient states that yesterday she had transient left peripheral vision loss, lasting less than an hour. She described the episode as \"viewing half a movie screen\" She denies any unilateral weakness and attributes weakness to lack of dietary intake. She is unsure if there was any difficulty speaking, but this was noted on initial exam. Macey denies any current vision changes, weakness beyond baseline, or confusion. Endorses difficulty swallowing, which is an ongoing concern. She states that she was diagnosed with a TIA in 2005 after developing dysarthria. Since the TIA endorses difficulty with numbers and word recall, but no physical deficits. She has on-going left sided weakness from her procedure. Additionally, she endorses ~10 days of headache, with associated " photophobia. She notes symptoms worsen with coughing or movement. She has a history of two prior episodes of meningitis.     Of note, during the exam, patient endorsed episodes of brief hallucinations, mis-seeing objects before correcting in a few seconds. She states that she saw a small child on the corner, before realizing it was a fire hydrant and corrected, which have been recurrent episodes. She also notes increased difficulty with short term memory, name recall, and feels slower on her everyday tasks. She denies any changes in her gait or new tremor.      Imaging Findings  MRI and/or Head CT MRI (11/8): No discrete mass lesion, hemorrhage or focal area suggestive of acute ischemia.      CT: Normal head CT.    Intracranial Vasculature Head CTA: Poor visualization of the proximal P2 segment of the right posterior cerebral artery could represent stenosis or partial occlusion. The distal P2 segment and branches of the right posterior cervical artery are patent and unremarkable. Otherwise, normal head CTA.   Cervical Vasculature Neck CTA: Normal neck CTA.       Echocardiogram 11/9 The visual ejection fraction is 60-65%. Normal left ventricular wall motion. The left ventricle is normal in size.    EKG/Telemetry N/A   Other Testing LP/CSF labs: pending          A1C 11/8/2023: 5.7 %     Intravenous Thrombolysis  Not given due to:   - minor/isolated/quickly resolving symptoms     Endovascular Treatment  Not initiated due to absence of proximal vessel occlusion    Impression   Transient ischemic attack  R P1 stenosis/partial occlusion- suspect embolic source causing the partial occlusion  With good distal flow.    Recommendations  - Neurochecks and Vital Signs every q4   - Initiate daily aspirin 81 mg and Plavix 6 hours after LP for secondary stroke prevention . Plan to continue for total of 21 days then drop plavix and continue aspirin only  - Statin: lipitor 40  - Recommend 30 day cardiac monitoring at  "discharge  - Bedside Glucose Monitoring  - PT/OT/SLP  - Stroke Education  - Euthermia, Euglycemia  -smoking cessation (currently using ~2 cigarettes/day)  -refer to general neurology for further headache and infection work-up  -recommend outpatient management for cognitive changes and concerns     Patient Follow-up     - final recommendation pending work-up    Thank you for this consult. We will continue to follow     The Stroke Fellow is Dr. Hunter. The Stroke Staff is Dr. Garcia.    [facility]  Medical Student  To page a member of the stroke/neurocritical care service, click here:  AMCOM   Choose \"On Call\" tab at top, then search dropdown box for \"Neurology Adult\", select location, press Enter, then look for stroke/neuro ICU/telestroke.  ______________________________________________________    Clinically Significant Risk Factors          # Hypocalcemia: Lowest Ca = 8.2 mg/dL in last 2 days, will monitor and replace as appropriate     # Hypoalbuminemia: Lowest albumin = 3.2 g/dL at 11/9/2023  7:56 AM, will monitor as appropriate                # Asthma: noted on problem list          Past Medical History   Past Medical History:   Diagnosis Date    Breast CA in situ 1987    Cancer (H) 1987    Right Breast treated with surgery    Cerebral infarction (H)     Chronic constipation     Fibromyalgia     History of blood transfusion     Malignant neoplasm of female breast, unspecified estrogen receptor status, unspecified laterality, unspecified site of breast (H) 06/17/2021    Meningitis     Several times as an adult    Osteoarthritis 02/01/2011    Osteopenia 02/01/2011    Pneumonia     Pterygium eye 08/26/2013    Reactive airway disease 02/01/2011    Seasonal allergies     Shingles     Prior to 2008 - scalp    Skin cancer     SCC - hand, left nose    Uncomplicated asthma      Past Surgical History   Past Surgical History:   Procedure Laterality Date    anterior c-disc fusion      ARTHROPLASTY KNEE Left 10/17/2022    " Procedure: LEFT TOTAL KNEE ARTHROPLASTY;  Surgeon: Jax Le MD;  Location:  OR    BLEPHAROPLASTY, BROW LIFT BILATERAL, COMBINED Bilateral 6/18/2018    Procedure: COMBINED BLEPHAROPLASTY, BROW LIFT BILATERAL;  BILATERAL UPPER LID BLEPHAROPLASTY; BILATERAL BROW PTOSIS REPAIR;  Surgeon: Сергей Walters MD;  Location:  EC    CHOLECYSTECTOMY      COLONOSCOPY N/A 10/19/2017    Procedure: COLONOSCOPY;  COLONOSCOPY;  Surgeon: Niko Wong MD;  Location:  GI    COLONOSCOPY N/A 8/27/2022    Procedure: COLONOSCOPY, WITH POLYPECTOMY AND BIOPSY;  Surgeon: Abraham Rogers MD;  Location:  GI    D & C      Bleeding before hysterectomy    DISCECTOMY, FUSION CERVICAL ANTERIOR ONE LEVEL, COMBINED N/A 9/20/2023    Procedure: ANTERIOR CERVICAL 5 TO CERVICAL 6 DISCECTOMY AND FUSION;  Surgeon: Alex Galindo MD;  Location:  OR    ENDOSCOPY  04/21/08    ESOPHAGOSCOPY, GASTROSCOPY, DUODENOSCOPY (EGD), COMBINED N/A 8/27/2022    Procedure: ESOPHAGOGASTRODUODENOSCOPY, WITH BIOPSY;  Surgeon: Abraham Rogers MD;  Location:  GI    HYSTERECTOMY, MARCUS      Ovaries and tubes out due to breast cancer    JOINT REPLACEMTN, KNEE RT/LT Right 01/2011    Joint Replacement knee RT, with tibial straightening (2 replacements)    MAMMOPLASTY AUGMENTATION BILATERAL      breast ca     MASTECTOMY, SIMPLE RT/LT/CLAIRE Bilateral 1989    Mastectomy Simple RT/LT/CLAIRE    MOHS MICROGRAPHIC PROCEDURE      Left lateral nose    OPEN REDUCTION INTERNAL FIXATION ANKLE  8/27/2013    Procedure: OPEN REDUCTION INTERNAL FIXATION ANKLE;  RIGHT OPEN REDUCTION INTERNAL FIXATION ANKLE WITH LIGAMENT REPAIR;  Surgeon: Nando Chang MD;  Location:  OR    PTERYGIUM WITH CONJUNCTIVAL AUTOLOGOUS TRANSPLANT Left 8/29/2016    Procedure: PTERYGIUM WITH CONJUNCTIVAL AUTOLOGOUS TRANSPLANT;  Surgeon: Сергей Walters MD;  Location:  EC    REPAIR LIGAMENT ANKLE  8/27/2013    Procedure: REPAIR LIGAMENT ANKLE;;  Surgeon: Nando Chang MD;   Location:  OR    SALIVARY GLAND SURGERY Left     Stone removal     SIGMOIDOSCOPY FLEXIBLE N/A 8/30/2022    Procedure: FLEXIBLE SIGMOIDOSCOPY;  Surgeon: Niko Wong MD;  Location:  GI    TONSILLECTOMY       Medications   Home Meds  Prior to Admission medications    Medication Sig Start Date End Date Taking? Authorizing Provider   acetaminophen (TYLENOL) 500 MG tablet Take 2 tablets (1,000 mg) by mouth every 8 hours as needed for pain (mild pain) 10/17/22  Yes Silvia Huff PA-C   albuterol (PROAIR HFA/PROVENTIL HFA/VENTOLIN HFA) 108 (90 Base) MCG/ACT inhaler Inhale 2 puffs into the lungs every 6 hours  Patient taking differently: Inhale 2 puffs into the lungs every 6 hours as needed for shortness of breath 4/17/23  Yes Long Ferrari MD   budesonide-formoterol (SYMBICORT) 160-4.5 MCG/ACT Inhaler Inhale 2 puffs into the lungs daily  Patient taking differently: Inhale 2 puffs into the lungs at bedtime Pt does not take year round, only seasonally in the fall 10/27/21  Yes Deandra Strong APRN CNP   hydrocortisone 2.5 % cream Apply topically daily as needed   Yes Unknown, Entered By History   metroNIDAZOLE (METROCREAM) 0.75 % external cream Apply topically every evening as needed 3/8/22  Yes Reported, Patient   nitroFURantoin macrocrystal (MACRODANTIN) 100 MG capsule Take 1 capsule (100 mg) by mouth 2 times daily for 10 days 10/30/23 11/9/23 Yes Long Ferrari MD   vitamin D3 (CHOLECALCIFEROL) 50 mcg (2000 units) tablet Take 1 tablet by mouth daily   Yes Reported, Patient       Scheduled Meds   acetaminophen  325 mg Oral Q4H    acyclovir  10 mg/kg (Adjusted) Intravenous Q8H    lidocaine (PF)  5 mL Subcutaneous Once    meropenem  2 g Intravenous Q8H    sennosides  8.6 mg Oral BID    sodium chloride (PF)  3 mL Intracatheter Q8H    vancomycin  2,000 mg Intravenous Q12H       Infusion Meds   sodium chloride 100 mL/hr at 11/09/23 1845       PRN Meds  ipratropium - albuterol 0.5 mg/2.5 mg/3 mL,  melatonin, naloxone **OR** naloxone **OR** naloxone **OR** naloxone, ondansetron **OR** ondansetron, oxyCODONE, prochlorperazine **OR** prochlorperazine **OR** prochlorperazine, sodium chloride (PF)    Allergies   Allergies   Allergen Reactions    Levaquin Hives and Itching    Pcn [Penicillins] Hives    Tetanus Toxoid Anaphylaxis    Tetanus Toxoids Anaphylaxis    Erythromycin GI Disturbance    Amoxicillin     Duloxetine Nausea     Other reaction(s): Jittery    Silicone Rash     Family History   Family History   Problem Relation Age of Onset    Respiratory Father     Cancer Brother         sarcoidosis    Diabetes Brother     Cerebrovascular Disease Brother     Liver Disease Brother      Social History   Social History     Tobacco Use    Smoking status: Some Days     Types: Cigarettes     Last attempt to quit: 2016     Years since quittin.8    Smokeless tobacco: Never    Tobacco comments:     quit but  still smokes, but not in house   Vaping Use    Vaping Use: Never used   Substance Use Topics    Alcohol use: No     Alcohol/week: 0.0 standard drinks of alcohol    Drug use: No       Review of Systems   The 5 point Review of Systems is negative other than noted in the HPI or here.        PHYSICAL EXAMINATION  Temp:  [98.6  F (37  C)-100.8  F (38.2  C)] 98.6  F (37  C)  Pulse:  [] 92  Resp:  [16-20] 16  BP: (115-177)/(54-64) 177/54  SpO2:  [91 %-95 %] 91 %     General Exam  General:  patient sitting in chair without any acute distress    HEENT:  normocephalic/atraumatic    Neuro Exam  Mental Status:  alert, oriented x 3, follows commands, speech clear and fluent, naming and repetition normal. Occasionally forgot her train of thought mid-conversation.  Cranial Nerves:  visual fields intact, PERRL, EOMI with normal smooth pursuit, facial sensation intact and symmetric, facial movements symmetric, hearing not formally tested but intact to conversation, no dysarthria, tongue protrusion midline  Motor:   normal muscle tone and bulk, no abnormal movements, able to move all limbs spontaneously, strength 5/5 throughout upper and lower extremities, no pronator drift  Reflexes:   not tested  Sensory:  light touch sensation intact and symmetric throughout upper and lower extremities, no extinction on double simultaneous stimulation   Coordination:  normal finger-to-nose and heel-to-shin bilaterally without dysmetria  Station/Gait:  deferred    Dysphagia Screen  Per Nursing    Stroke Scales    NIHSS  1a. Level of Consciousness 0-->Alert, keenly responsive   1b. LOC Questions 0-->Answers both questions correctly   1c. LOC Commands 0-->Performs both tasks correctly   2.   Best Gaze 0-->Normal   3.   Visual 0-->No visual loss   4.   Facial Palsy 0-->Normal symmetrical movements   5a. Motor Arm, Left 0-->No drift, limb holds 90 (or 45) degrees for full 10 secs   5b. Motor Arm, Right 0-->No drift, limb holds 90 (or 45) degrees for full 10 secs   6a. Motor Leg, Left 0-->No drift, leg holds 30 degree position for full 5 secs   6b. Motor Leg, right 0-->No drift, leg holds 30 degree position for full 5 secs   7.   Limb Ataxia 0-->Absent   8.   Sensory 0-->Normal, no sensory loss   9.   Best Language 0-->No aphasia, normal   10. Dysarthria 0-->Normal   11. Extinction and Inattention  0-->No abnormality   Total 0 (11/10/23 1042)         Imaging  I personally reviewed all imaging; relevant findings per HPI.     Lab Results Data   CBC  Recent Labs   Lab 11/09/23 1744 11/08/23 2159 11/07/23  1520   WBC 5.1 7.9 4.8   RBC 4.32 4.96 5.04   HGB 12.0 13.9 14.1   HCT 37.5 42.5 43.7    181 168     Basic Metabolic Panel    Recent Labs   Lab 11/10/23  0827 11/09/23  1744 11/09/23  1603 11/09/23  0756 11/08/23 2159   NA  --  134*  --  136 135   POTASSIUM  --  3.8  --  4.0 3.7   CHLORIDE  --  100  --  103 96*   CO2  --  22  --  23 25   BUN  --  9.3  --  9.8 13.0   CR 0.76 0.88  --  0.77 1.05*   GLC  --  136* 115* 116* 119*   DONNA  --   8.3*  --  8.2* 9.1     Liver Panel  Recent Labs   Lab 11/09/23  0756 11/08/23 2159 11/07/23  1509   PROTTOTAL 6.0* 7.5 7.2   ALBUMIN 3.2* 3.9 3.7   BILITOTAL 0.6 0.6 0.7   ALKPHOS 260* 327* 346*   AST 47* 64* 104*   * 149* 209*     INR    Recent Labs   Lab Test 11/08/23 2159   INR 0.92      Lipid Profile    Recent Labs   Lab Test 09/12/23  1650 10/10/22  1415 10/27/21  1037   CHOL 197 207* 206*   HDL 37* 45 43    136* 134*   TRIG 163* 146 160*     A1C    Recent Labs   Lab Test 11/08/23  2159 06/20/23  1131 10/10/22  1415   A1C 5.7* 5.9* 6.0*     Troponin    Recent Labs   Lab 11/08/23 2159   CTROPT 12

## 2023-11-10 NOTE — CONSULTS
DATE OF SERVICE : 11/9/2023    DATE OF ADMISSION: 11/8/2023    NEUROLOGICAL CONSULTATION    REQUESTED BY Tanya Zaragoza MD    SOURCE OF INFORMATION:Patient and EHR    REASON FOR CONSULTATION:     Headache     HISTORY OF PRESENT ILLNESS:       73 years old female with a history of anterior cervical disc fusion with removal of prior instrumentation 9/20/2023 presenting for evaluation regarding ongoing headache neck pain myalgias and fever.    Ongoing headaches for the past 6 weeks generalized into the cervical and suprascapular region as intense pounding pain where everything hurts with associated nauseated feeling at baseline patient has blur vision status post cataract surgery.  She is dependent on Tylenol prior to the arrival.  She has known history of elevated LFTs contributed to Advil she has known history of fibromyalgia.  RRT this evening with reported abnormal visual changes appeared to have dull sensation left side of the body  Initial work-up normal white count; Elevated creatinine, elevated AST/ALT. CRP elevated TSH normal ; A1c 5.7      Pain management-on oxycodone morphine    Past Medical History:   Diagnosis Date    Breast CA in situ 1987    Cancer (H) 1987    Right Breast treated with surgery    Cerebral infarction (H)     Chronic constipation     Fibromyalgia     History of blood transfusion     Malignant neoplasm of female breast, unspecified estrogen receptor status, unspecified laterality, unspecified site of breast (H) 06/17/2021    Meningitis     Several times as an adult    Osteoarthritis 02/01/2011    Osteopenia 02/01/2011    Pneumonia     Pterygium eye 08/26/2013    Reactive airway disease 02/01/2011    Seasonal allergies     Shingles     Prior to 2008 - scalp    Skin cancer     SCC - hand, left nose    Uncomplicated asthma        PSHx:   Past Surgical History:   Procedure Laterality Date    anterior c-disc fusion      ARTHROPLASTY KNEE Left 10/17/2022    Procedure: LEFT TOTAL KNEE  ARTHROPLASTY;  Surgeon: Jax Le MD;  Location:  OR    BLEPHAROPLASTY, BROW LIFT BILATERAL, COMBINED Bilateral 6/18/2018    Procedure: COMBINED BLEPHAROPLASTY, BROW LIFT BILATERAL;  BILATERAL UPPER LID BLEPHAROPLASTY; BILATERAL BROW PTOSIS REPAIR;  Surgeon: Сергей Walters MD;  Location:  EC    CHOLECYSTECTOMY      COLONOSCOPY N/A 10/19/2017    Procedure: COLONOSCOPY;  COLONOSCOPY;  Surgeon: Niko Wong MD;  Location:  GI    COLONOSCOPY N/A 8/27/2022    Procedure: COLONOSCOPY, WITH POLYPECTOMY AND BIOPSY;  Surgeon: Abraham Rogers MD;  Location:  GI    D & C      Bleeding before hysterectomy    DISCECTOMY, FUSION CERVICAL ANTERIOR ONE LEVEL, COMBINED N/A 9/20/2023    Procedure: ANTERIOR CERVICAL 5 TO CERVICAL 6 DISCECTOMY AND FUSION;  Surgeon: Alex Galindo MD;  Location:  OR    ENDOSCOPY  04/21/08    ESOPHAGOSCOPY, GASTROSCOPY, DUODENOSCOPY (EGD), COMBINED N/A 8/27/2022    Procedure: ESOPHAGOGASTRODUODENOSCOPY, WITH BIOPSY;  Surgeon: Abraham Rogers MD;  Location:  GI    HYSTERECTOMY, MARCUS      Ovaries and tubes out due to breast cancer    JOINT REPLACEMTN, KNEE RT/LT Right 01/2011    Joint Replacement knee RT, with tibial straightening (2 replacements)    MAMMOPLASTY AUGMENTATION BILATERAL      breast ca     MASTECTOMY, SIMPLE RT/LT/CLAIRE Bilateral 1989    Mastectomy Simple RT/LT/CLAIRE    MOHS MICROGRAPHIC PROCEDURE      Left lateral nose    OPEN REDUCTION INTERNAL FIXATION ANKLE  8/27/2013    Procedure: OPEN REDUCTION INTERNAL FIXATION ANKLE;  RIGHT OPEN REDUCTION INTERNAL FIXATION ANKLE WITH LIGAMENT REPAIR;  Surgeon: Nando Chang MD;  Location:  OR    PTERYGIUM WITH CONJUNCTIVAL AUTOLOGOUS TRANSPLANT Left 8/29/2016    Procedure: PTERYGIUM WITH CONJUNCTIVAL AUTOLOGOUS TRANSPLANT;  Surgeon: Сергей Walters MD;  Location:  EC    REPAIR LIGAMENT ANKLE  8/27/2013    Procedure: REPAIR LIGAMENT ANKLE;;  Surgeon: Nando Chang MD;  Location:  OR    SALIVARY GLAND  SURGERY Left     Stone removal     SIGMOIDOSCOPY FLEXIBLE N/A 8/30/2022    Procedure: FLEXIBLE SIGMOIDOSCOPY;  Surgeon: Niko Wong MD;  Location:  GI    TONSILLECTOMY       Medications Prior to Admission   Medication Sig Dispense Refill Last Dose    acetaminophen (TYLENOL) 500 MG tablet Take 2 tablets (1,000 mg) by mouth every 8 hours as needed for pain (mild pain) 90 tablet 0 11/8/2023    albuterol (PROAIR HFA/PROVENTIL HFA/VENTOLIN HFA) 108 (90 Base) MCG/ACT inhaler Inhale 2 puffs into the lungs every 6 hours (Patient taking differently: Inhale 2 puffs into the lungs every 6 hours as needed for shortness of breath) 18 g 1 prn    budesonide-formoterol (SYMBICORT) 160-4.5 MCG/ACT Inhaler Inhale 2 puffs into the lungs daily (Patient taking differently: Inhale 2 puffs into the lungs at bedtime Pt does not take year round, only seasonally in the fall) 10.2 g 3 11/7/2023    hydrocortisone 2.5 % cream Apply topically daily as needed   prn    metroNIDAZOLE (METROCREAM) 0.75 % external cream Apply topically every evening as needed   prn    nitroFURantoin macrocrystal (MACRODANTIN) 100 MG capsule Take 1 capsule (100 mg) by mouth 2 times daily for 10 days 20 capsule 0 11/8/2023 at am    vitamin D3 (CHOLECALCIFEROL) 50 mcg (2000 units) tablet Take 1 tablet by mouth daily   11/8/2023     Current Facility-Administered Medications   Medication Dose Route Frequency    acetaminophen  325 mg Oral Q4H    acyclovir  10 mg/kg (Adjusted) Intravenous Q8H    cefTRIAXone  2 g Intravenous Q24H    sennosides  8.6 mg Oral BID    sodium chloride (PF)  3 mL Intracatheter Q8H    vancomycin  2,000 mg Intravenous Q12H    vancomycin  2,500 mg Intravenous Once     Current Facility-Administered Medications   Medication Dose Route Frequency    ipratropium - albuterol 0.5 mg/2.5 mg/3 mL  3 mL Nebulization Q4H PRN    melatonin  1 mg Oral At Bedtime PRN    naloxone  0.2 mg Intravenous Q2 Min PRN    Or    naloxone  0.4 mg Intravenous Q2 Min PRN     Or    naloxone  0.2 mg Intramuscular Q2 Min PRN    Or    naloxone  0.4 mg Intramuscular Q2 Min PRN    ondansetron  4 mg Oral Q6H PRN    Or    ondansetron  4 mg Intravenous Q6H PRN    oxyCODONE  5 mg Oral Q4H PRN    prochlorperazine  5 mg Intravenous Q6H PRN    Or    prochlorperazine  5 mg Oral Q6H PRN    Or    prochlorperazine  12.5 mg Rectal Q12H PRN    sodium chloride (PF)  3 mL Intracatheter q1 min prn        Allergies   Allergen Reactions    Levaquin Hives and Itching    Pcn [Penicillins] Hives    Tetanus Toxoid Anaphylaxis    Tetanus Toxoids Anaphylaxis    Erythromycin GI Disturbance    Amoxicillin     Duloxetine Nausea     Other reaction(s): Jittery    Silicone Rash         SocHx:  reports that she has been smoking cigarettes. She has never used smokeless tobacco. She reports that she does not drink alcohol and does not use drugs.      Family History   Problem Relation Age of Onset    Respiratory Father     Cancer Brother         sarcoidosis    Diabetes Brother     Cerebrovascular Disease Brother     Liver Disease Brother          PHYSICAL EXAM    /56 (BP Location: Left arm)   Pulse 106   Temp 98.9  F (37.2  C) (Oral)   Resp 20   Wt (!) 161.3 kg (355 lb 9.6 oz)   SpO2 91%   BMI 61.04 kg/m        She appeared to be lethargic Limited cervical range of motion  Alert and oriented to current situations  spontaneous speech comprehension is normal no dysarthria  Pupils equal and round visual fields were full extraocular movements were intact mild left nasolabial fold flattening tongue   Able to move all 4 limbs against gravity  Reflexes 1+ upper and lower extremities diminished vibration distally at the toes      Lab and X-ray:   Recent Labs   Lab Test 11/09/23  1744 11/09/23  0756 11/08/23  2159 09/12/23  1654 09/12/23  1650   WBC 5.1  --  7.9   < >  --    HGB 12.0  --  13.9   < >  --      --  181   < >  --    INR  --   --  0.92  --   --    POTASSIUM 3.8   < > 3.7   < >  --    LDL  --   --   --    --  128    < > = values in this interval not displayed.     Recent Labs   Lab Test 11/09/23  1744 11/09/23  0756   POTASSIUM 3.8 4.0   CHLORIDE 100 103   BUN 9.3 9.8     Recent Labs   Lab Test 11/09/23  1744 11/08/23 2159   WBC 5.1 7.9   HGB 12.0 13.9   MCV 87 86    181   INR  --  0.92     Recent Labs   Lab Test 11/09/23  0756 11/08/23 2159   AST 47* 64*   * 149*   ALKPHOS 260* 327*     Recent Labs   Lab Test 09/12/23  1650 10/10/22  1415   HDL 37* 45    136*     No lab results found.    Laboratory results were personally interpreted and reviewed in detail.  Imaging studies reviewed and interpreted in detail      Summary: List Problems:   Patient Active Problem List   Diagnosis    Osteoarthritis    Osteopenia    History of breast cancer    IBS (irritable bowel syndrome)    Keloid of skin    Chronic pain of both knees    Fibromyalgia    H/O bilateral mastectomy    Moderate persistent asthma without complication    Prediabetes    Stage 3a chronic kidney disease (H)    Colitis    S/P cervical spinal fusion    Acute hepatitis    Complicated UTI (urinary tract infection)    Worsening of neck pain following surgery       ASSESSMENT:       73-year-old with a history of fibromyalgia with recent cervical spinal fusion 9/2023 with residual hand paresthesias presenting with new onset intractable headaches.  Limited cervical range of motion, with mild left nasolabial fold flattening   Elevated inflammatory markers, elevated LFTs- diagnostic considerations infectious/inflammatory    PLAN    MR brain pending. CSF studies pending  On empiric antibiotics  Check sed rate, MARIBETH , ANCA   Will continue to follow       Thank you for the opportunity to provide consultation on Mcaey Fay

## 2023-11-10 NOTE — PROVIDER NOTIFICATION
MD updated on possible extravasation. Pt came back from lumbar tap with IV completed. IV positional at that time, patent with arm straight. Dressing changed and flushing WNL. Started meropenum and stopped after 5 minutes due to leaking and pts frequent movement of arm. IV team called, heat pack applied. Some redness to site but dressing changed X2 and adhesive irritation vs extravasation. No infiltration noted superior of iv site.

## 2023-11-10 NOTE — PROGRESS NOTES
Trell messaged Dr. Sanderson @ [phone], request to change oxycodone from 5mg to 2.5 as Pt reports sensitivity to narcotics. No change to orders. Passed on to oncoming RN that Pt became increasingly confused after Oxycodone dose this morning and has responded better with sched Tylenol throughout the day.

## 2023-11-10 NOTE — CONSULTS
[facility]    Stroke Telephone Note    74 yo F with PMH of prediabetes, R breast cancer s/p bilateral mastectomy, OA, recent C5-C6 anterior discectomy hospitalized for headache, neck pain, myalgias and fever. Stroke code activated today for vision changes, L sided numbness. Brief encounter with patient who reports that during that time she had trouble understanding people and knew what to say but could not get the words out. With that she noticed that she could not side L side of her vision at all. This lasted for about an hour and then resolved. Patient reported left sided paresthesia are baseline for neck surgery Primary team and general neurology working up for fever and headaches.    /64 (BP Location: Right arm)   Pulse 88   Temp 99.2  F (37.3  C) (Oral)   Resp 20   Wt (!) 161.3 kg (355 lb 9.6 oz)   SpO2 95%   BMI 61.04 kg/m       Stroke Code Data (for stroke code without tele)  Stroke code activated 11/09/23   1626   Stroke provider first response  11/09/23   1630            Last known normal 11/09/23            Time of discovery   (or onset of symptoms)         Head CT read by Stroke Neuro Dr/Provider 11/09/23   1642   Was stroke code de-escalated?                Imaging Findings  CT head: no evidence of acute ischemic stroke or intracranial bleed  CTA head and neck: R P2 stenosis or partial occlusion    Intravenous Thrombolysis  Not given due to:   - minor/isolated/quickly resolving symptoms    Endovascular Treatment  P2 stenosis vs partial occlusion visualized but no intervenetion as distal and NIH back to 0    Impression  TIA vs acute ischemic stroke    Recommendations   Recommend MRI brain w wo contrast  Plan communicated to primary team    Case discussed with vascular neurology attending Dr. Garcia    My recommendations are based on the information provided over the phone by Macey Romero's in-person providers. They are not intended to replace the clinical judgment  "of her in-person providers. I was not requested to personally see or examine the patient at this time.    The Stroke Staff is Dr. Garcia.    Berenice Hunter MD  Vascular Neurology Fellow    To page me or covering stroke neurology team member, click here: [facility]  Choose \"On Call\" tab at top, then select \"NEUROLOGY/ALL SITES\" from middle drop-down box, press Enter, then look for \"stroke\" or \"telestroke\" for your site.      "

## 2023-11-10 NOTE — PLAN OF CARE
Goal Outcome Evaluation:               Aox4, forgetful. VSS on RA. Tolertaes regular diet. Up w/ SBA. Continent of B&B, voids in bathroom. PIV infusing and pt on 3 IV abx. MRI last night.  Headache managed w/ scheduled tylenol and oxycodone 2.5 mg x1, effective per pt. On droplet precautions. Plan: Lumbar Puncture planned for today to rule out meningitis.

## 2023-11-10 NOTE — PROGRESS NOTES
Ortho Spine Progress Note     Subjective:  Patient reporting atraumatic progressive anterior and posterior neck pain and non-positional headache and neck over the past two weeks.  Prior to that she had been recovering very well from her ACDF and noted improvements in the bilateral hand sensory changes which had been present preoperatively.  Feels that things are relatively stable since inpatient here.  Ongoing work-up for elevated inflammatory markers and potential infection negative to date, imaging of the cervical spine demonstrates no findings concerning for infection.    Objective:  BP (!) 177/54 (BP Location: Right arm)   Pulse 92   Temp 98.6  F (37  C) (Oral)   Resp 16   Wt (!) 161.3 kg (355 lb 9.6 oz)   SpO2 91%   BMI 61.04 kg/m    Gen: A&Ox3, no acute distress  CV: 2+ dp/pt pulses, capillary refill < 2sec  Resp: breathing equal and non-labored, no wheezing  MSK:       Spine:   Skin: intact about neck incision site without evidence of infection or complication; incision is very well-healed for 7 weeks postoperative.  No associated fluid collections.   Sensation:      R       L    C5:   Intact   Intact    C6:     Intact   Intact    C7:   Intact   Intact    C8:   Intact   Intact     Motor:     R L    C5: Deltoid   5  5    C6:   Biceps    5  5    C7: Triceps    5  5    C8:     5  5    T1: Intrinsics  5 5     Hemoglobin   Date Value Ref Range Status   11/09/2023 12.0 11.7 - 15.7 g/dL Final   11/08/2023 13.9 11.7 - 15.7 g/dL Final   11/07/2023 14.1 11.8 - 15.5 g/dl Final   09/12/2023 14.0 11.8 - 15.5 g/dl Final       Assessment/Plan: 73-year-old female 7 weeks status post ACDF C5-6 with removal of anterior plate C6-7 performed for cervical myelopathy with improvement of myelopathic symptoms postoperatively.    Currently admitted for significant nonpositional headaches, infection work-up, possible TIA.    Current imaging and examination demonstrates no signs of infection of the surgical site, and the  patient has intrinsic strength in the bilateral hands which has returned to full strength.  This short-term deficit which was previously identified on admission could potentially be a symptom of a TIA, though defer to neurology on that determination.    At present recommend no intervention for the cervical spine as I do not expect it is contributing to her current symptoms.    Orthopedic spine will sign off and follow peripherally.  Recommend she follow-up in clinic at the 3-month postoperative [place] for routine follow-up.  Please reach out if there are further questions regarding the cervical spine.      Alex Galindo MD  Orthopedic Spine Surgery  [facility]

## 2023-11-11 ENCOUNTER — APPOINTMENT (OUTPATIENT)
Dept: OCCUPATIONAL THERAPY | Facility: CLINIC | Age: 73
DRG: 069 | End: 2023-11-11
Payer: MEDICARE

## 2023-11-11 ENCOUNTER — APPOINTMENT (OUTPATIENT)
Dept: PHYSICAL THERAPY | Facility: CLINIC | Age: 73
DRG: 069 | End: 2023-11-11
Payer: MEDICARE

## 2023-11-11 LAB
ANION GAP SERPL CALCULATED.3IONS-SCNC: 10 MMOL/L (ref 7–15)
BUN SERPL-MCNC: 4.5 MG/DL (ref 8–23)
CALCIUM SERPL-MCNC: 9.1 MG/DL (ref 8.8–10.2)
CHLORIDE SERPL-SCNC: 103 MMOL/L (ref 98–107)
CREAT SERPL-MCNC: 0.69 MG/DL (ref 0.51–0.95)
DEPRECATED HCO3 PLAS-SCNC: 28 MMOL/L (ref 22–29)
EGFRCR SERPLBLD CKD-EPI 2021: >90 ML/MIN/1.73M2
ERYTHROCYTE [DISTWIDTH] IN BLOOD BY AUTOMATED COUNT: 12.5 % (ref 10–15)
GLUCOSE SERPL-MCNC: 110 MG/DL (ref 70–99)
HCT VFR BLD AUTO: 35.2 % (ref 35–47)
HGB BLD-MCNC: 11.7 G/DL (ref 11.7–15.7)
HSV1 DNA CSF QL NAA+PROBE: NOT DETECTED
HSV2 DNA CSF QL NAA+PROBE: NOT DETECTED
MCH RBC QN AUTO: 28 PG (ref 26.5–33)
MCHC RBC AUTO-ENTMCNC: 33.2 G/DL (ref 31.5–36.5)
MCV RBC AUTO: 84 FL (ref 78–100)
PLATELET # BLD AUTO: 216 10E3/UL (ref 150–450)
POTASSIUM SERPL-SCNC: 3.8 MMOL/L (ref 3.4–5.3)
RBC # BLD AUTO: 4.18 10E6/UL (ref 3.8–5.2)
SODIUM SERPL-SCNC: 141 MMOL/L (ref 135–145)
VZV DNA SPEC QL NAA+PROBE: NOT DETECTED
WBC # BLD AUTO: 4.3 10E3/UL (ref 4–11)

## 2023-11-11 PROCEDURE — 82248 BILIRUBIN DIRECT: CPT | Performed by: INTERNAL MEDICINE

## 2023-11-11 PROCEDURE — 250N000013 HC RX MED GY IP 250 OP 250 PS 637

## 2023-11-11 PROCEDURE — 250N000013 HC RX MED GY IP 250 OP 250 PS 637: Performed by: STUDENT IN AN ORGANIZED HEALTH CARE EDUCATION/TRAINING PROGRAM

## 2023-11-11 PROCEDURE — 97116 GAIT TRAINING THERAPY: CPT | Mod: GP

## 2023-11-11 PROCEDURE — 86140 C-REACTIVE PROTEIN: CPT | Performed by: INTERNAL MEDICINE

## 2023-11-11 PROCEDURE — 250N000011 HC RX IP 250 OP 636: Mod: JZ

## 2023-11-11 PROCEDURE — 120N000001 HC R&B MED SURG/OB

## 2023-11-11 PROCEDURE — 99233 SBSQ HOSP IP/OBS HIGH 50: CPT | Performed by: INTERNAL MEDICINE

## 2023-11-11 PROCEDURE — 36415 COLL VENOUS BLD VENIPUNCTURE: CPT | Performed by: INTERNAL MEDICINE

## 2023-11-11 PROCEDURE — 85027 COMPLETE CBC AUTOMATED: CPT | Performed by: INTERNAL MEDICINE

## 2023-11-11 PROCEDURE — 97530 THERAPEUTIC ACTIVITIES: CPT | Mod: GP

## 2023-11-11 PROCEDURE — 97530 THERAPEUTIC ACTIVITIES: CPT | Mod: GO

## 2023-11-11 PROCEDURE — 80053 COMPREHEN METABOLIC PANEL: CPT | Performed by: INTERNAL MEDICINE

## 2023-11-11 PROCEDURE — 97535 SELF CARE MNGMENT TRAINING: CPT | Mod: GO

## 2023-11-11 RX ORDER — LORAZEPAM 1 MG/1
1 TABLET ORAL ONCE
Status: COMPLETED | OUTPATIENT
Start: 2023-11-11 | End: 2023-11-11

## 2023-11-11 RX ADMIN — CLOPIDOGREL BISULFATE 75 MG: 75 TABLET ORAL at 09:38

## 2023-11-11 RX ADMIN — ASPIRIN 81 MG: 81 TABLET, COATED ORAL at 09:38

## 2023-11-11 RX ADMIN — ACETAMINOPHEN 325 MG: 325 TABLET, FILM COATED ORAL at 01:04

## 2023-11-11 RX ADMIN — ATORVASTATIN CALCIUM 40 MG: 40 TABLET, FILM COATED ORAL at 21:04

## 2023-11-11 RX ADMIN — ACETAMINOPHEN 325 MG: 325 TABLET, FILM COATED ORAL at 14:34

## 2023-11-11 RX ADMIN — ACETAMINOPHEN 325 MG: 325 TABLET, FILM COATED ORAL at 21:08

## 2023-11-11 RX ADMIN — ACETAMINOPHEN 325 MG: 325 TABLET, FILM COATED ORAL at 17:55

## 2023-11-11 RX ADMIN — SENNOSIDES 8.6 MG: 8.6 TABLET, FILM COATED ORAL at 09:38

## 2023-11-11 RX ADMIN — LORAZEPAM 1 MG: 1 TABLET ORAL at 22:48

## 2023-11-11 RX ADMIN — HYALURONIDASE (HUMAN RECOMBINANT) 150 UNITS: 150 INJECTION, SOLUTION SUBCUTANEOUS at 23:00

## 2023-11-11 RX ADMIN — ACETAMINOPHEN 325 MG: 325 TABLET, FILM COATED ORAL at 09:38

## 2023-11-11 RX ADMIN — ACETAMINOPHEN 325 MG: 325 TABLET, FILM COATED ORAL at 06:16

## 2023-11-11 RX ADMIN — SENNOSIDES 8.6 MG: 8.6 TABLET, FILM COATED ORAL at 21:04

## 2023-11-11 ASSESSMENT — ACTIVITIES OF DAILY LIVING (ADL)
ADLS_ACUITY_SCORE: 41

## 2023-11-11 NOTE — PROVIDER NOTIFICATION
Dr. Galindo returned call regarding new ortho consult. Patient already has PT ordered, okay from provider standpoint, Dr. Galindo previously signed off on patient.

## 2023-11-11 NOTE — PROVIDER NOTIFICATION
IV consult recommended that midline be placed due to limited veins in left arm and extravasation in R arm. IV team assessed old site and circled reddened area on AC site. Picture taken and in media tab. Neurology stopped into room and stated possible stop to Iv ABT due to spinal tap results. MD updated on  extravasation monitoring and to call neurology regarding spinal tap.

## 2023-11-11 NOTE — PROVIDER NOTIFICATION
MD Notification    Notified Person: MD    Notified Person Name: Kin    Notification Date/Time: 5:45 PM    Notification Interaction: Trell    Purpose of Notification: Order request    Orders Received: pending     Comments:     VAT is recommending a midline, can you please place an order?     Also Neurology would like you to call them, [phone].

## 2023-11-11 NOTE — PLAN OF CARE
Goal Outcome Evaluation:    A&OX4. VSS on RA. Numbness present to BUE. Redness & swelling to L arm and pt stated that pain is improving. Up with SBA w/ walker. Voids adequately. Continent of B/B. Single lumen midline to R arm in place & patent. Pain managed with schedule tylenol. Sleeping comfortably at this time. Will continue to monitor.

## 2023-11-11 NOTE — PROGRESS NOTES
[facility]    Medicine Progress Note - Hospitalist Service    Date of Admission:  11/8/2023    Interval History:   Patient is comfortable today.  She still has tingling in her hands.  Concerns of infiltration in her IV on the left arm.  Placing heat packs on her arm.   Anxious.   No evidence of CNS infection.  Stopped antibiotics including Merrem, acyclovir and Vanco  She has remained afebrile.  Feels like her headache is getting better.  Still hurts somewhat in the back of her neck. Will need to have clearance through orthopedics for PT   Assessment with physical therapy if possible.?  Home versus TCU  Discussion with Dr. Winters today.  There is no sign of infection and there is no concerns on spinal tap.     Assessment & Plan   73F hx of C5-C6 discetomy, CVA, vit D def, CKD, OA, PreDM, Asthma, constipation, R breast cancer s/p b/l mastectomy, presenting with diffuse myalgia, fevers, headache and neck pain.    Patient admitted to the hospital 9/20 through 9/23 and underwent an anterior cervical C5-C6 discectomy and fusion on 9/20/2023.  She did have mild postoperative dysphagia with SLP consultation.  Some improvement.  Cleared for discharge.    She did not complete physical therapy after her discharge  11/7 seen in her clinic.  Reported not feel well for 2 weeks.  Had been diagnosed with a UTI on 10/24.  Placed initially on Bactrim.  Culture with Enterococcus switch to Macrobid.  Does not feel like she got better.  Then described 5 days of a headache, subjective fevers and myalgias.  Discussion in the clinic she had declined going to the ER.  Her labs revealed slight elevation in liver tests and CRP.    Patient came to the emergency room on 11/8/2023  History 11/9 that headaches noted since in the clinic with UTI which was 10/24. Reported that Abx got switched and not sure if this added to her headaches or making her feel worse. (Change in Bactrim to Macrobid)  She described headaches up  the back of her neck, over head and into forehead. (Behind bilateral eyes) worse with moving chin to chest  and light. (No history of migraines)  + ROS in HPI as noted developed severe leg pain/weakness that spread to the entire body, associated with severe neck/headpain, dry mouth, chest pain,SOB, fever, decreased appetite and posterior neck fullness. Has been taking tylenol/advil with no improvement. Associated with dry mouth.     Headaches since 10/24   Diffuse myalgia  S/p C5-C6 anterior discectomy 9/2023  Neck stiffness with low-grade fever 100.8 tmax (LP excluded meningitis 11/10)   Exam extremely stiff through her paracervical muscles and into the back of her head neck and shoulders.  Neck felt so stiff it was hard for her to flex her chin (suspected myofascial component of pain)   11/8 CT head negative and CT cervical spine with no acute fracture or post traumatic subluxation   11/9 MRI cervical spine. S/p anterior cervical discectomy and fusion at C5-C6 and interbody fusion at the C6-C7 level. No post surgical complications.   She has not done therapy post surgery as thought she was to wait 6 weeks.   11/9 orthopedic consult.  No postop source identified to explain her profile of symptoms.  Continue medicine work-up.  11/9 neurology consult as well  No concern of postsurgical infection/complication based on imaging and assessment  11/11  confirm with orthopedics if therapy okay or any restrictions  Still with residual tingling in her hands and fingers from surgery    Headache/fever Tmax 100.8  LP negative for meningitis  11/10 LP with nucleated cells 4 and RBCs 2 (Gram stain negative)   Initially placed on Vanco, Merrem and acyclovir to cover for meningitis prior to LP (stopped all antibiotics as no sign of infection) discussion with neurology in agreement  11/11 General neurology signed off (sed rate normal, improved headaches and improved range of motion) MARIBETH/ANCA pending    11/9 RRT STROKE TEAM   11/9  Diagnosis of TIA on plavix/ASA  RRT 11/9 at approximately 4:44 PM.  Patient reported that she was having abnormal vision while working with physical therapy.  Described a black semicervical.  Neurologically appeared to have a dull sensation of the left side of her body with remained in tact.  Left visual field cut.  Left-sided numbness and dysarthria  11/9 stroke team called  11/9 CT negative head  11/9 CTA poor visualized proximal P2 segment right posterior cerebral artery could represent stenosis or partial occlusion.   11/9 MRI no discrete mass, hemorrhage or focal suggestion of ischemia.  At approximately the same time she also developed a fever of 100.8 (meningitis also excluded)   Stroke team: Diagnosed with TIA   11/10 started on aspirin 81 and Plavix for stroke prevention.  Plan to continue for a total of 21 days and drop Plavix and continue aspirin only  Lipitor 40 mg  30-day event monitor.   Patient does have a reported CVA in 2005 when she presented with right arm weakness.  Was not on aspirin or platelet regimen on admission    Reported fevers  Elevated inflammatory marker  Negative evaluation  11/9 Tmax 100.8 >> led to LP with neck stiffness and had a  WBC normal  Recent UTI with enterococcus. >> treated with UA neg on admit   CXR no infiltrate   11/9 COVID-negative  CRP elevated at 57  Procalcitonin 0.26  Liver tests improving with right upper quadrant ultrasound negative   No overt source to date with neg culture     Elevated liver test: Improved  Labs improving since admission  >>64>>47  >> 149>>106  Bilirubin remain normal  Alk phos 346>> 327>> 260  Right upper quadrant ultrasound post cholecystectomy no significant abnormality  Liver profile improved    Cardiac murmur   Cardiac murmur with 2/6 in LSB  11/10 echo with normal EF.  No wall motion abnormality. Grade 1 or early diastolic dysfunction.   CXR with no acute findings    BNP elevated 2186     Vitamin D def  resume supplements on  "discharge     Urinary frequency   Cystitis  patient has been urgency, frequency and dysurea for 1 month, despite being on Bactrim followed by Macrobid  UA shows only WBC 5, and small Leuk esterase, with no nitrites.  Outpatient urology vs GYN  No infection with urine culture negative        IgA MGUS  manages as an outpatient. 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 Monitor.       IV access:   Potential extravasation into left antecubital area  11/10 Concern when patient came back from her LP that her IV infiltrated  Had received vancomycin at some point  IV team came to evaluate her IV access  IV had been pulled out and apparently fairly difficult to get blood or IVs.  11/10 midline  11/10 discussion with pharmacy.  First-line still warm pack with next step of hyaluronidase   11/11 reviewed area of potential infiltration.  Discussion again with pharmacy.  We will continue with heat packs every 4 hours     Diet: Regular Diet Adult    DVT Prophylaxis: Pneumatic Compression Devices  Boudreaux Catheter: Not present  Lines: PRESENT           Cardiac Monitoring: None  Code Status: Full Code      Clinically Significant Risk Factors              # Hypoalbuminemia: Lowest albumin = 3.2 g/dL at 11/9/2023  7:56 AM, will monitor as appropriate                # Asthma: noted on problem list        Disposition Plan      Expected Discharge Date: 11/13/2023                    MYRON GARCIA MD  Hospitalist Service  [facility] " [facility]  Securely message with [employer] (more info)  Text page via [employer] Paging/Directory   ______________________________________________________________________    Physical Exam   Vital Signs: Temp: 98.1  F (36.7  C) Temp src: Oral BP: (!) 150/81 Pulse: 87   Resp: 16 SpO2: 97 % O2 Device: None (Room air)    Weight: 150 lbs 0 oz    General Appearance: Patient is awake and alert.  Much less stiffness in her neck.  She is able to laterally turn her neck flex extend  Respiratory: Clear to auscultation bilaterally   Cardiovascular: Regular rate with no gallop or rub  GI: + BS soft, non tender  Skin: No rashes   Left arm with slight swelling at the antecubital fossa.  No blistering.  Appears to be more serous at the surface.  She has bruising also.  No sign of infection see notes in media    Medical Decision Making       55 MINUTES SPENT BY ME on the date of service doing chart review, history, exam, documentation & further activities per the note.      Data     I have personally reviewed the following data over the past 24 hrs:    4.3  \   11.7   / 216     141 103 4.5 (L) /  110 (H)   3.8 28 0.69 \       Imaging results reviewed over the past 24 hrs:   No results found for this or any previous visit (from the past 24 hour(s)).

## 2023-11-11 NOTE — PROCEDURES
[facility]    Single Lumen Midline Placement    Date/Time: 11/10/2023 8:24 PM    Performed by: Kenisha Mckeon RN  Authorized by: Madison Sanderson MD  Indications: vascular access      UNIVERSAL PROTOCOL   Site Marked: Yes  Prior Images Obtained and Reviewed:  Yes  Required items: Required blood products, implants, devices and special equipment available    Patient identity confirmed:  Verbally with patient, provided demographic data, arm band and hospital-assigned identification number  NA - No sedation, light sedation, or local anesthesia  Confirmation Checklist:  Patient's identity using two indicators, relevant allergies, procedure was appropriate and matched the consent or emergent situation and correct equipment/implants were available  Time out: Immediately prior to the procedure a time out was called    Universal Protocol: the Joint Commission Universal Protocol was followed    Preparation: Patient was prepped and draped in usual sterile fashion       ANESTHESIA    Anesthesia:  Local infiltration  Local Anesthetic:  Lidocaine 1% without epinephrine  Anesthetic Total (mL):  1      SEDATION    Patient Sedated: No        Preparation: skin prepped with 2% chlorhexidine  Skin prep agent: skin prep agent completely dried prior to procedure  Sterile barriers: maximum sterile barriers were used: cap, mask, sterile gown, sterile gloves, and large sterile sheet  Hand hygiene: hand hygiene performed prior to central venous catheter insertion  Type of line used: Midline  Catheter type: single lumen  Lumen type: non-valved  Catheter size: 4 Fr  Brand: Bard  Lot number: [id number]  Placement method: MST and ultrasound  Number of attempts: 1  Difficulty threading catheter: no  Successful placement: yes  Orientation: right    Location: basilic vein  Tip Location: distal to axillary vein  Arm circumference: adults 10 cm  Extremity circumference: 33.5  Visible catheter length: 0  Internal length:  12 cm  Total catheter length: 12  Dressing and securement: adhesive securement device, alcohol impregnated caps, chlorhexidine disc applied, gloves changed prior to final dressing, securement device, sterile dressing applied and transparent dressing  Post procedure assessment: blood return through all ports and free fluid flow  PROCEDURE   Patient Tolerance:  Patient tolerated the procedure well with no immediate complications

## 2023-11-11 NOTE — PLAN OF CARE
"Goal Outcome Evaluation:       Pt alert and oriented X4 but forgetful. Pt denied SOB, CP, N/V. CO of headache that is \"sometime just neck pain\". Baseline extremity numbness and per pt \"much improved since surgery a few months ago. It used to feel like my hands were in a bucket of sprinklers\". Pt had IV vanco started at 1208 with 2 HR run time. Pt left to lumbar tab with a few minutes left remaining in the 500 ml infusion. Site WNL at that time. Pt educated to keep arm straight. See other notes for when pt returned. Pt overall presents as anxious. Had complaints of no window in room or people looking at her through window if curtain open. We are \"degrading\" her by using bed alarm, educated it is for her safety so staff can help, everyone has them, and she can refuse it if she wants. Pt educate through out shift regarding call light vs TV power on bed remote. Pt stated \" I haven't eaten anything since I have been here\", RN reminded her that she ordered grapes and oatmeal for breakfast, a sandwich and chips for lunch. RN brought her togo meal, pudding, gram crackers, and milk at 645 pm because pt couldn't remember if she ordered dinner. RN allowed pt to verbalize concerns and empathetically listened.                  "

## 2023-11-11 NOTE — PROGRESS NOTES
11/11/23 1700   Appointment Info   Signing Clinician's Name / Credentials (PT) Nidia Schafer DPT   Interventions   Interventions Quick Adds Gait Training;Therapeutic Activity   Therapeutic Activity   Therapeutic Activities: dynamic activities to improve functional performance Minutes (45979) 10   Symptoms Noted During/After Treatment Fatigue   Treatment Detail/Skilled Intervention Pt approached supine in bed, agreeable to therapy. VS monitored pre and post ambulation, remained stable throughout. Pt demonstrated significantly improved command following during session today.   Supine>sitting EOB completed IND. Sit>stand completed w/ SBA without AD. Post-ambulation, increased time spent educating pt on safe mobility and slowing mobility down to increase safety. Pt educated on using FWW when alone for increased balance support at this time, pt in agreement. [place] transfer completed IND without AD, pt demonstrated good balance throughout and while completing pericares. Pt educated on continued walking program with nursing in order to increase activity tolerance. Pt left supine in bed with all needs in reach, RN notified of session and progress.   Gait Training   Gait Training Minutes (45134) 15   Symptoms Noted During/After Treatment (Gait Training) fatigue   Treatment Detail/Skilled Intervention Pt ambulated w/o AD and CGA for 300 ft, progressed to SBA throughout ambulation. Pt cued for upright posture, pacing, and visual scanning throughout ambulation. No overt LOB occurred throughout.   Distance in Feet 300 ft   [place] Level (Gait Training) stand-by assist   PT Discharge Planning   PT Plan Progress ambulation withou AD, formal balance assessment   PT Discharge Recommendation (DC Rec) home with assist;home with home care physical therapy   PT Rationale for DC Rec Pt demonstrated improved mobility during session today. Currently, pt is ambulating w/ SBA without an AD. Anticipate following 1-2 addtional  sessions to complete higher level balance challenges that pt will be safe to return home with assist of spouse and HCPT in order to complete a safety evaluation at home and continue to address deficits in activity tolerance and balance.   PT Brief overview of current status SBA-CGA for all functional mobility   Total Session Time   Timed Code Treatment Minutes 25   Total Session Time (sum of timed and untimed services) 25

## 2023-11-11 NOTE — PROGRESS NOTES
Gen Neurology    Pt feels better   Improved headaches  Improved Cervical ROM, mild tenderness base of  mid occipital region  Sed rate normal   Will peripherally followed pending labs MARIBETH /ANCA     Consider Physical therapy once cleared from Ortho stand point    Will sign off.  Please contact General Neurology service if questions related to neurologic status or follow up needed during this admission.     Arabella Ibanez MD  [facility]  Office Phone [phone]

## 2023-11-12 ENCOUNTER — APPOINTMENT (OUTPATIENT)
Dept: OCCUPATIONAL THERAPY | Facility: CLINIC | Age: 73
DRG: 069 | End: 2023-11-12
Payer: MEDICARE

## 2023-11-12 VITALS
TEMPERATURE: 98 F | DIASTOLIC BLOOD PRESSURE: 74 MMHG | HEART RATE: 82 BPM | RESPIRATION RATE: 16 BRPM | WEIGHT: 150 LBS | BODY MASS INDEX: 25.75 KG/M2 | OXYGEN SATURATION: 97 % | SYSTOLIC BLOOD PRESSURE: 138 MMHG

## 2023-11-12 LAB
ALBUMIN SERPL BCG-MCNC: 3.3 G/DL (ref 3.5–5.2)
ALBUMIN SERPL BCG-MCNC: 3.4 G/DL (ref 3.5–5.2)
ALP SERPL-CCNC: 190 U/L (ref 35–104)
ALP SERPL-CCNC: 213 U/L (ref 35–104)
ALT SERPL W P-5'-P-CCNC: 55 U/L (ref 0–50)
ALT SERPL W P-5'-P-CCNC: 75 U/L (ref 0–50)
AST SERPL W P-5'-P-CCNC: 36 U/L (ref 0–45)
AST SERPL W P-5'-P-CCNC: 44 U/L (ref 0–45)
B BURGDOR IGG CSF QL IB: NEGATIVE
B BURGDOR IGM CSF QL IB: NEGATIVE
BILIRUB DIRECT SERPL-MCNC: <0.2 MG/DL (ref 0–0.3)
BILIRUB DIRECT SERPL-MCNC: <0.2 MG/DL (ref 0–0.3)
BILIRUB SERPL-MCNC: 0.4 MG/DL
BILIRUB SERPL-MCNC: 0.5 MG/DL
CK SERPL-CCNC: 16 U/L (ref 26–192)
CRP SERPL-MCNC: 10.11 MG/L
CRP SERPL-MCNC: 28.47 MG/L
GLUCOSE BLDC GLUCOMTR-MCNC: 101 MG/DL (ref 70–99)
NT-PROBNP SERPL-MCNC: 1421 PG/ML (ref 0–900)
PROCALCITONIN SERPL IA-MCNC: 0.07 NG/ML
PROT SERPL-MCNC: 6.2 G/DL (ref 6.4–8.3)
PROT SERPL-MCNC: 6.3 G/DL (ref 6.4–8.3)

## 2023-11-12 PROCEDURE — 250N000013 HC RX MED GY IP 250 OP 250 PS 637: Performed by: STUDENT IN AN ORGANIZED HEALTH CARE EDUCATION/TRAINING PROGRAM

## 2023-11-12 PROCEDURE — 82550 ASSAY OF CK (CPK): CPT | Performed by: INTERNAL MEDICINE

## 2023-11-12 PROCEDURE — 84145 PROCALCITONIN (PCT): CPT | Performed by: INTERNAL MEDICINE

## 2023-11-12 PROCEDURE — 99239 HOSP IP/OBS DSCHRG MGMT >30: CPT | Performed by: INTERNAL MEDICINE

## 2023-11-12 PROCEDURE — 80076 HEPATIC FUNCTION PANEL: CPT | Performed by: INTERNAL MEDICINE

## 2023-11-12 PROCEDURE — 999N000040 HC STATISTIC CONSULT NO CHARGE VASC ACCESS

## 2023-11-12 PROCEDURE — 86140 C-REACTIVE PROTEIN: CPT | Performed by: INTERNAL MEDICINE

## 2023-11-12 PROCEDURE — 99231 SBSQ HOSP IP/OBS SF/LOW 25: CPT | Mod: GC | Performed by: PSYCHIATRY & NEUROLOGY

## 2023-11-12 PROCEDURE — 83880 ASSAY OF NATRIURETIC PEPTIDE: CPT | Performed by: INTERNAL MEDICINE

## 2023-11-12 PROCEDURE — 97535 SELF CARE MNGMENT TRAINING: CPT | Mod: GO

## 2023-11-12 RX ORDER — ASPIRIN 81 MG/1
81 TABLET ORAL DAILY
Qty: 21 TABLET | Refills: 0 | Status: SHIPPED | OUTPATIENT
Start: 2023-11-12 | End: 2024-02-02

## 2023-11-12 RX ORDER — ATORVASTATIN CALCIUM 40 MG/1
40 TABLET, FILM COATED ORAL EVERY EVENING
Qty: 30 TABLET | Refills: 0 | Status: SHIPPED | OUTPATIENT
Start: 2023-11-12 | End: 2023-12-12 | Stop reason: ALTCHOICE

## 2023-11-12 RX ORDER — SODIUM CHLORIDE 9 MG/ML
INJECTION, SOLUTION INTRAVENOUS CONTINUOUS
Status: DISCONTINUED | OUTPATIENT
Start: 2023-11-12 | End: 2023-11-12 | Stop reason: HOSPADM

## 2023-11-12 RX ORDER — ACETAMINOPHEN 325 MG/1
325-650 TABLET ORAL EVERY 6 HOURS PRN
Status: ON HOLD
Start: 2023-11-12 | End: 2024-02-16

## 2023-11-12 RX ORDER — CLOPIDOGREL BISULFATE 75 MG/1
75 TABLET ORAL DAILY
Qty: 21 TABLET | Refills: 0 | Status: SHIPPED | OUTPATIENT
Start: 2023-11-12 | End: 2023-12-12

## 2023-11-12 RX ORDER — ALBUTEROL SULFATE 90 UG/1
2 AEROSOL, METERED RESPIRATORY (INHALATION) EVERY 6 HOURS PRN
Start: 2023-11-12 | End: 2024-06-27

## 2023-11-12 RX ADMIN — ASPIRIN 81 MG: 81 TABLET, COATED ORAL at 09:46

## 2023-11-12 RX ADMIN — ATORVASTATIN CALCIUM 40 MG: 40 TABLET, FILM COATED ORAL at 20:10

## 2023-11-12 RX ADMIN — SENNOSIDES 8.6 MG: 8.6 TABLET, FILM COATED ORAL at 20:10

## 2023-11-12 RX ADMIN — ACETAMINOPHEN 325 MG: 325 TABLET, FILM COATED ORAL at 18:12

## 2023-11-12 RX ADMIN — SENNOSIDES 8.6 MG: 8.6 TABLET, FILM COATED ORAL at 09:46

## 2023-11-12 RX ADMIN — CLOPIDOGREL BISULFATE 75 MG: 75 TABLET ORAL at 09:46

## 2023-11-12 RX ADMIN — ACETAMINOPHEN 325 MG: 325 TABLET, FILM COATED ORAL at 06:04

## 2023-11-12 RX ADMIN — ACETAMINOPHEN 325 MG: 325 TABLET, FILM COATED ORAL at 09:46

## 2023-11-12 RX ADMIN — ACETAMINOPHEN 325 MG: 325 TABLET, FILM COATED ORAL at 14:41

## 2023-11-12 ASSESSMENT — ACTIVITIES OF DAILY LIVING (ADL)
ADLS_ACUITY_SCORE: 41

## 2023-11-12 NOTE — PLAN OF CARE
Goal Outcome Evaluation:    A&OX4. VSS on RA. Baseline tingling present to hand & fingers. Up with SBA w/ walker. Voids adequately. Continent of B/B. Single lumen midline to R arm in place & patent. Pain managed with schedule tylenol. Pt complained of burning, sore, warmth to L arm at the infiltration site and was anxious and concerned about infection on L arm. Provider was notified. See previous note. Pt sleeping comfortably at this time. Will continue to monitor.

## 2023-11-12 NOTE — PROVIDER NOTIFICATION
MD Notification    Notified Person: MD    Notified Person Name: Kin    Notification Date/Time: 9:22 PM    Notification Interaction: Vocera    Purpose of Notification: Pt anxious about L arm infiltration, and c/o burning, sore, pain, warmth, and discoloration. Please advise.    Orders Received: Can the IV team come look at it?    Comments:     IV team suggested that pt will benefit from antidote, but requested an order from a provider. Charge RN spoke to Kin WALKER on the phone. Berry Arellano NP stopped by in patient room and order Hyaluronidase to be given. IV team notified and are present at this time to give the medication.

## 2023-11-12 NOTE — PROGRESS NOTES
Neurology Update:    73-year-old female with current tobacco use, prediabetes, MGUS, recent C5-C6 anterior discectomy hospitalized for headache and neck pain.  On 11/9 had an episode with left-sided numbness, left-sided visual field deficit and difficulty finding a word with slurred speech.  CT head did not show evidence of acute stroke.  CTA head and neck revealed right P2 stenosis with partial occlusion.  Symptoms resolved to baseline within an hour.  MRI negative for acute ischemic stroke.      Imaging Findings  MRI and/or Head CT MRI (11/8): No discrete mass lesion, hemorrhage or focal area suggestive of acute ischemia.      CT: Normal head CT.    Intracranial Vasculature Head CTA: Poor visualization of the proximal P2 segment of the right posterior cerebral artery could represent stenosis or partial occlusion. The distal P2 segment and branches of the right posterior cervical artery are patent and unremarkable. Otherwise, normal head CTA.   Cervical Vasculature Neck CTA: Normal neck CTA.       Echocardiogram 11/9 The visual ejection fraction is 60-65%. Normal left ventricular wall motion. The left ventricle is normal in size.    EKG/Telemetry Normal sinus rhythm   Other Testing None         A1C 11/8/2023: 5.7 %        Impression   Transient ischemic attack  R P1 stenosis/partial occlusion- suspect embolic source causing the partial occlusion with good distal flow.     Recommendations  -Recommend aspirin 81 mg and Plavix 75 mg for a total of 21 days after which Plavix can be discontinued and aspirin 325 can be continued thereafter.  - Statin: lipitor 40  - Recommend 30 day cardiac monitoring at discharge  - Bedside Glucose Monitoring  - PT/OT/SLP  - Stroke Education  - Euthermia, Euglycemia  -smoking cessation (currently using ~2 cigarettes/day)  -refer to general neurology for further headache and infection work-up  -recommend outpatient management for cognitive changes and concerns      Patient  "Follow-up     -Follow-up in 4 to 6-week with stroke neurology clinic.    The Stroke Staff is Dr. Garcia.    Stroke Neurology team will sign off. Please feel free to reach out with any further questions or concerns.    Berenice Hunter MD  Vascular Neurology Fellow     To page me or covering stroke neurology team member, click here: [facility]COM  Choose \"On Call\" tab at top, then select \"NEUROLOGY/ALL SITES\" from middle drop-down box, press Enter, then look for \"stroke\" or \"telestroke\" for your site.    "

## 2023-11-12 NOTE — PLAN OF CARE
Goal Outcome Evaluation:         A/O x4. VSS on RA. Up independently in room. Pain managed with scheduled Tylenol. Regular diet. Discharge pending.

## 2023-11-12 NOTE — SIGNIFICANT EVENT
Significant Event Note    Time of event: 10:20 PM November 11, 2023    Description of event:  Extravasation of vancomycin that is thought to have occurred on 11/10/2023 after LP. Patient has an area of redness and is complaining of burning sensation from her shoulder to her forearm. The original IV was in her AC and the irritation was initially managed with warm packs. The area of redness decreased in size but patient is continuing to complain of burning so I will order neutralizing agent in the event that the symptoms are caused by extravasated vancomycin.     I discussed the extravasation protocol and the injections of medication that would be needed to possibly neutralize the irritant and also that it may be less effective now that it was 24 hours after the possible extravasation occurred. Patient initially stated her understanding and I left the room. Charge RN then spoke to me at the desk and stated that the patient was crying and telling her daughter over the phone that she had a flesh-eating bacteria and she was going to lose her arm. I returned to the patient's room and put the daughter on speaker phone and spoke to both of them again to clarify the protocol. Patient was very anxious and tearful so I offered an anxiolytic to better tolerate the injections. Both patient and daughter were in favor of this.    Plan:  -Extravasation protocol with photos and measurements to be entered into chart  Hyaluronidase injections    - Lorazepam 1mg PO once    Discussed with: patient's family/emergency contact and bedside nurse    SHAHRAM Whitley CNP

## 2023-11-12 NOTE — PROGRESS NOTES
Occupational Therapy Discharge Summary    Reason for therapy discharge:    ADL goals met, patient refuses cognitive assessment.    Progress towards therapy goal(s). See goals on Care Plan in [facility] electronic health record for goal details.  Goals partially met.  Barriers to achieving goals:   See above.    Therapy recommendation(s):    Continued therapy is recommended.  Rationale/Recommendations:  Rec home OT to assess cognitive safety in her own environment, however patient refuses this also.

## 2023-11-13 ENCOUNTER — PATIENT OUTREACH (OUTPATIENT)
Dept: CARE COORDINATION | Facility: CLINIC | Age: 73
End: 2023-11-13
Payer: MEDICARE

## 2023-11-13 LAB
ANA SER QL IF: NEGATIVE
ANCA AB PATTERN SER IF-IMP: NORMAL
B BURGDOR AB CSF IA-ACNC: 0.06 LIV
C-ANCA TITR SER IF: NORMAL {TITER}
VDRL CSF QL: NON REACTIVE

## 2023-11-13 ASSESSMENT — ACTIVITIES OF DAILY LIVING (ADL): DEPENDENT_IADLS:: INDEPENDENT

## 2023-11-13 NOTE — DISCHARGE SUMMARY
[facility]  Hospitalist Discharge Summary      Date of Admission:  11/8/2023  Date of Discharge:  11/12/2023  8:53 PM  Discharging Provider: MYRON GARCIA MD  Discharge Service: Hospitalist Service    Discharge Diagnoses   Headache and neck pain  Status post C5-C6 anterior discectomy 9/2023  Neck stiffness with low-grade fever Tmax 100.8  LP excluded meningitis  Newly diagnosed stroke (MRI negative but CTA showing right P2 stenosis with partial occlusion)   Discharged on Plavix and aspirin  Left arm IV infiltration treated with hyaluronidase  History of UTI with negative urine culture  Elevated inflammatory markers improved  Elevated liver tests improved  Right upper quadrant ultrasound negative  Elevated BNP with normal echo follow-up with primary after discharge for repeat BMP    Clinically Significant Risk Factors          Follow-ups Needed After Discharge   Follow-up Appointments     Follow-up and recommended labs and tests       Follow up with primary care provider, Long Ferrari, within 7 days   for hospital follow- up.  The following labs/tests are recommended: basic   metabolic profile, liver, and cbc. CHECK BNP        Recommend patient follow-up with urogyn after discharge given refractory urinary symptoms/UTI    Unresulted Labs Ordered in the Past 30 Days of this Admission       Date and Time Order Name Status Description    11/10/2023 12:00 PM Lyme disease DNA detection by PCR CSF In process     11/10/2023 12:00 PM B Burgdorferi Yamileth CSF: In process     11/10/2023 12:00 PM Anaerobic CSF culture Preliminary     11/10/2023 12:00 PM Cerebrospinal fluid Aerobic Bacterial Culture Routine Preliminary     11/9/2023  7:41 PM ANCA IgG by IFA with Reflex to Titer In process     11/9/2023  7:41 PM Anti Nuclear Yamileth IgG by IFA with Reflex In process     11/8/2023 11:26 PM Blood Culture Arm, Left Preliminary     11/8/2023 11:26 PM Blood Culture Arm, Right Preliminary         These results  will be followed up by primary care provider ANCA and MARIBETH     Discharge Disposition   Discharged to home  Condition at discharge: Stable    Hospital Course   73F hx of C5-C6 discetomy, CVA, vit D def, CKD, OA, PreDM, Asthma, constipation, R breast cancer s/p b/l mastectomy, presenting with diffuse myalgia, fevers, headache and neck pain.     Patient admitted to the hospital 9/20 through 9/23 and underwent an anterior cervical C5-C6 discectomy and fusion on 9/20/2023.  She did have mild postoperative dysphagia with SLP consultation.  Some improvement.  Cleared for discharge.     She did not complete physical therapy after her discharge as she thought she needed to wait to initiate therapy.    11/7 seen in her clinic.  Reported not feel well for 2 weeks.  Had been diagnosed with a UTI on 10/24.  Placed initially on Bactrim.  Culture with Enterococcus switch to Macrobid.  Does not feel like she got better.  Then described 5 days of a headache, subjective fevers and myalgias.  Discussion in the clinic she had declined going to the ER.  Her labs revealed slight elevation in liver tests and CRP.     Patient came to the emergency room on 11/8/2023  History 11/9 that headaches noted since in the clinic with UTI which was 10/24. Reported that Abx got switched and not sure if this added to her headaches or making her feel worse. (Change in Bactrim to Macrobid)  She described headaches up the back of her neck, over head and into forehead. (Behind bilateral eyes) worse with moving chin to chest  and light. (No history of migraines)  + ROS in HPI as noted developed severe leg pain/weakness that spread to the entire body, associated with severe neck/headpain, dry mouth, chest pain,SOB, fever, decreased appetite and posterior neck fullness. Has been taking tylenol/advil with no improvement. Associated with dry mouth.     Headaches since 10/24   Diffuse myalgia suspected myofascial component of pain  S/p C5-C6 anterior discectomy  9/2023  Neck stiffness with low-grade fever 100.8 tmax (LP excluded meningitis 11/10)   Exam extremely stiff through her paracervical muscles and into the back of her head neck and shoulders.  Neck felt so stiff it was hard for her to flex her chin (suspected myofascial component of pain)   11/8 CT head negative and CT cervical spine with no acute fracture or post traumatic subluxation   11/9 MRI cervical spine. S/p anterior cervical discectomy and fusion at C5-C6 and interbody fusion at the C6-C7 level. No post surgical complications.   She has not done therapy post surgery as thought she was to wait 6 weeks per patient report  11/9 orthopedic consult.  No postop source identified to explain her profile of symptoms.  Continue medicine work-up.  11/9 neurology consult as well  No concern of postsurgical infection/complication based on imaging and assessment  Still with residual tingling in her hands and fingers from surgery on discharge.  Patient was okay to resume therapy.  Surgery did not feel there were any complications from her surgery based on imaging and examination.  Suspect the patient had a lot of myofascial discomfort as well associated with her postsurgery course and not having therapy.  Stroke diagnosis however the symptoms of visual changes were associated with CVA rather than neck and headache  Her symptoms improved significantly prior to discharge and she was able to move her neck with significant reduction in pain     Headache/fever Tmax 100.8  LP negative for meningitis  Given presentation of headache neck stiffness and low-grade temp and LP was done to exclude meningitis  11/10 LP with nucleated cells 4 and RBCs 2 (Gram stain negative)   Initially placed on Vanco, Merrem and acyclovir to cover for meningitis prior to LP (stopped all antibiotics as no sign of infection) discussion with neurology in agreement  11/11 General neurology signed off (sed rate normal, improved headaches and improved range  of motion) MARIBETH/ANCA pending  Meningitis/encephalitis panel negative.  Herpes simplex negative, varicella negative, cultures negative Lyme titer negative, VDRL nonreactive  All other cultures remain negative including urine, chest x-ray negative.  Blood cultures negative  Patient was initially given antibiotics with the concern of potential meningitis: When LP results negative all of these were discontinued     11/9 RRT STROKE TEAM   11/9 Diagnosis of TIA on plavix/ASA  RRT 11/9 at approximately 4:44 PM.  Patient reported that she was having abnormal vision while working with physical therapy.  Described a black semicervical.  Neurologically appeared to have a dull sensation of the left side of her body with remained in tact.  Left visual field cut.  Left-sided numbness and dysarthria  11/9 stroke team called  11/9 CT negative head  11/9 CTA poor visualized proximal P2 segment right posterior cerebral artery could represent stenosis or partial occlusion.   11/9 MRI no discrete mass, hemorrhage or focal suggestion of ischemia.  At approximately the same time she also developed a fever of 100.8 (meningitis also excluded)   Stroke team: Diagnosed with TIA   11/10 started on aspirin 81 and Plavix for stroke prevention.  Plan to continue for a total of 21 days and drop Plavix and continue aspirin only at 325 mg  Lipitor 40 mg  30-day event monitor.   Patient does have a reported CVA in 2005 when she presented with right arm weakness.  Was not on aspirin or platelet regimen on admission: Started on antiplatelet therapy and cholesterol management during her hospital stay     Reported fevers  Elevated inflammatory marker  Negative evaluation  11/9 Tmax 100.8 >> led to LP with neck stiffness and evaluation as noted: Started on antibiotics  WBC normal  Recent UTI with enterococcus prior to admission> treated with UA neg on admit   CXR no infiltrate   11/9 COVID-negative  CRP elevated at 57>> 10  Procalcitonin 0.26 >>  "0.07  Elevated CRP on admission up to 57 which improved to 10 on discharge.  All her cultures remain negative and she had no further temps after 100.8 at the time of admission  Later discussion with patient again revealed that she had vaccinations on the week prior to admission ??  Question potential inflammatory response is no sign of systemic infection.  Recommended that she follow-up with her primary care provider and repeat inflammatory markers.>>  Further work-up as indicated if these do not resolve.     Elevated liver test: Improved  Labs improving since admission  >>64>>47  >> 149>>106  Bilirubin remain normal  Alk phos 346>> 327>> 260  Right upper quadrant ultrasound post cholecystectomy no significant abnormality  Liver profile improved while in the hospital and ultrasound negative     Cardiac murmur   Cardiac murmur with 2/6 in LSB  11/10 echo with normal EF.  No wall motion abnormality. Grade 1 or early diastolic dysfunction.   CXR with no acute findings    BNP elevated 2186 >> repeat 1421  Recommend patient repeat BNP as outpatient if remains elevated could be referred to cardiology for further assessment of elevated BNP  Troponin and     Vitamin D def  resume supplements on discharge     Urinary frequency   Cystitis  patient has been urgency, frequency and dysurea for 1 month, despite being on Bactrim followed by Macrobid with Enterococcus urine  UA shows only WBC 5, and small Leuk esterase, with no nitrites.  Outpatient urology vs GYN  No infection with urine culture negative        IgA MGUS  manages as an outpatient. 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  Initially started on warm packs.  Patient felt her symptoms got worse and therefore stopped given hyaluronidase.  IV site continued to improve as noted in media section with serial photos  Clinically significantly improved at the time of discharge  Uncertain if Vanco extravasation but treated   11/10 midline required with limited IV access     Anxiety  Patient had various episodes of symptoms throughout her hospital stay.  Multiple symptoms with positive review of systems with work-up as above.  Reassurance and evaluation of her multiple symptoms throughout her hospital    In follow-up to hospital stay recommended patient get a BNP, CRP, liver, BMP, and follow up labs still pending on discharge.   Rec she see gyn/urology with urinary symptoms.   Rec rheumatology for ongoing arthritic symptoms      Clinically Significant Risk Factors               # Hypoalbuminemia: Lowest albumin = 3.2 g/dL at 11/9/2023  7:56 AM, will monitor as appropriate                # Asthma: noted on problem list               Disposition Plan    Consultations This Hospital Stay   SPINE SURGERY ADULT IP CONSULT  PHYSICAL THERAPY ADULT IP CONSULT  OCCUPATIONAL THERAPY ADULT IP CONSULT  NEUROLOGY IP CONSULT  PHARMACY TO DOSE VANCO  INTERVENTIONAL RADIOLOGY ADULT/PEDS IP CONSULT  VASCULAR ACCESS ADULT IP CONSULT  VASCULAR ACCESS ADULT IP CONSULT  VASCULAR ACCESS ADULT IP CONSULT  CARE MANAGEMENT / SOCIAL WORK IP CONSULT  ORTHOPEDIC SURGERY IP CONSULT  OCCUPATIONAL  THERAPY ADULT IP CONSULT  CARE MANAGEMENT / SOCIAL WORK IP CONSULT  CARE MANAGEMENT / SOCIAL WORK IP CONSULT    Code Status   Prior    Time Spent on this Encounter   I, MYRON GARCIA MD, personally saw the patient today and spent greater than 30 minutes discharging this patient.       MYRON GARCIA MD  [facility]  [address] [place] [address]  Phone: [phone]  Fax: [phone]  ______________________________________________________________________    Physical Exam   Vital Signs: Temp: 98  F (36.7  C) Temp src: Oral BP: 138/74 Pulse: 82   Resp: 16 SpO2: 97 % O2 Device: None (Room air)    Weight: 150 lbs 0 oz  General Appearance: Patient is awake and alert  Respiratory: Clear to auscultation bilaterally without wheezes or rhonchi  Cardiovascular: Regular rate and rhythm without gallop rub normal S1 and  GI: Positive bowel sounds soft rebound guarding tenderness  Skin: No overt edema         Primary Care Physician   Long Ferrari    Discharge Orders      Adult Uro/Gyn  Referral      Home Care Referral      Reason for your hospital stay    Headache and fever     Activity    Your activity upon discharge: activity as tolerated     Follow-up and recommended labs and tests     Follow up with primary care provider, Long Ferrari, within 7 days for hospital follow- up.  The following labs/tests are recommended: basic metabolic profile, liver, and cbc. CHECK BNP     Brief Discharge Instructions    See neurology 4-6 weeks     Adult Cardiac Event Monitor    Placed at the time of discharge.     Diet    Follow this diet upon discharge: Orders Placed This Encounter      NPO for Medical/Clinical Reasons Except for: Meds     Stroke Hospital Follow Up (for neurologist use only)    [employer] will call you to coordinate care as prescribed by your provider. If you don t hear from a representative within 2 business days, please call [phone].          Significant Results and Procedures   Most Recent 3 CBC's:  Recent Labs   Lab Test 11/11/23  0820 11/09/23  1744 11/08/23  2159   WBC 4.3 5.1 7.9   HGB 11.7 12.0 13.9   MCV 84 87 86    187 181     Most Recent 3 BMP's:  Recent Labs   Lab Test 11/12/23  1442 11/11/23  0820 11/10/23  0827 11/09/23  1744 11/09/23  1603 11/09/23  0756   NA  --  141  --  134*  --  136   POTASSIUM  --  3.8  --  3.8  --  4.0   CHLORIDE  --  103  --  100  --  103   CO2  --  28  --  22  --  23   BUN  --  4.5*  --  9.3  --  9.8   CR  --  0.69 0.76 0.88  --  0.77   ANIONGAP  --  10  --  12  --  10   DONNA  --  9.1  --  8.3*  --  8.2*   * 110*  --  136*   < > 116*    < > = values in this interval not displayed.     Most Recent 2 LFT's:  Recent Labs   Lab Test 11/12/23  1759 11/11/23 0820   AST 36 44   ALT 55* 75*   ALKPHOS 190* 213*   BILITOTAL 0.4 0.5     Most Recent 3 INR's:  Recent Labs   Lab Test 11/08/23  2159   INR 0.92     Most Recent INR's and Anticoagulation Dosing History:  Anticoagulation Dose History          Latest Ref Rng & Units 9/27/2008 7/16/2010 1/3/2011 1/9/2011 9/8/2011 11/8/2023   Recent Dosing and Labs   INR 0.85 - 1.15 1.04  0.99  0.92  0.94  1.00  0.92      Most Recent 3 Creatinines:  Recent Labs   Lab Test 11/11/23  0820 11/10/23  0827 11/09/23  1744   CR 0.69 0.76 0.88     Most Recent 3 Hemoglobins:  Recent Labs   Lab Test 11/11/23  0820 11/09/23  1744 11/08/23  2159   HGB 11.7 12.0 13.9     Most Recent 3 Troponin's:  Recent Labs   Lab Test 01/19/20  1131   TROPI <0.015     Most Recent 3 BNP's:  Recent Labs   Lab Test 11/12/23  1759 11/08/23  2159   NTBNPI 1,421* 2,186*     Most Recent D-dimer:No lab results found.  Most Recent Cholesterol Panel:  Recent Labs   Lab Test 09/12/23  1650   CHOL 197      HDL 37*   TRIG 163*     7-Day Micro Results       Collected Updated Procedure Result Status      11/10/2023 1427 11/10/2023 1558 CSF Cell Count with Differential: [[id number]]    Cerebrospinal  fluid from Lumbar Puncture    Final result Component Value   No component results            11/10/2023 1427 11/13/2023 0723 Cerebrospinal fluid Aerobic Bacterial Culture Routine [[id number]]    Cerebrospinal fluid from Lumbar Puncture    Preliminary result Component Value   Culture No growth after 2 days  [P]    Gram Stain Result No organisms seen  [P]     2+ WBC seen  [P]                11/10/2023 1427 11/12/2023 1901 Anaerobic CSF culture [[id number]]   Cerebrospinal fluid from Lumbar Puncture    Preliminary result Component Value   Culture No anaerobic organisms isolated after 2 days  [P]                11/10/2023 1427 11/11/2023 0907 Varicella Zoster DNA PCR CSF or Skin Swab [[id number]]   Cerebrospinal fluid from Lumbar Puncture    Final result Component Value   Varicella Zoster DNA by PCR Not Detected   The [employer] Simplexa VZV Direct assay on the [employer] instrument is an FDA approved, real-time PCR test for the qualitative detection of VZV DNA from patients with signs and symptoms of infection.            11/10/2023 1427 11/11/2023 0908 HSV Types 1 and 2 Qualitative PCR CSF [[id number]]    Cerebrospinal fluid from Lumbar Puncture    Final result Component Value   Herpes Simplex Virus 1 DNA - CSF Not Detected   Herpes simplex virus type 1 DNA not detected, presumed negative for HSV-1. A negative result does not rule out the presence of PCR inhibitors or HSV DNA in concentrations below the limit of detection.   Herpes Simplex Virus 2 DNA - CSF Not Detected   Herpes simplex virus type 2 DNA not detected, presumed negative for HSV-2. A negative result does not rule out the presence of PCR inhibitors or HSV DNA in concentrations below the limit of detection.            11/10/2023 1427 11/10/2023 2010 Meningitis/Encephalitis Panel Qual PCR CSF: [[id number]]    Cerebrospinal fluid from Lumbar Puncture    Final result Component Value   Escherichia coli K1 Negative   Haemophilus influenzae  Negative   Listeria monocytogenes Negative   Neisseria meningitidis Negative   Streptococcus agalactiae (GBS) Negative   Streptococcus pneumoniae Negative   Cytomegalovirus Negative   Enterovirus Negative   Herpes simplex virus 1 Negative   Recommend testing with another molecular method if clinical suspicion for infection is high.   Herpes simplex virus 2 Negative   Recommend testing with another molecular method if clinical suspicion for infection is high.   Human Herpes Virus 6 Negative   Human parechovirus Negative   Varicella zoster virus Negative   Cryptococcus neoformans/gattii Negative   Recommend testing for Cryptococcal antigen and fungal culture if clinical suspicion for infection is high.            11/10/2023 1427 11/10/2023 1558 Cell Count CSF [[id number]]   Cerebrospinal fluid from Lumbar Puncture    Final result Component Value Units   Tube Number 4    Color Colorless    Clarity Clear    Total Nucleated Cells 4 /uL   RBC Count 2 /uL            11/09/2023 0317 11/09/2023 0401 Asymptomatic COVID-19 Virus (Coronavirus) by PCR Nasopharyngeal [[id number]]    Swab from Nasopharyngeal    Final result Component Value   SARS CoV2 PCR Negative   NEGATIVE: SARS-CoV-2 (COVID-19) RNA not detected, presumed negative.            11/09/2023 0251 11/13/2023 0417 Blood Culture Arm, Right [[id number]]   Blood from Arm, Right    Preliminary result Component Value   Culture No growth after 4 days  [P]                11/08/2023 2159 11/13/2023 0417 Blood Culture Arm, Left [[id number]]   Blood from Arm, Left    Preliminary result Component Value   Culture No growth after 4 days  [P]                11/08/2023 2103 11/10/2023 0623 Urine Culture [[id number]]   Urine, Midstream    Final result Component Value   Culture <10,000 CFU/mL Mixture of Urogenital Shelli               11/07/2023 1521 11/09/2023 0612 Urine Culture [[id number]]   Urine, Midstream    Final result Component Value   Culture <10,000 CFU/mL Mixture  of Urogenital Shelli                     Most Recent TSH and T4:  Recent Labs   Lab Test 11/08/23 2159   TSH 2.41     Most Recent Hemoglobin A1c:  Recent Labs   Lab Test 11/08/23 2159   A1C 5.7*     Most Recent 6 glucoses:  Recent Labs   Lab Test 11/12/23  1442 11/11/23  0820 11/09/23  1744 11/09/23  1603 11/09/23  0756 11/08/23  2159   * 110* 136* 115* 116* 119*     Most Recent Urinalysis:  Recent Labs   Lab Test 11/10/23  0019 01/19/20  1415 10/24/17  1830   COLOR Straw   < > Yellow   APPEARANCE Clear   < > Clear   URINEGLC Negative   < > Negative   URINEBILI Negative   < > Negative   URINEKETONE Negative   < > Negative   SG 1.019   < > 1.015   UBLD Negative   < > Trace*   URINEPH 5.5   < > 7.0   PROTEIN Negative   < > Negative   UROBILINOGEN  --   --  0.2   NITRITE Negative   < > Negative   LEUKEST Negative   < > Trace*   RBCU 0   < > O - 2   WBCU 0   < > O - 2    < > = values in this interval not displayed.     Most Recent ABG:  Recent Labs   Lab Test 08/25/22  1744   PH 7.37     Most Recent ESR & CRP:  Recent Labs   Lab Test 11/12/23  1759 11/11/23  0820 11/09/23  1744 09/24/15  1142 09/24/15  1013   SED  --   --  12   < >  --    CRP  --   --   --   --  3.9   CRPI 10.11*   < >  --    < >  --     < > = values in this interval not displayed.     Most Recent Anemia Panel:  Recent Labs   Lab Test 11/11/23 0820   WBC 4.3   HGB 11.7   HCT 35.2   MCV 84        Most Recent CPK:  Recent Labs   Lab Test 11/12/23 1759   CKT 16*     Results for orders placed or performed during the hospital encounter of 11/08/23   CT Head w/o Contrast    Narrative    EXAM: CT HEAD W/O CONTRAST, CT CERVICAL SPINE W/O CONTRAST  LOCATION: [facility]  DATE: 11/8/2023    INDICATION: Headache, neck pain  COMPARISON: None.  TECHNIQUE:   1) Routine CT Head without IV contrast. Multiplanar reformats. Dose reduction techniques were used.  2) Routine CT Cervical Spine without IV contrast. Multiplanar  reformats. Dose reduction techniques were used.    FINDINGS:   HEAD CT:   INTRACRANIAL CONTENTS: No intracranial hemorrhage, extraaxial collection, or mass effect.  No CT evidence of acute infarct. Mild presumed chronic small vessel ischemic changes. Mild generalized volume loss. No hydrocephalus.     VISUALIZED ORBITS/SINUSES/MASTOIDS: Prior bilateral cataract surgery. Visualized portions of the orbits are otherwise unremarkable. No paranasal sinus mucosal disease. No middle ear or mastoid effusion.    BONES/SOFT TISSUES: No acute abnormality.    CERVICAL SPINE CT:   VERTEBRA: Normal vertebral body heights and straightened alignment. No fracture or posttraumatic subluxation. Status post ACDF C5-C7, with hardware at C5-C6.     CANAL/FORAMINA: No canal or neural foraminal stenosis.    PARASPINAL: No extraspinal abnormality. Visualized lung fields are clear.      Impression    IMPRESSION:  HEAD CT:  1.  No CT evidence for acute intracranial process.  2.  Mild chronic microvascular ischemic changes as above.    CERVICAL SPINE CT:  1.  No CT evidence for acute fracture or post traumatic subluxation.  2.  No high-grade central or foraminal stenosis.   Cervical spine CT w/o contrast    Narrative    EXAM: CT HEAD W/O CONTRAST, CT CERVICAL SPINE W/O CONTRAST  LOCATION: [facility]  DATE: 11/8/2023    INDICATION: Headache, neck pain  COMPARISON: None.  TECHNIQUE:   1) Routine CT Head without IV contrast. Multiplanar reformats. Dose reduction techniques were used.  2) Routine CT Cervical Spine without IV contrast. Multiplanar reformats. Dose reduction techniques were used.    FINDINGS:   HEAD CT:   INTRACRANIAL CONTENTS: No intracranial hemorrhage, extraaxial collection, or mass effect.  No CT evidence of acute infarct. Mild presumed chronic small vessel ischemic changes. Mild generalized volume loss. No hydrocephalus.     VISUALIZED ORBITS/SINUSES/MASTOIDS: Prior bilateral cataract surgery.  Visualized portions of the orbits are otherwise unremarkable. No paranasal sinus mucosal disease. No middle ear or mastoid effusion.    BONES/SOFT TISSUES: No acute abnormality.    CERVICAL SPINE CT:   VERTEBRA: Normal vertebral body heights and straightened alignment. No fracture or posttraumatic subluxation. Status post ACDF C5-C7, with hardware at C5-C6.     CANAL/FORAMINA: No canal or neural foraminal stenosis.    PARASPINAL: No extraspinal abnormality. Visualized lung fields are clear.      Impression    IMPRESSION:  HEAD CT:  1.  No CT evidence for acute intracranial process.  2.  Mild chronic microvascular ischemic changes as above.    CERVICAL SPINE CT:  1.  No CT evidence for acute fracture or post traumatic subluxation.  2.  No high-grade central or foraminal stenosis.   MR Cervical Spine w/o & w Contrast    Narrative    EXAM: MR CERVICAL SPINE W/O AND W CONTRAST  LOCATION: [facility]  DATE: 11/9/2023    INDICATION: Neck pain post operative.  COMPARISON: CT 11/08/2023, x-ray 09/21/2023.  CONTRAST: 7 mL Gadavist.  TECHNIQUE: MRI Cervical Spine without and with IV contrast.    FINDINGS:   Normal vertebral body heights, alignment and marrow signal. No abnormal cord signal. No extraspinal abnormality.    Craniovertebral junction and C1-C2: Normal.    C2-C3: Normal disc height. No herniation. Normal facets. No spinal canal or neural foraminal stenosis.     C3-C4: Mild loss of disc height. No herniation. Mild right and mild to moderate left facet arthropathy. No central canal stenosis. Mild bilateral foraminal stenosis.     C4-C5: Mild to moderate loss of disc height. No herniation. Mild facet arthropathy. No central canal stenosis. Mild bilateral foraminal stenosis.     C5-C6: Status post anterior cervical discectomy and fusion. Normal facets. No central canal stenosis. Mild to moderate bilateral foraminal stenosis.     C6-C7: Interbody fusion. Normal facets. No central canal or  foraminal stenosis.     C7-T1: Normal disc height. No herniation. Normal facets. No spinal canal or neural foraminal stenosis.      Impression    IMPRESSION:  1.  Status post anterior cervical discectomy and fusion at C5-C6 and interbody fusion at the C6-C7 level. No postsurgical complication is identified.  2.  Underlying degenerative changes at multiple levels without central canal or high-grade foraminal stenosis.     US Abdomen Limited    Narrative    EXAM: US ABDOMEN LIMITED  LOCATION: [facility]  DATE: 11/9/2023    INDICATION: LFT derangement  COMPARISON: PET/CT 10/27/2023. CT abdomen and pelvis 08/25/2022.  TECHNIQUE: Limited abdominal ultrasound.    FINDINGS:    GALLBLADDER: Postcholecystectomy.    BILE DUCTS: No biliary dilatation. The common duct measures 6 mm.    LIVER: Normal parenchyma with smooth contour. No focal mass.    RIGHT KIDNEY: No hydronephrosis.    PANCREAS: The visualized portions are normal.    No ascites.      Impression    IMPRESSION:  1.  Postcholecystectomy.  2.  No other significant abnormality.       XR Chest Port 1 View    Narrative    EXAM: XR CHEST PORT 1 VIEW  LOCATION: [facility]  DATE: 11/9/2023    INDICATION: Inspiratory crackles  COMPARISON: CT and chest x-ray on 12/12/2021      Impression    IMPRESSION: Negative chest.   CT Head w/o Contrast    Narrative    EXAM: CT HEAD W/O CONTRAST  LOCATION: [facility]  DATE: 11/9/2023    INDICATION: Altered mental status. Blurred vision. Left-sided tingling.  COMPARISON: Head CT 11/08/2023  TECHNIQUE: Routine CT Head without IV contrast. Multiplanar reformats. Dose reduction techniques were used.    FINDINGS:  INTRACRANIAL CONTENTS: No intracranial hemorrhage, extraaxial collection, or mass effect.  No CT evidence of acute infarct. Normal parenchymal attenuation. Normal ventricles and sulci.     VISUALIZED ORBITS/SINUSES/MASTOIDS: No intraorbital abnormality. No  paranasal sinus mucosal disease. No middle ear or mastoid effusion.    BONES/SOFT TISSUES: No acute abnormality.      Impression    IMPRESSION: Normal head CT.   CTA Head Neck w Contrast    Narrative    EXAM: CTA HEAD NECK W CONTRAST  LOCATION: [facility]  DATE: 11/9/2023    INDICATION: Altered mental status. Vision changes. Left-sided tingling.  COMPARISON: None.  CONTRAST: 75 mL Isovue 370  TECHNIQUE: Head and neck CT angiogram with IV contrast. Axial helical CT images of the head and neck vessels obtained during the arterial phase of intravenous contrast administration. Axial 2D reconstructed images and multiplanar 3D MIP reconstructed   images of the head and neck vessels were performed by the technologist. Dose reduction techniques were used. All stenosis measurements made according to NASCET criteria unless otherwise specified.    FINDINGS:   HEAD CTA:  ANTERIOR CIRCULATION: No stenosis/occlusion, aneurysm, or high flow vascular malformation. Standard [place] anatomy.    POSTERIOR CIRCULATION: The proximal P2 segment of the right posterior cerebral artery is difficult to visualize and could be stenotic or partially occluded. The distal P2 segment and branches of the right posterior cerebral artery are patent. The left   posterior cerebral artery is patent and unremarkable. Balanced vertebral arteries supply a normal basilar artery.    NECK CTA:  RIGHT CAROTID: No measurable stenosis or dissection.    LEFT CAROTID: No measurable stenosis or dissection.    VERTEBRAL ARTERIES: No focal stenosis or dissection. Balanced vertebral arteries.    AORTIC ARCH: Classic aortic arch anatomy with no significant stenosis at the origin of the great vessels.    NONVASCULAR STRUCTURES: Unremarkable.      Impression    IMPRESSION:   HEAD CTA:   1.  Poor visualization of the proximal P2 segment of the right posterior cerebral artery could represent stenosis or partial occlusion. The distal P2  segment and branches of the right posterior cervical artery are patent and unremarkable.  2.  Otherwise, normal head CTA.    NECK CTA:  1.  Normal neck CTA.   MR Brain w/o & w Contrast    Narrative    EXAM: MR BRAIN W/O and W CONTRAST  LOCATION: [facility]  DATE: 11/9/2023    INDICATION: Transient aphasia and vision changes  COMPARISON: 11/09/2023.  CONTRAST: 6 ml Gadavist  TECHNIQUE: MRI brain without and with contrast.    FINDINGS: On the diffusion-weighted images there is no evidence of acute ischemia or restricted diffusion. There is no discrete mass lesion or midline shift. There is no acute extra-axial fluid collection or acute intraparenchymal hemorrhage. There are   appropriate flow voids within the cavernous portions of the internal carotid arteries and the basilar artery. On the FLAIR and T2-weighted images there are multiple small foci of high signal within the periventricular and subcortical white matter   consistent with mild small vessel ischemic disease. The ventricular system, basal cisterns and the cortical sulci are consistent with mild diffuse volume loss. Following the administration of contrast no abnormal enhancement is visualized.    There is no evidence of cerebellar tonsillar ectopia. Corpus callosum and the sella region have appropriate configuration and signal intensity for the patient's age. The orbit regions are unremarkable. There is no significant paranasal sinus disease. The   mastoid air cells and the middle ear regions are clear.      Impression    IMPRESSION:  1. No discrete mass lesion, hemorrhage or focal area suggestive of acute ischemia.  2. No abnormal enhancement.  3. Mild age-related changes.   Echocardiogram Complete     Value    LVEF  60-65%    Narrative    [id number]  [id number]  [id number]  [id number]^OAMR^[facility]     [facility]  Echocardiography Laboratory  [address] [place] [id number]     Name: GLORYAMISHA ALBRIGHT ESHA  MRN:  [id number]  : 1950  Study Date: 2023 01:09 PM  Age: 73 yrs  Gender: Female  Patient Location: [facility]  Reason For Study: Cardiac Murmur  Ordering Physician: PAULETTE NELSON  Referring Physician: LISSY PUGA  Performed By: Celena Crawley     BSA: 1.7 m2  Height: 64 in  Weight: 152 lb  HR: 92  BP: 111/55 mmHg  ______________________________________________________________________________  Procedure  Complete Echo Adult.  ______________________________________________________________________________  Interpretation Summary     The left ventricle is normal in size.  Left ventricular systolic function is normal.  The visual ejection fraction is 60-65%.  Normal left ventricular wall motion  No hemodynamically significant valvular abnormalities on 2D or color flow  imaging. There is no comparison study available.  ______________________________________________________________________________  Left Ventricle  The left ventricle is normal in size. There is normal left ventricular wall  thickness. Left ventricular systolic function is normal. Grade I or early  diastolic dysfunction. Diastolic Doppler findings (E/E' ratio and/or other  parameters) suggest left ventricular filling pressures are normal. The visual  ejection fraction is 60-65%. Normal left ventricular wall motion.     Right Ventricle  The right ventricle is normal in structure, function and size.     Atria  Normal left atrial size. Right atrial size is normal. There is no atrial shunt  seen.     Mitral Valve  The mitral valve is normal in structure and function. There is trace mitral  regurgitation.     Tricuspid Valve  The tricuspid valve is normal in structure and function. The right ventricular  systolic pressure is approximated at 20mmHg plus the right atrial pressure.  Right ventricle systolic pressure estimate normal. There is trace tricuspid  regurgitation. IVC diameter <2.1 cm collapsing >50% with sniff suggests a  normal RA pressure of  3 mmHg.     Aortic Valve  The aortic valve is normal in structure and function. No aortic regurgitation  is present. No aortic stenosis is present.     Pulmonic Valve  The pulmonic valve is not well seen, but is grossly normal. There is trace  pulmonic valvular regurgitation.     Vessels  Normal size aorta.     Pericardium  There is no pericardial effusion.     Rhythm  Sinus rhythm was noted.  ______________________________________________________________________________  MMode/2D Measurements & Calculations  IVSd: 0.89 cm     LVIDd: 4.1 cm  LVIDs: 2.9 cm  LVPWd: 0.89 cm  FS: 27.8 %  LV mass(C)d: 110.7 grams  LV mass(C)dI: 63.6 grams/m2  Ao root diam: 2.8 cm  asc Aorta Diam: 2.6 cm  LVOT diam: 2.0 cm  LVOT area: 3.1 cm2  Ao root diam index Ht(cm/m): 1.7  Ao root diam index BSA (cm/m2): 1.6  Asc Ao diam index BSA (cm/m2): 1.5  Asc Ao diam index Ht(cm/m): 1.6  LA Volume (BP): 14.2 ml     LA Volume Index (BP): 8.2 ml/m2  RWT: 0.44     Doppler Measurements & Calculations  MV E max ceasar: 65.6 cm/sec  MV A max ceasar: 81.0 cm/sec  MV E/A: 0.81  MV dec time: 0.18 sec  Ao V2 max: 140.0 cm/sec  Ao max P.0 mmHg  Ao V2 mean: 95.0 cm/sec  Ao mean P.0 mmHg  Ao V2 VTI: 24.2 cm  ODELL(I,D): 2.6 cm2  ODELL(V,D): 2.4 cm2  LV V1 max P.4 mmHg  LV V1 max: 110.0 cm/sec  LV V1 VTI: 20.4 cm  SV(LVOT): 62.3 ml  SI(LVOT): 35.8 ml/m2  PA V2 max: 98.9 cm/sec  PA max PG: 3.9 mmHg  PA acc time: 0.11 sec  AV Ceasar Ratio (DI): 0.79  ODELL Index (cm2/m2): 1.5  E/E' av.5  Lateral E/e': 6.2  Medial E/e': 8.7     RV S Ceasar: 14.9 cm/sec     ______________________________________________________________________________  Report approved by: Kathryn Pop 2023 04:50 PM             Discharge Medications   Discharge Medication List as of 2023  8:13 PM        START taking these medications    Details   aspirin 81 MG EC tablet Take 1 tablet (81 mg) by mouth daily, Disp-21 tablet, R-0, E-Prescribe      atorvastatin (LIPITOR) 40 MG tablet  Take 1 tablet (40 mg) by mouth every evening, Disp-30 tablet, R-0, E-Prescribe      clopidogrel (PLAVIX) 75 MG tablet Take 1 tablet (75 mg) by mouth daily, Disp-21 tablet, R-0, E-Prescribe           CONTINUE these medications which have CHANGED    Details   acetaminophen (TYLENOL) 325 MG tablet Take 1-2 tablets (325-650 mg) by mouth every 6 hours as needed for mild pain, No Print Out      albuterol (PROAIR HFA/PROVENTIL HFA/VENTOLIN HFA) 108 (90 Base) MCG/ACT inhaler Inhale 2 puffs into the lungs every 6 hours as needed for shortness of breath, No Print OutPharmacy may dispense brand covered by insurance (Proair, or proventil or ventolin or generic albuterol inhaler)           CONTINUE these medications which have NOT CHANGED    Details   budesonide-formoterol (SYMBICORT) 160-4.5 MCG/ACT Inhaler Inhale 2 puffs into the lungs daily, Disp-10.2 g, R-3, E-Prescribe      hydrocortisone 2.5 % cream Apply topically daily as neededHistorical      metroNIDAZOLE (METROCREAM) 0.75 % external cream Apply topically every evening as neededHistorical      vitamin D3 (CHOLECALCIFEROL) 50 mcg (2000 units) tablet Take 1 tablet by mouth daily, Historical           STOP taking these medications       nitroFURantoin macrocrystal (MACRODANTIN) 100 MG capsule Comments:   Reason for Stopping:             Allergies   Allergies   Allergen Reactions    Levaquin Hives and Itching    Pcn [Penicillins] Hives    Tetanus Toxoid Anaphylaxis    Tetanus Toxoids Anaphylaxis    Erythromycin GI Disturbance    Amoxicillin     Duloxetine Nausea     Other reaction(s): Jittery    Silicone Rash

## 2023-11-13 NOTE — PROGRESS NOTES
Pt A&Ox4. VSS on RA. Denies pain. Pt discharged home at 8:34 PM with belongings & discharge medications. Visit summary provided with instruction & teaching and pt verbalized understanding. Pt was brought to door 2 and  picked up patient.

## 2023-11-13 NOTE — PLAN OF CARE
Physical Therapy Discharge Summary    Reason for therapy discharge:    Discharged to home with home therapy.    Progress towards therapy goal(s). See goals on Care Plan in [facility] electronic health record for goal details.  Goals partially met.  Barriers to achieving goals:   discharge from facility.    Therapy recommendation(s):    Continued therapy is recommended.  Rationale/Recommendations:  home PT to assess and maximize independence and safety in home environment.
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
## 2023-11-13 NOTE — PLAN OF CARE
Goal Outcome Evaluation:         A/O x4. VSS on RA. Up independently. Regular diet. Headache controlled with scheduled Tylenol. Discharge pending.

## 2023-11-14 LAB
B BURGDOR DNA SPEC QL NAA+PROBE: NOT DETECTED
BACTERIA BLD CULT: NO GROWTH
BACTERIA BLD CULT: NO GROWTH

## 2023-11-14 NOTE — PROGRESS NOTES
"  Hospital Follow-up Visit:  \"I just always get weird during hospital stays...\"  MRI showed no stroke  Tends to react to medications   Memory gets worse.    Hospital/Nursing Home/IP Rehab Facility: [facility]  Date of Admission: 11/8/23  Date of Discharge: 11/12/23  Reason(s) for Admission: Headache and neck pain  Status post C5-C6 anterior discectomy 9/2023  Neck stiffness with low-grade fever Tmax 100.8  LP excluded meningitis  Newly diagnosed stroke (MRI negative but CTA showing right P2 stenosis with partial occlusion)   Discharged on Plavix and aspirin  Left arm IV infiltration treated with hyaluronidase  History of UTI with negative urine culture  Elevated inflammatory markers improved  Elevated liver tests improved  Right upper quadrant ultrasound negative  Elevated BNP with normal echo follow-up with primary after discharge for repeat BMP    Was your hospitalization related to COVID-19? No   Problems taking medications regularly:  None  Medication changes since discharge: discussed starting statin  Problems adhering to non-medication therapy:  None    Summary of hospitalization:  [facility] discharge summary reviewed  Diagnostic Tests/Treatments reviewed.  Follow up needed: blood work  Other Healthcare Providers Involved in Patient s Care:         None  Update since discharge: improved.   Plan of care communicated with patient     Today will check blood work  Arm is improving from iv leakage.    Assessment/Plan:  Macey was seen today for hospital f/u.    Diagnoses and all orders for this visit:    Worsening of neck pain following surgery  -     VENOUS COLLECTION  -     CBC with Diff/Plt ([facility])    Elevated brain natriuretic peptide (BNP) level  -     B-Type Natriuretic Peptide - BNP ([facility])    Elevated LFTs  -     Comprehensive metabolic panel; Future  -     Comprehensive metabolic panel      Long Ferrari MD   [facility] " [facility]  [phone]

## 2023-11-15 ENCOUNTER — OFFICE VISIT (OUTPATIENT)
Dept: FAMILY MEDICINE | Facility: CLINIC | Age: 73
End: 2023-11-15

## 2023-11-15 VITALS — WEIGHT: 151.8 LBS | OXYGEN SATURATION: 97 % | BODY MASS INDEX: 26.06 KG/M2 | HEART RATE: 74 BPM

## 2023-11-15 DIAGNOSIS — R79.89 ELEVATED BRAIN NATRIURETIC PEPTIDE (BNP) LEVEL: ICD-10-CM

## 2023-11-15 DIAGNOSIS — R79.89 ELEVATED LFTS: ICD-10-CM

## 2023-11-15 DIAGNOSIS — G89.18 WORSENING OF NECK PAIN FOLLOWING SURGERY: Primary | ICD-10-CM

## 2023-11-15 LAB
% GRANULOCYTES: 59.8 % (ref 42.2–75.2)
ALBUMIN SERPL BCG-MCNC: 3.8 G/DL (ref 3.5–5.2)
ALP SERPL-CCNC: 165 U/L (ref 40–150)
ALT SERPL W P-5'-P-CCNC: 39 U/L (ref 0–50)
ANION GAP SERPL CALCULATED.3IONS-SCNC: 11 MMOL/L (ref 7–15)
AST SERPL W P-5'-P-CCNC: 36 U/L (ref 0–45)
B-TYPE NATRIURETIC PEPTIDE: 76.7 PG/ML (ref 0–100)
BACTERIA CSF CULT: NO GROWTH
BILIRUB SERPL-MCNC: 0.4 MG/DL
BUN SERPL-MCNC: 8 MG/DL (ref 8–23)
CALCIUM SERPL-MCNC: 9.3 MG/DL (ref 8.8–10.2)
CHLORIDE SERPL-SCNC: 102 MMOL/L (ref 98–107)
CREAT SERPL-MCNC: 0.76 MG/DL (ref 0.51–0.95)
DEPRECATED HCO3 PLAS-SCNC: 25 MMOL/L (ref 22–29)
EGFRCR SERPLBLD CKD-EPI 2021: 82 ML/MIN/1.73M2
GLUCOSE SERPL-MCNC: 110 MG/DL (ref 70–99)
GRAM STAIN RESULT: NORMAL
GRAM STAIN RESULT: NORMAL
HCT VFR BLD AUTO: 41.2 % (ref 35–46)
HEMOGLOBIN: 13.8 G/DL (ref 11.8–15.5)
LYMPHOCYTES NFR BLD AUTO: 31.7 % (ref 20.5–51.1)
MCH RBC QN AUTO: 28.6 PG (ref 27–31)
MCHC RBC AUTO-ENTMCNC: 33.5 G/DL (ref 33–37)
MCV RBC AUTO: 85.6 FL (ref 80–100)
MONOCYTES NFR BLD AUTO: 8.5 % (ref 1.7–9.3)
PLATELET # BLD AUTO: 340 K/UL (ref 140–450)
POTASSIUM SERPL-SCNC: 3.8 MMOL/L (ref 3.4–5.3)
PROT SERPL-MCNC: 6.9 G/DL (ref 6.4–8.3)
RBC # BLD AUTO: 4.81 X10/CMM (ref 3.7–5.2)
SODIUM SERPL-SCNC: 138 MMOL/L (ref 135–145)
WBC # BLD AUTO: 7.2 X10/CMM (ref 3.8–11)

## 2023-11-15 PROCEDURE — 36415 COLL VENOUS BLD VENIPUNCTURE: CPT | Performed by: FAMILY MEDICINE

## 2023-11-15 PROCEDURE — 99495 TRANSJ CARE MGMT MOD F2F 14D: CPT | Mod: 25 | Performed by: FAMILY MEDICINE

## 2023-11-15 PROCEDURE — 83880 ASSAY OF NATRIURETIC PEPTIDE: CPT | Performed by: FAMILY MEDICINE

## 2023-11-15 PROCEDURE — 85025 COMPLETE CBC W/AUTO DIFF WBC: CPT | Performed by: FAMILY MEDICINE

## 2023-11-15 PROCEDURE — 80053 COMPREHEN METABOLIC PANEL: CPT | Mod: ORL | Performed by: FAMILY MEDICINE

## 2023-11-20 NOTE — PATIENT INSTRUCTIONS
Patient Education   Personalized Prevention Plan  You are due for the preventive services outlined below.  Your care team is available to assist you in scheduling these services.  If you have already completed any of these items, please share that information with your care team to update in your medical record.  Health Maintenance Due   Topic Date Due     Talk to your care team about options to quit tobacco use.  Never done     Diptheria Tetanus Pertussis (DTAP/TDAP/TD) Vaccine (1 - Tdap) Never done     RSV VACCINE (Pregnancy & 60+) (1 - 1-dose 60+ series) Never done     LUNG CANCER SCREENING  12/12/2022     Osteoporosis Screening  11/17/2023

## 2023-11-20 NOTE — PROGRESS NOTES
"SUBJECTIVE:   Patient today who presents for Preventive Visit.    Patient has been advised of split billing requirements and indicates understanding: Yes  Are you in the first 12 months of your Medicare Part B coverage?  No    Physical Health:  In general, how would you rate your overall physical health? fair  Outside of work, how many days during the week do you exercise? 1 day/week  Outside of work, approximately how many minutes a day do you exercise?less than 15 minutes  If you drink alcohol do you typically have >3 drinks per day or >7 drinks per week? No  Do you usually eat at least 4 servings of fruit and vegetables a day, include whole grains & fiber and avoid regularly eating high fat or \"junk\" foods? NO  Do you have any problems taking medications regularly?  No  Do you have any side effects from medications?  Hard to know  Needs assistance for the following daily activities: no assistance needed  Which of the following safety concerns are present in your home?  throw rugs in the hallway   Hearing impairment: No  In the past 6 months, have you been bothered by leaking of urine? yes  Mental Health:  In general, how would you rate your overall mental or emotional health? good  Do you feel safe in your environment? Yes  Have you ever done Advance Care Planning? (For example, a Health Directive, POLST, or a discussion with a medical provider or your loved ones about your wishes): Yes, patient states has an Advance Care Planning document and will bring a copy to the clinic.  Do you have sleep apnea, excessive snoring or daytime drowsiness? : Daytime Drowsiness    HEARING FREQUENCY:     Right Ear:     500 Hz : Pass   1000 Hz: Pass   2000 Hz: Pass   4000 Hz: Pass  Left Ear:     500 Hz : Pass   1000 Hz: Pass   2000 Hz: Pass   4000 Hz: Pass    PHQ-2 Score:         11/22/2023     9:17 AM 1/17/2022     1:17 PM   PHQ-2 ( 1999 [employer])   Q1: Little interest or pleasure in doing things 0 0   Q2: Feeling down, depressed " or hopeless 0 0   PHQ-2 Score 0 0       Fall risk:  Fallen 2 or more times in the past year?: No  Any fall with injury in the past year?: No    Cognitive Screenin) Repeat 3 items (Leader, Season, Table)    2) Clock draw: NORMAL  3) 3 item recall: Recalls 2 objects   Results: NORMAL clock, 1-2 items recalled: COGNITIVE IMPAIRMENT LESS LIKELY    Mini-CogTM Copyright ROBERT Ruelas. Licensed by the author for use in [facility]; reprinted with permission (bonifacio@[facility]). All rights reserved.      Reviewed and updated as needed this visit by clinical staff   Tobacco   Meds            Reviewed and updated as needed this visit by Provider                 Social History     Tobacco Use    Smoking status: Some Days     Types: Cigarettes     Last attempt to quit: 2016     Years since quittin.8    Smokeless tobacco: Never    Tobacco comments:     quit but  still smokes, but not in house   Substance Use Topics    Alcohol use: No     Alcohol/week: 0.0 standard drinks of alcohol              No data to display              Do you have a current opioid prescription? No  Do you use any other controlled substances or medications that are not prescribed by a provider? None                      Current providers sharing in care for this patient include:   Patient Care Team:  Long Ferrari MD as PCP - General (Family Medicine)  Long Ferrari MD as Assigned PCP    The following health maintenance items are reviewed in Epic and correct as of today:  Health Maintenance   Topic Date Due    DTAP/TDAP/TD IMMUNIZATION (1 - Tdap) Never done    RSV VACCINE (Pregnancy & 60+) (1 - 1-dose 60+ series) Never done    LUNG CANCER SCREENING  2022    DEXA  2023    ASTHMA CONTROL TEST  2024    A1C  2024    LIPID  2024    MICROALBUMIN  2024    ASTHMA ACTION PLAN  2024    PHQ-9  2024    BMP  11/15/2024    HEMOGLOBIN  11/15/2024    NICOTINE/TOBACCO CESSATION  "COUNSELING Q 1 YR  11/22/2024    MEDICARE ANNUAL WELLNESS VISIT  11/22/2024    FALL RISK ASSESSMENT  11/22/2024    ADVANCE CARE PLANNING  11/22/2028    COLORECTAL CANCER SCREENING  08/27/2032    HEPATITIS C SCREENING  Completed    DEPRESSION ACTION PLAN  Completed    INFLUENZA VACCINE  Completed    Pneumococcal Vaccine: 65+ Years  Completed    URINALYSIS  Completed    ZOSTER IMMUNIZATION  Completed    COVID-19 Vaccine  Completed    IPV IMMUNIZATION  Aged Out    HPV IMMUNIZATION  Aged Out    MENINGITIS IMMUNIZATION  Aged Out    RSV MONOCLONAL ANTIBODY  Aged Out    URINE DRUG SCREEN  Discontinued     Lab work is in process      ROS:  Constitutional, HEENT, cardiovascular, pulmonary, GI, , musculoskeletal, neuro, skin, endocrine and psych systems are negative, except as otherwise noted.    OBJECTIVE:   /64   Pulse 82   Wt 68.2 kg (150 lb 6.4 oz)   SpO2 97%   BMI 25.82 kg/m     Estimated body mass index is 25.82 kg/m  as calculated from the following:    Height as of 9/20/23: 1.626 m (5' 4\").    Weight as of this encounter: 68.2 kg (150 lb 6.4 oz).  EXAM:   GENERAL: healthy, alert and no distress    Diagnostic Test Results:  Labs reviewed in Epic    ASSESSMENT / PLAN:   Macey was seen today for recheck, physical and hearing screening.    Diagnoses and all orders for this visit:    Encounter for Medicare annual wellness exam    Lower urinary tract symptoms (LUTS)  -     Urinalysis ([facility])    Urinary urgency  Follow up with urology    Pain syndrome, chronic  No good solution.      Patient has been advised of split billing requirements and indicates understanding: Yes    COUNSELING:  Reviewed preventive health counseling, as reflected in patient instructions       Vision screening       Hearing screening    Estimated body mass index is 25.82 kg/m  as calculated from the following:    Height as of 9/20/23: 1.626 m (5' 4\").    Weight as of this encounter: 68.2 kg (150 lb 6.4 oz).  Discussed briefly the issues of " fibromylagia, including the diagnostic criteria, lack of specific pathological findings, high frequency of co-morbid psychological diagnosed (usually depression and bipolar), and treatment considerations (importance of maintaining regular sleep, regular exercise, etc).       He reports that she has been smoking cigarettes. She has never used smokeless tobacco.    I have reviewed Opioid Use Disorder and Substance Use Disorder risk factors and made any needed referrals.   Appropriate preventive services were discussed with this patient, including applicable screening as appropriate for cardiovascular disease, diabetes, osteopenia/osteoporosis, and glaucoma.  As appropriate for age/gender, discussed screening for colorectal cancer, prostate cancer, breast cancer, and cervical cancer. Checklist reviewing preventive services available has been given to the patient.    Reviewed patients plan of care and provided an AVS. The Basic Care Plan (routine screening as documented in Health Maintenance) for [place] meets the Care Plan requirement. This Care Plan has been established and reviewed with the Patient.    Counseling Resources:  ATP IV Guidelines  Pooled Cohorts Equation Calculator  Breast Cancer Risk Calculator  BRCA-Related Cancer Risk Assessment: FHS-7 Tool  FRAX Risk Assessment  ICSI Preventive Guidelines  Dietary Guidelines for Americans, 2010  USDA's MyPlate  ASA Prophylaxis  Lung CA Screening    Long Ferrari MD  [facility]

## 2023-11-22 ENCOUNTER — OFFICE VISIT (OUTPATIENT)
Dept: FAMILY MEDICINE | Facility: CLINIC | Age: 73
End: 2023-11-22

## 2023-11-22 VITALS
BODY MASS INDEX: 25.82 KG/M2 | DIASTOLIC BLOOD PRESSURE: 64 MMHG | HEART RATE: 82 BPM | OXYGEN SATURATION: 97 % | WEIGHT: 150.4 LBS | SYSTOLIC BLOOD PRESSURE: 124 MMHG

## 2023-11-22 DIAGNOSIS — G89.4 PAIN SYNDROME, CHRONIC: ICD-10-CM

## 2023-11-22 DIAGNOSIS — Z00.00 ENCOUNTER FOR MEDICARE ANNUAL WELLNESS EXAM: Primary | ICD-10-CM

## 2023-11-22 DIAGNOSIS — R39.9 LOWER URINARY TRACT SYMPTOMS (LUTS): ICD-10-CM

## 2023-11-22 DIAGNOSIS — R39.15 URINARY URGENCY: ICD-10-CM

## 2023-11-22 DIAGNOSIS — M85.89 OSTEOPENIA OF MULTIPLE SITES: ICD-10-CM

## 2023-11-22 LAB
BACTERIA (RMG): ABNORMAL
BILIRUBIN (RMG): NEGATIVE
BLOOD (RMG): NEGATIVE
CASTS (RMG): ABNORMAL
COLOR UR: YELLOW
CRYSTAL (RMG): ABNORMAL
EPITHELIAL (RMG): ABNORMAL
GLUCOSE (RMG): NEGATIVE
KETONE (RMG): NEGATIVE
LEUKOCYTE (RMG): NEGATIVE
MUCOUS (RMG): ABNORMAL
NITRITE (RMG): NEGATIVE
PH UR STRIP: 6.5 PH (ref 5–7)
PROTEIN (RMG): ABNORMAL
RBC (RMG): ABNORMAL
SP GR UR STRIP: 1.02
UROBILINOGEN (RMG): 0.2
WBC (RMG): ABNORMAL

## 2023-11-22 PROCEDURE — G0439 PPPS, SUBSEQ VISIT: HCPCS | Performed by: FAMILY MEDICINE

## 2023-11-22 PROCEDURE — 81003 URINALYSIS AUTO W/O SCOPE: CPT | Performed by: FAMILY MEDICINE

## 2023-11-22 PROCEDURE — 99214 OFFICE O/P EST MOD 30 MIN: CPT | Mod: 25 | Performed by: FAMILY MEDICINE

## 2023-11-24 LAB — BACTERIA CSF CULT: NORMAL

## 2023-12-06 ENCOUNTER — TRANSFERRED RECORDS (OUTPATIENT)
Dept: FAMILY MEDICINE | Facility: CLINIC | Age: 73
End: 2023-12-06

## 2023-12-07 ENCOUNTER — TRANSFERRED RECORDS (OUTPATIENT)
Dept: FAMILY MEDICINE | Facility: CLINIC | Age: 73
End: 2023-12-07

## 2023-12-11 ENCOUNTER — OFFICE VISIT (OUTPATIENT)
Dept: NEUROLOGY | Facility: CLINIC | Age: 73
End: 2023-12-11
Payer: MEDICARE

## 2023-12-11 VITALS
HEIGHT: 64 IN | HEART RATE: 73 BPM | SYSTOLIC BLOOD PRESSURE: 155 MMHG | WEIGHT: 150 LBS | DIASTOLIC BLOOD PRESSURE: 78 MMHG | BODY MASS INDEX: 25.61 KG/M2 | OXYGEN SATURATION: 99 %

## 2023-12-11 DIAGNOSIS — G45.9 TIA (TRANSIENT ISCHEMIC ATTACK): Primary | ICD-10-CM

## 2023-12-11 PROCEDURE — 99417 PROLNG OP E/M EACH 15 MIN: CPT | Performed by: NURSE PRACTITIONER

## 2023-12-11 PROCEDURE — 99215 OFFICE O/P EST HI 40 MIN: CPT | Performed by: NURSE PRACTITIONER

## 2023-12-11 RX ORDER — PRAVASTATIN SODIUM 10 MG
5 TABLET ORAL DAILY
Qty: 30 TABLET | Refills: 0 | Status: SHIPPED | OUTPATIENT
Start: 2023-12-11 | End: 2024-01-15

## 2023-12-11 ASSESSMENT — PAIN SCALES - GENERAL: PAINLEVEL: MILD PAIN (2)

## 2023-12-11 NOTE — LETTER
"    12/11/2023         RE: Macey Romero  [address]  [facility] [address]        Dear Colleague,    Thank you for referring your patient, Macey Romero, to the [facility] NEUROLOGY [facility]. Please see a copy of my visit note below.    __________________________________________________________      [facility]   [phone]  __________________________________________________________    Chief Complaint: No chief complaint on file.      History of Present Illness: Macey Romero is a 73 year old female presenting for TIA. This is a follow-up to a hospitalization that recently happened at [facility] on 11/8-11/12/23.     Prior to the hospital stay, she had a past medical history of prediabetes, R breast cancer s/p bilateral mastectomy, IgA MGUS, and recent C5-6 anterior discectomy (9/20/23). She was admitted for evaluation of neck pain, myalgias and fever. Per chart review, inpatient stroke code was activated for acute onset left visual field cut, left sided numbness, and dysarthria that began while participating in physical therapy. Symptoms lasted <60 minutes. She was found to have a R P2 stenosis vs partial occlusion. MRI brain was negative. Given neck pain, fever, and recent surgery, LP was obtained (unremarkable). During her admission, she also endorsed a history of brief hallucinations, impaired short term memory and feeling slower in daily tasks. She was started on DAPT with ASA 81 mg + Plavix 75 mg for 21 days, then  mg alone indefinitely for recurrent stroke prevention.     Today in clinic, we had a long conversation regarding her hospital stay and TIA evaluation. She clarifies that the visual change can be better described as the left half of her vision looking \"shaded\". She continues to feel as though the colors on the left are less vibrant. She does not recall language impairment, but admits that she felt confused during her " "hospitalization and does not remember some aspects of her discharge.      Since discharge, she stopped the aspirin and Plavix. She recalls being told previously that she should never take aspirin. We discussed that given the right P2 stenosis/sub-occlusive thrombus and concern for TIA, daily antiplatelet regimen is recommended. She denies prior bleeding concerns, ulcers, while on aspirin. She also developed \"swooning\" episodes after discharge, where she felt as though she might pass out. She associated this with her new prescription for Lipitor and they seemed to improve when she stopped. She tends to feel lightheaded with position changes. Home BP readings 130/60s.     Stroke Evaluation Summarized:    MRI/Head CT MRI: negative for acute stroke   Intracranial Vasculature CTA head: stenosis vs partial occlusion of the proximal R P2 segment. Distal branches are patent.    Cervical Vasculature CTA neck: normal   I personally reviewed the following neuroimaging studies today and the comments above reflect my own personal interpretation of the images: images: CTA head/neck, MRI brain, and I also showed CTA and MRI to the patient myself today.    Echocardiogram EF 60-65%, normal LV wall motion, normal LA   EKG/Telemetry sinus   Other Testing Not Applicable      Labs Lab Results   Component Value Date     09/12/2023    A1C 5.7 (H) 11/08/2023    CTROPT 12 11/08/2023    INR 0.92 11/08/2023    INR 1.00 09/08/2011        Impression/Plan:     1. Transient ischemic attack (TIA) in the setting of R PCA stenosis vs sub-occlusive thrombus     - Daily ASA 81 mg daily  - , recommend statin therapy. Felt lightheaded with atorvastatin. She would like to trial pravastatin 5 mg daily. Titrate to goal LDL 40-70  - A1c within goal <7.0  - Goal BP <130/80 with tighter control associated with decreased overall CV risk, if tolerated  - Cardionet, new order placed   - Smoking cessation (Currently smokes 4 cigarettes per day)  - " "Discussed general neurology consultation for memory and cognitive concerns - she declines referral at this time    - With stroke clinic in 1 month (currently scheduled with YAHAIRA Greenwood)    Stroke Education provided.  She will call us with any questions.  For any acute neurologic deficits she was advised to  go directly to the hospital rather than call the clinic.    Marivel Jimenez, [facility]  12/11/2023    ___________________________________________________________________    Current Medications  Current Outpatient Medications   Medication Sig     acetaminophen (TYLENOL) 325 MG tablet Take 1-2 tablets (325-650 mg) by mouth every 6 hours as needed for mild pain     albuterol (PROAIR HFA/PROVENTIL HFA/VENTOLIN HFA) 108 (90 Base) MCG/ACT inhaler Inhale 2 puffs into the lungs every 6 hours as needed for shortness of breath     aspirin 81 MG EC tablet Take 1 tablet (81 mg) by mouth daily (Patient not taking: Reported on 11/15/2023)     atorvastatin (LIPITOR) 40 MG tablet Take 1 tablet (40 mg) by mouth every evening     budesonide-formoterol (SYMBICORT) 160-4.5 MCG/ACT Inhaler Inhale 2 puffs into the lungs daily     clopidogrel (PLAVIX) 75 MG tablet Take 1 tablet (75 mg) by mouth daily (Patient not taking: Reported on 11/15/2023)     hydrocortisone 2.5 % cream Apply topically daily as needed     metroNIDAZOLE (METROCREAM) 0.75 % external cream Apply topically every evening as needed     vitamin D3 (CHOLECALCIFEROL) 50 mcg (2000 units) tablet Take 1 tablet by mouth daily     No current facility-administered medications for this visit.       Physical Exam    Estimated body mass index is 25.82 kg/m  as calculated from the following:    Height as of 9/20/23: 1.626 m (5' 4\").    Weight as of 11/22/23: 68.2 kg (150 lb 6.4 oz).    There were no vitals taken for this visit.       General Exam  General: Sitting up in chair in no acute distress  Pulmonary:  no respiratory distress    Neurologic:  Mental Status:  alert, oriented " x 3, follows commands, speech clear and fluent, naming and repetition normal  Cranial Nerves:  visual fields intact, PERRL, EOMI with normal smooth pursuit, facial sensation intact and symmetric, facial movements symmetric, hearing not formally tested but intact to conversation, no dysarthria, tongue protrusion midline  Motor:  normal muscle tone and bulk, no abnormal movements, able to move all limbs spontaneously, no pronator drift  Reflexes:   deferred  Sensory: diminished light touch sensation in LUE (baseline), no extinction on double simultaneous stimulation   Coordination:   FTN without dysmetria  Station/Gait:  deferred    Modified [place] Scale  Score: 2-Slight disability; unable to carry out all previous activities, but able to look after own affairs    Questionnaires:         No data to display                    Billing:    I spent a total of 100 minutes on the day of the visit.   Time spent by me doing chart review, history and exam, documentation and further activities per the note            Again, thank you for allowing me to participate in the care of your patient.        Sincerely,        Marivel Jimenez, CNP

## 2023-12-11 NOTE — PROGRESS NOTES
"__________________________________________________________      [facility]   [phone]  __________________________________________________________    Chief Complaint: No chief complaint on file.      History of Present Illness: Macey Romero is a 73 year old female presenting for TIA. This is a follow-up to a hospitalization that recently happened at [facility] on 11/8-11/12/23.     Prior to the hospital stay, she had a past medical history of prediabetes, R breast cancer s/p bilateral mastectomy, IgA MGUS, and recent C5-6 anterior discectomy (9/20/23). She was admitted for evaluation of neck pain, myalgias and fever. Per chart review, inpatient stroke code was activated for acute onset left visual field cut, left sided numbness, and dysarthria that began while participating in physical therapy. Symptoms lasted <60 minutes. She was found to have a R P2 stenosis vs partial occlusion. MRI brain was negative. Given neck pain, fever, and recent surgery, LP was obtained (unremarkable). During her admission, she also endorsed a history of brief hallucinations, impaired short term memory and feeling slower in daily tasks. She was started on DAPT with ASA 81 mg + Plavix 75 mg for 21 days, then  mg alone indefinitely for recurrent stroke prevention.     Today in clinic, we had a long conversation regarding her hospital stay and TIA evaluation. She clarifies that the visual change can be better described as the left half of her vision looking \"shaded\". She continues to feel as though the colors on the left are less vibrant. She does not recall language impairment, but admits that she felt confused during her hospitalization and does not remember some aspects of her discharge.      Since discharge, she stopped the aspirin and Plavix. She recalls being told previously that she should never take aspirin. We discussed that given the right P2 stenosis/sub-occlusive " "thrombus and concern for TIA, daily antiplatelet regimen is recommended. She denies prior bleeding concerns, ulcers, while on aspirin. She also developed \"swooning\" episodes after discharge, where she felt as though she might pass out. She associated this with her new prescription for Lipitor and they seemed to improve when she stopped. She tends to feel lightheaded with position changes. Home BP readings 130/60s.     Stroke Evaluation Summarized:    MRI/Head CT MRI: negative for acute stroke   Intracranial Vasculature CTA head: stenosis vs partial occlusion of the proximal R P2 segment. Distal branches are patent.    Cervical Vasculature CTA neck: normal   I personally reviewed the following neuroimaging studies today and the comments above reflect my own personal interpretation of the images: images: CTA head/neck, MRI brain, and I also showed CTA and MRI to the patient myself today.    Echocardiogram EF 60-65%, normal LV wall motion, normal LA   EKG/Telemetry sinus   Other Testing Not Applicable      Labs Lab Results   Component Value Date     09/12/2023    A1C 5.7 (H) 11/08/2023    CTROPT 12 11/08/2023    INR 0.92 11/08/2023    INR 1.00 09/08/2011        Impression/Plan:     1. Transient ischemic attack (TIA) in the setting of R PCA stenosis vs sub-occlusive thrombus     - Daily ASA 81 mg daily  - , recommend statin therapy. Felt lightheaded with atorvastatin. She would like to trial pravastatin 5 mg daily. Titrate to goal LDL 40-70  - A1c within goal <7.0  - Goal BP <130/80 with tighter control associated with decreased overall CV risk, if tolerated  - Cardionet, new order placed   - Smoking cessation (Currently smokes 4 cigarettes per day)  - Discussed general neurology consultation for memory and cognitive concerns - she declines referral at this time    - With stroke clinic in 1 month (currently scheduled with YAHAIRA Greenwood)    Stroke Education provided.  She will call us with any questions.  For " "any acute neurologic deficits she was advised to  go directly to the hospital rather than call the clinic.    Marivel Jimenez, [facility]  Neurology  12/11/2023    ___________________________________________________________________    Current Medications  Current Outpatient Medications   Medication Sig    acetaminophen (TYLENOL) 325 MG tablet Take 1-2 tablets (325-650 mg) by mouth every 6 hours as needed for mild pain    albuterol (PROAIR HFA/PROVENTIL HFA/VENTOLIN HFA) 108 (90 Base) MCG/ACT inhaler Inhale 2 puffs into the lungs every 6 hours as needed for shortness of breath    aspirin 81 MG EC tablet Take 1 tablet (81 mg) by mouth daily (Patient not taking: Reported on 11/15/2023)    atorvastatin (LIPITOR) 40 MG tablet Take 1 tablet (40 mg) by mouth every evening    budesonide-formoterol (SYMBICORT) 160-4.5 MCG/ACT Inhaler Inhale 2 puffs into the lungs daily    clopidogrel (PLAVIX) 75 MG tablet Take 1 tablet (75 mg) by mouth daily (Patient not taking: Reported on 11/15/2023)    hydrocortisone 2.5 % cream Apply topically daily as needed    metroNIDAZOLE (METROCREAM) 0.75 % external cream Apply topically every evening as needed    vitamin D3 (CHOLECALCIFEROL) 50 mcg (2000 units) tablet Take 1 tablet by mouth daily     No current facility-administered medications for this visit.       Physical Exam    Estimated body mass index is 25.82 kg/m  as calculated from the following:    Height as of 9/20/23: 1.626 m (5' 4\").    Weight as of 11/22/23: 68.2 kg (150 lb 6.4 oz).    There were no vitals taken for this visit.       General Exam  General: Sitting up in chair in no acute distress  Pulmonary:  no respiratory distress    Neurologic:  Mental Status:  alert, oriented x 3, follows commands, speech clear and fluent, naming and repetition normal  Cranial Nerves:  visual fields intact, PERRL, EOMI with normal smooth pursuit, facial sensation intact and symmetric, facial movements symmetric, hearing not formally tested but intact to " conversation, no dysarthria, tongue protrusion midline  Motor:  normal muscle tone and bulk, no abnormal movements, able to move all limbs spontaneously, no pronator drift  Reflexes:   deferred  Sensory: diminished light touch sensation in LUE (baseline), no extinction on double simultaneous stimulation   Coordination:   FTN without dysmetria  Station/Gait:  deferred    Modified [place] Scale  Score: 2-Slight disability; unable to carry out all previous activities, but able to look after own affairs    Questionnaires:         No data to display                    Billing:    I spent a total of 100 minutes on the day of the visit.   Time spent by me doing chart review, history and exam, documentation and further activities per the note

## 2023-12-15 ENCOUNTER — HOSPITAL ENCOUNTER (OUTPATIENT)
Dept: CARDIOLOGY | Facility: CLINIC | Age: 73
Discharge: HOME | End: 2023-12-15
Attending: NURSE PRACTITIONER | Admitting: NURSE PRACTITIONER
Payer: MEDICARE

## 2023-12-15 DIAGNOSIS — G45.9 TIA (TRANSIENT ISCHEMIC ATTACK): ICD-10-CM

## 2023-12-15 PROCEDURE — 93270 REMOTE 30 DAY ECG REV/REPORT: CPT

## 2023-12-21 ENCOUNTER — HOSPITAL ENCOUNTER (OUTPATIENT)
Dept: BONE DENSITY | Facility: CLINIC | Age: 73
Discharge: HOME | End: 2023-12-21
Attending: FAMILY MEDICINE | Admitting: FAMILY MEDICINE
Payer: MEDICARE

## 2023-12-21 DIAGNOSIS — M85.89 OSTEOPENIA OF MULTIPLE SITES: ICD-10-CM

## 2023-12-21 PROCEDURE — 77080 DXA BONE DENSITY AXIAL: CPT

## 2024-01-05 ENCOUNTER — TRANSFERRED RECORDS (OUTPATIENT)
Dept: FAMILY MEDICINE | Facility: CLINIC | Age: 74
End: 2024-01-05

## 2024-01-05 NOTE — PROGRESS NOTES
"__________________________________________________________      [facility] - [place]   [phone]  __________________________________________________________    Chief Complaint: Patient presents with:  Stroke: Vertigo that she's always had but had gabapentin and has kind of an empty feeling in her head when she stands up      History of Present Illness: Macey Romero is a 73 year old female with PMH of of prediabetes, R breast cancer s/p bilateral mastectomy, IgA MGUS, and recent C5-6 anterior discectomy (9/20/23)  returning for TIA.  She was admitted to [facility] 11/8/23 for evaluation of neck pain, myalgias and fever. Per chart review, inpatient stroke code was activated for acute onset left visual field cut, left sided numbness, and dysarthria that began while participating in physical therapy. Symptoms lasted <60 minutes. She was found to have a R P2 stenosis vs partial occlusion. MRI brain was negative. Given neck pain, fever, and recent surgery, LP was obtained (unremarkable). During her admission, she also endorsed a history of brief hallucinations, impaired short term memory and feeling slower in daily tasks. She was started on DAPT with ASA 81 mg + Plavix 75 mg for 21 days, then  mg alone indefinitely for stroke prevention.      Most recent vascular neurology follow up was clinic visit with SHAHRAM Jimenez 12/11/23. At that time she reported she had stopped ASA and Plavix (recalls being previously told to never take ASA) and Lipitor (felt was associated with \"swooning/\"pre-syncopal episodes that improved following discontinuation). She reported lightheadedness with position changes, home BP reading 130/60s. It was recommended that she restart ASA 81mg, transition to pravastatin and discontinue tobacco use for stroke prevention.  30 day cardiac event monitor was also ordered.    Today, Macey reports that she is not quite certain why she was here.  She continues to feel that " she did not have a TIA; she feels that symptoms were likely related to an ongoing UTI and a reaction to vancomycin that she was receiving at that time.  We do spend considerable time discussing the symptoms that were present including left visual field cut, left-sided numbness and dysarthria; she ultimately does admit to this constellation of symptoms.  We review that there is also evidence of a focal right PCA stenosis which would further increase my suspicion of TIA.  Ultimately, she does appear to agree with the conclusion that symptoms likely represented a TIA.In further discussion Macey does admit that she likely suffered an additional TIA in 2005 when she experienced right facial weakness, dysarthria and sensory change.    Unfortunately she is not currently on antiplatelet therapy.  She reports initially this was because she was not sure that she needed to take aspirin, and then subsequently notes that she is actually scheduled for a left elbow surgery 1/17 and thus is holding antiplatelet therapy.  She does continue on pravastatin which she is tolerating without concern.  She continues to try and minimize tobacco use and estimates she is smoking 2 cigarettes/day on average.  She is currently wearing a cardiac event monitor.    Stroke Evaluation Summarized:  MRI/Head CT MRI: negative for acute stroke   Intracranial Vasculature CTA head: stenosis vs partial occlusion of the proximal R P2 segment. Distal branches are patent.    Cervical Vasculature CTA neck: normal      Echocardiogram EF 60-65%, normal LV wall motion, normal LA   EKG/Telemetry sinus   Other Testing 30 day cardiac event monitor: in process      Labs       Lab Results   Component Value Date      09/12/2023     A1C 5.7 (H) 11/08/2023     CTROPT 12 11/08/2023     INR 0.92 11/08/2023     INR 1.00 09/08/2011           Transient ischemic attack (TIA) presenting as L visual field cut, L sided numbness and dysarthria in the setting of R PCA  stenosis vs sub-occlusive thrombus, November 2023.     TIA presenting as R face droop and dysarthria, 2005     -would strongly recommend postponement of non-urgent surgical procedures until at least 3 months out from TIA to avoid disruption in antiplatelet therapy; she currently has L elbow surgery scheduled for 1/17 and has been holding ASA due to this   -I did attempt to contact her orthopedist Dr. Liliana Chaenl at [facility] directly; I left a message requesting a call back to discuss her case   - restart ASA 81mg daily for stroke/TIA prevention; discussed the importance of minimizing interruption of antiplatelet therapy moving forward and recommended that this be discussed with stroke team prior to any breaks in antiplatelet therapy  - continue pravastatin with titration for goal LDL 40-70; recheck LDL in 2-3 months   - Goal BP <130/80 with tighter control associated with decreased overall CV risk, if tolerated  - Cardionet, in process  - Smoking cessation (Currently smokes 2 cigarettes per day)   - Return in about 3 months (around 4/11/2024) for TIA, with NIKI Greenwood only, 60 min.     Stroke Education provided.  She will call us with any questions.  For any acute neurologic deficits she was advised to  go directly to the hospital rather than call the clinic.    Nani Greenwood, SHAHRAM [facility]  Neurology  01/11/2024    ___________________________________________________________________    Current Medications  Current Outpatient Medications   Medication Sig    gabapentin (NEURONTIN) 100 MG capsule Take 50 mg by mouth daily    hydrocortisone 2.5 % cream Apply topically daily as needed    metroNIDAZOLE (METROCREAM) 0.75 % external cream Apply topically every evening as needed    pravastatin (PRAVACHOL) 10 MG tablet Take 0.5 tablets (5 mg) by mouth daily    acetaminophen (TYLENOL) 325 MG tablet Take 1-2 tablets (325-650 mg) by mouth every 6 hours as needed for mild pain (Patient not taking: Reported on 1/11/2024)    albuterol  "(PROAIR HFA/PROVENTIL HFA/VENTOLIN HFA) 108 (90 Base) MCG/ACT inhaler Inhale 2 puffs into the lungs every 6 hours as needed for shortness of breath (Patient not taking: Reported on 1/11/2024)    aspirin 81 MG EC tablet Take 1 tablet (81 mg) by mouth daily (Patient not taking: Reported on 12/11/2023)    budesonide-formoterol (SYMBICORT) 160-4.5 MCG/ACT Inhaler Inhale 2 puffs into the lungs daily (Patient not taking: Reported on 1/11/2024)    vitamin D3 (CHOLECALCIFEROL) 50 mcg (2000 units) tablet Take 1 tablet by mouth daily (Patient not taking: Reported on 1/11/2024)     No current facility-administered medications for this visit.       Physical Exam    Estimated body mass index is 25.92 kg/m  as calculated from the following:    Height as of 12/11/23: 1.626 m (5' 4\").    Weight as of this encounter: 68.5 kg (151 lb).    /81   Pulse 82   Wt 68.5 kg (151 lb)   SpO2 99%   BMI 25.92 kg/m         General Exam  Pulmonary:  no respiratory distress      Neurologic:  Mental Status:  alert, oriented x 3, follows commands, speech clear and fluent, naming and repetition normal  Cranial Nerves:  visual fields intact, EOMI with normal smooth pursuit, facial sensation intact and symmetric, facial movements symmetric, hearing not formally tested but intact to conversation, palate elevation symmetric and uvula midline, no dysarthria, tongue protrusion midline  Motor:  normal muscle tone and bulk, no abnormal movements, able to move all limbs spontaneously, no pronator drift  Sensory:  light touch sensation intact and symmetric throughout upper and lower extremities, no extinction on double simultaneous stimulation   Coordination:  normal finger-to-nose and heel-to-shin bilaterally without dysmetria  Station/Gait:  deferred    Modified [place] Scale  Score: 0-No symptoms        Billing:    I spent a total of 54 minutes on the day of the visit.   Time spent by me doing chart review, history and exam, documentation and " further activities per the note

## 2024-01-10 ENCOUNTER — TELEPHONE (OUTPATIENT)
Dept: NEUROLOGY | Facility: CLINIC | Age: 74
End: 2024-01-10
Payer: MEDICARE

## 2024-01-11 ENCOUNTER — TRANSFERRED RECORDS (OUTPATIENT)
Dept: FAMILY MEDICINE | Facility: CLINIC | Age: 74
End: 2024-01-11

## 2024-01-11 ENCOUNTER — OFFICE VISIT (OUTPATIENT)
Dept: NEUROLOGY | Facility: CLINIC | Age: 74
End: 2024-01-11
Payer: MEDICARE

## 2024-01-11 VITALS
SYSTOLIC BLOOD PRESSURE: 138 MMHG | OXYGEN SATURATION: 99 % | DIASTOLIC BLOOD PRESSURE: 81 MMHG | BODY MASS INDEX: 25.92 KG/M2 | HEART RATE: 82 BPM | WEIGHT: 151 LBS

## 2024-01-11 DIAGNOSIS — G45.9 TIA (TRANSIENT ISCHEMIC ATTACK): Primary | ICD-10-CM

## 2024-01-11 PROCEDURE — 99215 OFFICE O/P EST HI 40 MIN: CPT | Performed by: NURSE PRACTITIONER

## 2024-01-11 RX ORDER — GABAPENTIN 100 MG/1
100 CAPSULE ORAL 3 TIMES DAILY PRN
Status: ON HOLD | COMMUNITY
Start: 2024-01-05 | End: 2024-02-03

## 2024-01-11 NOTE — NURSING NOTE
Symptoms or concerns today: Some vertigo/empty headiness when she's standing, wants to know if she can be done with her heart monitor, and some mild confusion    Med comments: Only taking gabapentin and pravastatin    Who the patient is here with today: alone    Wojciech Farrar

## 2024-01-11 NOTE — PATIENT INSTRUCTIONS
I would recommend restarting aspirin 81mg today   I would also recommend avoiding any surgical procedures until you are 3 months out from your TIA (until Feb 8, 2024 or after)  Continue Pravastatin   Continue to minimize tobacco use/smoking   Continue cardiac monitor (through 11/15)  Follow up in 3 months

## 2024-01-15 ENCOUNTER — OFFICE VISIT (OUTPATIENT)
Dept: FAMILY MEDICINE | Facility: CLINIC | Age: 74
End: 2024-01-15

## 2024-01-15 VITALS
DIASTOLIC BLOOD PRESSURE: 81 MMHG | HEART RATE: 88 BPM | OXYGEN SATURATION: 99 % | WEIGHT: 151 LBS | SYSTOLIC BLOOD PRESSURE: 148 MMHG | BODY MASS INDEX: 25.92 KG/M2

## 2024-01-15 DIAGNOSIS — G45.9 TIA (TRANSIENT ISCHEMIC ATTACK): Primary | ICD-10-CM

## 2024-01-15 DIAGNOSIS — M81.0 AGE-RELATED OSTEOPOROSIS WITHOUT CURRENT PATHOLOGICAL FRACTURE: ICD-10-CM

## 2024-01-15 DIAGNOSIS — N18.2 CKD (CHRONIC KIDNEY DISEASE) STAGE 2, GFR 60-89 ML/MIN: ICD-10-CM

## 2024-01-15 PROCEDURE — 93228 REMOTE 30 DAY ECG REV/REPORT: CPT | Performed by: INTERNAL MEDICINE

## 2024-01-15 PROCEDURE — 99214 OFFICE O/P EST MOD 30 MIN: CPT | Performed by: FAMILY MEDICINE

## 2024-01-15 RX ORDER — CLINDAMYCIN HCL 300 MG
CAPSULE ORAL
Status: ON HOLD | COMMUNITY
End: 2024-02-16

## 2024-01-15 RX ORDER — AMITRIPTYLINE HYDROCHLORIDE 10 MG/1
TABLET ORAL
COMMUNITY
Start: 2024-01-11 | End: 2024-02-12

## 2024-01-15 RX ORDER — PRAVASTATIN SODIUM 10 MG
10 TABLET ORAL DAILY
Qty: 90 TABLET | Refills: 3 | Status: SHIPPED | OUTPATIENT
Start: 2024-01-15 | End: 2024-05-28

## 2024-01-15 RX ORDER — AMOXICILLIN 250 MG
CAPSULE ORAL
COMMUNITY
Start: 2023-12-06 | End: 2024-02-02

## 2024-01-15 RX ORDER — ALENDRONATE SODIUM 70 MG/1
70 TABLET ORAL
Qty: 12 TABLET | Refills: 3 | Status: SHIPPED | OUTPATIENT
Start: 2024-01-15

## 2024-01-15 NOTE — PROGRESS NOTES
Answers submitted by the patient for this visit:  General Questionnaire (Submitted on 1/14/2024)  Chief Complaint: Chronic problems general questions HPI Form  What is the reason for your visit today? : Follow up after TIA  How many servings of fruits and vegetables do you eat daily?: 4 or more  On average, how many sweetened beverages do you drink each day (Examples: soda, juice, sweet tea, etc.  Do NOT count diet or artificially sweetened beverages)?: 0  How many minutes a day do you exercise enough to make your heart beat faster?: 9 or less  How many days a week do you exercise enough to make your heart beat faster?: 3 or less  How many days per week do you miss taking your medication?: 0    Problem(s) Oriented visit      ROS:  General and CV completed and negative except as noted above     HISTORY:   reports no history of alcohol use.   reports that she has been smoking cigarettes. She has never used smokeless tobacco.    See chart for additional current history and problem list    EXAM:  BP: 148/81   Pulse: 88    Temp: Data Unavailable    Wt Readings from Last 2 Encounters:   01/15/24 68.5 kg (151 lb)   01/11/24 68.5 kg (151 lb)       BMI= Body mass index is 25.92 kg/m .    EXAM:  APPEARANCE: = Relaxed and in no distress  Conj/Eyelids = noninjected and lids and lashes are without inflammation  Breath Sounds = Good air movement with no rales or rhonchi in any lung fields  Heart Rate, Rythym, & sounds (no Murm)  = Regular rate and rythym with no S3, S4, or murmer appreciated.      Assessment/Plan:  Macey was seen today for recheck.    Diagnoses and all orders for this visit:    TIA (transient ischemic attack)  Notes from neurology reviewed.   Do not stop the asa !    CKD2   Not on a ace/arb yet, but the egfr has improved.    Age-related osteoporosis without current pathological fracture documented at surgery by neurosurgeon  Start fosamax        COUNSELING:   reports that she has been smoking cigarettes. She has  "never used smokeless tobacco.  Tobacco Cessation Action Plan: Information offered: Patient not interested at this time  Estimated body mass index is 25.92 kg/m  as calculated from the following:    Height as of 12/11/23: 1.626 m (5' 4\").    Weight as of this encounter: 68.5 kg (151 lb).       Appropriate preventive services were discussed with this patient, including applicable screening as appropriate for cardiovascular disease, diabetes, osteopenia/osteoporosis, and glaucoma.  As appropriate for age/gender, discussed screening for colorectal cancer, prostate cancer, breast cancer, and cervical cancer. Checklist reviewing preventive services available has been given to the patient.    Reviewed patients plan of care and provided an AVS. The Basic Care Plan (routine screening as documented in Health Maintenance) for Macey meets the Care Plan requirement. This Care Plan has been established and reviewed with the  Patient.      The following health maintenance items are reviewed in Epic and correct as of today:  Health Maintenance   Topic Date Due    DTAP/TDAP/TD IMMUNIZATION (1 - Tdap) Never done    RSV VACCINE (Pregnancy & 60+) (1 - 1-dose 60+ series) Never done    LUNG CANCER SCREENING  12/12/2022    ASTHMA CONTROL TEST  03/12/2024    A1C  05/08/2024    LIPID  09/12/2024    MICROALBUMIN  09/12/2024    ASTHMA ACTION PLAN  09/12/2024    PHQ-9  09/12/2024    BMP  11/15/2024    HEMOGLOBIN  11/15/2024    NICOTINE/TOBACCO CESSATION COUNSELING Q 1 YR  11/22/2024    MEDICARE ANNUAL WELLNESS VISIT  11/22/2024    FALL RISK ASSESSMENT  11/22/2024    DEXA  12/21/2025    ADVANCE CARE PLANNING  11/22/2028    COLORECTAL CANCER SCREENING  08/27/2032    HEPATITIS C SCREENING  Completed    DEPRESSION ACTION PLAN  Completed    INFLUENZA VACCINE  Completed    Pneumococcal Vaccine: 65+ Years  Completed    URINALYSIS  Completed    ZOSTER IMMUNIZATION  Completed    COVID-19 Vaccine  Completed    IPV IMMUNIZATION  Aged Out    HPV " IMMUNIZATION  Aged Out    MENINGITIS IMMUNIZATION  Aged Out    RSV MONOCLONAL ANTIBODY  Aged Out    URINE DRUG SCREEN  Discontinued       Long Ferrari  [facility]  For any issues my office # is [phone]

## 2024-01-19 ENCOUNTER — TELEPHONE (OUTPATIENT)
Dept: NEUROLOGY | Facility: CLINIC | Age: 74
End: 2024-01-19
Payer: MEDICARE

## 2024-01-19 NOTE — TELEPHONE ENCOUNTER
Called pt to clarify where she wanted results sent to.     She is needing them to sent to Liliana Holt with [facility], so she can set up a procedure.     RN also reviewed results with pt, and she had no further questions.     Faxed cardiac monitor results to Liliana Holt @ [phone]. Rightfax confirmed 1/19 at 2:18 PM.     Deepthi CHATMAN RN, BSN  [facility]

## 2024-01-19 NOTE — TELEPHONE ENCOUNTER
[facility] Call Center    Phone Message    May a detailed message be left on voicemail: yes     Reason for Call:  Macey asking for the results of the cardiac monitor be faxed to [phone].     Please call Macey @ [phone] to discuss further.    Action Taken: Message routed to:  Other: [facility]    Travel Screening: Not Applicable

## 2024-02-02 ENCOUNTER — APPOINTMENT (OUTPATIENT)
Dept: CT IMAGING | Facility: CLINIC | Age: 74
End: 2024-02-02
Attending: EMERGENCY MEDICINE
Payer: MEDICARE

## 2024-02-02 ENCOUNTER — HOSPITAL ENCOUNTER (OUTPATIENT)
Facility: CLINIC | Age: 74
Setting detail: OBSERVATION
Discharge: HOME | End: 2024-02-03
Attending: EMERGENCY MEDICINE | Admitting: EMERGENCY MEDICINE
Payer: MEDICARE

## 2024-02-02 ENCOUNTER — NURSE TRIAGE (OUTPATIENT)
Dept: NURSING | Facility: CLINIC | Age: 74
End: 2024-02-02

## 2024-02-02 ENCOUNTER — APPOINTMENT (OUTPATIENT)
Dept: MRI IMAGING | Facility: CLINIC | Age: 74
End: 2024-02-02
Payer: MEDICARE

## 2024-02-02 DIAGNOSIS — H53.8 BLURRED VISION: ICD-10-CM

## 2024-02-02 DIAGNOSIS — R51.9 HEADACHE: Primary | ICD-10-CM

## 2024-02-02 DIAGNOSIS — G45.9 TIA (TRANSIENT ISCHEMIC ATTACK): ICD-10-CM

## 2024-02-02 DIAGNOSIS — R20.0 LEFT SIDED NUMBNESS: ICD-10-CM

## 2024-02-02 DIAGNOSIS — I10 HYPERTENSION, UNSPECIFIED TYPE: ICD-10-CM

## 2024-02-02 LAB
ALBUMIN SERPL BCG-MCNC: 4.3 G/DL (ref 3.5–5.2)
ALP SERPL-CCNC: 86 U/L (ref 40–150)
ALT SERPL W P-5'-P-CCNC: 14 U/L (ref 0–50)
ANION GAP SERPL CALCULATED.3IONS-SCNC: 8 MMOL/L (ref 7–15)
AST SERPL W P-5'-P-CCNC: 21 U/L (ref 0–45)
ATRIAL RATE - MUSE: 81 BPM
BASOPHILS # BLD AUTO: 0 10E3/UL (ref 0–0.2)
BASOPHILS NFR BLD AUTO: 0 %
BILIRUB SERPL-MCNC: 0.3 MG/DL
BUN SERPL-MCNC: 14.2 MG/DL (ref 8–23)
CALCIUM SERPL-MCNC: 9.4 MG/DL (ref 8.8–10.2)
CHLORIDE SERPL-SCNC: 100 MMOL/L (ref 98–107)
CREAT SERPL-MCNC: 0.83 MG/DL (ref 0.51–0.95)
DEPRECATED HCO3 PLAS-SCNC: 30 MMOL/L (ref 22–29)
DIASTOLIC BLOOD PRESSURE - MUSE: NORMAL MMHG
EGFRCR SERPLBLD CKD-EPI 2021: 74 ML/MIN/1.73M2
EOSINOPHIL # BLD AUTO: 0.2 10E3/UL (ref 0–0.7)
EOSINOPHIL NFR BLD AUTO: 3 %
ERYTHROCYTE [DISTWIDTH] IN BLOOD BY AUTOMATED COUNT: 12.4 % (ref 10–15)
ERYTHROCYTE [SEDIMENTATION RATE] IN BLOOD BY WESTERGREN METHOD: 2 MM/HR (ref 0–30)
FLUAV RNA SPEC QL NAA+PROBE: NEGATIVE
FLUBV RNA RESP QL NAA+PROBE: NEGATIVE
GLUCOSE SERPL-MCNC: 157 MG/DL (ref 70–99)
HCT VFR BLD AUTO: 43.3 % (ref 35–47)
HGB BLD-MCNC: 14.3 G/DL (ref 11.7–15.7)
IMM GRANULOCYTES # BLD: 0 10E3/UL
IMM GRANULOCYTES NFR BLD: 0 %
INTERPRETATION ECG - MUSE: NORMAL
LYMPHOCYTES # BLD AUTO: 2.2 10E3/UL (ref 0.8–5.3)
LYMPHOCYTES NFR BLD AUTO: 28 %
MCH RBC QN AUTO: 27.9 PG (ref 26.5–33)
MCHC RBC AUTO-ENTMCNC: 33 G/DL (ref 31.5–36.5)
MCV RBC AUTO: 85 FL (ref 78–100)
MONOCYTES # BLD AUTO: 0.6 10E3/UL (ref 0–1.3)
MONOCYTES NFR BLD AUTO: 8 %
NEUTROPHILS # BLD AUTO: 4.7 10E3/UL (ref 1.6–8.3)
NEUTROPHILS NFR BLD AUTO: 61 %
NRBC # BLD AUTO: 0 10E3/UL
NRBC BLD AUTO-RTO: 0 /100
NT-PROBNP SERPL-MCNC: 305 PG/ML (ref 0–900)
P AXIS - MUSE: 58 DEGREES
PLATELET # BLD AUTO: 210 10E3/UL (ref 150–450)
POTASSIUM SERPL-SCNC: 4 MMOL/L (ref 3.4–5.3)
PR INTERVAL - MUSE: 154 MS
PROT SERPL-MCNC: 7.5 G/DL (ref 6.4–8.3)
QRS DURATION - MUSE: 82 MS
QT - MUSE: 384 MS
QTC - MUSE: 446 MS
R AXIS - MUSE: 35 DEGREES
RBC # BLD AUTO: 5.12 10E6/UL (ref 3.8–5.2)
RSV RNA SPEC NAA+PROBE: NEGATIVE
SARS-COV-2 RNA RESP QL NAA+PROBE: NEGATIVE
SODIUM SERPL-SCNC: 138 MMOL/L (ref 135–145)
SYSTOLIC BLOOD PRESSURE - MUSE: NORMAL MMHG
T AXIS - MUSE: 33 DEGREES
TROPONIN T SERPL HS-MCNC: <6 NG/L
VENTRICULAR RATE- MUSE: 81 BPM
WBC # BLD AUTO: 7.8 10E3/UL (ref 4–11)

## 2024-02-02 PROCEDURE — 83880 ASSAY OF NATRIURETIC PEPTIDE: CPT | Performed by: EMERGENCY MEDICINE

## 2024-02-02 PROCEDURE — 99285 EMERGENCY DEPT VISIT HI MDM: CPT | Mod: 25

## 2024-02-02 PROCEDURE — 99222 1ST HOSP IP/OBS MODERATE 55: CPT | Mod: AI | Performed by: INTERNAL MEDICINE

## 2024-02-02 PROCEDURE — 96374 THER/PROPH/DIAG INJ IV PUSH: CPT

## 2024-02-02 PROCEDURE — 84484 ASSAY OF TROPONIN QUANT: CPT | Performed by: EMERGENCY MEDICINE

## 2024-02-02 PROCEDURE — 85652 RBC SED RATE AUTOMATED: CPT

## 2024-02-02 PROCEDURE — 80053 COMPREHEN METABOLIC PANEL: CPT | Performed by: EMERGENCY MEDICINE

## 2024-02-02 PROCEDURE — 250N000011 HC RX IP 250 OP 636: Performed by: EMERGENCY MEDICINE

## 2024-02-02 PROCEDURE — 87637 SARSCOV2&INF A&B&RSV AMP PRB: CPT

## 2024-02-02 PROCEDURE — 255N000002 HC RX 255 OP 636: Performed by: EMERGENCY MEDICINE

## 2024-02-02 PROCEDURE — 70450 CT HEAD/BRAIN W/O DYE: CPT | Mod: MA

## 2024-02-02 PROCEDURE — 70553 MRI BRAIN STEM W/O & W/DYE: CPT | Mod: MF

## 2024-02-02 PROCEDURE — A9585 GADOBUTROL INJECTION: HCPCS | Performed by: EMERGENCY MEDICINE

## 2024-02-02 PROCEDURE — G0378 HOSPITAL OBSERVATION PER HR: HCPCS

## 2024-02-02 PROCEDURE — 93005 ELECTROCARDIOGRAM TRACING: CPT

## 2024-02-02 PROCEDURE — 250N000013 HC RX MED GY IP 250 OP 250 PS 637

## 2024-02-02 PROCEDURE — 36415 COLL VENOUS BLD VENIPUNCTURE: CPT | Performed by: EMERGENCY MEDICINE

## 2024-02-02 PROCEDURE — 85025 COMPLETE CBC W/AUTO DIFF WBC: CPT | Performed by: EMERGENCY MEDICINE

## 2024-02-02 RX ORDER — ACETAMINOPHEN 500 MG
1000 TABLET ORAL ONCE
Status: COMPLETED | OUTPATIENT
Start: 2024-02-02 | End: 2024-02-02

## 2024-02-02 RX ORDER — GABAPENTIN 100 MG/1
100 CAPSULE ORAL AT BEDTIME
Status: DISCONTINUED | OUTPATIENT
Start: 2024-02-02 | End: 2024-02-03 | Stop reason: HOSPADM

## 2024-02-02 RX ORDER — OXYCODONE HYDROCHLORIDE 5 MG/1
5 TABLET ORAL ONCE
Status: COMPLETED | OUTPATIENT
Start: 2024-02-02 | End: 2024-02-02

## 2024-02-02 RX ORDER — AMITRIPTYLINE HYDROCHLORIDE 10 MG/1
10 TABLET ORAL AT BEDTIME
Status: DISCONTINUED | OUTPATIENT
Start: 2024-02-02 | End: 2024-02-03 | Stop reason: HOSPADM

## 2024-02-02 RX ORDER — ONDANSETRON 2 MG/ML
4 INJECTION INTRAMUSCULAR; INTRAVENOUS ONCE
Status: COMPLETED | OUTPATIENT
Start: 2024-02-02 | End: 2024-02-02

## 2024-02-02 RX ORDER — GADOBUTROL 604.72 MG/ML
7 INJECTION INTRAVENOUS ONCE
Status: COMPLETED | OUTPATIENT
Start: 2024-02-02 | End: 2024-02-02

## 2024-02-02 RX ORDER — ASPIRIN 81 MG/1
243 TABLET ORAL ONCE
Status: COMPLETED | OUTPATIENT
Start: 2024-02-02 | End: 2024-02-03

## 2024-02-02 RX ADMIN — ACETAMINOPHEN 1000 MG: 500 TABLET, FILM COATED ORAL at 18:42

## 2024-02-02 RX ADMIN — OXYCODONE HYDROCHLORIDE 5 MG: 5 TABLET ORAL at 21:39

## 2024-02-02 RX ADMIN — ONDANSETRON 4 MG: 2 INJECTION INTRAMUSCULAR; INTRAVENOUS at 17:15

## 2024-02-02 RX ADMIN — GADOBUTROL 7 ML: 604.72 INJECTION INTRAVENOUS at 21:15

## 2024-02-02 ASSESSMENT — ACTIVITIES OF DAILY LIVING (ADL)
ADLS_ACUITY_SCORE: 37

## 2024-02-02 NOTE — PROGRESS NOTES
Preoperative Evaluation  [facility]  [address] [place] [address]  Phone: [phone]  Fax: [phone]  Primary Provider: Lissy Ferrari  Pre-op Performing Provider: LISSY FERRARI  Feb 5, 2024   {Provider  Link to PREOP SmartSet  Use this to apply standard patient instructions to AVS; includes medication directions, common orders, guidelines for anemia, warfarin, additional testing   :908865}    Macey is a 73 year old, presenting for the following:  No chief complaint on file.      Surgical Information  Surgery/Procedure: ***  Surgery Location: ***  Surgeon: ***  Surgery Date: ***  Time of Surgery: ***  Where patient plans to recover: {Preop post recovery plans :976963}  Fax number for surgical facility: {:[phone]}    {2021 Provider Charting Preference for Preop :112913}    Subjective       HPI related to upcoming procedure: ***        1/29/2024    10:09 AM   Preop Questions   1. Have you ever had a heart attack or stroke? YES - ***   2. Have you ever had surgery on your heart or blood vessels, such as a stent placement, a coronary artery bypass, or surgery on an artery in your head, neck, heart, or legs? No   3. Do you have chest pain with activity? No   4. Do you have a history of  heart failure? No   5. Do you currently have a cold, bronchitis or symptoms of other infection? No   6. Do you have a cough, shortness of breath, or wheezing? No   7. Do you or anyone in your family have previous history of blood clots? YES - ***   8. Do you or does anyone in your family have a serious bleeding problem such as prolonged bleeding following surgeries or cuts? No   9. Have you ever had problems with anemia or been told to take iron pills? No   10. Have you had any abnormal blood loss such as black, tarry or bloody stools, or abnormal vaginal bleeding? No   11. Have you ever had a blood transfusion? YES - ***   11a. Have you ever had a transfusion reaction? YES - ***   12. Are  you willing to have a blood transfusion if it is medically needed before, during, or after your surgery? Yes   13. Have you or any of your relatives ever had problems with anesthesia? YES - ***   14. Do you have sleep apnea, excessive snoring or daytime drowsiness? No   15. Do you have any artifical heart valves or other implanted medical devices like a pacemaker, defibrillator, or continuous glucose monitor? No   16. Do you have artificial joints? YES - ***   17. Are you allergic to latex? No       Health Care Directive  Patient does not have a Health Care Directive or Living Will: {ADVANCE_DIRECTIVE_STATUS:324579}    Preoperative Review of   {Mnpmpreport:977356}  {Review MNPMP for all patients per [facility] MNPMP Profile:995860}    {Chronic problem details (Optional) :874136}    Patient Active Problem List    Diagnosis Date Noted     Pain syndrome, chronic 11/22/2023     Priority: Medium     Urinary urgency 11/22/2023     Priority: Medium     Acute hepatitis 11/09/2023     Priority: Medium     Complicated UTI (urinary tract infection) 11/09/2023     Priority: Medium     Worsening of neck pain following surgery 11/09/2023     Priority: Medium     S/P cervical spinal fusion 09/20/2023     Priority: Medium     Colitis 08/25/2022     Priority: Medium     Stage 3a chronic kidney disease (H) 06/22/2022     Priority: Medium     Moderate persistent asthma without complication 10/27/2021     Priority: Medium     Prediabetes 10/27/2021     Priority: Medium     H/O bilateral mastectomy 06/17/2021     Priority: Medium     with reconstruction       Fibromyalgia      Priority: Medium     Chronic pain of both knees 03/21/2018     Priority: Medium     IBS (irritable bowel syndrome) 06/28/2011     Priority: Medium     Keloid of skin 06/28/2011     Priority: Medium     Osteoarthritis 02/01/2011     Priority: Medium     Osteopenia 02/01/2011     Priority: Medium     History of breast cancer 02/01/2011     Priority: Medium     1987         Past Medical History:   Diagnosis Date     Breast CA in situ 1987     Cancer (H) 1987    Right Breast treated with surgery     Cerebral infarction (H)      Chronic constipation      Fibromyalgia      History of blood transfusion      Malignant neoplasm of female breast, unspecified estrogen receptor status, unspecified laterality, unspecified site of breast (H) 06/17/2021     Meningitis     Several times as an adult     Osteoarthritis 02/01/2011     Osteopenia 02/01/2011     Pneumonia      Pterygium eye 08/26/2013     Reactive airway disease 02/01/2011     Seasonal allergies      Shingles     Prior to 2008 - scalp     Skin cancer     SCC - hand, left nose     Uncomplicated asthma      Past Surgical History:   Procedure Laterality Date     anterior c-disc fusion       ARTHROPLASTY KNEE Left 10/17/2022    Procedure: LEFT TOTAL KNEE ARTHROPLASTY;  Surgeon: Jax Le MD;  Location:  OR     BLEPHAROPLASTY, BROW LIFT BILATERAL, COMBINED Bilateral 6/18/2018    Procedure: COMBINED BLEPHAROPLASTY, BROW LIFT BILATERAL;  BILATERAL UPPER LID BLEPHAROPLASTY; BILATERAL BROW PTOSIS REPAIR;  Surgeon: Сергей Walters MD;  Location:  EC     CHOLECYSTECTOMY       COLONOSCOPY N/A 10/19/2017    Procedure: COLONOSCOPY;  COLONOSCOPY;  Surgeon: Niko Wong MD;  Location:  GI     COLONOSCOPY N/A 8/27/2022    Procedure: COLONOSCOPY, WITH POLYPECTOMY AND BIOPSY;  Surgeon: Abraham Rogers MD;  Location:  GI     D & C      Bleeding before hysterectomy     DISCECTOMY, FUSION CERVICAL ANTERIOR ONE LEVEL, COMBINED N/A 9/20/2023    Procedure: ANTERIOR CERVICAL 5 TO CERVICAL 6 DISCECTOMY AND FUSION;  Surgeon: Alex Galindo MD;  Location:  OR     ENDOSCOPY  04/21/08     ESOPHAGOSCOPY, GASTROSCOPY, DUODENOSCOPY (EGD), COMBINED N/A 8/27/2022    Procedure: ESOPHAGOGASTRODUODENOSCOPY, WITH BIOPSY;  Surgeon: Abraham Rogers MD;  Location:  GI     HYSTERECTOMY, MARCUS      Ovaries and tubes out due to breast cancer      JOINT REPLACEMTN, KNEE RT/LT Right 01/2011    Joint Replacement knee RT, with tibial straightening (2 replacements)     MAMMOPLASTY AUGMENTATION BILATERAL      breast ca      MASTECTOMY, SIMPLE RT/LT/CLAIRE Bilateral 1989    Mastectomy Simple RT/LT/CLAIRE     MOHS MICROGRAPHIC PROCEDURE      Left lateral nose     OPEN REDUCTION INTERNAL FIXATION ANKLE  8/27/2013    Procedure: OPEN REDUCTION INTERNAL FIXATION ANKLE;  RIGHT OPEN REDUCTION INTERNAL FIXATION ANKLE WITH LIGAMENT REPAIR;  Surgeon: Nando Chang MD;  Location: [facility] OR     PTERYGIUM WITH CONJUNCTIVAL AUTOLOGOUS TRANSPLANT Left 8/29/2016    Procedure: PTERYGIUM WITH CONJUNCTIVAL AUTOLOGOUS TRANSPLANT;  Surgeon: Сергей Walters MD;  Location: [facility]     REPAIR LIGAMENT ANKLE  8/27/2013    Procedure: REPAIR LIGAMENT ANKLE;;  Surgeon: Nando Chang MD;  Location: [facility] OR     SALIVARY GLAND SURGERY Left     Stone removal      SIGMOIDOSCOPY FLEXIBLE N/A 8/30/2022    Procedure: FLEXIBLE SIGMOIDOSCOPY;  Surgeon: Niko Wong MD;  Location:  GI     TONSILLECTOMY       Current Outpatient Medications   Medication Sig Dispense Refill     ACE/ARB/ARNI NOT PRESCRIBED (INTENTIONAL) Please choose reason not prescribed from choices below.       acetaminophen (TYLENOL) 325 MG tablet Take 1-2 tablets (325-650 mg) by mouth every 6 hours as needed for mild pain (Patient not taking: Reported on 1/11/2024)       albuterol (PROAIR HFA/PROVENTIL HFA/VENTOLIN HFA) 108 (90 Base) MCG/ACT inhaler Inhale 2 puffs into the lungs every 6 hours as needed for shortness of breath (Patient not taking: Reported on 1/11/2024)       alendronate (FOSAMAX) 70 MG tablet Take 1 tablet (70 mg) by mouth every 7 days 12 tablet 3     amitriptyline (ELAVIL) 10 MG tablet TAKE 1 TABLET BY MOUTH AT BEDTIME FOR NERVE PAIN. IF NO RELIEF IN 4 NIGHTS INCREASE TO 2 TABLETS       aspirin 81 MG EC tablet Take 1 tablet (81 mg) by mouth daily (Patient not taking: Reported on 12/11/2023) 21 tablet 0      "budesonide-formoterol (SYMBICORT) 160-4.5 MCG/ACT Inhaler Inhale 2 puffs into the lungs daily (Patient not taking: Reported on 2024) 10.2 g 3     clindamycin (CLEOCIN) 300 MG capsule TAKE 2 CAPSULES BY MOUTH 1 HOUR BEFORE DENTAL APPOINTMENT       gabapentin (NEURONTIN) 100 MG capsule Take 50 mg by mouth daily       hydrocortisone 2.5 % cream Apply topically daily as needed       metroNIDAZOLE (METROCREAM) 0.75 % external cream Apply topically every evening as needed       pravastatin (PRAVACHOL) 10 MG tablet Take 1 tablet (10 mg) by mouth daily 90 tablet 3     senna-docusate (SENOKOT-S/PERICOLACE) 8.6-50 MG tablet        vitamin D3 (CHOLECALCIFEROL) 50 mcg (2000 units) tablet Take 1 tablet by mouth daily (Patient not taking: Reported on 2024)         Allergies   Allergen Reactions     Levaquin Hives and Itching     Pcn [Penicillins] Hives     Tetanus Toxoid Anaphylaxis     Tetanus Toxoids Anaphylaxis     Erythromycin GI Disturbance     Amoxicillin      Duloxetine Nausea     Other reaction(s): Jittery     Silicone Rash        Social History     Tobacco Use     Smoking status: Some Days     Types: Cigarettes     Last attempt to quit: 2016     Years since quittin.0     Smokeless tobacco: Never     Tobacco comments:     quit but  still smokes, but not in house   Substance Use Topics     Alcohol use: No     Alcohol/week: 0.0 standard drinks of alcohol     {FAMILY HISTORY (Optional):943442294}  History   Drug Use No         Review of Systems  {ROS Picklists (Optional):890551}  Objective    There were no vitals taken for this visit.   Estimated body mass index is 25.75 kg/m  as calculated from the following:    Height as of 23: 1.626 m (5' 4\").    Weight as of 24: 68 kg (150 lb).  Physical Exam  {Exam List (Optional):699946}    Recent Labs   Lab Test 11/15/23  1151 11/15/23  0000 23  0820 23  0756 23  2159 23  1639 23  1131   HGB  --  13.8 11.7   < > 13.9   < " >  --    PLT  --  340 216   < > 181   < >  --    INR  --   --   --   --  0.92  --   --      --  141   < > 135   < >  --    POTASSIUM 3.8  --  3.8   < > 3.7   < >  --    CR 0.76  --  0.69   < > 1.05*   < >  --    A1C  --   --   --   --  5.7*  --  5.9*    < > = values in this interval not displayed.        Diagnostics  {LABS:882552}   {EK}    Revised Cardiac Risk Index (RCRI)  The patient has the following serious cardiovascular risks for perioperative complications:  {PREOP REVISED CARDIAC RISK INDEX (RCRI) :465925}     RCRI Interpretation: {REVISED CARDIAC RISK INTERPRETATION :581389}         Signed Electronically by: Long Ferrari MD  Copy of this evaluation report is provided to requesting physician.    {Provider Resources  Preop [facility] Preop Guidelines  Revised Cardiac Risk Index :109660}   {Email feedback regarding this note to [email or web address]   :099440}

## 2024-02-02 NOTE — TELEPHONE ENCOUNTER
"Called pt back to discuss Triage RN's note.    Pt said she has never had a headache like this before.     She said it came on very suddenly and it is worsening.  She states \"last time I had a headache I had a stroke\"    Most recent home BP's were:  180/107  175/99    Advised pt to go to ED ASAP. Pt is reluctant saying that she does not want to wait 6 hours.  She asked that we expedite it. Writer let her know that RN will give report to ED, but can't guarantee it will expedite it.      Pt still reluctant and tearful.  RN reviewed that the ED is the best place to get the emergent treatment that is needed if it is a stroke.  She then verbalized understanding and was agreeable to go to [facility] ED.     Pt said that she is 10 minutes from [facility] and has someone with her now to drive her there.  She is leaving right now.     RN called ED and gave brief report on why pt is being sent to ED.      Will route to provider as CANDE.     Deepthi CHATMAN RN, BSN  [facility]      "

## 2024-02-02 NOTE — ED NOTES
PIT/Triage Evaluation    Patient presented with a headache and has a history of stroke. The patient reports that at 1430 she got a headache that was initially mild but turned to an 8/10 in under an hour. Her vision is blurry, but she can't determine in which eye it is. She adds that her lip feels swollen and that her fingers feel cold.     Exam is notable for:    Patient Vitals for the past 24 hrs:   BP Temp Temp src Pulse Resp SpO2 Weight   02/02/24 1627 (!) 179/79 97.9  F (36.6  C) Temporal 91 20 98 % 68 kg (150 lb)     General:  Alert, interactive  Cardiovascular:  Well perfused  Lungs:  No respiratory distress, no accessory muscle use  Neuro:  Moving all 4 extremities  Skin:  Warm, dry  Psych:  Normal affect    Appropriate interventions for symptom management were initiated if applicable.  Appropriate diagnostic tests were initiated if indicated.    Important information for subsequent clinician:  This elderly woman presents to us with a headache that began at approximately 2:30 PM.  She states that it escalated over a period of 20 to 30 minutes.  She describes some nausea, blurry vision, and chiefly a left-sided headache.  She denies any other overt neurologic deficits.  She is rather tearful on her initial assessment.  I have put in for a stat head CT to rule out subarachnoid hemorrhage and basic labs have been ordered.  I have tasked the triage team to get her back to a room for a more thorough assessment as soon as one is available.    I briefly evaluated the patient and developed an initial plan of care. I discussed this plan and explained that this brief interaction does not constitute a full evaluation. Patient/family understands that they should wait to be fully evaluated and discuss any test results with another clinician prior to leaving the hospital.    Scribe Disclosure:  I, Giancarlo Baum, am serving as a scribe at 4:34 PM on 2/2/2024 to document services personally performed by Trierweiller, Chad  MD GENI based on my observations and the provider's statements to me.       Trierweiler, Chad A, MD  02/02/24 2121

## 2024-02-02 NOTE — PATIENT INSTRUCTIONS
Preparing for Your Surgery  Getting started  A nurse will call you to review your health history and instructions. They will give you an arrival time based on your scheduled surgery time. Please be ready to share:  Your doctor's clinic name and phone number  Your medical, surgical, and anesthesia history  A list of allergies and sensitivities  A list of medicines, including herbal treatments and over-the-counter drugs  Whether the patient has a legal guardian (ask how to send us the papers in advance)  Please tell us if you're pregnant--or if there's any chance you might be pregnant. Some surgeries may injure a fetus (unborn baby), so they require a pregnancy test. Surgeries that are safe for a fetus don't always need a test, and you can choose whether to have one.   If you have a child who's having surgery, please ask for a copy of Preparing for Your Child's Surgery.    Preparing for surgery  Within 10 to 30 days of surgery: Have a pre-op exam (sometimes called an H&P, or History and Physical). This can be done at a clinic or pre-operative center.  If you're having a , you may not need this exam. Talk to your care team.  At your pre-op exam, talk to your care team about all medicines you take. If you need to stop any medicines before surgery, ask when to start taking them again.  We do this for your safety. Many medicines can make you bleed too much during surgery. Some change how well surgery (anesthesia) drugs work.  Call your insurance company to let them know you're having surgery. (If you don't have insurance, call [phone].)  Call your clinic if there's any change in your health. This includes signs of a cold or flu (sore throat, runny nose, cough, rash, fever). It also includes a scrape or scratch near the surgery site.  If you have questions on the day of surgery, call your hospital or surgery center.  Eating and drinking guidelines  For your safety: Unless your surgeon tells you otherwise,  follow the guidelines below.  Eat and drink as usual until 8 hours before you arrive for surgery. After that, no food or milk.  Drink clear liquids until 2 hours before you arrive. These are liquids you can see through, like water, Gatorade, and Propel Water. They also include plain black coffee and tea (no cream or milk), candy, and breath mints. You can spit out gum when you arrive.  If you drink alcohol: Stop drinking it the night before surgery.  If your care team tells you to take medicine on the morning of surgery, it's okay to take it with a sip of water.  Preventing infection  Shower or bathe the night before and morning of your surgery. Follow the instructions your clinic gave you. (If no instructions, use regular soap.)  Don't shave or clip hair near your surgery site. We'll remove the hair if needed.  Don't smoke or vape the morning of surgery. You may chew nicotine gum up to 2 hours before surgery. A nicotine patch is okay.  Note: Some surgeries require you to completely quit smoking and nicotine. Check with your surgeon.  Your care team will make every effort to keep you safe from infection. We will:  Clean our hands often with soap and water (or an alcohol-based hand rub).  Clean the skin at your surgery site with a special soap that kills germs.  Give you a special gown to keep you warm. (Cold raises the risk of infection.)  Wear special hair covers, masks, gowns and gloves during surgery.  Give antibiotic medicine, if prescribed. Not all surgeries need antibiotics.  What to bring on the day of surgery  Photo ID and insurance card  Copy of your health care directive, if you have one  Glasses and hearing aids (bring cases)  You can't wear contacts during surgery  Inhaler and eye drops, if you use them (tell us about these when you arrive)  CPAP machine or breathing device, if you use them  A few personal items, if spending the night  If you have . . .  A pacemaker, ICD (cardiac defibrillator) or other  implant: Bring the ID card.  An implanted stimulator: Bring the remote control.  A legal guardian: Bring a copy of the certified (court-stamped) guardianship papers.  Please remove any jewelry, including body piercings. Leave jewelry and other valuables at home.  If you're going home the day of surgery  You must have a responsible adult drive you home. They should stay with you overnight as well.  If you don't have someone to stay with you, and you aren't safe to go home alone, we may keep you overnight. Insurance often won't pay for this.  After surgery  If it's hard to control your pain or you need more pain medicine, please call your surgeon's office.  Questions?   If you have any questions for your care team, list them here: _________________________________________________________________________________________________________________________________________________________________________ ____________________________________ ____________________________________ ____________________________________  For informational purposes only. Not to replace the advice of your health care provider. Copyright   2003, 2019 [employer]. All rights reserved. Clinically reviewed by Autumn Valero MD. SMARTworks [id number] - REV 12/22.    Preparing for Your Surgery  Getting started  A nurse will call you to review your health history and instructions. They will give you an arrival time based on your scheduled surgery time. Please be ready to share:  Your doctor's clinic name and phone number  Your medical, surgical, and anesthesia history  A list of allergies and sensitivities  A list of medicines, including herbal treatments and over-the-counter drugs  Whether the patient has a legal guardian (ask how to send us the papers in advance)  Please tell us if you're pregnant--or if there's any chance you might be pregnant. Some surgeries may injure a fetus (unborn baby), so they require a pregnancy test. Surgeries that are  safe for a fetus don't always need a test, and you can choose whether to have one.   If you have a child who's having surgery, please ask for a copy of Preparing for Your Child's Surgery.    Preparing for surgery  Within 10 to 30 days of surgery: Have a pre-op exam (sometimes called an H&P, or History and Physical). This can be done at a clinic or pre-operative center.  If you're having a , you may not need this exam. Talk to your care team.  At your pre-op exam, talk to your care team about all medicines you take. If you need to stop any medicines before surgery, ask when to start taking them again.  We do this for your safety. Many medicines can make you bleed too much during surgery. Some change how well surgery (anesthesia) drugs work.  Call your insurance company to let them know you're having surgery. (If you don't have insurance, call [phone].)  Call your clinic if there's any change in your health. This includes signs of a cold or flu (sore throat, runny nose, cough, rash, fever). It also includes a scrape or scratch near the surgery site.  If you have questions on the day of surgery, call your hospital or surgery center.  Eating and drinking guidelines  For your safety: Unless your surgeon tells you otherwise, follow the guidelines below.  Eat and drink as usual until 8 hours before you arrive for surgery. After that, no food or milk.  Drink clear liquids until 2 hours before you arrive. These are liquids you can see through, like water, Gatorade, and Propel Water. They also include plain black coffee and tea (no cream or milk), candy, and breath mints. You can spit out gum when you arrive.  If you drink alcohol: Stop drinking it the night before surgery.  If your care team tells you to take medicine on the morning of surgery, it's okay to take it with a sip of water.  Preventing infection  Shower or bathe the night before and morning of your surgery. Follow the instructions your clinic gave  you. (If no instructions, use regular soap.)  Don't shave or clip hair near your surgery site. We'll remove the hair if needed.  Don't smoke or vape the morning of surgery. You may chew nicotine gum up to 2 hours before surgery. A nicotine patch is okay.  Note: Some surgeries require you to completely quit smoking and nicotine. Check with your surgeon.  Your care team will make every effort to keep you safe from infection. We will:  Clean our hands often with soap and water (or an alcohol-based hand rub).  Clean the skin at your surgery site with a special soap that kills germs.  Give you a special gown to keep you warm. (Cold raises the risk of infection.)  Wear special hair covers, masks, gowns and gloves during surgery.  Give antibiotic medicine, if prescribed. Not all surgeries need antibiotics.  What to bring on the day of surgery  Photo ID and insurance card  Copy of your health care directive, if you have one  Glasses and hearing aids (bring cases)  You can't wear contacts during surgery  Inhaler and eye drops, if you use them (tell us about these when you arrive)  CPAP machine or breathing device, if you use them  A few personal items, if spending the night  If you have . . .  A pacemaker, ICD (cardiac defibrillator) or other implant: Bring the ID card.  An implanted stimulator: Bring the remote control.  A legal guardian: Bring a copy of the certified (court-stamped) guardianship papers.  Please remove any jewelry, including body piercings. Leave jewelry and other valuables at home.  If you're going home the day of surgery  You must have a responsible adult drive you home. They should stay with you overnight as well.  If you don't have someone to stay with you, and you aren't safe to go home alone, we may keep you overnight. Insurance often won't pay for this.  After surgery  If it's hard to control your pain or you need more pain medicine, please call your surgeon's office.  Questions?   If you have any  questions for your care team, list them here: _________________________________________________________________________________________________________________________________________________________________________ ____________________________________ ____________________________________ ____________________________________  For informational purposes only. Not to replace the advice of your health care provider. Copyright   2003, 2019 [facility]. All rights reserved. Clinically reviewed by Autumn Valero MD. SMARTworks [id number] - REV 12/22.

## 2024-02-02 NOTE — ED TRIAGE NOTES
At 230pm sudden headache, feels like vision is blurry bilat, wears glasses and is crying, hx of TIA a few months ago, takes a baby ASA     Triage Assessment (Adult)       Row Name 02/02/24 5181          Triage Assessment    Airway WDL WDL        Respiratory WDL    Respiratory WDL WDL        Cardiac WDL    Cardiac WDL WDL        Cognitive/Neuro/Behavioral WDL    Cognitive/Neuro/Behavioral WDL X

## 2024-02-02 NOTE — TELEPHONE ENCOUNTER
"Nurse Triage SBAR    Is this a 2nd Level Triage? YES, LICENSED PRACTITIONER REVIEW IS REQUIRED    Situation: TIA concern, last seen 1/11/24 Timo/ Neurology  -She began getting headaches one hour ago. The headache is central / top of head and facial, feels somewhat pulsatile and is worsening in intensity-\" really bothersome\". She does not put 1/10 scale when asked..   I advised ED now and she is quite reluctant due to length of time she was in ED in November.   I counseled her that given the suddenness of headache and increasing intensity + /88 that her safest course was ED.    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  -Her Bp was 150/78, then 210/88  and just now was 179/107   She is taking ASA only. She is alone but contacting daughter to come home now and  also    She takes baby aspirin regularly and is off Plavix  Background:   PMH of of prediabetes, R breast cancer s/p bilateral mastectomy, IgA MGUS, and recent C5-6 anterior discectomy (9/20/23)  returning for TIA.  She was admitted to [facility]/8/23 for evaluation of neck pain, myalgias and fever. Per chart review, inpatient stroke code was activated for acute onset left visual field cut, left sided numbness, and dysarthria      Assessment: Symptoms concerning for potential CVA    Protocol Recommended Disposition:   ED    Recommendation:Patient states she would benefit from hearing advice to go to ED for symptoms from Neurology team. TIA concern, last seen 1/11/24 Timo/ Neurology  -She began getting headaches one hour ago. The headache is central / top of head and facial, feels somewhat pulsatile and is worsening in intensity-\" really bothersome\". She does not put 1/10 scale when asked..   I advised ED now and she is quite reluctant due to length of time " "she was in ED in November.   I counseled her that given the suddenness of headache and increasing intensity + /88 that her safest course was ED.    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    Routed to provider team for urgent call back.       Reason for Disposition   SEVERE headache, sudden-onset (i.e., reaching maximum intensity within seconds to 1 hour)    Additional Information   Negative: Difficult to awaken or acting confused (e.g., disoriented, slurred speech)   Negative: Weakness of the face, arm or leg on one side of the body and new-onset   Negative: Numbness of the face, arm or leg on one side of the body and new-onset   Negative: Loss of speech or garbled speech and new-onset   Negative: Passed out (i.e., fainted, collapsed and was not responding)   Negative: Followed a head injury within last 3 days   Negative: Traumatic Brain Injury (TBI) is suspected   Negative: Sinus pain of forehead and yellow or green nasal discharge   Negative: Unable to walk without falling   Negative: Stiff neck (can't touch chin to chest)   Negative: Possibility of carbon monoxide exposure   Negative: Severe pain in one eye   Negative: Loss of vision or double vision  (Exception: Same as prior migraines.)   Negative: Fever > 103 F (39.4 C)    Answer Assessment - Initial Assessment Questions  1. LOCATION: \"Where does it hurt?\"       Topd of head  2. ONSET: \"When did the headache start?\" (Minutes, hours or days)       1 hour  3. PATTERN: \"Does the pain come and go, or has it been constant since it started?\"  Constant, worsening  4. SEVERITY: \"How bad is the pain?\" and \"What does it keep you from doing?\"  (e.g., Scale 1-10; mild, moderate, or severe)       - MODERATE (4-7): interferes with normal activities or awakens from sleep      5. RECURRENT " "SYMPTOM: \"Have you ever had headaches before?\" If Yes, ask: \"When was the last time?\" and \"What happened that time?\"   When she experienced CVA  6. CAUSE: \"What do you think is causing the headache?\"      Eyestrain but is concerned it is CVA  7. MIGRAINE: \"Have you been diagnosed with migraine headaches?\" If Yes, ask: \"Is this headache similar?\"   NO  8. HEAD INJURY: \"Has there been any recent injury to the head?\"   No  9. OTHER SYMPTOMS: \"Do you have any other symptoms?\" (fever, stiff neck, eye pain, sore throat, cold symptoms)      No visual changes, no unilateral extremety weakness, no photophobia, neck pain, stiffness, fever. rash    Protocols used: Headache-A-[place]    "

## 2024-02-02 NOTE — PROGRESS NOTES
Hospital Follow-up Visit:    Hospital/Nursing Home/IP Rehab Facility: [facility]  Date of Admission: 2/2/24  Date of Discharge: 2/3/24  Reason(s) for Admission: TIA    Was your hospitalization related to COVID-19? No   Problems taking medications regularly:  None  Medication changes since discharge:        START taking these medications     Details   aspirin (ASA) 325 MG EC tablet Take 1 tablet (325 mg) by mouth daily Take with food or meal.     Associated Diagnoses: TIA (transient ischemic attack)                   CONTINUE these medications which have CHANGED     Details   gabapentin (NEURONTIN) 100 MG capsule Take 1 capsule (100 mg) by mouth at bedtime     Comments: NOTE this is an DECREASE in dose from prescription and what you have been taking           Problems adhering to non-medication therapy:  None    Summary of hospitalization:  [facility] discharge summary reviewed  Diagnostic Tests/Treatments reviewed.  Follow up needed: Neuro follow up in the next 3 weeks.  Other Healthcare Providers Involved in Patient s Care:         Specialist appointment - Neuro  Update since discharge: improved.         Plan of care communicated with patient                   Preoperative Evaluation  [facility] [address] [place] [address]  Phone: [phone]  Fax: [phone]  Primary Provider: Lissy Ferrari  Pre-op Performing Provider: LISSY FERRARI  Feb 5, 2024     Macey is a 73 year old, presenting for the following:  Pre-Op Exam (Carpal Tunnel, L wrist) and Hospital F/U (Hospital for TIA over weekend -- unsure if they will still do surgery)    Surgical Information  Surgery/Procedure: Graft in L elbow - L wrist carpal tunnel  Surgery Location: [facility]  Surgeon: Dr. Holt  Surgery Date: 2/21/24  Time of Surgery: TBD  Where patient plans to recover: At home with family  Fax number for surgical facility:  [phone]        Subjective       HPI related to upcoming procedure: Just out of the hospital and had TIA symptoms and increased the asa to a full dose.  Will need to put off the surgery for a few months.        1/29/2024    10:09 AM   Preop Questions   1. Have you ever had a heart attack or stroke? YES -    2. Have you ever had surgery on your heart or blood vessels, such as a stent placement, a coronary artery bypass, or surgery on an artery in your head, neck, heart, or legs? No   3. Do you have chest pain with activity? No   4. Do you have a history of  heart failure? No   5. Do you currently have a cold, bronchitis or symptoms of other infection? No   6. Do you have a cough, shortness of breath, or wheezing? No   7. Do you or anyone in your family have previous history of blood clots? YES -    8. Do you or does anyone in your family have a serious bleeding problem such as prolonged bleeding following surgeries or cuts? No   9. Have you ever had problems with anemia or been told to take iron pills? No   10. Have you had any abnormal blood loss such as black, tarry or bloody stools, or abnormal vaginal bleeding? No   11. Have you ever had a blood transfusion? YES -    11a. Have you ever had a transfusion reaction? YES -    12. Are you willing to have a blood transfusion if it is medically needed before, during, or after your surgery? Yes   13. Have you or any of your relatives ever had problems with anesthesia? YES -    14. Do you have sleep apnea, excessive snoring or daytime drowsiness? No   15. Do you have any artifical heart valves or other implanted medical devices like a pacemaker, defibrillator, or continuous glucose monitor? No   16. Do you have artificial joints? YES -    17. Are you allergic to latex? No       Health Care Directive  Patient does not have a Health Care Directive or Living Will:     Preoperative Review of             Patient Active Problem List    Diagnosis Date Noted    Blurred  vision 02/02/2024     Priority: Medium    Left sided numbness 02/02/2024     Priority: Medium    Headache 02/02/2024     Priority: Medium    Hypertension, unspecified type 02/02/2024     Priority: Medium    Pain syndrome, chronic 11/22/2023     Priority: Medium    Urinary urgency 11/22/2023     Priority: Medium    Acute hepatitis 11/09/2023     Priority: Medium    Complicated UTI (urinary tract infection) 11/09/2023     Priority: Medium    Worsening of neck pain following surgery 11/09/2023     Priority: Medium    S/P cervical spinal fusion 09/20/2023     Priority: Medium    Colitis 08/25/2022     Priority: Medium    Stage 3a chronic kidney disease (H) 06/22/2022     Priority: Medium    Moderate persistent asthma without complication 10/27/2021     Priority: Medium    Prediabetes 10/27/2021     Priority: Medium    H/O bilateral mastectomy 06/17/2021     Priority: Medium     with reconstruction      Fibromyalgia      Priority: Medium    Chronic pain of both knees 03/21/2018     Priority: Medium    IBS (irritable bowel syndrome) 06/28/2011     Priority: Medium    Keloid of skin 06/28/2011     Priority: Medium    Osteoarthritis 02/01/2011     Priority: Medium    Osteopenia 02/01/2011     Priority: Medium    History of breast cancer 02/01/2011     Priority: Medium     1987        Past Medical History:   Diagnosis Date    Breast CA in situ 1987    Cancer (H) 1987    Right Breast treated with surgery    Cerebral infarction (H)     Chronic constipation     Fibromyalgia     History of blood transfusion     Malignant neoplasm of female breast, unspecified estrogen receptor status, unspecified laterality, unspecified site of breast (H) 06/17/2021    Meningitis     Several times as an adult    Osteoarthritis 02/01/2011    Osteopenia 02/01/2011    Pneumonia     Pterygium eye 08/26/2013    Reactive airway disease 02/01/2011    Seasonal allergies     Shingles     Prior to 2008 - scalp    Skin cancer     SCC - hand, left nose     Uncomplicated asthma      Past Surgical History:   Procedure Laterality Date    anterior c-disc fusion      ARTHROPLASTY KNEE Left 10/17/2022    Procedure: LEFT TOTAL KNEE ARTHROPLASTY;  Surgeon: Jax Le MD;  Location:  OR    BLEPHAROPLASTY, BROW LIFT BILATERAL, COMBINED Bilateral 6/18/2018    Procedure: COMBINED BLEPHAROPLASTY, BROW LIFT BILATERAL;  BILATERAL UPPER LID BLEPHAROPLASTY; BILATERAL BROW PTOSIS REPAIR;  Surgeon: Сергей Walters MD;  Location:  EC    CHOLECYSTECTOMY      COLONOSCOPY N/A 10/19/2017    Procedure: COLONOSCOPY;  COLONOSCOPY;  Surgeon: Niko Wong MD;  Location:  GI    COLONOSCOPY N/A 8/27/2022    Procedure: COLONOSCOPY, WITH POLYPECTOMY AND BIOPSY;  Surgeon: Abraham Rogers MD;  Location:  GI    D & C      Bleeding before hysterectomy    DISCECTOMY, FUSION CERVICAL ANTERIOR ONE LEVEL, COMBINED N/A 9/20/2023    Procedure: ANTERIOR CERVICAL 5 TO CERVICAL 6 DISCECTOMY AND FUSION;  Surgeon: Alex Galindo MD;  Location:  OR    ENDOSCOPY  04/21/08    ESOPHAGOSCOPY, GASTROSCOPY, DUODENOSCOPY (EGD), COMBINED N/A 8/27/2022    Procedure: ESOPHAGOGASTRODUODENOSCOPY, WITH BIOPSY;  Surgeon: Abraham Rogers MD;  Location:  GI    HYSTERECTOMY, MARCUS      Ovaries and tubes out due to breast cancer    JOINT REPLACEMTN, KNEE RT/LT Right 01/2011    Joint Replacement knee RT, with tibial straightening (2 replacements)    MAMMOPLASTY AUGMENTATION BILATERAL      breast ca     MASTECTOMY, SIMPLE RT/LT/CLAIRE Bilateral 1989    Mastectomy Simple RT/LT/CLAIRE    MOHS MICROGRAPHIC PROCEDURE      Left lateral nose    OPEN REDUCTION INTERNAL FIXATION ANKLE  8/27/2013    Procedure: OPEN REDUCTION INTERNAL FIXATION ANKLE;  RIGHT OPEN REDUCTION INTERNAL FIXATION ANKLE WITH LIGAMENT REPAIR;  Surgeon: Nando Chang MD;  Location:  OR    PTERYGIUM WITH CONJUNCTIVAL AUTOLOGOUS TRANSPLANT Left 8/29/2016    Procedure: PTERYGIUM WITH CONJUNCTIVAL AUTOLOGOUS TRANSPLANT;  Surgeon: Romeo  Сергей SERNA MD;  Location:  EC    REPAIR LIGAMENT ANKLE  8/27/2013    Procedure: REPAIR LIGAMENT ANKLE;;  Surgeon: Nando Chang MD;  Location:  OR    SALIVARY GLAND SURGERY Left     Stone removal     SIGMOIDOSCOPY FLEXIBLE N/A 8/30/2022    Procedure: FLEXIBLE SIGMOIDOSCOPY;  Surgeon: Niko Wong MD;  Location:  GI    TONSILLECTOMY       Current Outpatient Medications   Medication Sig Dispense Refill    acetaminophen (TYLENOL) 325 MG tablet Take 1-2 tablets (325-650 mg) by mouth every 6 hours as needed for mild pain      albuterol (PROAIR HFA/PROVENTIL HFA/VENTOLIN HFA) 108 (90 Base) MCG/ACT inhaler Inhale 2 puffs into the lungs every 6 hours as needed for shortness of breath      alendronate (FOSAMAX) 70 MG tablet Take 1 tablet (70 mg) by mouth every 7 days 12 tablet 3    amitriptyline (ELAVIL) 10 MG tablet TAKE 1 TABLET BY MOUTH AT BEDTIME FOR NERVE PAIN. IF NO RELIEF IN 4 NIGHTS INCREASE TO 2 TABLETS      aspirin (ASA) 325 MG EC tablet Take 1 tablet (325 mg) by mouth daily Take with food or meal.      budesonide-formoterol (SYMBICORT) 160-4.5 MCG/ACT Inhaler Inhale 2 puffs into the lungs daily 10.2 g 3    pravastatin (PRAVACHOL) 10 MG tablet Take 1 tablet (10 mg) by mouth daily 90 tablet 3    vitamin D3 (CHOLECALCIFEROL) 50 mcg (2000 units) tablet Take 1 tablet by mouth daily      ACE/ARB/ARNI NOT PRESCRIBED (INTENTIONAL) Please choose reason not prescribed from choices below. (Patient not taking: Reported on 2/5/2024)      clindamycin (CLEOCIN) 300 MG capsule TAKE 2 CAPSULES BY MOUTH 1 HOUR BEFORE DENTAL APPOINTMENT (Patient not taking: Reported on 2/5/2024)      gabapentin (NEURONTIN) 100 MG capsule Take 1 capsule (100 mg) by mouth at bedtime         Allergies   Allergen Reactions    Levaquin Hives and Itching    Pcn [Penicillins] Hives    Tetanus Toxoid Anaphylaxis    Tetanus Toxoids Anaphylaxis    Erythromycin GI Disturbance    Amoxicillin     Duloxetine Nausea     Other reaction(s): Jittery     "Silicone Rash        Social History     Tobacco Use    Smoking status: Some Days     Years: 14     Types: Cigarettes    Smokeless tobacco: Never    Tobacco comments:     quit but  still smokes, but not in house   Substance Use Topics    Alcohol use: No     Alcohol/week: 0.0 standard drinks of alcohol       History   Drug Use No         Review of Systems    Objective    /77   Pulse 87   Ht 1.613 m (5' 3.5\")   Wt 70.3 kg (155 lb)   SpO2 98%   BMI 27.03 kg/m     Estimated body mass index is 27.03 kg/m  as calculated from the following:    Height as of this encounter: 1.613 m (5' 3.5\").    Weight as of this encounter: 70.3 kg (155 lb).  Physical Exam      Recent Labs   Lab Test 11/15/23  1151 11/15/23  0000 11/11/23  0820 11/09/23  0756 11/08/23  2159 09/12/23  1639 06/20/23  1131   HGB  --  13.8 11.7   < > 13.9   < >  --    PLT  --  340 216   < > 181   < >  --    INR  --   --   --   --  0.92  --   --      --  141   < > 135   < >  --    POTASSIUM 3.8  --  3.8   < > 3.7   < >  --    CR 0.76  --  0.69   < > 1.05*   < >  --    A1C  --   --   --   --  5.7*  --  5.9*    < > = values in this interval not displayed.        Diagnostics     Assessment/Plan:  Macey was seen today for pre-op exam and hospital f/u.    Diagnoses and all orders for this visit:    Preop general physical exam    Stage 3a chronic kidney disease (H)    Fibromyalgia    Raynaud's disease without gangrene    Will need to put off the surgery  ASA  at full dose.    Long Ferrari MD   [facility]  [phone]              Revised Cardiac Risk Index (RCRI)  The patient has the following serious cardiovascular risks for perioperative complications:       RCRI Interpretation:          Signed Electronically by: Long Ferrari MD  Copy of this evaluation report is provided to requesting physician.         "

## 2024-02-03 ENCOUNTER — APPOINTMENT (OUTPATIENT)
Dept: OCCUPATIONAL THERAPY | Facility: CLINIC | Age: 74
End: 2024-02-03
Attending: INTERNAL MEDICINE
Payer: MEDICARE

## 2024-02-03 VITALS
TEMPERATURE: 98.3 F | HEIGHT: 64 IN | HEART RATE: 78 BPM | SYSTOLIC BLOOD PRESSURE: 118 MMHG | RESPIRATION RATE: 19 BRPM | WEIGHT: 147.73 LBS | OXYGEN SATURATION: 98 % | BODY MASS INDEX: 25.22 KG/M2 | DIASTOLIC BLOOD PRESSURE: 66 MMHG

## 2024-02-03 LAB — GLUCOSE BLDC GLUCOMTR-MCNC: 86 MG/DL (ref 70–99)

## 2024-02-03 PROCEDURE — G0378 HOSPITAL OBSERVATION PER HR: HCPCS

## 2024-02-03 PROCEDURE — 250N000013 HC RX MED GY IP 250 OP 250 PS 637: Performed by: INTERNAL MEDICINE

## 2024-02-03 PROCEDURE — 82962 GLUCOSE BLOOD TEST: CPT

## 2024-02-03 PROCEDURE — 99407 BEHAV CHNG SMOKING > 10 MIN: CPT | Performed by: OCCUPATIONAL THERAPIST

## 2024-02-03 PROCEDURE — 250N000013 HC RX MED GY IP 250 OP 250 PS 637

## 2024-02-03 PROCEDURE — 99239 HOSP IP/OBS DSCHRG MGMT >30: CPT | Performed by: HOSPITALIST

## 2024-02-03 PROCEDURE — 999N000226 HC STATISTIC SLP IP EVAL DEFER: Performed by: SPEECH-LANGUAGE PATHOLOGIST

## 2024-02-03 RX ORDER — ACETAMINOPHEN 325 MG/10.15ML
650 LIQUID ORAL EVERY 4 HOURS PRN
Status: DISCONTINUED | OUTPATIENT
Start: 2024-02-03 | End: 2024-02-03 | Stop reason: HOSPADM

## 2024-02-03 RX ORDER — OXYCODONE HYDROCHLORIDE 5 MG/1
5 TABLET ORAL EVERY 4 HOURS PRN
Status: DISCONTINUED | OUTPATIENT
Start: 2024-02-03 | End: 2024-02-03 | Stop reason: HOSPADM

## 2024-02-03 RX ORDER — ONDANSETRON 4 MG/1
4 TABLET, ORALLY DISINTEGRATING ORAL EVERY 6 HOURS PRN
Status: DISCONTINUED | OUTPATIENT
Start: 2024-02-03 | End: 2024-02-03 | Stop reason: HOSPADM

## 2024-02-03 RX ORDER — HYDRALAZINE HYDROCHLORIDE 20 MG/ML
10-20 INJECTION INTRAMUSCULAR; INTRAVENOUS
Status: DISCONTINUED | OUTPATIENT
Start: 2024-02-03 | End: 2024-02-03 | Stop reason: HOSPADM

## 2024-02-03 RX ORDER — POLYETHYLENE GLYCOL 3350 17 G/17G
17 POWDER, FOR SOLUTION ORAL DAILY
Status: DISCONTINUED | OUTPATIENT
Start: 2024-02-03 | End: 2024-02-03 | Stop reason: HOSPADM

## 2024-02-03 RX ORDER — NALOXONE HYDROCHLORIDE 0.4 MG/ML
0.4 INJECTION, SOLUTION INTRAMUSCULAR; INTRAVENOUS; SUBCUTANEOUS
Status: DISCONTINUED | OUTPATIENT
Start: 2024-02-03 | End: 2024-02-03 | Stop reason: HOSPADM

## 2024-02-03 RX ORDER — GABAPENTIN 100 MG/1
100 CAPSULE ORAL EVERY OTHER DAY
COMMUNITY
Start: 2024-02-03 | End: 2024-05-28

## 2024-02-03 RX ORDER — ONDANSETRON 2 MG/ML
4 INJECTION INTRAMUSCULAR; INTRAVENOUS EVERY 6 HOURS PRN
Status: DISCONTINUED | OUTPATIENT
Start: 2024-02-03 | End: 2024-02-03 | Stop reason: HOSPADM

## 2024-02-03 RX ORDER — AMOXICILLIN 250 MG
1-2 CAPSULE ORAL 2 TIMES DAILY
Status: DISCONTINUED | OUTPATIENT
Start: 2024-02-03 | End: 2024-02-03 | Stop reason: HOSPADM

## 2024-02-03 RX ORDER — ACETAMINOPHEN 650 MG/1
650 SUPPOSITORY RECTAL EVERY 4 HOURS PRN
Status: DISCONTINUED | OUTPATIENT
Start: 2024-02-03 | End: 2024-02-03 | Stop reason: HOSPADM

## 2024-02-03 RX ORDER — ACETAMINOPHEN 325 MG/1
650 TABLET ORAL EVERY 4 HOURS PRN
Status: DISCONTINUED | OUTPATIENT
Start: 2024-02-03 | End: 2024-02-03 | Stop reason: HOSPADM

## 2024-02-03 RX ORDER — ASPIRIN 325 MG
325 TABLET, DELAYED RELEASE (ENTERIC COATED) ORAL AT BEDTIME
Status: DISCONTINUED | OUTPATIENT
Start: 2024-02-03 | End: 2024-02-03 | Stop reason: HOSPADM

## 2024-02-03 RX ORDER — ASPIRIN 325 MG
325 TABLET, DELAYED RELEASE (ENTERIC COATED) ORAL DAILY
COMMUNITY
Start: 2024-02-03

## 2024-02-03 RX ORDER — NALOXONE HYDROCHLORIDE 0.4 MG/ML
0.2 INJECTION, SOLUTION INTRAMUSCULAR; INTRAVENOUS; SUBCUTANEOUS
Status: DISCONTINUED | OUTPATIENT
Start: 2024-02-03 | End: 2024-02-03 | Stop reason: HOSPADM

## 2024-02-03 RX ORDER — LABETALOL HYDROCHLORIDE 5 MG/ML
10-40 INJECTION, SOLUTION INTRAVENOUS EVERY 10 MIN PRN
Status: DISCONTINUED | OUTPATIENT
Start: 2024-02-03 | End: 2024-02-03 | Stop reason: HOSPADM

## 2024-02-03 RX ADMIN — GABAPENTIN 100 MG: 100 CAPSULE ORAL at 00:50

## 2024-02-03 RX ADMIN — ACETAMINOPHEN 650 MG: 325 TABLET, FILM COATED ORAL at 11:02

## 2024-02-03 RX ADMIN — ASPIRIN 243 MG: 81 TABLET, COATED ORAL at 00:50

## 2024-02-03 RX ADMIN — AMITRIPTYLINE HYDROCHLORIDE 10 MG: 10 TABLET, FILM COATED ORAL at 00:51

## 2024-02-03 ASSESSMENT — ACTIVITIES OF DAILY LIVING (ADL)
ADLS_ACUITY_SCORE: 37
ADLS_ACUITY_SCORE: 37
ADLS_ACUITY_SCORE: 33
ADLS_ACUITY_SCORE: 37

## 2024-02-03 NOTE — DISCHARGE SUMMARY
[facility]    Discharge Summary  Hospitalist    Date of Admission:  2/2/2024  Date of Discharge:  2/3/2024  Discharging Provider: Licha Herndon MD  Date of Service (when I saw the patient): 02/03/24      History of Present Illness   Macey Romero is a 73 year old female admitted on 2/2/2024. She presents to the emergency department for evaluation of elevated blood pressure, headache, and left-sided paresthesias     Hospital Course   Macey Romero was admitted on 2/2/2024.  The following problems were addressed during her hospitalization:    Transient Left-sided paresthesias, resolved, felt 2' TIA  Hx TIA   CT negative for hemorrhage.  MRI without acute finding, but chronic microvascular ischemia and generalized volume loss.  -81 mg aspirin increased to full dose aspirin daily per recommendation of stroke neurology  -Stroke neurology consult; as symptoms resolved, brain imaging negativeas above and presentation felt secondary to TIA with history of TIA, stroke neurology [Dr Hunter] felt comfortable with plans to follow-up with her own neurologist.   -Resume prior to admission pravastatin at discharge.   -Presenting symptoms resolved, BP WNL, headache improving without neurologic focality, she was discharged today 2/3/2024 with follow-up PCP within 7 days for hospital follow-up including BP follow-up and with her Neurologist at [facility] at 3-4 weeks, sooner if needed.     Headache: Improving.  On admission 8/10, now 4-10 which she states if she was not in the hospital she would just take Tylenol.  Describes as more global, not unilateral or associated with neurologic focality, no history of migraines, no associated nausea/emesis, no vision change or nuchal rigidity, afebrile.   CT and MRI negative for hemorrhage.  As above.  Blood pressure on presentation elevated now normal, no history of hypertension.  -Resume at bedtime amitriptyline, gabapentin  -Full dose  aspirin  -Follow-up PCP on hospital discharge including BP follow-up.     Elevated blood pressure without diagnosis of hypertension: She actually reports low normal blood pressures at baseline.  She is uncertain if her headache precipitated elevated blood pressures or elevated blood pressures resulted in headache.  Systolic blood pressures improved from 180 range on arrival in the emergency department to the 120s range without intervention.  -Now normotensive, follow-up PCP on hospital discharge including BP follow-up.       Chronic constipation: Patient tells me that she has some chronic abdominal pain as well as chronic constipation.  Reports a bowel movement once every 3 days which she reports is a normal regimen for her.  Historically only having bowel movements approximately once per week, but she has stabilized after an aggressive bowel regimen to the point where she has now weaned off of a bowel regimen  -No associated abdominal pain, nausea, emesis, abdominal exam benign.  Follow-up PCP on hospital discharge.     Fibromyalgia:  -Continue amitriptyline 10 mg at bedtime  -Gabapentin 100 mg at bedtime.  She reports that she felt out of sorts when taking 100 mg 3 times daily, but has had improvement in her fibromyalgia discomfort on 100 mg at bedtime.  On discharge continue reduced dosage 100 mg at bedtime    Osteoporosis:  -Can resume prior to admission Fosamax at discharge.      COPD:  -No pulmonary S/S.  No hypoxia.  -Continue prior to admission Symbicort, albuterol at discharge.      Tobacco use disorder: Patient tells me that she has largely cut out tobacco use, but will still sneak a cigarette now and again, approximately 1 to 2/day.  Her  reportedly smokes 4 packs/day at home, but outside of the house.  -Discussed complete tobacco cessation     Possible Raynaud's: Patient reports that occasionally her hands and feet will get cold and purple and she will have decreased capillary refill.  This more  often happens at night.  Not present currently.  -Complete tobacco cessation.   -Follow-up PCP on hospital discharge      Code Status   Full Code       Primary Care Physician   Long Ferrari    Physical Exam   Temp: 98.3  F (36.8  C) Temp src: Oral BP: 118/66 Pulse: 78   Resp: 19 SpO2: 98 % O2 Device: None (Room air)    Vitals:    02/02/24 1627 02/03/24 0741   Weight: 68 kg (150 lb) 67 kg (147 lb 11.7 oz)     General/Constitutional:   NAD, alert, calm, cooperative    Chest/Respiratory: Respirations nonlabored room air  Cardiovascular:  regular, no murmur appreciated.  LE edema none  Gastrointestinal/Abdomen:  soft, nontender, no rebound, guarding or other peritoneal signs.  Neuro.  Gross motor tested, nonfocal,  Psych oriented, affect calm      Discharge Disposition   Discharged to home  Condition at discharge: Fair    Consultations This Hospital Stay   SPEECH LANGUAGE PATH ADULT IP CONSULT  SMOKING CESSATION PROGRAM IP CONSULT  NEUROLOGY IP STROKE CONSULT    Time Spent on this Encounter   I, Licha Herndon MD, personally saw the patient today and spent greater than 30 minutes discharging this patient discussing discharge plans with Patient and Nursing, conversation with Stroke Neurology regarding discharge follow-up; completing discharge orders, medications and follow-up.    Discharge Orders      Reason for your hospital stay    Presented with TIA and headache.     Follow-up and recommended labs and tests     Follow up with primary care provider, Long Ferrari, within 7 days for hospital follow- up including BP follow-up.     Follow up with your Neurologist at [facility] at 3-4 wks.     Activity    Your activity upon discharge: activity as tolerated     Diet    Follow this diet upon discharge:       Regular Diet Adult     Discharge Medications   Current Discharge Medication List        START taking these medications    Details   aspirin (ASA) 325 MG EC tablet Take 1 tablet (325 mg) by  mouth daily Take with food or meal.    Associated Diagnoses: TIA (transient ischemic attack)           CONTINUE these medications which have CHANGED    Details   gabapentin (NEURONTIN) 100 MG capsule Take 1 capsule (100 mg) by mouth at bedtime    Comments: NOTE this is an DECREASE in dose from prescription and what you have been taking           CONTINUE these medications which have NOT CHANGED    Details   acetaminophen (TYLENOL) 325 MG tablet Take 1-2 tablets (325-650 mg) by mouth every 6 hours as needed for mild pain    Associated Diagnoses: Pain      albuterol (PROAIR HFA/PROVENTIL HFA/VENTOLIN HFA) 108 (90 Base) MCG/ACT inhaler Inhale 2 puffs into the lungs every 6 hours as needed for shortness of breath    Comments: Pharmacy may dispense brand covered by insurance (Proair, or proventil or ventolin or generic albuterol inhaler)  Associated Diagnoses: Reactive airway disease, moderate persistent, with acute exacerbation      alendronate (FOSAMAX) 70 MG tablet Take 1 tablet (70 mg) by mouth every 7 days  Qty: 12 tablet, Refills: 3    Associated Diagnoses: Age-related osteoporosis without current pathological fracture      amitriptyline (ELAVIL) 10 MG tablet TAKE 1 TABLET BY MOUTH AT BEDTIME FOR NERVE PAIN. IF NO RELIEF IN 4 NIGHTS INCREASE TO 2 TABLETS      budesonide-formoterol (SYMBICORT) 160-4.5 MCG/ACT Inhaler Inhale 2 puffs into the lungs daily  Qty: 10.2 g, Refills: 3    Associated Diagnoses: Reactive airway disease, moderate persistent, with acute exacerbation      clindamycin (CLEOCIN) 300 MG capsule TAKE 2 CAPSULES BY MOUTH 1 HOUR BEFORE DENTAL APPOINTMENT      pravastatin (PRAVACHOL) 10 MG tablet Take 1 tablet (10 mg) by mouth daily  Qty: 90 tablet, Refills: 3    Associated Diagnoses: TIA (transient ischemic attack)      ACE/ARB/ARNI NOT PRESCRIBED (INTENTIONAL) Please choose reason not prescribed from choices below.      vitamin D3 (CHOLECALCIFEROL) 50 mcg (2000 units) tablet Take 1 tablet by mouth  daily           Allergies   Allergies   Allergen Reactions    Levaquin Hives and Itching    Pcn [Penicillins] Hives    Tetanus Toxoid Anaphylaxis    Tetanus Toxoids Anaphylaxis    Erythromycin GI Disturbance    Amoxicillin     Duloxetine Nausea     Other reaction(s): Jittery    Silicone Rash     Data   Most Recent 3 CBC's:  Recent Labs   Lab Test 02/02/24  1703 11/15/23  0000 11/11/23  0820   WBC 7.8 7.2 4.3   HGB 14.3 13.8 11.7   MCV 85 85.6 84    340 216      Most Recent 3 BMP's:  Recent Labs   Lab Test 02/03/24  0845 02/02/24  1703 11/15/23  1151 11/12/23  1442 11/11/23  0820   NA  --  138 138  --  141   POTASSIUM  --  4.0 3.8  --  3.8   CHLORIDE  --  100 102  --  103   CO2  --  30* 25  --  28   BUN  --  14.2 8.0  --  4.5*   CR  --  0.83 0.76  --  0.69   ANIONGAP  --  8 11  --  10   DONNA  --  9.4 9.3  --  9.1   GLC 86 157* 110*   < > 110*    < > = values in this interval not displayed.     Most Recent 2 LFT's:  Recent Labs   Lab Test 02/02/24  1703 11/15/23  1151   AST 21 36   ALT 14 39   ALKPHOS 86 165*   BILITOTAL 0.3 0.4

## 2024-02-03 NOTE — PHARMACY-ADMISSION MEDICATION HISTORY
Pharmacist Admission Medication History    Admission medication history is complete. The information provided in this note is only as accurate as the sources available at the time of the update.    Information Source(s): Patient and CareEverywhere/SureScripts via in-person    Pertinent Information:   Patient states she was told to stop taking vitamin D d/t a medication interaction   Typically only takes gabapentin in the evening d/t dizziness    Changes made to PTA medication list:  Added: None  Deleted: None  Changed: gabapentin (50 mg daily to 100 mg TID PRN)    Medication Affordability:  Not including over the counter (OTC) medications, was there a time in the past 3 months when you did not take your medications as prescribed because of cost?: No    Allergies reviewed with patient and updates made in EHR: no    Medication History Completed By: RENATE LINARES RPH 2/2/2024 8:36 PM    Prior to Admission medications    Medication Sig Last Dose Taking? Auth Provider Long Term End Date   acetaminophen (TYLENOL) 325 MG tablet Take 1-2 tablets (325-650 mg) by mouth every 6 hours as needed for mild pain Past Week Yes Madison Sanderson MD No    albuterol (PROAIR HFA/PROVENTIL HFA/VENTOLIN HFA) 108 (90 Base) MCG/ACT inhaler Inhale 2 puffs into the lungs every 6 hours as needed for shortness of breath More than a month Yes Madison Sanderson MD Yes    alendronate (FOSAMAX) 70 MG tablet Take 1 tablet (70 mg) by mouth every 7 days 1/27/2024 Yes Long Ferrari MD Yes    amitriptyline (ELAVIL) 10 MG tablet TAKE 1 TABLET BY MOUTH AT BEDTIME FOR NERVE PAIN. IF NO RELIEF IN 4 NIGHTS INCREASE TO 2 TABLETS 2/1/2024 Yes Reported, Patient Yes    aspirin 81 MG EC tablet Take 1 tablet (81 mg) by mouth daily 2/2/2024 Yes Madison Sanderson MD     budesonide-formoterol (SYMBICORT) 160-4.5 MCG/ACT Inhaler Inhale 2 puffs into the lungs daily More than a month Yes Deandra Strong, SHAHRAM CNP Yes    clindamycin (CLEOCIN) 300 MG capsule  TAKE 2 CAPSULES BY MOUTH 1 HOUR BEFORE DENTAL APPOINTMENT Unknown Yes Reported, Patient     gabapentin (NEURONTIN) 100 MG capsule Take 100 mg by mouth 3 times daily as needed for neuropathic pain 2/1/2024 at PM Yes Reported, Patient     pravastatin (PRAVACHOL) 10 MG tablet Take 1 tablet (10 mg) by mouth daily 2/2/2024 Yes Long Ferrari MD Yes    vitamin D3 (CHOLECALCIFEROL) 50 mcg (2000 units) tablet Take 1 tablet by mouth daily More than a month No Reported, Patient     ACE/ARB/ARNI NOT PRESCRIBED (INTENTIONAL) Please choose reason not prescribed from choices below.   Long Ferrari MD Yes

## 2024-02-03 NOTE — ED PROVIDER NOTES
Emergency Department Attending Supervision Note  2/2/2024  6:37 PM      I evaluated this patient in conjunction with Kat Jorge PA-C.    Briefly, this is a 70-year-old female with previous history of TIA, presenting with left sided severe headache in the setting of elevated blood pressure.  Center of head, associated with blurry vision, right sided facial numbness, and right arm numbness.  Symptoms are improving, but still present.  Symptoms started at 2:30pm.      On my exam,   /67   Pulse 79   Temp 97.9  F (36.6  C) (Temporal)   Resp 20   Wt 68 kg (150 lb)   SpO2 99%   BMI 25.75 kg/m    General: alert, appears comfortable  HENT: mucous membranes moist, neck supple, left eye slightly injected.  CV: regular rate, regular rhythm  Resp: normal effort  MSK: no bony tenderness  Skin: appropriately warm and dry  Extremities: no edema, calves non-tender  Neuro: alert, clear speech, oriented  Psych: normal mood and affect    Results:  MR Brain w/o & w Contrast   Final Result   IMPRESSION:   1.  Similar chronic age-related changes without an acute intracranial abnormality.         Head CT w/o contrast   Final Result   IMPRESSION:       1. Senescent changes and sequelae of chronic microangiopathy without acute intracranial abnormality.        Labs Ordered and Resulted from Time of ED Arrival to Time of ED Departure   COMPREHENSIVE METABOLIC PANEL - Abnormal       Result Value    Sodium 138      Potassium 4.0      Carbon Dioxide (CO2) 30 (*)     Anion Gap 8      Urea Nitrogen 14.2      Creatinine 0.83      GFR Estimate 74      Calcium 9.4      Chloride 100      Glucose 157 (*)     Alkaline Phosphatase 86      AST 21      ALT 14      Protein Total 7.5      Albumin 4.3      Bilirubin Total 0.3     TROPONIN T, HIGH SENSITIVITY - Normal    Troponin T, High Sensitivity <6     NT PROBNP INPATIENT - Normal    N terminal Pro BNP Inpatient 305     ERYTHROCYTE SEDIMENTATION RATE AUTO - Normal    Erythrocyte  Sedimentation Rate 2     CBC WITH PLATELETS AND DIFFERENTIAL    WBC Count 7.8      RBC Count 5.12      Hemoglobin 14.3      Hematocrit 43.3      MCV 85      MCH 27.9      MCHC 33.0      RDW 12.4      Platelet Count 210      % Neutrophils 61      % Lymphocytes 28      % Monocytes 8      % Eosinophils 3      % Basophils 0      % Immature Granulocytes 0      NRBCs per 100 WBC 0      Absolute Neutrophils 4.7      Absolute Lymphocytes 2.2      Absolute Monocytes 0.6      Absolute Eosinophils 0.2      Absolute Basophils 0.0      Absolute Immature Granulocytes 0.0      Absolute NRBCs 0.0         ED course:  On my exam, pain is mild.      My impression is 70-year-old female with history of TIA, presenting today with severe left-sided headache, hypertension which is new onset, left facial numbness that was acute in onset.  On exam, the patient is neurologically intact except for mild numbness and paresthesias involving the left face and left upper extremity.  Otherwise, she was initially quite hypertensive though this resolved as her headache improved.  CT of the head was obtained within 6 hours of symptom onset, which rules out subarachnoid hemorrhage.  Bilateral eye pressures are within normal limits.  MRI demonstrates no stroke, no hemorrhage, no mass.  Given new onset numbness, and history of TIA, will plan for admission to the hospital for further workup, stroke evaluation, and blood pressure monitoring.    Diagnosis    ICD-10-CM    1. Headache  R51.9       2. Left sided numbness  R20.0       3. Blurred vision  H53.8     resolved      4. Hypertension, unspecified type  I10     resolved          Maria Esther Hairston MD Pepper, Tracy Lynn, MD  02/02/24 5924

## 2024-02-03 NOTE — H&P
[facility]    History and Physical - Hospitalist Service       Date of Admission:  2/2/2024    Assessment & Plan      Macey Romero is a 73 year old female admitted on 2/2/2024. She presents to the emergency department for evaluation of elevated blood pressure, headache, and left-sided paresthesias.    Left-sided paresthesias: Suspicious for TIA.  CT negative for hemorrhage.  MRI without acute finding, but chronic microvascular ischemia and generalized volume loss.  -81 mg aspirin increased to full dose aspirin daily per recommendation of stroke neurology  -Every 4 hours neurochecks  -Stroke neurology consulted.  I do not anticipate further interventions.  -Could consider  -Resume prior to admission pravastatin at discharge.  Held given observation admission.  -Patient might benefit from neuropsychiatric testing in the future as historically has describes some forgetfulness and appears to have had some issues with medication adherence.  This can be performed as an outpatient    Headache: Improving.  Reported as 4 out of 10 currently.  Whereas headache was initially described as in a Hebrew distribution, she now feels it goes transversely through the middle of her cranium.  She is uncertain what resulted in this improvement.  CT and MRI negative for hemorrhage.  -Trial of nasal cannula oxygen in the emergency department for possible cluster headache  -Resume at bedtime amitriptyline, gabapentin  -As needed oxycodone  -Full dose aspirin  -Could consider low-dose metoprolol XL for migraine prophylaxis versus up titration of amitriptyline.  Currently reports baseline blood pressures are low normal in the  systolic range, so hesitant to initiate antihypertensive at this time.    Elevated blood pressure without diagnosis of hypertension: She actually reports low normal blood pressures at baseline.  She is uncertain if her headache precipitated elevated blood pressures or elevated blood  pressures resulted in headache.  Systolic blood pressures improved from 180 range on arrival in the emergency department to the 120s range without intervention  -As above, could consider metoprolol for migraine prophylaxis and hypertension if persistently elevated blood pressures.  Currently normotensive.    Chronic constipation: Patient tells me that she has some chronic abdominal pain as well as chronic constipation.  Reports a bowel movement once every 3 days which she reports is a normal regimen for her.  Historically only having bowel movements approximately once per week, but she has stabilized after an aggressive bowel regimen to the point where she has now weaned off of a bowel regimen  -As needed stool softeners and laxatives ordered    Fibromyalgia:  -Continue amitriptyline 10 mg at bedtime  -Gabapentin 100 mg at bedtime.  She reports that she felt out of sorts when taking 100 mg 3 times daily, but has had improvement in her fibromyalgia discomfort on 100 mg at bedtime.  Changing prescription to 100 at bedtime scheduled.    Osteoporosis:  -Can resume prior to admission Fosamax at discharge.  Held given observation admission.  Dose typically taken on Saturday per patient    COPD:  -Continue prior to admission Symbicort, albuterol at discharge.  Can add as needed albuterol nebulizers if needed during hospitalization.    Tobacco use disorder: Patient tells me that she has largely cut out tobacco use, but will still sneak a cigarette now and again, approximately 1 to 2/day.  Her  reportedly smokes 4 packs/day at home, but outside of the house.  -Discussed complete tobacco cessation    Possible Raynaud's: Patient reports that occasionally her hands and feet will get cold and purple and she will have decreased capillary refill.  This more often happens at night.  Not present currently.  -Continue to monitor, could consider calcium channel blocker initiation.  If Raynaud's noted, would then suggest against  prophylactic beta-blocker as above  -Continue aspirin therapy  -Complete tobacco cessation.  Discussed again with patient          Diet:  Regular adult diet as tolerated  DVT Prophylaxis: Pneumatic Compression Devices  Boudreaux Catheter: Not present  Lines: None     Cardiac Monitoring: None  Code Status:  full code.  Confirmed with patient on admission    Clinically Significant Risk Factors Present on Admission                # Drug Induced Platelet Defect: home medication list includes an antiplatelet medication   # Hypertension: Noted on problem list          # Asthma: noted on problem list        Disposition Plan      Expected Discharge Date: 02/03/2024                  Jigar Hatch MD  Hospitalist Service  [facility]  Securely message with [employer] (more info)  Text page via [facility] Paging/Directory     ______________________________________________________________________    Chief Complaint   Left-sided paresthesias, headache, elevated blood pressure    History is obtained from the patient, chart review, discussion with Kat Jorge, emergency department physician assistant.    History of Present Illness   Macey Romero is a 73 year old female admitted on 2/2/2024. She presents to the emergency department for evaluation of elevated blood pressure, headache, and left-sided paresthesias.  Patient reports headaches are atypical for her.  Does not have a history of migraines.  No nausea or photophobia described.    Patient has a history of TIAs.  She tells me her first TIA took place in 2005 and resulted in right-sided facial droop, right-sided weakness, word finding difficulty.  More recently had left-sided symptoms in November 2023 during what was thought to be another TIA with left-sided numbness/paresthesias, word finding difficulty, and left visual field cut.  Also had a fever at that time.  During that admission, CTA with right P2 stenosis versus partial occlusion, but negative for  stroke on MRI.  She had been prescribed aspirin and Plavix dual antiplatelet therapy, but actually only recently resumed antiplatelets at 81 mg aspirin daily following 1/11/2024 neurology visit.  Appears to be some medication nonadherence as neurology note describes patient off of aspirin and Plavix 12/11/2023 with recommendation to restart 81 mg aspirin.  Had self discontinued her prior statin therapy prior to switch to pravastatin    Patient had been feeling in her usual state of health when she developed a headache at approximately 2:30 PM.  She decided to check her blood pressure to see if blood pressure might have been contributing to her headache, and found that it was elevated in the 200 systolic range.  She does not typically have an elevated blood pressure, and tells me that her baseline blood pressure is usually in the  systolic range.  She tells me that over the past year or so, she will occasionally have readings in clinic in the 150s range which are surprising to her.  I see more recent blood pressures seem to be in the 120-140 systolic range through clinic visits, so she may actually have some blood pressure room for treatment.  As blood pressure has improved, headache is improved as well.    CT negative for hemorrhage, MRI negative for stroke.  Headache is improving and blood pressure has normalized without intervention, but patient still with left-sided face, hand, and lower extremity paresthesias.  Paresthesias are most prominent perioral on left side.  She feels that her left rapid finger tapping slightly slower than right, but I do not see an appreciable difference here.  Cranial nerves otherwise intact.    Stroke neurology made aware of patient from emergency department provider and recommended increased 81 mg aspirin daily to 325 mg daily.  Observation admission was requested.    Overall, patient is improving.  Will treat headache, but do not anticipate further stroke interventions.  Not  repeating echocardiogram at this time.  Patient might benefit from neuropsychiatric testing in the future        Past Medical History    Past Medical History:   Diagnosis Date    Breast CA in situ 1987    Cancer (H) 1987    Right Breast treated with surgery    Cerebral infarction (H)     Chronic constipation     Fibromyalgia     History of blood transfusion     Malignant neoplasm of female breast, unspecified estrogen receptor status, unspecified laterality, unspecified site of breast (H) 06/17/2021    Meningitis     Several times as an adult    Osteoarthritis 02/01/2011    Osteopenia 02/01/2011    Pneumonia     Pterygium eye 08/26/2013    Reactive airway disease 02/01/2011    Seasonal allergies     Shingles     Prior to 2008 - scalp    Skin cancer     SCC - hand, left nose    Uncomplicated asthma        Past Surgical History   Past Surgical History:   Procedure Laterality Date    anterior c-disc fusion      ARTHROPLASTY KNEE Left 10/17/2022    Procedure: LEFT TOTAL KNEE ARTHROPLASTY;  Surgeon: Jax Le MD;  Location:  OR    BLEPHAROPLASTY, BROW LIFT BILATERAL, COMBINED Bilateral 6/18/2018    Procedure: COMBINED BLEPHAROPLASTY, BROW LIFT BILATERAL;  BILATERAL UPPER LID BLEPHAROPLASTY; BILATERAL BROW PTOSIS REPAIR;  Surgeon: Сергей Walters MD;  Location:  EC    CHOLECYSTECTOMY      COLONOSCOPY N/A 10/19/2017    Procedure: COLONOSCOPY;  COLONOSCOPY;  Surgeon: Niko Wong MD;  Location:  GI    COLONOSCOPY N/A 8/27/2022    Procedure: COLONOSCOPY, WITH POLYPECTOMY AND BIOPSY;  Surgeon: Abraham Rogers MD;  Location:  GI    D & C      Bleeding before hysterectomy    DISCECTOMY, FUSION CERVICAL ANTERIOR ONE LEVEL, COMBINED N/A 9/20/2023    Procedure: ANTERIOR CERVICAL 5 TO CERVICAL 6 DISCECTOMY AND FUSION;  Surgeon: Alex Galindo MD;  Location:  OR    ENDOSCOPY  04/21/08    ESOPHAGOSCOPY, GASTROSCOPY, DUODENOSCOPY (EGD), COMBINED N/A 8/27/2022    Procedure: ESOPHAGOGASTRODUODENOSCOPY, WITH  BIOPSY;  Surgeon: Abraham Rogers MD;  Location:  GI    HYSTERECTOMY, MARCUS      Ovaries and tubes out due to breast cancer    JOINT REPLACEMTN, KNEE RT/LT Right 01/2011    Joint Replacement knee RT, with tibial straightening (2 replacements)    MAMMOPLASTY AUGMENTATION BILATERAL      breast ca     MASTECTOMY, SIMPLE RT/LT/CLAIRE Bilateral 1989    Mastectomy Simple RT/LT/CLAIRE    MOHS MICROGRAPHIC PROCEDURE      Left lateral nose    OPEN REDUCTION INTERNAL FIXATION ANKLE  8/27/2013    Procedure: OPEN REDUCTION INTERNAL FIXATION ANKLE;  RIGHT OPEN REDUCTION INTERNAL FIXATION ANKLE WITH LIGAMENT REPAIR;  Surgeon: Nando Chang MD;  Location:  OR    PTERYGIUM WITH CONJUNCTIVAL AUTOLOGOUS TRANSPLANT Left 8/29/2016    Procedure: PTERYGIUM WITH CONJUNCTIVAL AUTOLOGOUS TRANSPLANT;  Surgeon: Сергей Walters MD;  Location:  EC    REPAIR LIGAMENT ANKLE  8/27/2013    Procedure: REPAIR LIGAMENT ANKLE;;  Surgeon: Nando Chang MD;  Location:  OR    SALIVARY GLAND SURGERY Left     Stone removal     SIGMOIDOSCOPY FLEXIBLE N/A 8/30/2022    Procedure: FLEXIBLE SIGMOIDOSCOPY;  Surgeon: Niko Wong MD;  Location:  GI    TONSILLECTOMY         Prior to Admission Medications   Prior to Admission Medications   Prescriptions Last Dose Informant Patient Reported? Taking?   ACE/ARB/ARNI NOT PRESCRIBED (INTENTIONAL)   Yes No   Sig: Please choose reason not prescribed from choices below.   acetaminophen (TYLENOL) 325 MG tablet Past Week  No Yes   Sig: Take 1-2 tablets (325-650 mg) by mouth every 6 hours as needed for mild pain   albuterol (PROAIR HFA/PROVENTIL HFA/VENTOLIN HFA) 108 (90 Base) MCG/ACT inhaler More than a month  No Yes   Sig: Inhale 2 puffs into the lungs every 6 hours as needed for shortness of breath   alendronate (FOSAMAX) 70 MG tablet 1/27/2024  No Yes   Sig: Take 1 tablet (70 mg) by mouth every 7 days   amitriptyline (ELAVIL) 10 MG tablet 2/1/2024  Yes Yes   Sig: TAKE 1 TABLET BY MOUTH AT BEDTIME  FOR NERVE PAIN. IF NO RELIEF IN 4 NIGHTS INCREASE TO 2 TABLETS   aspirin 81 MG EC tablet 2/2/2024  No Yes   Sig: Take 1 tablet (81 mg) by mouth daily   budesonide-formoterol (SYMBICORT) 160-4.5 MCG/ACT Inhaler More than a month Self No Yes   Sig: Inhale 2 puffs into the lungs daily   clindamycin (CLEOCIN) 300 MG capsule Unknown  Yes Yes   Sig: TAKE 2 CAPSULES BY MOUTH 1 HOUR BEFORE DENTAL APPOINTMENT   gabapentin (NEURONTIN) 100 MG capsule 2/1/2024 at PM  Yes Yes   Sig: Take 100 mg by mouth 3 times daily as needed for neuropathic pain   pravastatin (PRAVACHOL) 10 MG tablet 2/2/2024  No Yes   Sig: Take 1 tablet (10 mg) by mouth daily   vitamin D3 (CHOLECALCIFEROL) 50 mcg (2000 units) tablet Not Taking Self Yes No   Sig: Take 1 tablet by mouth daily   Patient not taking: Reported on 2/2/2024      Facility-Administered Medications: None           Physical Exam   Vital Signs: Temp: 97.9  F (36.6  C) Temp src: Temporal BP: 134/67 Pulse: 79   Resp: 20 SpO2: 99 % O2 Device: None (Room air)    Weight: 150 lbs 0 oz    General Appearance: Thin, generally well-appearing 73-year-old female resting comfortably in bed.  She is in no acute distress.  Eyes: No scleral icterus or injection.  Extraocular motions are intact.  HEENT: Normocephalic and atraumatic  Respiratory: Breath sounds clear bilaterally  Cardiovascular: Regular rate and rhythm  Skin: No appreciable jaundice or rash, no petechiae  Musculoskeletal: Muscular tone and bulk intact in all extremities and appropriate for age without significant fat wasting.    Neurologic: Alert and conversant.  Appropriate in conversation.  Tongue midline, shoulder shrug intact, extraocular motions intact.  Speech fco and content intact.  Rapid finger tap appears equal, but patient believes it might be slightly slower on the left.  Alternating finger tap equal bilaterally.  Dorsi and plantarflexion intact and equal bilaterally, hip flexors intact and equal in strength.   strength  as well as resistance against flexion extension and wrists and elbows is intact and equal bilaterally.  With light touch, patient reports decreased sensation involving the left side of her face, left forearm and hand, left lower extremity.  Decreased sensation is described as being most prominent around left side of her mouth both above and below the lips  Psychiatric: Very pleasant, normal affect    Medical Decision Making       65 MINUTES SPENT BY ME on the date of service doing chart review, history, exam, documentation & further activities per the note.      Data     I have personally reviewed the following data over the past 24 hrs:    7.8  \   14.3   / 210     138 100 14.2 /  157 (H)   4.0 30 (H) 0.83 \     ALT: 14 AST: 21 AP: 86 TBILI: 0.3   ALB: 4.3 TOT PROTEIN: 7.5 LIPASE: N/A     Trop: <6 BNP: 305       Imaging results reviewed over the past 24 hrs:   Recent Results (from the past 24 hour(s))   Head CT w/o contrast    Narrative    EXAM: CT HEAD W/O CONTRAST  LOCATION: [facility]  DATE/TIME: 2/2/2024 6:16 PM CST    INDICATION: Headache  COMPARISON: MRI brain dated 11/09/2023  TECHNIQUE: Routine CT Head without IV contrast. Multiplanar reformats. Dose reduction techniques were used.    FINDINGS:   INTRACRANIAL CONTENTS: No acute intracranial hemorrhage. Basal ganglia mineralization. No CT evidence of acute infarct. Sequelae of mild chronic microangiopathy. Mild cerebral volume loss without hydrocephalus. No extra-axial fluid collections.  Patent   basal cisterns.     VISUALIZED ORBITS/SINUSES/MASTOIDS: Postoperative change of bilateral lenses, otherwise the orbits are unremarkable. The visualized paranasal sinuses and temporal bone structures are well-aerated.     BONES/SOFT TISSUES: The calvarium and skull base are unremarkable.       Impression    IMPRESSION:     1. Senescent changes and sequelae of chronic microangiopathy without acute intracranial abnormality.   MR Brain w/o & w  Contrast    Narrative    EXAM: MR BRAIN W/O and W CONTRAST  LOCATION: [facility]  DATE: 2/2/2024    INDICATION: Acute neuro deficit, stroke suspected. Headache.  COMPARISON: MRI dated 11/09/2023. CT performed earlier today. MRA dated 01/19/2020.  CONTRAST: 7mL Gadavist  TECHNIQUE: Routine multiplanar multisequence head MRI without and with intravenous contrast.    FINDINGS:  INTRACRANIAL CONTENTS: No acute/subacute infarct, acute hemorrhage, or extra-axial fluid collection. No intracranial mass or abnormal enhancement. Similar scattered punctate and patchy foci of T2 prolongation within the bilateral cerebral white matter as   well as the sol, nonspecific although most likely the sequela of chronic microvascular ischemia. Mild generalized cerebral parenchymal volume loss. No hydrocephalus. Normal position of the cerebellar tonsils. An apparent 3 mm T2-hypointense focus   within the right internal auditory canal without a correlate on the postcontrast T1-weighted images which likely represents  averaging artifact of a prominent vessel coursing into the right IAC given findings on on prior neck MRA (series 8 image 6).    SELLA: Within technique limitations, no gross abnormality.    OSSEOUS STRUCTURES/SOFT TISSUES: No suspicious bone marrow signal intensity. The major intracranial vascular flow voids are maintained.     ORBITS: Within technique limitations, no significant abnormality.     SINUSES/MASTOIDS: No evidence of significant paranasal sinus or mastoid mucosal disease.        Impression    IMPRESSION:  1.  Similar chronic age-related changes without an acute intracranial abnormality.

## 2024-02-03 NOTE — PROGRESS NOTES
RECEIVING UNIT ED HANDOFF REVIEW    ED Nurse Handoff Report was reviewed by: Pat Valverde RN on February 3, 2024 at 1:29 AM

## 2024-02-03 NOTE — ED NOTES
[facility]  ED Nurse Handoff Report    ED Chief complaint: Headache and Hypertension      ED Diagnosis:   Final diagnoses:   None       Code Status: Full Code    Allergies:   Allergies   Allergen Reactions    Levaquin Hives and Itching    Pcn [Penicillins] Hives    Tetanus Toxoid Anaphylaxis    Tetanus Toxoids Anaphylaxis    Erythromycin GI Disturbance    Amoxicillin     Duloxetine Nausea     Other reaction(s): Jittery    Silicone Rash       Patient Story: At 230pm sudden headache, feels like vision is blurry bilat, wears glasses and is crying, hx of TIA a few months ago, takes a baby ASA   Focused Assessment:  Cardiac-WDL  Resp-WDL  Neuro-intermittent blurry vision, c/o L sided numbness    Treatments and/or interventions provided: tylenol, zofran IV  Patient's response to treatments and/or interventions: reduction in nausea and pain    To be done/followed up on inpatient unit:  monitor neuros    Does this patient have any cognitive concerns?:  A/OX4    Activity level - Baseline/Home:  Independent  Activity Level - Current:   Stand with Assist    Patient's Preferred language: English   Needed?: No    Isolation: None  Infection: Not Applicable  Patient tested for COVID 19 prior to admission: NO  Bariatric?: No    Vital Signs:   Vitals:    02/02/24 1627 02/02/24 1715 02/02/24 1730   BP: (!) 179/79 (!) 173/77 134/67   Pulse: 91  79   Resp: 20     Temp: 97.9  F (36.6  C)     TempSrc: Temporal     SpO2: 98% 100% 99%   Weight: 68 kg (150 lb)         Cardiac Rhythm:     Was the PSS-3 completed:   No -  What interventions are required if any?               Family Comments: none  OBS brochure/video discussed/provided to patient/family: N/A              Name of person given brochure if not patient: NA              Relationship to patient: NA    For the majority of the shift this patient's behavior was Green.   Behavioral interventions performed were NA  .    ED NURSE PHONE NUMBER: [phone]

## 2024-02-03 NOTE — PLAN OF CARE
SLP: Orders received. Chart reviewed and discussed with care team. SLP not indicated due to No skilled SLP needs. Patient passed the swallow screen and tolerating a regular diet and thin liquids. MRI was negative and back to baseline. Defer discharge recommendations to the medical team. Will complete orders.

## 2024-02-03 NOTE — ED PROVIDER NOTES
"History     Chief Complaint:  Headache and Hypertension       HPI   Macey Romero is a 73 year old female with a past medical history of TIA in November 2023 currently on a baby aspirin as well as history of breast cancer presenting today for evaluation of a headache.  At approximately 1430 today while on her computer, the patient had a sudden onset of a headache described in a \"Latvian\" extending from the middle of the front of her head down to the distribution center of the nape of her neck.  Headache was initially very severe but has since improved somewhat.  She had associated blurry vision.  Unable to discern whether it was bilateral or unilateral.  No associated head trauma.  No associated fevers, chills, cough, neck stiffness.  Also reports 1-2 months of cold extremities that occasionally turn blue.    Independent Historian:   None - Patient Only    Review of External Notes:   Progress note with neurology on 1/11/2024.  She was in the hospital in November 2023 and reported an acute onset of left visual field cut with left-sided numbness and dysarthria.  She was found to have R P2 stenosis versus partial occlusion.  She had not diagnosed with a TIA and started on DAPT with plans for full aspirin indefinitely for stroke prevention.  It was unclear whether she had been taking the antiplatelet therapy at that visit.    Medications:    acetaminophen (TYLENOL) 325 MG tablet  albuterol (PROAIR HFA/PROVENTIL HFA/VENTOLIN HFA) 108 (90 Base) MCG/ACT inhaler  alendronate (FOSAMAX) 70 MG tablet  amitriptyline (ELAVIL) 10 MG tablet  aspirin 81 MG EC tablet  budesonide-formoterol (SYMBICORT) 160-4.5 MCG/ACT Inhaler  clindamycin (CLEOCIN) 300 MG capsule  gabapentin (NEURONTIN) 100 MG capsule  pravastatin (PRAVACHOL) 10 MG tablet  ACE/ARB/ARNI NOT PRESCRIBED (INTENTIONAL)  vitamin D3 (CHOLECALCIFEROL) 50 mcg (2000 units) tablet      Past Medical History:    Past Medical History:   Diagnosis Date    Breast CA in situ 1987    " Cancer (H) 1987    Cerebral infarction (H)     Chronic constipation     Fibromyalgia     History of blood transfusion     Malignant neoplasm of female breast, unspecified estrogen receptor status, unspecified laterality, unspecified site of breast (H) 06/17/2021    Meningitis     Osteoarthritis 02/01/2011    Osteopenia 02/01/2011    Pneumonia     Pterygium eye 08/26/2013    Reactive airway disease 02/01/2011    Seasonal allergies     Shingles     Skin cancer     Uncomplicated asthma        Past Surgical History:    Past Surgical History:   Procedure Laterality Date    anterior c-disc fusion      ARTHROPLASTY KNEE Left 10/17/2022    Procedure: LEFT TOTAL KNEE ARTHROPLASTY;  Surgeon: Jax Le MD;  Location:  OR    BLEPHAROPLASTY, BROW LIFT BILATERAL, COMBINED Bilateral 6/18/2018    Procedure: COMBINED BLEPHAROPLASTY, BROW LIFT BILATERAL;  BILATERAL UPPER LID BLEPHAROPLASTY; BILATERAL BROW PTOSIS REPAIR;  Surgeon: Сергей Walters MD;  Location:  EC    CHOLECYSTECTOMY      COLONOSCOPY N/A 10/19/2017    Procedure: COLONOSCOPY;  COLONOSCOPY;  Surgeon: Niko Wong MD;  Location:  GI    COLONOSCOPY N/A 8/27/2022    Procedure: COLONOSCOPY, WITH POLYPECTOMY AND BIOPSY;  Surgeon: Abraham Rogers MD;  Location:  GI    D & C      Bleeding before hysterectomy    DISCECTOMY, FUSION CERVICAL ANTERIOR ONE LEVEL, COMBINED N/A 9/20/2023    Procedure: ANTERIOR CERVICAL 5 TO CERVICAL 6 DISCECTOMY AND FUSION;  Surgeon: Alex Galindo MD;  Location: [facility]    ENDOSCOPY  04/21/08    ESOPHAGOSCOPY, GASTROSCOPY, DUODENOSCOPY (EGD), COMBINED N/A 8/27/2022    Procedure: ESOPHAGOGASTRODUODENOSCOPY, WITH BIOPSY;  Surgeon: Abraham Rogers MD;  Location:  GI    HYSTERECTOMY, MARCUS      Ovaries and tubes out due to breast cancer    JOINT REPLACEMTN, KNEE RT/LT Right 01/2011    Joint Replacement knee RT, with tibial straightening (2 replacements)    MAMMOPLASTY AUGMENTATION BILATERAL      breast ca     MASTECTOMY,  SIMPLE RT/LT/CLAIRE Bilateral 1989    Mastectomy Simple RT/LT/CLAIRE    MOHS MICROGRAPHIC PROCEDURE      Left lateral nose    OPEN REDUCTION INTERNAL FIXATION ANKLE  8/27/2013    Procedure: OPEN REDUCTION INTERNAL FIXATION ANKLE;  RIGHT OPEN REDUCTION INTERNAL FIXATION ANKLE WITH LIGAMENT REPAIR;  Surgeon: Nando Chang MD;  Location:  OR    PTERYGIUM WITH CONJUNCTIVAL AUTOLOGOUS TRANSPLANT Left 8/29/2016    Procedure: PTERYGIUM WITH CONJUNCTIVAL AUTOLOGOUS TRANSPLANT;  Surgeon: Сергей Walters MD;  Location:  EC    REPAIR LIGAMENT ANKLE  8/27/2013    Procedure: REPAIR LIGAMENT ANKLE;;  Surgeon: Nando Chang MD;  Location:  OR    SALIVARY GLAND SURGERY Left     Stone removal     SIGMOIDOSCOPY FLEXIBLE N/A 8/30/2022    Procedure: FLEXIBLE SIGMOIDOSCOPY;  Surgeon: Niko Wong MD;  Location:  GI    TONSILLECTOMY          Physical Exam   Patient Vitals for the past 24 hrs:   BP Temp Temp src Pulse Resp SpO2 Weight   02/02/24 1730 134/67 -- -- 79 -- 99 % --   02/02/24 1715 (!) 173/77 -- -- -- -- 100 % --   02/02/24 1627 (!) 179/79 97.9  F (36.6  C) Temporal 91 20 98 % 68 kg (150 lb)        Physical Exam  /67   Pulse 79   Temp 97.9  F (36.6  C) (Temporal)   Resp 20   Wt 68 kg (150 lb)   SpO2 99%   BMI 25.75 kg/m     General: Pleasant elderly female. Examined in room 6.  Accompanied by spouse.  Head: Atraumatic. Normocephalic.  No tenderness with palpation of temporal arteries bilaterally.  EENT: PERRL. EOMI. Moist mucus membranes.    Eye: Mild conjunctival injection of the left eye.  Pressures 9 mmHg on the left and 10 mmHg on the right.    CV: Regular rate and rhythm.   Respiratory: Breathing comfortably on room air. Normal work of breathing.  Mild diffuse wheezing.  GI: Soft, non-distended. Non-tender abdomen. No rebound, rigidity, or guarding.   Msk: Extremities without tenderness to palpation or deformity.  Hands and feet are cold with very slight blue discoloration.  Capillary refill  <3 seconds to fingers and toes.  2+ radial pulses and DP pulses bilaterally.  Skin: Warm and dry. No rashes.  Neuro: Awake, alert, and conversant. Face symmetrical. Speech clear.  Tongue midline.  Sensation intact to light touch of the face. Reports decreased sensation on the left upper and lower extremity compared to right.  Strength 5/5 and symmetrical to upper and lower extremities.  Slow with finger nose finger.  No pronator drift.   Psych: Appropriate mood and affect.    Emergency Department Course   ECG  ECG results from 02/02/24   EKG 12 lead     Value    Systolic Blood Pressure     Diastolic Blood Pressure     Ventricular Rate 81    Atrial Rate 81    GA Interval 154    QRS Duration 82        QTc 446    P Axis 58    R AXIS 35    T Axis 33    Interpretation ECG      Sinus rhythm  Low voltage QRS  Borderline ECG  When compared with ECG of 08-NOV-2023 21:05,  No significant change was found         Imaging:  MR Brain w/o & w Contrast   Final Result   IMPRESSION:   1.  Similar chronic age-related changes without an acute intracranial abnormality.         Head CT w/o contrast   Final Result   IMPRESSION:       1. Senescent changes and sequelae of chronic microangiopathy without acute intracranial abnormality.         Report per radiology    Laboratory:  Labs Ordered and Resulted from Time of ED Arrival to Time of ED Departure   COMPREHENSIVE METABOLIC PANEL - Abnormal       Result Value    Sodium 138      Potassium 4.0      Carbon Dioxide (CO2) 30 (*)     Anion Gap 8      Urea Nitrogen 14.2      Creatinine 0.83      GFR Estimate 74      Calcium 9.4      Chloride 100      Glucose 157 (*)     Alkaline Phosphatase 86      AST 21      ALT 14      Protein Total 7.5      Albumin 4.3      Bilirubin Total 0.3     TROPONIN T, HIGH SENSITIVITY - Normal    Troponin T, High Sensitivity <6     NT PROBNP INPATIENT - Normal    N terminal Pro BNP Inpatient 305     ERYTHROCYTE SEDIMENTATION RATE AUTO - Normal    Erythrocyte  Sedimentation Rate 2     CBC WITH PLATELETS AND DIFFERENTIAL    WBC Count 7.8      RBC Count 5.12      Hemoglobin 14.3      Hematocrit 43.3      MCV 85      MCH 27.9      MCHC 33.0      RDW 12.4      Platelet Count 210      % Neutrophils 61      % Lymphocytes 28      % Monocytes 8      % Eosinophils 3      % Basophils 0      % Immature Granulocytes 0      NRBCs per 100 WBC 0      Absolute Neutrophils 4.7      Absolute Lymphocytes 2.2      Absolute Monocytes 0.6      Absolute Eosinophils 0.2      Absolute Basophils 0.0      Absolute Immature Granulocytes 0.0      Absolute NRBCs 0.0     INFLUENZA A/B, RSV, & SARS-COV2 PCR        Emergency Department Course & Assessments:  Interventions:  Medications   sodium chloride (PF) 0.9% PF flush 3 mL (3 mLs Intracatheter $Given 2/2/24 1716)   sodium chloride (PF) 0.9% PF flush 3 mL (has no administration in time range)   ondansetron (ZOFRAN) injection 4 mg (4 mg Intravenous $Given 2/2/24 1715)   acetaminophen (TYLENOL) tablet 1,000 mg (1,000 mg Oral $Given 2/2/24 1842)   gadobutrol (GADAVIST) injection 7 mL (7 mLs Intravenous $Given 2/2/24 2115)   sodium chloride (PF) 0.9% PF flush 10 mL (10 mLs Intravenous $Given 2/2/24 2115)   oxyCODONE (ROXICODONE) tablet 5 mg (5 mg Oral $Given 2/2/24 2139)      Assessments and Consultations:  Patient was seen in conjunction with attending physician Dr. Hairston.    1810   I performed my initial evaluation of the patient  ED Course as of 02/02/24 2303   Fri Feb 02, 2024 1844 I reevaluated the patient.     2011 I reevaluated the patient.     2148 Discussed results and plan with the patient.   2213 I spoke with Dr. Hatch regarding this patient.   2243 I spoke with Stroke Neurology regarding this patient who recommended starting her on full dose aspirin.     Independent Interpretation (X-rays, CTs, rhythm strip):  None    Social Determinants of Health affecting care:   None    Disposition:  The patient was admitted to the hospital under the care  of Dr. Hatch.     Impression & Plan    Medical Decision Makin-year-old female presenting as above.  On arrival, vital signs are notable for elevated blood pressure at 179/79 but otherwise stable.  Primary concern upon arrival was for headache.  Given sudden onset nature, initial concern was for subarachnoid hemorrhage and stat head CT was obtained which was without acute findings.  On more thorough neurologic exam, the patient had resolution of her vision changes and blurry vision, but was reporting decreased sensation to the left side of her body.  The remainder of her neurologic exam is grossly unremarkable.  With hypertension and headache, also briefly considered less likely etiologies such as pheochromocytoma.  Due to decreased sensation and concerns for TIA, we obtained a head MRI which did not show any CVA.  She has just had vessel imaging in November of last year, 3 months ago, so did not feel that it was necessary to repeat this today.  No evidence of hypertensive emergency today and blood pressure has improve dramatically here without any intervention.    I also considered alternative etiologies of her headache such as acute angle-closure glaucoma, temporal arteritis, meningitis, viral syndrome.  ESR undetectable.  Eye pressures normal bilaterally.  No neck stiffness, fever, elevated white blood cell count to suggest meningitis.      Cleared from a Stroke Neurology standpoint, but will plan to admit for continued management of severe headache.    Diagnosis:    ICD-10-CM    1. Headache  R51.9       2. Left sided numbness  R20.0       3. Blurred vision  H53.8     resolved      4. Hypertension, unspecified type  I10     resolved         Kat Jorge PA-C  2024   [facility]           Kat Jorge PA-C  [phone]

## 2024-02-03 NOTE — PROGRESS NOTES
Writer called SLP assigned to patient today to discuss NPO status and need for swallow eval before diet can be assigned. Per report no neurological deficits, if patient continues to appears stable ok for writer to perform dysphagia screen. Writer will follow up with SLP if any concerns arise.

## 2024-02-03 NOTE — PLAN OF CARE
Goal Outcome Evaluation:      Pt A&Ox4, VSS on room air. BP on arrival to the unit 135/60; pulse 79. Neuros unchanged. Pt ambulated with assist of 1 with gait belt and walker. Denies major pain. Voiding adequately. Denies increase in headache, denies SOB, denies chest pain, denies numbness/tingling, denies nausea and vomiting. Will continue to monitor.

## 2024-02-03 NOTE — PLAN OF CARE
"Goal Outcome Evaluation:    PRIMARY DIAGNOSIS: \"GENERIC\" NURSING  OUTPATIENT/OBSERVATION GOALS TO BE MET BEFORE DISCHARGE:  ADLs back to baseline: Yes    Activity and level of assistance: Ambulating independently.    Pain status: Improved-controlled with oral pain medications.    Return to near baseline physical activity: Yes     Discharge Planner Nurse   Safe discharge environment identified: Yes  Barriers to discharge: No       Entered by: Nevin Welsh RN 02/03/2024 2:52 PM   Discharge goals met, AVS and discharge instructions reviewed with patient at bedside. All questions answered at this time. Medication adjustments reviewed with patient. Patient discharged with all of her belongings.   "

## 2024-02-03 NOTE — CONSULTS
"  [facility]    Stroke Telephone Note    I was called by Jigar Hatch on 2/2/24 regarding patient Macey Romero. The patient is a 73 year old woman with h/o TIA and headaches, comes in with c/o L sided headache that feels different from her usual. Headache started 14:30 and gradually ramped up over a time period of 30 minutes (not thunderclap). Associated w nausea and blurry vision but no neurological deficits nor visual field cuts. Additionally she has a sensation of her lips being swollen and her fingers feeling cold. BP on arrival 179/79.    Vitals  BP: 135/60   Pulse: 79   Resp: 19   Temp: 98.1  F (36.7  C)   Weight: 68 kg (150 lb)    Imaging Findings  CT Head: no hemorrhage or acute ischemia  MRI Brain: No acute ischemia or hemorrhage    Impression  Left sided headache    MRI and CT without evidence of headache or hemorrhage. Headache does not sound like thunderclap in quality; additionally she did not experience any neurological deficits so TIA is unlikely    Recommendations  - No further stroke work up needed  - Would treat headache with migraine cocktail; if headache persists, consult General Neurology    Stroke Neurology will sign off.    My recommendations are based on the information provided over the phone by Macey Romero's in-person providers. They are not intended to replace the clinical judgment of her in-person providers. I was not requested to personally see or examine the patient at this time.     The Stroke Staff is Dr. Garber.    Lars Dixon MD  Vascular Neurology Fellow    To page me or covering stroke neurology team member, click here: [facility]COM  Choose \"On Call\" tab at top, then select \"NEUROLOGY/ALL SITES\" from middle drop-down box, press Enter, then look for \"stroke\" or \"telestroke\" for your site.   "

## 2024-02-05 ENCOUNTER — OFFICE VISIT (OUTPATIENT)
Dept: FAMILY MEDICINE | Facility: CLINIC | Age: 74
End: 2024-02-05

## 2024-02-05 VITALS
DIASTOLIC BLOOD PRESSURE: 77 MMHG | HEART RATE: 87 BPM | BODY MASS INDEX: 26.46 KG/M2 | HEIGHT: 64 IN | OXYGEN SATURATION: 98 % | WEIGHT: 155 LBS | SYSTOLIC BLOOD PRESSURE: 135 MMHG

## 2024-02-05 DIAGNOSIS — N18.31 STAGE 3A CHRONIC KIDNEY DISEASE (H): ICD-10-CM

## 2024-02-05 DIAGNOSIS — I73.00 RAYNAUD'S DISEASE WITHOUT GANGRENE: ICD-10-CM

## 2024-02-05 DIAGNOSIS — M79.7 FIBROMYALGIA: ICD-10-CM

## 2024-02-05 DIAGNOSIS — Z01.818 PREOP GENERAL PHYSICAL EXAM: Primary | ICD-10-CM

## 2024-02-05 PROCEDURE — 99495 TRANSJ CARE MGMT MOD F2F 14D: CPT | Mod: 25 | Performed by: FAMILY MEDICINE

## 2024-02-12 ENCOUNTER — OFFICE VISIT (OUTPATIENT)
Dept: FAMILY MEDICINE | Facility: CLINIC | Age: 74
End: 2024-02-12

## 2024-02-12 VITALS — DIASTOLIC BLOOD PRESSURE: 69 MMHG | HEART RATE: 82 BPM | SYSTOLIC BLOOD PRESSURE: 144 MMHG | OXYGEN SATURATION: 98 %

## 2024-02-12 DIAGNOSIS — F32.9 REACTIVE DEPRESSION: Primary | ICD-10-CM

## 2024-02-12 PROCEDURE — 99214 OFFICE O/P EST MOD 30 MIN: CPT | Performed by: FAMILY MEDICINE

## 2024-02-12 RX ORDER — AMITRIPTYLINE HYDROCHLORIDE 10 MG/1
TABLET ORAL
Qty: 60 TABLET | Refills: 4 | Status: SHIPPED | OUTPATIENT
Start: 2024-02-12 | End: 2024-03-13

## 2024-02-12 ASSESSMENT — ANXIETY QUESTIONNAIRES
1. FEELING NERVOUS, ANXIOUS, OR ON EDGE: SEVERAL DAYS
5. BEING SO RESTLESS THAT IT IS HARD TO SIT STILL: NOT AT ALL
2. NOT BEING ABLE TO STOP OR CONTROL WORRYING: NOT AT ALL
3. WORRYING TOO MUCH ABOUT DIFFERENT THINGS: MORE THAN HALF THE DAYS
3. WORRYING TOO MUCH ABOUT DIFFERENT THINGS: MORE THAN HALF THE DAYS
2. NOT BEING ABLE TO STOP OR CONTROL WORRYING: NOT AT ALL
IF YOU CHECKED OFF ANY PROBLEMS ON THIS QUESTIONNAIRE, HOW DIFFICULT HAVE THESE PROBLEMS MADE IT FOR YOU TO DO YOUR WORK, TAKE CARE OF THINGS AT HOME, OR GET ALONG WITH OTHER PEOPLE: SOMEWHAT DIFFICULT
7. FEELING AFRAID AS IF SOMETHING AWFUL MIGHT HAPPEN: SEVERAL DAYS
1. FEELING NERVOUS, ANXIOUS, OR ON EDGE: SEVERAL DAYS
GAD7 TOTAL SCORE: 6
6. BECOMING EASILY ANNOYED OR IRRITABLE: MORE THAN HALF THE DAYS
GAD7 TOTAL SCORE: 6
GAD7 TOTAL SCORE: 6
IF YOU CHECKED OFF ANY PROBLEMS ON THIS QUESTIONNAIRE, HOW DIFFICULT HAVE THESE PROBLEMS MADE IT FOR YOU TO DO YOUR WORK, TAKE CARE OF THINGS AT HOME, OR GET ALONG WITH OTHER PEOPLE: SOMEWHAT DIFFICULT
7. FEELING AFRAID AS IF SOMETHING AWFUL MIGHT HAPPEN: SEVERAL DAYS
6. BECOMING EASILY ANNOYED OR IRRITABLE: MORE THAN HALF THE DAYS
5. BEING SO RESTLESS THAT IT IS HARD TO SIT STILL: NOT AT ALL

## 2024-02-12 ASSESSMENT — PATIENT HEALTH QUESTIONNAIRE - PHQ9
5. POOR APPETITE OR OVEREATING: NOT AT ALL
5. POOR APPETITE OR OVEREATING: NOT AT ALL
SUM OF ALL RESPONSES TO PHQ QUESTIONS 1-9: 5

## 2024-02-12 NOTE — PROGRESS NOTES
Has bouts of depression, symptoms are worse right now. Has done talk therapy in the past, but feels that medication would be more effective. Had tried MH meds a long time ago, but did not like how she felt.  ROS:  General and Resp. completed and negative except as noted above    Histories: reviewed    OBJECTIVE:                                                    BP (!) 144/69   Pulse 82   SpO2 98%   There is no height or weight on file to calculate BMI.   APPEARANCE: = Relaxed and in no distress  Recent/Remote Memory = Alert and Oriented x 3  Mood/Affect = Cooperative and interested     ASSESSMENT/PLAN:                                                    Quality gaps reviewed    Macey was seen today for depression and consult.    Diagnoses and all orders for this visit:    Reactive depression  -     amitriptyline (ELAVIL) 10 MG tablet; TAKE 1 TABLET BY MOUTH AT BEDTIME FOR NERVE PAIN. IF NO RELIEF IN 4 NIGHTS INCREASE TO 2 TABLETS  -     sertraline (ZOLOFT) 50 MG tablet; Take 1 tablet (50 mg) by mouth daily    Spoke at length about mood disorders including suspected pathophysiology, role of neurotransmitters, and treatment options including medication options ( especially SSRIs that treat cause of sx) and counselling.      Work on weight loss    Health maintenance items are reviewed in Epic and correct as of today:    Long Ferrari MD  [facility]  Family Practice  [facility]  [phone]    For any issues my office # is [phone]

## 2024-02-14 ENCOUNTER — APPOINTMENT (OUTPATIENT)
Dept: CT IMAGING | Facility: CLINIC | Age: 74
End: 2024-02-14
Attending: EMERGENCY MEDICINE
Payer: MEDICARE

## 2024-02-14 ENCOUNTER — APPOINTMENT (OUTPATIENT)
Dept: MRI IMAGING | Facility: CLINIC | Age: 74
End: 2024-02-14
Attending: STUDENT IN AN ORGANIZED HEALTH CARE EDUCATION/TRAINING PROGRAM
Payer: MEDICARE

## 2024-02-14 ENCOUNTER — NURSE TRIAGE (OUTPATIENT)
Dept: NURSING | Facility: CLINIC | Age: 74
End: 2024-02-14
Payer: MEDICARE

## 2024-02-14 ENCOUNTER — APPOINTMENT (OUTPATIENT)
Dept: CT IMAGING | Facility: CLINIC | Age: 74
End: 2024-02-14
Attending: STUDENT IN AN ORGANIZED HEALTH CARE EDUCATION/TRAINING PROGRAM
Payer: MEDICARE

## 2024-02-14 ENCOUNTER — HOSPITAL ENCOUNTER (OUTPATIENT)
Facility: CLINIC | Age: 74
Setting detail: OBSERVATION
Discharge: HOME | End: 2024-02-16
Attending: EMERGENCY MEDICINE | Admitting: EMERGENCY MEDICINE
Payer: MEDICARE

## 2024-02-14 DIAGNOSIS — R53.1 LEFT-SIDED WEAKNESS: ICD-10-CM

## 2024-02-14 DIAGNOSIS — R52 PAIN: ICD-10-CM

## 2024-02-14 DIAGNOSIS — R20.0 LEFT SIDED NUMBNESS: ICD-10-CM

## 2024-02-14 DIAGNOSIS — R51.9 NONINTRACTABLE HEADACHE, UNSPECIFIED CHRONICITY PATTERN, UNSPECIFIED HEADACHE TYPE: Primary | ICD-10-CM

## 2024-02-14 LAB
ANION GAP SERPL CALCULATED.3IONS-SCNC: 10 MMOL/L (ref 7–15)
APTT PPP: 29 SECONDS (ref 22–38)
ATRIAL RATE - MUSE: 76 BPM
BASOPHILS # BLD AUTO: 0 10E3/UL (ref 0–0.2)
BASOPHILS NFR BLD AUTO: 0 %
BUN SERPL-MCNC: 10.9 MG/DL (ref 8–23)
CALCIUM SERPL-MCNC: 9.1 MG/DL (ref 8.8–10.2)
CHLORIDE SERPL-SCNC: 102 MMOL/L (ref 98–107)
CREAT SERPL-MCNC: 0.84 MG/DL (ref 0.51–0.95)
DEPRECATED HCO3 PLAS-SCNC: 28 MMOL/L (ref 22–29)
DIASTOLIC BLOOD PRESSURE - MUSE: NORMAL MMHG
EGFRCR SERPLBLD CKD-EPI 2021: 73 ML/MIN/1.73M2
EOSINOPHIL # BLD AUTO: 0.2 10E3/UL (ref 0–0.7)
EOSINOPHIL NFR BLD AUTO: 3 %
ERYTHROCYTE [DISTWIDTH] IN BLOOD BY AUTOMATED COUNT: 12.4 % (ref 10–15)
GLUCOSE SERPL-MCNC: 112 MG/DL (ref 70–99)
HCT VFR BLD AUTO: 43.9 % (ref 35–47)
HGB BLD-MCNC: 14.4 G/DL (ref 11.7–15.7)
IMM GRANULOCYTES # BLD: 0 10E3/UL
IMM GRANULOCYTES NFR BLD: 0 %
INR PPP: 0.97 (ref 0.85–1.15)
INTERPRETATION ECG - MUSE: NORMAL
LYMPHOCYTES # BLD AUTO: 2.9 10E3/UL (ref 0.8–5.3)
LYMPHOCYTES NFR BLD AUTO: 33 %
MCH RBC QN AUTO: 27.9 PG (ref 26.5–33)
MCHC RBC AUTO-ENTMCNC: 32.8 G/DL (ref 31.5–36.5)
MCV RBC AUTO: 85 FL (ref 78–100)
MONOCYTES # BLD AUTO: 0.9 10E3/UL (ref 0–1.3)
MONOCYTES NFR BLD AUTO: 10 %
NEUTROPHILS # BLD AUTO: 4.8 10E3/UL (ref 1.6–8.3)
NEUTROPHILS NFR BLD AUTO: 54 %
NRBC # BLD AUTO: 0 10E3/UL
NRBC BLD AUTO-RTO: 0 /100
P AXIS - MUSE: 72 DEGREES
PLATELET # BLD AUTO: 214 10E3/UL (ref 150–450)
POTASSIUM SERPL-SCNC: 4 MMOL/L (ref 3.4–5.3)
PR INTERVAL - MUSE: 146 MS
QRS DURATION - MUSE: 76 MS
QT - MUSE: 406 MS
QTC - MUSE: 456 MS
R AXIS - MUSE: 48 DEGREES
RBC # BLD AUTO: 5.17 10E6/UL (ref 3.8–5.2)
SODIUM SERPL-SCNC: 140 MMOL/L (ref 135–145)
SYSTOLIC BLOOD PRESSURE - MUSE: NORMAL MMHG
T AXIS - MUSE: 48 DEGREES
TROPONIN T SERPL HS-MCNC: <6 NG/L
VENTRICULAR RATE- MUSE: 76 BPM
WBC # BLD AUTO: 8.9 10E3/UL (ref 4–11)

## 2024-02-14 PROCEDURE — 96374 THER/PROPH/DIAG INJ IV PUSH: CPT | Mod: 59

## 2024-02-14 PROCEDURE — 93005 ELECTROCARDIOGRAM TRACING: CPT

## 2024-02-14 PROCEDURE — 85610 PROTHROMBIN TIME: CPT | Performed by: EMERGENCY MEDICINE

## 2024-02-14 PROCEDURE — 84484 ASSAY OF TROPONIN QUANT: CPT | Performed by: EMERGENCY MEDICINE

## 2024-02-14 PROCEDURE — 250N000009 HC RX 250: Performed by: EMERGENCY MEDICINE

## 2024-02-14 PROCEDURE — 85730 THROMBOPLASTIN TIME PARTIAL: CPT | Performed by: EMERGENCY MEDICINE

## 2024-02-14 PROCEDURE — 96375 TX/PRO/DX INJ NEW DRUG ADDON: CPT

## 2024-02-14 PROCEDURE — 85025 COMPLETE CBC W/AUTO DIFF WBC: CPT | Performed by: EMERGENCY MEDICINE

## 2024-02-14 PROCEDURE — A9585 GADOBUTROL INJECTION: HCPCS | Performed by: EMERGENCY MEDICINE

## 2024-02-14 PROCEDURE — 70450 CT HEAD/BRAIN W/O DYE: CPT | Mod: MA

## 2024-02-14 PROCEDURE — 255N000002 HC RX 255 OP 636: Performed by: EMERGENCY MEDICINE

## 2024-02-14 PROCEDURE — 36415 COLL VENOUS BLD VENIPUNCTURE: CPT | Performed by: EMERGENCY MEDICINE

## 2024-02-14 PROCEDURE — 70553 MRI BRAIN STEM W/O & W/DYE: CPT | Mod: MG

## 2024-02-14 PROCEDURE — 99222 1ST HOSP IP/OBS MODERATE 55: CPT | Mod: AI | Performed by: INTERNAL MEDICINE

## 2024-02-14 PROCEDURE — 250N000011 HC RX IP 250 OP 636: Performed by: EMERGENCY MEDICINE

## 2024-02-14 PROCEDURE — 0042T CT HEAD PERFUSION W CONTRAST: CPT

## 2024-02-14 PROCEDURE — 250N000013 HC RX MED GY IP 250 OP 250 PS 637: Performed by: EMERGENCY MEDICINE

## 2024-02-14 PROCEDURE — 99285 EMERGENCY DEPT VISIT HI MDM: CPT | Mod: 25

## 2024-02-14 PROCEDURE — 80048 BASIC METABOLIC PNL TOTAL CA: CPT | Performed by: EMERGENCY MEDICINE

## 2024-02-14 PROCEDURE — 70496 CT ANGIOGRAPHY HEAD: CPT | Mod: MA

## 2024-02-14 RX ORDER — IOPAMIDOL 755 MG/ML
67 INJECTION, SOLUTION INTRAVASCULAR ONCE
Status: COMPLETED | OUTPATIENT
Start: 2024-02-14 | End: 2024-02-14

## 2024-02-14 RX ORDER — DIPHENHYDRAMINE HYDROCHLORIDE 50 MG/ML
25 INJECTION INTRAMUSCULAR; INTRAVENOUS ONCE
Status: COMPLETED | OUTPATIENT
Start: 2024-02-14 | End: 2024-02-14

## 2024-02-14 RX ORDER — IOPAMIDOL 755 MG/ML
50 INJECTION, SOLUTION INTRAVASCULAR ONCE
Status: COMPLETED | OUTPATIENT
Start: 2024-02-14 | End: 2024-02-14

## 2024-02-14 RX ORDER — GADOBUTROL 604.72 MG/ML
7 INJECTION INTRAVENOUS ONCE
Status: COMPLETED | OUTPATIENT
Start: 2024-02-14 | End: 2024-02-14

## 2024-02-14 RX ORDER — ACETAMINOPHEN 325 MG/1
975 TABLET ORAL ONCE
Status: COMPLETED | OUTPATIENT
Start: 2024-02-14 | End: 2024-02-14

## 2024-02-14 RX ADMIN — SODIUM CHLORIDE 100 ML: 9 INJECTION, SOLUTION INTRAVENOUS at 19:17

## 2024-02-14 RX ADMIN — IOPAMIDOL 67 ML: 755 INJECTION, SOLUTION INTRAVENOUS at 19:11

## 2024-02-14 RX ADMIN — GADOBUTROL 7 ML: 604.72 INJECTION INTRAVENOUS at 21:12

## 2024-02-14 RX ADMIN — DIPHENHYDRAMINE HYDROCHLORIDE 25 MG: 50 INJECTION, SOLUTION INTRAMUSCULAR; INTRAVENOUS at 20:34

## 2024-02-14 RX ADMIN — SODIUM CHLORIDE 90 ML: 9 INJECTION, SOLUTION INTRAVENOUS at 19:11

## 2024-02-14 RX ADMIN — PROCHLORPERAZINE EDISYLATE 5 MG: 5 INJECTION INTRAMUSCULAR; INTRAVENOUS at 20:32

## 2024-02-14 RX ADMIN — IOPAMIDOL 50 ML: 755 INJECTION, SOLUTION INTRAVENOUS at 19:16

## 2024-02-14 RX ADMIN — ACETAMINOPHEN 975 MG: 325 TABLET, FILM COATED ORAL at 22:23

## 2024-02-14 ASSESSMENT — ACTIVITIES OF DAILY LIVING (ADL)
ADLS_ACUITY_SCORE: 38
ADLS_ACUITY_SCORE: 40
ADLS_ACUITY_SCORE: 40

## 2024-02-14 ASSESSMENT — ENCOUNTER SYMPTOMS
SHORTNESS OF BREATH: 0
COUGH: 0
NUMBNESS: 1
DIARRHEA: 0
ABDOMINAL PAIN: 0
NAUSEA: 0
HEADACHES: 1
DYSURIA: 0
CHILLS: 0
BLOOD IN STOOL: 0
VOMITING: 0
FEVER: 0
DIFFICULTY URINATING: 0

## 2024-02-14 NOTE — TELEPHONE ENCOUNTER
Nurse Triage SBAR    Is this a 2nd Level Triage? NO    Situation: Hypertension, headache    Background: Was just hospitalized for a TIA on 2/2/24. Patient is now having similar symptoms such as a headache, pounding pulse, facial flushing and blurry vision.     Assessment: Last BP= 186/102, HR= 90. Patient also reports tingling and numbness on the left side of her face which has lead to her biting her tongue a couple times.     Protocol Recommended Disposition:   Go to ED Now, Call  Now    Recommendation: Call  now. Patient and her  prefer to drive to the ED instead, patient and her  were both encouraged to call 911 due to the severity of her symptoms.      Red transfer with warm transfer  Reason for Disposition   [1] Numbness (i.e., loss of sensation) of the face, arm or leg on one side of the body AND [2] new-onset   [1] Systolic BP  >= 160 OR Diastolic >= 100 AND [2] cardiac (e.g., breathing difficulty, chest pain) or neurologic symptoms (e.g., new-onset blurred or double vision, unsteady gait)    Additional Information   Negative: Difficult to awaken or acting confused (e.g., disoriented, slurred speech)   Negative: SEVERE difficulty breathing (e.g., struggling for each breath, speaks in single words)   Negative: [1] Weakness of the face, arm or leg on one side of the body AND [2] new-onset   Negative: [1] Chest pain lasts > 5 minutes AND [2] history of heart disease (i.e., heart attack, bypass surgery, angina, angioplasty, CHF)   Negative: [1] Chest pain AND [2] took nitrogylcerin AND [3] pain was not relieved   Negative: Sounds like a life-threatening emergency to the triager    Protocols used: Blood Pressure - High-A-AH

## 2024-02-15 ENCOUNTER — APPOINTMENT (OUTPATIENT)
Dept: GENERAL RADIOLOGY | Facility: CLINIC | Age: 74
End: 2024-02-15
Attending: STUDENT IN AN ORGANIZED HEALTH CARE EDUCATION/TRAINING PROGRAM
Payer: MEDICARE

## 2024-02-15 ENCOUNTER — APPOINTMENT (OUTPATIENT)
Dept: MRI IMAGING | Facility: CLINIC | Age: 74
End: 2024-02-15
Attending: STUDENT IN AN ORGANIZED HEALTH CARE EDUCATION/TRAINING PROGRAM
Payer: MEDICARE

## 2024-02-15 LAB
APPEARANCE CSF: CLEAR
COLOR CSF: COLORLESS
GLUCOSE CSF-MCNC: 60 MG/DL (ref 40–70)
PROT CSF-MCNC: 73.4 MG/DL (ref 15–45)
RBC # CSF MANUAL: 0 /UL (ref 0–2)
TUBE # CSF: 4
WBC # CSF MANUAL: 3 /UL (ref 0–5)

## 2024-02-15 PROCEDURE — 82945 GLUCOSE OTHER FLUID: CPT | Performed by: STUDENT IN AN ORGANIZED HEALTH CARE EDUCATION/TRAINING PROGRAM

## 2024-02-15 PROCEDURE — 89050 BODY FLUID CELL COUNT: CPT | Performed by: STUDENT IN AN ORGANIZED HEALTH CARE EDUCATION/TRAINING PROGRAM

## 2024-02-15 PROCEDURE — 250N000009 HC RX 250: Performed by: PHYSICIAN ASSISTANT

## 2024-02-15 PROCEDURE — 70546 MR ANGIOGRAPH HEAD W/O&W/DYE: CPT | Mod: MF

## 2024-02-15 PROCEDURE — 250N000013 HC RX MED GY IP 250 OP 250 PS 637: Performed by: INTERNAL MEDICINE

## 2024-02-15 PROCEDURE — G0378 HOSPITAL OBSERVATION PER HR: HCPCS

## 2024-02-15 PROCEDURE — 99233 SBSQ HOSP IP/OBS HIGH 50: CPT | Performed by: PHYSICIAN ASSISTANT

## 2024-02-15 PROCEDURE — 62328 DX LMBR SPI PNXR W/FLUOR/CT: CPT

## 2024-02-15 PROCEDURE — 255N000002 HC RX 255 OP 636: Performed by: INTERNAL MEDICINE

## 2024-02-15 PROCEDURE — A9585 GADOBUTROL INJECTION: HCPCS | Performed by: INTERNAL MEDICINE

## 2024-02-15 PROCEDURE — 84157 ASSAY OF PROTEIN OTHER: CPT | Performed by: STUDENT IN AN ORGANIZED HEALTH CARE EDUCATION/TRAINING PROGRAM

## 2024-02-15 RX ORDER — PROCHLORPERAZINE 25 MG
12.5 SUPPOSITORY, RECTAL RECTAL EVERY 12 HOURS PRN
Status: DISCONTINUED | OUTPATIENT
Start: 2024-02-15 | End: 2024-02-16 | Stop reason: HOSPADM

## 2024-02-15 RX ORDER — LIDOCAINE 40 MG/G
CREAM TOPICAL
Status: DISCONTINUED | OUTPATIENT
Start: 2024-02-15 | End: 2024-02-16 | Stop reason: HOSPADM

## 2024-02-15 RX ORDER — IBUPROFEN 200 MG
400 TABLET ORAL EVERY 6 HOURS PRN
Status: DISCONTINUED | OUTPATIENT
Start: 2024-02-15 | End: 2024-02-16 | Stop reason: HOSPADM

## 2024-02-15 RX ORDER — LIDOCAINE HYDROCHLORIDE 10 MG/ML
5 INJECTION, SOLUTION EPIDURAL; INFILTRATION; INTRACAUDAL; PERINEURAL ONCE
Status: COMPLETED | OUTPATIENT
Start: 2024-02-15 | End: 2024-02-15

## 2024-02-15 RX ORDER — POLYETHYLENE GLYCOL 3350 17 G/17G
17 POWDER, FOR SOLUTION ORAL 2 TIMES DAILY PRN
Status: DISCONTINUED | OUTPATIENT
Start: 2024-02-15 | End: 2024-02-16 | Stop reason: HOSPADM

## 2024-02-15 RX ORDER — GABAPENTIN 100 MG/1
100 CAPSULE ORAL AT BEDTIME
Status: DISCONTINUED | OUTPATIENT
Start: 2024-02-15 | End: 2024-02-16 | Stop reason: HOSPADM

## 2024-02-15 RX ORDER — NALOXONE HYDROCHLORIDE 0.4 MG/ML
0.2 INJECTION, SOLUTION INTRAMUSCULAR; INTRAVENOUS; SUBCUTANEOUS
Status: DISCONTINUED | OUTPATIENT
Start: 2024-02-15 | End: 2024-02-16 | Stop reason: HOSPADM

## 2024-02-15 RX ORDER — BISACODYL 10 MG
10 SUPPOSITORY, RECTAL RECTAL DAILY PRN
Status: DISCONTINUED | OUTPATIENT
Start: 2024-02-15 | End: 2024-02-16 | Stop reason: HOSPADM

## 2024-02-15 RX ORDER — AMOXICILLIN 250 MG
1 CAPSULE ORAL 2 TIMES DAILY PRN
Status: DISCONTINUED | OUTPATIENT
Start: 2024-02-15 | End: 2024-02-16 | Stop reason: HOSPADM

## 2024-02-15 RX ORDER — HYDRALAZINE HYDROCHLORIDE 20 MG/ML
10 INJECTION INTRAMUSCULAR; INTRAVENOUS EVERY 4 HOURS PRN
Status: DISCONTINUED | OUTPATIENT
Start: 2024-02-15 | End: 2024-02-16 | Stop reason: HOSPADM

## 2024-02-15 RX ORDER — AMITRIPTYLINE HYDROCHLORIDE 10 MG/1
20 TABLET ORAL AT BEDTIME
Status: DISCONTINUED | OUTPATIENT
Start: 2024-02-15 | End: 2024-02-16 | Stop reason: HOSPADM

## 2024-02-15 RX ORDER — ACETAMINOPHEN 325 MG/1
650 TABLET ORAL EVERY 4 HOURS PRN
Status: DISCONTINUED | OUTPATIENT
Start: 2024-02-15 | End: 2024-02-16 | Stop reason: HOSPADM

## 2024-02-15 RX ORDER — ACETAMINOPHEN 650 MG/1
650 SUPPOSITORY RECTAL EVERY 4 HOURS PRN
Status: DISCONTINUED | OUTPATIENT
Start: 2024-02-15 | End: 2024-02-16 | Stop reason: HOSPADM

## 2024-02-15 RX ORDER — NALOXONE HYDROCHLORIDE 0.4 MG/ML
0.4 INJECTION, SOLUTION INTRAMUSCULAR; INTRAVENOUS; SUBCUTANEOUS
Status: DISCONTINUED | OUTPATIENT
Start: 2024-02-15 | End: 2024-02-16 | Stop reason: HOSPADM

## 2024-02-15 RX ORDER — GADOBUTROL 604.72 MG/ML
10 INJECTION INTRAVENOUS ONCE
Status: COMPLETED | OUTPATIENT
Start: 2024-02-15 | End: 2024-02-15

## 2024-02-15 RX ORDER — ONDANSETRON 2 MG/ML
4 INJECTION INTRAMUSCULAR; INTRAVENOUS EVERY 6 HOURS PRN
Status: DISCONTINUED | OUTPATIENT
Start: 2024-02-15 | End: 2024-02-16 | Stop reason: HOSPADM

## 2024-02-15 RX ORDER — OXYCODONE HYDROCHLORIDE 5 MG/1
5 TABLET ORAL EVERY 4 HOURS PRN
Status: DISCONTINUED | OUTPATIENT
Start: 2024-02-15 | End: 2024-02-16 | Stop reason: HOSPADM

## 2024-02-15 RX ORDER — MAGNESIUM SULFATE HEPTAHYDRATE 40 MG/ML
2 INJECTION, SOLUTION INTRAVENOUS ONCE
Qty: 50 ML | Refills: 0 | Status: COMPLETED | OUTPATIENT
Start: 2024-02-15 | End: 2024-02-16

## 2024-02-15 RX ORDER — PROCHLORPERAZINE MALEATE 5 MG
5 TABLET ORAL EVERY 6 HOURS PRN
Status: DISCONTINUED | OUTPATIENT
Start: 2024-02-15 | End: 2024-02-16 | Stop reason: HOSPADM

## 2024-02-15 RX ORDER — AMOXICILLIN 250 MG
2 CAPSULE ORAL 2 TIMES DAILY PRN
Status: DISCONTINUED | OUTPATIENT
Start: 2024-02-15 | End: 2024-02-16 | Stop reason: HOSPADM

## 2024-02-15 RX ORDER — ASPIRIN 325 MG
325 TABLET, DELAYED RELEASE (ENTERIC COATED) ORAL DAILY
Status: DISCONTINUED | OUTPATIENT
Start: 2024-02-15 | End: 2024-02-16 | Stop reason: HOSPADM

## 2024-02-15 RX ORDER — ONDANSETRON 4 MG/1
4 TABLET, ORALLY DISINTEGRATING ORAL EVERY 6 HOURS PRN
Status: DISCONTINUED | OUTPATIENT
Start: 2024-02-15 | End: 2024-02-16 | Stop reason: HOSPADM

## 2024-02-15 RX ORDER — HYDRALAZINE HYDROCHLORIDE 10 MG/1
10 TABLET, FILM COATED ORAL EVERY 4 HOURS PRN
Status: DISCONTINUED | OUTPATIENT
Start: 2024-02-15 | End: 2024-02-16 | Stop reason: HOSPADM

## 2024-02-15 RX ADMIN — LIDOCAINE HYDROCHLORIDE 3 ML: 10 INJECTION, SOLUTION EPIDURAL; INFILTRATION; INTRACAUDAL; PERINEURAL at 14:06

## 2024-02-15 RX ADMIN — GADOBUTROL 10 ML: 604.72 INJECTION INTRAVENOUS at 17:53

## 2024-02-15 RX ADMIN — ASPIRIN 325 MG: 325 TABLET, COATED ORAL at 08:30

## 2024-02-15 RX ADMIN — DOCUSATE SODIUM 50 MG AND SENNOSIDES 8.6 MG 1 TABLET: 8.6; 5 TABLET, FILM COATED ORAL at 21:25

## 2024-02-15 RX ADMIN — OXYCODONE HYDROCHLORIDE 2.5 MG: 5 TABLET ORAL at 13:19

## 2024-02-15 RX ADMIN — AMITRIPTYLINE HYDROCHLORIDE 10 MG: 10 TABLET, FILM COATED ORAL at 21:25

## 2024-02-15 RX ADMIN — GABAPENTIN 100 MG: 100 CAPSULE ORAL at 21:25

## 2024-02-15 RX ADMIN — GABAPENTIN 100 MG: 100 CAPSULE ORAL at 01:59

## 2024-02-15 RX ADMIN — ACETAMINOPHEN 650 MG: 325 TABLET, FILM COATED ORAL at 10:41

## 2024-02-15 RX ADMIN — OXYCODONE HYDROCHLORIDE 5 MG: 5 TABLET ORAL at 17:13

## 2024-02-15 ASSESSMENT — ACTIVITIES OF DAILY LIVING (ADL)
ADLS_ACUITY_SCORE: 40
ADLS_ACUITY_SCORE: 36
ADLS_ACUITY_SCORE: 36
ADLS_ACUITY_SCORE: 40
ADLS_ACUITY_SCORE: 36
ADLS_ACUITY_SCORE: 40
ADLS_ACUITY_SCORE: 36
ADLS_ACUITY_SCORE: 40

## 2024-02-15 ASSESSMENT — ENCOUNTER SYMPTOMS
RHINORRHEA: 0
BACK PAIN: 0
NECK PAIN: 0

## 2024-02-15 NOTE — PROGRESS NOTES
RADIOLOGY PROCEDURE NOTE  Patient name: Macey Romero  MRN: [id number]  : 1950    Pre-procedure diagnosis: Headaches  Post-procedure diagnosis: Same    Procedure Date/Time: February 15, 2024  2:28 PM  Procedure: LP at L2-L3  Estimated blood loss: None  Specimen(s) collected with description: 6 ml of clear CSF.    The patient tolerated the procedure well with no immediate complications.  Significant findings:Pressure measured at 8 cm of water in lateral decubitus position.    See imaging dictation for procedural details.    Provider name: Jose Glass PA-C  Assistant(s):None

## 2024-02-15 NOTE — ED NOTES
De escalated stroke at 1941.  Back from MRI-no new weakness noted- earlier on was able to stand up and used commode but was having headache and given iv meds prior MRI.

## 2024-02-15 NOTE — ED NOTES
[facility]  ED Nurse Handoff Report    ED Chief complaint: Hypertension and One-sided Weakness      ED Diagnosis:   Final diagnoses:   None       Code Status: to be addressed    Allergies:   Allergies   Allergen Reactions    Levaquin Hives and Itching    Pcn [Penicillins] Hives    Tetanus Toxoid Anaphylaxis    Tetanus Toxoids Anaphylaxis    Erythromycin GI Disturbance    Amoxicillin     Duloxetine Nausea     Other reaction(s): Jittery    Silicone Rash       Patient Story: presented to ED due to left sided weakness,headache,high blood pressure,left facial  and jaw numbness right eyelid twitching.  Stroke activation tier 1  to 2  then de activated at 1941  Focused Assessment:  not distress,high BP initially,alert,with minimal weakness left hand and still numb on her left side of the face but able to swallow.her headache is less as compared earlier.having bilateral mastectomy     Treatments and/or interventions provided: iv access,labs,ct,mri,see MAR.  Patient's response to treatments and/or interventions: tolerated    To be done/followed up on inpatient unit:  as per admission    Does this patient have any cognitive concerns?:  none    Activity level - Baseline/Home:  Independent  Activity Level - Current:   Independent    Patient's Preferred language: English   Needed?: No    Isolation: None  Infection: Not Applicable  Patient tested for COVID 19 prior to admission: NO  Bariatric?: No    Vital Signs:   Vitals:    02/14/24 1959 02/14/24 2223 02/14/24 2228 02/14/24 2315   BP: (!) 140/76   99/66   Pulse: 82  77 77   Resp: 25  11 13   Temp:  97.9  F (36.6  C)     TempSrc:       SpO2: 95%  96% 96%       Cardiac Rhythm:Cardiac Rhythm: Normal sinus rhythm    Was the PSS-3 completed:   Yes  What interventions are required if any?               Family Comments:   OBS brochure/video discussed/provided to patient/family: No              Name of person given brochure if not patient:                Relationship to patient:     For the majority of the shift this patient's behavior was Green.   Behavioral interventions performed were none.    ED NURSE PHONE NUMBER: [phone]

## 2024-02-15 NOTE — PLAN OF CARE
Goal Outcome Evaluation:    Plan of Care Reviewed With: patient    Overall Patient Progress: no change    2211-5090    Orientations: A&Ox4   Vitals/Pain: AVSS, on RA.  Tylenol and Ice pack for headache.   Resp: Clear  Lines/Drains: PIV saline lock  Skin/Wounds: Intact with some bruising  GI/: Tolerating oral intake, no n/v.  Voiding w/o difficulty.  Last BM 2/15/24.  Ambulation/Assist: After assessment, patient ok to be independent in room.   Sleep Quality: Minimal in between cares. Pt verbalized feeling really tired as she was transferred in the room early this morning during previous shift.   Plan: Pending Neurology consult.

## 2024-02-15 NOTE — ED TRIAGE NOTES
Pt reports she went to bed last night with slight headache, pt woke up with worsening headache. Pt reports throughout day, noted pain/numbness in left side of body. Pt noted this afternoon to be grasping for item and coming up short.      Triage Assessment (Adult)       Row Name 02/14/24 4962          Triage Assessment    Airway WDL WDL        Cardiac WDL    Cardiac WDL --  HTN        Peripheral/Neurovascular WDL    Peripheral Neurovascular WDL WDL

## 2024-02-15 NOTE — PLAN OF CARE
Goal Outcome Evaluation:      Plan of Care Reviewed With: patient    Overall Patient Progress: improvingOverall Patient Progress: improving    Patient is here with TIA.VSS A&OX4.VSS.Neuros intact except dull sensation of Lt side of face and LUE ,mild weakness in LUE, dull HA.On Regular diet. Up with one voided adequately. Plan to have general neurology consult today

## 2024-02-15 NOTE — CONSULTS
[facility]    Stroke Telephone Note    I was called by Rafa Mayer on 02/14/24 regarding patient Macey Romero. 73-year-old female with current tobacco use, prediabetes, MGUS, prior TIA on aspirin comes in with headache that started this morning and gradually increasing, around 5 pm started having numbness of L face, arm and leg. Difficulty grabbing thing on L side. On ED MD exam, ? R facial, L arm drift slight, LLE slight drift, decreased sensation on L.  /75    11/09/2023:  episode with left-sided numbness, left-sided visual field deficit and difficulty finding a word with slurred speech. R P2 stenosis versus subocclusive thrombus- thought to TIA discharged on DAPT for 21 days L sided headache gradually increasing in intensity with   02/02/2024 presented again with nausea and blurry vision, feeling of lips being swollen. MRI negative, treated as complex migraine    Vitals  BP: 113/53   Pulse: 81   Resp: 14   Temp: 97.3  F (36.3  C)        Stroke Code Data (for stroke code without tele)  Stroke code activated 02/14/24  1858   Stroke provider first response 02/14/24  1859   Last known normal 02/14/24     Unknown likwly morning when HA started   Time of discovery (or onset of symptoms)        Head CT read by Stroke Neuro Provider 02/14/24 1914   Was stroke code de-escalated? Yes  02/14/24 1943     Imaging Findings  CT head: no evidence of acute ischemic stroke or intracranial bleed  CTA head and neck: no evidence of large vessel occlusion of critical stenosis. R P1/P2 stenosed mostly unchangeed    Intravenous Thrombolysis  Not given due to:   - minor/isolated/quickly resolving symptoms  - stroke mimic: recurrent episodes with similar pattern and headache suspected to be migraine  - unclear or unfavorable risk-benefit profile for extended window thrombolysis beyond the conventional 4.5 hour time window    Endovascular Treatment  Not initiated due to absence of proximal vessel  "occlusion    Impression  L sided paresthesia and weakness with headache    Recommendations   MRI brain w wo contrast    Addendum:  MRI completed and negative for acute stroke. Given similar episode in past with negative MRI, high suspicion that this is complex migraine. Recommend symptomatic management and gen neuro eval    My recommendations are based on the information provided over the phone by Macey Romero's in-person providers. They are not intended to replace the clinical judgment of her in-person providers. I was not requested to personally see or examine the patient at this time.     The Stroke Staff is Dr. Garber.    Berenice Hunter MD  Vascular Neurology Fellow    To page me or covering stroke neurology team member, click here: AMCOM  Choose \"On Call\" tab at top, then select \"NEUROLOGY/ALL SITES\" from middle drop-down box, press Enter, then look for \"stroke\" or \"telestroke\" for your site.        "

## 2024-02-15 NOTE — CONSULTS
[facility]    Neurology Consultation    Macey Romero MRN# [id number]   YOB: 1950 Age: 73 year old    Code Status:Full Code   Date of Admission: 2/14/2024  Date of Consult:02/15/2024                                                                                       Assessment and Plan:                                         #.  Positional headache with tinnitus and blurry vision,   -- Concern is for increased intracranial pressure, will order MRV to assess for potential thrombosis.  LP done at in 11/2023 did not have an opening pressure.  Will need to repeat this to make sure does not have increased pressure to explain her headaches.  If these are negative, patient likely has a headache from hypertension or tension headaches. She does not have symptoms consistent with a migraine such as photophobia, phonophobia, or nausea.  Does not have any nuchal rigidity to be concern for meningitis  -MRV head  -LP with opening pressure, will also send off her basic labs  -IV magnesium ordered, continue acetaminophen, ibuprofen, Compazine, and Benadryl  -Continue blood pressure management per primary team        ----------------------------------------------------------------------------------  ----------------------------------------------------------------------------------  Reason for consult: as above       Chief Complaint:   headache         History of Present Illness:   This patient is a 73 year old female who presents with numbness, weakness, and headache.  MRI negative for stroke.    Patient's history is notable for prediabetes, current tobacco use, and prior TIA currently on aspirin.  In November 2023 patient had an episode of left-sided numbness, left-sided visual field deficit, and difficulty with word finding with slurred speech.  Vessel showed a right P2 stenosis versus subocclusive thrombus so she was discharged with dual antiplatelets for 21 days then continued on aspirin  following that.  She then developed a left-sided headache that has been gradually increasing in intensity, on 2/2/2024 presented with nausea and blurry vision. MRI brain was negative for stroke and she was treated as a complex migraine. She states that headache improved over a couple of days. Last night, in the ER it was noted she had potentially right facial weakness, left arm drift, left leg drift and decreased sensation on the left.  Blood pressure was elevated 187/75.  CT head was negative for stroke and CTA did not reveal any evidence of occlusion but continue to show the right P2 area of stenosis.  MRI brain this time was also negative for stroke.  Hence general neurology was consulted for possible complex migraine.  Currently for her headache she is on amitriptyline, it was increased to 20 mg at night.  Compazine was ordered as needed.  Inpatient team ordered acetaminophen, ibuprofen, and low-dose oxycodone as needed.    Today patient notes that she had a history of daily headaches that improved following her cervical fusion.  Today denies any neck pain, light sensitivity, sound sensitivity, or nausea.  Does endorse some blurry vision and feeling her eyes have pressure behind it.  Endorses throbbing tinnitus.  Also notes top of the head pain that currently is a 5/10 but was a 10/10 last night described as a splitting headache.  She notes that she would not do anything when the headache started following she took her blood pressure in the systolic number was 218 she rechecked it and it was 190.  In regards to her blurry vision she states that started after the first severe headache she had in early February.  She had a LP done in January that was negative for meningitis but did not have opening pressure.  Last night she had improvement following Compazine and Benadryl but also stated that it made her sleepy.  Headaches worsen whenever she lays down.    MEDICAL DOCUMENTATION REVIEWED: HP, Stroke note             Past Medical History:     Past Medical History:   Diagnosis Date     Breast CA in situ 1987     Cancer (H) 1987    Right Breast treated with surgery     Cerebral infarction (H)      Chronic constipation      Fibromyalgia      History of blood transfusion      Malignant neoplasm of female breast, unspecified estrogen receptor status, unspecified laterality, unspecified site of breast (H) 06/17/2021     Meningitis     Several times as an adult     Osteoarthritis 02/01/2011     Osteopenia 02/01/2011     Pneumonia      Pterygium eye 08/26/2013     Reactive airway disease 02/01/2011     Seasonal allergies      Shingles     Prior to 2008 - scalp     Skin cancer     SCC - hand, left nose     Uncomplicated asthma          Past Surgical History:     Past Surgical History:   Procedure Laterality Date     anterior c-disc fusion       ARTHROPLASTY KNEE Left 10/17/2022    Procedure: LEFT TOTAL KNEE ARTHROPLASTY;  Surgeon: Jax Le MD;  Location:  OR     BLEPHAROPLASTY, BROW LIFT BILATERAL, COMBINED Bilateral 6/18/2018    Procedure: COMBINED BLEPHAROPLASTY, BROW LIFT BILATERAL;  BILATERAL UPPER LID BLEPHAROPLASTY; BILATERAL BROW PTOSIS REPAIR;  Surgeon: Сергей Walters MD;  Location:  EC     CHOLECYSTECTOMY       COLONOSCOPY N/A 10/19/2017    Procedure: COLONOSCOPY;  COLONOSCOPY;  Surgeon: Niko Wong MD;  Location:  GI     COLONOSCOPY N/A 8/27/2022    Procedure: COLONOSCOPY, WITH POLYPECTOMY AND BIOPSY;  Surgeon: Abraham Rogers MD;  Location:  GI     D & C      Bleeding before hysterectomy     DISCECTOMY, FUSION CERVICAL ANTERIOR ONE LEVEL, COMBINED N/A 9/20/2023    Procedure: ANTERIOR CERVICAL 5 TO CERVICAL 6 DISCECTOMY AND FUSION;  Surgeon: Alex Galindo MD;  Location:  OR     ENDOSCOPY  04/21/08     ESOPHAGOSCOPY, GASTROSCOPY, DUODENOSCOPY (EGD), COMBINED N/A 8/27/2022    Procedure: ESOPHAGOGASTRODUODENOSCOPY, WITH BIOPSY;  Surgeon: Abraham Rogers MD;  Location:  GI     HYSTERECTOMY,  MARCUS      Ovaries and tubes out due to breast cancer     JOINT REPLACEMTN, KNEE RT/LT Right 01/2011    Joint Replacement knee RT, with tibial straightening (2 replacements)     MAMMOPLASTY AUGMENTATION BILATERAL      breast ca      MASTECTOMY, SIMPLE RT/LT/CLAIRE Bilateral 1989    Mastectomy Simple RT/LT/CLAIRE     MOHS MICROGRAPHIC PROCEDURE      Left lateral nose     OPEN REDUCTION INTERNAL FIXATION ANKLE  8/27/2013    Procedure: OPEN REDUCTION INTERNAL FIXATION ANKLE;  RIGHT OPEN REDUCTION INTERNAL FIXATION ANKLE WITH LIGAMENT REPAIR;  Surgeon: Nando Chang MD;  Location: [facility] OR     PTERYGIUM WITH CONJUNCTIVAL AUTOLOGOUS TRANSPLANT Left 8/29/2016    Procedure: PTERYGIUM WITH CONJUNCTIVAL AUTOLOGOUS TRANSPLANT;  Surgeon: Сергей Walters MD;  Location:  EC     REPAIR LIGAMENT ANKLE  8/27/2013    Procedure: REPAIR LIGAMENT ANKLE;;  Surgeon: Nando Chang MD;  Location: [facility] OR     SALIVARY GLAND SURGERY Left     Stone removal      SIGMOIDOSCOPY FLEXIBLE N/A 8/30/2022    Procedure: FLEXIBLE SIGMOIDOSCOPY;  Surgeon: Niko Wong MD;  Location:  GI     TONSILLECTOMY            Social History:     Social History     Socioeconomic History     Marital status:    Tobacco Use     Smoking status: Some Days     Years: 14     Types: Cigarettes     Smokeless tobacco: Never     Tobacco comments:     quit but  still smokes, but not in house   Vaping Use     Vaping Use: Never used   Substance and Sexual Activity     Alcohol use: No     Alcohol/week: 0.0 standard drinks of alcohol     Drug use: No     Sexual activity: Never     Partners: Male     Social Determinants of Health     Financial Resource Strain: Low Risk  (1/5/2024)    Financial Resource Strain      Within the past 12 months, have you or your family members you live with been unable to get utilities (heat, electricity) when it was really needed?: No   Food Insecurity: Low Risk  (1/5/2024)    Food Insecurity      Within the past 12 months, did you  worry that your food would run out before you got money to buy more?: No      Within the past 12 months, did the food you bought just not last and you didn t have money to get more?: No   Transportation Needs: Low Risk  (1/5/2024)    Transportation Needs      Within the past 12 months, has lack of transportation kept you from medical appointments, getting your medicines, non-medical meetings or appointments, work, or from getting things that you need?: No   Housing Stability: Low Risk  (1/5/2024)    Housing Stability      Do you have housing? : Yes      Are you worried about losing your housing?: No           Family History:     Family History   Problem Relation Age of Onset     Respiratory Father      Cancer Brother         sarcoidosis     Diabetes Brother      Cerebrovascular Disease Brother      Liver Disease Brother           Home Medications:     Prior to Admission Medications   Prescriptions Last Dose Informant Patient Reported? Taking?   ACE/ARB/ARNI NOT PRESCRIBED (INTENTIONAL)   Yes No   Sig: Please choose reason not prescribed from choices below.   acetaminophen (TYLENOL) 325 MG tablet   No No   Sig: Take 1-2 tablets (325-650 mg) by mouth every 6 hours as needed for mild pain   albuterol (PROAIR HFA/PROVENTIL HFA/VENTOLIN HFA) 108 (90 Base) MCG/ACT inhaler   No Yes   Sig: Inhale 2 puffs into the lungs every 6 hours as needed for shortness of breath   alendronate (FOSAMAX) 70 MG tablet Past Week  No Yes   Sig: Take 1 tablet (70 mg) by mouth every 7 days   amitriptyline (ELAVIL) 10 MG tablet Past Week  No Yes   Sig: TAKE 1 TABLET BY MOUTH AT BEDTIME FOR NERVE PAIN. IF NO RELIEF IN 4 NIGHTS INCREASE TO 2 TABLETS   aspirin (ASA) 325 MG EC tablet Past Week  No Yes   Sig: Take 1 tablet (325 mg) by mouth daily Take with food or meal.   budesonide-formoterol (SYMBICORT) 160-4.5 MCG/ACT Inhaler  Self No Yes   Sig: Inhale 2 puffs into the lungs daily   clindamycin (CLEOCIN) 300 MG capsule   Yes No   gabapentin  (NEURONTIN) 100 MG capsule Past Week  Yes Yes   Sig: Take 1 capsule (100 mg) by mouth at bedtime   pravastatin (PRAVACHOL) 10 MG tablet Past Week  No Yes   Sig: Take 1 tablet (10 mg) by mouth daily   sertraline (ZOLOFT) 50 MG tablet Past Week  No Yes   Sig: Take 1 tablet (50 mg) by mouth daily   vitamin D3 (CHOLECALCIFEROL) 50 mcg (2000 units) tablet Past Week Self Yes Yes   Sig: Take 1 tablet by mouth daily      Facility-Administered Medications: None          Allergy:     Allergies   Allergen Reactions     Levaquin Hives and Itching     Pcn [Penicillins] Hives     Tetanus Toxoid Anaphylaxis     Tetanus Toxoids Anaphylaxis     Erythromycin GI Disturbance     Amoxicillin      Duloxetine Nausea     Other reaction(s): Jittery     Silicone Rash          Inpatient Medications:   Scheduled Meds:    amitriptyline  20 mg Oral At Bedtime     aspirin  325 mg Oral Daily     gabapentin  100 mg Oral At Bedtime     sodium chloride (PF)  3 mL Intracatheter Q8H     PRN Meds: acetaminophen **OR** acetaminophen, bisacodyl, hydrALAZINE **OR** hydrALAZINE, ibuprofen, lidocaine 4%, lidocaine (buffered or not buffered), melatonin, ondansetron **OR** ondansetron, oxyCODONE, oxyCODONE IR, polyethylene glycol, prochlorperazine **OR** prochlorperazine **OR** prochlorperazine, senna-docusate **OR** senna-docusate, sodium chloride (PF)          Physical Exam:   Physical Exam   Vitals:  Height:Data Unavailable  Weight:0 lbs 0 oz   Temp: 97.1  F (36.2  C) Temp src: Oral BP: 122/61 Pulse: 76   Resp: 18 SpO2: 93 % O2 Device: None (Room air) Oxygen Delivery: 1 LPM  General Appearance: No acute distress, normal body habitus  Neuro Exam:  Mental Status Exam: Alert and oriented. Language and speech intact. Fund of knowledge normal.  Cranial Nerves: Vision/visual fields intact to finger counting.  Difficulty reading words on the board. pupils are equal and reactive to light. Extraocular movements intact. Facial strength and sensation is normal. No jaw  or tongue deviation.  Motor: Motor tone and bulk are intact in extremities.  During the upper and lower extremities  Reflexes: Symmetrically intact. Plantar signs normal.  Sensory: Vibration lightly diminished at the toes but intact at the ankles, cold intact in all 4 extremities.  Coordination: Coordination intact  Neck: No nuchal rigidity  Extremities: No clubbing, no cyanosis, no edema          Data:   ROUTINE IP LABS   CBC RESULTS:     Recent Labs   Lab 02/14/24  1904   WBC 8.9   RBC 5.17   HGB 14.4   HCT 43.9        Basic Metabolic Panel:   Recent Labs   Lab Test 02/14/24  1904 02/03/24  0845 02/02/24  1703 11/15/23  1151     --  138 138   POTASSIUM 4.0  --  4.0 3.8   CHLORIDE 102  --  100 102   CO2 28  --  30* 25   BUN 10.9  --  14.2 8.0   CR 0.84  --  0.83 0.76   * 86 157* 110*   DONNA 9.1  --  9.4 9.3     Liver panel:  Recent Labs   Lab Test 02/02/24  1703 11/15/23  1151 11/12/23  1759 11/11/23  0820 11/09/23  0756   PROTTOTAL 7.5 6.9 6.3* 6.2* 6.0*   ALBUMIN 4.3 3.8 3.4* 3.3* 3.2*   BILITOTAL 0.3 0.4 0.4 0.5 0.6   ALKPHOS 86 165* 190* 213* 260*   AST 21 36 36 44 47*   ALT 14 39 55* 75* 106*     Lipid Profile:  Recent Labs   Lab Test 09/12/23  1650 10/10/22  1415 10/27/21  1037 06/25/21  1046 09/09/19  0930   CHOL 197 207* 206* 209* 195   HDL 37* 45 43 42 40    136* 134* 139* 120*   TRIG 163* 146 160* 154* 173*     Thyroid Panel:  Recent Labs   Lab Test 11/08/23  2159   TSH 2.41      Vitamin B12: No lab results found.        IMAGING:     MRI brain w/wo contrast 2/14/2023   --IMPRESSION:  1.  No recent infarct, intracranial mass, abnormal enhancement or evidence of intracranial hemorrhage.  2.  Mild generalized volume loss and presumed chronic small vessel ischemic changes.    HEAD CTA:   1.  Fetal origin of the right posterior cerebral artery with small right P2 segment, possibly stenosed, unchanged.  2.  Otherwise, no evidence of large vessel occlusion or high-grade  stenosis.     NECK CTA:  1.  Moderate stenosis at the left vertebral artery origin, unchanged.  2.  Otherwise, no evidence of large vessel occlusion or high-grade stenosis    Time:  75minutes evaluation and management.     Nery Barker M.D.  [facility]  Office [phone]

## 2024-02-15 NOTE — PHARMACY-ADMISSION MEDICATION HISTORY
Pharmacist Admission Medication History    Admission medication history is complete. The information provided in this note is only as accurate as the sources available at the time of the update.    Information Source(s): Patient, Hospital records, and CareEverywhere/SureScripts via phone    Pertinent Information:     Changes made to PTA medication list:  Added: None  Deleted: None  Changed: None    Allergies reviewed with patient and updates made in EHR: no    Medication History Completed By: RENATE LINARES [facility] 2/15/2024 7:36 AM    Prior to Admission medications    Medication Sig Last Dose Taking? Auth Provider Long Term End Date   albuterol (PROAIR HFA/PROVENTIL HFA/VENTOLIN HFA) 108 (90 Base) MCG/ACT inhaler Inhale 2 puffs into the lungs every 6 hours as needed for shortness of breath  Yes Madison Sanderson MD Yes    alendronate (FOSAMAX) 70 MG tablet Take 1 tablet (70 mg) by mouth every 7 days Past Week Yes Long Ferrari MD Yes    amitriptyline (ELAVIL) 10 MG tablet TAKE 1 TABLET BY MOUTH AT BEDTIME FOR NERVE PAIN. IF NO RELIEF IN 4 NIGHTS INCREASE TO 2 TABLETS Past Week Yes Long Ferrari MD Yes    aspirin (ASA) 325 MG EC tablet Take 1 tablet (325 mg) by mouth daily Take with food or meal. Past Week Yes Licha Herndon MD No    budesonide-formoterol (SYMBICORT) 160-4.5 MCG/ACT Inhaler Inhale 2 puffs into the lungs daily  Yes Deandra Strong, APRN CNP Yes    gabapentin (NEURONTIN) 100 MG capsule Take 1 capsule (100 mg) by mouth at bedtime Past Week Yes Licha Herndon MD Yes    pravastatin (PRAVACHOL) 10 MG tablet Take 1 tablet (10 mg) by mouth daily Past Week Yes Long Ferrari MD Yes    sertraline (ZOLOFT) 50 MG tablet Take 1 tablet (50 mg) by mouth daily Past Week Yes Long Ferrari MD Yes    vitamin D3 (CHOLECALCIFEROL) 50 mcg (2000 units) tablet Take 1 tablet by mouth daily Past Week Yes Reported, Patient     ACE/ARB/ARNI NOT PRESCRIBED (INTENTIONAL) Please choose  reason not prescribed from choices below.   Long Ferrari MD Yes    acetaminophen (TYLENOL) 325 MG tablet Take 1-2 tablets (325-650 mg) by mouth every 6 hours as needed for mild pain   Madison Sanderson MD No    clindamycin (CLEOCIN) 300 MG capsule    Reported, Patient

## 2024-02-15 NOTE — PROGRESS NOTES
[facility]  Hospitalist Progress Note    Assessment & Plan   Macey Romero is a 73 year old female who was admitted on 2/14/2024.     Past medical history significant for Prediabetes, CKD, COPD, Tobacco use D/O, Fibromyalgia, Chronic pain syndrome, MDD with a anxiety, Vit D Def, History of cervical spine stenosis s/p C5-C6 discectomy who was registered to short-stay/observation due to complex migraine headache.       Presented to the ED with onset of headache at approximately 4:30 PM and associated left-sided facial paresthesias, primarily around the corner of her mouth, left arm paresthesias.  Nearly identical admission 2/2/2024 for TIA versus complex migraine.  Negative MR imaging, but feels on ready to discharge home.    Complex migraine headache  Patient with recurrent headaches with associated left facial and arm paresthesias.  2 admissions this month now for the same.  History of TIA, but also notes she has had issues with headache and word finding difficulty over the past 10 years or more by her description.  Stroke neurology involved in the emergency department and following negative MR findings, recommend general neurology evaluation.  -Increase PTA amitriptyline to 20 mg daily.  -Acetaminophen, ibuprofen, low-dose oxycodone available as needed.  -As needed Compazine available if needed.  -General neurology consult appreciated:   --Reviewed Consult note from 2/15.     --IV Magnesium.     --Obtain MRV head to assess for potential thrombosis.     --Proceed with LP and obtain opening pressure as well as send CSF for studies.      --LP completed  this afternoon and reviewed IR note.    -Outpatient ophthalmology follow-up.  Discussed with patient admission and she believes she is due for repeat exam.    Hyperlipidemia  -Can resume prior to admission pravastatin at discharge.  Held given observation admission.    History of TIA  History of right-sided facial symptoms in 2005 which was  "attributed to TIA, felt to have TIA in November as well.  Now with repeated events with left-sided facial paresthesias felt less likely to represent TIA with serial negative MR imaging.  *Noted small right P2 segment, possibly stenosed, at the right posterior cerebral artery that was unchanged on Head/Neck CTA completed 2/14.     -Resume statin at discharge.  -Continue aspirin 325 mg daily.    Fibromyalgia, chronic pain syndrome  Reports significant improvement in her chronic pain since initiation on gabapentin and amitriptyline.  -Continue gabapentin 100 mg at bedtime.  Felt groggy on 100 mg 3 times daily dosing, so decreased at last admission.  -Amitriptyline increased to 20 mg at bedtime as above.    Reported memory loss, word finding difficulty  Note that patient had neuropsychiatric testing in April 2022 through [facility].  Intact cognitive function for age noted with isolated low score on verbal learning and memory was noted to be mildly impaired.  Thought to have major contributors primarily secondary to mood.  -As of April 2022, repeat neurocognitive testing recommended at 1 year to assess for change.  Can be pursued as an outpatient.  -Increase in amitriptyline as above.  -Not currently on Zoloft, though she reports a plan to initiate this medication in the future.    Tobacco use disorder  Patient continues to \"sneak\" cigarettes several times per day.  Reported 2 to 3 cigarettes/day with  still smoking 4 packs/day outside of the home.  -Discussed importance of complete tobacco cessation, especially in the setting of COPD and suspected Raynaud's syndrome.    COPD  -Prior to admission Symbicort, albuterol can be resumed at discharge.  Held given observation admission.  -Can add as needed albuterol nebulizer treatments if needed during hospitalization.    Suspected Raynaud's syndrome  Reported occasional purple discoloration and cold hands and feet.  Utilizes hand warmers to prevent " "this.  -As noted at last admission, could consider calcium channel blocker initiation for Raynaud's, but she does have a history of significant chronic constipation which may be worsened with calcium channel blocker.  -Beta-blockade could be considered for migraine prophylaxis, but might worsen Raynaud's syndrome.  Increasing amitriptyline as above.  -Complete tobacco cessation.  Discussed again with patient.  -Continue aspirin 325 mg daily.  -Continue with preventative strategies such as gloves/mittens and cold weather, hand warmers.    Elevated blood pressure  As of February 2 hospitalization, with headache she was having blood pressures in the 180 systolic range.  Reports similar elevated blood pressures at home prior to arrival and subsequently normotensive as pain improves.  Baseline blood pressures are in the 100 systolic range per her report.  Suspect elevated blood pressure is a pain response, though difficult to ascertain if elevated blood pressure precedes headache event as she only checks her blood pressure after she is in pain.  -Continue to monitor blood pressure.  No antihypertensive medication added as a scheduled med currently.    Chronic constipation  -As needed bowel regimen available as needed.    Known heart murmur  *Appreciated on physical exam 2/15.  Also noted during previous hospitalization 11/2023 at which time she had an ECHO that was unremarkable.      History of breast cancer  Treated surgically.    Clinically Significant Risk Factors Present on Admission                # Drug Induced Platelet Defect: home medication list includes an antiplatelet medication   # Hypertension: Noted on problem list      # Overweight: Estimated body mass index is 27.03 kg/m  as calculated from the following:    Height as of 2/5/24: 1.613 m (5' 3.5\").    Weight as of 2/5/24: 70.3 kg (155 lb).       # Asthma: noted on problem list          Diet: Regular Diet Adult     DVT Prophylaxis: Pneumatic Compression " Devices   Boudreaux Catheter: Not present  Lines: None     Cardiac Monitoring: None  Code Status: Full Code      Observation Goals: -diagnostic tests and consults completed and resulted, -vital signs normal or at patient baseline, -adequate pain control on oral analgesics, -returns to baseline functional status, -safe disposition plan has been identified, Nurse to notify provider when observation goals have been met and patient is ready for discharge.     Disposition Plan    Potentially in the next 24-48 hours.  Awaiting completion of Neurology work-up.      The patient's care was discussed with the Bedside Nurse and Patient.      The patient has been discussed with Dr. Thomas, who agrees with the assessment and plan at this time.    Remy Munoz PA-C  [facility]  Securely message with the Vocera Web Console (learn more here)  Text page via [employer] Paging/Directory      Interval History   Patient was seen after she had the LP completed.  She complained of a headache 7/10 located on the crown of her head and back of her neck.  She stated she had gotten some relief yesterday for a brief period of time.  She also mentioned that she did not sleep well.      She denied fever, chills, shortness of breath or abdominal pain.  She had a brief episode of chest discomfort/pressure after receiving the contrast for the imaging studies completed earlier but it resolved.  She stated her vision seems blurry at times and that her eye are sensitive to light.      We reviewed results thus far and discussed Neurology recommendations and plan.  Patient had mentioned previous finding of small right P2 segment.      -Data reviewed today: I reviewed all new labs and imaging results over the last 24 hours. I personally reviewed no images or EKG's today.    Physical Exam   BP (!) 152/69 (BP Location: Right arm)   Pulse 73   Temp 98.7  F (37.1  C)   Resp 18   SpO2 97%       Constitutional: Awake, alert,  cooperative, no apparent distress.    ENT: Normocephalic, without obvious abnormality, atraumatic, oral pharynx with dry mucus membranes, tonsils without erythema or exudates.  Neck: Supple, symmetrical, trachea midline, no adenopathy.  Pulmonary: No increased work of breathing, fair air exchange, clear to auscultation bilaterally, no crackles or wheezing.  Cardiovascular: Regular rate and rhythm, normal S1 and S2, no S3 or S4, and murmur noted.  GI: Normal bowel sounds, soft, non-distended, non-tender.  Skin/Integumen: Visualized skin appeared clear.  Neuro: CN II-XII grossly intact.  Psych:  Alert and oriented x 3. Normal affect.  Extremities: No lower extremity edema noted, and calves are non-TTP bilaterally.       Medical Decision Making       Please see A&P for additional details of medical decision making.  Greater than 50 MINUTES SPENT BY ME on the date of service doing chart review, history, exam, documentation & further activities per the note.         Medications      amitriptyline  20 mg Oral At Bedtime    aspirin  325 mg Oral Daily    gabapentin  100 mg Oral At Bedtime    magnesium sulfate  2 g Intravenous Once    sodium chloride (PF)  3 mL Intracatheter Q8H       Data   Recent Labs   Lab 02/14/24  1904   WBC 8.9   HGB 14.4   MCV 85      INR 0.97      POTASSIUM 4.0   CHLORIDE 102   CO2 28   BUN 10.9   CR 0.84   ANIONGAP 10   DONNA 9.1   *       Recent Results (from the past 24 hour(s))   CT Head w/o Contrast    Narrative    EXAM: CT HEAD W/O CONTRAST  LOCATION: [facility]  DATE: 2/14/2024    INDICATION: Code Stroke to evaluate for potential thrombolysis and thrombectomy. Left facial droop, left arm droop.  COMPARISON: Head CT 11/09/2023  TECHNIQUE: Routine CT Head without IV contrast. Multiplanar reformats. Dose reduction techniques were used.    FINDINGS:  No evidence of hemorrhage. No evidence of acute infarct. Mild generalized volume loss with background of  nonspecific white matter hypoattenuation presumably representing chronic small vessel ischemic change. No acute osseous abnormality. Bilateral lens   replacements.      Impression    IMPRESSION:  1.  No evidence of hemorrhage.  2.  No evidence of acute infarct.    Preliminary findings were discussed by phone between Dr. Ny and Dr. Mayer at approximately 7:18 PM on 2/14/2024.   CTA Head Neck with Contrast    Narrative    EXAM: CTA HEAD NECK W CONTRAST  LOCATION: [facility]  DATE: 2/14/2024    INDICATION: Code stroke to evaluate for potential thrombolysis and thrombectomy. PLEASE READ IMMEDIATELY. Left facial and arm droop/weakness.    COMPARISON: CTA of the head and neck 11/09/2023  CONTRAST: 67 mL Isovue 370  TECHNIQUE: Axial helical CT images of the head and neck vessels obtained during the arterial phase of intravenous contrast administration. Axial 2D reconstructed images and multiplanar 3D MIP reconstructed images of the head and neck vessels were   performed by the technologist. Dose reduction techniques were used. All stenosis measurements made according to NASCET criteria unless otherwise specified.    FINDINGS:     HEAD CTA:  Fetal origin of the right posterior cerebral artery with small right P2 segment, possibly stenosed, unchanged. Otherwise, no evidence of large vessel occlusion, high-grade stenosis, aneurysm, or high-flow vascular malformation. Scattered mild   atherosclerotic plaquing including mild focal plaque involving the left vertebral artery at the V4 segment.    NECK CTA:  Mild motion artifact. Moderate (approximately 50%) stenosis of the proximal left vertebral artery, unchanged. Otherwise, no evidence of large vessel occlusion, high-grade stenosis, or dissection. Scattered mild atherosclerotic plaquing.    INCIDENTAL FINDINGS: Cervical spine degenerative changes and postoperative changes. Scattered apical pulmonary mosaic attenuation. Multiple apical pulmonary  nodules, similar appearance compared to the prior chest CT (recommend follow-up as indicated).   Left submandibular gland is atrophic or absent.      Impression    IMPRESSION:     HEAD CTA:   1.  Fetal origin of the right posterior cerebral artery with small right P2 segment, possibly stenosed, unchanged.  2.  Otherwise, no evidence of large vessel occlusion or high-grade stenosis.    NECK CTA:  1.  Moderate stenosis at the left vertebral artery origin, unchanged.  2.  Otherwise, no evidence of large vessel occlusion or high-grade stenosis.   CT Head Perfusion w Contrast    Narrative    EXAM: CT HEAD PERFUSION W CONTRAST  LOCATION: [facility]  DATE: 2/14/2024    INDICATION: Left-sided weakness.  COMPARISON: None.  TECHNIQUE: CT cerebral perfusion was performed utilizing a second contrast bolus. Perfusion data were post processed with generation of standard perfusion maps and estimation of ischemic/infarcted volumes utilizing standard threshold values. Dose   reduction techniques were used.   CONTRAST: 50 mL Isovue 370.      Impression    IMPRESSION: Unremarkable CT perfusion. No convincing acute perfusion abnormality. Few scattered transit-time asymmetries, presumably artifactual.   MR Brain w/o & w Contrast    Narrative    EXAM: MR BRAIN W/O and W CONTRAST  LOCATION: [facility]  DATE: 2/14/2024    INDICATION: L sided weakness and paresthesia  COMPARISON: CT same day MRI 02/02/2024  CONTRAST: 7 Gadavist  TECHNIQUE: Routine multiplanar multisequence head MRI without and with intravenous contrast.    FINDINGS:  INTRACRANIAL CONTENTS: No acute or subacute infarct. No mass, acute hemorrhage, or extra-axial fluid collections. Scattered nonspecific T2/FLAIR hyperintensities within the cerebral white matter most consistent with mild chronic microvascular ischemic   change. Mild generalized cerebral atrophy. No hydrocephalus. Normal position of the cerebellar tonsils. No  pathologic contrast enhancement.    SELLA: No abnormality accounting for technique.    OSSEOUS STRUCTURES/SOFT TISSUES: Normal marrow signal. The major intracranial vascular flow voids are maintained.     ORBITS: No abnormality accounting for technique.     SINUSES/MASTOIDS: No paranasal sinus mucosal disease. No middle ear or mastoid effusion.       Impression    IMPRESSION:  1.  No recent infarct, intracranial mass, abnormal enhancement or evidence of intracranial hemorrhage.  2.  Mild generalized volume loss and presumed chronic small vessel ischemic changes.

## 2024-02-15 NOTE — ED NOTES
C/o headache,chest pressure and SOB,stated that still having left sided facial numbness and jaw,mouth keep biting with right eyelid twitching.    EKG ongoing.

## 2024-02-15 NOTE — PROGRESS NOTES
RECEIVING UNIT ED HANDOFF REVIEW    ED Nurse Handoff Report was reviewed by: Christiano Juan RN on February 15, 2024 at 1:54 AM

## 2024-02-15 NOTE — H&P
[facility]    History and Physical - Hospitalist Service       Date of Admission:  2/14/2024    Assessment & Plan      Macey Romero is a 73 year old female admitted on 2/14/2024. She presents to the emergency department with onset of headache at approximately 4:30 PM and associated left-sided facial paresthesias, primarily around the corner of her mouth, left arm paresthesias.  Nearly identical admission 2/2/2024 for TIA versus complex migraine.  Negative MR imaging, but feels on ready to discharge home.    Complex migraine headache: Patient with recurrent headaches with associated left facial and arm paresthesias.  2 admissions this month now for the same.  History of TIA, but also notes she has had issues with headache and word finding difficulty over the past 10 years or more by her description.  Stroke neurology involved in the emergency department and following negative MR findings, recommend general neurology evaluation  -Increasing amitriptyline to 20 mg daily  -Acetaminophen, ibuprofen, low-dose oxycodone available as needed  -As needed Compazine available if needed  -General neurology consulted  -Outpatient ophthalmology follow-up.  Discussed with patient admission and she believes she is due for repeat exam    Hyperlipidemia:  -Can resume prior to admission pravastatin at discharge.  Held given observation admission    History of TIA: History of right-sided facial symptoms in 2005 which was attributed to TIA, felt to have TIA in November as well.  Now with repeated events with left-sided facial paresthesias felt less likely to represent TIA with serial negative MR imaging  -Resume statin at discharge  -Continue aspirin 325 mg daily    Fibromyalgia, chronic pain syndrome: Reports significant improvement in her chronic pain since initiation on gabapentin and amitriptyline.  -Continue gabapentin 100 mg at bedtime.  Felt groggy on 100 mg 3 times daily dosing, so decreased at last  "admission.  -Amitriptyline increased to 20 mg at bedtime as above    Reported memory loss, word finding difficulty: Note that patient had neuropsychiatric testing in April 2022 through [facility].  Intact cognitive function for age noted with isolated low score on verbal learning and memory was noted to be mildly impaired.  Thought to have major contributors primarily secondary to mood  -As of April 2022, repeat neurocognitive testing recommended at 1 year to assess for change.  Can be pursued as an outpatient.  -Increase in amitriptyline as above  -Not currently on Zoloft, though she reports a plan to initiate this medication in the future    Tobacco use disorder: Continues to \"sneak\" cigarettes several times per day.  Reports 2 to 3 cigarettes/day with  still smoking 4 packs/day outside of the home.  -Discussed importance of complete tobacco cessation, especially in the setting of COPD and suspected Raynaud's syndrome.    COPD:  -Prior to admission Symbicort, albuterol can be resumed at discharge.  Held given observation admission  -Can add as needed albuterol nebulizer treatments if needed during hospitalization    Suspected Raynaud's syndrome: Reports occasional purple discoloration and cold hands and feet.  Utilizes hand warmers to prevent this.  -As noted at last admission, could consider calcium channel blocker initiation for Raynaud's, but she does have a history of significant chronic constipation which may be worsened with calcium channel blocker.  -Beta-blockade could be considered for migraine prophylaxis, but might worsen Raynaud's syndrome.  Increasing amitriptyline as above  -Complete tobacco cessation.  Discussed again with patient  -Continue aspirin 325 mg daily  -Continue with preventative strategies such as gloves/mittens and cold weather, hand warmers.    Elevated blood pressure: As of February 2 hospitalization, with headache she was having blood pressures in the 180 systolic " "range.  Reports similar elevated blood pressures at home tonight prior to arrival and subsequently normotensive as pain improves.  Baseline blood pressures are in the 100 systolic range per her report.  Suspect elevated blood pressure is a pain response, though difficult to ascertain if elevated blood pressure precedes headache event as she only checks her blood pressure after she is in pain  -Continue to monitor blood pressure.  No antihypertensive medication added as a scheduled med currently.    Chronic constipation:  -As needed bowel regimen available as needed    History of breast cancer: Treated surgically.          Diet: NPO for Medical/Clinical Reasons Except for: Meds    DVT Prophylaxis: Pneumatic Compression Devices  Boudreaux Catheter: Not present  Lines: None     Cardiac Monitoring: None  Code Status:  Full code.  Confirmed with patient on admission    Clinically Significant Risk Factors Present on Admission                # Drug Induced Platelet Defect: home medication list includes an antiplatelet medication   # Hypertension: Noted on problem list      # Overweight: Estimated body mass index is 27.03 kg/m  as calculated from the following:    Height as of 2/5/24: 1.613 m (5' 3.5\").    Weight as of 2/5/24: 70.3 kg (155 lb).       # Asthma: noted on problem list        Disposition Plan    anticipated discharge 2/15/2024 to home       Jigar Hatch MD  Hospitalist Service  [facility]  Securely message with [employer] (more info)  Text page via [employer] Paging/Directory     ______________________________________________________________________    Chief Complaint   Headache, left facial paresthesias and left arm weakness    History is obtained from the patient, chart review, discussion with Dr. Mayer in the emergency department.    History of Present Illness   Macey Romero is a 73 year old female who presents to the emergency department for evaluation of acute onset headache in a [place] " "distribution occurring at approximately 4:30 PM and associated with some left facial paresthesias, left hand weakness and numbness.  Very similar presentation 2/2/2024 thought potentially to represent TIA versus complex migraine.  Negative stroke evaluation at that time, 81 mg aspirin was increased to full dose aspirin during that hospitalization given history of TIA.    Note that patient has had issues with intermittent word finding difficulty, headaches, paresthesias for several years.  She is followed with neurology as an outpatient in the past.  She reports that she actually has completed neuropsychiatric testing as well, and it appears that this was in April 2022 through [facility].  Largely normal testing with isolated low verbal memory signal.    Patient describes issues with headaches even predating TIA in 2005.  At last admission reported that she has never been diagnosed with migraines in the past.  She does have photophobia and nausea associated with her headaches intermittently, and this was the case tonight.    Responded to analgesics in the emergency department with improvement in her headache.  She still reports feeling \"out of body\" following headache tonight, and tells me that this has been the case in the past as well.  I see mention of a similar description from her outpatient neurology follow-up in 2020.    Patient has been taking her full dose aspirin daily.  MRI negative for stroke in the emergency department.  Recommendation by stroke neurology was for general neurology evaluation for complex migraine.  Patient's headache is improved, and discussed discharge with outpatient follow-up versus admission.  She is afraid she might have recurrence of her headache, and opts for observation admission.    No fevers or chills.  Had been in her usual state of health this afternoon.  Headache occurred while she was at rest, binge watching television.  After onset of headache was building, she " checked her blood pressure and found it to be elevated, similar to last hospital encounter.      Past Medical History    Past Medical History:   Diagnosis Date    Breast CA in situ 1987    Cancer (H) 1987    Right Breast treated with surgery    Cerebral infarction (H)     Chronic constipation     Fibromyalgia     History of blood transfusion     Malignant neoplasm of female breast, unspecified estrogen receptor status, unspecified laterality, unspecified site of breast (H) 06/17/2021    Meningitis     Several times as an adult    Osteoarthritis 02/01/2011    Osteopenia 02/01/2011    Pneumonia     Pterygium eye 08/26/2013    Reactive airway disease 02/01/2011    Seasonal allergies     Shingles     Prior to 2008 - scalp    Skin cancer     SCC - hand, left nose    Uncomplicated asthma        Past Surgical History   Past Surgical History:   Procedure Laterality Date    anterior c-disc fusion      ARTHROPLASTY KNEE Left 10/17/2022    Procedure: LEFT TOTAL KNEE ARTHROPLASTY;  Surgeon: Jax Le MD;  Location:  OR    BLEPHAROPLASTY, BROW LIFT BILATERAL, COMBINED Bilateral 6/18/2018    Procedure: COMBINED BLEPHAROPLASTY, BROW LIFT BILATERAL;  BILATERAL UPPER LID BLEPHAROPLASTY; BILATERAL BROW PTOSIS REPAIR;  Surgeon: Сергей Walters MD;  Location:  EC    CHOLECYSTECTOMY      COLONOSCOPY N/A 10/19/2017    Procedure: COLONOSCOPY;  COLONOSCOPY;  Surgeon: Niko Wong MD;  Location:  GI    COLONOSCOPY N/A 8/27/2022    Procedure: COLONOSCOPY, WITH POLYPECTOMY AND BIOPSY;  Surgeon: Abraham Rogers MD;  Location:  GI    D & C      Bleeding before hysterectomy    DISCECTOMY, FUSION CERVICAL ANTERIOR ONE LEVEL, COMBINED N/A 9/20/2023    Procedure: ANTERIOR CERVICAL 5 TO CERVICAL 6 DISCECTOMY AND FUSION;  Surgeon: Alex Galindo MD;  Location:  OR    ENDOSCOPY  04/21/08    ESOPHAGOSCOPY, GASTROSCOPY, DUODENOSCOPY (EGD), COMBINED N/A 8/27/2022    Procedure: ESOPHAGOGASTRODUODENOSCOPY, WITH BIOPSY;   Surgeon: Abraham Rogers MD;  Location:  GI    HYSTERECTOMY, MARCUS      Ovaries and tubes out due to breast cancer    JOINT REPLACEMTN, KNEE RT/LT Right 01/2011    Joint Replacement knee RT, with tibial straightening (2 replacements)    MAMMOPLASTY AUGMENTATION BILATERAL      breast ca     MASTECTOMY, SIMPLE RT/LT/CLAIRE Bilateral 1989    Mastectomy Simple RT/LT/CLAIRE    MOHS MICROGRAPHIC PROCEDURE      Left lateral nose    OPEN REDUCTION INTERNAL FIXATION ANKLE  8/27/2013    Procedure: OPEN REDUCTION INTERNAL FIXATION ANKLE;  RIGHT OPEN REDUCTION INTERNAL FIXATION ANKLE WITH LIGAMENT REPAIR;  Surgeon: Nando Chang MD;  Location:  OR    PTERYGIUM WITH CONJUNCTIVAL AUTOLOGOUS TRANSPLANT Left 8/29/2016    Procedure: PTERYGIUM WITH CONJUNCTIVAL AUTOLOGOUS TRANSPLANT;  Surgeon: Сергей Walters MD;  Location:  EC    REPAIR LIGAMENT ANKLE  8/27/2013    Procedure: REPAIR LIGAMENT ANKLE;;  Surgeon: Nando Chang MD;  Location:  OR    SALIVARY GLAND SURGERY Left     Stone removal     SIGMOIDOSCOPY FLEXIBLE N/A 8/30/2022    Procedure: FLEXIBLE SIGMOIDOSCOPY;  Surgeon: Niko Wong MD;  Location:  GI    TONSILLECTOMY         Prior to Admission Medications   Prior to Admission Medications   Prescriptions Last Dose Informant Patient Reported? Taking?   ACE/ARB/ARNI NOT PRESCRIBED (INTENTIONAL)   Yes No   Sig: Please choose reason not prescribed from choices below.   Patient not taking: Reported on 2/5/2024   acetaminophen (TYLENOL) 325 MG tablet   No No   Sig: Take 1-2 tablets (325-650 mg) by mouth every 6 hours as needed for mild pain   albuterol (PROAIR HFA/PROVENTIL HFA/VENTOLIN HFA) 108 (90 Base) MCG/ACT inhaler   No No   Sig: Inhale 2 puffs into the lungs every 6 hours as needed for shortness of breath   Patient not taking: Reported on 2/12/2024   alendronate (FOSAMAX) 70 MG tablet   No No   Sig: Take 1 tablet (70 mg) by mouth every 7 days   amitriptyline (ELAVIL) 10 MG tablet   No No   Sig: TAKE  1 TABLET BY MOUTH AT BEDTIME FOR NERVE PAIN. IF NO RELIEF IN 4 NIGHTS INCREASE TO 2 TABLETS   aspirin (ASA) 325 MG EC tablet   No No   Sig: Take 1 tablet (325 mg) by mouth daily Take with food or meal.   budesonide-formoterol (SYMBICORT) 160-4.5 MCG/ACT Inhaler  Self No No   Sig: Inhale 2 puffs into the lungs daily   Patient not taking: Reported on 2/12/2024   clindamycin (CLEOCIN) 300 MG capsule   Yes No   Sig: TAKE 2 CAPSULES BY MOUTH 1 HOUR BEFORE DENTAL APPOINTMENT   Patient not taking: Reported on 2/5/2024   gabapentin (NEURONTIN) 100 MG capsule   Yes No   Sig: Take 1 capsule (100 mg) by mouth at bedtime   pravastatin (PRAVACHOL) 10 MG tablet   No No   Sig: Take 1 tablet (10 mg) by mouth daily   sertraline (ZOLOFT) 50 MG tablet   No No   Sig: Take 1 tablet (50 mg) by mouth daily   vitamin D3 (CHOLECALCIFEROL) 50 mcg (2000 units) tablet  Self Yes No   Sig: Take 1 tablet by mouth daily      Facility-Administered Medications: None           Physical Exam   Vital Signs: Temp: 97.9  F (36.6  C) Temp src: Temporal BP: 99/66 Pulse: 77   Resp: 13 SpO2: 96 % O2 Device: Nasal cannula Oxygen Delivery: 2 LPM      General Appearance: Generally well-appearing and nontoxic 73-year-old female resting on [facility].  Eyes: No scleral icterus or injection  HEENT: Normocephalic and atraumatic  Respiratory: Single isolated expiratory wheeze in left midlung field.  No increased work of breathing.  No crackles  Cardiovascular: Regular rate and rhythm with heart rate currently in the 80s.  No appreciable murmur  Lymph/Hematologic: Some trace-1+ chronic lymphedema bilateral lower extremities  Skin: Some superficial venous varicosities of bilateral lower extremities associated with chronic lymphedema.    Musculoskeletal: Muscular tone and bulk intact in all extremities and appropriate for age.  Neurologic: Alert, conversant, appropriate in conversation.  Mental status is grossly intact.  No identifiable focal neurologic deficits,  but reports paresthesias around left face corner of mouth  Psychiatric: Blunted affect.  Pleasant    Medical Decision Making       65 MINUTES SPENT BY ME on the date of service doing chart review, history, exam, documentation & further activities per the note.      Data     I have personally reviewed the following data over the past 24 hrs:    8.9  \   14.4   / 214     140 102 10.9 /  112 (H)   4.0 28 0.84 \     Trop: <6 BNP: N/A     INR:  0.97 PTT:  29   D-dimer:  N/A Fibrinogen:  N/A       Imaging results reviewed over the past 24 hrs:   Recent Results (from the past 24 hour(s))   CT Head w/o Contrast    Narrative    EXAM: CT HEAD W/O CONTRAST  LOCATION: [facility]  DATE: 2/14/2024    INDICATION: Code Stroke to evaluate for potential thrombolysis and thrombectomy. Left facial droop, left arm droop.  COMPARISON: Head CT 11/09/2023  TECHNIQUE: Routine CT Head without IV contrast. Multiplanar reformats. Dose reduction techniques were used.    FINDINGS:  No evidence of hemorrhage. No evidence of acute infarct. Mild generalized volume loss with background of nonspecific white matter hypoattenuation presumably representing chronic small vessel ischemic change. No acute osseous abnormality. Bilateral lens   replacements.      Impression    IMPRESSION:  1.  No evidence of hemorrhage.  2.  No evidence of acute infarct.    Preliminary findings were discussed by phone between Dr. Ny and Dr. Mayer at approximately 7:18 PM on 2/14/2024.   CTA Head Neck with Contrast    Narrative    EXAM: CTA HEAD NECK W CONTRAST  LOCATION: [facility]  DATE: 2/14/2024    INDICATION: Code stroke to evaluate for potential thrombolysis and thrombectomy. PLEASE READ IMMEDIATELY. Left facial and arm droop/weakness.    COMPARISON: CTA of the head and neck 11/09/2023  CONTRAST: 67 mL Isovue 370  TECHNIQUE: Axial helical CT images of the head and neck vessels obtained during the arterial phase of  intravenous contrast administration. Axial 2D reconstructed images and multiplanar 3D MIP reconstructed images of the head and neck vessels were   performed by the technologist. Dose reduction techniques were used. All stenosis measurements made according to NASCET criteria unless otherwise specified.    FINDINGS:     HEAD CTA:  Fetal origin of the right posterior cerebral artery with small right P2 segment, possibly stenosed, unchanged. Otherwise, no evidence of large vessel occlusion, high-grade stenosis, aneurysm, or high-flow vascular malformation. Scattered mild   atherosclerotic plaquing including mild focal plaque involving the left vertebral artery at the V4 segment.    NECK CTA:  Mild motion artifact. Moderate (approximately 50%) stenosis of the proximal left vertebral artery, unchanged. Otherwise, no evidence of large vessel occlusion, high-grade stenosis, or dissection. Scattered mild atherosclerotic plaquing.    INCIDENTAL FINDINGS: Cervical spine degenerative changes and postoperative changes. Scattered apical pulmonary mosaic attenuation. Multiple apical pulmonary nodules, similar appearance compared to the prior chest CT (recommend follow-up as indicated).   Left submandibular gland is atrophic or absent.      Impression    IMPRESSION:     HEAD CTA:   1.  Fetal origin of the right posterior cerebral artery with small right P2 segment, possibly stenosed, unchanged.  2.  Otherwise, no evidence of large vessel occlusion or high-grade stenosis.    NECK CTA:  1.  Moderate stenosis at the left vertebral artery origin, unchanged.  2.  Otherwise, no evidence of large vessel occlusion or high-grade stenosis.   CT Head Perfusion w Contrast    Narrative    EXAM: CT HEAD PERFUSION W CONTRAST  LOCATION: [facility]  DATE: 2/14/2024    INDICATION: Left-sided weakness.  COMPARISON: None.  TECHNIQUE: CT cerebral perfusion was performed utilizing a second contrast bolus. Perfusion data were  post processed with generation of standard perfusion maps and estimation of ischemic/infarcted volumes utilizing standard threshold values. Dose   reduction techniques were used.   CONTRAST: 50 mL Isovue 370.      Impression    IMPRESSION: Unremarkable CT perfusion. No convincing acute perfusion abnormality. Few scattered transit-time asymmetries, presumably artifactual.   MR Brain w/o & w Contrast    Narrative    EXAM: MR BRAIN W/O and W CONTRAST  LOCATION: [facility]  DATE: 2/14/2024    INDICATION: L sided weakness and paresthesia  COMPARISON: CT same day MRI 02/02/2024  CONTRAST: 7 Gadavist  TECHNIQUE: Routine multiplanar multisequence head MRI without and with intravenous contrast.    FINDINGS:  INTRACRANIAL CONTENTS: No acute or subacute infarct. No mass, acute hemorrhage, or extra-axial fluid collections. Scattered nonspecific T2/FLAIR hyperintensities within the cerebral white matter most consistent with mild chronic microvascular ischemic   change. Mild generalized cerebral atrophy. No hydrocephalus. Normal position of the cerebellar tonsils. No pathologic contrast enhancement.    SELLA: No abnormality accounting for technique.    OSSEOUS STRUCTURES/SOFT TISSUES: Normal marrow signal. The major intracranial vascular flow voids are maintained.     ORBITS: No abnormality accounting for technique.     SINUSES/MASTOIDS: No paranasal sinus mucosal disease. No middle ear or mastoid effusion.       Impression    IMPRESSION:  1.  No recent infarct, intracranial mass, abnormal enhancement or evidence of intracranial hemorrhage.  2.  Mild generalized volume loss and presumed chronic small vessel ischemic changes.

## 2024-02-15 NOTE — PLAN OF CARE
"Goal Outcome Evaluation:      Plan of Care Reviewed With: patient    Overall Patient Progress: no changeOverall Patient Progress: no change     Neuro:c/o HA - \"egg cracked on my head and running down\" 4-8/10 - tylenol, oxycodone, LP now, needs MRV  CV/Rhythm:WDL  Resp/02:RA  GI/Diet:tolerating small amts  :voiding  Skin/Incisions/Sites:intact, pale  Pulses/CMS:intact  Edema:none  Activity/Falls Risk:SBA, refused bed alarm  Lines/Drains/IVs:PIV  Labs/BGM:-  Test/Procedures:LP now, needs MRA  VS/Pain:stable, HA pain  DC Plan:after work up  Other:-        "

## 2024-02-15 NOTE — ED PROVIDER NOTES
History     Chief Complaint:  Hypertension and One-sided Weakness       The history is provided by the patient.      Macey Romero is a 73 year old female with history of hypertension, TIA, and stage 3a CKD who presents with hypertension, headache, and left-sided weakness. She woke up with a headache this morning and has been getting progressively worse over the day. At around 1700 she started having left facial and mouth numbness. She also notes that she was also struggling with her ability to use her left arm at this time in which grabbing items felt difficult as her left arm seemed uncoordinated. Her right arm is fine. She report she had a TIA 2 weeks ago and stayed at the hospital overnight and had her aspirin dosage increased to a full dose. She reports that he symptoms last hospitalization eventually resolved, but this episode appears to be lasting longer and she is concerned regarding a stroke. She notes her blood pressures today have been higher than usual.    Review of Systems   Constitutional:  Negative for chills and fever.   HENT:  Negative for congestion and rhinorrhea.    Respiratory:  Negative for cough and shortness of breath.    Cardiovascular:  Negative for chest pain.   Gastrointestinal:  Negative for abdominal pain, blood in stool, diarrhea, nausea and vomiting.   Genitourinary:  Negative for difficulty urinating and dysuria.   Musculoskeletal:  Negative for back pain and neck pain.   Neurological:  Positive for numbness and headaches.       Independent Historian:   None - Patient Only    Review of External Notes:   2/3/24 Discharge Summary       Medications:    Fosamax  Elavil    Neurontin  Zoloft  Pravachol    Past Medical History:    Breast cancer  Cerebral infarction  Meningitis  Osteoarthritis  Pneumonia  Shingles  Asthma  TIA  IBS  Hypertension  Stage 3a CKD  Prediabetes  UTI    Past Surgical History:    Left knee  arthroplasty  Blepharoplasty  Cholecystectomy  Discectomy  EGD  Hysterectomy  Mastectomy  MOHS  Tonsillectomy       Physical Exam   Patient Vitals for the past 24 hrs:   BP Temp Temp src Pulse Resp SpO2   02/14/24 2315 99/66 -- -- 77 13 96 %   02/14/24 2228 -- -- -- 77 11 96 %   02/14/24 2223 -- 97.9  F (36.6  C) -- -- -- --   02/14/24 1959 (!) 140/76 -- -- 82 25 95 %   02/14/24 1944 (!) 163/75 -- -- 75 15 --   02/14/24 1925 (!) 168/92 -- -- 88 20 98 %   02/14/24 1829 (!) 187/75 98  F (36.7  C) Temporal 88 18 100 %        Constitutional:       General: Not in acute distress.     Appearance: Normal appearance  HENT:      Head: Normocephalic and atraumatic.   Eyes:     Extraocular Movements: Extraocular movements intact.      Conjunctiva/sclera: Conjunctivae normal.      Pupils: Pupils are equal, round, and reactive to light.   Cardiovascular:     Rate and Rhythm: Normal rate and regular rhythm.   Pulmonary:      Effort: Pulmonary effort is normal.      Breath sounds: Normal breath sounds.   Abdominal:      General: Abdomen is flat. There is no distension.      Palpations: Abdomen is soft.      Tenderness: There is no abdominal tenderness.   Musculoskeletal:      Cervical back: Normal range of motion and neck supple. No rigidity.      General: No swelling or deformity.   Skin:     General: Skin is warm and dry.   Neurological:      General: Decreased sensation to the left face, left arm, and left leg.  Left finger-nose-finger ataxia.  Left arm drift, but does not hit bed.  Left leg drift, but does not hit bed.  Asymmetric smile.     NIHSS: See below     Mental Status: Alert and oriented to person, place, and time.   Psychiatric:         Mood and Affect: Mood normal.         Behavior: Behavior normal.       Emergency Department Course   ECG  ECG results from 02/14/24   EKG 12-lead, tracing only     Value    Systolic Blood Pressure     Diastolic Blood Pressure     Ventricular Rate 76    Atrial Rate 76    RI Interval 146     QRS Duration 76        QTc 456    P Axis 72    R AXIS 48    T Axis 48    Interpretation ECG      Sinus rhythm  Normal ECG  When compared with ECG of 02-FEB-2024 21:40,  No significant change was found  Confirmed by GENERATED REPORT, COMPUTER ([id number]),  NICOLE OCHOA ([id number]) on 2/14/2024 7:51:14 PM        See ED course for independent interpretation.     Imaging:  MR Brain w/o & w Contrast   Final Result   IMPRESSION:   1.  No recent infarct, intracranial mass, abnormal enhancement or evidence of intracranial hemorrhage.   2.  Mild generalized volume loss and presumed chronic small vessel ischemic changes.      CT Head Perfusion w Contrast   Final Result   IMPRESSION: Unremarkable CT perfusion. No convincing acute perfusion abnormality. Few scattered transit-time asymmetries, presumably artifactual.      CTA Head Neck with Contrast   Final Result   IMPRESSION:       HEAD CTA:    1.  Fetal origin of the right posterior cerebral artery with small right P2 segment, possibly stenosed, unchanged.   2.  Otherwise, no evidence of large vessel occlusion or high-grade stenosis.      NECK CTA:   1.  Moderate stenosis at the left vertebral artery origin, unchanged.   2.  Otherwise, no evidence of large vessel occlusion or high-grade stenosis.      CT Head w/o Contrast   Final Result   IMPRESSION:   1.  No evidence of hemorrhage.   2.  No evidence of acute infarct.      Preliminary findings were discussed by phone between Dr. Ny and Dr. Mayer at approximately 7:18 PM on 2/14/2024.         Report per radiology    Laboratory:  Labs Ordered and Resulted from Time of ED Arrival to Time of ED Departure   BASIC METABOLIC PANEL - Abnormal       Result Value    Sodium 140      Potassium 4.0      Chloride 102      Carbon Dioxide (CO2) 28      Anion Gap 10      Urea Nitrogen 10.9      Creatinine 0.84      GFR Estimate 73      Calcium 9.1      Glucose 112 (*)    INR - Normal    INR 0.97     PARTIAL THROMBOPLASTIN TIME - Normal     aPTT 29     TROPONIN T, HIGH SENSITIVITY - Normal    Troponin T, High Sensitivity <6     CBC WITH PLATELETS AND DIFFERENTIAL    WBC Count 8.9      RBC Count 5.17      Hemoglobin 14.4      Hematocrit 43.9      MCV 85      MCH 27.9      MCHC 32.8      RDW 12.4      Platelet Count 214      % Neutrophils 54      % Lymphocytes 33      % Monocytes 10      % Eosinophils 3      % Basophils 0      % Immature Granulocytes 0      NRBCs per 100 WBC 0      Absolute Neutrophils 4.8      Absolute Lymphocytes 2.9      Absolute Monocytes 0.9      Absolute Eosinophils 0.2      Absolute Basophils 0.0      Absolute Immature Granulocytes 0.0      Absolute NRBCs 0.0     GLUCOSE MONITOR NURSING POCT       Emergency Department Course & Assessments:    Interventions:  Medications   Saline Flush - CT (90 mLs Intravenous $Given 2/14/24 1911)   iopamidol (ISOVUE-370) solution 67 mL (67 mLs Intravenous $Given 2/14/24 1911)   iopamidol (ISOVUE-370) solution 50 mL (50 mLs Intravenous $Given 2/14/24 1916)   Saline Flush (100 mLs Intravenous $Given 2/14/24 1917)   prochlorperazine (COMPAZINE) injection 5 mg (5 mg Intravenous $Given 2/14/24 2032)   diphenhydrAMINE (BENADRYL) injection 25 mg (25 mg Intravenous $Given 2/14/24 2034)   gadobutrol (GADAVIST) injection 7 mL (7 mLs Intravenous $Given 2/14/24 2112)   acetaminophen (TYLENOL) tablet 975 mg (975 mg Oral $Given 2/14/24 2223)     Independent Interpretation (X-rays, CTs, rhythm strip):  See ED course.    Assessments/Consultations/Discussion of Management or Tests:  ED Course as of 02/15/24 0218   Wed Feb 14, 2024   1851 I examined the patient and obtained history as noted above.     1855 I called a Tier 1 stroke.   1921 Neuroradiology called: no obvious acute findings on CT head and CTA   1940 EKG 12-lead, tracing only  Normal sinus rhythm.  Rate of 76.  Normal GA and QRS.  Normal QTc.  No acute ST elevation or depression as compared with 2/2/2024 EKG.   1942 I spoke with Dr. Hunter, stroke  neurology, regarding the patient.    2029 Notified by RN that patient unable to tolerate going to MRI without headache management.  Compazine and Benadryl ordered for suspicion of potential complex migraine as well.   2302 I rechecked and updated the patient.     2313 Stroke neurology called back and de-escalated stroke alert. No TNK. Suspect complex migraine. They ordered MRI brain. Disposition pending MRI.   2316 On reevaluation, patient has improvement in headache.  Sensation more even now on bilateral face and upper extremity.  Suspect potential complex migraine vs cluster headache.  Given frequency of patient's symptoms, advanced age, and still somnolent, will admit overnight for observation and general neurology consultation.   2325 I spoke with Dr. Hatch, of the hospitalist team, regarding the patient. They accepted the patient for admission.       Social Determinants of Health affecting care:   None    Disposition:  The patient was admitted to the hospital under the care of Dr. Hatch.     Impression & Plan    CMS Diagnosis: The patient has stroke symptoms:         ED Stroke specific documentation           NIHSS PDF     Patient last known well time: 1700  ED Provider first to bedside at: 1851  CT Results received at: 1921    Thrombolytics:   Not given due to:   - minor/isolated/quickly resolving symptoms  - stroke mimic: Stroke neurology suspects complex migraine    If treating with thrombolytics: Ensure SBP<180 and DBP<105 prior to treatment with thrombolytics.  Administering thrombolytics after treatment with IV labetalol, hydralazine, or nicardipine is reasonable once BP control is established.    Endovascular Retrieval:  Not initiated due to absence of proximal vessel occlusion    [employer] Stroke Scale (Baseline)  Time Performed: 1851     Score    Level of consciousness: (0)   Alert, keenly responsive    LOC questions: (0)   Answers both questions correctly    LOC commands: (0)    Performs both tasks correctly    Best gaze: (0)   Normal    Visual: (0)   No visual loss    Facial palsy: (1)   Minor paralysis (flat nasolabial fold, smile asymmetry)    Motor arm (left): (1)   Drift    Motor arm (right): (0)   No drift    Motor leg (left): (1)   Drift    Motor leg (right): (0)   No drift    Limb ataxia: (1)   Present in one limb    Sensory: (1)   Mild to moderate sensory loss    Best language: (0)   Normal- no aphasia    Dysarthria: (0)   Normal    Extinction and inattention: (0)   No abnormality        Total Score:  5        Stroke Mimics were considered (including migraine headache, seizure disorder, hypoglycemia (or hyperglycemia), head or spinal trauma, CNS infection, Toxin ingestion and shock state (e.g. sepsis) .    Medical Decision Makin-year-old female as described above presents to the emergency department with decreased sensation to the left face, left arm, and left leg with associated ataxia to the left arm.  Patient hemodynamically stable at time of evaluation.  Afebrile.  NIHSS score of 5.  At time of evaluation, patient is still symptomatic.  Patient is recently evaluated and hospitalized for TIA and advance to full dose aspirin.  Patient is still currently symptomatic at this time.  Tier 1 stroke alert activated given symptoms and last known well at 1700.  Differential diagnosis considered includes, but not limited to, CVA, TIA, ICH/SAH, and complicated migraine.  Stroke neurology notified and will follow-up on imaging.  Will have telestroke neurology cart at bedside.  Disposition pending stroke evaluation.  Discussed care plan with patient who voiced understanding and agreement with plan.  Answered all questions.  Additional workup and orders as listed in chart.     Please refer to ED course above as part of continuation of MDM for details on the patient's treatment course and any changes or updates in care plan beyond my initial evaluation and MDM creation.    Diagnosis:     ICD-10-CM    1. Nonintractable headache, unspecified chronicity pattern, unspecified headache type  R51.9       2. Left sided numbness  R20.0       3. Left-sided weakness  R53.1              Scribe Disclosure:  I, Corey Nguyen, am serving as a scribe at 7:04 PM on 2/14/2024 to document services personally performed by Rafa Mayer DO based on my observations and the provider's statements to me.     2/14/2024   Rafa Mayer DO Yeh, Ferris, DO  02/15/24 0219

## 2024-02-16 ENCOUNTER — APPOINTMENT (OUTPATIENT)
Dept: ULTRASOUND IMAGING | Facility: CLINIC | Age: 74
End: 2024-02-16
Attending: PHYSICIAN ASSISTANT
Payer: MEDICARE

## 2024-02-16 VITALS
SYSTOLIC BLOOD PRESSURE: 138 MMHG | OXYGEN SATURATION: 96 % | TEMPERATURE: 98.5 F | RESPIRATION RATE: 16 BRPM | HEART RATE: 76 BPM | DIASTOLIC BLOOD PRESSURE: 61 MMHG

## 2024-02-16 PROCEDURE — G0378 HOSPITAL OBSERVATION PER HR: HCPCS

## 2024-02-16 PROCEDURE — 99239 HOSP IP/OBS DSCHRG MGMT >30: CPT | Performed by: PHYSICIAN ASSISTANT

## 2024-02-16 PROCEDURE — 250N000011 HC RX IP 250 OP 636: Performed by: STUDENT IN AN ORGANIZED HEALTH CARE EDUCATION/TRAINING PROGRAM

## 2024-02-16 PROCEDURE — 96375 TX/PRO/DX INJ NEW DRUG ADDON: CPT

## 2024-02-16 PROCEDURE — 250N000013 HC RX MED GY IP 250 OP 250 PS 637: Performed by: INTERNAL MEDICINE

## 2024-02-16 PROCEDURE — 999N000128 HC STATISTIC PERIPHERAL IV START W/O US GUIDANCE

## 2024-02-16 PROCEDURE — 93971 EXTREMITY STUDY: CPT | Mod: LT

## 2024-02-16 RX ORDER — ACETAMINOPHEN 325 MG/1
975 TABLET ORAL EVERY 8 HOURS PRN
COMMUNITY
Start: 2024-02-16 | End: 2024-03-13

## 2024-02-16 RX ORDER — IBUPROFEN 400 MG/1
400 TABLET, FILM COATED ORAL DAILY PRN
COMMUNITY
Start: 2024-02-16 | End: 2024-03-13

## 2024-02-16 RX ORDER — CYCLOBENZAPRINE HCL 5 MG
5 TABLET ORAL EVERY 8 HOURS PRN
Status: DISCONTINUED | OUTPATIENT
Start: 2024-02-16 | End: 2024-02-16 | Stop reason: HOSPADM

## 2024-02-16 RX ORDER — METHOCARBAMOL 500 MG/1
500 TABLET, FILM COATED ORAL EVERY 6 HOURS PRN
COMMUNITY
Start: 2024-02-16 | End: 2024-03-13

## 2024-02-16 RX ORDER — CLINDAMYCIN HCL 300 MG
300 CAPSULE ORAL
COMMUNITY
Start: 2024-02-16

## 2024-02-16 RX ADMIN — ACETAMINOPHEN 650 MG: 325 TABLET, FILM COATED ORAL at 09:01

## 2024-02-16 RX ADMIN — ACETAMINOPHEN 650 MG: 325 TABLET, FILM COATED ORAL at 00:45

## 2024-02-16 RX ADMIN — ASPIRIN 325 MG: 325 TABLET, COATED ORAL at 09:01

## 2024-02-16 RX ADMIN — DOCUSATE SODIUM 50 MG AND SENNOSIDES 8.6 MG 1 TABLET: 8.6; 5 TABLET, FILM COATED ORAL at 09:01

## 2024-02-16 RX ADMIN — MAGNESIUM SULFATE HEPTAHYDRATE 2 G: 40 INJECTION, SOLUTION INTRAVENOUS at 00:28

## 2024-02-16 ASSESSMENT — ACTIVITIES OF DAILY LIVING (ADL)
ADLS_ACUITY_SCORE: 36

## 2024-02-16 NOTE — PROGRESS NOTES
[facility]  Neurology Daily Note      Admission Date:2/14/2024   Date of service: 02/16/2024   Hospital Day: 3                                                   Assessment and Plan:   #.  Headache   Initially, patient had concern for a positional headache where she reports that when laying down her headache would worsen, blurred vision, and pulsatile tinnitus.  Therefore LP done as well as MRV, both of which are negative and do not reveal an increase in intracranial pressure.  With these results patient's description of The head pain as well as history of cervical neck surgery, this could be a cervicogenic headache or hypertensive headache given she does have high blood pressure during these events.  Unclear if the blood pressure precedes the headache or the headache results and high blood pressure.  She does have occipital notch tenderness but no shooting pain that is consistent with occipital neuralgia.  -Recommend she has primary follow-up with blood pressure  -Consider PT in the outpatient setting for neck therapy for possible cervicogenic headache, she reports she does have PT ordered for her neck but it has yet to be scheduled.  -Continue Tylenol and ibuprofen at home, encouraged her to try muscle relaxers as well.  She does have this from her cervical surgery from the past.    General Neurology service will sign off as no additional neurologic evaluation or management from our service is required at this time.  Please contact General Neurology service if questions related to neurologic status or follow up needed during this hospitalization.          Interval History:   LP with opening pressure of 8, negative MRV.  This morning has a back of head pain, 4/10.  She reports she is on a nightly Tylenol as she felt off with oxycodone.         Medications:   Scheduled Meds:    amitriptyline  20 mg Oral At Bedtime     aspirin  325 mg Oral Daily     gabapentin  100 mg Oral At Bedtime     sodium chloride (PF)   3 mL Intracatheter Q8H     PRN Meds: acetaminophen **OR** acetaminophen, bisacodyl, hydrALAZINE **OR** hydrALAZINE, ibuprofen, lidocaine 4%, lidocaine (buffered or not buffered), melatonin, naloxone **OR** naloxone **OR** naloxone **OR** naloxone, ondansetron **OR** ondansetron, oxyCODONE, oxyCODONE IR, polyethylene glycol, prochlorperazine **OR** prochlorperazine **OR** prochlorperazine, senna-docusate **OR** senna-docusate, sodium chloride (PF)        Physical Exam:   Vitals: Temp: 98.2  F (36.8  C) Temp src: Axillary BP: 126/64 Pulse: 72   Resp: 18 SpO2: 94 % O2 Device: None (Room air)    Vital Signs with Ranges: Temp:  [98  F (36.7  C)-98.7  F (37.1  C)] 98.2  F (36.8  C)  Pulse:  [69-73] 72  Resp:  [16-22] 18  BP: (114-152)/(55-69) 126/64  SpO2:  [93 %-99 %] 94 %    General Appearance:  No acute distress  Neuro:           Mental Status Exam: Alert and oriented. Language and speech intact. Fund of knowledge normal.  Cranial Nerves: Extraocular movements intact. Facial strength  is normal.no dysarthria   motor: Antigravity strength in the upper and lower extremities  Does have occipital notch tenderness to touch, no significant neck pain.   Extremities: No clubbing, no cyanosis, no edema            Data:   ROUTINE IP LABS (Last 3results)  CBC RESULTS:     Recent Labs   Lab Test 02/14/24  1904 02/02/24  1703 11/15/23  0000   WBC 8.9 7.8 7.2   RBC 5.17 5.12 4.81   HGB 14.4 14.3 13.8   HCT 43.9 43.3 41.2    210 340     Basic Metabolic Panel:  Recent Labs   Lab Test 02/14/24  1904 02/03/24  0845 02/02/24  1703 11/15/23  1151     --  138 138   POTASSIUM 4.0  --  4.0 3.8   CHLORIDE 102  --  100 102   CO2 28  --  30* 25   BUN 10.9  --  14.2 8.0   CR 0.84  --  0.83 0.76   * 86 157* 110*   DONNA 9.1  --  9.4 9.3     Liver panel:  Recent Labs   Lab Test 02/02/24  1703 11/15/23  1151 11/12/23  1759 11/11/23  0820 11/09/23  0756   PROTTOTAL 7.5 6.9 6.3* 6.2* 6.0*   ALBUMIN 4.3 3.8 3.4* 3.3* 3.2*   BILITOTAL  0.3 0.4 0.4 0.5 0.6   ALKPHOS 86 165* 190* 213* 260*   AST 21 36 36 44 47*   ALT 14 39 55* 75* 106*     Thyroid Panel:  Recent Labs   Lab Test 11/08/23  2159   TSH 2.41      Vitamin B12: No lab results found.   Ammonia: No lab results found.     IMAGING:     MRV without contrast 2/14/2024  IMPRESSION:  1.  No dural venous sinus thrombosis or significant stenosis.    MRI brain w/wo contrast 2/14/2024   --IMPRESSION:  1.  No recent infarct, intracranial mass, abnormal enhancement or evidence of intracranial hemorrhage.  2.  Mild generalized volume loss and presumed chronic small vessel ischemic changes.     HEAD CTA:   1.  Fetal origin of the right posterior cerebral artery with small right P2 segment, possibly stenosed, unchanged.  2.  Otherwise, no evidence of large vessel occlusion or high-grade stenosis.     NECK CTA:  1.  Moderate stenosis at the left vertebral artery origin, unchanged.  2.  Otherwise, no evidence of large vessel occlusion or high-grade stenosis        TIME     45 minutes Evaluation/management time     Nery Barker M.D.  Neurologist  [facility] Neurology  Office [phone]

## 2024-02-16 NOTE — PLAN OF CARE
A/O, VSS, All discharge instructions, prescriptions, and personal belongings given to pt. All questions answered. Pt d/c to home via wheelchair.

## 2024-02-16 NOTE — PROGRESS NOTES
VSS on RA. Aox4, cooperative. C/o blurry vision, light sensitivity and headache. Neuros otherwise equal/intact. Baseline numbness and tingling to hands/ feet. Prn oxycodone x1 for headache. MRV done.

## 2024-02-16 NOTE — PLAN OF CARE
Goal Outcome Evaluation:       Pt here with Complex migraine vs TIA. A&O x4. Neuros blurred vision, baseline numbness and tingling on all extremities. VSS. reg diet, thin liquids. Takes pills whole with water. Up with A1 SBA. Intermittent headaches (2/10), Tylenol PRN given with no release, denied any other intervention. Pt scoring green on the Aggression Stop Light Tool. LUE ultrasound done and negative for deep vein thrombosis. Possible discharge home today .

## 2024-02-16 NOTE — PLAN OF CARE
Reason for Admission: TIA vs Complex Migraine    Cognitive/Mentation: A/Ox 4  Neuros/CMS: Intact baseline numbness BUE and BLE  VS: stable. .  GI: Continent.  : Continent.  Pulmonary: LS clear.  Pain: denies.     Drains/Lines: no IV at this time  Skin: intact  Activity: Assist x 1 .  Diet: regular with thin liquids. Takes pills whole.     Therapies recs: home  Discharge: pending    Aggression Stoplight Tool: green    End of shift summary: Patient had MRV late afternoon, awaiting results, patient lost iv after MRV, waiting for IV team and ultrasound which patient says they have to use every time, anesthesia did attempt once. Patient had Mag gtt due but still waiting for IV team for up to 1 more hour .

## 2024-02-16 NOTE — PLAN OF CARE
Goal Outcome Evaluation:  [id number]  Orientations: A & O x 4, forgetful at times.   Neuros/CMS: Intact, ex baseline numbness to BUE and BLE   Vitals/Pain: stable vitals on RA. Headache managed with tylenol.  Pul: Clear lung sounds.   Tele: n/a  Diet: Regular with thin liquids.Takes pills whole.   Lines/Drains: L PIV (new),SL.  Skin/Wounds: wdl. LP site, CDI.  GI/: Continent of B & B. Active BS, no BM, passing flatus. Adequately voiding in the bathroom.   Abnormal labs/Trends, Electrolyte Replacement-   Ambulation/Assist: Up with SBA  Sleep Quality: good. Sleep/rest promoted.  Plan: continue plan of care. potential discharge to home today.

## 2024-02-16 NOTE — DISCHARGE SUMMARY
[facility]  Hospitalist Discharge Summary     Admit Date:  2/14/2024  Discharge Date:     2/16/2024  Discharging Provider: Remy Munoz PA-C    PRIMARY CARE PROVIDER:    Long Ferrari     DISCHARGE DIAGNOSES:   Headache  Acute left upper extremity superficial thrombophlebitis  Hyperlipidemia   History of TIA  Fibromyalgia  Chronic Pain  Reported memory loss and word finding difficulty  Tobacco use D/O  COPD  Suspected Raynaud's syndrome   Elevated blood pressure   Chronic constipation   Known heart murmur   History of breast cancer      BRIEF HISTORY OF PRESENT ILLNESS:    Macey Romero is a 73 year old female who was admitted on 2/14/2024.      Past medical history significant for Prediabetes, CKD, COPD, Tobacco use D/O, Fibromyalgia, Chronic pain syndrome, MDD with a anxiety, Vit D Def, History of cervical spine stenosis s/p C5-C6 discectomy who was registered to short-stay/observation due to complex migraine headache.       Presented to the ED with onset of headache at approximately 4:30 PM and associated left-sided facial paresthesias, primarily around the corner of her mouth, left arm paresthesias.  Nearly identical admission 2/2/2024 for TIA versus complex migraine. Negative MR imaging.       HOSPITAL COURSE:   Headache  Patient with recurrent headaches with associated left facial and arm paresthesias.  2 admissions this month now for the same.  History of TIA, but also notes she has had issues with headache and word finding difficulty over the past 10 years or more by her description. Stroke neurology involved in the emergency department and following negative MR findings, recommend general neurology evaluation.  -Increase PTA amitriptyline to 20 mg/d.  -Acetaminophen, ibuprofen, low-dose oxycodone were available as needed.  -As needed Compazine was available as needed.  -General neurology consult appreciated:               --Received IV Mag.  --LP and MRV  completed, both negative and no evidence of increased intracranial pressure.    --Could be cervicogenic headache versus hypertensive headache (noted elevated blood pressures with worsening headaches.    --Continue with outpatient PT (has ordered previously but yet to be started).  --Continue PRN APAP and Ibuprofen at home.    --Trial PRN muscle relaxants (patient indicated she has some from previous surgery).    -Outpatient ophthalmology follow-up.  Discussed with patient admission and she believes she is due for repeat exam.    Acute left upper extremity superficial thrombophlebitis  -Elevate left upper extremity.    -Warm compress PRN.    -Utilize PRN APAP and Ibuprofen for pain.       Hyperlipidemia  -Resume prior to admission pravastatin at discharge.  Held given observation admission.     History of TIA  History of right-sided facial symptoms in 2005 which was attributed to TIA, felt to have TIA in November as well.  Now with repeated events with left-sided facial paresthesias felt less likely to represent TIA with serial negative MR imaging.  *Noted small right P2 segment, possibly stenosed, at the right posterior cerebral artery that was unchanged on Head/Neck CTA completed 2/14.     -Resumed statin at discharge.  -Continued on PTA aspirin 325 mg daily.     Fibromyalgia  Chronic pain syndrome  Reports significant improvement in her chronic pain since initiation on gabapentin and amitriptyline.  -Continue gabapentin 100 mg at bedtime.    --Felt groggy on 100 mg 3 times daily dosing, so decreased at Nov admission.    -Amitriptyline increased to 20 mg at bedtime as above.     Reported memory loss, word finding difficulty  Note that patient had neuropsychiatric testing in April 2022 through [facility] system.  Intact cognitive function for age noted with isolated low score on verbal learning and memory was noted to be mildly impaired.  Thought to have major contributors primarily secondary to mood.  -As of  "April 2022, repeat neurocognitive testing recommended at 1 year to assess for change.  Can be pursued as an outpatient.  -Increase in amitriptyline as above.  -Resume/start Zoloft at discharge.    --Per patient, not currently on Zoloft, though she reports a plan to initiate this medication in the future.     Tobacco use disorder  Patient continues to \"sneak\" cigarettes several times per day.  Reported 2 to 3 cigarettes/day with  still smoking 4 packs/day outside of the home.  -Discussed importance of complete tobacco cessation, especially in the setting of COPD and suspected Raynaud's syndrome.     COPD  -Prior to admission Symbicort and albuterol resumed at discharge.  Held given observation admission.     Suspected Raynaud's syndrome  Reported occasional purple discoloration and cold hands and feet.  Utilizes hand warmers to prevent this.  -Beta-blockade could be considered for migraine prophylaxis, but might worsen Raynaud's syndrome.  Increased amitriptyline as above.  -Complete tobacco cessation.  Discussed again with patient.  -Continued on PTA aspirin 325 mg/d.  -Continue with preventative strategies such as gloves/mittens and cold weather, hand warmers.     Elevated blood pressure  As of February 2 hospitalization, with headache she was having blood pressures in the 180 systolic range.  Reports similar elevated blood pressures at home prior to arrival and subsequently normotensive as pain improves.  Baseline blood pressures are in the 100 systolic range per her report.  Suspect elevated blood pressure is a pain response, though difficult to ascertain if elevated blood pressure precedes headache event as she only checks her blood pressure after she is in pain.  -Continue to monitor blood pressure.  No antihypertensive medication added as a scheduled med currently.  --Patient stated she would not feel comfortable starting antihypertensive agent at this time.    --Advised to continue to check BP at home " "BID and keep a log of readings and bring that in to her PCP follow up appointment which was advised to occur within 1 week.       Chronic constipation  -As needed bowel regimen was available as needed.     Known heart murmur  *Appreciated on physical exam 2/15.  Also noted during previous hospitalization 11/2023 at which time she had an ECHO that was unremarkable.  No interventions.       History of breast cancer  Treated surgically. No interventions.         Clinically Significant Risk Factors Present on Admission                # Drug Induced Platelet Defect: home medication list includes an antiplatelet medication   # Hypertension: Noted on problem list      # Overweight: Estimated body mass index is 27.03 kg/m  as calculated from the following:    Height as of 2/5/24: 1.613 m (5' 3.5\").    Weight as of 2/5/24: 70.3 kg (155 lb).       # Asthma: noted on problem list          The patient was discussed with Dr. Thomas who agrees with discharge at this time.       TOTAL DISCHARGE TIME:  Remy PEDRO PA-C, personally saw the patient today and spent greater than 30 minutes discharging this patient.    Remy Munoz PA-C  [facility]  Securely message with the Vocera Web Console (learn more here)  Text page via [facility] Paging/Directory      DISCHARGE MEDICATIONS:       Review of your medicines        START taking        Dose / Directions   ibuprofen 400 MG tablet  Commonly known as: ADVIL/MOTRIN      Dose: 400 mg  Take 1 tablet (400 mg) by mouth daily as needed for inflammatory pain or other (HA)  Refills: 0            CONTINUE these medicines which may have CHANGED, or have new prescriptions. If we are uncertain of the size of tablets/capsules you have at home, strength may be listed as something that might have changed.        Dose / Directions   acetaminophen 325 MG tablet  Commonly known as: TYLENOL  This may have changed:   how much to take  when to take this  Used " for: Pain      Dose: 975 mg  Take 3 tablets (975 mg) by mouth every 8 hours as needed for mild pain  Refills: 0            CONTINUE these medicines which have NOT CHANGED        Dose / Directions   ACE/ARB/ARNI NOT PRESCRIBED  Commonly known as: INTENTIONAL      Please choose reason not prescribed from choices below.  Refills: 0     albuterol 108 (90 Base) MCG/ACT inhaler  Commonly known as: PROAIR HFA/PROVENTIL HFA/VENTOLIN HFA  Used for: Reactive airway disease, moderate persistent, with acute exacerbation      Dose: 2 puff  Inhale 2 puffs into the lungs every 6 hours as needed for shortness of breath  Refills: 0     alendronate 70 MG tablet  Commonly known as: FOSAMAX  Used for: Age-related osteoporosis without current pathological fracture documented at surgery by neurosurgeon      Dose: 70 mg  Take 1 tablet (70 mg) by mouth every 7 days  Quantity: 12 tablet  Refills: 3     amitriptyline 10 MG tablet  Commonly known as: ELAVIL  Used for: Reactive depression      TAKE 1 TABLET BY MOUTH AT BEDTIME FOR NERVE PAIN. IF NO RELIEF IN 4 NIGHTS INCREASE TO 2 TABLETS  Quantity: 60 tablet  Refills: 4     aspirin 325 MG EC tablet  Commonly known as: ASA  Used for: TIA (transient ischemic attack)      Dose: 325 mg  Take 1 tablet (325 mg) by mouth daily Take with food or meal.  Refills: 0     budesonide-formoterol 160-4.5 MCG/ACT Inhaler  Commonly known as: Symbicort  Used for: Reactive airway disease, moderate persistent, with acute exacerbation      Dose: 2 puff  Inhale 2 puffs into the lungs daily  Quantity: 10.2 g  Refills: 3     clindamycin 300 MG capsule  Commonly known as: CLEOCIN      Refills: 0     gabapentin 100 MG capsule  Commonly known as: NEURONTIN      Dose: 100 mg  Take 1 capsule (100 mg) by mouth at bedtime  Refills: 0     methocarbamol 500 MG tablet  Commonly known as: ROBAXIN      Dose: 500 mg  Take 1 tablet (500 mg) by mouth every 6 hours as needed for muscle spasms or other (Headaches)  Refills: 0      pravastatin 10 MG tablet  Commonly known as: PRAVACHOL  Used for: TIA (transient ischemic attack)      Dose: 10 mg  Take 1 tablet (10 mg) by mouth daily  Quantity: 90 tablet  Refills: 3     sertraline 50 MG tablet  Commonly known as: ZOLOFT  Used for: Reactive depression      Dose: 50 mg  Take 1 tablet (50 mg) by mouth daily  Quantity: 30 tablet  Refills: 11     vitamin D3 50 mcg (2000 units) tablet  Commonly known as: CHOLECALCIFEROL      Dose: 1 tablet  Take 1 tablet by mouth daily  Refills: 0               Where to get your medicines        Some of these will need a paper prescription and others can be bought over the counter. Ask your nurse if you have questions.    You don't need a prescription for these medications  acetaminophen 325 MG tablet  ibuprofen 400 MG tablet        ALLERGIES:    Allergies   Allergen Reactions    Levaquin Hives and Itching    Pcn [Penicillins] Hives    Tetanus Toxoid Anaphylaxis    Tetanus Toxoids Anaphylaxis    Erythromycin GI Disturbance    Amoxicillin     Duloxetine Nausea     Other reaction(s): Jittery    Silicone Rash       DISPOSITION:    Discharged to home  Condition at discharge: Stable        Reason for your hospital stay    You were at [facility] due to recurrent headaches with left sided facial numbness.  You were seen by Neurology (brain specialist) and underwent a work-up that included a lumbar puncture and several imaging studies of your brain/head.  Work-up was normal and advised management with as needed medications (Tylenol, Ibuprofen/Advil, Methocarbamol).  You indicated that you have scheduled outpatient PT already arranged and would advise continuation with therapy.      You were found to have elevated blood pressure at times.  Unclear if this was occurring due to the headaches or could be a potential cause of the headaches.  You were advised to check blood pressures at home and to keep a record of the readings to bring to your primary care provider  follow up.      You were also found to have left arm superficial venous thrombophlebitis on day of discharge.  This can be managed with elevation of the arm and as need pain medications (Tylenol, Ibuprofen/Advil).     Follow-up and recommended labs and tests     Follow up with primary care provider, Long Ferrari, within 7 days for hospital follow- up and to review blood pressure log.  No follow up labs or test are needed.     Activity    Your activity upon discharge: activity as tolerated     Discharge Instructions    -In regards to your left arm superficial venous thrombophlebitis you will want to try and keep your left arm elevated and utilize warm compresses to the area as needed.      -You can use Tylenol and Ibuprofen/Advil as need for pain management of your headaches as well as the thrombophlebitis.    -You can also use your previosuly prescribed muscle relaxant (Robaxin/methocarbamol 500 mg every 4 hours as needed) in attempts to manage headache.  I looked at your discharge medications from your surgery and verified this was the medication that was prescribed after your surgery.    -You should continued with already scheduled/planned outpatient physical therapy.     Monitor and record    blood pressure twice daily (try to check them at the same time every day; once in the morning and again in the early evening).  Please record the readings in a journal and bring this journal with you at your follow up appointment with your primary care provider.     Diet    Follow this diet upon discharge: Orders Placed This Encounter      Regular Diet Adult        Consultations This Hospital Stay   NEUROLOGY IP CONSULT  VASCULAR ACCESS ADULT IP CONSULT     LABORATORY IMAGING AND PROCEDURES:   Laboratory studies that included CBC with platelets differential, BMP, INR, PTT, Troponin T, CSF fluid analysis (glucose, cell count and total protein).    Imaging studies that included Head CT w/o contrast, Head/Neck CTA with  contrast, Brain MRI with and without contrast, Xray Lumbar puncture, MRV Brain with and without contrast, Left UE Venous US, EKG.       PENDING RESULTS:    None     PHYSICAL EXAMINATION ON DAY OF DISCHARGE:    /61 (BP Location: Right arm)   Pulse 76   Temp 98.5  F (36.9  C) (Oral)   Resp 16   SpO2 96%     Constitutional: Awake, alert, cooperative, no apparent distress.    ENT: Normocephalic, without obvious abnormality, atraumatic, oral pharynx with moist mucus membranes, tonsils without erythema or exudates.     Neck: Supple, symmetrical, trachea midline, no adenopathy.  Pulmonary: No increased work of breathing, fair air exchange, clear to auscultation bilaterally, no crackles or wheezing.  Cardiovascular: Regular rate and rhythm, normal S1 and S2, no S3 or S4, and systolic murmur noted.  GI: Normal bowel sounds, soft, non-distended, non-tender.    Skin/Integumen: Several areas of ecchymosis noted on the upper extremities bilaterally.  Otherwise visualized skin appeared clear.  Neuro: CN II-XII grossly intact.  Patient seen moving all 4 extremities without difficulty.    Psych:  Alert and oriented x 3. Normal affect.  Extremities: No lower extremity edema noted, and calves are non-tender to palpation bilaterally. Left upper extremity (antecubital fossa) with edema.  Noted warmth, tenderness and slight firmness to palpation.

## 2024-02-28 ENCOUNTER — OFFICE VISIT (OUTPATIENT)
Dept: FAMILY MEDICINE | Facility: CLINIC | Age: 74
End: 2024-02-28

## 2024-02-28 VITALS
WEIGHT: 159.8 LBS | HEART RATE: 86 BPM | BODY MASS INDEX: 27.86 KG/M2 | DIASTOLIC BLOOD PRESSURE: 83 MMHG | SYSTOLIC BLOOD PRESSURE: 147 MMHG | OXYGEN SATURATION: 98 %

## 2024-02-28 DIAGNOSIS — M79.7 FIBROMYALGIA: ICD-10-CM

## 2024-02-28 DIAGNOSIS — G89.29 CHRONIC PAIN OF BOTH KNEES: Primary | ICD-10-CM

## 2024-02-28 DIAGNOSIS — M25.561 CHRONIC PAIN OF BOTH KNEES: Primary | ICD-10-CM

## 2024-02-28 DIAGNOSIS — M25.562 CHRONIC PAIN OF BOTH KNEES: Primary | ICD-10-CM

## 2024-02-28 PROCEDURE — 99214 OFFICE O/P EST MOD 30 MIN: CPT | Performed by: FAMILY MEDICINE

## 2024-02-28 NOTE — PROGRESS NOTES
Hospital Follow-up Visit:    Hospital/Nursing Home/IP Rehab Facility: [facility]  Date of Admission: 2/2/24  Date of Discharge: 2/2/24  Reason(s) for Admission: TIA     Was your hospitalization related to COVID-19? No   Problems taking medications regularly:  None  Medication changes since discharge: None  Problems adhering to non-medication therapy:  None    Summary of hospitalization:  [facility] discharge summary reviewed  Diagnostic Tests/Treatments reviewed.  Follow up needed: we are going to see how the sertraline effects kick in  Other Healthcare Providers Involved in Patient s Care:         None  Update since discharge: improved.         Plan of care communicated with     Assessment/Plan:  Macey was seen today for recheck and er f/u.    Diagnoses and all orders for this visit:    Chronic pain of both knees    Fibromyalgia    Tolerating Depression medication with a good response to this treatment.  PHQ-9 score:        2/12/2024     9:41 AM   PHQ   PHQ-9 Total Score 5   Q9: Thoughts of better off dead/self-harm past 2 weeks Not at all    today and we agreed that we will continue current treatment. We discussed ongoing management of her medications and how we will refill the medications now and in the future.   Next scheduled follow up in 1 months.  We breifly reviewed any questions she had about mood disorders including suspected pathophysiology, role of neurotransmitters, and treatment options including medication options ( especially SSRIs that treat cause of sx) and counselling.  Recheck in 3 weeks.  oLng Ferrari MD   [facility]  [phone]

## 2024-03-07 ENCOUNTER — OFFICE VISIT (OUTPATIENT)
Dept: FAMILY MEDICINE | Facility: CLINIC | Age: 74
End: 2024-03-07

## 2024-03-07 VITALS — DIASTOLIC BLOOD PRESSURE: 79 MMHG | SYSTOLIC BLOOD PRESSURE: 135 MMHG | OXYGEN SATURATION: 97 % | HEART RATE: 79 BPM

## 2024-03-07 DIAGNOSIS — M79.7 FIBROMYALGIA: ICD-10-CM

## 2024-03-07 DIAGNOSIS — R09.89 CHEST CRACKLES: ICD-10-CM

## 2024-03-07 DIAGNOSIS — J30.89 SEASONAL ALLERGIC RHINITIS DUE TO OTHER ALLERGIC TRIGGER: ICD-10-CM

## 2024-03-07 DIAGNOSIS — J45.40 MODERATE PERSISTENT ASTHMA WITHOUT COMPLICATION: ICD-10-CM

## 2024-03-07 DIAGNOSIS — N18.31 STAGE 3A CHRONIC KIDNEY DISEASE (H): ICD-10-CM

## 2024-03-07 DIAGNOSIS — I73.00 RAYNAUD'S DISEASE WITHOUT GANGRENE: Primary | ICD-10-CM

## 2024-03-07 DIAGNOSIS — L71.9 ROSACEA: ICD-10-CM

## 2024-03-07 DIAGNOSIS — L29.9 ITCHING: ICD-10-CM

## 2024-03-07 LAB
FEF 25/75: NORMAL
FEV-1: NORMAL
FEV1/FVC: NORMAL
FVC: NORMAL

## 2024-03-07 PROCEDURE — 94010 BREATHING CAPACITY TEST: CPT

## 2024-03-07 PROCEDURE — 71046 X-RAY EXAM CHEST 2 VIEWS: CPT

## 2024-03-07 PROCEDURE — 99214 OFFICE O/P EST MOD 30 MIN: CPT | Mod: 25

## 2024-03-07 PROCEDURE — 82044 UR ALBUMIN SEMIQUANTITATIVE: CPT

## 2024-03-07 RX ORDER — CETIRIZINE HYDROCHLORIDE 5 MG/1
5 TABLET ORAL DAILY
Qty: 30 TABLET | Refills: 0 | Status: SHIPPED | OUTPATIENT
Start: 2024-03-07 | End: 2024-04-06

## 2024-03-07 RX ORDER — MONTELUKAST SODIUM 10 MG/1
10 TABLET ORAL AT BEDTIME
Qty: 30 TABLET | Refills: 0 | Status: ON HOLD | OUTPATIENT
Start: 2024-03-07 | End: 2024-08-10

## 2024-03-07 NOTE — PROGRESS NOTES
Assessment & Plan     Raynaud's disease without gangrene  - Adult Rheumatology  Referral - has seen Dr. Gil years ago would like to see him again  -had MARIBETH level checked in 11/2023 and this was negative, will wait for Rheum work up for further labs to be done   -educated patient about staying warm and wearing appropriate clothing in the winter months, no signs of severe Raynaud's in clinic today     Fibromyalgia  - Adult Rheumatology  Referral  -patient has aches and pains all over, feels it is not managed well and has not seen rheumatologist in awhile, very concerned about lupus, labs have been done for work up in the past with negative MARIBETH on 11/2023, will see what rheumatology would like to further investigate in regards to diffuse symptoms with patient     Stage 3a chronic kidney disease (H)  -will check protein levels today  - Microalbumin ([facility])    Itching  -trial cetirizine to see if helps with itching  - cetirizine (ZYRTEC) 5 MG tablet  Dispense: 30 tablet; Refill: 0    Chest crackles  -suspect pulmonary fibrosis, will obtain spirometry and chest xray - per last note on 2/14/24 lung sounds were clear, no new reports of SOB or chest pain    - XR Chest 2 Views  - Spirometry, Breathing Capacity - NORMAL    Rosacea  -will refill metronidazole, can use sparingly hydrocortisone to face.     MED REC REQUIRED  Post Medication Reconciliation Status: discharge medications reconciled, continue medications without change    See Patient Instructions    No follow-ups on file.    Gustavo Choudhury is a 73 year old, presenting for the following health issues:  Consult (Wondering about lupus. Recently dx with raynaud's and has some marks on face. )    HPI     Patient presents with numerous concerns today that ultimately have led her to research her symptoms and she believes she has lupus, wants to have work up for this today.     1.) TIA x2 since February - neurology following patient after these  episodes, no deficits.     2.) DERM: Reports had a rash to her face on Sunday, with burning macules to face. Rash was around nose and to bilateral cheeks in maxillary area, today rash is not present, however the a few of the macules are. She puts hydrocortisone and metronidazole on topically with mild relief.     3.) FIBROMYALGIA: Has had bilateral knee replacements. 8/10 pain to knees, ortho states nothing wrong. Fibromyalgia history. Feels weak with her hands in the mornings. Dry mouth and tongue.     4.) RAYNAUDS: cold hands and feet most of the time, worse in winter months with blue discoloration to tips of fingers and pale hands and feet overall. It Suspected patient has Raynauds by ortho and PCP, Dr. Ferrari.       5.) PULM: lives with 4 pack per day smoker (smokes outside and in the car) sometimes smokes one cigarette a day and does not smoke the entire thing. Had symbicort inhaler but has not needed for years.  Allergies are not well controlled and typically she requires use of albuterol in the spring     Patient is fasting today.    Review of Systems  Constitutional, HEENT, cardiovascular, pulmonary, gi and gu systems are negative, except as otherwise noted.      Objective    /79   Pulse 79   SpO2 97%   There is no height or weight on file to calculate BMI.  Physical Exam   GENERAL: alert and no distress  NECK: no adenopathy, no asymmetry, masses, or scars  RESP: fine crackles throughout lungs   CV: regular rate and rhythm, normal S1 S2, no S3 or S4, no murmur, click or rub, no peripheral edema  ABDOMEN: soft, nontender, no hepatosplenomegaly, no masses and bowel sounds normal  MS: no gross musculoskeletal defects noted, no edema    No results found for this or any previous visit (from the past 24 hour(s)).        Signed Electronically by: SHAHRAM Xiong CNP

## 2024-03-08 LAB
A/C RATIO (RMG): ABNORMAL
ALBUMIN- RMG: 30 (ref 0–10)
INTERPRETATION: ABNORMAL
URINE CREATININE - RMG: 50 (ref 0–300)

## 2024-03-13 ENCOUNTER — APPOINTMENT (OUTPATIENT)
Dept: MRI IMAGING | Facility: CLINIC | Age: 74
DRG: 101 | End: 2024-03-13
Attending: STUDENT IN AN ORGANIZED HEALTH CARE EDUCATION/TRAINING PROGRAM
Payer: MEDICARE

## 2024-03-13 ENCOUNTER — HOSPITAL ENCOUNTER (OUTPATIENT)
Dept: NEUROLOGY | Facility: CLINIC | Age: 74
Setting detail: OBSERVATION
Discharge: HOME | DRG: 101 | End: 2024-03-13
Attending: HOSPITALIST
Payer: MEDICARE

## 2024-03-13 ENCOUNTER — APPOINTMENT (OUTPATIENT)
Dept: CT IMAGING | Facility: CLINIC | Age: 74
DRG: 101 | End: 2024-03-13
Attending: EMERGENCY MEDICINE
Payer: MEDICARE

## 2024-03-13 ENCOUNTER — HOSPITAL ENCOUNTER (INPATIENT)
Facility: CLINIC | Age: 74
LOS: 2 days | Discharge: HOME | DRG: 101 | End: 2024-03-16
Attending: EMERGENCY MEDICINE | Admitting: HOSPITALIST
Payer: MEDICARE

## 2024-03-13 DIAGNOSIS — G40.909 SEIZURE DISORDER (H): Primary | ICD-10-CM

## 2024-03-13 DIAGNOSIS — R41.0 ACUTE CONFUSION: ICD-10-CM

## 2024-03-13 DIAGNOSIS — H83.2X9 VESTIBULAR DISEQUILIBRIUM, UNSPECIFIED LATERALITY: ICD-10-CM

## 2024-03-13 LAB
ALBUMIN UR-MCNC: NEGATIVE MG/DL
ANION GAP SERPL CALCULATED.3IONS-SCNC: 12 MMOL/L (ref 7–15)
APPEARANCE UR: CLEAR
APTT PPP: 29 SECONDS (ref 22–38)
ATRIAL RATE - MUSE: 74 BPM
BASOPHILS # BLD AUTO: 0 10E3/UL (ref 0–0.2)
BASOPHILS NFR BLD AUTO: 1 %
BILIRUB UR QL STRIP: NEGATIVE
BUN SERPL-MCNC: 13.1 MG/DL (ref 8–23)
CALCIUM SERPL-MCNC: 9 MG/DL (ref 8.8–10.2)
CHLORIDE SERPL-SCNC: 102 MMOL/L (ref 98–107)
COLOR UR AUTO: ABNORMAL
CREAT SERPL-MCNC: 0.9 MG/DL (ref 0.51–0.95)
DEPRECATED HCO3 PLAS-SCNC: 26 MMOL/L (ref 22–29)
DIASTOLIC BLOOD PRESSURE - MUSE: NORMAL MMHG
EGFRCR SERPLBLD CKD-EPI 2021: 67 ML/MIN/1.73M2
EOSINOPHIL # BLD AUTO: 0.3 10E3/UL (ref 0–0.7)
EOSINOPHIL NFR BLD AUTO: 4 %
ERYTHROCYTE [DISTWIDTH] IN BLOOD BY AUTOMATED COUNT: 12.6 % (ref 10–15)
GLUCOSE SERPL-MCNC: 123 MG/DL (ref 70–99)
GLUCOSE UR STRIP-MCNC: NEGATIVE MG/DL
HCT VFR BLD AUTO: 42.3 % (ref 35–47)
HGB BLD-MCNC: 13.7 G/DL (ref 11.7–15.7)
HGB UR QL STRIP: NEGATIVE
IMM GRANULOCYTES # BLD: 0 10E3/UL
IMM GRANULOCYTES NFR BLD: 0 %
INR PPP: 1.02 (ref 0.85–1.15)
INTERPRETATION ECG - MUSE: NORMAL
KETONES UR STRIP-MCNC: NEGATIVE MG/DL
LEUKOCYTE ESTERASE UR QL STRIP: ABNORMAL
LYMPHOCYTES # BLD AUTO: 2.1 10E3/UL (ref 0.8–5.3)
LYMPHOCYTES NFR BLD AUTO: 32 %
MCH RBC QN AUTO: 27.7 PG (ref 26.5–33)
MCHC RBC AUTO-ENTMCNC: 32.4 G/DL (ref 31.5–36.5)
MCV RBC AUTO: 86 FL (ref 78–100)
MONOCYTES # BLD AUTO: 0.6 10E3/UL (ref 0–1.3)
MONOCYTES NFR BLD AUTO: 10 %
NEUTROPHILS # BLD AUTO: 3.5 10E3/UL (ref 1.6–8.3)
NEUTROPHILS NFR BLD AUTO: 53 %
NITRATE UR QL: NEGATIVE
NRBC # BLD AUTO: 0 10E3/UL
NRBC BLD AUTO-RTO: 0 /100
P AXIS - MUSE: 43 DEGREES
PH UR STRIP: 5.5 [PH] (ref 5–7)
PLATELET # BLD AUTO: 195 10E3/UL (ref 150–450)
POTASSIUM SERPL-SCNC: 4 MMOL/L (ref 3.4–5.3)
PR INTERVAL - MUSE: 154 MS
QRS DURATION - MUSE: 80 MS
QT - MUSE: 348 MS
QTC - MUSE: 386 MS
R AXIS - MUSE: 43 DEGREES
RBC # BLD AUTO: 4.95 10E6/UL (ref 3.8–5.2)
RBC URINE: 1 /HPF
SODIUM SERPL-SCNC: 140 MMOL/L (ref 135–145)
SP GR UR STRIP: 1.02 (ref 1–1.03)
SQUAMOUS EPITHELIAL: 2 /HPF
SYSTOLIC BLOOD PRESSURE - MUSE: NORMAL MMHG
T AXIS - MUSE: -32 DEGREES
TROPONIN T SERPL HS-MCNC: 8 NG/L
TSH SERPL DL<=0.005 MIU/L-ACNC: 2 UIU/ML (ref 0.3–4.2)
UROBILINOGEN UR STRIP-MCNC: NORMAL MG/DL
VENTRICULAR RATE- MUSE: 74 BPM
WBC # BLD AUTO: 6.5 10E3/UL (ref 4–11)
WBC URINE: 6 /HPF

## 2024-03-13 PROCEDURE — A9585 GADOBUTROL INJECTION: HCPCS | Performed by: EMERGENCY MEDICINE

## 2024-03-13 PROCEDURE — 99291 CRITICAL CARE FIRST HOUR: CPT | Mod: GC | Performed by: PSYCHIATRY & NEUROLOGY

## 2024-03-13 PROCEDURE — 84484 ASSAY OF TROPONIN QUANT: CPT | Performed by: EMERGENCY MEDICINE

## 2024-03-13 PROCEDURE — G0378 HOSPITAL OBSERVATION PER HR: HCPCS

## 2024-03-13 PROCEDURE — 258N000003 HC RX IP 258 OP 636: Performed by: EMERGENCY MEDICINE

## 2024-03-13 PROCEDURE — 99285 EMERGENCY DEPT VISIT HI MDM: CPT | Mod: 25

## 2024-03-13 PROCEDURE — 99222 1ST HOSP IP/OBS MODERATE 55: CPT | Mod: AI | Performed by: HOSPITALIST

## 2024-03-13 PROCEDURE — 85025 COMPLETE CBC W/AUTO DIFF WBC: CPT | Performed by: EMERGENCY MEDICINE

## 2024-03-13 PROCEDURE — 255N000002 HC RX 255 OP 636: Performed by: EMERGENCY MEDICINE

## 2024-03-13 PROCEDURE — 250N000009 HC RX 250: Performed by: EMERGENCY MEDICINE

## 2024-03-13 PROCEDURE — 250N000013 HC RX MED GY IP 250 OP 250 PS 637: Performed by: HOSPITALIST

## 2024-03-13 PROCEDURE — 84443 ASSAY THYROID STIM HORMONE: CPT | Performed by: HOSPITALIST

## 2024-03-13 PROCEDURE — 80048 BASIC METABOLIC PNL TOTAL CA: CPT | Performed by: EMERGENCY MEDICINE

## 2024-03-13 PROCEDURE — 96361 HYDRATE IV INFUSION ADD-ON: CPT

## 2024-03-13 PROCEDURE — 85730 THROMBOPLASTIN TIME PARTIAL: CPT | Performed by: EMERGENCY MEDICINE

## 2024-03-13 PROCEDURE — 250N000011 HC RX IP 250 OP 636: Performed by: EMERGENCY MEDICINE

## 2024-03-13 PROCEDURE — 85610 PROTHROMBIN TIME: CPT | Performed by: EMERGENCY MEDICINE

## 2024-03-13 PROCEDURE — 93005 ELECTROCARDIOGRAM TRACING: CPT

## 2024-03-13 PROCEDURE — 70450 CT HEAD/BRAIN W/O DYE: CPT | Mod: MA

## 2024-03-13 PROCEDURE — 70496 CT ANGIOGRAPHY HEAD: CPT | Mod: MA

## 2024-03-13 PROCEDURE — 36415 COLL VENOUS BLD VENIPUNCTURE: CPT | Performed by: EMERGENCY MEDICINE

## 2024-03-13 PROCEDURE — 81001 URINALYSIS AUTO W/SCOPE: CPT | Performed by: EMERGENCY MEDICINE

## 2024-03-13 PROCEDURE — 250N000013 HC RX MED GY IP 250 OP 250 PS 637: Performed by: EMERGENCY MEDICINE

## 2024-03-13 PROCEDURE — 96374 THER/PROPH/DIAG INJ IV PUSH: CPT

## 2024-03-13 PROCEDURE — 95816 EEG AWAKE AND DROWSY: CPT

## 2024-03-13 PROCEDURE — 70553 MRI BRAIN STEM W/O & W/DYE: CPT | Mod: MF

## 2024-03-13 RX ORDER — IOPAMIDOL 755 MG/ML
67 INJECTION, SOLUTION INTRAVASCULAR ONCE
Status: COMPLETED | OUTPATIENT
Start: 2024-03-13 | End: 2024-03-13

## 2024-03-13 RX ORDER — ASPIRIN 325 MG
325 TABLET, DELAYED RELEASE (ENTERIC COATED) ORAL DAILY
Status: DISCONTINUED | OUTPATIENT
Start: 2024-03-14 | End: 2024-03-13

## 2024-03-13 RX ORDER — GABAPENTIN 100 MG/1
100 CAPSULE ORAL EVERY OTHER DAY
Status: DISCONTINUED | OUTPATIENT
Start: 2024-03-14 | End: 2024-03-16 | Stop reason: HOSPADM

## 2024-03-13 RX ORDER — ACETAMINOPHEN 325 MG/1
650 TABLET ORAL EVERY 4 HOURS PRN
Status: DISCONTINUED | OUTPATIENT
Start: 2024-03-13 | End: 2024-03-16 | Stop reason: HOSPADM

## 2024-03-13 RX ORDER — ALBUTEROL SULFATE 90 UG/1
2 AEROSOL, METERED RESPIRATORY (INHALATION) EVERY 6 HOURS PRN
Status: DISCONTINUED | OUTPATIENT
Start: 2024-03-13 | End: 2024-03-16 | Stop reason: HOSPADM

## 2024-03-13 RX ORDER — ACETAMINOPHEN 500 MG
1000 TABLET ORAL 2 TIMES DAILY
Status: DISCONTINUED | OUTPATIENT
Start: 2024-03-13 | End: 2024-03-16 | Stop reason: HOSPADM

## 2024-03-13 RX ORDER — POLYETHYLENE GLYCOL 3350 17 G/17G
17 POWDER, FOR SOLUTION ORAL 2 TIMES DAILY PRN
Status: DISCONTINUED | OUTPATIENT
Start: 2024-03-13 | End: 2024-03-16 | Stop reason: HOSPADM

## 2024-03-13 RX ORDER — PRAVASTATIN SODIUM 10 MG
10 TABLET ORAL EVERY EVENING
Status: DISCONTINUED | OUTPATIENT
Start: 2024-03-13 | End: 2024-03-16 | Stop reason: HOSPADM

## 2024-03-13 RX ORDER — GADOBUTROL 604.72 MG/ML
7 INJECTION INTRAVENOUS ONCE
Status: COMPLETED | OUTPATIENT
Start: 2024-03-13 | End: 2024-03-13

## 2024-03-13 RX ORDER — ACETAMINOPHEN 650 MG/1
650 SUPPOSITORY RECTAL EVERY 4 HOURS PRN
Status: DISCONTINUED | OUTPATIENT
Start: 2024-03-13 | End: 2024-03-16 | Stop reason: HOSPADM

## 2024-03-13 RX ORDER — MONTELUKAST SODIUM 10 MG/1
10 TABLET ORAL AT BEDTIME
Status: DISCONTINUED | OUTPATIENT
Start: 2024-03-13 | End: 2024-03-16 | Stop reason: HOSPADM

## 2024-03-13 RX ORDER — ONDANSETRON 4 MG/1
4 TABLET, ORALLY DISINTEGRATING ORAL EVERY 6 HOURS PRN
Status: DISCONTINUED | OUTPATIENT
Start: 2024-03-13 | End: 2024-03-16 | Stop reason: HOSPADM

## 2024-03-13 RX ORDER — GABAPENTIN 100 MG/1
100 CAPSULE ORAL EVERY OTHER DAY
Status: DISCONTINUED | OUTPATIENT
Start: 2024-03-14 | End: 2024-03-13

## 2024-03-13 RX ORDER — AMOXICILLIN 250 MG
1 CAPSULE ORAL 2 TIMES DAILY PRN
Status: DISCONTINUED | OUTPATIENT
Start: 2024-03-13 | End: 2024-03-16 | Stop reason: HOSPADM

## 2024-03-13 RX ORDER — ASPIRIN 325 MG
325 TABLET, DELAYED RELEASE (ENTERIC COATED) ORAL DAILY
Status: DISCONTINUED | OUTPATIENT
Start: 2024-03-14 | End: 2024-03-16 | Stop reason: HOSPADM

## 2024-03-13 RX ORDER — ACETAMINOPHEN 325 MG/1
650 TABLET ORAL ONCE
Status: COMPLETED | OUTPATIENT
Start: 2024-03-13 | End: 2024-03-13

## 2024-03-13 RX ORDER — ONDANSETRON 2 MG/ML
4 INJECTION INTRAMUSCULAR; INTRAVENOUS EVERY 6 HOURS PRN
Status: DISCONTINUED | OUTPATIENT
Start: 2024-03-13 | End: 2024-03-16 | Stop reason: HOSPADM

## 2024-03-13 RX ORDER — ACETAMINOPHEN 500 MG
1000 TABLET ORAL DAILY
COMMUNITY

## 2024-03-13 RX ORDER — AMOXICILLIN 250 MG
2 CAPSULE ORAL 2 TIMES DAILY PRN
Status: DISCONTINUED | OUTPATIENT
Start: 2024-03-13 | End: 2024-03-16 | Stop reason: HOSPADM

## 2024-03-13 RX ORDER — KETOROLAC TROMETHAMINE 15 MG/ML
15 INJECTION, SOLUTION INTRAMUSCULAR; INTRAVENOUS ONCE
Status: COMPLETED | OUTPATIENT
Start: 2024-03-13 | End: 2024-03-13

## 2024-03-13 RX ADMIN — MONTELUKAST 10 MG: 10 TABLET, FILM COATED ORAL at 22:14

## 2024-03-13 RX ADMIN — ACETAMINOPHEN 650 MG: 325 TABLET, FILM COATED ORAL at 13:30

## 2024-03-13 RX ADMIN — PRAVASTATIN SODIUM 10 MG: 10 TABLET ORAL at 22:14

## 2024-03-13 RX ADMIN — SODIUM CHLORIDE 100 ML: 9 INJECTION, SOLUTION INTRAVENOUS at 10:26

## 2024-03-13 RX ADMIN — METRONIDAZOLE: 7.5 CREAM TOPICAL at 23:42

## 2024-03-13 RX ADMIN — GADOBUTROL 7 ML: 604.72 INJECTION INTRAVENOUS at 13:10

## 2024-03-13 RX ADMIN — KETOROLAC TROMETHAMINE 15 MG: 15 INJECTION, SOLUTION INTRAMUSCULAR; INTRAVENOUS at 14:19

## 2024-03-13 RX ADMIN — IOPAMIDOL 67 ML: 755 INJECTION, SOLUTION INTRAVENOUS at 10:26

## 2024-03-13 RX ADMIN — ACETAMINOPHEN 650 MG: 325 TABLET, FILM COATED ORAL at 22:14

## 2024-03-13 RX ADMIN — ACETAMINOPHEN 1000 MG: 500 TABLET, FILM COATED ORAL at 19:30

## 2024-03-13 RX ADMIN — SODIUM CHLORIDE 1000 ML: 9 INJECTION, SOLUTION INTRAVENOUS at 14:19

## 2024-03-13 ASSESSMENT — ACTIVITIES OF DAILY LIVING (ADL)
ADLS_ACUITY_SCORE: 40
ADLS_ACUITY_SCORE: 40
ADLS_ACUITY_SCORE: 27
ADLS_ACUITY_SCORE: 42
ADLS_ACUITY_SCORE: 40
ADLS_ACUITY_SCORE: 42
ADLS_ACUITY_SCORE: 40
ADLS_ACUITY_SCORE: 40
ADLS_ACUITY_SCORE: 27
ADLS_ACUITY_SCORE: 40

## 2024-03-13 NOTE — H&P
[facility]    History and Physical - Hospitalist Service       Date of Admission:  3/13/2024    Assessment & Plan      Macey Romero is a 73 year old female with past medical history of transient ischemic attack, nicotine dependence, breast cancer, chronic kidney disease stage III, hyperlipidemia, asthma, prediabetes, osteoarthritis, fibromyalgia, admitted 3/13/2024 with an indeterminate spell of confusion day of admission associated with headache, and a one week history of right arm weakness.    Indeterminate spell of confusion, right arm weakness  History of transient ischemic attack   Headache, acute on chronic, right-sided  -1 week history of right arm weakness prior to admission, with 1 day history of confusion at Bible study lasting approximately 1 hour; additional right-sided acute on chronic headache, right parietal-occipital region  -Code stroke in the emergency room with stroke team consulted and imaging negative for acute ischemic or hemorrhagic event  -Admission vitals: 130/40, 98.5 Fahrenheit, pulse 84, respiratory rate 20, O2 saturation 99% room air  -Admission labs: Normal basic profile, glucose 123, troponin 8, WBC 6500, hemoglobin 13.7, platelets 195,000  -MRI brain negative for acute intracranial pathology.  Diffuse cerebral volume loss and cerebral white matter changes consistent with chronic small vessel ischemic disease.  -CTA head and neck with moderate focal narrowing at the P1-P2 junction of the right posterior cerebral artery with relatively decreased filling of the right PCA compared to the left PCA, uncertain clinical significance; moderate atherosclerotic narrowing at the origin of the left vertebral artery, otherwise normal head and neck CTA  -Noncontrast head CT diffuse cerebral volume loss and cerebral white matter changes consistent with chronic small vessel ischemic disease.  No acute changes.  -EKG sinus rhythm, rate 74, nonspecific ST-T wave changes  inferiorly  -Hospital admission for indeterminate spell of confusion, right arm weakness, and initial concerns of stroke versus TIA; rule out seizure versus other cause (medication side effect, systemic inflammatory disorder, vs other)    Admit observation  General neurology consult; stroke neurology consulted in ER on admission  EEG, rule out seizure  Telemetry, rule out dysrhythmia or heart block  Transthoracic echocardiogram (TTE), rule out structural heart disease  Neuro checks  Aspirin 325 mg daily, as prior to admission   HOLD prior to admission sertraline (Zoloft) and certirizine (Zyrtec), started in the past 1 1/2 weeks per patient, rule out medication side effect  Pain control, see hospital orders  Physical therapy (PT), occupational therapy (OT) consults  Check TSH  AM labs as ordered    Chronic kidney disease (CKD), stage IIIa  -Admission serum Cr 0.90, historic baseline 0.69 to 1.05 in 2023  Monitor serum Cr, urine output, I/O's, daily weights  Avoid non-steroidal anti-inflammatory drugs (NSAIDs) and nephrotoxic agents  AM basic profile  Recent Labs   Lab 03/13/24  1019   CR 0.90     History of asthma  History of emphysema  Stable, no current cardiopulmonary symptoms reported  Albuterol as needed as prior to admission    Hyperlipidemia  Continue prior to admission statin once reconciled by pharmacy   Check AM fasting lipid profile    History of prediabetes  -Admission glucose 123  -HbA1c 5.7% 11/8/2023  Monitor blood sugars  Consider insulin sliding scale if worsening hyperglycemia  Regular diet, reassess daily  Follow-up with primary clinic provider   Recent Labs   Lab 03/13/24  1019   *     History of constipation  Bowel regimen, see hospital orders    Anxiety, depression  HOLD prior to admission sertraline (Zoloft) as outlined above, started ~1.5 weeks prior to admission; rule out medication side effect  Comfort and support offered at the bedside  Follow-up with primary clinic provider primary  clinic provider or mental health provider    Nicotine dependence  Recent smoker, quit over one week prior to admission per patient report  On/off smoking over many years, <1 pack per day, not routine by report  Smoking cessation dicussed and encouraged  Nicotine patch may considered upon request  Follow-up with primary clinic provider    History of breast cancer - noted, follow-up with primary clinic provider and oncology  Fibromyalgia - noted; pain control; therapy services consulted  History of chronic pain, knees - s/p bilateral TKA's; stable  Osteoarthritis - noted  History of cervical spinal fusion - noted    Disposition  Estimated length of stay 24 to 48 hours  Anticipated discharge to home  Social work consulted    Change in hospitalist provider tomorrow with one of my [facility] hospitalist colleagues assuming care.        Diet: NPO for Medical/Clinical Reasons Except for: No Exceptions    DVT Prophylaxis: Pneumatic Compression Devices  Boudreaux Catheter: Not present  Lines: None     Cardiac Monitoring: None  Code Status:  FULL CODE status, confirmed on admission    Clinically Significant Risk Factors Present on Admission                # Drug Induced Platelet Defect: home medication list includes an antiplatelet medication   # Hypertension: Noted on problem list          # Asthma: noted on problem list        Disposition Plan      Expected Discharge Date: 03/14/2024                Garth Ferrari MD  Hospitalist Service  [facility]  Securely message with [employer] (more info)  Text page via [employer] Paging/Directory     ______________________________________________________________________    Chief Complaint   Confusion, right arm weakness, initial concerns of code stroke or transient ischemic attack, headache    History is obtained from the patient, ER provider, chart review     History of Present Illness   Macey Romero is a 73 year old female with past medical history above  "presenting with spell of confusion the Bible study morning of admission.  Approxi-10 AM was reading the Bible and noted confusion, difficulties finding her page.  Associated with mild to moderate right-sided posterior headache, acute on chronic.  Later describes sensation of \"hot oil\" poured over her body associated with ongoing weakness right arm, present for the past week.  No slurred speech.  No acute changes in vision.  No leg weakness reported.  History of longer standing tremors.  Describes prior history of TIA in January and February 2024 associate with elevated blood pressure.  Also recalls question of possible stroke in 2005 though indeterminate based on her report.  She does take aspirin 325 mg daily.  X-ray today due to concerns of confusion lasting up to 60 minutes, persistent right arm weakness over the past week, presented to emergency room.  No chest pain or shortness of breath.  No acute changes in bowel or bladder though chronic constipation.  Of note, started Zoloft approximately 1-1/2 weeks ago because others thought she was \"depressed\" though she is not convinced.  Also reportedly started sertraline.  No nausea or vomiting.  No fever, chills, or sweats.    Code stroke in the emergency room initially called.  Imaging negative, see below.    -Admission vitals: 130/40, 98.5 Fahrenheit, pulse 84, respiratory rate 20, O2 saturation 99% room air  -Admission labs: Normal basic profile, glucose 123, troponin 8, WBC 6500, hemoglobin 13.7, platelets 195,000  -MRI brain negative for acute intracranial pathology.  Diffuse cerebral volume loss and cerebral white matter changes consistent with chronic small vessel ischemic disease.  -CTA head and neck with moderate focal narrowing at the P1-P2 junction of the right posterior cerebral artery with relatively decreased filling of the right PCA compared to the left PCA, uncertain clinical significance; moderate atherosclerotic narrowing at the origin of the left " vertebral artery, otherwise normal head and neck CTA  -Noncontrast head CT diffuse cerebral volume loss and cerebral white matter changes consistent with chronic small vessel ischemic disease.  No acute changes.  -EKG sinus rhythm, rate 74, nonspecific ST-T wave changes inferiorly  -Hospital admission for indeterminate spell of confusion, leg weakness, and initial concerns of stroke versus TIA, rule out seizure versus other cause (such as medication side effect or systemic inflammatory disorder)    Past Medical History    Past Medical History:   Diagnosis Date    Breast CA in situ 1987    Cancer (H) 1987    Right Breast treated with surgery    Cerebral infarction (H)     Chronic constipation     Fibromyalgia     History of blood transfusion     Malignant neoplasm of female breast, unspecified estrogen receptor status, unspecified laterality, unspecified site of breast (H) 06/17/2021    Meningitis     Several times as an adult    Osteoarthritis 02/01/2011    Osteopenia 02/01/2011    Pneumonia     Pterygium eye 08/26/2013    Reactive airway disease 02/01/2011    Seasonal allergies     Shingles     Prior to 2008 - scalp    Skin cancer     SCC - hand, left nose    Uncomplicated asthma        Past Surgical History   Past Surgical History:   Procedure Laterality Date    anterior c-disc fusion      ARTHROPLASTY KNEE Left 10/17/2022    Procedure: LEFT TOTAL KNEE ARTHROPLASTY;  Surgeon: Jax Le MD;  Location:  OR    BLEPHAROPLASTY, BROW LIFT BILATERAL, COMBINED Bilateral 6/18/2018    Procedure: COMBINED BLEPHAROPLASTY, BROW LIFT BILATERAL;  BILATERAL UPPER LID BLEPHAROPLASTY; BILATERAL BROW PTOSIS REPAIR;  Surgeon: Сергей Walters MD;  Location:  EC    CHOLECYSTECTOMY      COLONOSCOPY N/A 10/19/2017    Procedure: COLONOSCOPY;  COLONOSCOPY;  Surgeon: Niko Wong MD;  Location:  GI    COLONOSCOPY N/A 8/27/2022    Procedure: COLONOSCOPY, WITH POLYPECTOMY AND BIOPSY;  Surgeon: Abraham Rogers MD;   Location:  GI    D & C      Bleeding before hysterectomy    DISCECTOMY, FUSION CERVICAL ANTERIOR ONE LEVEL, COMBINED N/A 9/20/2023    Procedure: ANTERIOR CERVICAL 5 TO CERVICAL 6 DISCECTOMY AND FUSION;  Surgeon: Alex Galindo MD;  Location:  OR    ENDOSCOPY  04/21/08    ESOPHAGOSCOPY, GASTROSCOPY, DUODENOSCOPY (EGD), COMBINED N/A 8/27/2022    Procedure: ESOPHAGOGASTRODUODENOSCOPY, WITH BIOPSY;  Surgeon: Abraham Rogers MD;  Location:  GI    HYSTERECTOMY, MARCUS      Ovaries and tubes out due to breast cancer    JOINT REPLACEMTN, KNEE RT/LT Right 01/2011    Joint Replacement knee RT, with tibial straightening (2 replacements)    MAMMOPLASTY AUGMENTATION BILATERAL      breast ca     MASTECTOMY, SIMPLE RT/LT/CLAIRE Bilateral 1989    Mastectomy Simple RT/LT/CLAIRE    MOHS MICROGRAPHIC PROCEDURE      Left lateral nose    OPEN REDUCTION INTERNAL FIXATION ANKLE  8/27/2013    Procedure: OPEN REDUCTION INTERNAL FIXATION ANKLE;  RIGHT OPEN REDUCTION INTERNAL FIXATION ANKLE WITH LIGAMENT REPAIR;  Surgeon: Nando Chang MD;  Location:  OR    PTERYGIUM WITH CONJUNCTIVAL AUTOLOGOUS TRANSPLANT Left 8/29/2016    Procedure: PTERYGIUM WITH CONJUNCTIVAL AUTOLOGOUS TRANSPLANT;  Surgeon: Сергей Walters MD;  Location:  EC    REPAIR LIGAMENT ANKLE  8/27/2013    Procedure: REPAIR LIGAMENT ANKLE;;  Surgeon: Nando Chang MD;  Location:  OR    SALIVARY GLAND SURGERY Left     Stone removal     SIGMOIDOSCOPY FLEXIBLE N/A 8/30/2022    Procedure: FLEXIBLE SIGMOIDOSCOPY;  Surgeon: Niko Wong MD;  Location:  GI    TONSILLECTOMY         Prior to Admission Medications   Prior to Admission Medications   Prescriptions Last Dose Informant Patient Reported? Taking?   acetaminophen (TYLENOL) 325 MG tablet   No No   Sig: Take 3 tablets (975 mg) by mouth every 8 hours as needed for mild pain   albuterol (PROAIR HFA/PROVENTIL HFA/VENTOLIN HFA) 108 (90 Base) MCG/ACT inhaler   No No   Sig: Inhale 2 puffs into the lungs every 6  hours as needed for shortness of breath   alendronate (FOSAMAX) 70 MG tablet   No No   Sig: Take 1 tablet (70 mg) by mouth every 7 days   amitriptyline (ELAVIL) 10 MG tablet   No No   Sig: TAKE 1 TABLET BY MOUTH AT BEDTIME FOR NERVE PAIN. IF NO RELIEF IN 4 NIGHTS INCREASE TO 2 TABLETS   Patient not taking: Reported on 3/7/2024   aspirin (ASA) 325 MG EC tablet   No No   Sig: Take 1 tablet (325 mg) by mouth daily Take with food or meal.   budesonide-formoterol (SYMBICORT) 160-4.5 MCG/ACT Inhaler  Self No No   Sig: Inhale 2 puffs into the lungs daily   Patient not taking: Reported on 3/7/2024   cetirizine (ZYRTEC) 5 MG tablet   No No   Sig: Take 1 tablet (5 mg) by mouth daily for 30 days   clindamycin (CLEOCIN) 300 MG capsule   Yes No   Sig: Take 1 capsule (300 mg) by mouth Take 1 hour prior to dental appointment.   Patient not taking: Reported on 3/7/2024   gabapentin (NEURONTIN) 100 MG capsule   Yes No   Sig: Take 1 capsule (100 mg) by mouth at bedtime   ibuprofen (ADVIL/MOTRIN) 400 MG tablet   No No   Sig: Take 1 tablet (400 mg) by mouth daily as needed for inflammatory pain or other (HA)   methocarbamol (ROBAXIN) 500 MG tablet   Yes No   Sig: Take 1 tablet (500 mg) by mouth every 6 hours as needed for muscle spasms or other (Headaches)   Patient not taking: Reported on 2/28/2024   metroNIDAZOLE (METROCREAM) 0.75 % external cream   Yes No   Sig: Apply topically 2 times daily   metroNIDAZOLE (METROCREAM) 0.75 % external cream   No No   Sig: Apply topically 2 times daily   montelukast (SINGULAIR) 10 MG tablet   No No   Sig: Take 1 tablet (10 mg) by mouth at bedtime for 30 days   pravastatin (PRAVACHOL) 10 MG tablet   No No   Sig: Take 1 tablet (10 mg) by mouth daily   sertraline (ZOLOFT) 50 MG tablet   No No   Sig: Take 1 tablet (50 mg) by mouth daily   vitamin D3 (CHOLECALCIFEROL) 50 mcg (2000 units) tablet  Self Yes No   Sig: Take 1 tablet by mouth daily   Patient not taking: Reported on 3/7/2024       Facility-Administered Medications: None        Review of Systems    Review of systems otherwise negative and per HPI above  Constipation reported.  Chronic headaches.  Right arm weakness over the past week.    Social History   I have reviewed this patient's social history and updated it with pertinent information if needed.  Social History     Tobacco Use    Smoking status: Former     Years: 14     Types: Cigarettes    Smokeless tobacco: Never    Tobacco comments:     quit but  still smokes, but not in house   Vaping Use    Vaping Use: Never used   Substance Use Topics    Alcohol use: No     Alcohol/week: 0.0 standard drinks of alcohol    Drug use: No     Nicotine dependence, quit smoking 1 week ago after on and off smoking for many years.  Denies alcohol use.  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    Family History   I have reviewed this patient's family history and updated it with pertinent information if needed.  Family History   Problem Relation Age of Onset    Respiratory Father     Cancer Brother         sarcoidosis    Diabetes Brother     Cerebrovascular Disease Brother     Liver Disease Brother          Allergies   Allergies   Allergen Reactions    Levaquin Hives and Itching    Pcn [Penicillins] Hives    Tetanus Toxoid Anaphylaxis    Tetanus Toxoids Anaphylaxis    Erythromycin GI Disturbance    Amoxicillin     Duloxetine Nausea     Other reaction(s): Jittery    Silicone Rash        Physical Exam   Vital Signs: Temp: 98.5  F (36.9  C) Temp src: Oral BP: 136/82 Pulse: 74   Resp: 15 SpO2: 98 % O2 Device: None (Room air)    Weight: 0 lbs 0 oz    GENERAL awake and alert, lying in bed, pleasant and interactive, mild to moderately anxious  EYES pupils equal, round; no conjunctival injection or jaundice  ENT mucous membranes mildly dry without lesions or exudates  LYMPH no cervical, submandibular, supraclavicular, or axillary adenopathy  BACK normal  LUNGS no wheezes or crackles  HEART S1,S2 with RRR,  no rubs or gallops, no murmurs  ABDOMEN soft, non-tender; no guarding, rebound, or rigidity  MUSCULOSKELETAL no gross joint deformities; extremities without edema  PULSES dorsalis pedis pulses present, symmetric  SKIN warm and dry  NEURO moves upper and lower extremities spontaneously and to command; no focal weakness appreciated; sensation intact to light touch upper and lower extremities  PSYCHIATRIC awake and alert; answers questions and follows simple commands    Medical Decision Making             Data     I have personally reviewed the following data over the past 24 hrs:    6.5  \   13.7   / 195     140 102 13.1 /  123 (H)   4.0 26 0.90 \     Trop: 8 BNP: N/A     INR:  1.02 PTT:  29   D-dimer:  N/A Fibrinogen:  N/A       Imaging results reviewed over the past 24 hrs:   Recent Results (from the past 24 hour(s))   CT Head w/o Contrast    Narrative    CT OF THE HEAD WITHOUT CONTRAST 3/13/2024 10:29 AM     COMPARISON: None.    HISTORY: Headache. Confusion.    TECHNIQUE: 5 mm thick axial CT images of the head were acquired  without IV contrast material.    FINDINGS:  There is mild diffuse cerebral volume loss. There are  subtle patchy areas of decreased density in the cerebral white matter  bilaterally that are consistent with sequela of chronic small vessel  ischemic disease.     The ventricles and basal cisterns are within normal limits in  configuration given the degree of cerebral volume loss.  There is no  midline shift. There are no extra-axial fluid collections.     No intracranial hemorrhage, mass or recent infarct.    The visualized paranasal sinuses are well-aerated. There is no  mastoiditis. There are no fractures of the visualized bones.       Impression    IMPRESSION:  Diffuse cerebral volume loss and cerebral white matter  changes consistent with chronic small vessel ischemic disease. No  evidence for acute intracranial pathology.         Radiation dose for this scan was reduced using automated  exposure  control, adjustment of the mA and/or kV according to patient size, or  iterative reconstruction technique.    ADELIA CAVANAUGH MD         SYSTEM ID:  [id number]   CTA Head Neck with Contrast    Narrative    CT ANGIOGRAM OF THE HEAD AND NECK WITHOUT AND WITH CONTRAST  3/13/2024  10:33 AM     COMPARISON: None.    HISTORY: Altered mental status. Headache. Confusion.    TECHNIQUE:  Precontrast localizing scans were followed by CT  angiography with an injection of 67 mL [id number]-370 nonionic intravenous  contrast material with scans through the head and neck.  Images were  transferred to a separate 3-D workstation where multiplanar  reformations and 3-D images were created.  Estimates of carotid  stenoses are made relative to the distal internal carotid artery  diameters except as noted.      FINDINGS:   Neck CTA: The bilateral common carotid, bilateral cervical internal  carotid and right vertebral arteries are patent without stenosis.  There is moderate atherosclerotic narrowing at the origin of the left  vertebral artery. The left vertebral artery is otherwise patent and  unremarkable.    Head CTA: There is moderate focal narrowing at the P1-P2 junction of  the right posterior cerebral artery with decreased opacification of  the right posterior cerebral artery compared to the left PCA; this is  of uncertain clinical significance. The basilar, bilateral  intracranial distal internal carotid, bilateral anterior cerebral,  bilateral middle cerebral and left posterior cerebral arteries are  patent and unremarkable.      Impression    IMPRESSION:  1. Moderate focal narrowing at the P1-P2 junction of the right  posterior cerebral artery with relatively decreased filling of the  right PCA compared to the left PCA. This is of uncertain clinical  significance.  2. Moderate atherosclerotic narrowing at the origin of the left  vertebral artery.  3. Otherwise, normal neck and head CTA.      Radiation dose for this scan was  reduced using automated exposure  control, adjustment of the mA and/or kV according to patient size, or  iterative reconstruction technique.    ADELIA CAVANAUGH MD         SYSTEM ID:  [id number]   MR Brain w/o & w Contrast    Narrative    MRI OF THE BRAIN WITHOUT AND WITH CONTRAST 3/13/2024 1:08 PM     COMPARISON: Head CT same day.    HISTORY:  Headache, ? receptive aphasia, vision changes.    TECHNIQUE: Axial diffusion-weighted with ADC map, axial T2-weighted  with fat saturation, axial T1-weighted, axial turboFLAIR and coronal  T1-weighted images of the brain were acquired without intravenous  contrast.  Following intravenous administration of gadolinium (7 mL  Gadavist), axial T1-weighted images of the brain were acquired.     FINDINGS: There is mild diffuse cerebral volume loss. There are patchy  periventricular areas of abnormal T2 signal hyperintensity in the  cerebral white matter bilaterally that are consistent with sequela of  chronic small vessel ischemic disease.     The ventricles and basal cisterns are within normal limits in  configuration given the degree of cerebral volume loss.  There is no  midline shift.  There are no extra-axial fluid collections.  There is  no evidence for stroke or acute intracranial hemorrhage.  There is no  abnormal contrast enhancement in the brain or its coverings.    There is no sinusitis or mastoiditis.      Impression    IMPRESSION:  Diffuse cerebral volume loss and cerebral white matter  changes consistent with chronic small vessel ischemic disease. No  evidence for acute intracranial pathology.    ADELIA CAVANAUGH MD         SYSTEM ID:  [id number]

## 2024-03-13 NOTE — ED NOTES
"[facility]  ED Nurse Handoff Report    ED Chief complaint: Stroke Symptoms      ED Diagnosis:   Final diagnoses:   Acute confusion       Code Status: to be addressed by admitting provider    Allergies:   Allergies   Allergen Reactions    Levaquin Hives and Itching    Pcn [Penicillins] Hives    Tetanus Toxoid Anaphylaxis    Tetanus Toxoids Anaphylaxis    Erythromycin GI Disturbance    Amoxicillin     Duloxetine Nausea     Other reaction(s): Jittery    Silicone Rash       Patient Story: presents to ED for stroke concern. Pt was at [employer] and at 0930 she was unable to recognize words, R sided HA, and felt \"like I was in a bubble\". Sx resolved by arrival in ED. Pt reporting small amount of decreased right sided facial sensation. Hx CVA and TIAs.     Focused Assessment:  A&Ox4. Continues to have R sided HA. Medicated with tylenol and Toradol. Able to ambulate independently with steady gait but requested a SBA. Denies sob, cough, fevers, chest pain. CT and MRI neg for acute CVA. Calm, cooperative.    MR Brain w/o & w Contrast   Final Result   IMPRESSION:  Diffuse cerebral volume loss and cerebral white matter   changes consistent with chronic small vessel ischemic disease. No   evidence for acute intracranial pathology.      ADELIA CAVANAUGH MD            SYSTEM ID:  [id number]      CTA Head Neck with Contrast   Final Result   IMPRESSION:   1. Moderate focal narrowing at the P1-P2 junction of the right   posterior cerebral artery with relatively decreased filling of the   right PCA compared to the left PCA. This is of uncertain clinical   significance.   2. Moderate atherosclerotic narrowing at the origin of the left   vertebral artery.   3. Otherwise, normal neck and head CTA.         Radiation dose for this scan was reduced using automated exposure   control, adjustment of the mA and/or kV according to patient size, or   iterative reconstruction technique.      ADELIA CAVANAUGH MD            SYSTEM ID:  " [id number]      CT Head w/o Contrast   Final Result   IMPRESSION:  Diffuse cerebral volume loss and cerebral white matter   changes consistent with chronic small vessel ischemic disease. No   evidence for acute intracranial pathology.             Radiation dose for this scan was reduced using automated exposure   control, adjustment of the mA and/or kV according to patient size, or   iterative reconstruction technique.      ADELIA CAVANAUGH MD            SYSTEM ID:  [id number]           Labs Ordered and Resulted from Time of ED Arrival to Time of ED Departure   BASIC METABOLIC PANEL - Abnormal       Result Value    Sodium 140      Potassium 4.0      Chloride 102      Carbon Dioxide (CO2) 26      Anion Gap 12      Urea Nitrogen 13.1      Creatinine 0.90      GFR Estimate 67      Calcium 9.0      Glucose 123 (*)    ROUTINE UA WITH MICROSCOPIC REFLEX TO CULTURE - Abnormal    Color Urine Light Yellow      Appearance Urine Clear      Glucose Urine Negative      Bilirubin Urine Negative      Ketones Urine Negative      Specific Gravity Urine 1.019      Blood Urine Negative      pH Urine 5.5      Protein Albumin Urine Negative      Urobilinogen Urine Normal      Nitrite Urine Negative      Leukocyte Esterase Urine Small (*)     RBC Urine 1      WBC Urine 6 (*)     Squamous Epithelials Urine 2 (*)    INR - Normal    INR 1.02     PARTIAL THROMBOPLASTIN TIME - Normal    aPTT 29     TROPONIN T, HIGH SENSITIVITY - Normal    Troponin T, High Sensitivity 8     CBC WITH PLATELETS AND DIFFERENTIAL    WBC Count 6.5      RBC Count 4.95      Hemoglobin 13.7      Hematocrit 42.3      MCV 86      MCH 27.7      MCHC 32.4      RDW 12.6      Platelet Count 195      % Neutrophils 53      % Lymphocytes 32      % Monocytes 10      % Eosinophils 4      % Basophils 1      % Immature Granulocytes 0      NRBCs per 100 WBC 0      Absolute Neutrophils 3.5      Absolute Lymphocytes 2.1      Absolute Monocytes 0.6      Absolute Eosinophils 0.3       Absolute Basophils 0.0      Absolute Immature Granulocytes 0.0      Absolute NRBCs 0.0     GLUCOSE MONITOR NURSING POCT      Treatments and/or interventions provided: CT, CTA, MRI, tylenol, toradol and NS bolus  Patient's response to treatments and/or interventions: tolerated    To be done/followed up on inpatient unit:  Continue with plan of care per admitting MD.     Does this patient have any cognitive concerns?: none    Activity level - Baseline/Home:  Independent  Activity Level - Current:   Independent    Patient's Preferred language: English   Needed?: No    Isolation: None  Infection: Not Applicable  Patient tested for COVID 19 prior to admission: no  Bariatric?: No    Vital Signs:   Vitals:    03/13/24 1100 03/13/24 1117 03/13/24 1130 03/13/24 1200   BP: 132/56   127/50   BP Location:       Pulse: 73 75 78 68   Resp: 17 18 19 13   Temp:       TempSrc:       SpO2:  97% 98% 97%       Cardiac Rhythm:Cardiac Rhythm: Normal sinus rhythm    Was the PSS-3 completed:   Yes  What interventions are required if any?               Family Comments:  at bedside    For the majority of the shift this patient's behavior was Green.   Behavioral interventions performed were na.    ED NURSE PHONE NUMBER: *  [id number]

## 2024-03-13 NOTE — ED NOTES
Patient walking to the bathroom and using the bathroom at MRI well, able to walk with minimal to no assistance.

## 2024-03-13 NOTE — ED TRIAGE NOTES
Patient was at a [employer] study and was having a hard time turning the pages that the teacher was asking her to turn to.  Patient is super emotional and scared because this is how she felt when she had a TIA in the past.  This happened about a half hour ago and is also feeling faint and like someone has poured hot oil all over her body. Patient has a horrible headache on right side only.  The headache started with the inability to find the pages.

## 2024-03-13 NOTE — ED PROVIDER NOTES
History     Chief Complaint:  Stroke Symptoms       HPI   Macey Romero is a 73 year old female with history of cerebral infarction who presents with difficulty reading and understanding numbers. Patient woke up feeling fine and went to [employer]. Around 0950 while read she was told to flip to a page but began to flip back and forth and could not understand the numbers on the page. She then briefly felt like some hot had been poured onto her before developed a right sided headache which she has here. Witness notes she did not have slurred speech at that time and seems like her normal self here. No vision changes. Patient has history of TIA..     Independent Historian:   None - Patient Only    Review of External Notes:   2/16/24: Discharge summary reviewed.  Patient admitted with headache      Medications:    Albuterol   Fosamax  Aspirin   Amitriptyline  Symbicort   Zyrtec   Yoandy  Singulair   Pravastatin   Zoloft     Past Medical History:    Cancer  Cerebral infarction   Chronic constipation  Fibromyalgia  blood transfusion  Malignant neoplasm of female breast  Meningitis  Osteoarthritis  Osteopenia  Pneumonia  Pterygium eye  Reactive airway disease  Shingles  Skin cancer  Uncomplicated asthma    Past Surgical History:    Tonsillectomy  Salivary stone removal   Right ankle ligament repair  ORIF ankle  MOHS  Mastectomy   Mammoplasty   Right knee arthroplasty   Hysterectomy   EGD  Cervical spine fusion discectomy   D&C   Colonoscopy   Cholecystectomy   Blepharoplasty   Left knee arthroplasty      Physical Exam   Patient Vitals for the past 24 hrs:   BP Temp Temp src Pulse Resp SpO2   03/13/24 1200 127/50 -- -- 68 13 97 %   03/13/24 1130 -- -- -- 78 19 98 %   03/13/24 1117 -- -- -- 75 18 97 %   03/13/24 1100 132/56 -- -- 73 17 --   03/13/24 1054 132/56 -- -- 78 13 --   03/13/24 1045 -- -- -- 86 22 98 %   03/13/24 1035 (!) 144/71 -- -- 86 -- 99 %   03/13/24 1002 138/40 98.5  F (36.9  C) Oral 84 20 99 %        Physical  Exam  General: Resting on the bed. Tearful and anxious appearing.   Head: No obvious trauma to head.  Ears, Nose, Throat:  External ears normal.  Nose normal.  No pharyngeal erythema, swelling or exudate.  Midline uvula. Moist mucus membranes.  Eyes:  Conjunctivae clear.   Neck: Normal range of motion.  Neck supple.   CV: Regular rate and rhythm.  No murmurs.      Respiratory: Effort normal and breath sounds normal.  No wheezing or crackles.   Gastrointestinal: Soft.  No distension. There is no tenderness.  There is no rigidity, no rebound and no guarding.   Musculoskeletal: Normal range of motion.  Non tender extremities to palpations.    Neuro: Alert. Moving all extremities appropriately.  Normal speech.    CN II-XII grossly intact, no pronator drift, normal finger-nose-finger, visual fields intact.  Mild right lower extremity weakness  Sensation intact to light touch in all 4 extremities.   Skin: Skin is warm and dry.  No rash noted.   Psych: Normal mood and affect. Behavior is normal.      Emergency Department Course   ECG  ECG results from 03/13/24   EKG 12-lead, tracing only     Value    Systolic Blood Pressure     Diastolic Blood Pressure     Ventricular Rate 74    Atrial Rate 74    GA Interval 154    QRS Duration 80        QTc 386    P Axis 43    R AXIS 43    T Axis -32    Interpretation ECG      Sinus rhythm  ST & T wave abnormality, consider inferior ischemia  Abnormal ECG  When compared with ECG of 14-FEB-2024 19:40,  T wave inversion now evident in Inferior leads  QT has shortened  Confirmed by GENERATED REPORT, COMPUTER ([id number]),  Jaimie Lira ([id number]) on 3/13/2024 12:34:15 PM          Imaging:  MR Brain w/o & w Contrast   Final Result   IMPRESSION:  Diffuse cerebral volume loss and cerebral white matter   changes consistent with chronic small vessel ischemic disease. No   evidence for acute intracranial pathology.      ADELIA CAVANAUGH MD            SYSTEM ID:  [id number]      CTA Head Neck with  Contrast   Final Result   IMPRESSION:   1. Moderate focal narrowing at the P1-P2 junction of the right   posterior cerebral artery with relatively decreased filling of the   right PCA compared to the left PCA. This is of uncertain clinical   significance.   2. Moderate atherosclerotic narrowing at the origin of the left   vertebral artery.   3. Otherwise, normal neck and head CTA.         Radiation dose for this scan was reduced using automated exposure   control, adjustment of the mA and/or kV according to patient size, or   iterative reconstruction technique.      ADELIA CAVANAUGH MD            SYSTEM ID:  [id number]      CT Head w/o Contrast   Final Result   IMPRESSION:  Diffuse cerebral volume loss and cerebral white matter   changes consistent with chronic small vessel ischemic disease. No   evidence for acute intracranial pathology.             Radiation dose for this scan was reduced using automated exposure   control, adjustment of the mA and/or kV according to patient size, or   iterative reconstruction technique.      ADELIA CAVANAUGH MD            SYSTEM ID:  [id number]             Laboratory:  Labs Ordered and Resulted from Time of ED Arrival to Time of ED Departure   BASIC METABOLIC PANEL - Abnormal       Result Value    Sodium 140      Potassium 4.0      Chloride 102      Carbon Dioxide (CO2) 26      Anion Gap 12      Urea Nitrogen 13.1      Creatinine 0.90      GFR Estimate 67      Calcium 9.0      Glucose 123 (*)    ROUTINE UA WITH MICROSCOPIC REFLEX TO CULTURE - Abnormal    Color Urine Light Yellow      Appearance Urine Clear      Glucose Urine Negative      Bilirubin Urine Negative      Ketones Urine Negative      Specific Gravity Urine 1.019      Blood Urine Negative      pH Urine 5.5      Protein Albumin Urine Negative      Urobilinogen Urine Normal      Nitrite Urine Negative      Leukocyte Esterase Urine Small (*)     RBC Urine 1      WBC Urine 6 (*)     Squamous Epithelials Urine 2 (*)    INR - Normal     INR 1.02     PARTIAL THROMBOPLASTIN TIME - Normal    aPTT 29     TROPONIN T, HIGH SENSITIVITY - Normal    Troponin T, High Sensitivity 8     CBC WITH PLATELETS AND DIFFERENTIAL    WBC Count 6.5      RBC Count 4.95      Hemoglobin 13.7      Hematocrit 42.3      MCV 86      MCH 27.7      MCHC 32.4      RDW 12.6      Platelet Count 195      % Neutrophils 53      % Lymphocytes 32      % Monocytes 10      % Eosinophils 4      % Basophils 1      % Immature Granulocytes 0      NRBCs per 100 WBC 0      Absolute Neutrophils 3.5      Absolute Lymphocytes 2.1      Absolute Monocytes 0.6      Absolute Eosinophils 0.3      Absolute Basophils 0.0      Absolute Immature Granulocytes 0.0      Absolute NRBCs 0.0     GLUCOSE MONITOR NURSING POCT        Procedures   None     Emergency Department Course & Assessments:    Interventions:  Medications   iopamidol (ISOVUE-370) solution 67 mL (67 mLs Intravenous $Given 3/13/24 1026)   Saline flush (100 mLs Intravenous $Given 3/13/24 1026)   sodium chloride (PF) 0.9% PF flush 10 mL (10 mLs Intravenous $Given 3/13/24 1310)   gadobutrol (GADAVIST) injection 7 mL (7 mLs Intravenous $Given 3/13/24 1310)   acetaminophen (TYLENOL) tablet 650 mg (650 mg Oral $Given 3/13/24 1330)   ketorolac (TORADOL) injection 15 mg (15 mg Intravenous $Given 3/13/24 1419)   sodium chloride 0.9% BOLUS 1,000 mL (1,000 mLs Intravenous $New Bag 3/13/24 1419)        Assessments/Independent Interpretation/Consultation/Discuss Management or Tests:   ED Course as of 03/13/24 1449   Wed Mar 13, 2024   1013 I obtained history and examined the patient as noted above.    1021 I spoke with stroke neurology regarding patient's presentation, findings, and plan of care.     1040 Independent interpretation of head CT shows no acute intracranial hemorrhage   1041 I spoke with radiology regarding patient's findings. No hemorrhage or large vessel occlusion    1108 I spoke with stroke neurology regarding patient's plan of care.    "  1146 I rechecked the patient and explained findings.    1324 I was contacted by stroke neurology. Report that MRI was negative. Recommended general neurology consult.    1351 I spoke with Dr. Fisher of general neurology regarding patient's presentation, findings, and plan of care.     1415 I spoke with Dr. Ferrari of the Hospitalist service from [facility] regarding patient's presentation, findings, and plan of care.          Social Determinants of Health affecting care:   None    Disposition:  The patient was admitted to the hospital under the care of Dr. Ferrari.     Impression & Plan    Medical Decision Making:  Patient presents with acute onset confusion, which seems to be mostly resolved by the time she arrived to the emergency department.  She does have some right leg weakness.  Tier 1 code stroke is called.  Head CT and CT angio head and neck are obtained and shows no acute radial hemorrhage or large vessel occlusion.  I discussed the patient with stroke neurology, and they recommend obtaining an MRI to further evaluate.  This is obtained and shows no evidence of acute stroke.  CBC, BMP, troponin, UA are unremarkable.  I discussed the patient with stroke neurology again and they recommend consulting general neurology to further evaluate for possible seizure activity.  I discussed the patient with general neurology, and they state that they will evaluate the patient if she is admitted.  I spoke to the patient again and she is endorsing a headache, and does not feel comfortable discharging home at this time.  She appears to be close to baseline, but her report, is still not \"feeling right.\"  I am giving her IV fluids, Tylenol and Toradol for her headache.  I discussed the patient with the hospitalist, agreed to admit the patient to their service.  She remains in stable condition.      Diagnosis:    ICD-10-CM    1. Acute confusion  R41.0            Discharge Medications:  New Prescriptions    No " medications on file          Scribe Disclosure:  I, Chava Molina, am serving as a scribe at 10:36 AM on 3/13/2024 to document services personally performed by Laith Weiss MD based on my observations and the provider's statements to me.   3/13/2024    Laith Weiss MD Peery, Stephen, MD  03/13/24 1454

## 2024-03-13 NOTE — PHARMACY-ADMISSION MEDICATION HISTORY
Pharmacist Admission Medication History    Admission medication history is complete. The information provided in this note is only as accurate as the sources available at the time of the update.    Information Source(s): Patient and CareEverywhere/SureScripts via in-person    Pertinent Information: Has been taking pravastatin in the morning, discussed taking in evening for better efficacy    Changes made to PTA medication list:  Added: None  Deleted: amitriptyline, ibuprofen, methocarbamol, metronidazole cream (duplicate), vitamin d3  Changed:   Acetaminophen; prn to BID  Gabapentin; nightly to every other night    Allergies reviewed with patient and updates made in EHR: yes    Medication History Completed By: Ken Husain [facility] 3/13/2024 3:23 PM    PTA Med List   Medication Sig Note Last Dose    acetaminophen (TYLENOL) 500 MG tablet Take 1,000 mg by mouth 2 times daily  3/13/2024 at AM    albuterol (PROAIR HFA/PROVENTIL HFA/VENTOLIN HFA) 108 (90 Base) MCG/ACT inhaler Inhale 2 puffs into the lungs every 6 hours as needed for shortness of breath 3/13/2024: uses seasonally in the fall/spring More than a month at prn    alendronate (FOSAMAX) 70 MG tablet Take 1 tablet (70 mg) by mouth every 7 days  3/9/2024    aspirin (ASA) 325 MG EC tablet Take 1 tablet (325 mg) by mouth daily Take with food or meal.  3/13/2024 at AM    cetirizine (ZYRTEC) 5 MG tablet Take 1 tablet (5 mg) by mouth daily for 30 days  3/13/2024 at AM    clindamycin (CLEOCIN) 300 MG capsule Take 300 mg by mouth Take 1 hour prior to dental appointment.  prn for dental appt    gabapentin (NEURONTIN) 100 MG capsule Take 100 mg by mouth every other day At bedtime  3/12/2024 at PM    montelukast (SINGULAIR) 10 MG tablet Take 1 tablet (10 mg) by mouth at bedtime for 30 days  3/12/2024 at PM    pravastatin (PRAVACHOL) 10 MG tablet Take 1 tablet (10 mg) by mouth daily  3/13/2024 at AM    sertraline (ZOLOFT) 50 MG tablet Take 1 tablet (50 mg) by mouth daily   3/13/2024 at AM

## 2024-03-14 ENCOUNTER — APPOINTMENT (OUTPATIENT)
Dept: GENERAL RADIOLOGY | Facility: CLINIC | Age: 74
DRG: 101 | End: 2024-03-14
Attending: NURSE PRACTITIONER
Payer: MEDICARE

## 2024-03-14 ENCOUNTER — APPOINTMENT (OUTPATIENT)
Dept: CARDIOLOGY | Facility: CLINIC | Age: 74
DRG: 101 | End: 2024-03-14
Attending: HOSPITALIST
Payer: MEDICARE

## 2024-03-14 PROBLEM — R41.0 ACUTE CONFUSION: Status: ACTIVE | Noted: 2024-03-14

## 2024-03-14 LAB
ANION GAP SERPL CALCULATED.3IONS-SCNC: 8 MMOL/L (ref 7–15)
BUN SERPL-MCNC: 11.8 MG/DL (ref 8–23)
CALCIUM SERPL-MCNC: 8.7 MG/DL (ref 8.8–10.2)
CHLORIDE SERPL-SCNC: 107 MMOL/L (ref 98–107)
CHOLEST SERPL-MCNC: 144 MG/DL
CREAT SERPL-MCNC: 0.83 MG/DL (ref 0.51–0.95)
DEPRECATED HCO3 PLAS-SCNC: 25 MMOL/L (ref 22–29)
EGFRCR SERPLBLD CKD-EPI 2021: 74 ML/MIN/1.73M2
ERYTHROCYTE [DISTWIDTH] IN BLOOD BY AUTOMATED COUNT: 12.7 % (ref 10–15)
FASTING STATUS PATIENT QL REPORTED: NO
GLUCOSE SERPL-MCNC: 106 MG/DL (ref 70–99)
HBA1C MFR BLD: 6 %
HCT VFR BLD AUTO: 38 % (ref 35–47)
HDLC SERPL-MCNC: 39 MG/DL
HGB BLD-MCNC: 12.5 G/DL (ref 11.7–15.7)
LDLC SERPL CALC-MCNC: 78 MG/DL
LVEF ECHO: NORMAL
MCH RBC QN AUTO: 27.9 PG (ref 26.5–33)
MCHC RBC AUTO-ENTMCNC: 32.9 G/DL (ref 31.5–36.5)
MCV RBC AUTO: 85 FL (ref 78–100)
NONHDLC SERPL-MCNC: 105 MG/DL
PLATELET # BLD AUTO: 163 10E3/UL (ref 150–450)
POTASSIUM SERPL-SCNC: 4.2 MMOL/L (ref 3.4–5.3)
RBC # BLD AUTO: 4.48 10E6/UL (ref 3.8–5.2)
SODIUM SERPL-SCNC: 140 MMOL/L (ref 135–145)
TRIGL SERPL-MCNC: 135 MG/DL
WBC # BLD AUTO: 5.5 10E3/UL (ref 4–11)

## 2024-03-14 PROCEDURE — 250N000013 HC RX MED GY IP 250 OP 250 PS 637: Performed by: NURSE PRACTITIONER

## 2024-03-14 PROCEDURE — 36415 COLL VENOUS BLD VENIPUNCTURE: CPT | Performed by: HOSPITALIST

## 2024-03-14 PROCEDURE — 80061 LIPID PANEL: CPT | Performed by: HOSPITALIST

## 2024-03-14 PROCEDURE — C8929 TTE W OR WO FOL WCON,DOPPLER: HCPCS

## 2024-03-14 PROCEDURE — 250N000011 HC RX IP 250 OP 636: Mod: JZ | Performed by: NURSE PRACTITIONER

## 2024-03-14 PROCEDURE — 250N000013 HC RX MED GY IP 250 OP 250 PS 637: Performed by: HOSPITALIST

## 2024-03-14 PROCEDURE — 71046 X-RAY EXAM CHEST 2 VIEWS: CPT

## 2024-03-14 PROCEDURE — 93306 TTE W/DOPPLER COMPLETE: CPT | Mod: 26 | Performed by: INTERNAL MEDICINE

## 2024-03-14 PROCEDURE — 120N000001 HC R&B MED SURG/OB

## 2024-03-14 PROCEDURE — 999N000208 ECHOCARDIOGRAM COMPLETE

## 2024-03-14 PROCEDURE — 999N000128 HC STATISTIC PERIPHERAL IV START W/O US GUIDANCE

## 2024-03-14 PROCEDURE — 255N000002 HC RX 255 OP 636: Performed by: HOSPITALIST

## 2024-03-14 PROCEDURE — 99232 SBSQ HOSP IP/OBS MODERATE 35: CPT | Performed by: NURSE PRACTITIONER

## 2024-03-14 PROCEDURE — 80048 BASIC METABOLIC PNL TOTAL CA: CPT | Performed by: HOSPITALIST

## 2024-03-14 PROCEDURE — G0378 HOSPITAL OBSERVATION PER HR: HCPCS

## 2024-03-14 PROCEDURE — 85049 AUTOMATED PLATELET COUNT: CPT | Performed by: HOSPITALIST

## 2024-03-14 PROCEDURE — 83036 HEMOGLOBIN GLYCOSYLATED A1C: CPT | Performed by: NURSE PRACTITIONER

## 2024-03-14 RX ORDER — LEVETIRACETAM 10 MG/ML
1000 INJECTION INTRAVASCULAR ONCE
Qty: 100 ML | Refills: 0 | Status: COMPLETED | OUTPATIENT
Start: 2024-03-14 | End: 2024-03-14

## 2024-03-14 RX ORDER — LEVETIRACETAM 5 MG/ML
500 INJECTION INTRAVASCULAR EVERY 12 HOURS
Status: DISCONTINUED | OUTPATIENT
Start: 2024-03-14 | End: 2024-03-15

## 2024-03-14 RX ORDER — SENNOSIDES 8.6 MG
2 TABLET ORAL 2 TIMES DAILY
Status: DISCONTINUED | OUTPATIENT
Start: 2024-03-14 | End: 2024-03-16 | Stop reason: HOSPADM

## 2024-03-14 RX ADMIN — METRONIDAZOLE: 7.5 CREAM TOPICAL at 12:20

## 2024-03-14 RX ADMIN — LEVETIRACETAM 500 MG: 5 INJECTION INTRAVENOUS at 22:04

## 2024-03-14 RX ADMIN — PRAVASTATIN SODIUM 10 MG: 10 TABLET ORAL at 20:50

## 2024-03-14 RX ADMIN — METRONIDAZOLE: 7.5 CREAM TOPICAL at 20:52

## 2024-03-14 RX ADMIN — HUMAN ALBUMIN MICROSPHERES AND PERFLUTREN 9 ML: 10; .22 INJECTION, SOLUTION INTRAVENOUS at 11:10

## 2024-03-14 RX ADMIN — DOCUSATE SODIUM 50 MG AND SENNOSIDES 8.6 MG 1 TABLET: 8.6; 5 TABLET, FILM COATED ORAL at 12:38

## 2024-03-14 RX ADMIN — ASPIRIN 325 MG: 325 TABLET, COATED ORAL at 10:11

## 2024-03-14 RX ADMIN — SENNOSIDES 2 TABLET: 8.6 TABLET, FILM COATED ORAL at 20:49

## 2024-03-14 RX ADMIN — LEVETIRACETAM 1000 MG: 10 INJECTION INTRAVENOUS at 17:27

## 2024-03-14 RX ADMIN — MONTELUKAST 10 MG: 10 TABLET, FILM COATED ORAL at 22:07

## 2024-03-14 RX ADMIN — ACETAMINOPHEN 1000 MG: 500 TABLET, FILM COATED ORAL at 20:50

## 2024-03-14 RX ADMIN — GABAPENTIN 100 MG: 100 CAPSULE ORAL at 20:50

## 2024-03-14 RX ADMIN — ACETAMINOPHEN 1000 MG: 500 TABLET, FILM COATED ORAL at 10:11

## 2024-03-14 ASSESSMENT — ACTIVITIES OF DAILY LIVING (ADL)
ADLS_ACUITY_SCORE: 28
ADLS_ACUITY_SCORE: 27
ADLS_ACUITY_SCORE: 28
ADLS_ACUITY_SCORE: 29
ADLS_ACUITY_SCORE: 28
ADLS_ACUITY_SCORE: 29
ADLS_ACUITY_SCORE: 29
ADLS_ACUITY_SCORE: 27
ADLS_ACUITY_SCORE: 28
ADLS_ACUITY_SCORE: 29
ADLS_ACUITY_SCORE: 28
ADLS_ACUITY_SCORE: 28
ADLS_ACUITY_SCORE: 29

## 2024-03-14 NOTE — CONSULTS
Neurology Consult Note  [facility]       [2024]                                                                                       Admission Date: 3/13/2024  Hospital Day: 2      Patient: Macey Romero      : 1950  MRN:  [id number]     CC:      Chief Complaint   Patient presents with    Stroke Symptoms       Consult Request:  Referring Provider:  Garth Ferrari MD  Primary Care Provider:  Long Ferrari MD        HPI:  Macey Romero is a 73 year old yo female admitted for episode of confusion that started yesterday.  Patient has a very longstanding cerebrovascular history with a history of TIA in the past, chronic renal disease 3, hypertension, dyslipidemia and history of breast cancer.  Has been having a headache for a week along with right arm weakness that has been going on for a week.  Had an acute episode of confusion yesterday with severe headache.  Has a longstanding history ofHeadaches, but this was increased above baseline in the last 2 years or so.    Recent neurological history: About 1 month ago, patient had splitting headache,Followed by measurement of extremely high blood pressure with systolic of about 220.    Similar episode happened about a week later.      With both of these episodes, she was labeled as having a TIA, although no focal neurological symptoms happened.  She did feel weak in a generalized manner.      She also felt somewhat confused, and has had multiple episodes of confusion, about 4-5, in which she feels a sensation of confusion, lack of being able to coordinate herself, and not knowing what is going on around her.  She has not passed out or had loss of consciousness.      Yesterday, she felt an acute onset of confusion, some increase in headache, and when asked to open her Bible to a particular page, did not know how to operate her right arm.  Since then, she has felt as if her right arm is weaker or incoordinated as  compared to before.  Strangely, she has had similar feelings on the left, and was diagnosed as having an elbow problem with a nerve pinch by an orthopedic surgeon in [facility].  I do not have records, but it appears that she might have a left ulnar neuropathy for which she was undergoing planning for surgery.  She feels that the right arm symptoms are fairly similar to her left arm symptoms.    Yesterday, In the emergency room, MRI negative for acute intracranial pathology.  Some mild small vessel disease.  CT unremarkable.  CTA shows focal narrowing at the P1 to junction of the right PCA.    Patient has a longstanding history of cerebrovascular disease as mentioned above.  On aspirin and 10 mg of pravastatin-both started in the last month.      PAST MEDICAL HISTORY:  ALLERGIES:   Allergies   Allergen Reactions    Levaquin Hives and Itching    Pcn [Penicillins] Hives    Tetanus Toxoid Anaphylaxis    Tetanus Toxoids Anaphylaxis    Erythromycin GI Disturbance    Amoxicillin     Duloxetine Nausea     Other reaction(s): Jittery    Silicone Rash     Tobacco:    History   Smoking Status    Former    Years: 14.00    Types: Cigarettes   Smokeless Tobacco    Never     Alcohol:  Social History    Substance and Sexual Activity      Alcohol use: No        Alcohol/week: 0.0 standard drinks of alcohol    MEDICATIONS:       CURRENTLY SCHEDULED MEDICATIONS    acetaminophen  1,000 mg Oral BID    aspirin  325 mg Oral Daily    gabapentin  100 mg Oral Every Other Day    metroNIDAZOLE   Topical BID    montelukast  10 mg Oral At Bedtime    pravastatin  10 mg Oral QPM          HOME MEDICATIONS  Medications Prior to Admission   Medication Sig Dispense Refill Last Dose    acetaminophen (TYLENOL) 500 MG tablet Take 1,000 mg by mouth 2 times daily   3/13/2024 at AM    albuterol (PROAIR HFA/PROVENTIL HFA/VENTOLIN HFA) 108 (90 Base) MCG/ACT inhaler Inhale 2 puffs into the lungs every 6 hours as needed for shortness of breath   More than a month at  prn    alendronate (FOSAMAX) 70 MG tablet Take 1 tablet (70 mg) by mouth every 7 days 12 tablet 3 3/9/2024    aspirin (ASA) 325 MG EC tablet Take 1 tablet (325 mg) by mouth daily Take with food or meal.   3/13/2024 at AM    cetirizine (ZYRTEC) 5 MG tablet Take 1 tablet (5 mg) by mouth daily for 30 days 30 tablet 0 3/13/2024 at AM    clindamycin (CLEOCIN) 300 MG capsule Take 300 mg by mouth Take 1 hour prior to dental appointment.   prn for dental appt    gabapentin (NEURONTIN) 100 MG capsule Take 100 mg by mouth every other day At bedtime   3/12/2024 at PM    montelukast (SINGULAIR) 10 MG tablet Take 1 tablet (10 mg) by mouth at bedtime for 30 days 30 tablet 0 3/12/2024 at PM    pravastatin (PRAVACHOL) 10 MG tablet Take 1 tablet (10 mg) by mouth daily 90 tablet 3 3/13/2024 at AM    sertraline (ZOLOFT) 50 MG tablet Take 1 tablet (50 mg) by mouth daily 30 tablet 11 3/13/2024 at AM    budesonide-formoterol (SYMBICORT) 160-4.5 MCG/ACT Inhaler Inhale 2 puffs into the lungs daily 10.2 g 3     metroNIDAZOLE (METROCREAM) 0.75 % external cream Apply topically 2 times daily 45 g 1      MEDICAL HISTORY  Past Medical History:   Diagnosis Date    Breast CA in situ 1987    Cancer (H) 1987    Right Breast treated with surgery    Cerebral infarction (H)     Chronic constipation     Fibromyalgia     History of blood transfusion     Malignant neoplasm of female breast, unspecified estrogen receptor status, unspecified laterality, unspecified site of breast (H) 06/17/2021    Meningitis     Several times as an adult    Osteoarthritis 02/01/2011    Osteopenia 02/01/2011    Pneumonia     Pterygium eye 08/26/2013    Reactive airway disease 02/01/2011    Seasonal allergies     Shingles     Prior to 2008 - scalp    Skin cancer     SCC - hand, left nose    Uncomplicated asthma      SURGICAL HISTORY  Past Surgical History:   Procedure Laterality Date    anterior c-disc fusion      ARTHROPLASTY KNEE Left 10/17/2022    Procedure: LEFT TOTAL  KNEE ARTHROPLASTY;  Surgeon: Jax Le MD;  Location:  OR    BLEPHAROPLASTY, BROW LIFT BILATERAL, COMBINED Bilateral 6/18/2018    Procedure: COMBINED BLEPHAROPLASTY, BROW LIFT BILATERAL;  BILATERAL UPPER LID BLEPHAROPLASTY; BILATERAL BROW PTOSIS REPAIR;  Surgeon: Сергей Walters MD;  Location:  EC    CHOLECYSTECTOMY      COLONOSCOPY N/A 10/19/2017    Procedure: COLONOSCOPY;  COLONOSCOPY;  Surgeon: Niko Wong MD;  Location:  GI    COLONOSCOPY N/A 8/27/2022    Procedure: COLONOSCOPY, WITH POLYPECTOMY AND BIOPSY;  Surgeon: Abraham Rogers MD;  Location:  GI    D & C      Bleeding before hysterectomy    DISCECTOMY, FUSION CERVICAL ANTERIOR ONE LEVEL, COMBINED N/A 9/20/2023    Procedure: ANTERIOR CERVICAL 5 TO CERVICAL 6 DISCECTOMY AND FUSION;  Surgeon: Alex Galindo MD;  Location:  OR    ENDOSCOPY  04/21/08    ESOPHAGOSCOPY, GASTROSCOPY, DUODENOSCOPY (EGD), COMBINED N/A 8/27/2022    Procedure: ESOPHAGOGASTRODUODENOSCOPY, WITH BIOPSY;  Surgeon: Abraham Rogers MD;  Location:  GI    HYSTERECTOMY, MARCUS      Ovaries and tubes out due to breast cancer    JOINT REPLACEMTN, KNEE RT/LT Right 01/2011    Joint Replacement knee RT, with tibial straightening (2 replacements)    MAMMOPLASTY AUGMENTATION BILATERAL      breast ca     MASTECTOMY, SIMPLE RT/LT/CLAIRE Bilateral 1989    Mastectomy Simple RT/LT/CLAIRE    MOHS MICROGRAPHIC PROCEDURE      Left lateral nose    OPEN REDUCTION INTERNAL FIXATION ANKLE  8/27/2013    Procedure: OPEN REDUCTION INTERNAL FIXATION ANKLE;  RIGHT OPEN REDUCTION INTERNAL FIXATION ANKLE WITH LIGAMENT REPAIR;  Surgeon: Nando Chang MD;  Location:  OR    PTERYGIUM WITH CONJUNCTIVAL AUTOLOGOUS TRANSPLANT Left 8/29/2016    Procedure: PTERYGIUM WITH CONJUNCTIVAL AUTOLOGOUS TRANSPLANT;  Surgeon: Сергей Walters MD;  Location:  EC    REPAIR LIGAMENT ANKLE  8/27/2013    Procedure: REPAIR LIGAMENT ANKLE;;  Surgeon: Nando Chang MD;  Location:  OR    SALIVARY  GLAND SURGERY Left     Stone removal     SIGMOIDOSCOPY FLEXIBLE N/A 8/30/2022    Procedure: FLEXIBLE SIGMOIDOSCOPY;  Surgeon: Niko Wong MD;  Location:  GI    TONSILLECTOMY       FAMILY HISTORY    Family History   Problem Relation Age of Onset    Respiratory Father     Cancer Brother         sarcoidosis    Diabetes Brother     Cerebrovascular Disease Brother     Liver Disease Brother      SOCIAL HISTORY  Social History     Socioeconomic History    Marital status:    Tobacco Use    Smoking status: Former     Years: 14     Types: Cigarettes    Smokeless tobacco: Never    Tobacco comments:     quit but  still smokes, but not in house   Vaping Use    Vaping Use: Never used   Substance and Sexual Activity    Alcohol use: No     Alcohol/week: 0.0 standard drinks of alcohol    Drug use: No    Sexual activity: Never     Partners: Male     Social Determinants of Health     Financial Resource Strain: Low Risk  (1/5/2024)    Financial Resource Strain     Within the past 12 months, have you or your family members you live with been unable to get utilities (heat, electricity) when it was really needed?: No   Food Insecurity: Low Risk  (1/5/2024)    Food Insecurity     Within the past 12 months, did you worry that your food would run out before you got money to buy more?: No     Within the past 12 months, did the food you bought just not last and you didn t have money to get more?: No   Transportation Needs: Low Risk  (1/5/2024)    Transportation Needs     Within the past 12 months, has lack of transportation kept you from medical appointments, getting your medicines, non-medical meetings or appointments, work, or from getting things that you need?: No   Housing Stability: Low Risk  (1/5/2024)    Housing Stability     Do you have housing? : Yes     Are you worried about losing your housing?: No                  Temp: 98.8  F (37.1  C)   Weight: 72.1 kg (158 lb 15.2 oz)    Temp src: Oral         BP: 139/65     "     Estimated body mass index is 27.72 kg/m  as calculated from the following:    Height as of 24: 1.613 m (5' 3.5\").    Weight as of this encounter: 72.1 kg (158 lb 15.2 oz).    Resp: 16   SpO2: 98 %   O2 Device: None (Room air)     Blood Pressure:   BP Readings from Last 3 Encounters:   24 139/65   24 135/79   24 (!) 147/83     T24 : Temp (24hrs), Av.1  F (36.7  C), Min:97.8  F (36.6  C), Max:98.8  F (37.1  C)       GENERAL EXAMINATION:  HEENT: PERRLA, EOMI.  Neck: Supple, No myofascial tender points.      NEUROLOGICAL EXAMINATION:    Level of Consciousness:  Normal.    Orientation:  Alert and oriented to time, place and person.    Memory:  Intact recent and remote memory.    Attention span and Concentration:  Unremarkable.    Language and Speech:  Normal (can name, repeat phrases, good spontaneous speech).    Fund of Knowledge:  Within acceptable range.     Cranial Nerves:  2,3,4,5,6,7,8,9,10,11,12 are unremarkable.  Some rash on both cheeks, developed over the last month.    Sensory:  No overt sensory abnormalities to touch, pinprick and vibration and proprioception.  Positive Tinel's sign over bilateral ulnar nerves, making her arm go heavy and weak like she has been feeling for the last month.    Motor: Slight right arm motor weakness-4+/5: Mostly in distal muscles, such as  strength, finger abduction, finger extension.  Left arm appears to be 5/5 today.  Muscle tone, bulk unremarkable.  No abnormal movements seen.  Fine motor skills are normal.  Lower extremity motor examination normal.    Coordination:  Coordination intact.  No clear ataxia or dysmetria on finger-nose-finger or heel-shin-toe testing.      Balance, Gait and Station:  Balance and gait intact.      Deep Tendon Reflexes:  DTR's (biceps, triceps, brachioradialis, knee jerks, ankle jerks) preserved and symmetric.      Plantar response:  Flexor bilaterally.       .  LABORATORY RESULTS        IMAGING RESULTS     Recent " Results (from the past 24 hour(s))   MR Brain w/o & w Contrast    Narrative    MRI OF THE BRAIN WITHOUT AND WITH CONTRAST 3/13/2024 1:08 PM     COMPARISON: Head CT same day.    HISTORY:  Headache, ? receptive aphasia, vision changes.    TECHNIQUE: Axial diffusion-weighted with ADC map, axial T2-weighted  with fat saturation, axial T1-weighted, axial turboFLAIR and coronal  T1-weighted images of the brain were acquired without intravenous  contrast.  Following intravenous administration of gadolinium (7 mL  Gadavist), axial T1-weighted images of the brain were acquired.     FINDINGS: There is mild diffuse cerebral volume loss. There are patchy  periventricular areas of abnormal T2 signal hyperintensity in the  cerebral white matter bilaterally that are consistent with sequela of  chronic small vessel ischemic disease.     The ventricles and basal cisterns are within normal limits in  configuration given the degree of cerebral volume loss.  There is no  midline shift.  There are no extra-axial fluid collections.  There is  no evidence for stroke or acute intracranial hemorrhage.  There is no  abnormal contrast enhancement in the brain or its coverings.    There is no sinusitis or mastoiditis.      Impression    IMPRESSION:  Diffuse cerebral volume loss and cerebral white matter  changes consistent with chronic small vessel ischemic disease. No  evidence for acute intracranial pathology.    ADELIA CAVANAUGH MD         SYSTEM ID:  [id number]               ASSESSMENT     Differentials would be wide:  Episode of confusion in the setting of increased headaches and 1 week history of right arm weakness.  While DWI imaging is negative for an acute stroke, there is some FLAIR abnormality in the left frontal lobe that could be related to right arm weakness.  Hence, possible migrainous phenomena or metabolic derangements could cause weakness in right arm transiently.   Seizure disorder possible with episodes of confusion.  EEG would  be helpful.  Also has a history of ulnar neuropathy on the left with similar symptoms on the right.  Floridly positive Tinel's sign on both sides.  This could cause weakness in her right arm as well.      RECOMMENDATIONS   Continue with order for EEG.  Hospitalist team advised to continue looking for metabolic/infectious derangements triggering reactivation of old stroke symptoms.  With negative MRI, if EEG is also negative, the rest of the evaluation could be done as an outpatient.  Would probably need EMGs and orthopedic/neuromuscular medicine consultations.  Very unlikely to be cerebrovascular.  However, would recommend continuing aspirin and pravastatin.       Sebastian Fisher MD   Neurologist, [facility]   March 14, 2024 11:15 AM        ============================    Addendum: 2:06 PM  EEG analyzed, and shows biPLEDs.  Given that the patient has had waxing and waning level of consciousness, along with episodes of confusion, this would raise suspicion for the possibility of underlying epilepsy and recent seizures.    We should start an antiepileptic medication.  Would recommend 1000 mg of Keppra as an IV bolus, followed by 500 mg twice daily to continue.  These are ordered.    Neurology will follow to ensure tolerability of medication and other dosage changes that might be needed.    Sebastian Fisher MD   Neurologist, [facility]   March 14, 2024 2:07 PM                A total time of 105 minutes was spent on the care of this patient on the date of the visit, outside of time spent performing any procedures. Please excuse any typographical errors, as this note was generated using a Voice Recognition Software.

## 2024-03-14 NOTE — UTILIZATION REVIEW
[facility] Utilization Review  Admission Status; Secondary Review Determination     Admission Date: 3/13/2024 10:12 AM      Under the authority of the Utilization Management Committee, the utilization review process indicated a secondary review on the above patient.  The review outcome is based on review of the medical records, discussions with staff, and applying clinical experience noted on the date of the review.        (X)      Inpatient Status Appropriate - This patient's medical care is consistent with medical management for inpatient care and reasonable inpatient medical practice.          RATIONALE FOR DETERMINATION   Macey Romero is a 73 year old female with past medical history of TIA, breast cancer, CKD stage III, fibromyalgia, who presented with confusion, headache and right arm and leg weakness.  Initial workup was negative for acute CVA but EEG is revealing for epileptiform discharges, started on Keppra, further neurowork-up including echo and close monitoring while adjustment of other medications.  In light of suboptimal clinical improvement with ongoing tremors and weakness, new findings of epileptiform activity on EEG requiring further intervention and monitoring with anticipated length of stay more than 2 midnights and failed observation cares, it is reasonable to advance to inpatient status.  Recommendation is communicated to the primary team Anay Borjas CNP.             The definitions of Inpatient Status and Observation Status used in making the determination above are those provided in the CMS Coverage Manual, Chapter 1 and Chapter 6, section 70.4.      Sincerely,       Jade Chow MD, MS  Physician Advisor  Utilization Review-[place]    Phone: [phone]

## 2024-03-14 NOTE — PLAN OF CARE
PT/OT: Orders received. Chart reviewed and discussed with care team.  Per nursing staff, PT/OT not indicated due to pt being at her baseline, no concerns for mobility or completing ADLs.  Defer discharge recommendations to medical team.  Will complete orders.

## 2024-03-14 NOTE — PLAN OF CARE
Goal Outcome Evaluation:    ED admit transferred to floor at 2100    Diagnosis/summary: TIA, spell confusion, R sided headache  Orientation/Cognitive: A&OX4  Neuros/CMS: intact ex R hand slight weakness per pt  RUE 4/5, LE 5/5  Mobility Level: Ind/SBA  Pain Management: pain controlled with tylenol for headache  Tele/VS/O2: VSS@R, on tele-NSR  Diet: Regular  Bowel/Bladder: Continent of B/B, voided in BR  Drains/Devices: PIV SL  Skin: poonam, facial rash  Discharge date/time: TBD

## 2024-03-14 NOTE — PROGRESS NOTES
[facility]    Medicine Progress Note - Hospitalist Service    Date of Admission:  3/13/2024    Assessment & Plan      Macey Romero is a 73 year old female with PMH of TIA, nicotine dependence, breast cancer, chronic kidney disease stage III, hyperlipidemia, asthma, prediabetes, osteoarthritis, fibromyalgia, admitted 3/13/2024 with a  spell of confusion with associated with headache, and a one week history of right arm weakness and worsening tremor    Indeterminate spell of confusion, right arm weakness  History of transient ischemic attack   Headache, acute on chronic, right-sided  +epileptiform discharges on EEG  *1 week history of right arm weakness /tremor prior to admission, with 1 day history of confusion at Bible study lasting approximately 1 hour; additional right-sided acute on chronic headache, right parietal-occipital region  *Code stroke in the emergency room, imaging negative for acute ischemic or hemorrhagic event though CTA with moderate focal narrowing at the P1-P2 junction of the right posterior cerebral artery with relatively decreased filling of the right PCA compared to the left PCA,  *MRI brain negative for acute intracranial pathology.  changes consistent with chronic small vessel ischemic disease.  *stroke neurology consulted in ER, did not recommend any additional stroke workup  *Metabolic workup unrevealing with normal TSH, pyuria only on UA  *Working diagnosis includes possible seizure, migraine, medication effects, functional neurologic disorder  -General neurology consult appreciated, will look for infectious process and await EEG result   -EEG completed,--> revealed by PLEDs worrisome for underlying epilepsy/seizures, restarting AED with Keppra as per neuro  -Continue telemetry, rule out dysrhythmia or heart block  -Transthoracic echocardiogram (TTE) normal  -Neuro checks every 4 hours  -Continue PTA aspirin 325 mg daily, as prior to admission   -HOLD prior to  admission sertraline (Zoloft) and certirizine (Zyrtec), started in the past 1 1/2 weeks per patient, rule out medication side effect  -As needed pain control with acetaminophen, usually adequate for her headache  -Physical therapy (PT) melanie don, recs to discharge home    Right upper extremity tremor, action type, progressively worsening over the last 1-2-week  -Continue to hold Zoloft, 9% risk of tremor    Chronic kidney disease (CKD), stage IIIa  -Admission serum Cr 0.90, baseline 0.69 to 1.05 in 2023  -urine output, I/O's, daily weights  -Avoid non-steroidal anti-inflammatory drugs (NSAIDs) and nephrotoxic agents  -Patient concerned that this new information to her, discussed long-term management including blood pressure and glycemic control and long-term monitoring    History of asthma  History of emphysema  -In light of abnormal lung sounds need for infectious workup will obtain chest x-ray although SpO2 and WBCs are within normal limit  -Albuterol as needed as prior to admission  -Continue PTA Singulair  -Resume PTA Symbicort    Hyperlipidemia  -Continue PTA statin     History of prediabetes, hyperglycemic  Recent Labs   Lab 03/14/24  0800 03/13/24  1019   * 123*     -Recheck A1c  -Monitor blood sugars as needed  -Consider insulin sliding scale if worsening hyperglycemia if greater than equal to 180 mg/dL or if A1c diagnostic of diabetes  -Changed to mod carb diet reassess daily  -Follow-up with primary clinic provider     History of constipation  -Bowel regimen with scheduled senna    Anxiety, depression  -HOLD prior to admission sertraline (Zoloft) as outlined above, started ~1.5 weeks prior to admission; rule out medication side effect  -Follow-up with primary clinic provider primary clinic provider or mental health provider    Nicotine dependence  Recent smoker, quit over one week prior to admission per patient report  On/off smoking over many years, <1 pack per day, not routine by  report  -Smoking cessation dicussed and encouraged by admitting MD.  This is of utmost importance given her recent TIAs  -nicotine patch may be considered upon request  -Follow-up with primary clinic provider    History of breast cancer - noted, follow-up with primary clinic provider and oncology  Fibromyalgia - noted; pain control with bedtime Neurontin; therapy services consulted  History of chronic pain, knees - s/p bilateral TKA's; stable  Osteoarthritis - noted  History of cervical spinal fusion - noted    Disposition  -Estimated length of stay 24 to 48 hours  -Anticipated discharge to home       Observation Goals: -diagnostic tests and consults completed and resulted, -vital signs normal or at patient baseline, -adequate pain control on oral analgesics, -returns to baseline functional status, -safe disposition plan has been identified, -Stroke neurology and general neurology consult teams consulted and recommendations followed, Nurse to notify provider when observation goals have been met and patient is ready for discharge.  Diet: Regular Diet Adult    DVT Prophylaxis: Ambulate every shift and PCD's  Boudreaux Catheter: Not present  Lines: None     Cardiac Monitoring: ACTIVE order. Indication: TIA, spell of confusion  Code Status: Full Code      Clinically Significant Risk Factors Present on Admission                # Drug Induced Platelet Defect: home medication list includes an antiplatelet medication   # Hypertension: Noted on problem list          # Asthma: noted on problem list        Disposition Plan      Expected Discharge Date: 03/15/2024                  The patient's care was discussed with the Attending Physician, Dr. David Kim, Bedside Nurse, and Patient.    SHAHRAM Gamble [facility]  Hospitalist Service  [facility]  Securely message with [employer] (more info)  Text page via [employer]upp Paging/[employer]  "  ______________________________________________________________________    Interval History   No acute events overnight.  Was concerned about progressively worsening right upper extremity tremor in the last 1 to 2 weeks.  She describes how prior to admission she did not know what to do with the Bible, endorses daily headaches and usually has to take acetaminophen currently has headache on a dull on top of her head.  BMs are usually every 3 to 4 days.  Denies any dyspnea chest pain. reports feels a little \"tight\" and \"wheezing\" usually occurs in the spring.  She is concerned about lupus citing a facial rash Raynaud's has 8/10 symptoms by online [facility] was seen.  Feels a \"internal tremor\".  Denies any confusion    Physical Exam   Vital Signs: Temp: 98.1  F (36.7  C) Temp src: Oral BP: (!) 152/65 Pulse: 76   Resp: 16 SpO2: 97 % O2 Device: None (Room air)    Weight: 158 lbs 15.23 oz    Constitutional: vs as per EMR  General:  adult pt lying in bed without acute distress  Neuro: +follows commands wiggle toes and show 2 fingers bilat, face symmetric, tongue midline, speech fluent, right upper extremity has tremor with any kind of movement  Eyes pupils equal round 3mm briskly reactive bilat, sclera nonicteric, noninjected, conjunctiva pink,  Head, ENT & mouth: NC/AT,  mouth moist oral mucosa  Neck: supple  CV S1S2  resp: Squeak like wheeze left more than right upper lobes but also in the left lower lobe  gi:normoactive bowel sounds, soft, nontender, nondisteded  Ext: no edema or mottling  Skin: no rashes on exposed skin  Musculoskeletal no bony joint deformities    Medical Decision Making         MINUTES SPENT BY ME on the date of service doing chart review, history, exam, documentation & further activities per the note.  Medical complexity over the past 24 hours:  - Prescription DRUG MANAGEMENT performed  - Reviewed notes from neurology, greater than equal to 3 lab data points, managed two chronic stable problems " and 1 acute problem of uncertain significance consistent with moderate complexity       Data     I have personally reviewed the following data over the past 24 hrs:    5.5  \   12.5   / 163     140 107 11.8 /  106 (H)   4.2 25 0.83 \     TSH: N/A T4: N/A A1C: N/A       Imaging results reviewed over the past 24 hrs:   Recent Results (from the past 24 hour(s))   Echocardiogram Complete   Result Value    LVEF  55-60%    Narrative    [id number]  [id number]  ^VIVIEN^VANESSA^ÁNGELA     [facility]  Echocardiography Laboratory  [address] [place] [id number]     Name: AMISHA HOLDER  MRN: [id number]  : 1950  Study Date: 2024 10:53 AM  Age: 73 yrs  Gender: Female  Patient Location: [facility]  Reason For Study: TIA  Ordering Physician: VANESSA FERRARI  Referring Physician: Long Ferrari  Performed By: Brianna Dela Cruz     BSA: 1.7 m2  Height: 63 in  Weight: 158 lb  HR: 68  BP: 136/82 mmHg  ______________________________________________________________________________  Procedure  Complete Portable Echo Adult. Optison (NDC #[id number]) given intravenously.  ______________________________________________________________________________  Interpretation Summary     The left ventricle is normal in size.  Left ventricular systolic function is normal.  The visual ejection fraction is 55-60%.  Normal left ventricular wall motion  Diastolic Doppler findings (E/E' ratio and/or other parameters) suggest left  ventricular filling pressures are normal.  No hemodynamically significant valvular abnormalities on 2D or color flow  imaging. No obvious cardiac source of emboli was seen within the limits of a  transthoracic echocardiogram. Compared to prior study, there is no significant  change.  ______________________________________________________________________________  Left Ventricle  The left ventricle is normal in size. There is normal left ventricular wall  thickness. Left  ventricular systolic function is normal. The visual ejection  fraction is 55-60%. Diastolic Doppler findings (E/E' ratio and/or other  parameters) suggest left ventricular filling pressures are normal. Normal left  ventricular wall motion.     Right Ventricle  The right ventricle is normal in structure, function and size.     Atria  Normal left atrial size. Right atrial size is normal. There is no atrial shunt  seen.     Mitral Valve  The mitral valve is normal in structure and function. There is trace mitral  regurgitation.     Tricuspid Valve  The tricuspid valve is normal in structure and function. There is trace  tricuspid regurgitation.     Aortic Valve  The aortic valve is normal in structure and function. No aortic regurgitation  is present. No aortic stenosis is present.     Pulmonic Valve  The pulmonic valve is not well seen, but is grossly normal. There is trace  pulmonic valvular regurgitation.     Vessels  Normal size aorta.     Pericardium  There is no pericardial effusion.     Rhythm  Sinus rhythm was noted.  ______________________________________________________________________________  MMode/2D Measurements & Calculations  IVSd: 0.83 cm     LVIDd: 4.3 cm  LVIDs: 2.8 cm  LVPWd: 0.74 cm  FS: 33.8 %  LV mass(C)d: 101.8 grams  LV mass(C)dI: 58.2 grams/m2  Ao root diam: 2.7 cm  LA dimension: 2.8 cm  asc Aorta Diam: 2.5 cm  LA/Ao: 1.0  LVOT diam: 2.0 cm  LVOT area: 3.1 cm2  Ao root diam index Ht(cm/m): 1.7  Ao root diam index BSA (cm/m2): 1.5  Asc Ao diam index BSA (cm/m2): 1.4  Asc Ao diam index Ht(cm/m): 1.5  LA Volume (BP): 30.5 ml     LA Volume Index (BP): 17.4 ml/m2  RWT: 0.35  TAPSE: 2.3 cm     Doppler Measurements & Calculations  MV E max angie: 73.3 cm/sec  MV A max angie: 77.6 cm/sec  MV E/A: 0.94  MV dec time: 0.22 sec  Ao V2 max: 134.0 cm/sec  Ao max P.0 mmHg  Ao V2 mean: 92.2 cm/sec  Ao mean P.0 mmHg  Ao V2 VTI: 27.7 cm  ODELL(I,D): 2.5 cm2  ODELL(V,D): 2.3 cm2  LV V1 max P.2 mmHg  LV V1  max: 102.0 cm/sec  LV V1 VTI: 22.8 cm  SV(LVOT): 69.7 ml  SI(LVOT): 39.8 ml/m2  PA acc time: 0.16 sec  AV Ceasar Ratio (DI): 0.76  ODELL Index (cm2/m2): 1.4  E/E' av.7  Lateral E/e': 6.7     Medial E/e': 8.8  RV S Ceasar: 14.8 cm/sec     ______________________________________________________________________________  Report approved by: Kathryn Pop 2024 11:44 AM

## 2024-03-14 NOTE — CONSULTS
"[facility]    Stroke Consult Note    Reason for Consult: Stroke Code     Chief Complaint: Stroke Symptoms      HPI  Macey Romero is a 73 year old female with PMH of breast cancers/p bilateral mastectomy,  IgA MGUS, recent C5-C6 anterior discectomy (9/20), former smoking, prediabetes, HLD, CKD woke up normal this morning however when she was in [employer] she started feeling R sided headache, warm sensation inside the body and difficulty reading and understanding what written in book. Symptoms improved on ED arrival initially very anxious but improved after that.     Imaging Findings  CT head: no evidence of acute ischemic stroke or intracranial bleed  CTA head and neck: moderate focal narrowing at the P1-P2 junction unchanged from prior otherwise no evidence of large vessel occlusion of critical stenosis  MRI brain: no evidence of acute stroke, CAA or intra cranial hemorhage    Intravenous Thrombolysis  Not given due to:   - minor/isolated/quickly resolving symptoms    Endovascular Treatment  Not initiated due to absence of proximal vessel occlusion    Impression   Transient neurologic symptoms    Recurrent symptoms with some similar features including headache, speech difficulty, L sided weakness, vision changes. All episodes with negative MRI for acute stroke. Differentials include amyloid spell, complex migraine, seizure, TIA/stroke. Low suspicion of TIA given recurrent similar episode. MRI done no evidence of acute stroke or cerebral amyloid angiopathy.     Recommendations  -Recommend general neurology consult for further workup to rule out possible seizure or migraine    Thank you for this consult. No further stroke evaluation is recommended, so we will sign off. Please contact us with any additional questions.     The Stroke Staff is Dr. Urbano.    Berenice Hunter MD  Vascular Neurology Fellow    To page me or covering stroke neurology team member, click here: AMCOM  Choose \"On " "Call\" tab at top, then select \"NEUROLOGY/ALL SITES\" from middle drop-down box, press Enter, then look for \"stroke\" or \"telestroke\" for your site.    ______________________________________________________     Past Medical History   Past Medical History:   Diagnosis Date     Breast CA in situ 1987     Cancer (H) 1987    Right Breast treated with surgery     Cerebral infarction (H)      Chronic constipation      Fibromyalgia      History of blood transfusion      Malignant neoplasm of female breast, unspecified estrogen receptor status, unspecified laterality, unspecified site of breast (H) 06/17/2021     Meningitis     Several times as an adult     Osteoarthritis 02/01/2011     Osteopenia 02/01/2011     Pneumonia      Pterygium eye 08/26/2013     Reactive airway disease 02/01/2011     Seasonal allergies      Shingles     Prior to 2008 - scalp     Skin cancer     SCC - hand, left nose     Uncomplicated asthma      Past Surgical History   Past Surgical History:   Procedure Laterality Date     anterior c-disc fusion       ARTHROPLASTY KNEE Left 10/17/2022    Procedure: LEFT TOTAL KNEE ARTHROPLASTY;  Surgeon: Jax Le MD;  Location:  OR     BLEPHAROPLASTY, BROW LIFT BILATERAL, COMBINED Bilateral 6/18/2018    Procedure: COMBINED BLEPHAROPLASTY, BROW LIFT BILATERAL;  BILATERAL UPPER LID BLEPHAROPLASTY; BILATERAL BROW PTOSIS REPAIR;  Surgeon: Сергей Walters MD;  Location:  EC     CHOLECYSTECTOMY       COLONOSCOPY N/A 10/19/2017    Procedure: COLONOSCOPY;  COLONOSCOPY;  Surgeon: Niko Wong MD;  Location:  GI     COLONOSCOPY N/A 8/27/2022    Procedure: COLONOSCOPY, WITH POLYPECTOMY AND BIOPSY;  Surgeon: Abraham Rogers MD;  Location:  GI     D & C      Bleeding before hysterectomy     DISCECTOMY, FUSION CERVICAL ANTERIOR ONE LEVEL, COMBINED N/A 9/20/2023    Procedure: ANTERIOR CERVICAL 5 TO CERVICAL 6 DISCECTOMY AND FUSION;  Surgeon: Alex Galindo MD;  Location:  OR     ENDOSCOPY  04/21/08     " ESOPHAGOSCOPY, GASTROSCOPY, DUODENOSCOPY (EGD), COMBINED N/A 8/27/2022    Procedure: ESOPHAGOGASTRODUODENOSCOPY, WITH BIOPSY;  Surgeon: Abraham Rogers MD;  Location:  GI     HYSTERECTOMY, MARCUS      Ovaries and tubes out due to breast cancer     JOINT REPLACEMTN, KNEE RT/LT Right 01/2011    Joint Replacement knee RT, with tibial straightening (2 replacements)     MAMMOPLASTY AUGMENTATION BILATERAL      breast ca      MASTECTOMY, SIMPLE RT/LT/CLAIRE Bilateral 1989    Mastectomy Simple RT/LT/CLAIRE     MOHS MICROGRAPHIC PROCEDURE      Left lateral nose     OPEN REDUCTION INTERNAL FIXATION ANKLE  8/27/2013    Procedure: OPEN REDUCTION INTERNAL FIXATION ANKLE;  RIGHT OPEN REDUCTION INTERNAL FIXATION ANKLE WITH LIGAMENT REPAIR;  Surgeon: Nando Chang MD;  Location:  OR     PTERYGIUM WITH CONJUNCTIVAL AUTOLOGOUS TRANSPLANT Left 8/29/2016    Procedure: PTERYGIUM WITH CONJUNCTIVAL AUTOLOGOUS TRANSPLANT;  Surgeon: Сергей Walters MD;  Location:  EC     REPAIR LIGAMENT ANKLE  8/27/2013    Procedure: REPAIR LIGAMENT ANKLE;;  Surgeon: Nando Chang MD;  Location:  OR     SALIVARY GLAND SURGERY Left     Stone removal      SIGMOIDOSCOPY FLEXIBLE N/A 8/30/2022    Procedure: FLEXIBLE SIGMOIDOSCOPY;  Surgeon: Niko Wong MD;  Location:  GI     TONSILLECTOMY       Medications   Home Meds  Prior to Admission medications    Medication Sig Start Date End Date Taking? Authorizing Provider   acetaminophen (TYLENOL) 500 MG tablet Take 1,000 mg by mouth 2 times daily   Yes Unknown, Entered By History   albuterol (PROAIR HFA/PROVENTIL HFA/VENTOLIN HFA) 108 (90 Base) MCG/ACT inhaler Inhale 2 puffs into the lungs every 6 hours as needed for shortness of breath 11/12/23  Yes Madison Sanderson MD   alendronate (FOSAMAX) 70 MG tablet Take 1 tablet (70 mg) by mouth every 7 days 1/15/24  Yes Long Ferrari MD   aspirin (ASA) 325 MG EC tablet Take 1 tablet (325 mg) by mouth daily Take with food or meal. 2/3/24  Yes  Licha Herndon MD   cetirizine (ZYRTEC) 5 MG tablet Take 1 tablet (5 mg) by mouth daily for 30 days 3/7/24 4/6/24 Yes Alina Aguirre APRN CNP   clindamycin (CLEOCIN) 300 MG capsule Take 300 mg by mouth Take 1 hour prior to dental appointment. 2/16/24  Yes Remy Munoz PA-C   gabapentin (NEURONTIN) 100 MG capsule Take 100 mg by mouth every other day At bedtime 2/3/24  Yes Licha Herndon MD   montelukast (SINGULAIR) 10 MG tablet Take 1 tablet (10 mg) by mouth at bedtime for 30 days 3/7/24 4/6/24 Yes Alina Aguirre APRN CNP   pravastatin (PRAVACHOL) 10 MG tablet Take 1 tablet (10 mg) by mouth daily 1/15/24  Yes Long Ferrari MD   sertraline (ZOLOFT) 50 MG tablet Take 1 tablet (50 mg) by mouth daily 2/12/24  Yes Long Ferrari MD   budesonide-formoterol (SYMBICORT) 160-4.5 MCG/ACT Inhaler Inhale 2 puffs into the lungs daily 10/27/21   Deandra Strong APRN CNP   metroNIDAZOLE (METROCREAM) 0.75 % external cream Apply topically 2 times daily 3/7/24   Alina Aguirre APRN CNP       Scheduled Meds    acetaminophen  1,000 mg Oral BID     [START ON 3/14/2024] aspirin  325 mg Oral Daily     [START ON 3/14/2024] gabapentin  100 mg Oral Every Other Day     metroNIDAZOLE   Topical BID     montelukast  10 mg Oral At Bedtime     pravastatin  10 mg Oral QPM       Infusion Meds      PRN Meds  acetaminophen **OR** acetaminophen, albuterol, ondansetron **OR** ondansetron, polyethylene glycol, senna-docusate **OR** senna-docusate    Allergies   Allergies   Allergen Reactions     Levaquin Hives and Itching     Pcn [Penicillins] Hives     Tetanus Toxoid Anaphylaxis     Tetanus Toxoids Anaphylaxis     Erythromycin GI Disturbance     Amoxicillin      Duloxetine Nausea     Other reaction(s): Jittery     Silicone Rash     Family History   Family History   Problem Relation Age of Onset     Respiratory Father      Cancer Brother         sarcoidosis     Diabetes Brother      Cerebrovascular Disease Brother       Liver Disease Brother      Social History   Social History     Tobacco Use     Smoking status: Former     Years: 14     Types: Cigarettes     Smokeless tobacco: Never     Tobacco comments:     quit but  still smokes, but not in house   Vaping Use     Vaping Use: Never used   Substance Use Topics     Alcohol use: No     Alcohol/week: 0.0 standard drinks of alcohol     Drug use: No     PHYSICAL EXAMINATION  Temp:  [97.8  F (36.6  C)-98.5  F (36.9  C)] 97.8  F (36.6  C)  Pulse:  [68-86] 70  Resp:  [13-22] 16  BP: (127-144)/(40-82) 128/58  SpO2:  [94 %-99 %] 94 %     Neurologic  Mental Status:  alert, oriented x 3, follows commands, speech clear and fluent, naming and repetition normal  Cranial Nerves:  visual fields intact, PERRL, EOMI with normal smooth pursuit, facial sensation intact and symmetric, facial movements symmetric, hearing not formally tested but intact to conversation, no dysarthria, tongue protrusion midline  Motor:  normal muscle tone and bulk, no abnormal movements, able to move all limbs spontaneously, strength 5/5 throughout upper and lower extremities, no pronator drift  Sensory:  light touch sensation intact and symmetric throughout upper and lower extremities, no extinction on double simultaneous stimulation   Coordination:  normal finger-to-nose and heel-to-shin bilaterally without dysmetria, rapid alternating movements symmetric  Station/Gait:  deferred    Stroke Scales    NIHSS  1a. Level of Consciousness 0-->Alert, keenly responsive   1b. LOC Questions 0-->Answers both questions correctly   1c. LOC Commands 0-->Performs both tasks correctly   2.   Best Gaze 0-->Normal   3.   Visual 0-->No visual loss   4.   Facial Palsy 0-->Normal symmetrical movements   5a. Motor Arm, Left 0-->No drift, limb holds 90 (or 45) degrees for full 10 secs   5b. Motor Arm, Right 0-->No drift, limb holds 90 (or 45) degrees for full 10 secs   6a. Motor Leg, Left 0-->No drift, leg holds 30 degree position  "for full 5 secs   6b. Motor Leg, right 0-->No drift, leg holds 30 degree position for full 5 secs   7.   Limb Ataxia 0-->Absent   8.   Sensory 0-->Normal, no sensory loss   9.   Best Language 0-->No aphasia, normal   10. Dysarthria 0-->Normal   11. Extinction and Inattention  0-->No abnormality   Total 0 (03/13/24 1020)       Modified [place] Score (Pre-morbid)  0 - No symptoms.    Imaging  I personally reviewed all imaging; relevant findings per HPI.     Lab Results Data   CBC  Recent Labs   Lab 03/13/24  1019   WBC 6.5   RBC 4.95   HGB 13.7   HCT 42.3        Basic Metabolic Panel    Recent Labs   Lab 03/13/24  1019      POTASSIUM 4.0   CHLORIDE 102   CO2 26   BUN 13.1   CR 0.90   *   DONNA 9.0     Liver Panel  No results for input(s): \"PROTTOTAL\", \"ALBUMIN\", \"BILITOTAL\", \"ALKPHOS\", \"AST\", \"ALT\", \"BILIDIRECT\" in the last 168 hours.  INR    Recent Labs   Lab Test 03/13/24  1019 02/14/24  1904 11/08/23  2159   INR 1.02 0.97 0.92      Lipid Profile    Recent Labs   Lab Test 09/12/23  1650 10/10/22  1415 10/27/21  1037   CHOL 197 207* 206*   HDL 37* 45 43    136* 134*   TRIG 163* 146 160*     A1C    Recent Labs   Lab Test 11/08/23  2159 06/20/23  1131 10/10/22  1415   A1C 5.7* 5.9* 6.0*     Troponin    Recent Labs   Lab 03/13/24  1019   CTROPT 8          Stroke Code Data Data   Stroke Code Data  (for stroke code without tele)  Stroke code activated 03/13/24  1013   First stroke provider response 03/13/24  1014   Last known normal 03/13/24  0945   Time of discovery (or onset of symptoms)        Head CT read by Stroke Neuro Provider 03/13/24  1029   Was stroke code de-escalated? Yes  03/13/24  1108           "

## 2024-03-14 NOTE — PLAN OF CARE
Goal Outcome Evaluation:       .RECEIVING UNIT ED HANDOFF REVIEW    ED Nurse Handoff Report was reviewed by: Rodo Larose RN on March 13, 2024 at 8:46 PM

## 2024-03-15 ENCOUNTER — APPOINTMENT (OUTPATIENT)
Dept: PHYSICAL THERAPY | Facility: CLINIC | Age: 74
DRG: 101 | End: 2024-03-15
Attending: INTERNAL MEDICINE
Payer: MEDICARE

## 2024-03-15 LAB — GLUCOSE BLDC GLUCOMTR-MCNC: 99 MG/DL (ref 70–99)

## 2024-03-15 PROCEDURE — 99232 SBSQ HOSP IP/OBS MODERATE 35: CPT | Performed by: INTERNAL MEDICINE

## 2024-03-15 PROCEDURE — 250N000013 HC RX MED GY IP 250 OP 250 PS 637: Performed by: NURSE PRACTITIONER

## 2024-03-15 PROCEDURE — 250N000013 HC RX MED GY IP 250 OP 250 PS 637: Performed by: HOSPITALIST

## 2024-03-15 PROCEDURE — 97116 GAIT TRAINING THERAPY: CPT | Mod: GP

## 2024-03-15 PROCEDURE — 120N000001 HC R&B MED SURG/OB

## 2024-03-15 PROCEDURE — 97162 PT EVAL MOD COMPLEX 30 MIN: CPT | Mod: GP

## 2024-03-15 PROCEDURE — 250N000013 HC RX MED GY IP 250 OP 250 PS 637: Performed by: INTERNAL MEDICINE

## 2024-03-15 RX ORDER — LEVETIRACETAM 500 MG/1
500 TABLET ORAL 2 TIMES DAILY
Qty: 60 TABLET | Refills: 1 | Status: SHIPPED | OUTPATIENT
Start: 2024-03-15 | End: 2024-05-28

## 2024-03-15 RX ORDER — LEVETIRACETAM 500 MG/1
500 TABLET ORAL 2 TIMES DAILY
Status: DISCONTINUED | OUTPATIENT
Start: 2024-03-15 | End: 2024-03-16 | Stop reason: HOSPADM

## 2024-03-15 RX ADMIN — ACETAMINOPHEN 1000 MG: 500 TABLET, FILM COATED ORAL at 20:55

## 2024-03-15 RX ADMIN — SENNOSIDES 1 TABLET: 8.6 TABLET, FILM COATED ORAL at 09:14

## 2024-03-15 RX ADMIN — LEVETIRACETAM 500 MG: 500 TABLET, FILM COATED ORAL at 20:55

## 2024-03-15 RX ADMIN — ASPIRIN 325 MG: 325 TABLET, COATED ORAL at 09:14

## 2024-03-15 RX ADMIN — LEVETIRACETAM 500 MG: 500 TABLET, FILM COATED ORAL at 09:49

## 2024-03-15 RX ADMIN — PRAVASTATIN SODIUM 10 MG: 10 TABLET ORAL at 20:55

## 2024-03-15 RX ADMIN — METRONIDAZOLE: 7.5 CREAM TOPICAL at 21:04

## 2024-03-15 RX ADMIN — ACETAMINOPHEN 1000 MG: 500 TABLET, FILM COATED ORAL at 09:14

## 2024-03-15 ASSESSMENT — ACTIVITIES OF DAILY LIVING (ADL)
ADLS_ACUITY_SCORE: 28
ADLS_ACUITY_SCORE: 29
DEPENDENT_IADLS:: INDEPENDENT
ADLS_ACUITY_SCORE: 29
ADLS_ACUITY_SCORE: 26
ADLS_ACUITY_SCORE: 28
ADLS_ACUITY_SCORE: 29

## 2024-03-15 NOTE — PLAN OF CARE
Reason for Admission: confusion and R arm weakness. EEG remarkable for possible seizures    Hx: TIA, breast cancer, fibromyalgia    Cognitive/Mentation: A/Ox 4  Neuros/CMS: Intact ex R arm weakness and tremors.  VS: stable on RA.  GI: Continent.  : Continent.  Pulmonary: LS clear.  Pain: Managed with tylenol.     Drains/Lines: R PIV SL  Skin: intact  Activity: Assist x stand by with GB.  Diet: consistent carb/diabetic diet with thin liquids. Takes pills whole.     Discharge: home, pending workup    Aggression Stoplight Tool: green    End of shift summary: Reporting dizziness after first dose of Keppra. No other issue overnight. Slept between cares.

## 2024-03-15 NOTE — CONSULTS
Care Management Initial Consult    General Information  Assessment completed with: Patient,    Type of CM/SW Visit: Offer D/C Planning    Primary Care Provider verified and updated as needed: Yes   Readmission within the last 30 days: current reason for admission unrelated to previous admission   Return Category: Exacerbation of disease  Reason for Consult: discharge planning  Advance Care Planning: Advance Care Planning Reviewed: no concerns identified          Communication Assessment  Patient's communication style: spoken language (English or Bilingual)    Hearing Difficulty or Deaf: no   Wear Glasses or Blind: yes    Cognitive  Cognitive/Neuro/Behavioral: WDL  Level of Consciousness: alert  Arousal Level: opens eyes spontaneously  Orientation: oriented x 4  Mood/Behavior: calm, cooperative  Best Language: 0 - No aphasia  Speech: clear    Living Environment:   People in home: spouse     Current living Arrangements: house      Able to return to prior arrangements: yes       Family/Social Support:  Care provided by: self  Provides care for: no one  Marital Status:             Description of Support System:           Current Resources:   Patient receiving home care services: No     Community Resources: None  Equipment currently used at home: none  Supplies currently used at home:      Employment/Financial:  Employment Status: retired        Financial Concerns: none           Does the patient's insurance plan have a 3 day qualifying hospital stay waiver?  No    Lifestyle & Psychosocial Needs:  Social Determinants of Health     Food Insecurity: Low Risk  (1/5/2024)    Food Insecurity     Within the past 12 months, did you worry that your food would run out before you got money to buy more?: No     Within the past 12 months, did the food you bought just not last and you didn t have money to get more?: No   Depression: Not at risk (2/12/2024)    PHQ-2     PHQ-2 Score: 2   Housing Stability: Low Risk   (1/5/2024)    Housing Stability     Do you have housing? : Yes     Are you worried about losing your housing?: No   Tobacco Use: Medium Risk (3/7/2024)    Patient History     Smoking Tobacco Use: Former     Smokeless Tobacco Use: Never     Passive Exposure: Not on file   Financial Resource Strain: Low Risk  (1/5/2024)    Financial Resource Strain     Within the past 12 months, have you or your family members you live with been unable to get utilities (heat, electricity) when it was really needed?: No   Alcohol Use: Not on file   Transportation Needs: Low Risk  (1/5/2024)    Transportation Needs     Within the past 12 months, has lack of transportation kept you from medical appointments, getting your medicines, non-medical meetings or appointments, work, or from getting things that you need?: No   Physical Activity: Not on file   Interpersonal Safety: Not on file   Stress: Not on file   Social Connections: Not on file       Functional Status:  Prior to admission patient needed assistance:   Dependent ADLs:: Independent  Dependent IADLs:: Independent       Mental Health Status:          Chemical Dependency Status:                Values/Beliefs:  Spiritual, Cultural Beliefs, Buddhist Practices, Values that affect care:                 Additional Information:  CM consult for discharge planning.  Met with patient.  She lives independently with her .  They both drive.  She has no concerns about discharge.  Discussed follow up appointments.  She has seen a provider at [facility] in the past and would prefer follow up there.  She also agrees to have PCP appt scheduled.  No other needs identified.    Mar 19, 2024 11:00 AM  Office Visit with Long Ferrari MD  [facility] ([facility])     An appointment is scheduled with Jackson Xie at the [facility] on Thursday March 28 at 10:00 a.m.     Poonam Ballesteros RN, BSN, PHN  Inpatient Care Coordination  [facility]   [facility]  Phone: [phone]

## 2024-03-15 NOTE — DISCHARGE SUMMARY
[facility]    Discharge Summary  Hospitalist    Date of Admission:  3/13/2024  Date of Discharge:  3/16/2024  Discharging Provider: Sam Gunderson MD    Discharge Diagnoses     Indeterminate spell of confusion, right arm weakness most likely due to seizure  Dizziness most likely due to vestibular dysfunction  History of transient ischemic attack   Headache, acute on chronic, right-sided  Right upper extremity tremor  Chronic kidney disease (CKD), stage IIIa  History of asthma  History of emphysema  Hyperlipidemia  History of prediabetes, hyperglycemic  History of constipation  Anxiety, depression  Nicotine dependence  Fibromyalgia   History of cervical spinal fusion    Hospital Course:    Macey Romero is a 73 year old female with PMH of TIA, nicotine dependence, breast cancer, chronic kidney disease stage III, hyperlipidemia, asthma, prediabetes, osteoarthritis, fibromyalgia, admitted 3/13/2024 with a  spell of confusion with associated with headache, and a one week history of right arm weakness and worsening tremor     Indeterminate spell of confusion, right arm weakness most likely due to seizure  *1 week history of right arm weakness /tremor prior to admission, with 1 day history of confusion at [employer] study lasting approximately 1 hour; additional right-sided acute on chronic headache, right parietal-occipital region  *Code stroke in the emergency room, imaging negative for acute ischemic or hemorrhagic event though CTA with moderate focal narrowing at the P1-P2 junction of the right posterior cerebral artery with relatively decreased filling of the right PCA compared to the left PCA,  *MRI brain negative for acute intracranial pathology.  changes consistent with chronic small vessel ischemic disease.  *stroke neurology consulted in ER, did not recommend any additional stroke workup  *Metabolic workup unrevealing with normal TSH, pyuria only on UA  -Evaluated by general neurology,  EEG concerning for epileptiform disorder  -Loaded on Keppra.  Neurology recommended continuing Keppra at 500 mg twice a day at discharge.  Prescription provided.  -neurology follow-up in 4 weeks  -Transthoracic echocardiogram (TTE) normal  -Continue PTA aspirin 325 mg daily  -No driving for 3 months at discharge     Dizziness most likely due to vestibular dysfunction  -Patient complaining of dizziness since overnight  -Unclear if this is due to side effect from Keppra or vestibular problem  -Evaluated by physical therapy they feel that patient is more unsteady today with symptoms of dizziness they also found that her vestibular system appears weak and affecting her balance  -Orthostatics were negative  -She was monitored overnight in the hospital, she was better.  PT recommended outpatient PT for vestibular treatment.  Orders placed.     Right upper extremity tremor  -Will need outpatient general neurology follow-up.  -Can continue to hold Zoloft for now given some association with tremors.  Although patient prefers to continue on Zoloft for now.  Will defer that to her primary care provider     Chronic kidney disease (CKD), stage IIIa  -Admission serum Cr 0.90, baseline 0.69 to 1.05 in 2023     History of asthma  History of emphysema  -In light of abnormal lung sounds need for infectious workup will obtain chest x-ray although SpO2 and WBCs are within normal limit  -Albuterol as needed as prior to admission  -Continue PTA Singulair  -Continue PTA Symbicort     Hyperlipidemia  -Continue PTA statin      History of prediabetes, hyperglycemic  -Hemoglobin A1c is 6.0  -Follow-up closely with PCP     Anxiety, depression  -As mentioned above will defer the decision about Zoloft to her and her primary care provider.       Nicotine dependence  Recent smoker, quit over one week prior to admission per patient report  On/off smoking over many years, <1 pack per day, not routine by report  -Smoking cessation dicussed and  encouraged by admitting MD.  This is of utmost importance given her recent TIAs  -nicotine patch may be considered upon request  -Follow-up with primary clinic provider     History of breast cancer - noted, follow-up with primary clinic provider and oncology  Fibromyalgia - noted; pain control with bedtime Neurontin; therapy services consulted  History of chronic pain, knees - s/p bilateral TKA's; stable  Osteoarthritis - noted  History of cervical spinal fusion - noted       Sam Gunderson MD, MD    Significant Results and Procedures   See below    Pending Results     Unresulted Labs Ordered in the Past 30 Days of this Admission       No orders found from 2/12/2024 to 3/14/2024.            Code Status   Full Code       Primary Care Physician   Long Ferrari    Physical Exam   Temp: 97.9  F (36.6  C) Temp src: Oral BP: 121/58 Pulse: 69   Resp: 16 SpO2: 93 % O2 Device: None (Room air)      Constitutional: AAOX3, NAD  Respiratory: CTA B/L, Normal WOB  Cardiovascular: RRR, No murmur  GI: Soft, Non- tender, BS- normoactive  Skin/Integument: Warm and dry, no rashes  Neuro: CN- grossly intact, Movin all 4 extremities.     Discharge Disposition   Discharged to home  Condition at discharge: Stable    Consultations This Hospital Stay   NEUROLOGY IP CONSULT  NEUROLOGY IP CONSULT  CARE MANAGEMENT / SOCIAL WORK IP CONSULT  PHYSICAL THERAPY ADULT IP CONSULT  OCCUPATIONAL THERAPY ADULT IP CONSULT  VASCULAR ACCESS ADULT IP CONSULT  PHYSICAL THERAPY ADULT IP CONSULT    Time Spent on this Encounter   I, Sam Gunderson MD, personally saw the patient today and spent greater than 30 minutes discharging this patient.    Discharge Orders      Follow-up and recommended labs and tests     Neurology follow up:  An appointment is scheduled with Jackson Xie at the [facility] on Thursday March 28 at 10:00 a.m.    [facility]  [address] [place] [address], [address]  [place] [address]  Phone:  [phone]      Reason for your hospital stay    Seizure     Follow-up and recommended labs and tests     Follow up with primary care provider, Long Ferrari, within 7 days for hospital follow- up.    Follow-up with general 3-4 weeks.     Activity    Your activity upon discharge: activity as tolerated     Discharge Instructions    No driving for at least 3 months or until cleared by neurology     Diet    Follow this diet upon discharge: Orders Placed This Encounter      Combination Diet Moderate Consistent Carb (60 g CHO per Meal) Diet     Discharge Medications   Current Discharge Medication List        START taking these medications    Details   levETIRAcetam (KEPPRA) 500 MG tablet Take 1 tablet (500 mg) by mouth 2 times daily  Qty: 60 tablet, Refills: 1    Comments: Future refills by PCP Dr. Long Ferrari with phone number [phone].  Associated Diagnoses: Seizure disorder (H)           CONTINUE these medications which have NOT CHANGED    Details   acetaminophen (TYLENOL) 500 MG tablet Take 1,000 mg by mouth 2 times daily      albuterol (PROAIR HFA/PROVENTIL HFA/VENTOLIN HFA) 108 (90 Base) MCG/ACT inhaler Inhale 2 puffs into the lungs every 6 hours as needed for shortness of breath    Comments: Pharmacy may dispense brand covered by insurance (Proair, or proventil or ventolin or generic albuterol inhaler)  Associated Diagnoses: Reactive airway disease, moderate persistent, with acute exacerbation      alendronate (FOSAMAX) 70 MG tablet Take 1 tablet (70 mg) by mouth every 7 days  Qty: 12 tablet, Refills: 3    Associated Diagnoses: Age-related osteoporosis without current pathological fracture      aspirin (ASA) 325 MG EC tablet Take 1 tablet (325 mg) by mouth daily Take with food or meal.    Associated Diagnoses: TIA (transient ischemic attack)      cetirizine (ZYRTEC) 5 MG tablet Take 1 tablet (5 mg) by mouth daily for 30 days  Qty: 30 tablet, Refills: 0    Associated Diagnoses: Itching      clindamycin  (CLEOCIN) 300 MG capsule Take 300 mg by mouth Take 1 hour prior to dental appointment.      gabapentin (NEURONTIN) 100 MG capsule Take 100 mg by mouth every other day At bedtime    Comments: NOTE this is an DECREASE in dose from prescription and what you have been taking      montelukast (SINGULAIR) 10 MG tablet Take 1 tablet (10 mg) by mouth at bedtime for 30 days  Qty: 30 tablet, Refills: 0    Associated Diagnoses: Moderate persistent asthma without complication; Seasonal allergic rhinitis due to other allergic trigger      pravastatin (PRAVACHOL) 10 MG tablet Take 1 tablet (10 mg) by mouth daily  Qty: 90 tablet, Refills: 3    Associated Diagnoses: TIA (transient ischemic attack)      sertraline (ZOLOFT) 50 MG tablet Take 1 tablet (50 mg) by mouth daily  Qty: 30 tablet, Refills: 11    Associated Diagnoses: Reactive depression      budesonide-formoterol (SYMBICORT) 160-4.5 MCG/ACT Inhaler Inhale 2 puffs into the lungs daily  Qty: 10.2 g, Refills: 3    Associated Diagnoses: Reactive airway disease, moderate persistent, with acute exacerbation      metroNIDAZOLE (METROCREAM) 0.75 % external cream Apply topically 2 times daily  Qty: 45 g, Refills: 1    Associated Diagnoses: Rosacea           Allergies   Allergies   Allergen Reactions     Levaquin Hives and Itching     Pcn [Penicillins] Hives     Tetanus Toxoid Anaphylaxis     Tetanus Toxoids Anaphylaxis     Erythromycin GI Disturbance     Amoxicillin      Duloxetine Nausea     Other reaction(s): Jittery     Silicone Rash     Data   Most Recent 3 CBC's:  Recent Labs   Lab Test 03/14/24  0800 03/13/24  1019 02/14/24  1904   WBC 5.5 6.5 8.9   HGB 12.5 13.7 14.4   MCV 85 86 85    195 214      Most Recent 3 BMP's:  Recent Labs   Lab Test 03/14/24  0800 03/13/24  1019 02/14/24  1904    140 140   POTASSIUM 4.2 4.0 4.0   CHLORIDE 107 102 102   CO2 25 26 28   BUN 11.8 13.1 10.9   CR 0.83 0.90 0.84   ANIONGAP 8 12 10   DONNA 8.7* 9.0 9.1   * 123* 112*     Most  Recent 2 LFT's:  Recent Labs   Lab Test 02/02/24  1703 11/15/23  1151   AST 21 36   ALT 14 39   ALKPHOS 86 165*   BILITOTAL 0.3 0.4     Most Recent INR's and Anticoagulation Dosing History:  Anticoagulation Dose History  More data exists         Latest Ref Rng & Units 7/16/2010 1/3/2011 1/9/2011 9/8/2011 11/8/2023 2/14/2024 3/13/2024   Recent Dosing and Labs   INR 0.85 - 1.15 0.99  0.92  0.94  1.00  0.92  0.97  1.02      Most Recent 3 Troponin's:  Recent Labs   Lab Test 01/19/20  1131   TROPI <0.015     Most Recent Cholesterol Panel:  Recent Labs   Lab Test 03/14/24  0800   CHOL 144   LDL 78   HDL 39*   TRIG 135     Most Recent 6 Bacteria Isolates From Any Culture (See EPIC Reports for Culture Details):  Recent Labs   Lab Test 06/23/21  0824 01/19/20  1415 10/28/16  1320 10/28/16  1300 10/28/16  1250 10/28/16  1210   CULT No anaerobes isolated  No growth 10,000 to 50,000 colonies/mL  mixed urogenital xin  Susceptibility testing not routinely done   No growth No growth 10,000 to 50,000 colonies/mL mixed urogenital xin Susceptibility testing not   routinely done   No growth     Most Recent TSH, T4 and A1c Labs:  Recent Labs   Lab Test 03/14/24  0800 03/13/24  1019   TSH  --  2.00   A1C 6.0*  --        Results for orders placed or performed during the hospital encounter of 03/13/24   CT Head w/o Contrast    Narrative    CT OF THE HEAD WITHOUT CONTRAST 3/13/2024 10:29 AM     COMPARISON: None.    HISTORY: Headache. Confusion.    TECHNIQUE: 5 mm thick axial CT images of the head were acquired  without IV contrast material.    FINDINGS:  There is mild diffuse cerebral volume loss. There are  subtle patchy areas of decreased density in the cerebral white matter  bilaterally that are consistent with sequela of chronic small vessel  ischemic disease.     The ventricles and basal cisterns are within normal limits in  configuration given the degree of cerebral volume loss.  There is no  midline shift. There are no  extra-axial fluid collections.     No intracranial hemorrhage, mass or recent infarct.    The visualized paranasal sinuses are well-aerated. There is no  mastoiditis. There are no fractures of the visualized bones.       Impression    IMPRESSION:  Diffuse cerebral volume loss and cerebral white matter  changes consistent with chronic small vessel ischemic disease. No  evidence for acute intracranial pathology.         Radiation dose for this scan was reduced using automated exposure  control, adjustment of the mA and/or kV according to patient size, or  iterative reconstruction technique.    ADELIA CAVANAUGH MD         SYSTEM ID:  [id number]   CTA Head Neck with Contrast    Narrative    CT ANGIOGRAM OF THE HEAD AND NECK WITHOUT AND WITH CONTRAST  3/13/2024  10:33 AM     COMPARISON: None.    HISTORY: Altered mental status. Headache. Confusion.    TECHNIQUE:  Precontrast localizing scans were followed by CT  angiography with an injection of 67 mL [id number]-370 nonionic intravenous  contrast material with scans through the head and neck.  Images were  transferred to a separate 3-D workstation where multiplanar  reformations and 3-D images were created.  Estimates of carotid  stenoses are made relative to the distal internal carotid artery  diameters except as noted.      FINDINGS:   Neck CTA: The bilateral common carotid, bilateral cervical internal  carotid and right vertebral arteries are patent without stenosis.  There is moderate atherosclerotic narrowing at the origin of the left  vertebral artery. The left vertebral artery is otherwise patent and  unremarkable.    Head CTA: There is moderate focal narrowing at the P1-P2 junction of  the right posterior cerebral artery with decreased opacification of  the right posterior cerebral artery compared to the left PCA; this is  of uncertain clinical significance. The basilar, bilateral  intracranial distal internal carotid, bilateral anterior cerebral,  bilateral middle  cerebral and left posterior cerebral arteries are  patent and unremarkable.      Impression    IMPRESSION:  1. Moderate focal narrowing at the P1-P2 junction of the right  posterior cerebral artery with relatively decreased filling of the  right PCA compared to the left PCA. This is of uncertain clinical  significance.  2. Moderate atherosclerotic narrowing at the origin of the left  vertebral artery.  3. Otherwise, normal neck and head CTA.      Radiation dose for this scan was reduced using automated exposure  control, adjustment of the mA and/or kV according to patient size, or  iterative reconstruction technique.    ADELIA CAVANAUGH MD         SYSTEM ID:  [id number]   MR Brain w/o & w Contrast    Narrative    MRI OF THE BRAIN WITHOUT AND WITH CONTRAST 3/13/2024 1:08 PM     COMPARISON: Head CT same day.    HISTORY:  Headache, ? receptive aphasia, vision changes.    TECHNIQUE: Axial diffusion-weighted with ADC map, axial T2-weighted  with fat saturation, axial T1-weighted, axial turboFLAIR and coronal  T1-weighted images of the brain were acquired without intravenous  contrast.  Following intravenous administration of gadolinium (7 mL  Gadavist), axial T1-weighted images of the brain were acquired.     FINDINGS: There is mild diffuse cerebral volume loss. There are patchy  periventricular areas of abnormal T2 signal hyperintensity in the  cerebral white matter bilaterally that are consistent with sequela of  chronic small vessel ischemic disease.     The ventricles and basal cisterns are within normal limits in  configuration given the degree of cerebral volume loss.  There is no  midline shift.  There are no extra-axial fluid collections.  There is  no evidence for stroke or acute intracranial hemorrhage.  There is no  abnormal contrast enhancement in the brain or its coverings.    There is no sinusitis or mastoiditis.      Impression    IMPRESSION:  Diffuse cerebral volume loss and cerebral white matter  changes  consistent with chronic small vessel ischemic disease. No  evidence for acute intracranial pathology.    ADELIA CAVANAUGH MD         SYSTEM ID:  [id number]   XR Chest 2 Views    Narrative    XR CHEST 2 VIEWS  3/14/2024 2:19 PM       INDICATION: eval for pneumonia abnormal breath sounds  COMPARISON: 2023       Impression    IMPRESSION: Negative chest.    UNIQUE FRANCO MD         SYSTEM ID:  [id number]   Echocardiogram Complete     Value    LVEF  55-60%    Narrative    [id number]  [id number]  [id number]^VIVIEN^VANESSA^ÁNGELA     [facility]  Echocardiography Laboratory  [address] [place], [place] [address]     Name: AMISHA HOLDER  MRN: [id number]  : 1950  Study Date: 2024 10:53 AM  Age: 73 yrs  Gender: Female  Patient Location: [facility]  Reason For Study: TIA  Ordering Physician: VANESSA FERRARI  Referring Physician: Long Ferrari  Performed By: Brianna Dela Cruz     BSA: 1.7 m2  Height: 63 in  Weight: 158 lb  HR: 68  BP: 136/82 mmHg  ______________________________________________________________________________  Procedure  Complete Portable Echo Adult. Optison (NDC #2306-9872) given intravenously.  ______________________________________________________________________________  Interpretation Summary     The left ventricle is normal in size.  Left ventricular systolic function is normal.  The visual ejection fraction is 55-60%.  Normal left ventricular wall motion  Diastolic Doppler findings (E/E' ratio and/or other parameters) suggest left  ventricular filling pressures are normal.  No hemodynamically significant valvular abnormalities on 2D or color flow  imaging. No obvious cardiac source of emboli was seen within the limits of a  transthoracic echocardiogram. Compared to prior study, there is no significant  change.  ______________________________________________________________________________  Left Ventricle  The left ventricle is normal in size. There is normal left  ventricular wall  thickness. Left ventricular systolic function is normal. The visual ejection  fraction is 55-60%. Diastolic Doppler findings (E/E' ratio and/or other  parameters) suggest left ventricular filling pressures are normal. Normal left  ventricular wall motion.     Right Ventricle  The right ventricle is normal in structure, function and size.     Atria  Normal left atrial size. Right atrial size is normal. There is no atrial shunt  seen.     Mitral Valve  The mitral valve is normal in structure and function. There is trace mitral  regurgitation.     Tricuspid Valve  The tricuspid valve is normal in structure and function. There is trace  tricuspid regurgitation.     Aortic Valve  The aortic valve is normal in structure and function. No aortic regurgitation  is present. No aortic stenosis is present.     Pulmonic Valve  The pulmonic valve is not well seen, but is grossly normal. There is trace  pulmonic valvular regurgitation.     Vessels  Normal size aorta.     Pericardium  There is no pericardial effusion.     Rhythm  Sinus rhythm was noted.  ______________________________________________________________________________  MMode/2D Measurements & Calculations  IVSd: 0.83 cm     LVIDd: 4.3 cm  LVIDs: 2.8 cm  LVPWd: 0.74 cm  FS: 33.8 %  LV mass(C)d: 101.8 grams  LV mass(C)dI: 58.2 grams/m2  Ao root diam: 2.7 cm  LA dimension: 2.8 cm  asc Aorta Diam: 2.5 cm  LA/Ao: 1.0  LVOT diam: 2.0 cm  LVOT area: 3.1 cm2  Ao root diam index Ht(cm/m): 1.7  Ao root diam index BSA (cm/m2): 1.5  Asc Ao diam index BSA (cm/m2): 1.4  Asc Ao diam index Ht(cm/m): 1.5  LA Volume (BP): 30.5 ml     LA Volume Index (BP): 17.4 ml/m2  RWT: 0.35  TAPSE: 2.3 cm     Doppler Measurements & Calculations  MV E max angie: 73.3 cm/sec  MV A max angie: 77.6 cm/sec  MV E/A: 0.94  MV dec time: 0.22 sec  Ao V2 max: 134.0 cm/sec  Ao max P.0 mmHg  Ao V2 mean: 92.2 cm/sec  Ao mean P.0 mmHg  Ao V2 VTI: 27.7 cm  ODELL(I,D): 2.5 cm2  ODELL(V,D): 2.3  cm2  LV V1 max P.2 mmHg  LV V1 max: 102.0 cm/sec  LV V1 VTI: 22.8 cm  SV(LVOT): 69.7 ml  SI(LVOT): 39.8 ml/m2  PA acc time: 0.16 sec  AV Ceasar Ratio (DI): 0.76  ODELL Index (cm2/m2): 1.4  E/E' av.7  Lateral E/e': 6.7     Medial E/e': 8.8  RV S Ceasar: 14.8 cm/sec     ______________________________________________________________________________  Report approved by: Kathryn Pop 2024 11:44 AM

## 2024-03-15 NOTE — PLAN OF CARE
Reason for Admission: confusion and R arm weakness. EEG remarkable for possible seizures    Hx: TIA, breast cancer, fibromyalgia    Cognitive/Mentation: A/Ox 4  Neuros/CMS: Intact ex R arm weakness and tremors.  VS: stable on RA.  GI: Continent.  : Continent.  Pulmonary: LS clear.  Pain: none reported.     Drains/Lines: R PIV SL  Skin: intact  Activity: Assist x stand by with GB.  Diet: consistent carb/diabetic diet with thin liquids. Takes pills whole.     Discharge: home, pending workup    Aggression Stoplight Tool: green    End of shift summary: completed chest xray and EEG. Started on IV kepra. Head CT and MRI neg for TIA or stroke

## 2024-03-15 NOTE — PROGRESS NOTES
Patient is AO XZ 4; VSS; on RA; c/o dizziness with ambulation.   She denies nausea, vomiting, sob. Pain is controlled with Tylenol.   She can swallow pills whole with water.   PT recommends one more day of hospitalization.

## 2024-03-15 NOTE — PROGRESS NOTES
"   03/15/24 1229   Appointment Info   Signing Clinician's Name / Credentials (PT) Jessica Cheema, PT, DPT   Quick Adds   Quick Adds Vestibular Eval   Living Environment   People in Home spouse   Current Living Arrangements house   Home Accessibility stairs to enter home   Self-Care   Usual Activity Tolerance good   Current Activity Tolerance fair   Equipment Currently Used at Home none   Fall history within last six months no   Activity/Exercise/Self-Care Comment Owns 4WW from knee surgery. Independent at baseline and has been indepednent in room in hospital, but feeling more dizzy and unsteady today.   General Information   Onset of Illness/Injury or Date of Surgery 03/13/24   Referring Physician Sam Gunderson MD   Patient/Family Therapy Goals Statement (PT) to feel better   Pertinent History of Current Problem (include personal factors and/or comorbidities that impact the POC) \"Indeterminate spell of confusion, right arm weakness  History of transient ischemic attack   Headache, acute on chronic, right-sided  +epileptiform discharges on EEG  \"   Weight-Bearing Status - LLE full weight-bearing   Weight-Bearing Status - RLE full weight-bearing   General Observations Pt reports she occasionally has dizzy spells at baseline where she will need to furniture surf to mobilize. She noticed this happens every once and a while since 2004 since TIA.   Cognition   Orientation Status (Cognition) oriented x 4   Pain Assessment   Patient Currently in Pain No   Integumentary/Edema   Integumentary/Edema no deficits were identifed   Posture    Posture Forward head position   Range of Motion (ROM)   Range of Motion ROM is WFL   Strength (Manual Muscle Testing)   Strength (Manual Muscle Testing) strength is WFL   Bed Mobility   Bed Mobility no deficits identified   Comment, (Bed Mobility) independent seated<>supine, feels dizzy sitting up   Transfers   Comment, (Transfers) superivison without device and with 4WW; no overt " unsteadiness standing but pt reports feeling unsteady   Gait/Stairs (Locomotion)   Comment, (Gait/Stairs) occasional veering without device, improved with use of 4WW   Balance   Balance Comments decreased dynamic balance,   Oculomotor Exam   Ocular ROM Normal   Smooth Pursuit Normal   Saccades Normal   VOR Abnormal   VOR Comments dizziness, tolerable but present   Head Impulse Test Corrective Saccade L head thrust   Convergence Testing Comments pt reports double vision in near vision at baseline (~5ft away), from retina procedure?   Clinical Impression   Criteria for Skilled Therapeutic Intervention Yes, treatment indicated   PT Diagnosis (PT) impaired functional mobility   Influenced by the following impairments decreased balance, activity tolerance   Functional limitations due to impairments ambulation, stairs   Clinical Presentation (PT Evaluation Complexity) evolving   Clinical Presentation Rationale clinical judgement   Clinical Decision Making (Complexity) moderate complexity   Planned Therapy Interventions (PT) bed mobility training;balance training;gait training;home exercise program;neuromuscular re-education;patient/family education;stair training;strengthening;transfer training   Risk & Benefits of therapy have been explained evaluation/treatment results reviewed;care plan/treatment goals reviewed;risks/benefits reviewed;current/potential barriers reviewed;participants voiced agreement with care plan;participants included;patient   PT Total Evaluation Time   PT Eval, Moderate Complexity Minutes (55044) 12   Physical Therapy Goals   PT Frequency Daily   PT Predicted Duration/Target Date for Goal Attainment 03/22/24   PT Goals Bed Mobility;Transfers;Gait;Stairs   PT: Bed Mobility Independent   PT: Transfers Independent   PT: Gait Independent;Assistive device   PT: Stairs Modified independent   Interventions   Interventions Quick Adds Gait Training   Gait Training   Gait Training Minutes (24868) 10    Symptoms Noted During/After Treatment (Gait Training) dizziness   Treatment Detail/Skilled Intervention Bed mob and transfer independent without device. Pt reports dizziness, feels the need to hold onto something. 4WW provided, patient able to ambulate with device improved than without device, but regardless feels unsteady and dizzy with all dynamic balance including head turns and dual tasking. Pt veers at times and needs to stop to adjust.   Distance in Feet 400   PT Discharge Planning   PT Plan recheck vestib; gait with vs. without device; stairs   PT Discharge Recommendation (DC Rec) home with assist;home with outpatient physical therapy   PT Rationale for DC Rec Patient is more unsteady today with symptoms of dizziness. PT recommends one more day in hospital to check orthostatics, and monitor symptoms. Pt will benefit from OP PT for vestibular PT when symptoms are stable enough to return home. Vestibular system appears weak and affecting her balance.   PT Brief overview of current status supervision with walker   PT Equipment Needed at Discharge   (pt owns a walker at home)   Total Session Time   Timed Code Treatment Minutes 10   Total Session Time (sum of timed and untimed services) 22

## 2024-03-15 NOTE — PROGRESS NOTES
[facility]    Medicine Progress Note - Hospitalist Service        Date of Admission:  3/13/2024 10:12 AM    Assessment & Plan:   Macey Romero is a 73 year old female with PMH of TIA, nicotine dependence, breast cancer, chronic kidney disease stage III, hyperlipidemia, asthma, prediabetes, osteoarthritis, fibromyalgia, admitted 3/13/2024 with a  spell of confusion with associated with headache, and a one week history of right arm weakness and worsening tremor     Indeterminate spell of confusion, right arm weakness most likely due to seizure  *1 week history of right arm weakness /tremor prior to admission, with 1 day history of confusion at [employer] study lasting approximately 1 hour; additional right-sided acute on chronic headache, right parietal-occipital region  *Code stroke in the emergency room, imaging negative for acute ischemic or hemorrhagic event though CTA with moderate focal narrowing at the P1-P2 junction of the right posterior cerebral artery with relatively decreased filling of the right PCA compared to the left PCA,  *MRI brain negative for acute intracranial pathology.  changes consistent with chronic small vessel ischemic disease.  *stroke neurology consulted in ER, did not recommend any additional stroke workup  *Metabolic workup unrevealing with normal TSH, pyuria only on UA  -Evaluated by general neurology, EEG concerning for epileptiform disorder  -Added on Keppra, patient slightly better, continue Keppra for now.  -Patient neurology follow-up in 4 weeks  -Transthoracic echocardiogram (TTE) normal  -Continue PTA aspirin 325 mg daily  -No driving for 3 months at discharge    Dizziness  -Patient complaining of dizziness since overnight  -Unclear if this is due to side effect from Keppra or vestibular problem  -Evaluated by physical therapy they feel that patient is more unsteady today with symptoms of dizziness they also found that her vestibular system appears weak and affecting  her balance  -Check orthostatics  -Monitor overnight in the hospital  -Outpatient PT for vestibular symptoms when able to return home     Right upper extremity tremor  -Will need outpatient general neurology follow-up.  -Can continue to hold Zoloft for now.     Chronic kidney disease (CKD), stage IIIa  -Admission serum Cr 0.90, baseline 0.69 to 1.05 in 2023     History of asthma  History of emphysema  -In light of abnormal lung sounds need for infectious workup will obtain chest x-ray although SpO2 and WBCs are within normal limit  -Albuterol as needed as prior to admission  -Continue PTA Singulair  -Continue PTA Symbicort     Hyperlipidemia  -Continue PTA statin      History of prediabetes, hyperglycemic  -Hemoglobin A1c is 6.0  -Follow-up closely with PCP     Anxiety, depression  -HOLD prior to admission sertraline (Zoloft) as outlined above, started ~1.5 weeks prior to admission; rule out medication side effect although she has multiple other reasons to have tremors  -Follow-up with primary clinic provider primary clinic provider or mental health provider     Nicotine dependence  Recent smoker, quit over one week prior to admission per patient report  On/off smoking over many years, <1 pack per day, not routine by report  -Smoking cessation dicussed and encouraged by admitting MD.  This is of utmost importance given her recent TIAs  -nicotine patch may be considered upon request  -Follow-up with primary clinic provider     History of breast cancer - noted, follow-up with primary clinic provider and oncology  Fibromyalgia - noted; pain control with bedtime Neurontin; therapy services consulted  History of chronic pain, knees - s/p bilateral TKA's; stable  Osteoarthritis - noted  History of cervical spinal fusion - noted       Diet: Combination Diet Moderate Consistent Carb (60 g CHO per Meal) Diet  Diet     DVT Prophylaxis: Pneumatic Compression Devices   Boudreaux Catheter: Not present  Code Status: Full Code    "  Disposition Plan       Expected Discharge Date: 03/16/2024      Destination: home        Entered: Sam Gunderson MD 03/15/2024, 1:27 PM        Clinically Significant Risk Factors Present on Admission                # Drug Induced Platelet Defect: home medication list includes an antiplatelet medication   # Hypertension: Noted on problem list          # Financial/Environmental Concerns: none  # Asthma: noted on problem list           The patient's care was discussed with the Bedside Nurse and Patient.    Medical Decision Making       **CLEAR ALL SELECTIONS**      Labs/Imaging Reviewed:  See Information above and Data section below  Time SPENT BY ME on the date of service doing chart review, history, exam, documentation & further activities per the note:  35 MINUTES    Chart documentation was completed, in part, with [employer] voice-recognition software. Even though reviewed, some grammatical, spelling, and word errors may remain.    Sam Gunderson MD  Hospitalist Service  [facility]  Text Page 7AM-6PM  Securely message with the Vocera Web Console (learn more here)  Text page via [employer] Paging/Directory    ______________________________________________________________________    Interval History   No further confusion or disorientation but patient feels more dizzy today and slightly impaired with her balance.  Evaluated by PT who feel that she has a vestibular system and recommended overnight observation.  Denies headache.    Data reviewed today: I reviewed all medications, new labs and imaging results over the last 24 hours. I personally reviewed no images or EKG's today.    Physical Exam   Vital signs:  Temp: 98.9  F (37.2  C) Temp src: Oral BP: 133/62 Pulse: 68   Resp: 16 SpO2: 96 % O2 Device: None (Room air)     Weight: 72.1 kg (158 lb 15.2 oz)  Estimated body mass index is 27.72 kg/m  as calculated from the following:    Height as of 2/5/24: 1.613 m (5' 3.5\").    Weight as of this " encounter: 72.1 kg (158 lb 15.2 oz).      Wt Readings from Last 2 Encounters:   03/13/24 72.1 kg (158 lb 15.2 oz)   02/28/24 72.5 kg (159 lb 12.8 oz)       Gen: AAOX3, NAD, comfortable  Resp: CTA B/L, normal WOB  CVS: RRR, no murmur  Abd/GI: Soft, non-tender. BS- normoactive.    Skin: Warm, dry no rashes  MSK: no pedal edema  Neuro- CN- intact. No focal deficits.      Data   Recent Labs   Lab 03/14/24  0800 03/13/24  1019   WBC 5.5 6.5   HGB 12.5 13.7   MCV 85 86    195   INR  --  1.02    140   POTASSIUM 4.2 4.0   CHLORIDE 107 102   CO2 25 26   BUN 11.8 13.1   CR 0.83 0.90   ANIONGAP 8 12   DONNA 8.7* 9.0   * 123*       Recent Results (from the past 24 hour(s))   XR Chest 2 Views    Narrative    XR CHEST 2 VIEWS  3/14/2024 2:19 PM       INDICATION: eval for pneumonia abnormal breath sounds  COMPARISON: 11/9/2023       Impression    IMPRESSION: Negative chest.    UNIQUE FRANCO MD         SYSTEM ID:  [id number]     Medications      acetaminophen  1,000 mg Oral BID    aspirin  325 mg Oral Daily    gabapentin  100 mg Oral Every Other Day    levETIRAcetam  500 mg Oral BID    metroNIDAZOLE   Topical BID    montelukast  10 mg Oral At Bedtime    pravastatin  10 mg Oral QPM    sennosides  2 tablet Oral BID

## 2024-03-15 NOTE — PROGRESS NOTES
Neurology follow-up: 03/15/24    Patient admitted with confusion, found to have concerning EEG for epileptiform disorder.  Started on Keppra yesterday.      Interval history and investigations   Patient doing well.  Mildly confused, which could be because of Keppra side effects.  No more events yesterday.  More or less tolerating Keppra 500 mg twice daily well.        Examination   Alert, oriented, interactive.  Very lucid, but has slight difficulty in recalling medical details of events recently.  Nonfocal motor examination.  Reflexes symmetrical.  No nuchal rigidity.      ASSESSMENT     Confusion related to underlying seizure disorder  No evidence of secondary intracranial process such as cerebrovascular or malignant process       RECOMMENDATIONS   Continue Keppra 500 mg twice daily.    Follow-up: Inpatient neurology will sign off.  Will recommend establishing outpatient neurology coverage.    I have discussed the above details with Ms. Romero at great length and they expressed understanding.  They seemed satisfied with this conversation, and had no further queries as of now.  she has our contact information and has been advised to stay in touch with us regarding any other issues that may arise.     Thank you for allowing me to participate in Ms. Romero's care.       Sebastian Fisher MD   Neurologist, [facility]   March 15, 2024 8:20 AM      A total time of 40 minutes was spent on the care of this patient on the date of the visit, outside of time spent performing any procedures. Please excuse any typographical errors, as this note was generated using a Voice Recognition Software.

## 2024-03-16 ENCOUNTER — APPOINTMENT (OUTPATIENT)
Dept: PHYSICAL THERAPY | Facility: CLINIC | Age: 74
DRG: 101 | End: 2024-03-16
Attending: INTERNAL MEDICINE
Payer: MEDICARE

## 2024-03-16 VITALS
SYSTOLIC BLOOD PRESSURE: 145 MMHG | HEART RATE: 62 BPM | BODY MASS INDEX: 27.72 KG/M2 | RESPIRATION RATE: 16 BRPM | TEMPERATURE: 98.4 F | OXYGEN SATURATION: 96 % | WEIGHT: 158.95 LBS | DIASTOLIC BLOOD PRESSURE: 66 MMHG

## 2024-03-16 LAB — GLUCOSE BLDC GLUCOMTR-MCNC: 100 MG/DL (ref 70–99)

## 2024-03-16 PROCEDURE — 99239 HOSP IP/OBS DSCHRG MGMT >30: CPT | Performed by: INTERNAL MEDICINE

## 2024-03-16 PROCEDURE — 250N000013 HC RX MED GY IP 250 OP 250 PS 637: Performed by: HOSPITALIST

## 2024-03-16 PROCEDURE — 97530 THERAPEUTIC ACTIVITIES: CPT | Mod: GP | Performed by: PHYSICAL THERAPIST

## 2024-03-16 PROCEDURE — 250N000013 HC RX MED GY IP 250 OP 250 PS 637: Performed by: INTERNAL MEDICINE

## 2024-03-16 PROCEDURE — 97116 GAIT TRAINING THERAPY: CPT | Mod: GP | Performed by: PHYSICAL THERAPIST

## 2024-03-16 RX ADMIN — ACETAMINOPHEN 1000 MG: 500 TABLET, FILM COATED ORAL at 09:12

## 2024-03-16 RX ADMIN — ASPIRIN 325 MG: 325 TABLET, COATED ORAL at 09:13

## 2024-03-16 RX ADMIN — LEVETIRACETAM 500 MG: 500 TABLET, FILM COATED ORAL at 09:13

## 2024-03-16 ASSESSMENT — ACTIVITIES OF DAILY LIVING (ADL)
ADLS_ACUITY_SCORE: 26
ADLS_ACUITY_SCORE: 26
ADLS_ACUITY_SCORE: 25
ADLS_ACUITY_SCORE: 26

## 2024-03-16 NOTE — PLAN OF CARE
Goal Outcome Evaluation:    [id number]    Reason for Admission: confusion and R arm weakness    Hx: TIA, breast cancer, fibromyalgia    Cognitive/Mentation: A/Ox 4  Neuros/CMS: Intact ex tingling and tremors.  VS: stable on RA.   Tele: SR  GI: Continent.  : Continent.  Pulmonary: LS clear.  Pain: denies     Drains/Lines: none  Skin: intact ex bruising  Activity: Assist x stand by with walker  Diet: mod carb     Discharge: home, pending workup    Aggression Stoplight Tool: green    End of shift summary: Reporting intermittent dizziness. No other issue overnight. Slept between cares.

## 2024-03-16 NOTE — PLAN OF CARE
"Physical Therapy Discharge Summary    Reason for therapy discharge:    Discharged to home with outpatient therapy.    Progress towards therapy goal(s). See goals on Care Plan in [facility] electronic health record for goal details.  Goals met    Therapy recommendation(s):    Continued therapy is recommended.  Rationale/Recommendations:  Pt reports she has had ongoing instability at times over the years since 2004. States the \"dizziness\" is not a new thing for her. With further investigation, pt reports she is not acutally \"dizzy,\" but more \"full in the head\" and at times \"light headed\" or \"floaty.\" No active sway noted, pt without LOB during gait or stairs. Needs cues for safety as pt is impulsive and moves quick. Reivewed safety with mobility, taking time, ensuring breathing and use of walker at home. Recommend OP PT for ongoing strength and balance benefits as pt history of TIAs and vision deficits can certainly contribute to instability. Can not rule out a hypo function of the vestibular system, but pt's symptoms do not align with a positional vertigo. Pt in agreement with plan for use of walker at home, reports she feels safe for discharge home. Did states she hopes to continue some sort of med like \"zoloft\" because she felt she was beginning to be more interested in activities again, such as quilting and shopping with her friend a couple times per week..      "

## 2024-03-16 NOTE — PROGRESS NOTES
Reason for Admission: confusion and R arm weakness. EEG remarkable for possible seizures     Hx: TIA, breast cancer, fibromyalgia     Cognitive/Mentation: A/Ox4; forgetful  Neuros/CMS: Intact ex R arm weakness, no tremors observed  VS: VSS on Rm Air  GI: Continent.  : Continent.  Pulmonary: LS clear.  Pain: Denied pain      Drains/Lines: R PIV SL  Skin: intact  Activity: SBA in Rm, patient refuses bed alarm. Education provided. Pt utilizing call-light.  Diet: consistent carb/diabetic diet with thin liquids. BG 99. Takes pills whole.

## 2024-03-16 NOTE — PLAN OF CARE
Goal Outcome Evaluation:      Plan of Care Reviewed With: patient      Patient alert, oriented times 4. Neuro's intact. VSS. Tele NSR. On a regular diet, good appetite. Takes pills whole with water. Denies pain. Ambulates to bathroom, SBA. Plan to go home later this morning.

## 2024-03-18 NOTE — PROGRESS NOTES
Hospital Follow-up Visit:    Hospital/Nursing Home/IP Rehab Facility: [facility]  Date of Admission: 3/13/24  Date of Discharge: 3/16/24  Reason(s) for Admission: acute confusion     Was your hospitalization related to COVID-19? No   Problems taking medications regularly:  None  Medication changes since discharge:         START taking these medications     Details   levETIRAcetam (KEPPRA) 500 MG tablet Take 1 tablet (500 mg) by mouth 2 times daily  Qty: 60 tablet, Refills: 1     Comments: Future refills by PCP Dr. Long Ferrari with phone number [phone].  Associated Diagnoses: Seizure disorder (H)         Problems adhering to non-medication therapy:  None    Summary of hospitalization:  [facility] discharge summary reviewed  Diagnostic Tests/Treatments reviewed.  Follow up needed: none  Other Healthcare Providers Involved in Patient s Care:         None  Update since discharge: improved.         Plan of care communicated with patient     Hold the amitriptaline for now.    ROS:  General and Resp. completed and negative except as noted above    Histories: reviewed    OBJECTIVE:                                                    /82   Pulse 75   Wt 69.4 kg (153 lb)   SpO2 99%   BMI 26.68 kg/m    Body mass index is 26.68 kg/m .   APPEARANCE: = Relaxed and in no distress  Resp effort = Calm regular breathing  Breath Sounds = Good air movement with no rales or rhonchi in any lung fields  Heart Rate, Rhythm, & sounds (no Murm)  = Regular rate and rhythm with no S3, S4, or murmur appreciated.     ASSESSMENT/PLAN:                                                    Quality gaps reviewed    Macey was seen today for hospital f/u.    Diagnoses and all orders for this visit:    New onset seizure (H)    Chronic pain of both knees    Pain syndrome, chronic    Keppra for a month and see Neuro to follow up on the EEG in the hospital.  Gabapentin and zoloft working very  well  Wants to stop the amitriptaline that I thought we had already stopped.    Work on weight loss    Health maintenance items are reviewed in Epic and correct as of today:    Long Ferrari MD  [facility]  [phone]    For any issues my office # is [phone]

## 2024-03-19 ENCOUNTER — OFFICE VISIT (OUTPATIENT)
Dept: FAMILY MEDICINE | Facility: CLINIC | Age: 74
End: 2024-03-19

## 2024-03-19 VITALS
HEART RATE: 75 BPM | DIASTOLIC BLOOD PRESSURE: 82 MMHG | BODY MASS INDEX: 26.68 KG/M2 | OXYGEN SATURATION: 99 % | SYSTOLIC BLOOD PRESSURE: 139 MMHG | WEIGHT: 153 LBS

## 2024-03-19 DIAGNOSIS — M25.561 CHRONIC PAIN OF BOTH KNEES: ICD-10-CM

## 2024-03-19 DIAGNOSIS — R56.9 NEW ONSET SEIZURE (H): Primary | ICD-10-CM

## 2024-03-19 DIAGNOSIS — G89.29 CHRONIC PAIN OF BOTH KNEES: ICD-10-CM

## 2024-03-19 DIAGNOSIS — M25.562 CHRONIC PAIN OF BOTH KNEES: ICD-10-CM

## 2024-03-19 DIAGNOSIS — G89.4 PAIN SYNDROME, CHRONIC: ICD-10-CM

## 2024-03-19 PROBLEM — N18.31 STAGE 3A CHRONIC KIDNEY DISEASE (H): Status: RESOLVED | Noted: 2022-06-22 | Resolved: 2024-03-19

## 2024-03-19 PROCEDURE — 99496 TRANSJ CARE MGMT HIGH F2F 7D: CPT | Performed by: FAMILY MEDICINE

## 2024-03-20 ENCOUNTER — PATIENT OUTREACH (OUTPATIENT)
Dept: CARE COORDINATION | Facility: CLINIC | Age: 74
End: 2024-03-20
Payer: MEDICARE

## 2024-03-20 ASSESSMENT — ACTIVITIES OF DAILY LIVING (ADL): DEPENDENT_IADLS:: INDEPENDENT

## 2024-03-20 NOTE — PROGRESS NOTES
Clinic Care Coordination Contact  [facility]/Voicemail    Clinical Data: Care Coordinator Outreach: Pt was hospitalized at [facility] from 3/13-3/16 with confusion/weakness/dizziness thought to be caused by seizure.     Outreach Documentation Number of Outreach Attempt   3/20/2024   2:23 PM 1       Left message on patient's voicemail with call back information and requested return call.    Plan: Care Coordinator will try to reach patient again in 3-5 business days.    Anna Morales [facility]  Social Work Care Coordinator  Phone: [phone]

## 2024-03-20 NOTE — LETTER
[facility] [address] [place] [address]         March 27, 2024    Macey Romero  [address]  [facility] [address] [phone]          Dear Macey,    I am a clinic care coordinator who works with Long Ferrari MD at [facility]. I recently tried to call and was unable to reach you. Below is a description of clinic care coordination and how I can further assist you.     The goal of clinic care coordination is to help you manage your health and improve access to the health care system. Our we work alongside your provider to assist you in determining your health and social needs. We can help you obtain health care and community resources, providing you with necessary information and education. We can work with you through barriers and develop a care plan that helps coordinate and strengthen the communication between you and your care team.  Our services are voluntary and are offered without charge to you personally.    Please feel free to contact me at [phone], with any questions or concerns. We at [facility] are focused on providing you with the highest-quality healthcare experience possible and that all starts with you.     Sincerely,     MADISON ThomasSW

## 2024-03-25 ENCOUNTER — THERAPY VISIT (OUTPATIENT)
Dept: PHYSICAL THERAPY | Facility: CLINIC | Age: 74
End: 2024-03-25
Attending: INTERNAL MEDICINE
Payer: MEDICARE

## 2024-03-25 DIAGNOSIS — G40.909 SEIZURE DISORDER (H): ICD-10-CM

## 2024-03-25 DIAGNOSIS — H83.2X9 VESTIBULAR DISEQUILIBRIUM, UNSPECIFIED LATERALITY: ICD-10-CM

## 2024-03-25 PROCEDURE — 97110 THERAPEUTIC EXERCISES: CPT | Mod: GP | Performed by: PHYSICAL THERAPIST

## 2024-03-25 PROCEDURE — 97162 PT EVAL MOD COMPLEX 30 MIN: CPT | Mod: GP | Performed by: PHYSICAL THERAPIST

## 2024-03-25 NOTE — PROGRESS NOTES
03/25/24 1300   Dizziness Handicap Inventory (DHI)   P1. Does looking up increase your problem? 4   E2. Because of your problem, do you feel frustrated? 2   F3. Because of your problem, do you restrict your travel for business or recreation? 0   P4. Does walking down the aisle of a supermarket increase your problems? 0   F5. Because of your problem, do you have difficulty getting into or out of bed? 0   F6. Does your problem significantly restrict your participation in social activities, such as going out to dinner, going to the movies, dancing, or going to parties? 0   F7. Because of your problem, do you have difficulty reading? 4   P8. Does performing more ambitious activities such as sports, dancing, household chores (sweeping or putting dishes away) increase your problems? 2   E9. Because of your problem, are you afraid to leave your home without having someone accompany you? 0   E10. Because of your problem, have you been embarrassed in front of others? 4   P11. Do quick movements of your head increase your problem? 4   F12. Because of your problem, do you avoid heights? 0   P13. Does turning over in bed increase your problem? 0   F14. Because of your problem, is it difficult for you to do strenuous housework or yard work? 4   E15. Because of your problem, are you afraid people may think you are intoxicated? 0   F16. Because of your problem, is it difficult for you to go for a walk by yourself? 2   P17. Does walking down a sidewalk increase your problem? 2   E18. Because of your problem, is it difficult for you to concentrate? 4   F19. Because of your problem, is it difficult for you to walk around your house in the dark? 0   E20. Because of your problem, are you afraid to stay home alone? 0   E21. Because of your problem, do you feel handicapped? 2   E22. Has your problem placed stress on your relationships with members of your family or friends? 0   E23. Because of your problem, are you depressed? 0   F24.  Does your problem interfere with your job or household responsibilities? 2   P25. Does bending over increase your problem? 4   Total Score 40

## 2024-03-25 NOTE — PROGRESS NOTES
03/25/24 1400   Signing Clinician's Name / Credentials   Signing clinician's name / credentials Nicki Hardy MPT   Functional Gait Assessment (GUILLE Whitmore., Raeann GCherelle F., et al. (2004))   1. GAIT LEVEL SURFACE 1   2. CHANGE IN GAIT SPEED 2   3. GAIT WITH HORIZONTAL HEAD TURNS 2   4. GAIT WITH VERTICAL HEAD TURNS 2   5. GAIT AND PIVOT TURN 2   6. STEP OVER OBSTACLE 3   7. GAIT WITH NARROW BASE OF SUPPORT 0   8. GAIT WITH EYES CLOSED 1   9. AMBULATING BACKWARDS 2   10. STEPS 2   Total Functional Gait Assessment Score   TOTAL SCORE: (MAXIMUM SCORE 30) 17

## 2024-03-25 NOTE — PROGRESS NOTES
"PHYSICAL THERAPY EVALUATION  Type of Visit: Evaluation    See electronic medical record for Abuse and Falls Screening details.    Subjective  Reports very drowsy with all the pills she is taking now. Just removed Amitryptylene but still feels very drowsy. .  Hospitalized 3/13/24-3/16/24 due to indeterminate spell of confusion, 1 wk hx of R arm  weakness most likely due to the confusion.  Dx with seizure disorder and now is on Keppra and cannot drive for 3 months. R hand tremor (was previously in the L hand and was told it was an ulnar nerve issue and was scheduled for surgery when she had a TIA)  Previous 2 episodes her episodes started with splitting headache and were considered TIA's.  Did have other TIA episode in 2004 where she had a facial droop and aphasia. Lindsay acute/chronic R side headache.   In the AM feels like whole \"inner core is vibrating\" and then gets better as day goes on.   Dizziness.  Laying down helps her symptoms. Describes blurry, off balance, attacks of dizziness, blurred vision, lightheadedness.    Many near miss falls, bumping into things when she catches herself.       Presenting condition or subjective complaint:    Date of onset: 03/13/24    Relevant medical history:   Fibromyalgia, Raynaud's disease, kidney disease, cataracts and retinal issues. .   Dates & types of surgery:  B TKA,  cervical fusion 5-7 with plate insertion    Prior diagnostic imaging/testing results:       Prior therapy history for the same diagnosis, illness or injury:    only an inpatient evaluation.     Prior Level of Function  Transfers: Independent  Ambulation: Independent  ADL: Independent  IADL:     Living Environment  Social support:   Lives with  who works 16-17 hours per day, 7 days per week.   Type of home:   House  Stairs to enter the home:       2 story  Ramp:     Stairs inside the home:         Help at home:    Equipment owned:       Employment:    retired RN  Hobbies/Interests:  Quilting.   Patient " "goals for therapy:   reports balance is not good.     Pain assessment: Pain present \"Always have a dull headache\"  Rates it 5-6/10. R hip hip pain that painful with walking     Objective   Cognitive Status Examination  Orientation: Oriented to person, place and time   Level of Consciousness: Alert    OBSERVATION: Very slow to get up from waiting room and walk into gym. Appears a bit hazy. Guarding neck with positional testing and c/o pain with testing.     RANGE OF MOTION: decreased CROM  STRENGTH: not formally tested but appears generally weak with functional STS test, which took her 40 sec to complete.     BED MOBILITY: Independent    TRANSFERS: Independent    GAIT:   Gait Description: Ambulates slowly with wider CHELSEA. . Appears off balance reaching for furniture.      BALANCE:     SPECIAL TESTS  Functional Gait Assessment (FGA) TOTAL SCORE: (MAXIMUM SCORE 30): 17  (<22/30 indicates fall risk)     Dynamic Gait Index (DGI)    (<19/24 indicates fall risk)   Modified CTSIB Conditions (sec) Cond 1: 30  Cond 2: 30  Cond 4: 7  Cond 5 : 1   10 M walk  6MWT 11.41 sec  Not tested today due to lack of time.      SENSATION: feels like sock is wadded up across balls of feet when it is not.   COORDINATION: noted R resting hand tremor. Mild incoordination with finger to nose testing.     VESTIBULAR  Cervicogenic Screen    Neck ROM Impaired ROM.  Does not have extension.  Decreased cervical rotation R > L.       Oculomotor Screen    Ocular ROM Normal   Smooth Pursuit Normal   Saccades Normal   VOR Normal   VOR Cancellation normal   Head Impulse Test Abnormal + R head thrust test   Convergence Testing Not tested.         Infrared Goggle Exam Vestibular Suppressant in Last 24 Hours? No  Exam Completed With: Infrared goggles   Spontaneous Nystagmus Negative   Gaze Evoked Nystagmus R beating with R gaze, L beating with L gaze   Head Shake Horizontal Nystagmus Negative   Supine Head-Hanging Test     Left Right   Manuel-Hallpike Negative " Negative   [facility] Supine Roll Test Negative Negative   [facility] Forward Roll Test            Dynamic Visual Acuity (DVA)    Static Acuity (LogMar)    Horizontal Head Movement at 1 Hz (LogMar)    Horizontal Head Movement at 2 Hz (LogMar)    Unable to tolerate due to cervical fusion.       DHI: 40     Assessment & Plan   CLINICAL IMPRESSIONS  Medical Diagnosis: Seizure disorder    Treatment Diagnosis: Lightheadedness, Balance problems.   Impression/Assessment: Patient is a 73 year old female with dizziness, weakness and balance complaints.  The following significant findings have been identified: Pain, Decreased ROM/flexibility, Decreased strength, Impaired balance, Impaired sensation, Impaired gait, Impaired muscle performance, Decreased activity tolerance, Dizziness, Disequilibrium , and Impaired vision. These impairments interfere with their ability to perform self care tasks, recreational activities, household chores, driving , household mobility, and community mobility as compared to previous level of function.     Clinical Decision Making (Complexity):  Clinical Presentation: Evolving/Changing  Clinical Presentation Rationale: based on medical and personal factors listed in PT evaluation  Clinical Decision Making (Complexity): Moderate complexity    PLAN OF CARE  Treatment Interventions:  Interventions: Gait Training, Manual Therapy, Neuromuscular Re-education, Therapeutic Activity, Therapeutic Exercise, Standardized Testing    Long Term Goals     PT Goal 1  Goal Identifier: 5x STS  Goal Description: Client will demonstrate improved functional LE strength by completing 5x STS in 20 seconds or less.  Goal Progress: baseline: 40 sec  Target Date: 06/22/24  PT Goal 2  Goal Identifier: FGA  Goal Description: Client will demonstrate improved dynamic standing balance scoring a 24/30 or better on the FGA  Goal Progress: baseline: 17/30  Target Date: 06/22/24  PT Goal 3  Goal Identifier: 6MWT  Goal Description: Client will  score 1300 ft on the 6MWT to be at age/gender norms demonstrating good activity tolerance for her age.  Goal Progress: baseline: not tested on eval  Target Date: 06/22/24  PT Goal 4  Goal Identifier: DHI  Goal Description: Client will score a 14 or less on the DHI to indicate improved dizziness symptoms.  Goal Progress: baseline: 40  Target Date: 06/22/24      Frequency of Treatment: 1-2x/week  Duration of Treatment: 90 days    Recommended Referrals to Other Professionals:   Education Assessment:   Learner/Method: Patient;Listening;Demonstration  Education Comments: HEP    Risks and benefits of evaluation/treatment have been explained.   Patient/Family/caregiver agrees with Plan of Care.     Evaluation Time:     PT Sasha, Moderate Complexity Minutes (45794): 40       Signing Clinician: Nicki Ernandez, PT      [facility]                                                                                   OUTPATIENT PHYSICAL THERAPY      PLAN OF TREATMENT FOR OUTPATIENT REHABILITATION   Patient's Last Name, First Name, Macey Pablo YOB: 1950   Provider's Name   [facility]   Medical Record No.  [id number]     Onset Date: 03/13/24  Start of Care Date: 03/25/24     Medical Diagnosis:  Seizure disorder      PT Treatment Diagnosis:  Lightheadedness, Balance problems. Plan of Treatment  Frequency/Duration: 1-2x/week/ 90 days    Certification date from 03/25/24 to 06/22/24         See note for plan of treatment details and functional goals     Nicki Ernandez, PT                         I CERTIFY THE NEED FOR THESE SERVICES FURNISHED UNDER        THIS PLAN OF TREATMENT AND WHILE UNDER MY CARE     (Physician attestation of this document indicates review and certification of the therapy plan).              Referring Provider:  Sam Gunderson - Cert being sent to PCP Long Ferrari MD    Initial Assessment  See Epic Evaluation-

## 2024-03-27 ASSESSMENT — ACTIVITIES OF DAILY LIVING (ADL): DEPENDENT_IADLS:: INDEPENDENT

## 2024-03-27 NOTE — PROGRESS NOTES
Clinic Care Coordination Contact  Clinic Care Coordination Contact  OUTREACH    Referral Information:  Referral Source: IP Report: Pt was hospitalized at [facility] from 3/13-3/16 with acute confusion, right arm weakness most likely due to seizure.     Primary Diagnosis: Neurological Disorders    Chief Complaint   Patient presents with    Clinic Care Coordination - Post Hospital     SW Outreach        Chilo Utilization:   Clinic Utilization  Difficulty keeping appointments:: No  Compliance Concerns: No  No-Show Concerns: No  No PCP office visit in Past Year: No  Utilization      No Show Count (past year)  0             ED Visits  4             Hospital Admissions  5                    Current as of: 3/26/2024 10:57 AM                Clinical Concerns:  Current Medical Concerns:  Recent acute confusion with right arm weakness thought to be caused by seizure.  Hx TIA  Fibromyalgia  Dizziness/Tremor  Hyperlipidemia  CKD 3  Hx cervical spine fusion      Current Behavioral Concerns: None    Education Provided to patient: SW role, recommended f/up and review of discharge instructions        Medication Management:  Medication review status: Pt reports that she is taking her medications as prescribed. She was recently started on Keppra. Pt believes that she is 'over medicated', she is experiencing fatigue and headaches especially in the afternoon and is wondering and concerned about medication side effects. Writer explained and suggested MTM appointment, pt is interested but would like to wait until after her upcoming Neurology and Rheumatology appointments. Writer explained how to schedule this appointment when she is ready.    Functional Status:  Dependent ADLs:: Independent  Dependent IADLs:: Independent    Living Situation:  Current living arrangement:: I live in a private home with spouse  Type of residence:: Private home - stairs    Lifestyle & Psychosocial Needs:  Pt is a 73 yr old  female who lives in [place]  with her spouse Kevin. Pt is a retired OB nurse. She reports that her  works 12 hr days, he owns the [employer] in [place], it is a family business that has been in his family since the 1950's. Kevin is very dedicated to his work. Pt is currently not able to drive due to recent seizure, she does have help with transportation from her family, she has adult children in the area. Pt has [employer] (commercial plan through spouse's employer) and Medicare. No financial concerns reported.    Social Determinants of Health     Food Insecurity: Low Risk  (2024)    Food Insecurity     Within the past 12 months, did you worry that your food would run out before you got money to buy more?: No     Within the past 12 months, did the food you bought just not last and you didn t have money to get more?: No   Depression: Not at risk (2024)    PHQ-2     PHQ-2 Score: 2   Housing Stability: Low Risk  (2024)    Housing Stability     Do you have housing? : Yes     Are you worried about losing your housing?: No   Tobacco Use: Medium Risk (3/7/2024)    Patient History     Smoking Tobacco Use: Former     Smokeless Tobacco Use: Never     Passive Exposure: Not on file   Financial Resource Strain: Low Risk  (2024)    Financial Resource Strain     Within the past 12 months, have you or your family members you live with been unable to get utilities (heat, electricity) when it was really needed?: No   Alcohol Use: Not on file   Transportation Needs: Low Risk  (2024)    Transportation Needs     Within the past 12 months, has lack of transportation kept you from medical appointments, getting your medicines, non-medical meetings or appointments, work, or from getting things that you need?: No   Physical Activity: Not on file   Interpersonal Safety: Not on file   Stress: Not on file   Social Connections: Not on file     Inadequate nutrition (GOAL):: No  Tube Feeding: No  Transportation means::  Regular car     Informal Support system:: Children, Spouse        Resources and Interventions:  Current Resources:      Community Resources: None        Employment Status: retired         Advance Care Plan/Directive  Advanced Care Plans/Directives on file:: No    Spoke with pt over the phone to discuss how she has been doing since discharging home from the hospital. Pt voiced some concerns regarding feeling faitgued, dizzy, balance is off, is worried that her BP is too high and that she is over medicated. She was told that she has a seizure but she is questioning whether this is really what happened. She has been going to outpatient PT. She has a follow-up neurology appointment tomorrow but stated that she doesn't know what this is for. We discussed what questions she will ask at Neurology appointment as it seems that she has many and this is a good opportunity to hopefully get those questions answered. Also explained that the Neurology provider will be managing the Keppra. Pt expressed frustration and confusion about the plan and what she is supposed to do, we carefully went through her questions and hospital discharge instructions together. Encouraged her to schedule follow-up PCP and MTM visit if she continues to have questions/concerns.      Patient/Caregiver understanding: Yes       Future Appointments                In 1 week Nicki Hardy PT M [facility] [place]    In 2 weeks Nicki Hardy PT [facility] [address]    In 2 weeks Nicki Hardy PT M [facility] [address]    In 3 weeks Nicki Hardy PT M [facility] [address]    In 3 weeks Nicki Hardy PT M [facility] [address]    In 1 month Nicki Hardy PT M [facility] [place]    In 1 month Hayley  Nicki MARCUM PT M [facility] [address]            Plan: [facility] CC will plan to outreach to patient again in 1 month.    Anna Morales [facility]  Social Work Care Coordinator  Phone: [phone]

## 2024-03-27 NOTE — PROGRESS NOTES
Clinic Care Coordination Contact  [facility]/Voicemail    Clinical Data: Care Coordinator Outreach    Outreach Documentation Number of Outreach Attempt   3/20/2024   2:23 PM 1   3/27/2024   2:59 PM 2       Left message on patient's voicemail with call back information and requested return call.    Plan: Care Coordinator will send unable to contact letter with care coordinator contact information via Dipity. Care Coordinator will do no further outreaches at this time.    Anna Morales [facility]  Social Work Care Coordinator  Phone: [phone]

## 2024-03-29 ENCOUNTER — HOSPITAL ENCOUNTER (EMERGENCY)
Facility: CLINIC | Age: 74
Discharge: HOME | End: 2024-03-29
Attending: STUDENT IN AN ORGANIZED HEALTH CARE EDUCATION/TRAINING PROGRAM | Admitting: STUDENT IN AN ORGANIZED HEALTH CARE EDUCATION/TRAINING PROGRAM
Payer: MEDICARE

## 2024-03-29 ENCOUNTER — APPOINTMENT (OUTPATIENT)
Dept: CT IMAGING | Facility: CLINIC | Age: 74
End: 2024-03-29
Attending: STUDENT IN AN ORGANIZED HEALTH CARE EDUCATION/TRAINING PROGRAM
Payer: MEDICARE

## 2024-03-29 ENCOUNTER — APPOINTMENT (OUTPATIENT)
Dept: GENERAL RADIOLOGY | Facility: CLINIC | Age: 74
End: 2024-03-29
Attending: STUDENT IN AN ORGANIZED HEALTH CARE EDUCATION/TRAINING PROGRAM
Payer: MEDICARE

## 2024-03-29 VITALS
DIASTOLIC BLOOD PRESSURE: 64 MMHG | RESPIRATION RATE: 19 BRPM | HEART RATE: 76 BPM | OXYGEN SATURATION: 98 % | SYSTOLIC BLOOD PRESSURE: 151 MMHG | TEMPERATURE: 98 F

## 2024-03-29 DIAGNOSIS — M25.552 HIP PAIN, LEFT: ICD-10-CM

## 2024-03-29 DIAGNOSIS — M25.561 ACUTE PAIN OF BOTH KNEES: ICD-10-CM

## 2024-03-29 DIAGNOSIS — M25.512 ACUTE PAIN OF LEFT SHOULDER: ICD-10-CM
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
## 2024-03-29 DIAGNOSIS — M25.562 ACUTE PAIN OF BOTH KNEES: ICD-10-CM

## 2024-03-29 PROCEDURE — 70450 CT HEAD/BRAIN W/O DYE: CPT | Mod: MA

## 2024-03-29 PROCEDURE — 73030 X-RAY EXAM OF SHOULDER: CPT | Mod: LT

## 2024-03-29 PROCEDURE — 73560 X-RAY EXAM OF KNEE 1 OR 2: CPT | Mod: 50

## 2024-03-29 PROCEDURE — 72125 CT NECK SPINE W/O DYE: CPT | Mod: MA

## 2024-03-29 PROCEDURE — 73502 X-RAY EXAM HIP UNI 2-3 VIEWS: CPT

## 2024-03-29 PROCEDURE — 250N000013 HC RX MED GY IP 250 OP 250 PS 637: Performed by: STUDENT IN AN ORGANIZED HEALTH CARE EDUCATION/TRAINING PROGRAM

## 2024-03-29 PROCEDURE — 99285 EMERGENCY DEPT VISIT HI MDM: CPT | Mod: 25

## 2024-03-29 RX ORDER — OXYCODONE HYDROCHLORIDE 5 MG/1
5 TABLET ORAL ONCE
Status: COMPLETED | OUTPATIENT
Start: 2024-03-29 | End: 2024-03-29

## 2024-03-29 RX ADMIN — OXYCODONE HYDROCHLORIDE 5 MG: 5 TABLET ORAL at 10:33

## 2024-03-29 ASSESSMENT — ACTIVITIES OF DAILY LIVING (ADL)
ADLS_ACUITY_SCORE: 39

## 2024-03-29 NOTE — ED TRIAGE NOTES
Pt presents to ed via ems to be evaluated for a fall.   Ems report as follows: pt lives at home, and was walking when she tripped over the edge of her rug.   Pt fell forward and caught herself, landing on the palms of her hands. Pt reporting L shoulder, and left hip pain.   Pt also landed on her knees, and complains of bilateral knee pain. Pt states she has had a R knee replacement in the past.   En route pt was given 50 mcg of fentanyl IM.   BS was 143.      Triage Assessment (Adult)       Row Name 03/29/24 1037          Triage Assessment    Airway WDL WDL        Respiratory WDL    Respiratory WDL WDL        Peripheral/Neurovascular WDL    Peripheral Neurovascular WDL WDL

## 2024-03-29 NOTE — ED NOTES
Bed: ED06  Expected date:   Expected time:   Means of arrival:   Comments:  Hems [address] 73 F poss hip fx shoulder dislocation eta 1000

## 2024-03-29 NOTE — ED PROVIDER NOTES
"History   Chief Complaint:  Fall       HPI:  Macey Romero is a very pleasant 73 year old female with seizure disorder, hypertension, and history of TIA anticoagulated on Asprin presenting with her daughter via EMS for evaluation after a mechanical fall. Around 9:00am today, patient left the kitchen to go upstairs and when she rounded the corner, she tripped over a rug and fell. Daughter believes that the patient's slipper became cough on the edge of the rug. Macey landed on her knees and palms and when daughter arrived, she was lying on her left side. Patient endorses left shoulder, hip, and knee pain and notes history of bilateral knee replacements and cervical spine diskectomy. She did not hit her head or lose consciousness. Patient attempted to get up on her own but was unable to. She denies any lightheadedness, dizziness, chest pain, or shortness of breath prior to the episode. Patient reports dizziness at baseline but this did not cause her to fall today - she did not have a \"dizzy spell\". She denies recent falling episodes and says that she is usually able to catch herself. Lesley reports that the patient lives at home alone and does not need a walker at baseline. Macey recently started taking Keppra.    Independent Historian:  Independent history was obtained from the patient's daughter. They provided information detailed above in HPI.     Review of External Notes:  None.    I personally reviewed the patient's chart, including available medication list and available past medical history, past surgical history, family history, and social history.    Physical Exam   Patient Vitals for the past 24 hrs:   BP Temp Temp src Pulse Resp SpO2   03/29/24 1012 (!) 151/64 98  F (36.7  C) Temporal -- 19 --   03/29/24 1005 -- -- -- 76 -- --   03/29/24 1004 -- -- -- -- -- 98 %      Physical Exam  Vitals and nursing note reviewed.   Constitutional:       Appearance: Normal appearance. She is not ill-appearing.   HENT:     "  Head: Atraumatic.   Eyes:      Extraocular Movements: Extraocular movements intact.      Pupils: Pupils are equal, round, and reactive to light.   Cardiovascular:      Rate and Rhythm: Normal rate and regular rhythm.   Pulmonary:      Effort: Pulmonary effort is normal.   Abdominal:      Palpations: Abdomen is soft.      Tenderness: There is no abdominal tenderness.   Musculoskeletal:         General: Tenderness present. No swelling, deformity or signs of injury.      Cervical back: No rigidity or tenderness.      Comments: Tenderness to palpation around left shoulder, particularly glenohumeral joint and acromioclavicular joint.  Range of motion limited secondary to pain.    Tenderness to palpation over the left greater trochanter and limitation in range of motion of hip secondary to pain.   Skin:     General: Skin is warm and dry.   Neurological:      Mental Status: She is alert and oriented to person, place, and time.      Sensory: No sensory deficit.      Motor: No weakness.          Emergency Department Course     Imaging:   XR Shoulder Left G/E 3 Views   Final Result   IMPRESSION:       There is diffuse osseous demineralization. Normal left shoulder   alignment. No dislocation. No displaced fracture. There is mild   glenohumeral joint space narrowing, likely degenerative. A tiny focus   of mineralization along the posterior aspect of the humeral head on   the internal rotation view could relate to calcific tendinitis or   remote injury.      SOULEYMANE BURGER MD            SYSTEM ID:  [id number]      XR Pelvis w Hip Left 1 View   Final Result   IMPRESSION:       No acute left hip fracture or dislocation. No definite joint space   narrowing. There are mild degenerative changes in the lower lumbar   spine and at the sacroiliac joints. No displaced pelvic fracture.      SOULEYMANE BURGER MD            SYSTEM ID:  [id number]      XR Knee Bilateral 1/2 Views   Final Result   IMPRESSION:       Status post bilateral  total knee arthroplasties, in standard   alignment. No acute fracture is identified. No sizable joint effusion   is identified radiographically.      SOULEYMANE BURGER MD            SYSTEM ID:  [id number]      CT Cervical Spine w/o Contrast   Final Result   IMPRESSION:   1. No acute fracture or posttraumatic subluxation.   2. Postsurgical changes of instrumented anterior spinal fusion C5-C7.   Complete bony fusion at C6-7 and partial fusion at C5-6.       ROWAN BELLAMY MD            SYSTEM ID:  [id number]      CT Head w/o Contrast   Final Result   IMPRESSION: No acute intracranial pathology.       ROWAN BELLAMY MD            SYSTEM ID:  [id number]                  Interventions & Assessments:  Interventions:  Medications   oxyCODONE (ROXICODONE) tablet 5 mg (5 mg Oral $Given 3/29/24 1033)      Assessments:  Consultations/Discussion of Management or Tests:  Independent Interpretation (X-rays, CT Head, rhythm strip):  ED Course as of 03/29/24 1141   Fri Mar 29, 2024   1003 I obtained history and examined the patient as noted above.    1122 CT Head w/o Contrast  I independently interpreted the patient's non-contrast head CT; reassuring against acute intracranial hemorrhage.     1140 I independently interpreted the patient's left shoulder, left hip/pelvis, and left knee x-rays; all reassuring against fracture or dislocation   1142 I rechecked and updated the patient. Patient will be discharged.      Social Determinants of Health affecting care:   None.      Disposition:  The patient was discharged to home.     Impression & Plan      MIPS (If applicable):  N/A    Medical Decision Making:  Patient presenting with fall.  Vital signs are reassuring.  Fall is mechanical in nature.  No evidence of syncope or near syncope.  Physical exam is notable for pain in the left shoulder, bilateral knees and left hip.  Given patient's age, did obtain head CT and CT C-spine.  Evaluated for intracranial hemorrhage, skull fracture,  cervical spinal fracture or subluxation, fracture or dislocation of left humerus, left femur, complication of bilateral knee hardware.  Workup was reassuring.  Patient was able to ambulate before being discharged.  Patient has assistance at home.  She feels comfortable being discharged with her daughter.  Counseled the patient regarding fall precautions at home.  Findings were discussed.  Additional verbal instructions were provided.  I discussed specific warning signs and instructed the patient to return to the emergency department if there are any concerns. Understanding of instructions was voiced, questions were answered and the patient was discharged.        Diagnosis:    ICD-10-CM    1. Fall from slip, trip, or stumble, initial encounter  W01.0XXA       2. Acute pain of left shoulder  M25.512       3. Hip pain, left  M25.552       4. Acute pain of both knees  M25.561     M25.562            Discharge Medications:  Discharge Medication List as of 3/29/2024 11:54 AM        Colette Glover   3/29/2024     Rodo Paris MD  03/29/24 0059

## 2024-04-09 ENCOUNTER — THERAPY VISIT (OUTPATIENT)
Dept: PHYSICAL THERAPY | Facility: CLINIC | Age: 74
End: 2024-04-09
Attending: INTERNAL MEDICINE
Payer: MEDICARE

## 2024-04-09 DIAGNOSIS — G40.909 SEIZURE DISORDER (H): Primary | ICD-10-CM

## 2024-04-09 DIAGNOSIS — R26.89 BALANCE PROBLEMS: ICD-10-CM

## 2024-04-09 PROCEDURE — 97112 NEUROMUSCULAR REEDUCATION: CPT | Mod: GP | Performed by: PHYSICAL THERAPIST

## 2024-04-09 PROCEDURE — 97110 THERAPEUTIC EXERCISES: CPT | Mod: GP | Performed by: PHYSICAL THERAPIST

## 2024-04-11 ENCOUNTER — TRANSFERRED RECORDS (OUTPATIENT)
Dept: FAMILY MEDICINE | Facility: CLINIC | Age: 74
End: 2024-04-11

## 2024-04-11 ENCOUNTER — THERAPY VISIT (OUTPATIENT)
Dept: PHYSICAL THERAPY | Facility: CLINIC | Age: 74
End: 2024-04-11
Payer: MEDICARE

## 2024-04-11 DIAGNOSIS — G40.909 SEIZURE DISORDER (H): ICD-10-CM

## 2024-04-11 DIAGNOSIS — R26.89 BALANCE PROBLEMS: Primary | ICD-10-CM

## 2024-04-11 PROCEDURE — 97112 NEUROMUSCULAR REEDUCATION: CPT | Mod: GP | Performed by: PHYSICAL THERAPIST

## 2024-04-11 PROCEDURE — 97110 THERAPEUTIC EXERCISES: CPT | Mod: GP | Performed by: PHYSICAL THERAPIST

## 2024-04-16 ENCOUNTER — TRANSFERRED RECORDS (OUTPATIENT)
Dept: FAMILY MEDICINE | Facility: CLINIC | Age: 74
End: 2024-04-16

## 2024-04-16 ENCOUNTER — THERAPY VISIT (OUTPATIENT)
Dept: PHYSICAL THERAPY | Facility: CLINIC | Age: 74
End: 2024-04-16
Payer: MEDICARE

## 2024-04-16 DIAGNOSIS — G40.909 SEIZURE DISORDER (H): ICD-10-CM

## 2024-04-16 DIAGNOSIS — R26.89 BALANCE PROBLEMS: Primary | ICD-10-CM

## 2024-04-16 PROCEDURE — 97110 THERAPEUTIC EXERCISES: CPT | Mod: GP | Performed by: PHYSICAL THERAPIST

## 2024-04-16 PROCEDURE — 97112 NEUROMUSCULAR REEDUCATION: CPT | Mod: GP | Performed by: PHYSICAL THERAPIST

## 2024-04-19 ENCOUNTER — THERAPY VISIT (OUTPATIENT)
Dept: PHYSICAL THERAPY | Facility: CLINIC | Age: 74
End: 2024-04-19
Payer: MEDICARE

## 2024-04-19 ENCOUNTER — TELEPHONE (OUTPATIENT)
Dept: NEUROLOGY | Facility: CLINIC | Age: 74
End: 2024-04-19

## 2024-04-19 DIAGNOSIS — G40.909 SEIZURE DISORDER (H): ICD-10-CM

## 2024-04-19 DIAGNOSIS — R26.89 BALANCE PROBLEMS: Primary | ICD-10-CM

## 2024-04-19 PROCEDURE — 97112 NEUROMUSCULAR REEDUCATION: CPT | Mod: GP | Performed by: PHYSICAL THERAPIST

## 2024-04-19 PROCEDURE — 97110 THERAPEUTIC EXERCISES: CPT | Mod: GP | Performed by: PHYSICAL THERAPIST

## 2024-04-19 NOTE — TELEPHONE ENCOUNTER
[facility] Call Center    Phone Message    May a detailed message be left on voicemail: no     Reason for Call: Other:   Pt returned call to state that she is currently being seen at [facility] for Stroke follow up care.     Writer advised patient to call back if she's interested in scheduling.     Action Taken: Other: Neurology    Travel Screening: Not Applicable

## 2024-04-19 NOTE — TELEPHONE ENCOUNTER
"Called pt to schedule stroke return visit, left message to call back. Patient was seen by the stroke team about 2 months ago, at that time it was recommended patient follow up with the stroke team to check in, review plan and optimize overall stroke risk management and we would like to offer an appointment.     When patient calls back: please schedule Stroke Return with Nani Greenwood CNP, any visit type as per patient preference, appointment notes \"3 month follow up for stroke\".    "

## 2024-04-23 ENCOUNTER — THERAPY VISIT (OUTPATIENT)
Dept: PHYSICAL THERAPY | Facility: CLINIC | Age: 74
End: 2024-04-23
Payer: MEDICARE

## 2024-04-23 DIAGNOSIS — R26.89 BALANCE PROBLEMS: Primary | ICD-10-CM

## 2024-04-23 DIAGNOSIS — G40.909 SEIZURE DISORDER (H): ICD-10-CM

## 2024-04-23 PROCEDURE — 97110 THERAPEUTIC EXERCISES: CPT | Mod: GP | Performed by: PHYSICAL THERAPIST

## 2024-04-26 ENCOUNTER — THERAPY VISIT (OUTPATIENT)
Dept: PHYSICAL THERAPY | Facility: CLINIC | Age: 74
End: 2024-04-26
Payer: MEDICARE

## 2024-04-26 DIAGNOSIS — G40.909 SEIZURE DISORDER (H): ICD-10-CM

## 2024-04-26 DIAGNOSIS — R26.89 BALANCE PROBLEMS: Primary | ICD-10-CM

## 2024-04-26 PROCEDURE — 97112 NEUROMUSCULAR REEDUCATION: CPT | Mod: GP | Performed by: PHYSICAL THERAPIST

## 2024-04-26 PROCEDURE — 97110 THERAPEUTIC EXERCISES: CPT | Mod: GP | Performed by: PHYSICAL THERAPIST

## 2024-04-27 NOTE — TELEPHONE ENCOUNTER
Per Dr. Long Ferrari-- would like her to consult with Dr. Lugo.  Patient informed.  She will call and schedule appointment.   ,

## 2024-04-29 ENCOUNTER — TRANSFERRED RECORDS (OUTPATIENT)
Dept: FAMILY MEDICINE | Facility: CLINIC | Age: 74
End: 2024-04-29

## 2024-05-01 ENCOUNTER — THERAPY VISIT (OUTPATIENT)
Dept: PHYSICAL THERAPY | Facility: CLINIC | Age: 74
End: 2024-05-01
Payer: MEDICARE

## 2024-05-01 DIAGNOSIS — H83.2X9 VESTIBULAR DISEQUILIBRIUM, UNSPECIFIED LATERALITY: ICD-10-CM

## 2024-05-01 DIAGNOSIS — R26.89 BALANCE PROBLEMS: Primary | ICD-10-CM

## 2024-05-01 DIAGNOSIS — G40.909 SEIZURE DISORDER (H): ICD-10-CM

## 2024-05-01 PROCEDURE — 97112 NEUROMUSCULAR REEDUCATION: CPT | Mod: GP

## 2024-05-01 PROCEDURE — 97110 THERAPEUTIC EXERCISES: CPT | Mod: GP

## 2024-05-10 ENCOUNTER — THERAPY VISIT (OUTPATIENT)
Dept: PHYSICAL THERAPY | Facility: CLINIC | Age: 74
End: 2024-05-10
Payer: MEDICARE

## 2024-05-10 DIAGNOSIS — R26.89 BALANCE PROBLEMS: Primary | ICD-10-CM

## 2024-05-10 DIAGNOSIS — G40.909 SEIZURE DISORDER (H): ICD-10-CM

## 2024-05-10 PROCEDURE — 97112 NEUROMUSCULAR REEDUCATION: CPT | Mod: GP | Performed by: PHYSICAL THERAPIST

## 2024-05-10 PROCEDURE — 97110 THERAPEUTIC EXERCISES: CPT | Mod: GP | Performed by: PHYSICAL THERAPIST

## 2024-05-10 PROCEDURE — 97750 PHYSICAL PERFORMANCE TEST: CPT | Mod: GP | Performed by: PHYSICAL THERAPIST

## 2024-05-10 NOTE — PROGRESS NOTES
05/10/24 1000   Signing Clinician's Name / Credentials   Signing clinician's name / credentials Nicki Hardy MPT   Functional Gait Assessment (GUILLE Whitmore., Raeann GCherelle F., et al. (2004))   1. GAIT LEVEL SURFACE 3   2. CHANGE IN GAIT SPEED 3   3. GAIT WITH HORIZONTAL HEAD TURNS 2   4. GAIT WITH VERTICAL HEAD TURNS 2   5. GAIT AND PIVOT TURN 2   6. STEP OVER OBSTACLE 2   7. GAIT WITH NARROW BASE OF SUPPORT 1   8. GAIT WITH EYES CLOSED 0   9. AMBULATING BACKWARDS 2   10. STEPS 2   Total Functional Gait Assessment Score   TOTAL SCORE: (MAXIMUM SCORE 30) 19

## 2024-05-17 ENCOUNTER — THERAPY VISIT (OUTPATIENT)
Dept: PHYSICAL THERAPY | Facility: CLINIC | Age: 74
End: 2024-05-17
Payer: MEDICARE

## 2024-05-17 DIAGNOSIS — R26.89 BALANCE PROBLEMS: Primary | ICD-10-CM

## 2024-05-17 DIAGNOSIS — G40.909 SEIZURE DISORDER (H): ICD-10-CM

## 2024-05-17 PROCEDURE — 97112 NEUROMUSCULAR REEDUCATION: CPT | Mod: GP | Performed by: PHYSICAL THERAPIST

## 2024-05-20 ENCOUNTER — TRANSFERRED RECORDS (OUTPATIENT)
Dept: FAMILY MEDICINE | Facility: CLINIC | Age: 74
End: 2024-05-20

## 2024-05-21 ENCOUNTER — TELEPHONE (OUTPATIENT)
Dept: FAMILY MEDICINE | Facility: CLINIC | Age: 74
End: 2024-05-21

## 2024-05-21 DIAGNOSIS — F32.9 REACTIVE DEPRESSION: ICD-10-CM

## 2024-05-21 NOTE — TELEPHONE ENCOUNTER
Patient called clinic requesting directions regarding weaning off sertraline, due to possible side effects of muscle aches, tremors and fluttering in chest. Patient was seen at neurology 5/20 and recommended to discuss taper with PCP. Per Dr Ferrari patient to decrease dose from 50mg to 25mg and will discuss further taper at upcoming 5/28/24 office visit. Discussed recommendations per Dr Ferrari with patient who agrees. Adilene Vale

## 2024-05-22 ASSESSMENT — ASTHMA QUESTIONNAIRES
QUESTION_3 LAST FOUR WEEKS HOW OFTEN DID YOUR ASTHMA SYMPTOMS (WHEEZING, COUGHING, SHORTNESS OF BREATH, CHEST TIGHTNESS OR PAIN) WAKE YOU UP AT NIGHT OR EARLIER THAN USUAL IN THE MORNING: NOT AT ALL
ACT_TOTALSCORE: 25
QUESTION_4 LAST FOUR WEEKS HOW OFTEN HAVE YOU USED YOUR RESCUE INHALER OR NEBULIZER MEDICATION (SUCH AS ALBUTEROL): NOT AT ALL
QUESTION_2 LAST FOUR WEEKS HOW OFTEN HAVE YOU HAD SHORTNESS OF BREATH: NOT AT ALL
QUESTION_1 LAST FOUR WEEKS HOW MUCH OF THE TIME DID YOUR ASTHMA KEEP YOU FROM GETTING AS MUCH DONE AT WORK, SCHOOL OR AT HOME: NONE OF THE TIME
ACT_TOTALSCORE: 25
QUESTION_5 LAST FOUR WEEKS HOW WOULD YOU RATE YOUR ASTHMA CONTROL: COMPLETELY CONTROLLED

## 2024-05-24 ENCOUNTER — THERAPY VISIT (OUTPATIENT)
Dept: PHYSICAL THERAPY | Facility: CLINIC | Age: 74
End: 2024-05-24
Payer: MEDICARE

## 2024-05-24 DIAGNOSIS — R26.89 BALANCE PROBLEMS: Primary | ICD-10-CM

## 2024-05-24 DIAGNOSIS — G40.909 SEIZURE DISORDER (H): ICD-10-CM

## 2024-05-24 PROCEDURE — 97110 THERAPEUTIC EXERCISES: CPT | Mod: GP | Performed by: PHYSICAL THERAPIST

## 2024-05-24 PROCEDURE — 97112 NEUROMUSCULAR REEDUCATION: CPT | Mod: GP | Performed by: PHYSICAL THERAPIST

## 2024-05-24 NOTE — PROGRESS NOTES
05/17/24 0500   Appointment Info   Signing clinician's name / credentials Nicki Hardy MPT   Visits Used 10   Medical Diagnosis Seizure disorder   PT Tx Diagnosis Lightheadedness, Balance problems.   Progress Note/Certification   Start of Care Date 03/25/24   Onset of illness/injury or Date of Surgery 03/13/24   Therapy Frequency 1-2x/week   Predicted Duration 90 days   Certification date from 03/25/24   Certification date to 06/22/24   PT Goal 1   Goal Identifier 5x STS   Goal Description Client will demonstrate improved functional LE strength by completing 5x STS in 20 seconds or less.   Goal Progress baseline: 40 sec. 4/26/24: 14.31 sec.   Target Date 06/22/24   PT Goal 2   Goal Identifier FGA   Goal Description Client will demonstrate improved dynamic standing balance scoring a 24/30 or better on the FGA   Goal Progress baseline: 17/30 5/10/24: 19/30   Target Date 06/22/24   PT Goal 3   Goal Identifier 6MWT   Goal Description Client will score 1300 ft on the 6MWT to be at age/gender norms demonstrating good activity tolerance for her age.   Goal Progress baseline: not tested on eval. 4/9/24: 1050 ft 5/10/24 1230 ft.   Target Date 06/22/24   PT Goal 4   Goal Identifier DHI   Goal Description Client will score a 14 or less on the DHI to indicate improved dizziness symptoms.   Goal Progress baseline: 40 5/10/24 48   Target Date 06/22/24   Subjective Report   Subjective Report I feel like my whole body is vibrating.  I stopped on of the meds but am on a new medication. I am so frustrated.  Has follow up appt with MD on Monday.   Objective Measure 1   Objective Measure 6MWT   Details 1230 ft. c/o knee pain and throat issues. Kept stopping to talk today during test.   Objective Measure 2   Objective Measure FGA   Details 19/30   Objective Measure 3   Objective Measure DHI   Details 48.   Therapeutic Procedure/Exercise   Therapeutic Procedures: strength, endurance, ROM, flexibility minutes (58353) 2   Ther Proc 1  5x STS   Ther Proc 1 - Details 15.31 sec today. Slightly slower but didn't sit quickly after doing the last one.   Neuromuscular Re-education   Neuromuscular re-ed of mvmt, balance, coord, kinesthetic sense, posture, proprioception minutes (88517) 38   Neuro Re-ed 1 Balance   Neuro Re-ed 1 - Details Added wt. shifting WBOS side to side and front and back, staggered stand wt. shifting with either foot in front.  Tried with EO and EC in all positions.  Gait with head turns,150 ft x 2. Veers and imbalance  Tandem stand able to hold for about 10 seconds at best.  Added these 3 exs to HEP.  Also did gait with EC and backwards walking. x 5 reps. Improved with repetition  Progressed HEP:  marching now to do without support as more of balance exercise. Add head turns as this becomes easy. Romberg with head turns progressed to semi tandem stand with head turns. (this was more challenging for her). Reprinted out all exs and went through them again to claify instructions.   Neuro Re-ed 3 VOR   Neuro Re-ed 3 - Details Educated to do this in standing with chair infront incase she needed to grab it but to try and not use it.  Did 1 min each direction. Cues to not turn head to full range so she doesn't bother neck issues.   Skilled Intervention education to make sure she can do EO before going to EC.  , cues   Patient Response/Progress imbalance and dizziness   Physical Performance Test/measures   Skilled Intervention FGA 19/30   Education   Learner/Method Patient;Listening;Demonstration   Education Comments HEP   Plan   Home program walking [id number] min, STS, balance exs and VOR x1 in standing   Updates to plan of care due to medication still needing to be adjusted switched   Plan for next session Add to HEP. Progress note next visit.   Total Session Time   Timed Code Treatment Minutes 40   Total Treatment Time (sum of timed and untimed services) 40         PLAN  Continue therapy 1x/week    Beginning/End Dates of Progress Note  Reporting Period:   3/25/24  to 05/17/2024    Referring Provider:  Sam Gunderson

## 2024-05-28 ENCOUNTER — OFFICE VISIT (OUTPATIENT)
Dept: FAMILY MEDICINE | Facility: CLINIC | Age: 74
End: 2024-05-28

## 2024-05-28 VITALS — HEART RATE: 74 BPM | SYSTOLIC BLOOD PRESSURE: 127 MMHG | OXYGEN SATURATION: 95 % | DIASTOLIC BLOOD PRESSURE: 46 MMHG

## 2024-05-28 DIAGNOSIS — R56.9 NEW ONSET SEIZURE (H): Primary | ICD-10-CM

## 2024-05-28 DIAGNOSIS — J45.40 MODERATE PERSISTENT ASTHMA WITHOUT COMPLICATION: ICD-10-CM

## 2024-05-28 DIAGNOSIS — F32.9 REACTIVE DEPRESSION: ICD-10-CM

## 2024-05-28 PROCEDURE — 99214 OFFICE O/P EST MOD 30 MIN: CPT | Performed by: FAMILY MEDICINE

## 2024-05-28 PROCEDURE — G2211 COMPLEX E/M VISIT ADD ON: HCPCS | Performed by: FAMILY MEDICINE

## 2024-05-28 RX ORDER — DIVALPROEX SODIUM 250 MG/1
TABLET, DELAYED RELEASE ORAL EVERY 12 HOURS
Status: ON HOLD | COMMUNITY
Start: 2024-04-16 | End: 2024-08-10

## 2024-05-28 RX ORDER — LACOSAMIDE 50 MG/1
50 TABLET ORAL 2 TIMES DAILY
COMMUNITY
End: 2024-06-18

## 2024-05-28 NOTE — PROGRESS NOTES
Problem(s) Oriented visit        SUBJECTIVE:                                                    Macey Romero is a 73 year old female who presents to clinic today for the following health issues :  RECHECK (Neurology and medcheck- weaning off of zoloft /Fasting /)    Long discussion about the fast weaning off of the zoloft   Feels icky.  A little improved today.  On seizure med and follow up with Neuro in 5 weeks.    There are no diagnoses linked to this encounter.       Problem list, Medication list, Allergies, and Medical/Social/Surgical histories reviewed in [facility] and updated as appropriate.   Additional history: as documented    ROS:  General and Resp. completed and negative except as noted above    Histories: reviewed    OBJECTIVE:                                                    /46   Pulse 74   SpO2 95%   There is no height or weight on file to calculate BMI.   APPEARANCE: = alert and mild distress  Breath Sounds = Good air movement with no rales or rhonchi in any lung fields  Heart Rate, Rhythm, & sounds (no Murm)  = Regular rate and rhythm with no S3, S4, or murmur appreciated.     ASSESSMENT/PLAN:                                                    Quality gaps reviewed    Macey was seen today for recheck.    Diagnoses and all orders for this visit:    New onset seizure (H)    Reactive depression      Recheck here in 3 weeks and right after Neuro visit  Now on 25   >30 min spent with patient, greater than 50% spent on discussion/education/planning, etc. About The primary encounter diagnosis was New onset seizure (H). Diagnoses of Reactive depression and Moderate persistent asthma without complication were also pertinent to this visit.    Regular exercise    Health maintenance items are reviewed in Epic and correct as of today:    Long Ferrari MD  [facility]  Family Practice  [facility]  [phone]    For any issues my office # is [phone]        Answers  submitted by the patient for this visit:  General Questionnaire (Submitted on 5/22/2024)  Chief Complaint: Chronic problems general questions HPI Form  What is the reason for your visit today? : Followup. Review meds  How many servings of fruits and vegetables do you eat daily?: 2-3  On average, how many sweetened beverages do you drink each day (Examples: soda, juice, sweet tea, etc.  Do NOT count diet or artificially sweetened beverages)?: 0  How many minutes a day do you exercise enough to make your heart beat faster?: 9 or less  How many days a week do you exercise enough to make your heart beat faster?: 3 or less  How many days per week do you miss taking your medication?: 0

## 2024-06-04 ENCOUNTER — TRANSFERRED RECORDS (OUTPATIENT)
Dept: FAMILY MEDICINE | Facility: CLINIC | Age: 74
End: 2024-06-04

## 2024-06-12 ENCOUNTER — MEDICAL CORRESPONDENCE (OUTPATIENT)
Dept: FAMILY MEDICINE | Facility: CLINIC | Age: 74
End: 2024-06-12

## 2024-06-18 ENCOUNTER — OFFICE VISIT (OUTPATIENT)
Dept: FAMILY MEDICINE | Facility: CLINIC | Age: 74
End: 2024-06-18

## 2024-06-18 VITALS
DIASTOLIC BLOOD PRESSURE: 44 MMHG | SYSTOLIC BLOOD PRESSURE: 120 MMHG | WEIGHT: 154.6 LBS | HEART RATE: 69 BPM | BODY MASS INDEX: 26.96 KG/M2 | OXYGEN SATURATION: 95 %

## 2024-06-18 DIAGNOSIS — F33.42 RECURRENT MAJOR DEPRESSIVE DISORDER, IN FULL REMISSION (H): Primary | ICD-10-CM

## 2024-06-18 PROCEDURE — 99213 OFFICE O/P EST LOW 20 MIN: CPT | Performed by: FAMILY MEDICINE

## 2024-06-18 PROCEDURE — G2211 COMPLEX E/M VISIT ADD ON: HCPCS | Performed by: FAMILY MEDICINE

## 2024-06-18 RX ORDER — LACOSAMIDE 50 MG/1
TABLET ORAL 2 TIMES DAILY
Status: ON HOLD | COMMUNITY
End: 2024-08-10

## 2024-06-18 NOTE — PROGRESS NOTES
Doing better  Feels less darker  Awake  Cleaning house  Seems brighter  Depression seems much better.  Tolerating the 25 mg of     Sees Neuro in 2 weeks     See Dr. Gil soon.    The 10-year ASCVD risk score (Renan JIMENEZ, et al., 2019) is: 11.2%    Values used to calculate the score:      Age: 73 years      Sex: Female      Is Non- : No      Diabetic: No      Tobacco smoker: No      Systolic Blood Pressure: 120 mmHg      Is BP treated: No      HDL Cholesterol: 39 mg/dL      Total Cholesterol: 144 mg/dL    Currently off of statin.  Assessment/Plan:  Macey was seen today for recheck medication.    Diagnoses and all orders for this visit:    Recurrent major depressive disorder, in full remission (H24)    Doing well, recheck in 2 months'  Long Ferrari MD   [facility]  [phone]

## 2024-06-21 ENCOUNTER — THERAPY VISIT (OUTPATIENT)
Dept: PHYSICAL THERAPY | Facility: CLINIC | Age: 74
End: 2024-06-21
Payer: MEDICARE

## 2024-06-21 DIAGNOSIS — G40.909 SEIZURE DISORDER (H): ICD-10-CM

## 2024-06-21 DIAGNOSIS — H83.2X9 VESTIBULAR DISEQUILIBRIUM, UNSPECIFIED LATERALITY: ICD-10-CM

## 2024-06-21 DIAGNOSIS — R26.89 BALANCE PROBLEMS: Primary | ICD-10-CM

## 2024-06-21 PROCEDURE — 97535 SELF CARE MNGMENT TRAINING: CPT | Mod: GP

## 2024-06-21 PROCEDURE — 97750 PHYSICAL PERFORMANCE TEST: CPT | Mod: GP

## 2024-06-21 NOTE — PROGRESS NOTES
06/21/24 0500   Appointment Info   Signing clinician's name / credentials Samantha Yates, PT, DPT   Visits Used 12   Medical Diagnosis Seizure disorder   PT Tx Diagnosis Lightheadedness, Balance problems.   Progress Note/Certification   Start of Care Date 03/25/24   Onset of illness/injury or Date of Surgery 03/13/24   Therapy Frequency 1-2x/week   Predicted Duration 90 days   Certification date from 03/25/24   Certification date to 06/22/24   PT Goal 1   Goal Identifier 5x STS   Goal Description Client will demonstrate improved functional LE strength by completing 5x STS in 20 seconds or less.   Goal Progress baseline: 40 sec. 4/26/24: 14.31 sec.; MET 6/21/24: 13.53 sec   Target Date 06/22/24   Date Met 06/21/24   PT Goal 2   Goal Identifier FGA   Goal Description Client will demonstrate improved dynamic standing balance scoring a 24/30 or better on the FGA   Goal Progress baseline: 17/30 5/10/24: 19/30; MET 6/21/24: 26/30   Target Date 06/22/24   Date Met 06/21/24   PT Goal 3   Goal Identifier 6MWT   Goal Description Client will score 1300 ft on the 6MWT to be at age/gender norms demonstrating good activity tolerance for her age.   Goal Progress baseline: not tested on eval. 4/9/24: 1050 ft 5/10/24 1230 ft.; MET 6/21/24: 1426 ft   Target Date 06/22/24   Date Met 06/21/24   PT Goal 4   Goal Identifier DHI   Goal Description Client will score a 14 or less on the DHI to indicate improved dizziness symptoms.   Goal Progress baseline: 40 5/10/24 48; MET 6/21/24: 14/100   Target Date 06/22/24   Date Met 06/21/24   Subjective Report   Subjective Report Has a lot of complaints about knee and hip pain, mostly knee pain. Pt is tangential with her subjective reaclling in tandem past experience of knee rehab after TKA and surgeon hurting her in follow up as well as recent fall on knees and reecnt follow up for that. Dizziness is better. Pt wondering about being able to drive. She thinks that the PT here took away her ability  to drive.   Objective Measures   Objective Measures Objective Measure 4   Objective Measure 1   Objective Measure 6MWT   Details 1426 ft   Objective Measure 2   Objective Measure FGA   Details 26/30   Objective Measure 3   Objective Measure DHI   Details 14/100   Objective Measure 4   Objective Measure 5xSTS   Details 13.53 sec   Self Care/home Management   ADL/Home Mgmt Training (88012) 8   Self Care 1 Assitance with care coordination.   Self Care 1 - Details Extended assistance with care education of following up on knee with ortho PT and asking neurologist/GP about returning to driving.   Eval/Assessments   Assessments Physical Performance Test/Measures   Physical Performance Test/measures   Physical Performance Test/Measurement, Minutes (44649) 32   Physical Performance Test/Measurement Details Reassessment of DHI, 6mwt, FGA, and 5xSTS. Discussed POC and   Skilled Intervention Reassessment of all goals. POC discussion to discharge - pt in agreement.   Patient Response/Progress see objective measures   Education   Learner/Method Patient;Listening;Demonstration   Education Comments HEP   Plan   Home program walking [id number] min, STS, balance exs and VOR x1 in standing   Updates to plan of care Discharge   Total Session Time   Timed Code Treatment Minutes 40   Total Treatment Time (sum of timed and untimed services) 40         DISCHARGE  Reason for Discharge: Patient has met all goals.    Equipment Issued:     Discharge Plan: Patient to continue home program.    Referring Provider:  Sam Gunderson

## 2024-06-25 ENCOUNTER — APPOINTMENT (OUTPATIENT)
Dept: CT IMAGING | Facility: CLINIC | Age: 74
End: 2024-06-25
Attending: PHYSICIAN ASSISTANT
Payer: MEDICARE

## 2024-06-25 ENCOUNTER — OFFICE VISIT (OUTPATIENT)
Dept: FAMILY MEDICINE | Facility: CLINIC | Age: 74
End: 2024-06-25

## 2024-06-25 ENCOUNTER — TRANSFERRED RECORDS (OUTPATIENT)
Dept: FAMILY MEDICINE | Facility: CLINIC | Age: 74
End: 2024-06-25

## 2024-06-25 ENCOUNTER — HOSPITAL ENCOUNTER (EMERGENCY)
Facility: CLINIC | Age: 74
Discharge: HOME | End: 2024-06-25
Attending: PHYSICIAN ASSISTANT | Admitting: PHYSICIAN ASSISTANT
Payer: MEDICARE

## 2024-06-25 ENCOUNTER — APPOINTMENT (OUTPATIENT)
Dept: CT IMAGING | Facility: CLINIC | Age: 74
End: 2024-06-25
Attending: EMERGENCY MEDICINE
Payer: MEDICARE

## 2024-06-25 VITALS
OXYGEN SATURATION: 97 % | RESPIRATION RATE: 18 BRPM | DIASTOLIC BLOOD PRESSURE: 73 MMHG | HEART RATE: 84 BPM | BODY MASS INDEX: 26.5 KG/M2 | WEIGHT: 152 LBS | TEMPERATURE: 98.1 F | SYSTOLIC BLOOD PRESSURE: 140 MMHG

## 2024-06-25 VITALS — SYSTOLIC BLOOD PRESSURE: 146 MMHG | OXYGEN SATURATION: 97 % | HEART RATE: 82 BPM | DIASTOLIC BLOOD PRESSURE: 55 MMHG

## 2024-06-25 DIAGNOSIS — M54.2 NECK PAIN: ICD-10-CM

## 2024-06-25 DIAGNOSIS — S09.90XA CLOSED HEAD INJURY, INITIAL ENCOUNTER: ICD-10-CM
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
## 2024-06-25 PROCEDURE — G1010 CDSM STANSON: HCPCS

## 2024-06-25 PROCEDURE — 99284 EMERGENCY DEPT VISIT MOD MDM: CPT | Mod: 25

## 2024-06-25 PROCEDURE — 70450 CT HEAD/BRAIN W/O DYE: CPT | Mod: ME

## 2024-06-25 ASSESSMENT — ACTIVITIES OF DAILY LIVING (ADL)
ADLS_ACUITY_SCORE: 39

## 2024-06-25 ASSESSMENT — COLUMBIA-SUICIDE SEVERITY RATING SCALE - C-SSRS
1. IN THE PAST MONTH, HAVE YOU WISHED YOU WERE DEAD OR WISHED YOU COULD GO TO SLEEP AND NOT WAKE UP?: NO
2. HAVE YOU ACTUALLY HAD ANY THOUGHTS OF KILLING YOURSELF IN THE PAST MONTH?: NO
6. HAVE YOU EVER DONE ANYTHING, STARTED TO DO ANYTHING, OR PREPARED TO DO ANYTHING TO END YOUR LIFE?: NO

## 2024-06-25 NOTE — ED PROVIDER NOTES
Emergency Department Note      History of Present Illness     Chief Complaint   Fall    HPI   Macey Romero is a 73 year old female who presents to the ED for evaluation after fall. Patient states approximately 2 hours ago, she attempted to sit down on a chair and fell backwards on the floor. She struck the back of her head on the ground. . Patient reports she landed primarily on her left side and was positioned between her stone tile floor and the wall. Denies loss of consciousness. Patient currently reports nausea, headache, posterior neck pain and left eye twitching. She denies pain to her left eye but states it feels swollen. Patient was able to call daughter who helped her up from floor and was able to walk at home. Reports taking tylenol approximately 2 hours ago with minimal relief. Patient also notes a seizure approximately 4 months ago while she was hospitalized. She denies any seizures since then, has followed up with neurology and is taking Vimpat 2x daily. Reports intermittent numbness and paresthesias of hands as well as left-sided weakness that is chronic and unchanged. Patient denies dizziness or lightheadedness prior to the fall or any new hip pain. Patient takes an adult aspirin, denies any other blood thinners.     Independent Historian   Daughter as detailed above.    Review of External Notes   None    Past Medical History   Medical History and Problem List   Malignant neoplasm of female breast  Cerebral infarction  Fibromyalgia  Blood transfusion  Meningitis  Osteoarthritis  Osteopenia  Pterygium eye  Reactive airway disease  Seasonal allergies  Shingles  Skin cancer  Asthma    Medications   Fosamax  Asa  Depakote  Vimpat  Singulair  Zoloft    Surgical History   Anterior C-disc fusion  Left total knee arthroplasty  Bilateral blepharoplasty  Cholecystectomy  Colonoscopy x 2  D&C   Discectomy, fusion one level cervical  Endoscopy  EGD  Hysterectomy  Joint replacement right knee with tibial  straightening  Mammoplasty augmentation bilateral  Mastectomy bilateral simple  Mohs left lateral nose  Right ORIF ankle  Pterygium with conjunctival autologous transplant  Repair ligament ankle  Tonsillectomy   Sigmoidoscopy     Physical Exam   Patient Vitals for the past 24 hrs:   BP Temp Temp src Pulse Resp SpO2 Weight   06/25/24 1949 (!) 140/73 -- -- -- 18 97 % --   06/25/24 1650 (!) 162/56 98.1  F (36.7  C) Temporal 84 18 97 % 68.9 kg (152 lb)     Physical Exam  Constitutional: Alert, attentive, GCS 15  HENT:    Nose: Nose normal.    Mouth/Throat: Oropharynx is clear, mucous membranes are moist   Eyes: EOM are normal. Pupils equal and reactive.  Neck: normal range of motion. Midline cervical spinal tenderness.  CV: regular rate and rhythm; no murmurs, rubs or gallups  Chest: Effort normal and breath sounds normal.   GI:  There is no tenderness. No distension. Normal bowel sounds  MSK: Normal range of motion.   Neurological: Alert, attentive, Oriented x 3. No facial asymmetry. CN II-XII intact. 5/5 strength in upper and lower extremities. Normal range of motion in all four extremities. Distal sensation in hands and feet intact. Normal gait. Normal coordination.  Skin: Skin is warm and dry. No open wounds.    Diagnostics   Imaging   CT Cervical Spine w/o Contrast   Final Result   IMPRESSION:   1.  No fracture or posttraumatic subluxation.   2.  No high-grade spinal canal or neural foraminal stenosis.      Head CT w/o contrast   Final Result   IMPRESSION:   1.  No acute intracranial process.        Independent Interpretation   CT Head:   CT Cervical Spine:  ED Course    Medications Administered   Medications - No data to display    Procedures   Procedures     Discussion of Management   None    Social Determinants of Health adding to complexity of care   None    ED Course   ED Course as of 06/25/24 2144   Tue Jun 25, 2024   0032 I obtained history and examined the patient as noted above.       Medical Decision  Making / Diagnosis   CMS Diagnoses: None    MIPS       None    MDM   Macey Romero is a 73 year old female who presents with headache, nausea, neck pain after mechanical fall at home today.  She is afebrile, normotensive, nonhypoxic.  Physical examination reveals no neurological deficits though patient does have midline neck tenderness.  Head CT obtained shows no evidence of intracranial bleed or basilar skull fracture.  Suspect likely mild concussion given headache and nausea but no vomiting.  Cervical CT shows no traumatic subluxation or fracture.  Results reviewed and discussed with patient at bedside.  She remains neurologically intact without confusion, seizures, or vomiting.  She may be managed as an outpatient.  Cautioned her to avoid recurrent head injury and also discussed postconcussive syndrome.  Recommend she be seen by her primary care provider for recheck in 1 week and patient notes that she has neurology follow-up in July 1 which is appropriate.  Return precautions to the ED discussed including worsening headache, vomiting, confusion, seizures, or abnormal behavior and this is also discussed with patient and spouse at bedside. She may take Tylenol or ibuprofen for headache. Patient is comfortable with plan and was discharged home.    Disposition   The patient was discharged.     ICD-10 Codes:     ICD-10-CM    1. Fall, initial encounter  W19.XXXA       2. Closed head injury, initial encounter  S09.90XA       3. Neck pain  M54.2            Discharge Medications   Discharge Medication List as of 6/25/2024  8:00 PM            Scribe Disclosure:  I, Juli Cross, am serving as a scribe at 6:03 PM on 6/25/2024 to document services personally performed by Silvia Trevino PA-C based on my observations and the provider's statements to me.        Silvia Trevino PA-C  06/25/24 1902

## 2024-06-25 NOTE — PROGRESS NOTES
Patient presents to clinic with daughter having sustain head injury few hours prior. Complaining of nausea and neck pain. Redirected to present to ER for expedited work up. Daughter will take in private vehicle.

## 2024-06-25 NOTE — ED TRIAGE NOTES
Pt fell while trying to sit down on the chair today, hitting the back of her head. Pt takes a full ASA a day. Pt reports neck pain after fall s/p discectomy over year ago. Pt took 1000mg tylenol and was feeling nauseated right after fall, no emesis      Triage Assessment (Adult)       Row Name 06/25/24 7735          Triage Assessment    Airway WDL WDL        Respiratory WDL    Respiratory WDL WDL        Skin Circulation/Temperature WDL    Skin Circulation/Temperature WDL WDL        Cardiac WDL    Cardiac WDL WDL        Peripheral/Neurovascular WDL    Peripheral Neurovascular WDL WDL        Cognitive/Neuro/Behavioral WDL    Cognitive/Neuro/Behavioral WDL WDL

## 2024-06-26 ENCOUNTER — PATIENT OUTREACH (OUTPATIENT)
Dept: CARE COORDINATION | Facility: CLINIC | Age: 74
End: 2024-06-26
Payer: MEDICARE

## 2024-06-26 NOTE — DISCHARGE INSTRUCTIONS
Your imaging is reassuring today. Continue supportive cares at home including rest, ice to head, and Tylenol or ibuprofen for pain. Follow-up with primary care or neurology for recheck in 1 week. For any new or worsening symptoms such as worsening headache, confusion, seizures, vomiting, or abnormal behavior, present back to ED.     Discharge Instructions  Head Injury    You have been seen today for a head injury. Your evaluation included a history and physical examination. You may have had a CT (CAT) scan performed, though most head injuries do not require a scan. Based on this evaluation, your provider today does not feel that your head injury is serious.    Generally, every Emergency Department visit should have a follow-up clinic visit with either a primary or a specialty clinic/provider. Please follow-up as instructed by your emergency provider today.  Return to the Emergency Department if:  You are confused or you are not acting right.  Your headache gets worse or you start to have a really bad headache even with your recommended treatment plan.  You vomit (throw up) more than once.  You have a seizure.  You have trouble walking.  You have weakness or paralysis (cannot move) in an arm or a leg.  You have blood or fluid coming from your ears or nose.  You have new symptoms or anything that worries you.    Sleeping:  It is okay for you to sleep, but someone should wake you up if instructed by your provider, and someone should check on you at your usual time to wake up.     Activity:  Do not drive for at least 24 hours.  Do not drive if you have dizzy spells or trouble concentrating, or remembering things.  Do not return to any contact sports until cleared by your regular provider.     MORE INFORMATION:    Concussion:  A concussion is a minor head injury that may cause temporary problems with the way the brain works. Although concussions are important, they are generally not an emergency or a reason that a person  needs to be hospitalized. Some concussion symptoms include confusion, amnesia (forgetful), nausea (sick to your stomach) and vomiting (throwing up), dizziness, fatigue, memory or concentration problems, irritability and sleep problems. For most people, concussions are mild and temporary but some will have more severe and persistent symptoms that require on-going care and treatment.  CT Scans: Your evaluation today may have included a CT scan (CAT scan) to look for things like bleeding or a skull fracture (broken bone).  CT scans involve radiation and too many CT scans can cause serious health problems like cancer, especially in children.  Because of this, your provider may not have ordered a CT scan today if they think you are at low risk for a serious or life threatening problem.    If you were given a prescription for medicine here today, be sure to read all of the information (including the package insert) that comes with your prescription.  This will include important information about the medicine, its side effects, and any warnings that you need to know about.  The pharmacist who fills the prescription can provide more information and answer questions you may have about the medicine.  If you have questions or concerns that the pharmacist cannot address, please call or return to the Emergency Department.     Remember that you can always come back to the Emergency Department if you are not able to see your regular provider in the amount of time listed above, if you get any new symptoms, or if there is anything that worries you.

## 2024-06-26 NOTE — PROGRESS NOTES
Clinic Care Coordination Contact  Clinic Care Coordination Contact  OUTREACH    Referral Information: Pt was evaluated in the ED on  after falling at home and hitting her head.       Primary Diagnosis: Neurological Disorders    Chief Complaint   Patient presents with    Clinic Care Coordination - [facility]     SW Outreach        Gary Utilization:   Clinic Utilization  Difficulty keeping appointments:: No  Compliance Concerns: No  No-Show Concerns: No  No PCP office visit in Past Year: No  Utilization      No Show Count (past year)  0             ED Visits  6             Hospital Admissions  5                    Current as of: 2024  9:02 PM                Clinical Concerns:  Current Medical Concerns:  Hx seizure, balance impairment, recent fall.   Hx TIA  Fibromyalgia  Dizziness/Tremor  Hyperlipidemia  CKD 3  Hx cervical spine fusion        Current Behavioral Concerns: None    Education Provided to patient: SW role and recommended f/up.      Medication Management:  Medication review status:Pt reports that she has been taking her seizure medication. She reports that she believes she is out of refills for her Albuterol inhaler and has been needing it more recently due to allergies. Pt also has Symbicort inhaler that she uses for allergies.    Functional Status:   Pt is normally independent with ADL's. Since recent seizure she has not been allowed to drive. She is hoping that she can resume driving soon as it has been greater than 3 months since her seizure and plans to discuss this with her neurologist at upcoming appointment on .    Living Situation:       Lifestyle & Psychosocial Needs:  Pt is a 73 yr old  female. Her  (Kevin) works long hours as a , their family owns [employer] in [place] and this has been a family business for many years. Pt resides in [place] with her . Pt has adult children who are involved and  supportive.    Social Determinants of Health     Food Insecurity: Low Risk  (1/5/2024)    Food Insecurity     Within the past 12 months, did you worry that your food would run out before you got money to buy more?: No     Within the past 12 months, did the food you bought just not last and you didn t have money to get more?: No   Depression: Not at risk (2/12/2024)    PHQ-2     PHQ-2 Score: 2   Housing Stability: Low Risk  (1/5/2024)    Housing Stability     Do you have housing? : Yes     Are you worried about losing your housing?: No   Tobacco Use: Medium Risk (5/20/2024)    Received from [facility]     Patient History     Smoking Tobacco Use: Former     Smokeless Tobacco Use: Never     Passive Exposure: Not on file   Financial Resource Strain: Low Risk  (1/5/2024)    Financial Resource Strain     Within the past 12 months, have you or your family members you live with been unable to get utilities (heat, electricity) when it was really needed?: No   Alcohol Use: Not on file   Transportation Needs: Low Risk  (1/5/2024)    Transportation Needs     Within the past 12 months, has lack of transportation kept you from medical appointments, getting your medicines, non-medical meetings or appointments, work, or from getting things that you need?: No   Physical Activity: Not on file   Interpersonal Safety: Not on file   Stress: Not on file   Social Connections: Not on file   Health Literacy: Not on file     Inadequate nutrition (GOAL):: No  Tube Feeding: No  Transportation means:: Regular car        Resources and Interventions:  Current Resources: None        Call placed to pt today to check-in on how she is feeling. Pt reports that she is doing okay but does have a headache today. Pt explained that she remembers falling and did not have a syncopal episode that led to fall. She was able to call her daughter who came over to help her, they had some difficulty getting her up off the floor but were able to do it. Pt  plans to call [facility] to schedule follow-up in next couple of days. She also has a f/up Neurology appointment on 7/1 that she has been looking forward to.      Patient/Caregiver understanding: Yes       Future Appointments                In 1 month Long Ferrari MD [facility], [facility]            Plan: [facility] will not plan further outreach at this time.    Anna Morales, [facility]  Social Work Care Coordinator  Phone: [phone]

## 2024-06-27 ENCOUNTER — OFFICE VISIT (OUTPATIENT)
Dept: FAMILY MEDICINE | Facility: CLINIC | Age: 74
End: 2024-06-27

## 2024-06-27 VITALS — DIASTOLIC BLOOD PRESSURE: 52 MMHG | SYSTOLIC BLOOD PRESSURE: 117 MMHG | OXYGEN SATURATION: 97 % | HEART RATE: 95 BPM

## 2024-06-27 DIAGNOSIS — J44.1 COPD EXACERBATION (H): ICD-10-CM

## 2024-06-27 DIAGNOSIS — J40 BRONCHITIS: ICD-10-CM

## 2024-06-27 DIAGNOSIS — S09.90XD TRAUMATIC INJURY OF HEAD, SUBSEQUENT ENCOUNTER: Primary | ICD-10-CM

## 2024-06-27 DIAGNOSIS — R06.2 WHEEZING: ICD-10-CM

## 2024-06-27 DIAGNOSIS — S40.012D CONTUSION OF LEFT SHOULDER, SUBSEQUENT ENCOUNTER: ICD-10-CM

## 2024-06-27 DIAGNOSIS — J45.41 REACTIVE AIRWAY DISEASE, MODERATE PERSISTENT, WITH ACUTE EXACERBATION: ICD-10-CM

## 2024-06-27 PROCEDURE — 99495 TRANSJ CARE MGMT MOD F2F 14D: CPT | Performed by: FAMILY MEDICINE

## 2024-06-27 RX ORDER — ALBUTEROL SULFATE 90 UG/1
2 AEROSOL, METERED RESPIRATORY (INHALATION) EVERY 6 HOURS PRN
Qty: 18 G | Refills: 3 | Status: SHIPPED | OUTPATIENT
Start: 2024-06-27

## 2024-06-27 RX ORDER — METHYLPREDNISOLONE 4 MG
TABLET, DOSE PACK ORAL
Qty: 21 TABLET | Refills: 0 | Status: ON HOLD | OUTPATIENT
Start: 2024-06-27 | End: 2024-08-10

## 2024-06-27 RX ORDER — BUDESONIDE AND FORMOTEROL FUMARATE DIHYDRATE 160; 4.5 UG/1; UG/1
2 AEROSOL RESPIRATORY (INHALATION) DAILY
Qty: 10.2 G | Refills: 3 | Status: SHIPPED | OUTPATIENT
Start: 2024-06-27

## 2024-06-27 NOTE — PROGRESS NOTES
2 days ago was in the ER for head trauma after fall\    Currently on copd exacerbation  ROS:  General and Resp. completed and negative except as noted above    Histories: reviewed    OBJECTIVE:                                                    /52   Pulse 95   SpO2 97%   There is no height or weight on file to calculate BMI.   APPEARANCE: = Relaxed and in no distress  Neck = No anterior or posterior adenopathy appreciated.  Resp effort =  Mild Distress  Breath Sounds =  bilateral wheezing     ASSESSMENT/PLAN:                                                    Quality gaps reviewed    Macey was seen today for er f/u and allergies.    Diagnoses and all orders for this visit:    Traumatic injury of head, and Contusion of left shoulder, subsequent encounter  Stable, ER note reviewed.  No worsening in past two days.      COPD exacerbation (H)  With   Bronchitis and Wheezing  Discussed general issues of COPD, including pathophysiology, ways it will affect the pt., when to seek help, reviewed the typical medications (how they are taken, how they help)  Refill symbicort and albuterol and   -     methylPREDNISolone (MEDROL DOSEPAK) 4 MG tablet therapy pack; Follow Package Directions    Work on weight loss    Health maintenance items are reviewed in Epic and correct as of today:    Long Ferrari MD  [facility]  Family Practice  [facility]  [phone]    For any issues my office # is [phone]

## 2024-07-01 ENCOUNTER — TRANSFERRED RECORDS (OUTPATIENT)
Dept: FAMILY MEDICINE | Facility: CLINIC | Age: 74
End: 2024-07-01

## 2024-07-12 ENCOUNTER — TRANSFERRED RECORDS (OUTPATIENT)
Dept: FAMILY MEDICINE | Facility: CLINIC | Age: 74
End: 2024-07-12

## 2024-07-17 DIAGNOSIS — F32.9 REACTIVE DEPRESSION: ICD-10-CM

## 2024-07-17 NOTE — TELEPHONE ENCOUNTER
Sertraline refill to [facility] said the refills on last rx was canceled at the pharmacy   Med: Sertraline      LOV (related): 6/18/24      Due for F/U around: 2 months     Next Appt: 8/19/24 (med check ) with [facility]               9/12/2023     5:23 PM 2/12/2024     9:32 AM 2/12/2024     9:41 AM   PHQ   PHQ-9 Total Score 0 5 5   Q9: Thoughts of better off dead/self-harm past 2 weeks Not at all Not at all Not at all           9/12/2023     5:23 PM 2/12/2024     9:32 AM 2/12/2024     9:41 AM   AGUSTINA-7 SCORE   Total Score 1 6 6

## 2024-07-28 ENCOUNTER — NURSE TRIAGE (OUTPATIENT)
Dept: NURSING | Facility: CLINIC | Age: 74
End: 2024-07-28
Payer: COMMERCIAL

## 2024-07-28 ENCOUNTER — HOSPITAL ENCOUNTER (EMERGENCY)
Facility: CLINIC | Age: 74
Discharge: HOME | End: 2024-07-28
Attending: EMERGENCY MEDICINE | Admitting: EMERGENCY MEDICINE
Payer: MEDICARE

## 2024-07-28 VITALS
OXYGEN SATURATION: 97 % | TEMPERATURE: 98.5 F | DIASTOLIC BLOOD PRESSURE: 62 MMHG | RESPIRATION RATE: 18 BRPM | HEART RATE: 75 BPM | SYSTOLIC BLOOD PRESSURE: 155 MMHG

## 2024-07-28 DIAGNOSIS — L30.9 FACIAL DERMATITIS: ICD-10-CM

## 2024-07-28 PROCEDURE — 99283 EMERGENCY DEPT VISIT LOW MDM: CPT

## 2024-07-28 RX ORDER — TRIAMCINOLONE ACETONIDE 0.25 MG/G
OINTMENT TOPICAL 2 TIMES DAILY
Qty: 80 G | Refills: 0 | Status: SHIPPED | OUTPATIENT
Start: 2024-07-28 | End: 2024-07-28

## 2024-07-28 RX ORDER — TRIAMCINOLONE ACETONIDE 0.25 MG/G
OINTMENT TOPICAL 2 TIMES DAILY
Qty: 80 G | Refills: 0 | Status: ON HOLD | OUTPATIENT
Start: 2024-07-28 | End: 2024-08-10

## 2024-07-28 ASSESSMENT — ACTIVITIES OF DAILY LIVING (ADL): ADLS_ACUITY_SCORE: 39

## 2024-07-28 NOTE — ED TRIAGE NOTES
Pt arrives through triage c/o facial swelling which started at 1700 last night after pt ate dinner at a restaurant that she has been to in the past and ate the same food without this reaction, pt states tongue feels larger, facial redness noted, no difficulty breathing or swallowing, pt also admits to a new hair spray she has been using for the last 3 weeks, ABCD intact.       Triage Assessment (Adult)       Row Name 07/28/24 1141          Triage Assessment    Airway WDL WDL        Respiratory WDL    Respiratory WDL WDL        Skin Circulation/Temperature WDL    Skin Circulation/Temperature WDL X  facial redness and swelling        Cardiac WDL    Cardiac WDL WDL        Peripheral/Neurovascular WDL    Peripheral Neurovascular WDL WDL        Cognitive/Neuro/Behavioral WDL    Cognitive/Neuro/Behavioral WDL WDL

## 2024-07-28 NOTE — TELEPHONE ENCOUNTER
"Nurse Triage SBAR    Is this a 2nd Level Triage? NO    Situation: Patient believes she has shingles.     Background: Has had it in the past years ago.     Assessment: Started yesterday as blisters on her cheeks. Is red and hot to the touch. \"Burns like fire\". Spreading to her eyelids. Upper lip swollen. Feels as though her tongue is swollen. Denies difficulty breathing or swallowing.     Protocol Recommended Disposition:   Call  Now, See HCP Within 4 Hours (Or PCP Triage)    Recommendation: During triage patient then began to wheeze. Changed protocols and advised patient to call 911. Patient refused and states she is only 5 minutes away from the ED. Reinforced the 911 disposition, but patient continued to refuse. Advised that she needs to go to the ED as soon as possible if she is not willing to call 911. Patient verbalized understanding.     Theo Leo RN on 7/28/2024 at 11:26 AM    Reason for Disposition   Shingles rash on the eyelid or tip of the nose   Swollen tongue    Additional Information   Negative: Difficult to awaken or acting confused (e.g., disoriented, slurred speech)   Negative: Sounds like a life-threatening emergency to the triager   Negative: Unresponsive, passed out or very weak   Negative: Patient sounds very sick or weak to the triager   Negative: [1] Shingles rash (matches SYMPTOMS) AND [2] weak immune system (e.g., HIV positive,  cancer chemotherapy, chronic steroid treatment, splenectomy) AND [3] NOT taking antiviral medication   Negative: [1] Sudden onset of rash (within last 2 hours) AND [2] difficulty breathing or swallowing   Negative: Sounds like a life-threatening emergency to the triager   Negative: [1] Localized purple or blood-colored spots or dots AND [2] not from injury or friction AND [3] fever   Negative: Patient sounds very sick or weak to the triager   Negative: [1] Red area or streak AND [2] fever   Negative: [1] Rash is painful to touch AND [2] fever    Protocols " used: Rash or Redness - Cselmegci-Z-JM, Shingles-A-AH, Lip Swelling-A-AH

## 2024-07-28 NOTE — ED PROVIDER NOTES
"  Emergency Department Note      History of Present Illness     Chief Complaint   Facial Swelling      HPI   Macey Romero is a 73 year old female here for evaluation of facial swelling. Patient reports onset of facial blisters yesterday afternoon. States that the blisters feel hot and itchy. She then developed facial redness and burning. She also says the roof of her mouth feels \"shredded\". She also has pain on the inside of her cheeks when eating. No difficulty swallowing. Today she feels that it is harder to articulate her words since her mouth feels swollen. Also reports a few small rashes on her bilateral upper extremities in the last 24 hours. She has a very small rash on her abdomen but denies rashes on the lower extremities. States that she used a prescription acne cream on her face which did not help her symptoms. Denies open wounds on the face. Denies new exposure to facial cleansers, body wash, soaps in the past 48 hours. Denies exposure to plants or significant sun exposure in the past 48 hours. Denies any new medications or medication changes in the past 48 hours. She also mentions that she started using a new hair spray in the past few months.       Independent Historian   None    Review of External Notes   Yes-I reviewed the patient's call to the nurse line earlier today.  I also reviewed her visit with neurology in early July for her seizure disorder.    Past Medical History     Medical History and Problem List   Malignant neoplasm of female breast  Cerebral infarction  Fibromyalgia  Blood transfusion  Meningitis  Osteoarthritis  Osteopenia  Pterygium eye  Reactive airway disease  Seasonal allergies  Shingles  Skin cancer  Asthma   Seizure   UTI   Chronic pain syndrome   Fibromyalgia   IBS  Acute hepatitis   Stage 3 CKD       Medications   Depakote   Vimpat   Albuterol   Fosamax   Aspirin 325 mg   Symbicort   Medrol dosepak   Singulair   Zoloft   Cleocin     Surgical History   Anterior C-disc " fusion  Left total knee arthroplasty  Bilateral blepharoplasty  Cholecystectomy  Colonoscopy x 2  D&C   Discectomy, fusion one level cervical  Endoscopy  Hysterectomy  Joint replacement right knee with tibial straightening  Mammoplasty augmentation bilateral  Mastectomy bilateral simple  Mohs left lateral nose  Right ORIF ankle  Pterygium with conjunctival autologous transplant  Repair ligament ankle  Tonsillectomy   Sigmoidoscopy      Physical Exam     Patient Vitals for the past 24 hrs:   BP Temp Temp src Pulse Resp SpO2   07/28/24 1224 (!) 155/62 -- -- 75 -- 97 %   07/28/24 1142 (!) 174/82 98.5  F (36.9  C) Temporal 91 18 97 %     Physical Exam    Eye:  Pupils are equal, round, and reactive.  Extraocular movements intact.    ENT:  No rhinorrhea.  Moist mucus membranes.  Normal tongue and tonsil.    Cardiac:  Regular rate and rhythm.  No murmurs, gallops, or rubs.    Pulmonary:  Clear to auscultation bilaterally.  Few scattered wheezes without increased work of breathing.     Abdomen:  Positive bowel sounds.  Abdomen is soft and non-distended, without focal tenderness.    Musculoskeletal:  Normal movement of all extremities without evidence for deficit.    Skin:  Warm and dry. focused facial exam shows areas of inflammation and skin break down. Consistent with simple dermatitis. No sign of abscess, vesicle, or blister. No intra oral involvement.     Neurologic:  Non-focal exam without asymmetric weakness or numbness.     Psychiatric:  Normal affect with appropriate interaction with examiner.       Diagnostics     Lab Results   Labs Ordered and Resulted from Time of ED Arrival to Time of ED Departure - No data to display    Imaging   No orders to display           Independent Interpretation   None    ED Course      Medications Administered   Medications - No data to display    Procedures   Procedures     Discussion of Management   None    ED Course   ED Course as of 07/29/24 1126   Sun Jul 28, 2024   1204 I  obtained history and examined the patient as noted above.        Optional/Additional Documentation  None    Medical Decision Making / Diagnosis     CMS Diagnoses: None    MIPS       None    MDM   Macey Romero is a 73 year old female presenting to us with a facial rash.  The etiology is unclear and she denies any new topical medications or beauty products.  The skin does not appear to be infected as there are no vesicles, blisters, or pustules.  Instead, it appears more consistent with a facial irritation and inflammation.  She has no other rash elsewhere on her body and I feel this is unlikely to represent a systemic issue.  I do not feel this would be related to a drug rash.  She currently has a coating of mupirocin over these areas.    I recommended the patient clean her face when she gets home with a gentle wash.  I will prescribe triamcinolone to help reduce the inflammation.  I have given her information for follow-up with dermatology if symptoms or not improving.  She will otherwise return to us immediately if she has spreading of the rash, worsening of her condition, or other emergent concerns.    Disposition   The patient was discharged.     Diagnosis     ICD-10-CM    1. Facial dermatitis  L30.9            Discharge Medications   Discharge Medication List as of 7/28/2024 12:24 PM            Scribe Disclosure:  I, Fanny Logan, am serving as a scribe at 12:38 PM on 7/28/2024 to document services personally performed by Trierweiler, Chad A, MD based on my observations and the provider's statements to me.        Trierweiler, Chad A, MD  07/29/24 5447

## 2024-08-09 ENCOUNTER — APPOINTMENT (OUTPATIENT)
Dept: CT IMAGING | Facility: CLINIC | Age: 74
End: 2024-08-09
Attending: EMERGENCY MEDICINE
Payer: MEDICARE

## 2024-08-09 ENCOUNTER — HOSPITAL ENCOUNTER (OUTPATIENT)
Facility: CLINIC | Age: 74
Setting detail: OBSERVATION
Discharge: HOME | End: 2024-08-11
Attending: EMERGENCY MEDICINE | Admitting: HOSPITALIST
Payer: MEDICARE

## 2024-08-09 DIAGNOSIS — R41.89 SPELL OF ALTERED COGNITION: ICD-10-CM

## 2024-08-09 DIAGNOSIS — R53.1 LEFT-SIDED WEAKNESS: Primary | ICD-10-CM

## 2024-08-09 PROCEDURE — 250N000009 HC RX 250: Performed by: EMERGENCY MEDICINE

## 2024-08-09 PROCEDURE — 99449 NTRPROF PH1/NTRNET/EHR 31/>: CPT | Performed by: STUDENT IN AN ORGANIZED HEALTH CARE EDUCATION/TRAINING PROGRAM

## 2024-08-09 PROCEDURE — 99291 CRITICAL CARE FIRST HOUR: CPT | Mod: 25

## 2024-08-09 PROCEDURE — 70496 CT ANGIOGRAPHY HEAD: CPT | Mod: MG

## 2024-08-09 PROCEDURE — 250N000011 HC RX IP 250 OP 636: Performed by: EMERGENCY MEDICINE

## 2024-08-09 PROCEDURE — 0042T CT HEAD PERFUSION W CONTRAST: CPT

## 2024-08-09 PROCEDURE — 70450 CT HEAD/BRAIN W/O DYE: CPT | Mod: MG

## 2024-08-09 RX ORDER — IOPAMIDOL 755 MG/ML
117 INJECTION, SOLUTION INTRAVASCULAR ONCE
Status: COMPLETED | OUTPATIENT
Start: 2024-08-09 | End: 2024-08-09

## 2024-08-09 RX ADMIN — SODIUM CHLORIDE 100 ML: 9 INJECTION, SOLUTION INTRAVENOUS at 23:57

## 2024-08-09 RX ADMIN — IOPAMIDOL 117 ML: 755 INJECTION, SOLUTION INTRAVENOUS at 23:57

## 2024-08-09 ASSESSMENT — COLUMBIA-SUICIDE SEVERITY RATING SCALE - C-SSRS
6. HAVE YOU EVER DONE ANYTHING, STARTED TO DO ANYTHING, OR PREPARED TO DO ANYTHING TO END YOUR LIFE?: NO
2. HAVE YOU ACTUALLY HAD ANY THOUGHTS OF KILLING YOURSELF IN THE PAST MONTH?: NO
1. IN THE PAST MONTH, HAVE YOU WISHED YOU WERE DEAD OR WISHED YOU COULD GO TO SLEEP AND NOT WAKE UP?: NO

## 2024-08-10 ENCOUNTER — APPOINTMENT (OUTPATIENT)
Dept: MRI IMAGING | Facility: CLINIC | Age: 74
End: 2024-08-10
Attending: STUDENT IN AN ORGANIZED HEALTH CARE EDUCATION/TRAINING PROGRAM
Payer: MEDICARE

## 2024-08-10 ENCOUNTER — APPOINTMENT (OUTPATIENT)
Dept: GENERAL RADIOLOGY | Facility: CLINIC | Age: 74
End: 2024-08-10
Attending: HOSPITALIST
Payer: MEDICARE

## 2024-08-10 ENCOUNTER — HOSPITAL ENCOUNTER (OUTPATIENT)
Dept: NEUROLOGY | Facility: CLINIC | Age: 74
Setting detail: OBSERVATION
Discharge: HOME | End: 2024-08-10
Attending: HOSPITALIST
Payer: MEDICARE

## 2024-08-10 PROBLEM — R41.89 SPELL OF ALTERED COGNITION: Status: ACTIVE | Noted: 2024-08-10

## 2024-08-10 LAB
ALBUMIN UR-MCNC: NEGATIVE MG/DL
ANION GAP SERPL CALCULATED.3IONS-SCNC: 7 MMOL/L (ref 7–15)
ANION GAP SERPL CALCULATED.3IONS-SCNC: 8 MMOL/L (ref 7–15)
APPEARANCE UR: CLEAR
APTT PPP: 29 SECONDS (ref 22–38)
ATRIAL RATE - MUSE: 95 BPM
BASOPHILS # BLD AUTO: 0 10E3/UL (ref 0–0.2)
BASOPHILS # BLD AUTO: 0 10E3/UL (ref 0–0.2)
BASOPHILS NFR BLD AUTO: 0 %
BASOPHILS NFR BLD AUTO: 0 %
BILIRUB UR QL STRIP: NEGATIVE
BUN SERPL-MCNC: 13.5 MG/DL (ref 8–23)
BUN SERPL-MCNC: 14.8 MG/DL (ref 8–23)
CALCIUM SERPL-MCNC: 8.8 MG/DL (ref 8.8–10.4)
CALCIUM SERPL-MCNC: 9 MG/DL (ref 8.8–10.4)
CHLORIDE SERPL-SCNC: 102 MMOL/L (ref 98–107)
CHLORIDE SERPL-SCNC: 99 MMOL/L (ref 98–107)
COLOR UR AUTO: ABNORMAL
CREAT SERPL-MCNC: 0.92 MG/DL (ref 0.51–0.95)
CREAT SERPL-MCNC: 0.93 MG/DL (ref 0.51–0.95)
DIASTOLIC BLOOD PRESSURE - MUSE: NORMAL MMHG
EGFRCR SERPLBLD CKD-EPI 2021: 65 ML/MIN/1.73M2
EGFRCR SERPLBLD CKD-EPI 2021: 65 ML/MIN/1.73M2
EOSINOPHIL # BLD AUTO: 0.3 10E3/UL (ref 0–0.7)
EOSINOPHIL # BLD AUTO: 0.3 10E3/UL (ref 0–0.7)
EOSINOPHIL NFR BLD AUTO: 4 %
EOSINOPHIL NFR BLD AUTO: 4 %
ERYTHROCYTE [DISTWIDTH] IN BLOOD BY AUTOMATED COUNT: 13 % (ref 10–15)
ERYTHROCYTE [DISTWIDTH] IN BLOOD BY AUTOMATED COUNT: 13.2 % (ref 10–15)
ETHANOL SERPL-MCNC: <0.01 G/DL
GLUCOSE BLDC GLUCOMTR-MCNC: 102 MG/DL (ref 70–99)
GLUCOSE SERPL-MCNC: 105 MG/DL (ref 70–99)
GLUCOSE SERPL-MCNC: 92 MG/DL (ref 70–99)
GLUCOSE UR STRIP-MCNC: NEGATIVE MG/DL
HCO3 SERPL-SCNC: 27 MMOL/L (ref 22–29)
HCO3 SERPL-SCNC: 29 MMOL/L (ref 22–29)
HCT VFR BLD AUTO: 36.8 % (ref 35–47)
HCT VFR BLD AUTO: 38.8 % (ref 35–47)
HGB BLD-MCNC: 12.5 G/DL (ref 11.7–15.7)
HGB BLD-MCNC: 12.6 G/DL (ref 11.7–15.7)
HGB UR QL STRIP: NEGATIVE
IMM GRANULOCYTES # BLD: 0 10E3/UL
IMM GRANULOCYTES # BLD: 0 10E3/UL
IMM GRANULOCYTES NFR BLD: 0 %
IMM GRANULOCYTES NFR BLD: 0 %
INR PPP: 1.09 (ref 0.85–1.15)
INTERPRETATION ECG - MUSE: NORMAL
KETONES UR STRIP-MCNC: NEGATIVE MG/DL
LEUKOCYTE ESTERASE UR QL STRIP: ABNORMAL
LYMPHOCYTES # BLD AUTO: 2.3 10E3/UL (ref 0.8–5.3)
LYMPHOCYTES # BLD AUTO: 2.7 10E3/UL (ref 0.8–5.3)
LYMPHOCYTES NFR BLD AUTO: 29 %
LYMPHOCYTES NFR BLD AUTO: 33 %
MCH RBC QN AUTO: 27.5 PG (ref 26.5–33)
MCH RBC QN AUTO: 28.4 PG (ref 26.5–33)
MCHC RBC AUTO-ENTMCNC: 32.5 G/DL (ref 31.5–36.5)
MCHC RBC AUTO-ENTMCNC: 34 G/DL (ref 31.5–36.5)
MCV RBC AUTO: 84 FL (ref 78–100)
MCV RBC AUTO: 85 FL (ref 78–100)
MONOCYTES # BLD AUTO: 0.8 10E3/UL (ref 0–1.3)
MONOCYTES # BLD AUTO: 0.8 10E3/UL (ref 0–1.3)
MONOCYTES NFR BLD AUTO: 10 %
MONOCYTES NFR BLD AUTO: 11 %
NEUTROPHILS # BLD AUTO: 4.1 10E3/UL (ref 1.6–8.3)
NEUTROPHILS # BLD AUTO: 4.3 10E3/UL (ref 1.6–8.3)
NEUTROPHILS NFR BLD AUTO: 51 %
NEUTROPHILS NFR BLD AUTO: 56 %
NITRATE UR QL: NEGATIVE
NRBC # BLD AUTO: 0 10E3/UL
NRBC # BLD AUTO: 0 10E3/UL
NRBC BLD AUTO-RTO: 0 /100
NRBC BLD AUTO-RTO: 0 /100
P AXIS - MUSE: 80 DEGREES
PH UR STRIP: 5.5 [PH] (ref 5–7)
PLATELET # BLD AUTO: 182 10E3/UL (ref 150–450)
PLATELET # BLD AUTO: 187 10E3/UL (ref 150–450)
POTASSIUM SERPL-SCNC: 4 MMOL/L (ref 3.4–5.3)
POTASSIUM SERPL-SCNC: 4.3 MMOL/L (ref 3.4–5.3)
PR INTERVAL - MUSE: 148 MS
QRS DURATION - MUSE: 86 MS
QT - MUSE: 382 MS
QTC - MUSE: 480 MS
R AXIS - MUSE: 73 DEGREES
RBC # BLD AUTO: 4.4 10E6/UL (ref 3.8–5.2)
RBC # BLD AUTO: 4.59 10E6/UL (ref 3.8–5.2)
RBC URINE: 1 /HPF
SODIUM SERPL-SCNC: 134 MMOL/L (ref 135–145)
SODIUM SERPL-SCNC: 138 MMOL/L (ref 135–145)
SP GR UR STRIP: 1.01 (ref 1–1.03)
SQUAMOUS EPITHELIAL: 1 /HPF
SYSTOLIC BLOOD PRESSURE - MUSE: NORMAL MMHG
T AXIS - MUSE: 57 DEGREES
TROPONIN T SERPL HS-MCNC: <6 NG/L
UROBILINOGEN UR STRIP-MCNC: NORMAL MG/DL
VENTRICULAR RATE- MUSE: 95 BPM
WBC # BLD AUTO: 7.7 10E3/UL (ref 4–11)
WBC # BLD AUTO: 8 10E3/UL (ref 4–11)
WBC URINE: 1 /HPF

## 2024-08-10 PROCEDURE — G0378 HOSPITAL OBSERVATION PER HR: HCPCS

## 2024-08-10 PROCEDURE — 84484 ASSAY OF TROPONIN QUANT: CPT | Performed by: EMERGENCY MEDICINE

## 2024-08-10 PROCEDURE — 250N000013 HC RX MED GY IP 250 OP 250 PS 637: Performed by: HOSPITALIST

## 2024-08-10 PROCEDURE — 80048 BASIC METABOLIC PNL TOTAL CA: CPT | Performed by: EMERGENCY MEDICINE

## 2024-08-10 PROCEDURE — 250N000013 HC RX MED GY IP 250 OP 250 PS 637: Performed by: EMERGENCY MEDICINE

## 2024-08-10 PROCEDURE — 80048 BASIC METABOLIC PNL TOTAL CA: CPT | Mod: 91 | Performed by: HOSPITALIST

## 2024-08-10 PROCEDURE — 96374 THER/PROPH/DIAG INJ IV PUSH: CPT | Mod: 59

## 2024-08-10 PROCEDURE — 255N000002 HC RX 255 OP 636: Performed by: EMERGENCY MEDICINE

## 2024-08-10 PROCEDURE — 85730 THROMBOPLASTIN TIME PARTIAL: CPT | Performed by: EMERGENCY MEDICINE

## 2024-08-10 PROCEDURE — 95816 EEG AWAKE AND DROWSY: CPT

## 2024-08-10 PROCEDURE — 82077 ASSAY SPEC XCP UR&BREATH IA: CPT | Performed by: EMERGENCY MEDICINE

## 2024-08-10 PROCEDURE — 70553 MRI BRAIN STEM W/O & W/DYE: CPT | Mod: MG

## 2024-08-10 PROCEDURE — 250N000013 HC RX MED GY IP 250 OP 250 PS 637: Performed by: INTERNAL MEDICINE

## 2024-08-10 PROCEDURE — 81001 URINALYSIS AUTO W/SCOPE: CPT | Performed by: HOSPITALIST

## 2024-08-10 PROCEDURE — 36415 COLL VENOUS BLD VENIPUNCTURE: CPT | Performed by: EMERGENCY MEDICINE

## 2024-08-10 PROCEDURE — 250N000011 HC RX IP 250 OP 636: Performed by: EMERGENCY MEDICINE

## 2024-08-10 PROCEDURE — 93005 ELECTROCARDIOGRAM TRACING: CPT

## 2024-08-10 PROCEDURE — 85025 COMPLETE CBC W/AUTO DIFF WBC: CPT | Performed by: EMERGENCY MEDICINE

## 2024-08-10 PROCEDURE — 82962 GLUCOSE BLOOD TEST: CPT

## 2024-08-10 PROCEDURE — 85025 COMPLETE CBC W/AUTO DIFF WBC: CPT | Performed by: HOSPITALIST

## 2024-08-10 PROCEDURE — 71045 X-RAY EXAM CHEST 1 VIEW: CPT

## 2024-08-10 PROCEDURE — 36415 COLL VENOUS BLD VENIPUNCTURE: CPT | Performed by: HOSPITALIST

## 2024-08-10 PROCEDURE — 99207 PR APP CREDIT; MD BILLING SHARED VISIT: CPT | Performed by: INTERNAL MEDICINE

## 2024-08-10 PROCEDURE — 85610 PROTHROMBIN TIME: CPT | Performed by: EMERGENCY MEDICINE

## 2024-08-10 PROCEDURE — A9585 GADOBUTROL INJECTION: HCPCS | Performed by: EMERGENCY MEDICINE

## 2024-08-10 PROCEDURE — 99223 1ST HOSP IP/OBS HIGH 75: CPT | Mod: AI | Performed by: HOSPITALIST

## 2024-08-10 RX ORDER — ACETAMINOPHEN 500 MG
1000 TABLET ORAL DAILY
Status: DISCONTINUED | OUTPATIENT
Start: 2024-08-10 | End: 2024-08-11 | Stop reason: HOSPADM

## 2024-08-10 RX ORDER — ACETAMINOPHEN 650 MG/1
650 SUPPOSITORY RECTAL EVERY 4 HOURS PRN
Status: DISCONTINUED | OUTPATIENT
Start: 2024-08-10 | End: 2024-08-11 | Stop reason: HOSPADM

## 2024-08-10 RX ORDER — ACETAMINOPHEN 500 MG
500 TABLET ORAL ONCE
Status: COMPLETED | OUTPATIENT
Start: 2024-08-10 | End: 2024-08-10

## 2024-08-10 RX ORDER — LANOLIN ALCOHOL/MO/W.PET/CERES
3 CREAM (GRAM) TOPICAL
Status: DISCONTINUED | OUTPATIENT
Start: 2024-08-10 | End: 2024-08-11 | Stop reason: HOSPADM

## 2024-08-10 RX ORDER — HYDROCORTISONE 2.5 %
CREAM (GRAM) TOPICAL DAILY PRN
COMMUNITY

## 2024-08-10 RX ORDER — ALBUTEROL SULFATE 90 UG/1
2 AEROSOL, METERED RESPIRATORY (INHALATION) EVERY 6 HOURS PRN
Status: DISCONTINUED | OUTPATIENT
Start: 2024-08-10 | End: 2024-08-11 | Stop reason: HOSPADM

## 2024-08-10 RX ORDER — PROCHLORPERAZINE 25 MG
12.5 SUPPOSITORY, RECTAL RECTAL EVERY 12 HOURS PRN
Status: DISCONTINUED | OUTPATIENT
Start: 2024-08-10 | End: 2024-08-11 | Stop reason: HOSPADM

## 2024-08-10 RX ORDER — AMOXICILLIN 250 MG
1 CAPSULE ORAL 2 TIMES DAILY PRN
Status: DISCONTINUED | OUTPATIENT
Start: 2024-08-10 | End: 2024-08-11 | Stop reason: HOSPADM

## 2024-08-10 RX ORDER — ONDANSETRON 2 MG/ML
4 INJECTION INTRAMUSCULAR; INTRAVENOUS EVERY 6 HOURS PRN
Status: DISCONTINUED | OUTPATIENT
Start: 2024-08-10 | End: 2024-08-11 | Stop reason: HOSPADM

## 2024-08-10 RX ORDER — ONDANSETRON 4 MG/1
4 TABLET, ORALLY DISINTEGRATING ORAL EVERY 6 HOURS PRN
Status: DISCONTINUED | OUTPATIENT
Start: 2024-08-10 | End: 2024-08-11 | Stop reason: HOSPADM

## 2024-08-10 RX ORDER — AMOXICILLIN 250 MG
2 CAPSULE ORAL 2 TIMES DAILY PRN
Status: DISCONTINUED | OUTPATIENT
Start: 2024-08-10 | End: 2024-08-11 | Stop reason: HOSPADM

## 2024-08-10 RX ORDER — LACOSAMIDE 50 MG/1
50 TABLET ORAL 2 TIMES DAILY
COMMUNITY

## 2024-08-10 RX ORDER — LACOSAMIDE 50 MG/1
50 TABLET ORAL 2 TIMES DAILY
Status: DISCONTINUED | OUTPATIENT
Start: 2024-08-10 | End: 2024-08-11 | Stop reason: HOSPADM

## 2024-08-10 RX ORDER — BIOTIN 10 MG
TABLET ORAL
COMMUNITY

## 2024-08-10 RX ORDER — ACETAMINOPHEN 325 MG/1
650 TABLET ORAL EVERY 4 HOURS PRN
Status: DISCONTINUED | OUTPATIENT
Start: 2024-08-10 | End: 2024-08-11 | Stop reason: HOSPADM

## 2024-08-10 RX ORDER — SERTRALINE HYDROCHLORIDE 25 MG/1
25 TABLET, FILM COATED ORAL EVERY EVENING
Status: DISCONTINUED | OUTPATIENT
Start: 2024-08-10 | End: 2024-08-11 | Stop reason: HOSPADM

## 2024-08-10 RX ORDER — ASPIRIN 325 MG
325 TABLET, DELAYED RELEASE (ENTERIC COATED) ORAL DAILY
Status: DISCONTINUED | OUTPATIENT
Start: 2024-08-10 | End: 2024-08-11 | Stop reason: HOSPADM

## 2024-08-10 RX ORDER — PROCHLORPERAZINE MALEATE 5 MG
5 TABLET ORAL EVERY 6 HOURS PRN
Status: DISCONTINUED | OUTPATIENT
Start: 2024-08-10 | End: 2024-08-11 | Stop reason: HOSPADM

## 2024-08-10 RX ORDER — POLYETHYLENE GLYCOL 3350 17 G/17G
1 POWDER, FOR SOLUTION ORAL DAILY PRN
COMMUNITY

## 2024-08-10 RX ORDER — GADOBUTROL 604.72 MG/ML
7 INJECTION INTRAVENOUS ONCE
Status: COMPLETED | OUTPATIENT
Start: 2024-08-10 | End: 2024-08-10

## 2024-08-10 RX ORDER — MECLIZINE HYDROCHLORIDE 25 MG/1
25 TABLET ORAL 3 TIMES DAILY
Status: DISCONTINUED | OUTPATIENT
Start: 2024-08-10 | End: 2024-08-11 | Stop reason: HOSPADM

## 2024-08-10 RX ORDER — ONDANSETRON 2 MG/ML
4 INJECTION INTRAMUSCULAR; INTRAVENOUS ONCE
Status: COMPLETED | OUTPATIENT
Start: 2024-08-10 | End: 2024-08-10

## 2024-08-10 RX ORDER — AMOXICILLIN 250 MG
1 CAPSULE ORAL DAILY PRN
COMMUNITY

## 2024-08-10 RX ADMIN — ACETAMINOPHEN 650 MG: 325 TABLET, FILM COATED ORAL at 06:28

## 2024-08-10 RX ADMIN — SERTRALINE HYDROCHLORIDE 25 MG: 25 TABLET ORAL at 21:14

## 2024-08-10 RX ADMIN — LACOSAMIDE 50 MG: 50 TABLET, FILM COATED ORAL at 21:32

## 2024-08-10 RX ADMIN — MECLIZINE HYDROCHLORIDE 25 MG: 25 TABLET ORAL at 21:14

## 2024-08-10 RX ADMIN — GADOBUTROL 7 ML: 604.72 INJECTION INTRAVENOUS at 02:04

## 2024-08-10 RX ADMIN — ONDANSETRON 4 MG: 2 INJECTION INTRAMUSCULAR; INTRAVENOUS at 03:09

## 2024-08-10 RX ADMIN — ACETAMINOPHEN 1000 MG: 500 TABLET, FILM COATED ORAL at 13:57

## 2024-08-10 RX ADMIN — ASPIRIN 325 MG: 325 TABLET, COATED ORAL at 13:57

## 2024-08-10 RX ADMIN — ACETAMINOPHEN 500 MG: 500 TABLET, FILM COATED ORAL at 02:31

## 2024-08-10 RX ADMIN — MECLIZINE HYDROCHLORIDE 25 MG: 25 TABLET ORAL at 13:57

## 2024-08-10 ASSESSMENT — ACTIVITIES OF DAILY LIVING (ADL)
ADLS_ACUITY_SCORE: 36
ADLS_ACUITY_SCORE: 39
ADLS_ACUITY_SCORE: 36
ADLS_ACUITY_SCORE: 39
ADLS_ACUITY_SCORE: 36
ADLS_ACUITY_SCORE: 39
ADLS_ACUITY_SCORE: 39
ADLS_ACUITY_SCORE: 36
ADLS_ACUITY_SCORE: 39
ADLS_ACUITY_SCORE: 40
ADLS_ACUITY_SCORE: 39

## 2024-08-10 NOTE — ED PROVIDER NOTES
Emergency Department Note      History of Present Illness     Chief Complaint   Stroke Symptoms      HPI   Macey Romero is a 73 year old female with a history of cerebral infraction (on Aspirin), breast cancer, seizure and fibromyalgia presents to ED with spouse and freind for an evaluation of stroke symptoms. The patient's friend notes that she spent the day with the patient and she has noticed that she been more confused today. Patient's friend adds that she developed redness in her face right before she arrived to the Ed. She states that she was having difficulty thinking and pronouncing words.  Patient is complaining of a headache, increased dizziness and edema on her feet. Endorses right sided weakness. Her spouse notes that she was seemed perfectly normal last night.  Denies any any alcohol use.       Independent Historian   Patient, as per HPI.       Review of External Notes   Office visit 4/11/2024 which discusses prior seizures seen on EEG.    Past Medical History     Medical History and Problem List   Breast cancer   Cancer   Cerebral infraction   Chronic constipation   Fibromyalgia   Malignant neoplasm of female breast   Meningitis   Osteoarthritis   Osteopenia   Pneumonia   Pterygium eye   Reactive airway disease   Shingles   Skin cancer        Medications   Fosamax   Aspirin   Cleocin   Depakote   Vimpat  Singulair   Zoloft     Surgical History   Anterior c disc fusion   Arthroplasty knee   Blepharoplasty brow lift   Cholecystectomy   Colonoscopy   Discectomy   Endoscopy   Esophagoscopy, gastroscopy, duodenoscopy combined   Hysterectomy   Joint replacement knee   Mammoplasty augmentation bilateral   Mastectomy   MOHS micrographic procedure   Open reduction of ankle   Repair ligament of knee   Salivary gland surgery   Sigmoidoscopy   Tonsillectomy     Physical Exam     Patient Vitals for the past 24 hrs:   BP Temp Pulse Resp SpO2   08/09/24 2345 -- -- 75 17 --   08/09/24 2314 (!) 172/65 98.2  F (36.8   C) 74 16 100 %     Physical Exam  General: Resting on the gurney, appears anxious and uncomfortable  Head:  The scalp, face, and head appear normal.  Face somewhat flushed.  Mouth/Throat: Mucus membranes are moist  CV:  Regular rate    Normal S1 and S2  No pathological murmur   Resp:  Breath sounds clear and equal bilaterally    Non-labored, no retractions or accessory muscle use    No coarseness    No wheezing   GI:  Abdomen is soft, no rigidity    No tenderness to palpation  MS:  Normal motor assessment of all extremities.    Good capillary refill noted.    Slight right-sided weakness.  Cranial nerves II through XII intact.  Sensation intact x 4.  Difficulty with word finding and does seem easily confused.  Skin:  No rash or lesions noted.  Neuro:   Speech is normal and fluent. No apparent deficit.  Psych: Awake. Alert.  Anxious affect.  Labile mood, often tearful.      Diagnostics     Lab Results   Labs Ordered and Resulted from Time of ED Arrival to Time of ED Departure   BASIC METABOLIC PANEL - Abnormal       Result Value    Sodium 134 (*)     Potassium 4.0      Chloride 99      Carbon Dioxide (CO2) 27      Anion Gap 8      Urea Nitrogen 14.8      Creatinine 0.92      GFR Estimate 65      Calcium 8.8      Glucose 105 (*)    INR - Normal    INR 1.09     PARTIAL THROMBOPLASTIN TIME - Normal    aPTT 29     TROPONIN T, HIGH SENSITIVITY - Normal    Troponin T, High Sensitivity <6     CBC WITH PLATELETS AND DIFFERENTIAL    WBC Count 8.0      RBC Count 4.40      Hemoglobin 12.5      Hematocrit 36.8      MCV 84      MCH 28.4      MCHC 34.0      RDW 13.0      Platelet Count 182      % Neutrophils 51      % Lymphocytes 33      % Monocytes 11      % Eosinophils 4      % Basophils 0      % Immature Granulocytes 0      NRBCs per 100 WBC 0      Absolute Neutrophils 4.1      Absolute Lymphocytes 2.7      Absolute Monocytes 0.8      Absolute Eosinophils 0.3      Absolute Basophils 0.0      Absolute Immature Granulocytes 0.0       Absolute NRBCs 0.0     GLUCOSE MONITOR NURSING POCT       Imaging   CT Head Perfusion w Contrast - For Tier 2 Stroke   Final Result   IMPRESSION:    HEAD CTA:    1.  No branch occlusion.      2.  Persistent diminished flow within the right P2 segment PCA compared to the left. This is unchanged and of uncertain clinical significance.      NECK CTA:   1.  Normal neck CTA.      CT PERFUSION:   1.  13 mL area of abnormal perfusion seen in the left temporal lobe and adjacent cerebellum. This is favored to be artifactual possibly related to skull base artifact. It could be further evaluated with MRI.      2.  Perfusion is otherwise symmetric to both hemispheres.      CTA and perfusion findings were discussed with Dr. Brock at 0045 hours 8/10/2024.      CTA Head Neck with Contrast   Final Result   IMPRESSION:    HEAD CTA:    1.  No branch occlusion.      2.  Persistent diminished flow within the right P2 segment PCA compared to the left. This is unchanged and of uncertain clinical significance.      NECK CTA:   1.  Normal neck CTA.      CT PERFUSION:   1.  13 mL area of abnormal perfusion seen in the left temporal lobe and adjacent cerebellum. This is favored to be artifactual possibly related to skull base artifact. It could be further evaluated with MRI.      2.  Perfusion is otherwise symmetric to both hemispheres.      CTA and perfusion findings were discussed with Dr. Brock at 0045 hours 8/10/2024.      CT Head w/o Contrast   Final Result   IMPRESSION:   1.  No acute intracranial abnormality or significant change compared to the prior study.      Findings were relayed to Dr. Brock at 0010 hours 8/10/2024.      MR Brain w/o & w Contrast    (Results Pending)       ED Course      Medications Administered   Medications   iopamidol (ISOVUE-370) solution 117 mL (117 mLs Intravenous $Given 8/9/24 9843)     And   sodium chloride 0.9 % bag for CT scan flush use (100 mLs As instructed $Given 8/9/24 8672)   gadobutrol  (GADAVIST) injection 7 mL (7 mLs Intravenous $Given 8/10/24 0204)   sodium chloride (PF) 0.9% PF flush 10 mL (10 mLs Intravenous $Given 8/10/24 0204)       Discussion of Management   Admitting Hospitalist,    Radiologist,    Neurology,      ED Course   ED Course as of 08/10/24 0213   Fri Aug 09, 2024   2326 I have obtained history and evaluated the patient.      2335 I spoke to Dr. Hudson from Stroke   Sat Aug 10, 2024   0012 I spoke to [facility] and discussed CT findings.        Medical Decision Making / Diagnosis       SANDRA Romero is a 73 year old female who presents with right sided weakness and confusion.  She states the symptoms are similar to prior TIAs.  Review of records indicate that she has had prior focal seizure with similar symptoms as well.  CT and MRI obtained and unremarkable.  She will be admitted for further evaluation of potential breakthrough seizure as well as to allow her mental status to clear.  She is currently confused and would not be reasonable to discharge to home.  This was a rather abrupt onset.  She has no infectious symptoms.  She denies any ingestion or alcohol use.  She will be admitted to the hospitalist for further evaluation of her altered mental status and right-sided weakness.    Disposition   The patient was admitted to the hospital.     Diagnosis     ICD-10-CM    1. Spell of altered cognition  R41.89            Scribe Disclosure:  I, Karoline Powell, am serving as a scribe at 2:13 AM on 8/10/2024 to document services personally performed by Leola Brock MD based on my observations and the provider's statements to me.        Leola Brock MD  08/10/24 0741

## 2024-08-10 NOTE — PROGRESS NOTES
Non billing PN    Patient seen and H and P and neurology consult noted.    Medications renewed    She had had chronic double vision (years), Headaches (Months) but today feeling dizzy and about to fall, had prior hx of vestibular therapy in the past)  Will start meclizine and have patient see PT.    Await EEG    Continue Vimpat and all other medications.    Noted symptoms of possible carpal tunnel as well has headaches that maybe tension vs cervicogenic.    Noted recommendations for outpatient neurospychiatric testing.      PD

## 2024-08-10 NOTE — ED NOTES
[facility]  ED Nurse Handoff Report    ED Chief complaint: Stroke Symptoms      ED Diagnosis:   Final diagnoses:   Spell of altered cognition       Code Status: deferred to admitting    Allergies:   Allergies   Allergen Reactions    Levaquin Hives and Itching    Pcn [Penicillins] Hives    Tetanus Toxoid Anaphylaxis    Tetanus Toxoids Anaphylaxis    Erythromycin GI Disturbance    Amoxicillin     Duloxetine Nausea     Other reaction(s): Jittery    Silicone Rash       Patient Story:   Pt c/o AMS around 1200. Pt states she endorses HA. Pt hx stoke. Pt states she feels this is similar to last stroke. Pt denies blood thinner. Takes Asprin daily. Pt friend at bedside.         Focused Assessment:    Neuro: Alert, oriented x 3  Respiratory:Clear lung sounds, on room air   Cardiology:  nsr   Gastrointestinal: soft, non tender, non distended   Genitourinary/Renal:  purewick  Musculoskeletal: moves all extremities   Skin: Intact skin   Lines: 18g left ac    Labs Ordered and Resulted from Time of ED Arrival to Time of ED Departure   BASIC METABOLIC PANEL - Abnormal       Result Value    Sodium 134 (*)     Potassium 4.0      Chloride 99      Carbon Dioxide (CO2) 27      Anion Gap 8      Urea Nitrogen 14.8      Creatinine 0.92      GFR Estimate 65      Calcium 8.8      Glucose 105 (*)    INR - Normal    INR 1.09     PARTIAL THROMBOPLASTIN TIME - Normal    aPTT 29     TROPONIN T, HIGH SENSITIVITY - Normal    Troponin T, High Sensitivity <6     CBC WITH PLATELETS AND DIFFERENTIAL    WBC Count 8.0      RBC Count 4.40      Hemoglobin 12.5      Hematocrit 36.8      MCV 84      MCH 28.4      MCHC 34.0      RDW 13.0      Platelet Count 182      % Neutrophils 51      % Lymphocytes 33      % Monocytes 11      % Eosinophils 4      % Basophils 0      % Immature Granulocytes 0      NRBCs per 100 WBC 0      Absolute Neutrophils 4.1      Absolute Lymphocytes 2.7      Absolute Monocytes 0.8      Absolute Eosinophils 0.3       Absolute Basophils 0.0      Absolute Immature Granulocytes 0.0      Absolute NRBCs 0.0     GLUCOSE MONITOR NURSING POCT        MR Brain w/o & w Contrast   Final Result   IMPRESSION:   1.  No epileptogenic focus identified.   2.  No acute intracranial process.   3.  Stable age-related change.      CT Head Perfusion w Contrast - For Tier 2 Stroke   Final Result   IMPRESSION:    HEAD CTA:    1.  No branch occlusion.      2.  Persistent diminished flow within the right P2 segment PCA compared to the left. This is unchanged and of uncertain clinical significance.      NECK CTA:   1.  Normal neck CTA.      CT PERFUSION:   1.  13 mL area of abnormal perfusion seen in the left temporal lobe and adjacent cerebellum. This is favored to be artifactual possibly related to skull base artifact. It could be further evaluated with MRI.      2.  Perfusion is otherwise symmetric to both hemispheres.      CTA and perfusion findings were discussed with Dr. Brock at 0045 hours 8/10/2024.      CTA Head Neck with Contrast   Final Result   IMPRESSION:    HEAD CTA:    1.  No branch occlusion.      2.  Persistent diminished flow within the right P2 segment PCA compared to the left. This is unchanged and of uncertain clinical significance.      NECK CTA:   1.  Normal neck CTA.      CT PERFUSION:   1.  13 mL area of abnormal perfusion seen in the left temporal lobe and adjacent cerebellum. This is favored to be artifactual possibly related to skull base artifact. It could be further evaluated with MRI.      2.  Perfusion is otherwise symmetric to both hemispheres.      CTA and perfusion findings were discussed with Dr. Brock at 0045 hours 8/10/2024.      CT Head w/o Contrast   Final Result   IMPRESSION:   1.  No acute intracranial abnormality or significant change compared to the prior study.      Findings were relayed to Dr. Brock at 0010 hours 8/10/2024.            Treatments and/or interventions provided:      Medications   iopamidol  (ISOVUE-370) solution 117 mL (117 mLs Intravenous $Given 8/9/24 8457)     And   sodium chloride 0.9 % bag for CT scan flush use (100 mLs As instructed $Given 8/9/24 2357)   gadobutrol (GADAVIST) injection 7 mL (7 mLs Intravenous $Given 8/10/24 0204)   sodium chloride (PF) 0.9% PF flush 10 mL (10 mLs Intravenous $Given 8/10/24 0204)   acetaminophen (TYLENOL) tablet 500 mg (500 mg Oral $Given 8/10/24 0231)   ondansetron (ZOFRAN) injection 4 mg (4 mg Intravenous $Given 8/10/24 0309)        Patient's response to treatments and/or interventions:   Resting comfortably    To be done/followed up on inpatient unit:    See any in-patient orders    Does this patient have any cognitive concerns?:  issues word finding at times    Activity level - Baseline/Home:    Independent    Activity Level - Current:     Total Care    Patient's Preferred language: English     Needed?: No    Isolation: None  Infection: Not Applicable  Patient tested for COVID 19 prior to admission: NO    Bariatric?: No    Vital Signs:   Vitals:    08/10/24 0230 08/10/24 0245 08/10/24 0300 08/10/24 0315   BP:    112/52   Pulse: 76 74 75 73   Resp: 15 13 15 15   Temp:       SpO2: 94% 95% 94% 92%       Cardiac Rhythm:     Was the PSS-3 completed:   Yes    Family Comments: na    For the majority of the shift this patient's behavior was Green.   Behavioral interventions performed were na    ED NURSE PHONE NUMBER: [id number]

## 2024-08-10 NOTE — ED TRIAGE NOTES
Pt c/o AMS around 1200. Pt states she endorses HA. Pt hx stoke. Pt states she feels this is similar to last stroke. Pt denies blood thinner. Takes Asprin daily. Pt friend at bedside.

## 2024-08-10 NOTE — PLAN OF CARE
RECEIVING UNIT ED HANDOFF REVIEW    ED Nurse Handoff Report was reviewed by: Jaun Ozuna RN on August 10, 2024 at 4:18 AM

## 2024-08-10 NOTE — CONSULTS
[facility]    Neurology Consultation    Macey Romero MRN# [id number]   YOB: 1950 Age: 73 year old    Code Status:Full Code   Date of Admission: 8/9/2024  Date of Consult:08/10/2024                                                                                       Assessment and Plan:                                         #.  Right hand numbness  -- Suspect this is carpal tunnel, advised patient to wear a wrist brace that actually has a mobility to it.  This is stable and should be followed up in the outpatient setting.    # Daily headaches  Seem to be tension in description but could also be cervicogenic.  Consider PT referral on the patient for cervicogenic headaches.  MRI negative for any abnormalities.  Denies any positional or significant changes from her headaches prior to admission.  -Recommend follow-up with outpatient neurology  -Consider PT for cervicogenic headaches in the hospital and referral in the outpatient setting  -Consider outpatient sleep study for ruling out YELITZA  -May give Tylenol 1000 mg 3 times daily as needed for headaches    # Confusion?  # Abnormal EEGs placing patient at risk for seizures  By description patient did not actually sound confused yesterday.  She knew where, who she was, and who she was with.  Instead she was saying things that later on she where she is not sure where she said that.  No actual aphasia and denies word finding difficulty.  She feels improved compared to yesterday.  EEG pending to make sure her EEG has not worsened in comparison to March.  Would consider neuropsychiatry testing in the outpatient setting as she does report more memory concerns in the last year.   -EEG pending  -Recommend neuropsychiatry testing in the outpatient setting  -Continue Vimpat 50 mg BID    Will follow-up in EEG and addend note with conclusion  ADDENDUM: EEG continues to show some sharps but improved from prior. No seizures noted. Will continue  Vimpat 50mg BID.     We will sign off, recommend outpatient neurology follow-up and neuropsych referral.     ----------------------------------------------------------------------------------  ----------------------------------------------------------------------------------  Reason for consult: as above       Chief Complaint:   Concern for seizure           History of Present Illness:   This patient is a 73 year old female who presents with confusion, speech disturbances, right-sided weakness and headache. Significant past medical history includes TIA (2000), fibromyalgia, breast cancer (1987, s/p bilateral mastectomy with most recent surgical revision one year ago), benign paroxysmal vertigo, and ankylosing spondylitis.     Prior neurological history:   2000 - TIA in 2000 for involving right face and right arm weakness with dysarthria with complete resolution.   9/2023-underwent cervical discectomy  11/9/2023 -  left sided numbness and hemianopia. Found to have right P2 stenosis, MRI Brain negative for any stroke but was placed on  DAPT x 21 days for possible TIA. LP unremarkable  2/2/24 - Headache, nausea, lips/fingers feeling cold. MRI Brain negative  2/14/2024 - Headache, left sided numbness/weakness. MRI/MRV negative. LP unremarkable  3/13/2024 - Headache, feeling warm, difficulty reading and understanding. Was very anxious on arrival. EEG showed BiPLEDs and was started on Keppra.   4/16/240 stopped Keppra due to side effects of dizziness, anxious, sweating, and fatigue.  She was started on Depakote  5/20/24-patient stopped Depakote due to side effects.  She was started on Vimpat twice a day with a lot of tenderness and fatigue that was gradually improving.  7/1/24-last seen by Jackson Xie, at this appointment she was taking Vimpat 50 milligrams twice daily and doing well.  Was not having any spells.  Had been seen by rheumatology with no autoimmune disorders found.  Has follow-up with hematology for MGUS.    "  8/9: Presented for acute confusion.  CT head negative for any acute abnormalities, CTA head and neck continue to show diminished perfusion in the right P2 segment.  MR the brain showed no epileptic focus or other acute intracranial process.    Upon interview with the patient she states that she did have confusion per se she instead had episodes where she would say something and would ask \"why did I see that\".  No word finding difficulties.  No aphasia.  She knew where she was and who she was with.  She states she was with some girlfriends.  Her girlfriend stated that she seemed off and was seeing some inappropriate things.  She reports she has had headaches daily for months now they are on top of her head and more of an achy pressure.  Has some neck pain that results in the back of the head pain.  Usually helps with extra strength Tylenol.  Reports right hand numbness and she has been told this is carpal tunnel.  Denies any other numbness of the face, arm, or leg.    MEDICAL DOCUMENTATION REVIEWED: Neurology note 7/1/2024, stroke note, H&P           Past Medical History:     Past Medical History:   Diagnosis Date    Breast CA in situ 1987    Cancer (H) 1987    Right Breast treated with surgery    Cerebral infarction (H)     Chronic constipation     Fibromyalgia     History of blood transfusion     Malignant neoplasm of female breast, unspecified estrogen receptor status, unspecified laterality, unspecified site of breast (H) 06/17/2021    Meningitis     Several times as an adult    Osteoarthritis 02/01/2011    Osteopenia 02/01/2011    Pneumonia     Pterygium eye 08/26/2013    Raynaud phenomenon     Reactive airway disease 02/01/2011    Seasonal allergies     Shingles     Prior to 2008 - scalp    Skin cancer     SCC - hand, left nose    Uncomplicated asthma          Past Surgical History:     Past Surgical History:   Procedure Laterality Date    anterior c-disc fusion      ARTHROPLASTY KNEE Left 10/17/2022    " Procedure: LEFT TOTAL KNEE ARTHROPLASTY;  Surgeon: Jax Le MD;  Location:  OR    BLEPHAROPLASTY, BROW LIFT BILATERAL, COMBINED Bilateral 6/18/2018    Procedure: COMBINED BLEPHAROPLASTY, BROW LIFT BILATERAL;  BILATERAL UPPER LID BLEPHAROPLASTY; BILATERAL BROW PTOSIS REPAIR;  Surgeon: Сергей Walters MD;  Location:  EC    CHOLECYSTECTOMY      COLONOSCOPY N/A 10/19/2017    Procedure: COLONOSCOPY;  COLONOSCOPY;  Surgeon: Niko Wong MD;  Location:  GI    COLONOSCOPY N/A 8/27/2022    Procedure: COLONOSCOPY, WITH POLYPECTOMY AND BIOPSY;  Surgeon: Abraham Rogers MD;  Location:  GI    D & C      Bleeding before hysterectomy    DISCECTOMY, FUSION CERVICAL ANTERIOR ONE LEVEL, COMBINED N/A 9/20/2023    Procedure: ANTERIOR CERVICAL 5 TO CERVICAL 6 DISCECTOMY AND FUSION;  Surgeon: Alex Galindo MD;  Location:  OR    ENDOSCOPY  04/21/08    ESOPHAGOSCOPY, GASTROSCOPY, DUODENOSCOPY (EGD), COMBINED N/A 8/27/2022    Procedure: ESOPHAGOGASTRODUODENOSCOPY, WITH BIOPSY;  Surgeon: Abraham Rogers MD;  Location:  GI    HYSTERECTOMY, MARCUS      Ovaries and tubes out due to breast cancer    JOINT REPLACEMTN, KNEE RT/LT Right 01/2011    Joint Replacement knee RT, with tibial straightening (2 replacements)    MAMMOPLASTY AUGMENTATION BILATERAL      breast ca     MASTECTOMY, SIMPLE RT/LT/CLAIRE Bilateral 1989    Mastectomy Simple RT/LT/CLAIRE    MOHS MICROGRAPHIC PROCEDURE      Left lateral nose    OPEN REDUCTION INTERNAL FIXATION ANKLE  8/27/2013    Procedure: OPEN REDUCTION INTERNAL FIXATION ANKLE;  RIGHT OPEN REDUCTION INTERNAL FIXATION ANKLE WITH LIGAMENT REPAIR;  Surgeon: Nando Chang MD;  Location:  OR    PTERYGIUM WITH CONJUNCTIVAL AUTOLOGOUS TRANSPLANT Left 8/29/2016    Procedure: PTERYGIUM WITH CONJUNCTIVAL AUTOLOGOUS TRANSPLANT;  Surgeon: Сергей Walters MD;  Location:  EC    REPAIR LIGAMENT ANKLE  8/27/2013    Procedure: REPAIR LIGAMENT ANKLE;;  Surgeon: Nando Chang MD;   Location:  OR    SALIVARY GLAND SURGERY Left     Stone removal     SIGMOIDOSCOPY FLEXIBLE N/A 8/30/2022    Procedure: FLEXIBLE SIGMOIDOSCOPY;  Surgeon: Niko Wong MD;  Location:  GI    TONSILLECTOMY            Social History:     Social History     Socioeconomic History    Marital status:    Tobacco Use    Smoking status: Former     Types: Cigarettes    Smokeless tobacco: Never    Tobacco comments:     quit but  still smokes, but not in house   Vaping Use    Vaping status: Never Used   Substance and Sexual Activity    Alcohol use: No     Alcohol/week: 0.0 standard drinks of alcohol    Drug use: No    Sexual activity: Never     Partners: Male     Social Determinants of Health     Financial Resource Strain: Low Risk  (1/5/2024)    Financial Resource Strain     Within the past 12 months, have you or your family members you live with been unable to get utilities (heat, electricity) when it was really needed?: No   Food Insecurity: Low Risk  (1/5/2024)    Food Insecurity     Within the past 12 months, did you worry that your food would run out before you got money to buy more?: No     Within the past 12 months, did the food you bought just not last and you didn t have money to get more?: No   Transportation Needs: Low Risk  (1/5/2024)    Transportation Needs     Within the past 12 months, has lack of transportation kept you from medical appointments, getting your medicines, non-medical meetings or appointments, work, or from getting things that you need?: No   Housing Stability: Low Risk  (1/5/2024)    Housing Stability     Do you have housing? : Yes     Are you worried about losing your housing?: No         Family History:     Family History   Problem Relation Age of Onset    Respiratory Father     Cancer Brother         sarcoidosis    Diabetes Brother     Cerebrovascular Disease Brother     Liver Disease Brother           Home Medications:     Prior to Admission Medications   Prescriptions Last  Dose Informant Patient Reported? Taking?   Multiple Vitamins-Minerals (MULTIVITAMIN ADULT) CHEW   Yes Yes   Sig: Chew 2 gummies by mouth daily   acetaminophen (TYLENOL) 500 MG tablet   Yes Yes   Sig: Take 1,000 mg by mouth daily   albuterol (PROAIR HFA/PROVENTIL HFA/VENTOLIN HFA) 108 (90 Base) MCG/ACT inhaler  at PRN  No Yes   Sig: Inhale 2 puffs into the lungs every 6 hours as needed for shortness of breath   alendronate (FOSAMAX) 70 MG tablet 8/3/2024  No Yes   Sig: Take 1 tablet (70 mg) by mouth every 7 days   aspirin (ASA) 325 MG EC tablet 8/9/2024 at am  No Yes   Sig: Take 1 tablet (325 mg) by mouth daily Take with food or meal.   budesonide-formoterol (SYMBICORT) 160-4.5 MCG/ACT Inhaler  at PRN  No Yes   Sig: Inhale 2 puffs into the lungs daily   Patient taking differently: Inhale 2 puffs into the lungs daily as needed With rescue inhaler   clindamycin (CLEOCIN) 300 MG capsule  at PRN  Yes Yes   Sig: Take 300 mg by mouth Take 1 hour prior to dental appointment.   hydrocortisone 2.5 % cream  at PRN  Yes Yes   Sig: Apply topically daily as needed To the face   lacosamide (VIMPAT) 50 MG TABS tablet 8/9/2024 at 1000  Yes Yes   Sig: Take 50 mg by mouth 2 times daily At 1000 and 2200   metroNIDAZOLE (METROCREAM) 0.75 % external cream  at PRN  No Yes   Sig: Apply topically 2 times daily   polyethylene glycol (MIRALAX) 17 g packet  at PRN  Yes Yes   Sig: Take 1 packet by mouth daily as needed for constipation   senna-docusate (SENOKOT-S/PERICOLACE) 8.6-50 MG tablet  at PRN  Yes Yes   Sig: Take 1 tablet by mouth daily as needed for constipation   sertraline (ZOLOFT) 50 MG tablet 8/8/2024 at pm  No Yes   Sig: Take 0.5 tablets (25 mg) by mouth daily      Facility-Administered Medications: None          Allergy:     Allergies   Allergen Reactions    Pcn [Penicillins] Hives    Tetanus Toxoids Anaphylaxis    Erythromycin GI Disturbance    Levaquin Hives and Itching    Amoxicillin     Duloxetine Nausea     Other reaction(s):  Jittery    Silicone Rash          Inpatient Medications:   Scheduled Meds:  Current Facility-Administered Medications   Medication Dose Route Frequency Provider Last Rate Last Admin     PRN Meds:   Current Facility-Administered Medications   Medication Dose Route Frequency Provider Last Rate Last Admin    acetaminophen (TYLENOL) tablet 650 mg  650 mg Oral Q4H PRN Cristino Herman MD   650 mg at 08/10/24 0628    Or    acetaminophen (TYLENOL) Suppository 650 mg  650 mg Rectal Q4H PRN Cristino Herman MD        melatonin tablet 3 mg  3 mg Oral At Bedtime PRN Cristino Herman MD        ondansetron (ZOFRAN ODT) ODT tab 4 mg  4 mg Oral Q6H PRN Cristino Herman MD        Or    ondansetron (ZOFRAN) injection 4 mg  4 mg Intravenous Q6H PRCristino Jo MD        prochlorperazine (COMPAZINE) injection 5 mg  5 mg Intravenous Q6H PRN Cristino Herman MD        Or    prochlorperazine (COMPAZINE) tablet 5 mg  5 mg Oral Q6H PRN Cristino Herman MD        Or    prochlorperazine (COMPAZINE) suppository 12.5 mg  12.5 mg Rectal Q12H PRN Cristino Herman MD        senna-docusate (SENOKOT-S/PERICOLACE) 8.6-50 MG per tablet 1 tablet  1 tablet Oral BID PRCristino Jo MD        Or    senna-docusate (SENOKOT-S/PERICOLACE) 8.6-50 MG per tablet 2 tablet  2 tablet Oral BID PRCristino Jo MD                 Physical Exam:   Physical Exam   Vitals:  Height:Data Unavailable  Weight:0 lbs 0 oz   Temp: 97.9  F (36.6  C) Temp src: Oral BP: 128/57 Pulse: 72   Resp: 16 SpO2: 95 % O2 Device: None (Room air)    General Appearance: No acute distress, normal body habitus  Neuro Exam:  Mental Status Exam: Alert and oriented to month, year, president, hospital, city, and situation.  She did not know the exact date. Language and speech intact. Fund of knowledge normal.  Cranial Nerves: Vision/visual fields intact to finger counting. Pupils are equal and reactive to light. Extraocular  movements intact. Facial strength and sensation is normal. No jaw or tongue deviation.  Motor: Motor tone and bulk are intact in extremities.  Full strength in the bilateral upper extremities and lower extremities except left lower extremity 4+/5 hip flexion which appears to be similar to outside testing.  Reflexes: Symmetrically intact. Plantar signs normal.  Sensory: Vibration and cold intact in all 4 extremities.  Positive Tinel's of the right hand.  Coordination: Coordination intact.  Extremities: No clubbing, no cyanosis, no edema          Data:   ROUTINE IP LABS   CBC RESULTS:     Recent Labs   Lab 08/10/24  0652 08/10/24  0038   WBC 7.7 8.0   RBC 4.59 4.40   HGB 12.6 12.5   HCT 38.8 36.8    182     Basic Metabolic Panel:   Recent Labs   Lab Test 08/10/24  0652 08/10/24  0622 08/10/24  0038 03/15/24  1717 03/14/24  0800     --  134*  --  140   POTASSIUM 4.3  --  4.0  --  4.2   CHLORIDE 102  --  99  --  107   CO2 29  --  27  --  25   BUN 13.5  --  14.8  --  11.8   CR 0.93  --  0.92  --  0.83   GLC 92 102* 105*   < > 106*   DONNA 9.0  --  8.8  --  8.7*    < > = values in this interval not displayed.     Liver panel:  Recent Labs   Lab Test 02/02/24  1703 11/15/23  1151 11/12/23  1759 11/11/23  0820 11/09/23  0756   PROTTOTAL 7.5 6.9 6.3* 6.2* 6.0*   ALBUMIN 4.3 3.8 3.4* 3.3* 3.2*   BILITOTAL 0.3 0.4 0.4 0.5 0.6   ALKPHOS 86 165* 190* 213* 260*   AST 21 36 36 44 47*   ALT 14 39 55* 75* 106*     Lipid Profile:  Recent Labs   Lab Test 03/14/24  0800 09/12/23  1650 10/10/22  1415 10/27/21  1037 06/25/21  1046   CHOL 144 197 207* 206* 209*   HDL 39* 37* 45 43 42   LDL 78 128 136* 134* 139*   TRIG 135 163* 146 160* 154*     Thyroid Panel:  Recent Labs   Lab Test 03/13/24  1019 11/08/23  2159   TSH 2.00 2.41      Vitamin B12: No lab results found.        IMAGING:   Independent interpretation of the following studies by myself as part of today's encounter.   MRI brain with without contrast 8/10/2024    FINDINGS:  INTRACRANIAL CONTENTS: Dedicated imaging of the temporal lobes demonstrates symmetrical size and signal intensity of the mesial temporal structures including the hippocampus. No malformations of cortical development. No acute or subacute infarct. No   mass, acute hemorrhage, or extra-axial fluid collections.Mild volume loss and presumed chronic small vessel ischemia are stable. Normal position of the cerebellar tonsils. No pathologic contrast enhancement.     SELLA: No abnormality accounting for technique.     OSSEOUS STRUCTURES/SOFT TISSUES: Normal marrow signal. The major intracranial vascular flow voids are maintained.      ORBITS: No abnormality accounting for technique.      SINUSES/MASTOIDS: No paranasal sinus mucosal disease. No middle ear or mastoid effusion.                                                                       IMPRESSION:  1.  No epileptogenic focus identified.  2.  No acute intracranial process.  3.  Stable age-related change.    Time:  76 minutes evaluation and management.     Nery Barker M.D.  [facility]  Office [phone]

## 2024-08-10 NOTE — PROGRESS NOTES
OBS GOALS:    -diagnostic tests and consults completed and resulted - NOT MET. Awaiting EEG  -vital signs normal or at patient baseline- MET   -tolerating oral intake to maintain hydration - MET  -adequate pain control on oral analgesics - ongoing. Reports 5/10 headache, tylenol ordered  -returns to baseline functional status- ongoing. Generalized weakness reported   -safe disposition plan has been identified- NOT MET   Nurse to notify provider when observation goals have been met and patient is ready for discharge.

## 2024-08-10 NOTE — H&P
[facility]    History and Physical  Hospitalist       Date of Admission:  8/9/2024  Date of Service (when I saw the patient): 08/10/24    ASSESSMENT  Macey Romero is a markedly pleasant 73 year old woman with past medical history that is most notable for cervical spine stenosis, TIA, fibromyalgia, COPD, CKD Stage 3, and seizure disorder, among others; who presents with acute confusion, and is found to have suspected recurrent seizure.    PLAN     Possible seizure: Of note, Ms. Blankenship underwent cervical discectomy in 9/2023. She was admitted to [facility] in 11/2023 for evaluation of neck pain, myalgias and fever. While hospitalized, she developed acute onset left visual field cut, left sided numbness, and dysarthria. She was found on CT imaging to have right sided P2 arterial stenosis vs partial occlusion. MRI brain was negative for acute stroke. LP was obtained and results were unremarkable. During this admission, she also endorsed hallucinations, impaired short term memory and feeling slower in daily tasks. She was treated for presumed TIA with DAPT. Then, in 2/2024, she was admitted here for headache and left sided paresthesias. She was found to have accelerated hypertension. Brain imaging was again negative for acute stroke.    Most recently, she was admitted 3/2024 for a spell of confusion with right upper extremity weakness and dizziness. Seizure and vestibular dysfunction were suspected. Ongoing moderate focal narrowing at the P1-P2 junction of the right posterior cerebral artery with relatively decreased filling of the right PCA compared to the left PCA was again seen on CT imaging. MRI Brain was again negative for stroke. EEG was reportedly concerning for epileptiform discharges. [facility] was consulted and Keppra started. Since then it seems she has been seen in ongoing close follow up with Neurology and transitioned to Vimpat due to intolerance to Keppra and Depakote.      Now, she presents for acute confusion. In the ED, she is afebrile, without hypotension, tachycardia, or hypoxia. WBC is normal. Troponin T is negative. EKG shows NSR. CTH shows no acute intracranial abnormality. CTA of the head and neck with perfusion study are notable for persistent diminished flow within the right P2 segment PCA compared to the left. A 13 mL area of abnormal perfusion is seen in the left temporal lobe and adjacent cerebellum, favored to be artifactual possibly related to skull base artifact. MRI of the brain shows no epileptogenic focus identified or other acute intracranial process.     Overall, the case has been discussed with Stroke Neurology and recurrent TIA/stroke seem less likely. Recurrent seizure or perhaps atypical migraine suspected. There are no clear signs of acute infectious process.       -- Observation. Fall and seizure precautions. Q 4 neuro checks. Neurology consulted. EEG ordered. Vimpat to be resumed when verified    -- Resume home  mg for stroke prevention when verified    -- UA and CXR ordered    -- Repeat CBC and BMP in AM. SW consulted for disposition planning.    CKD Stage 3: Monitor closely. Repeat BMP in AM.    Recent Labs   Lab Test 08/10/24  0038 03/14/24  0800 03/13/24  1019   CR 0.92 0.83 0.90     COPD/asthma: Resume home inhalers when verified.    Hyperlipidemia: Resume home statin when verified    Chronic anxiety and depression: Resume home medications when verified.    I have spent 75 minutes on the date of service doing chart review, history, examination, documentation, and further activities per the note.    Chief Complaint   Confusion    History is obtained from the patient and the ED physician whom I have spoken with    History of Present Illness   Macey Romero is a markedly pleasant 73 year old woman who presents with acute onset of confusion today. She says that she was out with friends at a high school reunion today. They went to the [place]  theater and saw a show there, and then went out for dinner. She says that the whole time, she felt confused, not quite sure of what was happening around her. She soon developed concomitant associated headache over the top of her head. Reportedly later this evening the confusion worsened further and she became unable to speak the words she wanted to say; her friends reported her face to appear flushed and she began to cry. She also reportedly felt right sided weakness and tingling. She came in for further evaluation. She says that many of these symptoms persist now. She can see hazy lines around the edges of her TV screen. She otherwise denies fever, dyspnea, or other acute complaints. IV Zofran and Tylenol given in the ED have partially improved her symptoms.    In the ED,   08/09 2314 172/65 98.2  F (36.8  C) 74 16 100 %     CBC and BMP were notable for Na 134, otherwise were within the normal reference range. WBC was 8.0. Glucose was 105. Troponin T was less than 6. EKG showed NSR without ST segment changes. INR was 1.09.    The case was discussed with the Stroke Service in the ED.    Recent Results (from the past 24 hour(s))   CT Head w/o Contrast    Narrative    EXAM: CT HEAD W/O CONTRAST  LOCATION: [facility]  DATE: 8/10/2024    INDICATION: Code Stroke to evaluate for potential thrombolysis and thrombectomy. PLEASE READ IMMEDIATELY.  COMPARISON: CT head 6/25/2024.  TECHNIQUE: Routine CT Head without IV contrast. Multiplanar reformats. Dose reduction techniques were used.    FINDINGS:  INTRACRANIAL CONTENTS: No intracranial hemorrhage, extraaxial collection, or mass effect.  No CT evidence of acute infarct. Mild volume loss and presumed chronic small vessel ischemia are stable.    VISUALIZED ORBITS/SINUSES/MASTOIDS: No intraorbital abnormality. No paranasal sinus mucosal disease. No middle ear or mastoid effusion.    BONES/SOFT TISSUES: No acute abnormality.      Impression     IMPRESSION:  1.  No acute intracranial abnormality or significant change compared to the prior study.    Findings were relayed to Dr. Brock at 0010 hours 8/10/2024.   CTA Head Neck with Contrast    Narrative    EXAM: CTA HEAD NECK W CONTRAST, CT HEAD PERFUSION W CONTRAST  LOCATION: [facility]  DATE: 8/10/2024    INDICATION: Code Stroke to evaluate for potential thrombolysis and thrombectomy. PLEASE READ IMMEDIATELY.  COMPARISON: CTA head and neck from 3/13/2024 and brain MRI from 3/13/2024. CT head 6/25/2024.  TECHNIQUE: Head and neck CT angiogram with IV contrast. Axial helical CT images of the head and neck vessels obtained during the arterial phase of intravenous contrast administration. Axial 2D reconstructed images and multiplanar 3D MIP reconstructed   images of the head and neck vessels were performed by the technologist. Additional CT cerebral perfusion was performed utilizing a second contrast bolus. Perfusion data were post processed with generation of standard perfusion maps and estimation of   ischemic/infarcted volumes utilizing standard threshold values. Dose reduction techniques were used. All stenosis measurements made according to NASCET criteria unless otherwise specified.  CONTRAST: 117 mL Isovue 370    FINDINGS:     HEAD CTA:  ANTERIOR CIRCULATION: No stenosis/occlusion, aneurysm, or high flow vascular malformation. Standard [place] anatomy.    POSTERIOR CIRCULATION: No stenosis/occlusion, aneurysm, or high flow vascular malformation. Balanced vertebral arteries supply a normal basilar artery. Relatively diminished flow within the right P2 segment and beyond is unchanged compared to the prior   study and nonspecific.    DURAL VENOUS SINUSES: Expected enhancement of the major dural venous sinuses.    NECK CTA:  RIGHT CAROTID: No measurable stenosis or dissection.    LEFT CAROTID: No measurable stenosis or dissection.    VERTEBRAL ARTERIES: No focal stenosis or  dissection. Balanced vertebral arteries.    AORTIC ARCH: Classic aortic arch anatomy with no significant stenosis at the origin of the great vessels.    NONVASCULAR STRUCTURES: Unremarkable.    CT PERFUSION:  PERFUSION MAPS: There is a small zone of abnormal perfusion suggested by the rapid sequences in the left anteroinferior temporal lobe and the adjacent cerebellum. These findings are favored to be artifactual. No large occlusion is seen in this region.   MRI could be obtained for further evaluation    RAPID ANALYSIS:  CBF<30%: 0 mL  Tmax>6sec: 13 mL  Mismatch volume: 13 mL  Mismatch ratio: Infinite      Impression    IMPRESSION:   HEAD CTA:   1.  No branch occlusion.    2.  Persistent diminished flow within the right P2 segment PCA compared to the left. This is unchanged and of uncertain clinical significance.    NECK CTA:  1.  Normal neck CTA.    CT PERFUSION:  1.  13 mL area of abnormal perfusion seen in the left temporal lobe and adjacent cerebellum. This is favored to be artifactual possibly related to skull base artifact. It could be further evaluated with MRI.    2.  Perfusion is otherwise symmetric to both hemispheres.    CTA and perfusion findings were discussed with Dr. Brock at 0045 hours 8/10/2024.   CT Head Perfusion w Contrast - For Tier 2 Stroke    Narrative    EXAM: CTA HEAD NECK W CONTRAST, CT HEAD PERFUSION W CONTRAST  LOCATION: [facility]  DATE: 8/10/2024    INDICATION: Code Stroke to evaluate for potential thrombolysis and thrombectomy. PLEASE READ IMMEDIATELY.  COMPARISON: CTA head and neck from 3/13/2024 and brain MRI from 3/13/2024. CT head 6/25/2024.  TECHNIQUE: Head and neck CT angiogram with IV contrast. Axial helical CT images of the head and neck vessels obtained during the arterial phase of intravenous contrast administration. Axial 2D reconstructed images and multiplanar 3D MIP reconstructed   images of the head and neck vessels were performed by the  technologist. Additional CT cerebral perfusion was performed utilizing a second contrast bolus. Perfusion data were post processed with generation of standard perfusion maps and estimation of   ischemic/infarcted volumes utilizing standard threshold values. Dose reduction techniques were used. All stenosis measurements made according to NASCET criteria unless otherwise specified.  CONTRAST: 117 mL Isovue 370    FINDINGS:     HEAD CTA:  ANTERIOR CIRCULATION: No stenosis/occlusion, aneurysm, or high flow vascular malformation. Standard [place] anatomy.    POSTERIOR CIRCULATION: No stenosis/occlusion, aneurysm, or high flow vascular malformation. Balanced vertebral arteries supply a normal basilar artery. Relatively diminished flow within the right P2 segment and beyond is unchanged compared to the prior   study and nonspecific.    DURAL VENOUS SINUSES: Expected enhancement of the major dural venous sinuses.    NECK CTA:  RIGHT CAROTID: No measurable stenosis or dissection.    LEFT CAROTID: No measurable stenosis or dissection.    VERTEBRAL ARTERIES: No focal stenosis or dissection. Balanced vertebral arteries.    AORTIC ARCH: Classic aortic arch anatomy with no significant stenosis at the origin of the great vessels.    NONVASCULAR STRUCTURES: Unremarkable.    CT PERFUSION:  PERFUSION MAPS: There is a small zone of abnormal perfusion suggested by the rapid sequences in the left anteroinferior temporal lobe and the adjacent cerebellum. These findings are favored to be artifactual. No large occlusion is seen in this region.   MRI could be obtained for further evaluation    RAPID ANALYSIS:  CBF<30%: 0 mL  Tmax>6sec: 13 mL  Mismatch volume: 13 mL  Mismatch ratio: Infinite      Impression    IMPRESSION:   HEAD CTA:   1.  No branch occlusion.    2.  Persistent diminished flow within the right P2 segment PCA compared to the left. This is unchanged and of uncertain clinical significance.    NECK CTA:  1.  Normal neck  CTA.    CT PERFUSION:  1.  13 mL area of abnormal perfusion seen in the left temporal lobe and adjacent cerebellum. This is favored to be artifactual possibly related to skull base artifact. It could be further evaluated with MRI.    2.  Perfusion is otherwise symmetric to both hemispheres.    CTA and perfusion findings were discussed with Dr. Brock at 0045 hours 8/10/2024.   MR Brain w/o & w Contrast    Narrative    EXAM: MR BRAIN W/O and W CONTRAST  LOCATION: [facility]  DATE: 8/10/2024    INDICATION: Right-sided weakness.  COMPARISON: None.  CONTRAST: 7 mL Gadavist.  TECHNIQUE: 1.5 Alesha multiplanar multisequence head MRI without and with intravenous contrast according to the seizure protocol.    FINDINGS:  INTRACRANIAL CONTENTS: Dedicated imaging of the temporal lobes demonstrates symmetrical size and signal intensity of the mesial temporal structures including the hippocampus. No malformations of cortical development. No acute or subacute infarct. No   mass, acute hemorrhage, or extra-axial fluid collections.Mild volume loss and presumed chronic small vessel ischemia are stable. Normal position of the cerebellar tonsils. No pathologic contrast enhancement.    SELLA: No abnormality accounting for technique.    OSSEOUS STRUCTURES/SOFT TISSUES: Normal marrow signal. The major intracranial vascular flow voids are maintained.     ORBITS: No abnormality accounting for technique.     SINUSES/MASTOIDS: No paranasal sinus mucosal disease. No middle ear or mastoid effusion.       Impression    IMPRESSION:  1.  No epileptogenic focus identified.  2.  No acute intracranial process.  3.  Stable age-related change.       PHYSICAL EXAM  Blood pressure 121/44, pulse 75, temperature 98.2  F (36.8  C), resp. rate 15, SpO2 94%, not currently breastfeeding.  Constitutional: Alert and oriented to person, place and time; no apparent distress; speech is rambling and tangential at times  Respiratory: lungs  clear to auscultation bilaterally  Cardiovascular: regular S1 S2  GI: abdomen soft non tender non distended bowel sounds positive  Musculoskeletal: no clubbing, cyanosis or edema  Neurologic: extra-ocular muscles intact; moves all four extremities     DVT Prophylaxis: Pneumatic Compression Devices  Code Status: Full Code    Disposition: Expected discharge in 0-2 days    Cristino Herman MD, MD    Past Medical History    I have reviewed this patient's medical history and updated it with pertinent information if needed.   Past Medical History:   Diagnosis Date    Breast CA in situ 1987    Cancer (H) 1987    Right Breast treated with surgery    Cerebral infarction (H)     Chronic constipation     Fibromyalgia     History of blood transfusion     Malignant neoplasm of female breast, unspecified estrogen receptor status, unspecified laterality, unspecified site of breast (H) 06/17/2021    Meningitis     Several times as an adult    Osteoarthritis 02/01/2011    Osteopenia 02/01/2011    Pneumonia     Pterygium eye 08/26/2013    Raynaud phenomenon     Reactive airway disease 02/01/2011    Seasonal allergies     Shingles     Prior to 2008 - scalp    Skin cancer     SCC - hand, left nose    Uncomplicated asthma        Past Surgical History   I have reviewed this patient's surgical history and updated it with pertinent information if needed.  Past Surgical History:   Procedure Laterality Date    anterior c-disc fusion      ARTHROPLASTY KNEE Left 10/17/2022    Procedure: LEFT TOTAL KNEE ARTHROPLASTY;  Surgeon: Jax Le MD;  Location:  OR    BLEPHAROPLASTY, BROW LIFT BILATERAL, COMBINED Bilateral 6/18/2018    Procedure: COMBINED BLEPHAROPLASTY, BROW LIFT BILATERAL;  BILATERAL UPPER LID BLEPHAROPLASTY; BILATERAL BROW PTOSIS REPAIR;  Surgeon: Сергей Watlers MD;  Location:  EC    CHOLECYSTECTOMY      COLONOSCOPY N/A 10/19/2017    Procedure: COLONOSCOPY;  COLONOSCOPY;  Surgeon: Niko Wong MD;  Location:   GI    COLONOSCOPY N/A 8/27/2022    Procedure: COLONOSCOPY, WITH POLYPECTOMY AND BIOPSY;  Surgeon: Abraham Rogers MD;  Location:  GI    D & C      Bleeding before hysterectomy    DISCECTOMY, FUSION CERVICAL ANTERIOR ONE LEVEL, COMBINED N/A 9/20/2023    Procedure: ANTERIOR CERVICAL 5 TO CERVICAL 6 DISCECTOMY AND FUSION;  Surgeon: Alex Galindo MD;  Location:  OR    ENDOSCOPY  04/21/08    ESOPHAGOSCOPY, GASTROSCOPY, DUODENOSCOPY (EGD), COMBINED N/A 8/27/2022    Procedure: ESOPHAGOGASTRODUODENOSCOPY, WITH BIOPSY;  Surgeon: Abraham Rogers MD;  Location:  GI    HYSTERECTOMY, MARCUS      Ovaries and tubes out due to breast cancer    JOINT REPLACEMTN, KNEE RT/LT Right 01/2011    Joint Replacement knee RT, with tibial straightening (2 replacements)    MAMMOPLASTY AUGMENTATION BILATERAL      breast ca     MASTECTOMY, SIMPLE RT/LT/CLAIRE Bilateral 1989    Mastectomy Simple RT/LT/CLAIRE    MOHS MICROGRAPHIC PROCEDURE      Left lateral nose    OPEN REDUCTION INTERNAL FIXATION ANKLE  8/27/2013    Procedure: OPEN REDUCTION INTERNAL FIXATION ANKLE;  RIGHT OPEN REDUCTION INTERNAL FIXATION ANKLE WITH LIGAMENT REPAIR;  Surgeon: Nando Chang MD;  Location:  OR    PTERYGIUM WITH CONJUNCTIVAL AUTOLOGOUS TRANSPLANT Left 8/29/2016    Procedure: PTERYGIUM WITH CONJUNCTIVAL AUTOLOGOUS TRANSPLANT;  Surgeon: Сергей Walters MD;  Location:  [facility]    REPAIR LIGAMENT ANKLE  8/27/2013    Procedure: REPAIR LIGAMENT ANKLE;;  Surgeon: Nando Chang MD;  Location:  OR    SALIVARY GLAND SURGERY Left     Stone removal     SIGMOIDOSCOPY FLEXIBLE N/A 8/30/2022    Procedure: FLEXIBLE SIGMOIDOSCOPY;  Surgeon: Niko Wong MD;  Location:  GI    TONSILLECTOMY         Prior to Admission Medications   Prior to Admission Medications   Prescriptions Last Dose Informant Patient Reported? Taking?   acetaminophen (TYLENOL) 500 MG tablet   Yes No   Sig: Take 1,000 mg by mouth 2 times daily   albuterol (PROAIR HFA/PROVENTIL  HFA/VENTOLIN HFA) 108 (90 Base) MCG/ACT inhaler   No No   Sig: Inhale 2 puffs into the lungs every 6 hours as needed for shortness of breath   alendronate (FOSAMAX) 70 MG tablet   No No   Sig: Take 1 tablet (70 mg) by mouth every 7 days   aspirin (ASA) 325 MG EC tablet   No No   Sig: Take 1 tablet (325 mg) by mouth daily Take with food or meal.   budesonide-formoterol (SYMBICORT) 160-4.5 MCG/ACT Inhaler   No No   Sig: Inhale 2 puffs into the lungs daily   clindamycin (CLEOCIN) 300 MG capsule   Yes No   Sig: Take 300 mg by mouth Take 1 hour prior to dental appointment.   divalproex sodium delayed-release (DEPAKOTE) 250 MG DR tablet   Yes No   Sig: Take by mouth every 12 hours   lacosamide (VIMPAT) 50 MG TABS tablet   Yes No   Sig: Take by mouth 2 times daily   methylPREDNISolone (MEDROL DOSEPAK) 4 MG tablet therapy pack   No No   Sig: Follow Package Directions   metroNIDAZOLE (METROCREAM) 0.75 % external cream   No No   Sig: Apply topically 2 times daily   montelukast (SINGULAIR) 10 MG tablet   No No   Sig: Take 1 tablet (10 mg) by mouth at bedtime for 30 days   sertraline (ZOLOFT) 50 MG tablet   No No   Sig: Take 0.5 tablets (25 mg) by mouth daily   triamcinolone (KENALOG) 0.025 % external ointment   No No   Sig: Apply topically 2 times daily      Facility-Administered Medications: None     Allergies   Allergies   Allergen Reactions    Levaquin Hives and Itching    Pcn [Penicillins] Hives    Tetanus Toxoid Anaphylaxis    Tetanus Toxoids Anaphylaxis    Erythromycin GI Disturbance    Amoxicillin     Duloxetine Nausea     Other reaction(s): Jittery    Silicone Rash       Social History   I have reviewed this patient's social history and updated it with pertinent information if needed. Macey BALBUENA Heather  reports that she has quit smoking. Her smoking use included cigarettes. She has never used smokeless tobacco. She reports that she does not drink alcohol and does not use drugs.    Family History   Family history assessed  and, except as above, is non-contributory.    Family History   Problem Relation Age of Onset    Respiratory Father     Cancer Brother         sarcoidosis    Diabetes Brother     Cerebrovascular Disease Brother     Liver Disease Brother        Review of Systems   The 10 point Review of Systems is negative other than noted in the HPI or here.     Primary Care Physician   Long Ferrari    Data   Labs Ordered and Resulted from Time of ED Arrival to Time of ED Departure   BASIC METABOLIC PANEL - Abnormal       Result Value    Sodium 134 (*)     Potassium 4.0      Chloride 99      Carbon Dioxide (CO2) 27      Anion Gap 8      Urea Nitrogen 14.8      Creatinine 0.92      GFR Estimate 65      Calcium 8.8      Glucose 105 (*)    INR - Normal    INR 1.09     PARTIAL THROMBOPLASTIN TIME - Normal    aPTT 29     TROPONIN T, HIGH SENSITIVITY - Normal    Troponin T, High Sensitivity <6     CBC WITH PLATELETS AND DIFFERENTIAL    WBC Count 8.0      RBC Count 4.40      Hemoglobin 12.5      Hematocrit 36.8      MCV 84      MCH 28.4      MCHC 34.0      RDW 13.0      Platelet Count 182      % Neutrophils 51      % Lymphocytes 33      % Monocytes 11      % Eosinophils 4      % Basophils 0      % Immature Granulocytes 0      NRBCs per 100 WBC 0      Absolute Neutrophils 4.1      Absolute Lymphocytes 2.7      Absolute Monocytes 0.8      Absolute Eosinophils 0.3      Absolute Basophils 0.0      Absolute Immature Granulocytes 0.0      Absolute NRBCs 0.0     GLUCOSE MONITOR NURSING POCT       Data reviewed today:  I personally reviewed the EKG tracing showing NSR and the brain MRI image(s) showing no acute pathology .

## 2024-08-10 NOTE — CONSULTS
"  [facility]    Stroke Telephone Note    I was called by Leola Valladares on 08/09/24 regarding patient Macey Romero. The patient is a 73 year old female with history of HTN, prediabetes, R breast cancer s/p bilateral mastectomy, tobacco use, anxiety who presented with confusion, speech disturbances, right sided weakness and headache since 12 noon today.     Prior neurological history:   2005 - \"Of note patient reports history of stroke in 2004/5 but her MRI from this time period is per report in our system normal\" per Dr. Urbano's note on 3/13/24  11/9/2023 - P/w left sided numbness and hemianopia. 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 Was placed on DAPT x 21 days for possible TIA. LP unremarkable  2/2/24 - P/w Headache, nausea, lips/fingers feeling cold. MRI Brain -ve  2/14/2024 - Headache, left sided numbness/weakness. MRI/MRV -ve. LP unremarkable  3/13/2024 - Headache, feeling warm, difficulty reading and understanding. Was very anxious on arrival. EEG showed BiPLEDs and was started on Keppra.     She has had multiple MRIs w/ SWI sequence that show no stigmata of cerebral amyloid angiopathy. CTA showed right P2 stenosis that has been fairly stable and she has been on Aspirin 325 mg daily.     She has been following with [facility] neurology, she did not tolerate Keppra or Depakote and was placed on Vimpat 50 mg bid.     Vitals  BP: (!) 172/65   Pulse: 75   Resp: 17   Temp: 98.2  F (36.8  C)        Stroke Code Data (for stroke code without tele)  Stroke code activated 08/09/24  2331   Stroke provider first response 08/09/24  2335   Last known normal 08/09/24  1200      Time of discovery (or onset of symptoms) 08/09/24  1200   Head CT read by Stroke Neuro Provider 08/10/24  0007   Was stroke code de-escalated? Yes  08/10/24  0024     Imaging Findings  CT head: No acute findings  CTA head/neck: Stable right P2 stenosis.     Intravenous Thrombolysis  Not given due to:   - unclear " "or unfavorable risk-benefit profile for extended window thrombolysis beyond the conventional 4.5 hour time window    Endovascular Treatment  Not initiated due to absence of proximal vessel occlusion    Impression  Recurrent neurological spell with associated headache - very low suspicion for TIA/stroke    Recommendations   Given new right sided weakness (unlike prior episodes w/ left sided weakness), recommend MRI Brain wwo contrast. If negative and symptoms still persistent, will benefit from general neurology consult due to her history of abnormal EEG associated with these confusion spells.     I personally spent a total of 35 minutes on August 10, 2024 consulting with her  medical providers and coordinating care.  This includes personally reviewing the patient's medical record including diagnostic testing, neuroimaging, and laboratory studies.    My recommendations are based on the information provided over the phone by Macey Romero's in-person providers. They are not intended to replace the clinical judgment of her in-person providers. I was not requested to personally see or examine the patient at this time.     Papito Garber MD       Department of Neurology       Securely message with the [employer] Web Console (learn more here)   To page me or covering stroke neurology team member, click here: AMCOM  Choose \"On Call\" tab at top, then select \"NEUROLOGY/ALL SITES\" from middle drop-down box, press Enter, then look for \"stroke\" or \"telestroke\" for your site.    "

## 2024-08-10 NOTE — PLAN OF CARE
PRIMARY Concern: Pt presented with HA/R sided weakness/speech disturbances/confusion.  Stroke deescalated  SAFETY RISK Concerns (fall risk, behaviors, etc.): Fall risk. Seizure risk        Isolation/Type: N/A  Tests/Procedures for NEXT shift: CXR just completed awaiting results to be read. EEG pending.  Consults? (Pending/following, signed-off?) Neurology & SW pending  Where is patient from? (Home, TCU, etc.): Home  Other Important info for NEXT shift: Q4 neuros   Pt PTA meds need to be ordered.  Anticipated DC date & active delays: TBD  _____________________________________________________________________________  SUMMARY NOTE:  Orientation/Cognitive: A&Ox4  Observation Goals (Met/ Not Met): Not met  Mobility Level/Assist Equipment: A1 gb/w  Antibiotics & Plan (IV/po, length of tx left): N/A  Pain Management: Tylenol for HA  Tele/VS/O2: VSS on RA  ABNL Lab/BG: NA:134. UA to be collected  Diet: Reg  Bowel/Bladder: Continent  Skin Concerns: Scattered bruising throughout   Drains/Devices: PIV SL  Patient Stated Goal for Today: Pain control

## 2024-08-10 NOTE — PROGRESS NOTES
OBS GOALS:     -diagnostic tests and consults completed and resulted - EEG pending  -vital signs normal or at patient baseline- MET   -tolerating oral intake to maintain hydration - MET  -adequate pain control on oral analgesics - ongoing. Tolerable pain with OTC tylenol.   -returns to baseline functional status- Meclizine ordered for possible vertigo, PT consult pending.    -safe disposition plan has been identified- ongoing  Nurse to notify provider when observation goals have been met and patient is ready for discharge

## 2024-08-10 NOTE — PHARMACY-ADMISSION MEDICATION HISTORY
Pharmacy Intern Admission Medication History    Admission medication history is complete. The information provided in this note is only as accurate as the sources available at the time of the update.    Information Source(s): Patient and CareEverywhere/SureScripts via in-person    Pertinent Information:   Pt did not recognize depakote or keppra, doesn't think she's taking and confirms only taking lacosamide for seizures    Changes made to PTA medication list:  Added: miralax, senna S, multivitamin, hydrocortisone cream  Deleted: depakote 250mg DR (LF 4/17 #170/90ds), keppra 500mg (LF 3/15 #60/30ds), montelukast, gabapentin, methocarbamol, pravastatin, linaclotide  Changed: None    Allergies reviewed with patient and updates made in EHR: yes    Medication History Completed By: CHASITY CHANDLER 8/10/2024 10:13 AM    PTA Med List   Medication Sig Last Dose    acetaminophen (TYLENOL) 500 MG tablet Take 1,000 mg by mouth daily     albuterol (PROAIR HFA/PROVENTIL HFA/VENTOLIN HFA) 108 (90 Base) MCG/ACT inhaler Inhale 2 puffs into the lungs every 6 hours as needed for shortness of breath  at PRN    alendronate (FOSAMAX) 70 MG tablet Take 1 tablet (70 mg) by mouth every 7 days 8/3/2024    aspirin (ASA) 325 MG EC tablet Take 1 tablet (325 mg) by mouth daily Take with food or meal. 8/9/2024 at am    budesonide-formoterol (SYMBICORT) 160-4.5 MCG/ACT Inhaler Inhale 2 puffs into the lungs daily (Patient taking differently: Inhale 2 puffs into the lungs daily as needed With rescue inhaler)  at PRN    clindamycin (CLEOCIN) 300 MG capsule Take 300 mg by mouth Take 1 hour prior to dental appointment.  at PRN    hydrocortisone 2.5 % cream Apply topically daily as needed To the face  at PRN    lacosamide (VIMPAT) 50 MG TABS tablet Take 50 mg by mouth 2 times daily At 1000 and 2200 8/9/2024 at 1000    metroNIDAZOLE (METROCREAM) 0.75 % external cream Apply topically 2 times daily  at PRN    Multiple Vitamins-Minerals (MULTIVITAMIN ADULT)  CHEW Chew 2 gummies by mouth daily     polyethylene glycol (MIRALAX) 17 g packet Take 1 packet by mouth daily as needed for constipation  at PRN    senna-docusate (SENOKOT-S/PERICOLACE) 8.6-50 MG tablet Take 1 tablet by mouth daily as needed for constipation  at PRN    sertraline (ZOLOFT) 50 MG tablet Take 0.5 tablets (25 mg) by mouth daily 8/8/2024 at pm

## 2024-08-10 NOTE — PLAN OF CARE
Observation Goals:  -diagnostic tests and consults completed and resulted: Not met; pending EEG  -vital signs normal or at patient baseline: Met  -tolerating oral intake to maintain hydration: Met  -adequate pain control on oral analgesics: Met  -returns to baseline functional status: Not met  -safe disposition plan has been identified: Not met  Nurse to notify provider when observation goals have been met and patient is ready for discharge.

## 2024-08-11 ENCOUNTER — APPOINTMENT (OUTPATIENT)
Dept: PHYSICAL THERAPY | Facility: CLINIC | Age: 74
End: 2024-08-11
Attending: INTERNAL MEDICINE
Payer: MEDICARE

## 2024-08-11 VITALS
DIASTOLIC BLOOD PRESSURE: 70 MMHG | RESPIRATION RATE: 16 BRPM | SYSTOLIC BLOOD PRESSURE: 142 MMHG | OXYGEN SATURATION: 93 % | HEART RATE: 71 BPM | TEMPERATURE: 99.1 F

## 2024-08-11 PROCEDURE — G0378 HOSPITAL OBSERVATION PER HR: HCPCS

## 2024-08-11 PROCEDURE — 97161 PT EVAL LOW COMPLEX 20 MIN: CPT | Mod: GP

## 2024-08-11 PROCEDURE — 97530 THERAPEUTIC ACTIVITIES: CPT | Mod: GP

## 2024-08-11 PROCEDURE — 99239 HOSP IP/OBS DSCHRG MGMT >30: CPT | Performed by: INTERNAL MEDICINE

## 2024-08-11 PROCEDURE — 250N000013 HC RX MED GY IP 250 OP 250 PS 637: Performed by: INTERNAL MEDICINE

## 2024-08-11 PROCEDURE — 97116 GAIT TRAINING THERAPY: CPT | Mod: GP

## 2024-08-11 RX ORDER — MECLIZINE HYDROCHLORIDE 25 MG/1
25 TABLET ORAL 3 TIMES DAILY
Qty: 15 TABLET | Refills: 0 | Status: SHIPPED | OUTPATIENT
Start: 2024-08-11 | End: 2024-08-11

## 2024-08-11 RX ADMIN — MECLIZINE HYDROCHLORIDE 25 MG: 25 TABLET ORAL at 14:44

## 2024-08-11 RX ADMIN — ACETAMINOPHEN 1000 MG: 500 TABLET, FILM COATED ORAL at 08:46

## 2024-08-11 RX ADMIN — MECLIZINE HYDROCHLORIDE 25 MG: 25 TABLET ORAL at 08:46

## 2024-08-11 RX ADMIN — ASPIRIN 325 MG: 325 TABLET, COATED ORAL at 08:46

## 2024-08-11 RX ADMIN — LACOSAMIDE 50 MG: 50 TABLET, FILM COATED ORAL at 09:51

## 2024-08-11 ASSESSMENT — ACTIVITIES OF DAILY LIVING (ADL)
ADLS_ACUITY_SCORE: 36

## 2024-08-11 NOTE — PLAN OF CARE
PRIMARY Concern: Pt presented with HA/R sided weakness/speech disturbances/confusion.  SAFETY RISK Concerns (fall risk, behaviors, etc.): Fall risk. Seizurer isIsolation/Type: N/A  Tests/Procedures for NEXT shift: Speech Eval  Consults? (Pending/following, signed-off?) Neurology & SW pending  Where is patient from? (Home, TCU, etc.): Home  Other Important info for NEXT shift: Q4 neuros   Pt PTA meds need to be ordered. Refusing Bed Alarm   Anticipated DC date & active delays: TBD  _____________________________________________________________________________  SUMMARY NOTE:  Orientation/Cognitive: A&Ox4  Observation Goals (Met/ Not Met): Not met  Mobility Level/Assist Equipment: A1 gb/w  Antibiotics & Plan (IV/po, length of tx left): N/A  Pain Management: Tylenol for HA  Tele/VS/O2: VSS on RA  ABNL Lab/BG: See Chart  Diet: Reg  Bowel/Bladder: Continent  Skin Concerns: Scattered bruising   Drains/Devices: PIV SL  Patient Stated Goal for Today: Pain control                  Observation Goals:  -diagnostic tests and consults completed and resulted: Met  -vital signs normal or at patient baseline: Met  -tolerating oral intake to maintain hydration: Met  -adequate pain control on oral analgesics: Met  -returns to baseline functional status: Not met  -safe disposition plan has been identified: Not met  Nurse to notify provider when observation goals have been met and patient is ready for discharge.

## 2024-08-11 NOTE — PLAN OF CARE
PRIMARY Concern: Pt presented with HA/R sided weakness/speech disturbances/confusion.  SAFETY RISK Concerns (fall risk, behaviors, etc.): Fall risk. Seizure risIsolation/Type: N/A  Tests/Procedures for NEXT shift: Speech Eval  Consults? (Pending/following, signed-off?) Neurology & SW pending  Where is patient from? (Home, TCU, etc.): Home  Other Important info for NEXT shift: Q4 neuros   Pt PTA meds need to be ordered. Refusing Bed Alarm   Anticipated DC date & active delays: TBD  _____________________________________________________________________________  SUMMARY NOTE:  Orientation/Cognitive: A&Ox4  Observation Goals (Met/ Not Met): Not met  Mobility Level/Assist Equipment: SBA/Waker  Antibiotics & Plan (IV/po, length of tx left): N/A  Pain Management: Tylenol for HA  Tele/VS/O2: VSS on RA  ABNL Lab/BG: See Chart  Diet: Reg  Bowel/Bladder: Continent  Skin Concerns: Scattered bruising   Drains/Devices: PIV SL  Patient Stated Goal for Today: Pain control

## 2024-08-11 NOTE — PLAN OF CARE
Observation Goals:  -diagnostic tests and consults completed and resulted: Met  -vital signs normal or at patient baseline: Met  -tolerating oral intake to maintain hydration: Met  -adequate pain control on oral analgesics: Met  -returns to baseline functional status: Not met  -safe disposition plan has been identified: Not met  Nurse to notify provider when observation goals have been met and patient is ready for discharge.

## 2024-08-11 NOTE — PROGRESS NOTES
Here for seizure vs atypical migraines. AOx4.   Neuros intact. VSS on RA. Up w/ SBA. Improved headache with tylenol. Voiding in BR. Tolerating diet. Takes pills whole. EEG done today. Discharge pending PT recommendations.

## 2024-08-11 NOTE — PLAN OF CARE
Goal Outcome Evaluation:       Patient has been discharged August 11, 2024 4:01 PM. Discharge instructions and education reviewed, medication schedule and follow up appts discussed. Pt had no questions. All belongings sent with pt.

## 2024-08-11 NOTE — DISCHARGE SUMMARY
[facility]  Hospitalist Discharge Summary      Date of Admission:  8/9/2024  Date of Discharge:  8/11/2024  Discharging Provider: David Herron MD  Discharge Service: Hospitalist Service    Discharge Diagnoses   Macey Romero is a markedly pleasant 73 year old woman with past medical history that is most notable for cervical spine stenosis, TIA, fibromyalgia, COPD, CKD Stage 3, and seizure disorder, among others; who presents with acute confusion, and is found to have suspected recurrent seizure.       Hx of possible seizure  Concern for recurrent seizure  Dizziness secondary to Vimpat.    CKD Stage 3:        COPD/asthma:      Hyperlipidemia:       Chronic anxiety and depression:     Clinically Significant Risk Factors          Follow-ups Needed After Discharge   Follow-up Appointments     Follow-up and recommended labs and tests       Follow up with primary care provider, Long Ferrari, in 7-10 days   for follow up of hospitalization and set up for outpatient neurpsychiatric   testing for memory difficulties        {Additional follow-up instructions/to-do's for PCP and Neurology    Unresulted Labs Ordered in the Past 30 Days of this Admission       No orders found from 7/10/2024 to 8/10/2024.        These results will be followed up by PCP and Neurology    Discharge Disposition   Discharged to home  Condition at discharge: Stable    Hospital Course   Macey Romero is a markedly pleasant 73 year old woman with past medical history that is most notable for cervical spine stenosis, TIA, fibromyalgia, COPD, CKD Stage 3, and seizure disorder, among others; who presents with acute confusion, and is found to have suspected recurrent seizure.     PLAN      Hx of possible seizure  Concern for recurrent seizure  Dizziness secondary to Vimpat.   Of note, Ms. Blankenship underwent cervical discectomy in 9/2023. She was admitted to [facility] in 11/2023 for evaluation of neck pain, myalgias and fever.  While hospitalized, she developed acute onset left visual field cut, left sided numbness, and dysarthria. She was found on CT imaging to have right sided P2 arterial stenosis vs partial occlusion. MRI brain was negative for acute stroke. LP was obtained and results were unremarkable. During this admission, she also endorsed hallucinations, impaired short term memory and feeling slower in daily tasks. She was treated for presumed TIA with DAPT. Then, in 2/2024, she was admitted here for headache and left sided paresthesias. She was found to have accelerated hypertension. Brain imaging was again negative for acute stroke.     Most recently, she was admitted 3/2024 for a spell of confusion with right upper extremity weakness and dizziness. Seizure and vestibular dysfunction were suspected. Ongoing moderate focal narrowing at the P1-P2 junction of the right posterior cerebral artery with relatively decreased filling of the right PCA compared to the left PCA was again seen on CT imaging. MRI Brain was again negative for stroke. EEG was reportedly concerning for epileptiform discharges. [facility] was consulted and Keppra started. Since then it seems she has been seen in ongoing close follow up with Neurology and transitioned to Vimpat due to intolerance to Keppra and Depakote.     Now, she presents for acute confusion. In the ED, she is afebrile, without hypotension, tachycardia, or hypoxia. WBC is normal. Troponin T is negative. EKG shows NSR. CTH shows no acute intracranial abnormality. CTA of the head and neck with perfusion study are notable for persistent diminished flow within the right P2 segment PCA compared to the left. A 13 mL area of abnormal perfusion is seen in the left temporal lobe and adjacent cerebellum, favored to be artifactual possibly related to skull base artifact. MRI of the brain shows no epileptogenic focus identified or other acute intracranial process.      Overall, the case has  been discussed with Stroke Neurology and recurrent TIA/stroke seem less likely. Recurrent seizure or perhaps atypical migraine suspected. There are no clear signs of acute infectious process.       -- Observation. Fall and seizure precautions. Q 4 neuro checks. Neurology consulted. EEG shows no seizure activity. MRI shows no stroke, CTA shows chronic persistent decrease of P2 of the PCA on the right that is unchanged    -- Resume home  mg for stroke prevention when verified    -- UA and CXR are negative    -- seen by PT and ok for discharge with home assist, she lives with , ordered outpatient PT    -- her dizziness is secondary to her Vimpat, she has other AED intolerances as well    -- follow up with outpatient neurology and also for memory issues with neuropsych testing     CKD Stage 3: Monitor closely. Repeat BMP in AM.     COPD/asthma: Resume home inhalers       Hyperlipidemia: Resume home statin       Chronic anxiety and depression: Resume home medication     Consultations This Hospital Stay   CARE MANAGEMENT / SOCIAL WORK IP CONSULT  NEUROLOGY IP CONSULT  PHYSICAL THERAPY ADULT IP CONSULT    Code Status   Full Code    Time Spent on this Encounter   I, David Herron MD, personally saw the patient today and spent greater than 30 minutes discharging this patient.       David Herron MD  [facility] EXTENDED RECOVERY AND SHORT STAY  [address] [place] [id number]  Phone: [phone]  ______________________________________________________________________    Physical Exam   Vital Signs: Temp: 99.1  F (37.3  C) Temp src: Oral BP: (!) 142/70 Pulse: 71   Resp: 16 SpO2: 93 % O2 Device: None (Room air)    Weight: 0 lbs 0 oz         Primary Care Physician   Long Ferrari    Discharge Orders      Primary Care - Care Coordination Referral      Physical Therapy  Referral      Reason for your hospital stay    Admit with dizziness, evaluated for possible  seizure or stroke     Follow-up and recommended labs and tests     Follow up with primary care provider, Long Ferrari, in 7-10 days for follow up of hospitalization and set up for outpatient neurpsychiatric testing for memory difficulties     Activity    Your activity upon discharge: activity as tolerated     Diet    Follow this diet upon discharge: Orders Placed This Encounter      Regular Diet Adult       Significant Results and Procedures   Most Recent 3 CBC's:  Recent Labs   Lab Test 08/10/24  0652 08/10/24  0038 03/14/24  0800   WBC 7.7 8.0 5.5   HGB 12.6 12.5 12.5   MCV 85 84 85    182 163     Most Recent 3 BMP's:  Recent Labs   Lab Test 08/10/24  0652 08/10/24  0622 08/10/24  0038 03/15/24  1717 03/14/24  0800     --  134*  --  140   POTASSIUM 4.3  --  4.0  --  4.2   CHLORIDE 102  --  99  --  107   CO2 29  --  27  --  25   BUN 13.5  --  14.8  --  11.8   CR 0.93  --  0.92  --  0.83   ANIONGAP 7  --  8  --  8   DONNA 9.0  --  8.8  --  8.7*   GLC 92 102* 105*   < > 106*    < > = values in this interval not displayed.   ,   Results for orders placed or performed during the hospital encounter of 08/09/24   CT Head w/o Contrast    Narrative    EXAM: CT HEAD W/O CONTRAST  LOCATION: [facility]  DATE: 8/10/2024    INDICATION: Code Stroke to evaluate for potential thrombolysis and thrombectomy. PLEASE READ IMMEDIATELY.  COMPARISON: CT head 6/25/2024.  TECHNIQUE: Routine CT Head without IV contrast. Multiplanar reformats. Dose reduction techniques were used.    FINDINGS:  INTRACRANIAL CONTENTS: No intracranial hemorrhage, extraaxial collection, or mass effect.  No CT evidence of acute infarct. Mild volume loss and presumed chronic small vessel ischemia are stable.    VISUALIZED ORBITS/SINUSES/MASTOIDS: No intraorbital abnormality. No paranasal sinus mucosal disease. No middle ear or mastoid effusion.    BONES/SOFT TISSUES: No acute abnormality.      Impression     IMPRESSION:  1.  No acute intracranial abnormality or significant change compared to the prior study.    Findings were relayed to Dr. Brock at 0010 hours 8/10/2024.   CTA Head Neck with Contrast    Narrative    EXAM: CTA HEAD NECK W CONTRAST, CT HEAD PERFUSION W CONTRAST  LOCATION: [facility]  DATE: 8/10/2024    INDICATION: Code Stroke to evaluate for potential thrombolysis and thrombectomy. PLEASE READ IMMEDIATELY.  COMPARISON: CTA head and neck from 3/13/2024 and brain MRI from 3/13/2024. CT head 6/25/2024.  TECHNIQUE: Head and neck CT angiogram with IV contrast. Axial helical CT images of the head and neck vessels obtained during the arterial phase of intravenous contrast administration. Axial 2D reconstructed images and multiplanar 3D MIP reconstructed   images of the head and neck vessels were performed by the technologist. Additional CT cerebral perfusion was performed utilizing a second contrast bolus. Perfusion data were post processed with generation of standard perfusion maps and estimation of   ischemic/infarcted volumes utilizing standard threshold values. Dose reduction techniques were used. All stenosis measurements made according to NASCET criteria unless otherwise specified.  CONTRAST: 117 mL Isovue 370    FINDINGS:     HEAD CTA:  ANTERIOR CIRCULATION: No stenosis/occlusion, aneurysm, or high flow vascular malformation. Standard [place] anatomy.    POSTERIOR CIRCULATION: No stenosis/occlusion, aneurysm, or high flow vascular malformation. Balanced vertebral arteries supply a normal basilar artery. Relatively diminished flow within the right P2 segment and beyond is unchanged compared to the prior   study and nonspecific.    DURAL VENOUS SINUSES: Expected enhancement of the major dural venous sinuses.    NECK CTA:  RIGHT CAROTID: No measurable stenosis or dissection.    LEFT CAROTID: No measurable stenosis or dissection.    VERTEBRAL ARTERIES: No focal stenosis or  dissection. Balanced vertebral arteries.    AORTIC ARCH: Classic aortic arch anatomy with no significant stenosis at the origin of the great vessels.    NONVASCULAR STRUCTURES: Unremarkable.    CT PERFUSION:  PERFUSION MAPS: There is a small zone of abnormal perfusion suggested by the rapid sequences in the left anteroinferior temporal lobe and the adjacent cerebellum. These findings are favored to be artifactual. No large occlusion is seen in this region.   MRI could be obtained for further evaluation    RAPID ANALYSIS:  CBF<30%: 0 mL  Tmax>6sec: 13 mL  Mismatch volume: 13 mL  Mismatch ratio: Infinite      Impression    IMPRESSION:   HEAD CTA:   1.  No branch occlusion.    2.  Persistent diminished flow within the right P2 segment PCA compared to the left. This is unchanged and of uncertain clinical significance.    NECK CTA:  1.  Normal neck CTA.    CT PERFUSION:  1.  13 mL area of abnormal perfusion seen in the left temporal lobe and adjacent cerebellum. This is favored to be artifactual possibly related to skull base artifact. It could be further evaluated with MRI.    2.  Perfusion is otherwise symmetric to both hemispheres.    CTA and perfusion findings were discussed with Dr. Brock at 0045 hours 8/10/2024.   CT Head Perfusion w Contrast - For Tier 2 Stroke    Narrative    EXAM: CTA HEAD NECK W CONTRAST, CT HEAD PERFUSION W CONTRAST  LOCATION: [facility]  DATE: 8/10/2024    INDICATION: Code Stroke to evaluate for potential thrombolysis and thrombectomy. PLEASE READ IMMEDIATELY.  COMPARISON: CTA head and neck from 3/13/2024 and brain MRI from 3/13/2024. CT head 6/25/2024.  TECHNIQUE: Head and neck CT angiogram with IV contrast. Axial helical CT images of the head and neck vessels obtained during the arterial phase of intravenous contrast administration. Axial 2D reconstructed images and multiplanar 3D MIP reconstructed   images of the head and neck vessels were performed by the  technologist. Additional CT cerebral perfusion was performed utilizing a second contrast bolus. Perfusion data were post processed with generation of standard perfusion maps and estimation of   ischemic/infarcted volumes utilizing standard threshold values. Dose reduction techniques were used. All stenosis measurements made according to NASCET criteria unless otherwise specified.  CONTRAST: 117 mL Isovue 370    FINDINGS:     HEAD CTA:  ANTERIOR CIRCULATION: No stenosis/occlusion, aneurysm, or high flow vascular malformation. Standard [place] anatomy.    POSTERIOR CIRCULATION: No stenosis/occlusion, aneurysm, or high flow vascular malformation. Balanced vertebral arteries supply a normal basilar artery. Relatively diminished flow within the right P2 segment and beyond is unchanged compared to the prior   study and nonspecific.    DURAL VENOUS SINUSES: Expected enhancement of the major dural venous sinuses.    NECK CTA:  RIGHT CAROTID: No measurable stenosis or dissection.    LEFT CAROTID: No measurable stenosis or dissection.    VERTEBRAL ARTERIES: No focal stenosis or dissection. Balanced vertebral arteries.    AORTIC ARCH: Classic aortic arch anatomy with no significant stenosis at the origin of the great vessels.    NONVASCULAR STRUCTURES: Unremarkable.    CT PERFUSION:  PERFUSION MAPS: There is a small zone of abnormal perfusion suggested by the rapid sequences in the left anteroinferior temporal lobe and the adjacent cerebellum. These findings are favored to be artifactual. No large occlusion is seen in this region.   MRI could be obtained for further evaluation    RAPID ANALYSIS:  CBF<30%: 0 mL  Tmax>6sec: 13 mL  Mismatch volume: 13 mL  Mismatch ratio: Infinite      Impression    IMPRESSION:   HEAD CTA:   1.  No branch occlusion.    2.  Persistent diminished flow within the right P2 segment PCA compared to the left. This is unchanged and of uncertain clinical significance.    NECK CTA:  1.  Normal neck  CTA.    CT PERFUSION:  1.  13 mL area of abnormal perfusion seen in the left temporal lobe and adjacent cerebellum. This is favored to be artifactual possibly related to skull base artifact. It could be further evaluated with MRI.    2.  Perfusion is otherwise symmetric to both hemispheres.    CTA and perfusion findings were discussed with Dr. Brock at 0045 hours 8/10/2024.   MR Brain w/o & w Contrast    Narrative    EXAM: MR BRAIN W/O and W CONTRAST  LOCATION: [facility]  DATE: 8/10/2024    INDICATION: Right-sided weakness.  COMPARISON: None.  CONTRAST: 7 mL Gadavist.  TECHNIQUE: 1.5 Alesha multiplanar multisequence head MRI without and with intravenous contrast according to the seizure protocol.    FINDINGS:  INTRACRANIAL CONTENTS: Dedicated imaging of the temporal lobes demonstrates symmetrical size and signal intensity of the mesial temporal structures including the hippocampus. No malformations of cortical development. No acute or subacute infarct. No   mass, acute hemorrhage, or extra-axial fluid collections.Mild volume loss and presumed chronic small vessel ischemia are stable. Normal position of the cerebellar tonsils. No pathologic contrast enhancement.    SELLA: No abnormality accounting for technique.    OSSEOUS STRUCTURES/SOFT TISSUES: Normal marrow signal. The major intracranial vascular flow voids are maintained.     ORBITS: No abnormality accounting for technique.     SINUSES/MASTOIDS: No paranasal sinus mucosal disease. No middle ear or mastoid effusion.       Impression    IMPRESSION:  1.  No epileptogenic focus identified.  2.  No acute intracranial process.  3.  Stable age-related change.   XR Chest Port 1 View    Narrative    EXAM: CHEST SINGLE VIEW PORTABLE  LOCATION: [facility]  DATE: 08/10/2024    INDICATION: Dyspnea.  COMPARISON: 03/14/2014.    FINDINGS: No convincing pulmonary opacities. Normal-sized cardiac silhouette. Atherosclerotic  calcification in the thoracic aorta. Fusion hardware in the lower cervical spine.      Impression    IMPRESSION: No convincing evidence of active cardiopulmonary disease.          Discharge Medications   Current Discharge Medication List        CONTINUE these medications which have NOT CHANGED    Details   acetaminophen (TYLENOL) 500 MG tablet Take 1,000 mg by mouth daily      albuterol (PROAIR HFA/PROVENTIL HFA/VENTOLIN HFA) 108 (90 Base) MCG/ACT inhaler Inhale 2 puffs into the lungs every 6 hours as needed for shortness of breath  Qty: 18 g, Refills: 3    Comments: Pharmacy may dispense brand covered by insurance (Proair, or proventil or ventolin or generic albuterol inhaler)  Associated Diagnoses: Reactive airway disease, moderate persistent, with acute exacerbation      alendronate (FOSAMAX) 70 MG tablet Take 1 tablet (70 mg) by mouth every 7 days  Qty: 12 tablet, Refills: 3    Associated Diagnoses: Age-related osteoporosis without current pathological fracture      aspirin (ASA) 325 MG EC tablet Take 1 tablet (325 mg) by mouth daily Take with food or meal.    Associated Diagnoses: TIA (transient ischemic attack)      budesonide-formoterol (SYMBICORT) 160-4.5 MCG/ACT Inhaler Inhale 2 puffs into the lungs daily  Qty: 10.2 g, Refills: 3    Associated Diagnoses: Reactive airway disease, moderate persistent, with acute exacerbation      clindamycin (CLEOCIN) 300 MG capsule Take 300 mg by mouth Take 1 hour prior to dental appointment.      hydrocortisone 2.5 % cream Apply topically daily as needed To the face      lacosamide (VIMPAT) 50 MG TABS tablet Take 50 mg by mouth 2 times daily At 1000 and 2200      metroNIDAZOLE (METROCREAM) 0.75 % external cream Apply topically 2 times daily  Qty: 45 g, Refills: 1    Associated Diagnoses: Rosacea      Multiple Vitamins-Minerals (MULTIVITAMIN ADULT) CHEW Chew 2 gummies by mouth daily      polyethylene glycol (MIRALAX) 17 g packet Take 1 packet by mouth daily as needed for  constipation      senna-docusate (SENOKOT-S/PERICOLACE) 8.6-50 MG tablet Take 1 tablet by mouth daily as needed for constipation      sertraline (ZOLOFT) 50 MG tablet Take 0.5 tablets (25 mg) by mouth daily  Qty: 30 tablet, Refills: 0    Associated Diagnoses: Reactive depression           Allergies   Allergies   Allergen Reactions    Pcn [Penicillins] Hives    Tetanus Toxoids Anaphylaxis    Erythromycin GI Disturbance    Levaquin Hives and Itching    Amoxicillin     Duloxetine Nausea     Other reaction(s): Jittery    Silicone Rash

## 2024-08-11 NOTE — PLAN OF CARE
08/11/24 1238   Appointment Info   Signing Clinician's Name / Credentials (PT) Gladys Gutierrez DPT   Quick Adds   Quick Adds Vestibular Eval   Living Environment   People in Home spouse   Current Living Arrangements house   Living Environment Comments needs on main level. 2 JUANITO   Self-Care   Usual Activity Tolerance good   Current Activity Tolerance fair   Fall history within last six months no   Activity/Exercise/Self-Care Comment Pt IND at baseline, has FWW from previous knee surgeries. Reports hx of impaired balance, headaches, double vision. Went to OP vestibular therapy to address. Reports did not completely resolve. Has HEP from OP PT states she does not do all the exercises   General Information   Onset of Illness/Injury or Date of Surgery 08/09/24   Referring Physician David Herron MD   Pertinent History of Current Problem (include personal factors and/or comorbidities that impact the POC) This patient is a 73 year old female who presents with confusion, speech disturbances, right-sided weakness and headache. Significant past medical history includes TIA (2000), fibromyalgia, breast cancer (1987, s/p bilateral mastectomy with most recent surgical revision one year ago), benign paroxysmal vertigo, and ankylosing spondylitis.   Cognition   Cognitive Status Comments Forgetful, repeats self   Pain Assessment   Patient Currently in Pain Yes, see Vital Sign flowsheet  (R carpal tunnel)   Integumentary/Edema   Integumentary/Edema Comments Wearing brace to R wrist   Posture    Posture Forward head position   Range of Motion (ROM)   ROM Comment BLE WFL   Strength (Manual Muscle Testing)   Strength Comments Pt demonstrates gross functional weakness with OOB mobility.   Bed Mobility   Comment, (Bed Mobility) IND   Transfers   Comment, (Transfers) SBA   Gait/Stairs (Locomotion)   Comment, (Gait/Stairs) Pt ambulates w/ no AD, lateral path deviation, impaired balance with head turns   Balance   Balance  Comments impaired dynamic balance from baseline, positive Rhomberg EC   Cervicogenic Screen   Neck ROM WFL   Oculomotor Exam   Smooth Pursuit Normal   VOR Normal   Convergence Testing Abnormal   Convergence Testing Comments reporting double vision   Infrared Goggle Exam or Frenzel Lense Exam   Exam completed with Room Light   PT Total Evaluation Time   PT Sasha Low Complexity Minutes (15384) 12   Physical Therapy Goals   PT Frequency Daily   PT Predicted Duration/Target Date for Goal Attainment 08/13/24   PT Goals Bed Mobility;Transfers;Gait;Stairs   PT: Bed Mobility Supine to/from sit;Independent   PT: Transfers Independent;Sit to/from stand;Bed to/from chair   PT: Gait Independent;50 feet;Modified independent;Greater than 200 feet   PT: Stairs Supervision/stand-by assist;Greater than 10 stairs;Rail on left   Interventions   Interventions Quick Adds Therapeutic Activity;Gait Training   PT Discharge Planning   PT Plan Continue to assess balance/vertigo/vestibular, progress gait no AD, balance exercises   PT Discharge Recommendation (DC Rec) home with assist;home with outpatient physical therapy   PT Rationale for DC Rec Pt currently demonstrates impaired dynamic balance from baseline, demonstrating lateral lean and path devation without use of AD during ambulation. Improves with use of FWW. Has FWW at home. Pt completed vestibular therapy as OP, has home exercises she is supposed to continue. Recommend pt continues HEP, follow up with OP vestibular therapy at [place]. Recommend use of FWW for mobility until balance improves. Pt has concerns about R carpal tunnel syndrome, recommend OP PT to address these concerns. Pt has brace   PT Brief overview of current status Goals of therapy will be to address safe mobility and make recommendations for discharge to next level of care. Patient and RN will continue to follow all falls risk precautions as documented by RN staff while hospitalized. Pt SBA with FWW   Total Session Time    Total Session Time (sum of timed and untimed services) 12

## 2024-08-11 NOTE — PROGRESS NOTES
Observation goals  PRIOR TO DISCHARGE        Comments: -diagnostic tests and consults completed and resulted-Met.   -vital signs normal or at patient baseline-Met.  -tolerating oral intake to maintain hydration-Met.   -adequate pain control on oral analgesics-Met.  -returns to baseline functional status-Not met, still weak.  -safe disposition plan has been identified-Not met.  Nurse to notify provider when observation goals have been met and patient is ready for discharge.

## 2024-08-11 NOTE — PROGRESS NOTES
PRIMARY Concern: Pt presented with HA/R sided weakness/speech disturbances/confusion.  SAFETY RISK Concerns (fall risk, behaviors, etc.): Fall risk. Seizurer isIsolation/Type: N/A  Tests/Procedures for NEXT shift: Speech Eval  Consults? (Pending/following, signed-off?) Neurology & SW pending  Where is patient from? (Home, TCU, etc.): Home  Other Important info for NEXT shift: Q4 neuros   Pt PTA meds need to be ordered. Refusing Bed Alarm   Anticipated DC date & active delays: TBD  _____________________________________________________________________________  SUMMARY NOTE: Neuros mostly intact beside tingling at the tip of fingers, weakness to right hand with brace on. Patient reported feeling weak, especially after taking Vimpat. No changes in mental status this morning.   Orientation/Cognitive: A&Ox4  Observation Goals (Met/ Not Met): Not met  Mobility Level/Assist Equipment: A1 gb/w  Antibiotics & Plan (IV/po, length of tx left): N/A  Pain Management: Tylenol for HA  Tele/VS/O2: VSS on RA  ABNL Lab/BG: See Chart  Diet: Reg  Bowel/Bladder: Continent  Skin Concerns: Scattered bruising   Drains/Devices: PIV SL  Patient Stated Goal for Today: Pain control

## 2024-08-11 NOTE — CONSULTS
CTS note. Chart reviewed and pt discussed in interdisciplinary rounds. No anticipated discharge needs at this time. CTS team will continue to monitor daily in interdisciplinary rounds and will intervene as needed. If immediate needs arise, please contact CTS team at [phone]      Jennifer Goff RN

## 2024-08-11 NOTE — DISCHARGE SUMMARY
[facility]  Hospitalist Discharge Summary      Date of Admission:  8/9/2024  Date of Discharge:  8/11/2024  Discharging Provider: David Herron MD  Discharge Service: Hospitalist Service    Discharge Diagnoses   Macey Romero is a markedly pleasant 73 year old woman with past medical history that is most notable for cervical spine stenosis, TIA, fibromyalgia, COPD, CKD Stage 3, and seizure disorder, among others; who presents with acute confusion, and is found to have suspected recurrent seizure.       Hx of possible seizure  Concern for recurrent seizure  Dizziness secondary to Vimpat.    CKD Stage 3:        COPD/asthma:      Hyperlipidemia:       Chronic anxiety and depression:     Clinically Significant Risk Factors          Follow-ups Needed After Discharge   Follow-up Appointments     Follow-up and recommended labs and tests       Follow up with primary care provider, Long Ferrari, in 7-10 days   for follow up of hospitalization and set up for outpatient neurpsychiatric   testing for memory difficulties        {Additional follow-up instructions/to-do's for PCP and Neurology    Unresulted Labs Ordered in the Past 30 Days of this Admission       No orders found from 7/10/2024 to 8/10/2024.        These results will be followed up by PCP and Neurology    Discharge Disposition   Discharged to home  Condition at discharge: Stable    Hospital Course   Macey Romero is a markedly pleasant 73 year old woman with past medical history that is most notable for cervical spine stenosis, TIA, fibromyalgia, COPD, CKD Stage 3, and seizure disorder, among others; who presents with acute confusion, and is found to have suspected recurrent seizure.     PLAN      Hx of possible seizure  Concern for recurrent seizure  Dizziness secondary to Vimpat.   Of note, Ms. Blankenship underwent cervical discectomy in 9/2023. She was admitted to [facility] in 11/2023 for evaluation of neck pain, myalgias and fever.  While hospitalized, she developed acute onset left visual field cut, left sided numbness, and dysarthria. She was found on CT imaging to have right sided P2 arterial stenosis vs partial occlusion. MRI brain was negative for acute stroke. LP was obtained and results were unremarkable. During this admission, she also endorsed hallucinations, impaired short term memory and feeling slower in daily tasks. She was treated for presumed TIA with DAPT. Then, in 2/2024, she was admitted here for headache and left sided paresthesias. She was found to have accelerated hypertension. Brain imaging was again negative for acute stroke.     Most recently, she was admitted 3/2024 for a spell of confusion with right upper extremity weakness and dizziness. Seizure and vestibular dysfunction were suspected. Ongoing moderate focal narrowing at the P1-P2 junction of the right posterior cerebral artery with relatively decreased filling of the right PCA compared to the left PCA was again seen on CT imaging. MRI Brain was again negative for stroke. EEG was reportedly concerning for epileptiform discharges. [facility] was consulted and Keppra started. Since then it seems she has been seen in ongoing close follow up with Neurology and transitioned to Vimpat due to intolerance to Keppra and Depakote.     Now, she presents for acute confusion. In the ED, she is afebrile, without hypotension, tachycardia, or hypoxia. WBC is normal. Troponin T is negative. EKG shows NSR. CTH shows no acute intracranial abnormality. CTA of the head and neck with perfusion study are notable for persistent diminished flow within the right P2 segment PCA compared to the left. A 13 mL area of abnormal perfusion is seen in the left temporal lobe and adjacent cerebellum, favored to be artifactual possibly related to skull base artifact. MRI of the brain shows no epileptogenic focus identified or other acute intracranial process.      Overall, the case has  been discussed with Stroke Neurology and recurrent TIA/stroke seem less likely. Recurrent seizure or perhaps atypical migraine suspected. There are no clear signs of acute infectious process.       -- Observation. Fall and seizure precautions. Q 4 neuro checks. Neurology consulted. EEG shows no seizure activity. MRI shows no stroke, CTA shows chronic persistent decrease of P2 of the PCA on the right that is unchanged    -- Resume home  mg for stroke prevention when verified    -- UA and CXR are negative    -- seen by PT and ok for discharge with home assist, she lives with , ordered outpatient PT    -- her dizziness is secondary to her Vimpat, she has other AED intolerances as well    -- follow up with outpatient neurology and also for memory issues with neuropsych testing     CKD Stage 3: Monitor closely. Repeat BMP in AM.     COPD/asthma: Resume home inhalers       Hyperlipidemia: Resume home statin       Chronic anxiety and depression: Resume home medication     Consultations This Hospital Stay   CARE MANAGEMENT / SOCIAL WORK IP CONSULT  NEUROLOGY IP CONSULT  PHYSICAL THERAPY ADULT IP CONSULT    Code Status   Full Code    Time Spent on this Encounter   I, David Herron MD, personally saw the patient today and spent greater than 30 minutes discharging this patient.       David Herron MD  [facility] EXTENDED RECOVERY AND SHORT STAY  [address] [place] [id number]  Phone: [phone]  ______________________________________________________________________    Physical Exam   Vital Signs: Temp: 99.1  F (37.3  C) Temp src: Oral BP: (!) 142/70 Pulse: 71   Resp: 16 SpO2: 93 % O2 Device: None (Room air)    Weight: 0 lbs 0 oz         Primary Care Physician   Long Ferrari    Discharge Orders      Primary Care - Care Coordination Referral      Physical Therapy  Referral      Reason for your hospital stay    Admit with dizziness, evaluated for possible  seizure or stroke     Follow-up and recommended labs and tests     Follow up with primary care provider, Long Ferrari, in 7-10 days for follow up of hospitalization and set up for outpatient neurpsychiatric testing for memory difficulties     Activity    Your activity upon discharge: activity as tolerated     Diet    Follow this diet upon discharge: Orders Placed This Encounter      Regular Diet Adult       Significant Results and Procedures   Most Recent 3 CBC's:  Recent Labs   Lab Test 08/10/24  0652 08/10/24  0038 03/14/24  0800   WBC 7.7 8.0 5.5   HGB 12.6 12.5 12.5   MCV 85 84 85    182 163     Most Recent 3 BMP's:  Recent Labs   Lab Test 08/10/24  0652 08/10/24  0622 08/10/24  0038 03/15/24  1717 03/14/24  0800     --  134*  --  140   POTASSIUM 4.3  --  4.0  --  4.2   CHLORIDE 102  --  99  --  107   CO2 29  --  27  --  25   BUN 13.5  --  14.8  --  11.8   CR 0.93  --  0.92  --  0.83   ANIONGAP 7  --  8  --  8   DONNA 9.0  --  8.8  --  8.7*   GLC 92 102* 105*   < > 106*    < > = values in this interval not displayed.   ,   Results for orders placed or performed during the hospital encounter of 08/09/24   CT Head w/o Contrast    Narrative    EXAM: CT HEAD W/O CONTRAST  LOCATION: [facility]  DATE: 8/10/2024    INDICATION: Code Stroke to evaluate for potential thrombolysis and thrombectomy. PLEASE READ IMMEDIATELY.  COMPARISON: CT head 6/25/2024.  TECHNIQUE: Routine CT Head without IV contrast. Multiplanar reformats. Dose reduction techniques were used.    FINDINGS:  INTRACRANIAL CONTENTS: No intracranial hemorrhage, extraaxial collection, or mass effect.  No CT evidence of acute infarct. Mild volume loss and presumed chronic small vessel ischemia are stable.    VISUALIZED ORBITS/SINUSES/MASTOIDS: No intraorbital abnormality. No paranasal sinus mucosal disease. No middle ear or mastoid effusion.    BONES/SOFT TISSUES: No acute abnormality.      Impression     IMPRESSION:  1.  No acute intracranial abnormality or significant change compared to the prior study.    Findings were relayed to Dr. Brock at 0010 hours 8/10/2024.   CTA Head Neck with Contrast    Narrative    EXAM: CTA HEAD NECK W CONTRAST, CT HEAD PERFUSION W CONTRAST  LOCATION: [facility]  DATE: 8/10/2024    INDICATION: Code Stroke to evaluate for potential thrombolysis and thrombectomy. PLEASE READ IMMEDIATELY.  COMPARISON: CTA head and neck from 3/13/2024 and brain MRI from 3/13/2024. CT head 6/25/2024.  TECHNIQUE: Head and neck CT angiogram with IV contrast. Axial helical CT images of the head and neck vessels obtained during the arterial phase of intravenous contrast administration. Axial 2D reconstructed images and multiplanar 3D MIP reconstructed   images of the head and neck vessels were performed by the technologist. Additional CT cerebral perfusion was performed utilizing a second contrast bolus. Perfusion data were post processed with generation of standard perfusion maps and estimation of   ischemic/infarcted volumes utilizing standard threshold values. Dose reduction techniques were used. All stenosis measurements made according to NASCET criteria unless otherwise specified.  CONTRAST: 117 mL Isovue 370    FINDINGS:     HEAD CTA:  ANTERIOR CIRCULATION: No stenosis/occlusion, aneurysm, or high flow vascular malformation. Standard [place] anatomy.    POSTERIOR CIRCULATION: No stenosis/occlusion, aneurysm, or high flow vascular malformation. Balanced vertebral arteries supply a normal basilar artery. Relatively diminished flow within the right P2 segment and beyond is unchanged compared to the prior   study and nonspecific.    DURAL VENOUS SINUSES: Expected enhancement of the major dural venous sinuses.    NECK CTA:  RIGHT CAROTID: No measurable stenosis or dissection.    LEFT CAROTID: No measurable stenosis or dissection.    VERTEBRAL ARTERIES: No focal stenosis or  dissection. Balanced vertebral arteries.    AORTIC ARCH: Classic aortic arch anatomy with no significant stenosis at the origin of the great vessels.    NONVASCULAR STRUCTURES: Unremarkable.    CT PERFUSION:  PERFUSION MAPS: There is a small zone of abnormal perfusion suggested by the rapid sequences in the left anteroinferior temporal lobe and the adjacent cerebellum. These findings are favored to be artifactual. No large occlusion is seen in this region.   MRI could be obtained for further evaluation    RAPID ANALYSIS:  CBF<30%: 0 mL  Tmax>6sec: 13 mL  Mismatch volume: 13 mL  Mismatch ratio: Infinite      Impression    IMPRESSION:   HEAD CTA:   1.  No branch occlusion.    2.  Persistent diminished flow within the right P2 segment PCA compared to the left. This is unchanged and of uncertain clinical significance.    NECK CTA:  1.  Normal neck CTA.    CT PERFUSION:  1.  13 mL area of abnormal perfusion seen in the left temporal lobe and adjacent cerebellum. This is favored to be artifactual possibly related to skull base artifact. It could be further evaluated with MRI.    2.  Perfusion is otherwise symmetric to both hemispheres.    CTA and perfusion findings were discussed with Dr. Brock at 0045 hours 8/10/2024.   CT Head Perfusion w Contrast - For Tier 2 Stroke    Narrative    EXAM: CTA HEAD NECK W CONTRAST, CT HEAD PERFUSION W CONTRAST  LOCATION: [facility]  DATE: 8/10/2024    INDICATION: Code Stroke to evaluate for potential thrombolysis and thrombectomy. PLEASE READ IMMEDIATELY.  COMPARISON: CTA head and neck from 3/13/2024 and brain MRI from 3/13/2024. CT head 6/25/2024.  TECHNIQUE: Head and neck CT angiogram with IV contrast. Axial helical CT images of the head and neck vessels obtained during the arterial phase of intravenous contrast administration. Axial 2D reconstructed images and multiplanar 3D MIP reconstructed   images of the head and neck vessels were performed by the  technologist. Additional CT cerebral perfusion was performed utilizing a second contrast bolus. Perfusion data were post processed with generation of standard perfusion maps and estimation of   ischemic/infarcted volumes utilizing standard threshold values. Dose reduction techniques were used. All stenosis measurements made according to NASCET criteria unless otherwise specified.  CONTRAST: 117 mL Isovue 370    FINDINGS:     HEAD CTA:  ANTERIOR CIRCULATION: No stenosis/occlusion, aneurysm, or high flow vascular malformation. Standard [place] anatomy.    POSTERIOR CIRCULATION: No stenosis/occlusion, aneurysm, or high flow vascular malformation. Balanced vertebral arteries supply a normal basilar artery. Relatively diminished flow within the right P2 segment and beyond is unchanged compared to the prior   study and nonspecific.    DURAL VENOUS SINUSES: Expected enhancement of the major dural venous sinuses.    NECK CTA:  RIGHT CAROTID: No measurable stenosis or dissection.    LEFT CAROTID: No measurable stenosis or dissection.    VERTEBRAL ARTERIES: No focal stenosis or dissection. Balanced vertebral arteries.    AORTIC ARCH: Classic aortic arch anatomy with no significant stenosis at the origin of the great vessels.    NONVASCULAR STRUCTURES: Unremarkable.    CT PERFUSION:  PERFUSION MAPS: There is a small zone of abnormal perfusion suggested by the rapid sequences in the left anteroinferior temporal lobe and the adjacent cerebellum. These findings are favored to be artifactual. No large occlusion is seen in this region.   MRI could be obtained for further evaluation    RAPID ANALYSIS:  CBF<30%: 0 mL  Tmax>6sec: 13 mL  Mismatch volume: 13 mL  Mismatch ratio: Infinite      Impression    IMPRESSION:   HEAD CTA:   1.  No branch occlusion.    2.  Persistent diminished flow within the right P2 segment PCA compared to the left. This is unchanged and of uncertain clinical significance.    NECK CTA:  1.  Normal neck  CTA.    CT PERFUSION:  1.  13 mL area of abnormal perfusion seen in the left temporal lobe and adjacent cerebellum. This is favored to be artifactual possibly related to skull base artifact. It could be further evaluated with MRI.    2.  Perfusion is otherwise symmetric to both hemispheres.    CTA and perfusion findings were discussed with Dr. Brock at 0045 hours 8/10/2024.   MR Brain w/o & w Contrast    Narrative    EXAM: MR BRAIN W/O and W CONTRAST  LOCATION: [facility]  DATE: 8/10/2024    INDICATION: Right-sided weakness.  COMPARISON: None.  CONTRAST: 7 mL Gadavist.  TECHNIQUE: 1.5 Alesha multiplanar multisequence head MRI without and with intravenous contrast according to the seizure protocol.    FINDINGS:  INTRACRANIAL CONTENTS: Dedicated imaging of the temporal lobes demonstrates symmetrical size and signal intensity of the mesial temporal structures including the hippocampus. No malformations of cortical development. No acute or subacute infarct. No   mass, acute hemorrhage, or extra-axial fluid collections.Mild volume loss and presumed chronic small vessel ischemia are stable. Normal position of the cerebellar tonsils. No pathologic contrast enhancement.    SELLA: No abnormality accounting for technique.    OSSEOUS STRUCTURES/SOFT TISSUES: Normal marrow signal. The major intracranial vascular flow voids are maintained.     ORBITS: No abnormality accounting for technique.     SINUSES/MASTOIDS: No paranasal sinus mucosal disease. No middle ear or mastoid effusion.       Impression    IMPRESSION:  1.  No epileptogenic focus identified.  2.  No acute intracranial process.  3.  Stable age-related change.   XR Chest Port 1 View    Narrative    EXAM: CHEST SINGLE VIEW PORTABLE  LOCATION: [facility]  DATE: 08/10/2024    INDICATION: Dyspnea.  COMPARISON: 03/14/2014.    FINDINGS: No convincing pulmonary opacities. Normal-sized cardiac silhouette. Atherosclerotic  calcification in the thoracic aorta. Fusion hardware in the lower cervical spine.      Impression    IMPRESSION: No convincing evidence of active cardiopulmonary disease.          Discharge Medications   Current Discharge Medication List        START taking these medications    Details   meclizine (ANTIVERT) 25 MG tablet Take 1 tablet (25 mg) by mouth 3 times daily for 5 days  Qty: 15 tablet, Refills: 0    Associated Diagnoses: Spell of altered cognition           CONTINUE these medications which have NOT CHANGED    Details   acetaminophen (TYLENOL) 500 MG tablet Take 1,000 mg by mouth daily      albuterol (PROAIR HFA/PROVENTIL HFA/VENTOLIN HFA) 108 (90 Base) MCG/ACT inhaler Inhale 2 puffs into the lungs every 6 hours as needed for shortness of breath  Qty: 18 g, Refills: 3    Comments: Pharmacy may dispense brand covered by insurance (Proair, or proventil or ventolin or generic albuterol inhaler)  Associated Diagnoses: Reactive airway disease, moderate persistent, with acute exacerbation      alendronate (FOSAMAX) 70 MG tablet Take 1 tablet (70 mg) by mouth every 7 days  Qty: 12 tablet, Refills: 3    Associated Diagnoses: Age-related osteoporosis without current pathological fracture      aspirin (ASA) 325 MG EC tablet Take 1 tablet (325 mg) by mouth daily Take with food or meal.    Associated Diagnoses: TIA (transient ischemic attack)      budesonide-formoterol (SYMBICORT) 160-4.5 MCG/ACT Inhaler Inhale 2 puffs into the lungs daily  Qty: 10.2 g, Refills: 3    Associated Diagnoses: Reactive airway disease, moderate persistent, with acute exacerbation      clindamycin (CLEOCIN) 300 MG capsule Take 300 mg by mouth Take 1 hour prior to dental appointment.      hydrocortisone 2.5 % cream Apply topically daily as needed To the face      lacosamide (VIMPAT) 50 MG TABS tablet Take 50 mg by mouth 2 times daily At 1000 and 2200      metroNIDAZOLE (METROCREAM) 0.75 % external cream Apply topically 2 times daily  Qty: 45 g,  Refills: 1    Associated Diagnoses: Rosacea      Multiple Vitamins-Minerals (MULTIVITAMIN ADULT) CHEW Chew 2 gummies by mouth daily      polyethylene glycol (MIRALAX) 17 g packet Take 1 packet by mouth daily as needed for constipation      senna-docusate (SENOKOT-S/PERICOLACE) 8.6-50 MG tablet Take 1 tablet by mouth daily as needed for constipation      sertraline (ZOLOFT) 50 MG tablet Take 0.5 tablets (25 mg) by mouth daily  Qty: 30 tablet, Refills: 0    Associated Diagnoses: Reactive depression           Allergies   Allergies   Allergen Reactions    Pcn [Penicillins] Hives    Tetanus Toxoids Anaphylaxis    Erythromycin GI Disturbance    Levaquin Hives and Itching    Amoxicillin     Duloxetine Nausea     Other reaction(s): Jittery    Silicone Rash

## 2024-08-12 ENCOUNTER — TELEPHONE (OUTPATIENT)
Dept: FAMILY MEDICINE | Facility: CLINIC | Age: 74
End: 2024-08-12

## 2024-08-12 NOTE — PLAN OF CARE
Physical Therapy Discharge Summary    Reason for therapy discharge:    Discharged to home with outpatient therapy.    Progress towards therapy goal(s). See goals on Care Plan in [facility] electronic health record for goal details.  Goals partially met.  Barriers to achieving goals:   discharge from facility.    Therapy recommendation(s):    Continued therapy is recommended.  Rationale/Recommendations: Pt currently demonstrates impaired dynamic balance from baseline, demonstrating lateral lean and path devation without use of AD during ambulation. Improves with use of FWW. Has FWW at home. Pt completed vestibular therapy as OP, has home exercises she is supposed to continue. Recommend pt continues HEP, follow up with OP vestibular therapy at [place]. Recommend use of FWW for mobility until balance improves. Pt has concerns about R carpal tunnel syndrome, recommend OP PT to address these concerns. Pt has brace  PT Brief overview of current status: Goals of therapy will be to address safe mobility and make recommendations for discharge to next level of care. Patient and RN will continue to follow all falls risk precautions as documented by RN staff while hospitalized. Pt SBA with FWW    Recommendation above provided by last treating therapist.

## 2024-08-12 NOTE — TELEPHONE ENCOUNTER
ED / Discharge Outreach Protocol    Patient Contact    Attempt # 1    Was call answered?  Yes.  Is patient available?   Yes    Spoke with patient. Dizziness is better and confusion is gone. States she has a clear head today. Scheduled follow up for 8/14.

## 2024-08-13 NOTE — PROGRESS NOTES
Hospital Follow-up Visit:    Hospital/Nursing Home/IP Rehab Facility: [facility]  Date of Admission: 8/9/24  Date of Discharge: 8/11/24  Reason(s) for Admission:   Hx of possible seizure  Concern for recurrent seizure  Dizziness secondary to Vimpat.  CKD Stage 3:    COPD/asthma:   Hyperlipidemia:    Chronic anxiety and depression:   Was the patient in the ICU or did the patient experience delirium during hospitalization?  No  Do you have any other stressors you would like to discuss with your provider? No    Problems taking medications regularly:  wweening from seizure meds  Medication changes since discharge: None  Problems adhering to non-medication therapy:  None    Summary of hospitalization:  [facility] discharge summary reviewed  Diagnostic Tests/Treatments reviewed.  Follow up needed: needs counselor to start PTSD therapy  Other Healthcare Providers Involved in Patient s Care:         None  Update since discharge: improved.         Plan of care communicated with patient and family  Assessment/Plan:  Macey was seen today for hospital f/u, recheck medication and musculoskeletal problem.    Diagnoses and all orders for this visit:    PTSD (post-traumatic stress disorder)  -     Adult Mental Health  Referral; Future    Sleep disorder  -     Adult Sleep Eval & Management  Referral; Future    Needs ptsd counseling. Ween seizure meds per neurologist.    Long Ferrari MD   [facility]  [phone]

## 2024-08-14 ENCOUNTER — OFFICE VISIT (OUTPATIENT)
Dept: FAMILY MEDICINE | Facility: CLINIC | Age: 74
End: 2024-08-14

## 2024-08-14 ENCOUNTER — TRANSFERRED RECORDS (OUTPATIENT)
Dept: FAMILY MEDICINE | Facility: CLINIC | Age: 74
End: 2024-08-14

## 2024-08-14 VITALS
DIASTOLIC BLOOD PRESSURE: 78 MMHG | HEART RATE: 72 BPM | WEIGHT: 161 LBS | OXYGEN SATURATION: 98 % | SYSTOLIC BLOOD PRESSURE: 165 MMHG | BODY MASS INDEX: 28.07 KG/M2

## 2024-08-14 DIAGNOSIS — G47.9 SLEEP DISORDER: ICD-10-CM

## 2024-08-14 DIAGNOSIS — F43.10 PTSD (POST-TRAUMATIC STRESS DISORDER): Primary | ICD-10-CM

## 2024-08-14 PROCEDURE — 99496 TRANSJ CARE MGMT HIGH F2F 7D: CPT | Performed by: FAMILY MEDICINE

## 2024-08-16 ENCOUNTER — PATIENT OUTREACH (OUTPATIENT)
Dept: CARE COORDINATION | Facility: CLINIC | Age: 74
End: 2024-08-16
Payer: MEDICARE

## 2024-08-16 ASSESSMENT — ACTIVITIES OF DAILY LIVING (ADL): DEPENDENT_IADLS:: INDEPENDENT

## 2024-08-16 NOTE — LETTER
[facility] [address] [place] [address]         August 16, 2024    Macey Romero  [address]  [facility] [address] [phone]          Dear Macey,    I am a clinic care coordinator who works with Long Ferrari MD at [facility]. I wanted to thank you for spending the time to talk with me.  Below is a description of clinic care coordination and how I can further assist you.     The goal of clinic care coordination is to help you manage your health and improve access to the health care system. Our we work alongside your provider to assist you in determining your health and social needs. We can help you obtain health care and community resources, providing you with necessary information and education. We can work with you through barriers and develop a care plan that helps coordinate and strengthen the communication between you and your care team.  Our services are voluntary and are offered without charge to you personally.    Please feel free to contact me at [phone], with any questions or concerns. We at [facility] are focused on providing you with the highest-quality healthcare experience possible and that all starts with you.     Sincerely,     MADISON ThomasSW

## 2024-08-16 NOTE — LETTER
Patient Centered Plan of Care  About Me:        Patient Name:  Macey Romero    YOB: 1950  Age:         73 year old   [facility] MRN:    [id number] Telephone Information:  Home Phone [phone]   Mobile [phone]   Mobile Not on file.       Address:  [address]  [facility] [address] Email address:  [email or web address]      Emergency Contact(s)    Name Relationship Lgl Grd Work Phone Home Phone Mobile Phone   1. CASSANDRA ROMERO Spouse  [phone]   2. CAR KEYES Daughter  [phone]    3. VIK JOHNSON Daughter   [phone]            Primary language:  English     needed? No   [facility] [employer] [facility]:  [phone] op. 1  Other communication barriers:Cognitive impairment    Preferred Method of Communication:  Mail  Current living arrangement: I live in a private home with spouse    Mobility Status/ Medical Equipment: Independent        Health Maintenance  Health Maintenance Reviewed: No data recorded    My Access Plan  Medical Emergency 911   Primary Clinic Line [facility] - [phone]   24 Hour Appointment Line [phone] or  [phone]) (toll-free)   24 Hour Nurse Line [phone] (toll-free)   Preferred Urgent Care No data recorded   Preferred Hospital [facility]  [phone]     Preferred Pharmacy [id number] [place], [place] - [address] AT [address] [place] [address]     Behavioral Health Crisis Line The National Suicide Prevention Lifeline at 1-222.563.4169 or Text/Call 008           My Care Team Members  Patient Care Team         Relationship Specialty Notifications Start End    Long Ferrari MD PCP - General Family Medicine  9/7/22     Phone: [phone] Fax: [phone] [address] [facility] [address]    Long Ferrari MD Assigned PCP   5/6/23     Phone: [phone] Fax: [phone] [address]  [facility] [address]    Nani Greenwood APRN CNP Assigned Behavioral Health Provider   4/23/24     Phone: [phone] Fax: [phone]         [facility] [address] [facility] [address]    Anna Morales, [facility] Lead Care Coordinator Primary Care - CC Admissions 8/16/24     Phone: [phone]                     My Care Plans  Self Management and Treatment Plan    Care Plan  Care Plan: Mental Health       Problem: Mental Health Symptoms Need Improvement       Goal: Improve management of mental health symptoms and establish with mental health/psychosocial supports       Start Date: 8/16/2024 Expected End Date: 11/18/2024    Priority: High    Note:     Barriers: Overwhelm, complex and new medical concerns, finding therapist who takes Medicare  Strengths: Motivated and support from daughter who is helping navigate  Patient expressed understanding of goal: Yes  Action steps to achieve this goal:  1. I will meet with therapist  2. I will learn new coping skills and utilize   3. I will reach out to care team with questions/concerns along the way                               Action Plans on File:   Asthma  Asthma                Advance Care Plans/Directives:   Advanced Care Plan/Directives on file:   No    Discussed with patient/caregiver(s): No data recorded           My Medical and Care Information  Problem List   Patient Active Problem List   Diagnosis    Osteoarthritis    Osteopenia    History of breast cancer    IBS (irritable bowel syndrome)    Keloid of skin    Chronic pain of both knees    Fibromyalgia    H/O bilateral mastectomy    Moderate persistent asthma without complication    Prediabetes    Colitis    S/P cervical spinal fusion    Acute hepatitis    Complicated UTI (urinary tract infection)    Worsening of neck pain following surgery    Pain syndrome, chronic    Urinary urgency    Blurred vision    Left sided numbness    Headache    Hypertension, unspecified type     Left-sided weakness    Nonintractable headache, unspecified chronicity pattern, unspecified headache type    Acute confusion    New onset seizure (H)    Spell of altered cognition      Current Medications and Allergies:  See printed Medication Report.    Care Coordination Start Date: 8/16/2024   Frequency of Care Coordination: monthly, more frequently as needed     Form Last Updated: 08/16/2024

## 2024-08-16 NOTE — PROGRESS NOTES
Clinic Care Coordination Contact  Follow Up Progress Note      Assessment: Outreach to patient due to recent hospitalization, pt is known to  CC from previous contacts. Pt had recent PCP visit where she discussed anxiety/PTSD symptoms and desire to establish care with a therapist. [facility] mental health  referral placed as well.    Spoke with pt, she believes that she was given list of mental health therapists at her PCP appointment but her daughter has the list. Writer offered to provide some add'l therapist options who take Medicare in her area and are seeing people in person per her preference. Pt asked that writer e-mail her these. Explained that the mental health clinic will verify insurance coverage and she'll want to let them know that she has both Medicare (primary) and [employer] (secondary through spouse employer). Writer also offered that if she and/or her daughter have questions about the process or would like add'l resources to please reach out.    Pt stated that her spouse is a  and works 17 hr days 7 days per week, they are very busy at the  home. They have had some employees leave as well so he is having to work even more. Pt reports that her daughter has been her main support which she appreciates.     Care Gaps:    Health Maintenance Due   Topic Date Due    DTAP/TDAP/TD IMMUNIZATION (1 - Tdap) Never done    RSV VACCINE (Pregnancy & 60+) (1 - 1-dose 60+ series) Never done    LUNG CANCER SCREENING  2022    COVID-19 Vaccine (2023-24 season) 2024         Care Plans  Care Plan: Mental Health       Problem: Mental Health Symptoms Need Improvement       Goal: Improve management of mental health symptoms and establish with mental health/psychosocial supports       Start Date: 2024 Expected End Date: 2024    Priority: High    Note:     Barriers: Overwhelm, complex and new medical concerns, finding therapist who takes Medicare  Strengths: Motivated  and support from daughter who is helping navigate  Patient expressed understanding of goal: Yes  Action steps to achieve this goal:  1. I will meet with therapist  2. I will learn new coping skills and utilize   3. I will reach out to care team with questions/concerns along the way                               Intervention/Education provided during outreach: Emotional support and mental health therapist options.     Outreach Frequency: monthly, more frequently as needed    The patient consented via Verbal consent to have contact information and resources sent via email in an unencrypted manner.    The following e-mail was sent to pt:    Here are some mental health therapist options. As mentioned, the clinic can run/verify benefits, you will want to make sure that they have both of your insurances: Medicare and [employer].      [facility]: Home  [facility]   * Located in [facility]: phone: [phone]  choose option 4 for : Location in [place]. They have a therapist that takes Medicare but would want you to reach out to make sure would be a good fit.    [facility]   [facility]  [facility] ([email or web address])  * Multiple locations  Donna Lutz MA, LM - [facility] ([email or web address])    Keli Murray, Clinical Social Work/Therapist, [place], [address]  [facility]  * Located in [facility]      Please let me know if you or your family have questions, I'm happy to answer.    Thank you!    Anna       Plan:   Care Coordinator will follow up in 1 month.    Anna Morales, [facility]  Social Work Care Coordinator  Phone: [phone]

## 2024-08-20 ENCOUNTER — TRANSFERRED RECORDS (OUTPATIENT)
Dept: FAMILY MEDICINE | Facility: CLINIC | Age: 74
End: 2024-08-20

## 2024-09-16 ENCOUNTER — PATIENT OUTREACH (OUTPATIENT)
Dept: CARE COORDINATION | Facility: CLINIC | Age: 74
End: 2024-09-16
Payer: MEDICARE

## 2024-09-16 NOTE — PROGRESS NOTES
Clinic Care Coordination Contact  [facility]/Voicemail    Clinical Data: Care Coordinator Outreach    Outreach Documentation Number of Outreach Attempt   9/16/2024   2:33 PM 1       Left message on patient's voicemail with call back information and requested return call.    Plan: Care Coordinator will try to reach patient again in 3-5 business days.    Anna Morales [facility]  Social Work Care Coordinator  Phone: [phone]

## 2024-09-24 NOTE — PROGRESS NOTES
Clinic Care Coordination Contact  Follow Up Progress Note      Assessment: Call placed to pt to discuss progress/barriers toward goals. Pt shared that she was just leaving her first appointment with her therapist and she says it went very well. Her therapist is located at [facility] and she has scheduled visits out. She feels her therapist (Alina) is a very good fit. Pt feeling very good about finally taking this step as it is something that she has wanted to do for a long time and sees it as important self care.    Pt reports that she has not heard anything from [facility] regarding the sleep study referral that was made. Writer provided their number to call and schedule.    Pt reports that she has weaned completely off of her seizure medication.    Pt denied having add'l questions/concerns for SW CC at this time.    Care Gaps:    Health Maintenance Due   Topic Date Due    DTAP/TDAP/TD IMMUNIZATION (1 - Tdap) Never done    RSV VACCINE (1 - Risk 60-74 years 1-dose series) Never done    LUNG CANCER SCREENING  12/12/2022    INFLUENZA VACCINE (1) 09/01/2024    COVID-19 Vaccine (7 - 2024-25 season) 09/01/2024    A1C  09/14/2024       Care Plans  Care Plan: Mental Health       Problem: Mental Health Symptoms Need Improvement       Goal: Improve management of mental health symptoms and establish with mental health/psychosocial supports       Start Date: 8/16/2024 Expected End Date: 11/18/2024    This Visit's Progress: 50%    Priority: High    Note:     Barriers: Overwhelm, complex and new medical concerns, finding therapist who takes Medicare  Strengths: Motivated and support from daughter who is helping navigate  Patient expressed understanding of goal: Yes  Action steps to achieve this goal:  1. I will meet with therapist  2. I will learn new coping skills and utilize   3. I will reach out to care team with questions/concerns along the way                               Intervention/Education provided during  outreach: Gave positive feedback for starting mental health therapy and encouraged her to call to schedule sleep study.     Outreach Frequency: monthly, more frequently as needed      Plan:   Care Coordinator will follow up in 1 month.    Anna Morales [facility]  Social Work Care Coordinator  Phone: [phone]

## 2024-09-26 DIAGNOSIS — F32.9 REACTIVE DEPRESSION: ICD-10-CM

## 2024-10-03 ENCOUNTER — TRANSFERRED RECORDS (OUTPATIENT)
Dept: FAMILY MEDICINE | Facility: CLINIC | Age: 74
End: 2024-10-03

## 2024-10-13 ENCOUNTER — HEALTH MAINTENANCE LETTER (OUTPATIENT)
Age: 74
End: 2024-10-13

## 2024-10-29 ENCOUNTER — PATIENT OUTREACH (OUTPATIENT)
Dept: CARE COORDINATION | Facility: CLINIC | Age: 74
End: 2024-10-29
Payer: MEDICARE

## 2024-10-29 DIAGNOSIS — F32.9 REACTIVE DEPRESSION: ICD-10-CM

## 2024-10-29 NOTE — PROGRESS NOTES
Clinic Care Coordination Contact  [facility]/Voicemail    Clinical Data: Care Coordinator Outreach    Outreach Documentation Number of Outreach Attempt       10/29/2024  10:04 AM 1       Left message on patient's voicemail with call back information and requested return call.      Plan: Care Coordinator will try to reach patient again in 3-5 business days.    Anna Morales [facility]  Social Work Care Coordinator  Phone: [phone]

## 2024-10-30 NOTE — PROGRESS NOTES
Clinic Care Coordination Contact  Follow Up Progress Note      Assessment: Spoke with patient to discuss progress/barriers toward goals. Pt shared that she has been doing well, she continues seeing her therapist and has found this to be a very positive and helpful experience. She said that she is finally digging deeper into her past and experiences that have been causing her anxiety for years and she is seeing her anxiety lessen. Pt also reports that Zoloft has seemed to help her mood, people are commenting that she seems happier and has a brighter affect. Pt inquired about increasing the dose as we near [place] and the winter weather, she currently takes 25 mg per day. Writer offered to message PCP about this question. Pt is open to whatever PCP feels is best.    Pt reports that she has been feeling better physically as well. She reports that her balance seems better. She indicated that she had some cognitive testing done recently and they want her to have some more testing in future, this was done through her neurology clinic. Pt questioning why, she feels embarrassed by this. She admits that she has trouble remembering the 3 words but otherwise does well and is independent with ADL's. Writer validated her feelings, suggested reasons how this testing can be helpful and encouraged her to discuss further with her neurologist.     Pt stated that her  continues to work long hours, he owns a  home business that has been in his family for generations and he is very dedicated to his work. Pt reports that he checks in with her by phone multiple  times throughout day and she has very good support from her daughter. She has accepted her 's lifestyle and they make it work.     Pt discussed that she is hesitant to take medications because she is so sensitive to side effects and has had issues recently with being over prescribed medication, she reports that she was accidentally doubled up on  medications between her various hospitalizations, she believes that this has gotten straightened out now and she has gone off of many of these medications.    Care Gaps:    Health Maintenance Due   Topic Date Due    DTAP/TDAP/TD IMMUNIZATION (1 - Tdap) Never done    RSV VACCINE (1 - Risk 60-74 years 1-dose series) Never done    LUNG CANCER SCREENING  12/12/2022    INFLUENZA VACCINE (1) 09/01/2024    COVID-19 Vaccine (7 - 2024-25 season) 09/01/2024    A1C  09/14/2024    MEDICARE ANNUAL WELLNESS VISIT  11/22/2024       Care Plans  Care Plan: Mental Health       Problem: Mental Health Symptoms Need Improvement       Goal: Improve management of mental health symptoms and establish with mental health/psychosocial supports       Start Date: 8/16/2024 Expected End Date: 11/18/2024    This Visit's Progress: 80% Recent Progress: 50%    Priority: High    Note:     Barriers: Overwhelm, complex and new medical concerns, finding therapist who takes Medicare  Strengths: Motivated and support from daughter who is helping navigate  Patient expressed understanding of goal: Yes  Action steps to achieve this goal:  1. I will meet with therapist  2. I will learn new coping skills and utilize   3. I will reach out to care team with questions/concerns along the way                               Intervention/Education provided during outreach: Coordination with PCP regarding Zoloft dose, emotional support and reassurance.      Outreach Frequency: monthly, more frequently as needed    Plan:   Care Coordinator will follow up in 1 month.  Pt may benefit from MTM consultation in future.    Anna Morales [facility]  Social Work Care Coordinator  Phone: [phone]

## 2024-11-14 ENCOUNTER — TRANSFERRED RECORDS (OUTPATIENT)
Dept: FAMILY MEDICINE | Facility: CLINIC | Age: 74
End: 2024-11-14

## 2024-11-25 DIAGNOSIS — M81.0 AGE-RELATED OSTEOPOROSIS WITHOUT CURRENT PATHOLOGICAL FRACTURE: ICD-10-CM

## 2024-11-26 RX ORDER — ALENDRONATE SODIUM 70 MG/1
70 TABLET ORAL
Qty: 12 TABLET | Refills: 3 | Status: SHIPPED | OUTPATIENT
Start: 2024-11-26

## 2024-12-03 ENCOUNTER — PATIENT OUTREACH (OUTPATIENT)
Dept: CARE COORDINATION | Facility: CLINIC | Age: 74
End: 2024-12-03
Payer: MEDICARE

## 2024-12-03 NOTE — PROGRESS NOTES
Clinic Care Coordination Contact  Follow Up Progress Note      Assessment: Call placed to patient to check-in on progress/barriers toward goals. Pt shared that she was currently in the waiting room at her therapist's office and that therapy was continuing to go very well, she has found great benefit in it.    Pt stated that the increase in her Zoloft has also helped her mood. She stated that others have made comments to her about how she seems happier, including her spouse. She is hosting a lot of family for the holidays. She feels better physically but her thumb is bothering her. Pt stated that her thumb has been very sore and is also swollen. She has an appointment with [facility] next week. We discussed that [facility] also has an urgent care if she feels that she needs to be seen sooner, pt aware that they have a UC and will consider this. Otherwise, pt denied having add'l questions/concerns for SW CC at this time and was appreciative for the call.     Care Gaps:    Health Maintenance Due   Topic Date Due    DTAP/TDAP/TD IMMUNIZATION (1 - Tdap) Never done    RSV VACCINE (1 - Risk 60-74 years 1-dose series) Never done    LUNG CANCER SCREENING  12/12/2022    INFLUENZA VACCINE (1) 09/01/2024    COVID-19 Vaccine (7 - 2024-25 season) 09/01/2024    A1C  09/14/2024    MEDICARE ANNUAL WELLNESS VISIT  11/22/2024     Care Plans  Care Plan: Mental Health       Problem: Mental Health Symptoms Need Improvement       Goal: Improve management of mental health symptoms and establish with mental health/psychosocial supports  Completed 12/3/2024      Start Date: 8/16/2024 Expected End Date: 11/18/2024    This Visit's Progress: 100% Recent Progress: 80%    Priority: High    Note:     Barriers: Overwhelm, complex and new medical concerns, finding therapist who takes Medicare  Strengths: Motivated and support from daughter who is helping navigate  Patient expressed understanding of goal: Yes  Action steps to achieve this goal:  1. I will meet  with therapist  2. I will learn new coping skills and utilize   3. I will reach out to care team with questions/concerns along the way                               Intervention/Education provided during outreach: Emotional support, positive feedback regarding accomplishing goals.      Outreach Frequency: monthly, more frequently as needed    Plan:   Will transition pt to maintenance upon completion of goals. Will plan to outreach in 2 months, if pt is still stable with no further goals to work on with care coordinator, will plan to graduate at that time.     Anna Morales [facility]  Social Work Care Coordinator  Phone: [phone]

## 2024-12-08 ENCOUNTER — TRANSFERRED RECORDS (OUTPATIENT)
Dept: FAMILY MEDICINE | Facility: CLINIC | Age: 74
End: 2024-12-08

## 2024-12-30 ENCOUNTER — NURSE TRIAGE (OUTPATIENT)
Dept: NURSING | Facility: CLINIC | Age: 74
End: 2024-12-30
Payer: MEDICARE

## 2024-12-30 PROCEDURE — 99283 EMERGENCY DEPT VISIT LOW MDM: CPT

## 2024-12-31 ENCOUNTER — HOSPITAL ENCOUNTER (EMERGENCY)
Facility: CLINIC | Age: 74
Discharge: HOME | End: 2024-12-31
Attending: EMERGENCY MEDICINE | Admitting: EMERGENCY MEDICINE
Payer: MEDICARE

## 2024-12-31 VITALS
RESPIRATION RATE: 18 BRPM | HEART RATE: 90 BPM | TEMPERATURE: 98.3 F | OXYGEN SATURATION: 97 % | DIASTOLIC BLOOD PRESSURE: 83 MMHG | SYSTOLIC BLOOD PRESSURE: 161 MMHG

## 2024-12-31 DIAGNOSIS — T63.303A SPIDER BITE WOUND, ASSAULT, INITIAL ENCOUNTER: ICD-10-CM

## 2024-12-31 PROCEDURE — 250N000009 HC RX 250: Performed by: EMERGENCY MEDICINE

## 2024-12-31 PROCEDURE — 250N000013 HC RX MED GY IP 250 OP 250 PS 637: Performed by: EMERGENCY MEDICINE

## 2024-12-31 PROCEDURE — 250N000011 HC RX IP 250 OP 636: Performed by: EMERGENCY MEDICINE

## 2024-12-31 RX ORDER — LIDOCAINE/PRILOCAINE 2.5 %-2.5%
1 CREAM (GRAM) TOPICAL ONCE
Status: COMPLETED | OUTPATIENT
Start: 2024-12-31 | End: 2024-12-31

## 2024-12-31 RX ORDER — ONDANSETRON 4 MG/1
4 TABLET, ORALLY DISINTEGRATING ORAL ONCE
Status: COMPLETED | OUTPATIENT
Start: 2024-12-31 | End: 2024-12-31

## 2024-12-31 RX ORDER — ACETAMINOPHEN 325 MG/1
975 TABLET ORAL ONCE
Status: COMPLETED | OUTPATIENT
Start: 2024-12-31 | End: 2024-12-31

## 2024-12-31 RX ADMIN — ONDANSETRON 4 MG: 4 TABLET, ORALLY DISINTEGRATING ORAL at 00:05

## 2024-12-31 RX ADMIN — ACETAMINOPHEN 975 MG: 325 TABLET, FILM COATED ORAL at 02:35

## 2024-12-31 RX ADMIN — LIDOCAINE AND PRILOCAINE 1 G: 25; 25 CREAM TOPICAL at 04:32

## 2024-12-31 ASSESSMENT — ACTIVITIES OF DAILY LIVING (ADL)
ADLS_ACUITY_SCORE: 59

## 2024-12-31 NOTE — ED PROVIDER NOTES
Emergency Department Note      History of Present Illness     Chief Complaint   Insect Bite      HPI   Macey Romero is a 74 year old female who presents to the ER for evaluation of left distal ring finger spider bite.  This evening around 10 PM, she saw a spider coming down from the ceiling when she tried to swat it away and is seemingly stung her in the tip of the left ring finger.  Since then she has had burning pain intermittently in the finger and some subjective swelling in the left palm and some subjective redness of the left forearm.  She took Tylenol on arrival for pain.        Past Medical History     Medical History and Problem List   Past Medical History:   Diagnosis Date    Breast CA in situ 1987    Cancer (H) 1987    Cerebral infarction (H)     Chronic constipation     Fibromyalgia     History of blood transfusion     Malignant neoplasm of female breast, unspecified estrogen receptor status, unspecified laterality, unspecified site of breast (H) 06/17/2021    Meningitis     Osteoarthritis 02/01/2011    Osteopenia 02/01/2011    Pneumonia     Pterygium eye 08/26/2013    Raynaud phenomenon     Reactive airway disease 02/01/2011    Seasonal allergies     Shingles     Skin cancer     Uncomplicated asthma        Medications   acetaminophen (TYLENOL) 500 MG tablet  albuterol (PROAIR HFA/PROVENTIL HFA/VENTOLIN HFA) 108 (90 Base) MCG/ACT inhaler  alendronate (FOSAMAX) 70 MG tablet  aspirin (ASA) 325 MG EC tablet  budesonide-formoterol (SYMBICORT) 160-4.5 MCG/ACT Inhaler  clindamycin (CLEOCIN) 300 MG capsule  hydrocortisone 2.5 % cream  lacosamide (VIMPAT) 50 MG TABS tablet  metroNIDAZOLE (METROCREAM) 0.75 % external cream  Multiple Vitamins-Minerals (MULTIVITAMIN ADULT) CHEW  polyethylene glycol (MIRALAX) 17 g packet  senna-docusate (SENOKOT-S/PERICOLACE) 8.6-50 MG tablet  sertraline (ZOLOFT) 50 MG tablet        Surgical History   Past Surgical History:   Procedure Laterality Date    anterior c-disc fusion       ARTHROPLASTY KNEE Left 10/17/2022    Procedure: LEFT TOTAL KNEE ARTHROPLASTY;  Surgeon: Jax Le MD;  Location:  OR    BLEPHAROPLASTY, BROW LIFT BILATERAL, COMBINED Bilateral 6/18/2018    Procedure: COMBINED BLEPHAROPLASTY, BROW LIFT BILATERAL;  BILATERAL UPPER LID BLEPHAROPLASTY; BILATERAL BROW PTOSIS REPAIR;  Surgeon: Сергей Walters MD;  Location:  EC    CHOLECYSTECTOMY      COLONOSCOPY N/A 10/19/2017    Procedure: COLONOSCOPY;  COLONOSCOPY;  Surgeon: Niko Wong MD;  Location:  GI    COLONOSCOPY N/A 8/27/2022    Procedure: COLONOSCOPY, WITH POLYPECTOMY AND BIOPSY;  Surgeon: Abraham Rogers MD;  Location:  GI    D & C      Bleeding before hysterectomy    DISCECTOMY, FUSION CERVICAL ANTERIOR ONE LEVEL, COMBINED N/A 9/20/2023    Procedure: ANTERIOR CERVICAL 5 TO CERVICAL 6 DISCECTOMY AND FUSION;  Surgeon: Alex Galindo MD;  Location:  OR    ENDOSCOPY  04/21/08    ESOPHAGOSCOPY, GASTROSCOPY, DUODENOSCOPY (EGD), COMBINED N/A 8/27/2022    Procedure: ESOPHAGOGASTRODUODENOSCOPY, WITH BIOPSY;  Surgeon: Abraham Rogers MD;  Location:  GI    HYSTERECTOMY, MARCUS      Ovaries and tubes out due to breast cancer    JOINT REPLACEMTN, KNEE RT/LT Right 01/2011    Joint Replacement knee RT, with tibial straightening (2 replacements)    MAMMOPLASTY AUGMENTATION BILATERAL      breast ca     MASTECTOMY, SIMPLE RT/LT/CLAIRE Bilateral 1989    Mastectomy Simple RT/LT/CLAIRE    MOHS MICROGRAPHIC PROCEDURE      Left lateral nose    OPEN REDUCTION INTERNAL FIXATION ANKLE  8/27/2013    Procedure: OPEN REDUCTION INTERNAL FIXATION ANKLE;  RIGHT OPEN REDUCTION INTERNAL FIXATION ANKLE WITH LIGAMENT REPAIR;  Surgeon: Nando Chang MD;  Location:  OR    PTERYGIUM WITH CONJUNCTIVAL AUTOLOGOUS TRANSPLANT Left 8/29/2016    Procedure: PTERYGIUM WITH CONJUNCTIVAL AUTOLOGOUS TRANSPLANT;  Surgeon: Сергей Walters MD;  Location:  EC    REPAIR LIGAMENT ANKLE  8/27/2013    Procedure: REPAIR LIGAMENT ANKLE;;   Surgeon: Nando Chang MD;  Location:  OR    SALIVARY GLAND SURGERY Left     Stone removal     SIGMOIDOSCOPY FLEXIBLE N/A 8/30/2022    Procedure: FLEXIBLE SIGMOIDOSCOPY;  Surgeon: iNko Wong MD;  Location:  GI    TONSILLECTOMY         Physical Exam     Patient Vitals for the past 24 hrs:   BP Temp Temp src Pulse Resp SpO2   12/31/24 0008 (!) 161/83 98.3  F (36.8  C) Temporal 90 18 97 %     Physical Exam  VS: Reviewed per above  HENT: Mucous membranes moist  EYES: sclera anicteric  CV: Rate as noted,  regular rhythm.   RESP: Effort normal. Breath sounds are normal bilaterally.  NEURO: Alert, moving all extremities  MSK: No deformity of the extremities  SKIN: Warm and dry, tenderness of the distal left ring finger with mild erythema.  Intact left radial pulse at the wrist.        ED Course      Medications Administered   Medications   ondansetron (ZOFRAN ODT) ODT tab 4 mg (4 mg Oral $Given 12/31/24 0005)   acetaminophen (TYLENOL) tablet 975 mg (975 mg Oral $Given 12/31/24 0235)   lidocaine-prilocaine (EMLA) cream 1 g (1 g Topical $Given 12/31/24 0432)       Procedures   Procedures         Medical Decision Making / Diagnosis         SANDRA Romero is a 74 year old female who presents to the ER for evaluation of left ring finger pain after she witnessed a spider to bite her finger.  Vital signs reassuring.  On exam I do not appreciate signs of neurovascular compromise or compartment syndrome or necrotizing wounds.  The left distal ring finger is ever so slightly erythematous. She brought pieces of the spider with her and it does not appear consistent with specific toxic arthropod.  Suspect she has local inflammatory response to insect assault.  She was provided with Emla cream to apply to the finger as needed to help with pain relief.  Return precautions discussed.    Disposition   The patient was discharged.     Diagnosis     ICD-10-CM    1. Spider bite wound, assault, initial encounter  T63.303A      left ring finger           Discharge Medications   Discharge Medication List as of 12/31/2024  4:29 AM                   Cl Bhat MD  12/31/24 0737

## 2024-12-31 NOTE — ED TRIAGE NOTES
Patient arrivers with a spider bite to left hand ring finger. It happened about 2230 this evening. They brought the spider in, small and tan. Patient reports it feels like her finger is on fire and any sensation is excruciating. Swelling in finger, adjacent fingers and palm. Rings removed in triage.

## 2024-12-31 NOTE — DISCHARGE INSTRUCTIONS
Return for worsening pain, swelling, fevers. You can apply the numbing cream as needed for burning pain. Tylenol 1000mg every 8 hours is also good to take for pain control.

## 2024-12-31 NOTE — TELEPHONE ENCOUNTER
Got bit by spider, left ring finger. It's like you light a match. Hurts like fire. Redness near the nail bed. Pain at 8.  She is afraid to walk from the car to the door at the [facility]. She will go, as recommended.    Gisela Guzman RN  [facility]    Reason for Disposition   [1] Red streak or red line AND [2] length > 2 inches (5 cm)    Additional Information   Negative: [1] Life-threatening reaction (anaphylaxis) in the past to bite from same insect AND [2] < 2 hours since bite   Negative: Passed out (i.e., lost consciousness, collapsed and was not responding)   Negative: Difficulty breathing or wheezing   Negative: [1] Hoarseness or cough AND [2] sudden onset following bite   Negative: [1] Difficulty swallowing or slurred speech AND [2] sudden onset following bite   Negative: Sounds like a life-threatening emergency to the triager   Negative: [1] SEVERE bite pain AND [2] not improved after 2 hours of pain medicine     Pain at 9   Negative: [1] Fever AND [2] red area   Negative: [1] Fever AND [2] area is very tender to touch    Protocols used: Insect Bite-A-

## 2025-01-10 ENCOUNTER — TRANSFERRED RECORDS (OUTPATIENT)
Dept: FAMILY MEDICINE | Facility: CLINIC | Age: 75
End: 2025-01-10

## 2025-01-16 ENCOUNTER — OFFICE VISIT (OUTPATIENT)
Dept: FAMILY MEDICINE | Facility: CLINIC | Age: 75
End: 2025-01-16

## 2025-01-16 VITALS — SYSTOLIC BLOOD PRESSURE: 174 MMHG | HEART RATE: 75 BPM | DIASTOLIC BLOOD PRESSURE: 67 MMHG | OXYGEN SATURATION: 97 %

## 2025-01-16 DIAGNOSIS — T63.301S SPIDER BITE WOUND, ACCIDENTAL OR UNINTENTIONAL, SEQUELA: Primary | ICD-10-CM

## 2025-01-16 PROBLEM — R56.9 NEW ONSET SEIZURE (H): Status: RESOLVED | Noted: 2024-03-19 | Resolved: 2025-01-16

## 2025-01-16 RX ORDER — TRIAMCINOLONE ACETONIDE 1 MG/G
OINTMENT TOPICAL 2 TIMES DAILY
Qty: 30 G | Refills: 0 | Status: SHIPPED | OUTPATIENT
Start: 2025-01-16

## 2025-01-16 NOTE — PROGRESS NOTES
SUBJECTIVE:    Macey Romero, is a 74 year old female presenting for the below:     Observed to be bitten by a spider 12/21. Observed spider coming down from ceiling. Swatted away and was bitten to left ring finger. Immediate burning sensation in area of bite which has continued, but diminished. Seen at urgent care same day : presented with spider : was not able to be IDed by staff. No neurovascular compromise or compartment syndrome or necrotizing wounds appreciated at that time or now. Issued emla cream for comfort : caused worsening of stinging. Symptoms persist.       OBJECTIVE:  Vitals:    01/16/25 1354   BP: (!) 174/67   Pulse: 75   SpO2: 97%    There is no height or weight on file to calculate BMI.  General: no acute distress, cooperative with exam.  Left ring finger: neurovascularly intact. Small red papule just distal to DIP, flexor surface. No heat or erythema.           ASSESSMENT / PLAN:      Spider bite wound, accidental or unintentional, sequela  No neurovascular compromise, compartment syndrome or necrotizing wound appreciated in ER at initial evaluation or now. Issued emla cream for comfort : caused worsening of stinging. Symptoms persist. Switch to med/ high potency steroid in ointment form. Anticipate continue improvement to full resolution.   -     triamcinolone (KENALOG) 0.1 % external ointment; Apply topically 2 times daily.    The longitudinal plan of care for the diagnosis(es)/condition(s) as documented were addressed during this visit. Due to the added complexity in care, I will continue to support Macey in the subsequent management and with ongoing continuity of care.

## 2025-01-17 NOTE — PROGRESS NOTES
Pre-procedure Diagnosis: Cervical Radiculopathy  Post-procedure Diagnosis: Cervical radiculopathy  Procedure Title(s):  1. C7-T1 interlaminar epidural steroid injection      2. Intraoperative fluoroscopy  Attending Surgeon:   Chris Sharpe MD  Anesthesia:   Local     Indications: The patient is a 66 y.o. year-old male with a diagnosis of Cervical radiculopathy. The patient's history and physical exam were reviewed. The risks, benefits and alternatives to the procedure were discussed, and all questions were answered to the patient's satisfaction. The patient agreed to proceed, and written informed consent was obtained.    Procedure in Detail: The patient was brought into the procedure room and placed in the prone position on the fluoroscopy table. The area of the cervical spine was prepped with chlorhexidine gluconate solution times one and draped in a sterile manner.    The C7-T1 interspace was identified and marked under AP fluoroscopy. The skin and subcutaneous tissues in the area were anesthetized with 1% lidocaine. A 20-gauge Tuohy epidural needle was directed toward the interspace under fluoroscopic guidance until the ligamentum flavum was engaged. The C-arm was oblique to the right to obtain a contra-lateral oblique view.  From this point, a loss of resistance technique with air was used to identify entrance of the needle into the epidural space. Once an appropriate loss was obtained, negative aspiration was confirmed, and 1 ml Omnipaque 300 contrast solution was injected. An appropriate epidurogram was noted.    Then, after negative aspiration, a solution consisting of 1-mL depomedrol(80mg/ml) and 2-mL preservative-free saline was easily injected. The needle was removed with a 1% lidocaine flush. The patient's back was cleaned and a bandage was placed over the site of needle insertion.    Disposition: The patient tolerated the procedure well, and there were no apparent complications. The patient was taken  PRIOR TO DISCHARGE        Comments: -diagnostic tests and consults completed and resulted not met  -vital signs normal or at patient baseline met  -tolerating oral intake to maintain hydration partially met   Nurse to notify provider when observation goals have been met and patient is ready for discharge.        to the recovery area where written discharge instructions for the procedure were given.     Estimated Blood Loss: None  Specimens Obtained: N/A

## 2025-01-25 ENCOUNTER — HEALTH MAINTENANCE LETTER (OUTPATIENT)
Age: 75
End: 2025-01-25

## 2025-01-28 NOTE — PATIENT INSTRUCTIONS
How to Take Your Medication Before Surgery  Preoperative Medication Instructions   Antiplatelet or Anticoagulation Medication Instructions   - aspirin: Discontinue aspirin 7 days prior to procedure to reduce bleeding risk. It should be resumed postoperatively.     Additional Medication Instructions  Take all scheduled medications on the day of surgery       Patient Education   Preparing for Your Surgery  For Adults  Getting started  In most cases, a nurse will call to review your health history and instructions. They will give you an arrival time based on your scheduled surgery time. Please be ready to share:  Your doctor's clinic name and phone number  Your medical, surgical, and anesthesia history  A list of allergies and sensitivities  A list of medicines, including herbal treatments and over-the-counter drugs  Whether the patient has a legal guardian (ask how to send us the papers in advance)  Note: You may not receive a call if you were seen at our PAC (Preoperative Assessment Center).  Please tell us if you're pregnant--or if there's any chance you might be pregnant. Some surgeries may injure a fetus (unborn baby), so they require a pregnancy test. Surgeries that are safe for a fetus don't always need a test, and you can choose whether to have one.   Preparing for surgery  Within 10 to 30 days of surgery: Have a pre-op exam (sometimes called an H&P, or History and Physical). This can be done at a clinic or pre-operative center.  If you're having a , you may not need this exam. Talk to your care team.  At your pre-op exam, talk to your care team about all medicines you take. (This includes CBD oil and any drugs, such as THC, marijuana, and other forms of cannabis.) If you need to stop any medicine before surgery, ask when to start taking it again.  This is for your safety. Many medicines and drugs can make you bleed too much during surgery. Some change how well surgery (anesthesia) drugs work.  Call  your insurance company to let them know you're having surgery. (If you don't have insurance, call [phone].)  Call your clinic if there's any change in your health. This includes a scrape or scratch near the surgery site, or any signs of a cold (sore throat, runny nose, cough, rash, fever).  Eating and drinking guidelines  For your safety: Unless your surgeon tells you otherwise, follow the guidelines below.  Eat and drink as normal until 8 hours before you arrive for surgery. After that, no food or milk. You can spit out gum when you arrive.  Drink clear liquids until 2 hours before you arrive. These are liquids you can see through, like water, Gatorade, and Propel Water. They also include plain black coffee and tea (no cream or milk).  No alcohol for 24 hours before you arrive. The night before surgery, stop any drinks that contain THC.  If your care team tells you to take medicine on the morning of surgery, it's okay to take it with a sip of water. No other medicines or drugs are allowed (including CBD oil)--follow your care team's instructions.  If you have questions the day of surgery, call your hospital or surgery center.   Preventing infection  Shower or bathe the night before and the morning of surgery. Follow the instructions your clinic gave you. (If no instructions, use regular soap.)  Don't shave or clip hair near your surgery site. We'll remove the hair if needed.  Don't smoke or vape the morning of surgery. No chewing tobacco for 6 hours before you arrive. A nicotine patch is okay. You may spit out nicotine gum when you arrive.  For some surgeries, the surgeon will tell you to fully quit smoking and nicotine.  We will make every effort to keep you safe from infection. We will:  Clean our hands often with soap and water (or an alcohol-based hand rub).  Clean the skin at your surgery site with a special soap that kills germs.  Give you a special gown to keep you warm. (Cold raises the risk of  infection.)  Wear hair covers, masks, gowns, and gloves during surgery.  Give antibiotic medicine, if prescribed. Not all surgeries need this medicine.  What to bring on the day of surgery  Photo ID and insurance card  Copy of your health care directive, if you have one  Glasses and hearing aids (bring cases)  You can't wear contacts during surgery  Inhaler and eye drops, if you use them (tell us about these when you arrive)  CPAP machine or breathing device, if you use them  A few personal items, if spending the night  If you have . . .  A pacemaker, ICD (cardiac defibrillator), or other implant: Bring the ID card.  An implanted stimulator: Bring the remote control.  A legal guardian: Bring a copy of the certified (court-stamped) guardianship papers.  Please remove any jewelry, including body piercings. Leave jewelry and other valuables at home.  If you're going home the day of surgery  You must have a responsible adult drive you home. They should stay with you overnight as well.  If you don't have someone to stay with you, and you aren't safe to go home alone, we may keep you overnight. Insurance often won't pay for this.  After surgery  If it's hard to control your pain or you need more pain medicine, please call your surgeon's office.  Questions?   If you have any questions for your care team, list them here:   ____________________________________________________________________________________________________________________________________________________________________________________________________________________________________________________________  For informational purposes only. Not to replace the advice of your health care provider. Copyright   2003, 2019 [facility]. All rights reserved. Clinically reviewed by Ayaz Bhat MD. [employer] [id number] - REV 08/24.

## 2025-02-03 ENCOUNTER — OFFICE VISIT (OUTPATIENT)
Dept: FAMILY MEDICINE | Facility: CLINIC | Age: 75
End: 2025-02-03

## 2025-02-03 VITALS
DIASTOLIC BLOOD PRESSURE: 71 MMHG | HEIGHT: 63 IN | OXYGEN SATURATION: 98 % | SYSTOLIC BLOOD PRESSURE: 125 MMHG | HEART RATE: 79 BPM | BODY MASS INDEX: 27.46 KG/M2 | WEIGHT: 155 LBS

## 2025-02-03 DIAGNOSIS — M79.7 FIBROMYALGIA: ICD-10-CM

## 2025-02-03 DIAGNOSIS — K58.2 IRRITABLE BOWEL SYNDROME WITH BOTH CONSTIPATION AND DIARRHEA: ICD-10-CM

## 2025-02-03 DIAGNOSIS — I10 HYPERTENSION, UNSPECIFIED TYPE: ICD-10-CM

## 2025-02-03 DIAGNOSIS — M85.89 OSTEOPENIA OF MULTIPLE SITES: ICD-10-CM

## 2025-02-03 DIAGNOSIS — R73.03 PREDIABETES: ICD-10-CM

## 2025-02-03 DIAGNOSIS — Z98.1 S/P CERVICAL SPINAL FUSION: ICD-10-CM

## 2025-02-03 DIAGNOSIS — Z01.818 PREOP GENERAL PHYSICAL EXAM: Primary | ICD-10-CM

## 2025-02-03 DIAGNOSIS — M18.12 PRIMARY OSTEOARTHRITIS OF FIRST CARPOMETACARPAL JOINT OF LEFT HAND: ICD-10-CM

## 2025-02-03 DIAGNOSIS — L91.0 KELOID OF SKIN: ICD-10-CM

## 2025-02-03 DIAGNOSIS — J45.40 MODERATE PERSISTENT ASTHMA WITHOUT COMPLICATION: ICD-10-CM

## 2025-02-03 PROBLEM — B17.9 ACUTE HEPATITIS: Status: RESOLVED | Noted: 2023-11-09 | Resolved: 2025-02-03

## 2025-02-03 PROBLEM — G89.4 PAIN SYNDROME, CHRONIC: Status: RESOLVED | Noted: 2023-11-22 | Resolved: 2025-02-03

## 2025-02-03 PROBLEM — R53.1 LEFT-SIDED WEAKNESS: Status: RESOLVED | Noted: 2024-02-14 | Resolved: 2025-02-03

## 2025-02-03 PROBLEM — H53.8 BLURRED VISION: Status: RESOLVED | Noted: 2024-02-02 | Resolved: 2025-02-03

## 2025-02-03 PROBLEM — G89.18: Status: RESOLVED | Noted: 2023-11-09 | Resolved: 2025-02-03

## 2025-02-03 PROBLEM — N39.0 COMPLICATED UTI (URINARY TRACT INFECTION): Status: RESOLVED | Noted: 2023-11-09 | Resolved: 2025-02-03

## 2025-02-03 PROBLEM — R39.15 URINARY URGENCY: Status: RESOLVED | Noted: 2023-11-22 | Resolved: 2025-02-03

## 2025-02-03 PROBLEM — R51.9 NONINTRACTABLE HEADACHE, UNSPECIFIED CHRONICITY PATTERN, UNSPECIFIED HEADACHE TYPE: Status: RESOLVED | Noted: 2024-02-14 | Resolved: 2025-02-03

## 2025-02-03 PROBLEM — R20.0 LEFT SIDED NUMBNESS: Status: RESOLVED | Noted: 2024-02-02 | Resolved: 2025-02-03

## 2025-02-03 PROBLEM — R41.0 ACUTE CONFUSION: Status: RESOLVED | Noted: 2024-03-14 | Resolved: 2025-02-03

## 2025-02-03 PROBLEM — R41.89 SPELL OF ALTERED COGNITION: Status: RESOLVED | Noted: 2024-08-10 | Resolved: 2025-02-03

## 2025-02-03 PROBLEM — K52.9 COLITIS: Status: RESOLVED | Noted: 2022-08-25 | Resolved: 2025-02-03

## 2025-02-03 LAB
% GRANULOCYTES: 66.1 % (ref 42.2–75.2)
ANION GAP SERPL CALCULATED.3IONS-SCNC: 10 MMOL/L (ref 7–15)
BUN SERPL-MCNC: 14.4 MG/DL (ref 8–23)
CALCIUM SERPL-MCNC: 9.4 MG/DL (ref 8.8–10.4)
CHLORIDE SERPL-SCNC: 99 MMOL/L (ref 98–107)
CREAT SERPL-MCNC: 0.95 MG/DL (ref 0.51–0.95)
EGFRCR SERPLBLD CKD-EPI 2021: 63 ML/MIN/1.73M2
EST. AVERAGE GLUCOSE BLD GHB EST-MCNC: 128 MG/DL
FASTING STATUS PATIENT QL REPORTED: NO
GLUCOSE SERPL-MCNC: 119 MG/DL (ref 70–99)
HBA1C MFR BLD: 6.1 %
HCO3 SERPL-SCNC: 29 MMOL/L (ref 22–29)
HCT VFR BLD AUTO: 43.6 % (ref 35–46)
HEMOGLOBIN: 14.7 G/DL (ref 11.8–15.5)
LYMPHOCYTES NFR BLD AUTO: 26.9 % (ref 20.5–51.1)
MCH RBC QN AUTO: 27.4 PG (ref 27–31)
MCHC RBC AUTO-ENTMCNC: 33.8 G/DL (ref 33–37)
MCV RBC AUTO: 81 FL (ref 80–100)
MONOCYTES NFR BLD AUTO: 7 % (ref 1.7–9.3)
PLATELET # BLD AUTO: 220 K/UL (ref 140–450)
POTASSIUM SERPL-SCNC: 3.9 MMOL/L (ref 3.4–5.3)
RBC # BLD AUTO: 5.38 X10/CMM (ref 3.7–5.2)
SODIUM SERPL-SCNC: 138 MMOL/L (ref 135–145)
WBC # BLD AUTO: 8.5 X10/CMM (ref 3.8–11)

## 2025-02-03 PROCEDURE — 99214 OFFICE O/P EST MOD 30 MIN: CPT | Performed by: FAMILY MEDICINE

## 2025-02-03 PROCEDURE — 80048 BASIC METABOLIC PNL TOTAL CA: CPT | Mod: 90 | Performed by: FAMILY MEDICINE

## 2025-02-03 PROCEDURE — G2211 COMPLEX E/M VISIT ADD ON: HCPCS | Performed by: FAMILY MEDICINE

## 2025-02-03 PROCEDURE — 85025 COMPLETE CBC W/AUTO DIFF WBC: CPT | Performed by: FAMILY MEDICINE

## 2025-02-03 PROCEDURE — 83036 HEMOGLOBIN GLYCOSYLATED A1C: CPT | Performed by: FAMILY MEDICINE

## 2025-02-03 PROCEDURE — 36415 COLL VENOUS BLD VENIPUNCTURE: CPT | Performed by: FAMILY MEDICINE

## 2025-02-03 ASSESSMENT — PATIENT HEALTH QUESTIONNAIRE - PHQ9: SUM OF ALL RESPONSES TO PHQ QUESTIONS 1-9: 3

## 2025-02-03 NOTE — PROGRESS NOTES
Preoperative Evaluation  [facility]  [address] [place] [address]  Phone: [phone]  Fax: [phone]  Primary Provider: Long Ferrari MD  Pre-op Performing Provider: Long Ferrari MD  Feb 3, 2025             1/27/2025   Surgical Information   What procedure is being done? Carpometacarpal Ligament Reconstruction  tendon Interposition modified arthroplasty   Facility or Hospital where procedure/surgery will be performed: [facility]   Who is doing the procedure / surgery? Dr. Mejia   Date of surgery / procedure: 2/6/2025   Time of surgery / procedure: Midday   Where do you plan to recover after surgery? at home with family     Fax number for surgical facility: [phone]    Assessment & Plan     The proposed surgical procedure is considered LOW risk.    Preop general physical exam  Prior to complete rebuild of thumb to treat Primary osteoarthritis of first carpometacarpal joint of left hand    Prediabetes  Will check A1C    Osteopenia of multiple sites  On regular alendronate    Moderate persistent asthma without complication  Only during Spring and summer    S/P cervical spinal fusion  Has greatly helped neck pain    Fibromyalgia  Ongoing and stable and working on better sleep.    Irritable bowel syndrome with both constipation and diarrhea  More constipation    Hypertension, unspecified type  Well controlled    Keloid of skin  Minor skin scars              - No identified additional risk factors other than previously addressed    MEDICATION INSTRUCTIONS:  Take all scheduled medications on the day of surgery EXCEPT for modifications listed below:    Recommendation  ASA on hold    Gustavo Choudhury is a 74 year old, presenting for the following:  Pre-Op Exam (Carpal tunnel surgery, right wrist.) and Insect Bites (Spider bite on 4th finger of left hand.)        HPI related to upcoming procedure: End stage OA of the thumb now ready for revision athroplasty of the right  thumb.        1/27/2025   Pre-Op Questionnaire   Have you ever had a heart attack or stroke? (!) YES, last January and February   Have you ever had surgery on your heart or blood vessels, such as a stent placement, a coronary artery bypass, or surgery on an artery in your head, neck, heart, or legs? No   Do you have chest pain with activity? No   Do you have a history of heart failure? No   Do you currently have a cold, bronchitis or symptoms of other infection? No   Do you have a cough, shortness of breath, or wheezing? No   Do you or anyone in your family have previous history of blood clots? (!) YES daughter, patient had 50 years ago.   Do you or does anyone in your family have a serious bleeding problem such as prolonged bleeding following surgeries or cuts? (!) YES, daughter had hemophilia   Have you ever had problems with anemia or been told to take iron pills? No   Have you had any abnormal blood loss such as black, tarry or bloody stools, or abnormal vaginal bleeding? No   Have you ever had a blood transfusion? (!) YES   Have you ever had a transfusion reaction? (!) YES    Are you willing to have a blood transfusion if it is medically needed before, during, or after your surgery? Yes   Have you or any of your relatives ever had problems with anesthesia? (!) YES- Patient wakes up slowly  Daughter had post anesthsesia terrors.   Do you have sleep apnea, excessive snoring or daytime drowsiness? No   Do you have any artifical heart valves or other implanted medical devices like a pacemaker, defibrillator, or continuous glucose monitor? No   Do you have artificial joints? (!) YES knees   Are you allergic to latex? No     Health Care Directive  Patient does not have a Health Care Directive: Full code    Preoperative Review of    reviewed - no record of controlled substances prescribed.      Status of Chronic Conditions:  See problem list for active medical problems.  Problems all longstanding and stable,  except as noted/documented.  See ROS for pertinent symptoms related to these conditions.    Patient Active Problem List    Diagnosis Date Noted    Headache 02/02/2024     Priority: Medium    Hypertension, unspecified type 02/02/2024     Priority: Medium    S/P cervical spinal fusion 09/20/2023     Priority: Medium    Moderate persistent asthma without complication 10/27/2021     Priority: Medium    Prediabetes 10/27/2021     Priority: Medium    H/O bilateral mastectomy 06/17/2021     Priority: Medium     with reconstruction      Fibromyalgia      Priority: Medium    Chronic pain of both knees 03/21/2018     Priority: Medium    IBS (irritable bowel syndrome) 06/28/2011     Priority: Medium    Keloid of skin 06/28/2011     Priority: Medium    Osteoarthritis 02/01/2011     Priority: Medium    Osteopenia 02/01/2011     Priority: Medium    History of breast cancer 02/01/2011     Priority: Medium     1987        Past Medical History:   Diagnosis Date    Breast CA in situ 1987    Cancer (H) 1987    Right Breast treated with surgery    Cerebral infarction (H)     Chronic constipation     Fibromyalgia     History of blood transfusion     Malignant neoplasm of female breast, unspecified estrogen receptor status, unspecified laterality, unspecified site of breast (H) 06/17/2021    Meningitis     Several times as an adult    Osteoarthritis 02/01/2011    Osteopenia 02/01/2011    Pneumonia     Pterygium eye 08/26/2013    Raynaud phenomenon     Reactive airway disease 02/01/2011    Seasonal allergies     Shingles     Prior to 2008 - scalp    Skin cancer     SCC - hand, left nose    Spell of altered cognition 08/10/2024    Uncomplicated asthma      Past Surgical History:   Procedure Laterality Date    anterior c-disc fusion      ARTHROPLASTY KNEE Left 10/17/2022    Procedure: LEFT TOTAL KNEE ARTHROPLASTY;  Surgeon: Jax Le MD;  Location: [facility]    BLEPHAROPLASTY, BROW LIFT BILATERAL, COMBINED Bilateral 6/18/2018     Procedure: COMBINED BLEPHAROPLASTY, BROW LIFT BILATERAL;  BILATERAL UPPER LID BLEPHAROPLASTY; BILATERAL BROW PTOSIS REPAIR;  Surgeon: Сергей Walters MD;  Location:  [facility]    CHOLECYSTECTOMY      COLONOSCOPY N/A 10/19/2017    Procedure: COLONOSCOPY;  COLONOSCOPY;  Surgeon: iNko Wong MD;  Location:  GI    COLONOSCOPY N/A 8/27/2022    Procedure: COLONOSCOPY, WITH POLYPECTOMY AND BIOPSY;  Surgeon: Abraham Rogers MD;  Location:  GI    D & C      Bleeding before hysterectomy    DISCECTOMY, FUSION CERVICAL ANTERIOR ONE LEVEL, COMBINED N/A 9/20/2023    Procedure: ANTERIOR CERVICAL 5 TO CERVICAL 6 DISCECTOMY AND FUSION;  Surgeon: Alex Galindo MD;  Location:  OR    ENDOSCOPY  04/21/08    ESOPHAGOSCOPY, GASTROSCOPY, DUODENOSCOPY (EGD), COMBINED N/A 8/27/2022    Procedure: ESOPHAGOGASTRODUODENOSCOPY, WITH BIOPSY;  Surgeon: Abraham Rogers MD;  Location:  GI    HYSTERECTOMY, MARCUS      Ovaries and tubes out due to breast cancer    JOINT REPLACEMTN, KNEE RT/LT Right 01/2011    Joint Replacement knee RT, with tibial straightening (2 replacements)    MAMMOPLASTY AUGMENTATION BILATERAL      breast ca     MASTECTOMY, SIMPLE RT/LT/CLAIRE Bilateral 1989    Mastectomy Simple RT/LT/CLAIRE    MOHS MICROGRAPHIC PROCEDURE      Left lateral nose    OPEN REDUCTION INTERNAL FIXATION ANKLE  8/27/2013    Procedure: OPEN REDUCTION INTERNAL FIXATION ANKLE;  RIGHT OPEN REDUCTION INTERNAL FIXATION ANKLE WITH LIGAMENT REPAIR;  Surgeon: Nando Chang MD;  Location:  OR    PTERYGIUM WITH CONJUNCTIVAL AUTOLOGOUS TRANSPLANT Left 8/29/2016    Procedure: PTERYGIUM WITH CONJUNCTIVAL AUTOLOGOUS TRANSPLANT;  Surgeon: Сергей Walters MD;  Location:  [facility]    REPAIR LIGAMENT ANKLE  8/27/2013    Procedure: REPAIR LIGAMENT ANKLE;;  Surgeon: Nando Chang MD;  Location:  OR    SALIVARY GLAND SURGERY Left     Stone removal     SIGMOIDOSCOPY FLEXIBLE N/A 8/30/2022    Procedure: FLEXIBLE SIGMOIDOSCOPY;  Surgeon: Niko Wong  MD;  Location:  GI    TONSILLECTOMY       Current Outpatient Medications   Medication Sig Dispense Refill    acetaminophen (TYLENOL) 500 MG tablet Take 1,000 mg by mouth daily      albuterol (PROAIR HFA/PROVENTIL HFA/VENTOLIN HFA) 108 (90 Base) MCG/ACT inhaler Inhale 2 puffs into the lungs every 6 hours as needed for shortness of breath 18 g 3    alendronate (FOSAMAX) 70 MG tablet TAKE 1 TABLET(70 MG) BY MOUTH EVERY 7 DAYS 12 tablet 3    aspirin (ASA) 325 MG EC tablet Take 1 tablet (325 mg) by mouth daily Take with food or meal.      clindamycin (CLEOCIN) 300 MG capsule Take 300 mg by mouth Take 1 hour prior to dental appointment.      metroNIDAZOLE (METROCREAM) 0.75 % external cream Apply topically 2 times daily 45 g 1    Multiple Vitamins-Minerals (MULTIVITAMIN ADULT) CHEW Chew 2 gummies by mouth daily      sertraline (ZOLOFT) 50 MG tablet Take 1 tablet (50 mg) by mouth daily. 90 tablet 2    triamcinolone (KENALOG) 0.1 % external ointment Apply topically 2 times daily. (Patient taking differently: Apply topically as needed.) 30 g 0    budesonide-formoterol (SYMBICORT) 160-4.5 MCG/ACT Inhaler Inhale 2 puffs into the lungs daily (Patient not taking: Reported on 2/3/2025) 10.2 g 3    hydrocortisone 2.5 % cream Apply topically daily as needed To the face      lacosamide (VIMPAT) 50 MG TABS tablet Take 50 mg by mouth 2 times daily At 1000 and 2200 (Patient not taking: Reported on 2/3/2025)      polyethylene glycol (MIRALAX) 17 g packet Take 1 packet by mouth daily as needed for constipation (Patient not taking: Reported on 2/3/2025)      senna-docusate (SENOKOT-S/PERICOLACE) 8.6-50 MG tablet Take 1 tablet by mouth daily as needed for constipation         Allergies   Allergen Reactions    Pcn [Penicillins] Hives    Tetanus Toxoids Anaphylaxis    Erythromycin GI Disturbance    Levaquin Hives and Itching    Amoxicillin     Duloxetine Nausea     Other reaction(s): Jittery    Silicone Rash        Social History  "    Tobacco Use    Smoking status: Some Days     Types: Cigarettes     Passive exposure: Current    Smokeless tobacco: Never    Tobacco comments:     quit but  still smokes, but not in house   Substance Use Topics    Alcohol use: No     Alcohol/week: 0.0 standard drinks of alcohol     Family History   Problem Relation Age of Onset    Respiratory Father     Cancer Brother         sarcoidosis    Diabetes Brother     Cerebrovascular Disease Brother     Liver Disease Brother      History   Drug Use No             Review of Systems  Constitutional, HEENT, cardiovascular, pulmonary, GI, , musculoskeletal, neuro, skin, endocrine and psych systems are negative, except as otherwise noted.    Objective    /71   Pulse 79   Ht 1.6 m (5' 3\")   Wt 70.3 kg (155 lb)   SpO2 98%   BMI 27.46 kg/m     Estimated body mass index is 27.46 kg/m  as calculated from the following:    Height as of this encounter: 1.6 m (5' 3\").    Weight as of this encounter: 70.3 kg (155 lb).  Physical Exam  GENERAL: alert and no distress  EYES: Eyes grossly normal to inspection, PERRL and conjunctivae and sclerae normal  HENT: ear canals and TM's normal, nose and mouth without ulcers or lesions  NECK: no adenopathy, no asymmetry, masses, or scars  RESP: lungs clear to auscultation - no rales, rhonchi or wheezes  CV: regular rate and rhythm, normal S1 S2, no S3 or S4, no murmur, click or rub, no peripheral edema  ABDOMEN: soft, nontender, no hepatosplenomegaly, no masses and bowel sounds normal  SKIN: no suspicious lesions or rashes  NEURO: Normal strength and tone, mentation intact and speech normal  PSYCH: mentation appears normal, affect normal/bright    Recent Labs   Lab Test 08/10/24  0652 08/10/24  0038 03/14/24  0800 03/13/24  1019   HGB 12.6 12.5 12.5 13.7    182 163 195   INR  --  1.09  --  1.02    134* 140 140   POTASSIUM 4.3 4.0 4.2 4.0   CR 0.93 0.92 0.83 0.90   A1C  --   --  6.0*  --         Diagnostics  Labs " pending at this time.  Results will be reviewed when available.   No EKG required for low risk surgery (cataract, skin procedure, breast biopsy, etc).    Revised Cardiac Risk Index (RCRI)  The patient has the following serious cardiovascular risks for perioperative complications:   - No serious cardiac risks = 0 points     RCRI Interpretation: 1 point: Class II (low risk - 0.9% complication rate)         Signed Electronically by: Long Ferrari MD  A copy of this evaluation report is provided to the requesting physician.

## 2025-02-05 ENCOUNTER — PATIENT OUTREACH (OUTPATIENT)
Dept: CARE COORDINATION | Facility: CLINIC | Age: 75
End: 2025-02-05
Payer: MEDICARE

## 2025-02-05 NOTE — PROGRESS NOTES
Clinic Care Coordination Contact  Follow Up Progress Note      Assessment: Call placed to pt to check in, she reports that she is having carpal tunnel plus some add'l work done to her thumb/hand tomorrow which she is nervous about. She has a call out to the nurse at [facility] to discuss. She is worried about post-op pain management as well. Encouraged her to write all of her questions down and discuss them all prior to the surgery.    Pt continues to see her therapist/counselor weekly and continues to feel it's very beneficial, she is so glad she has this.     As far as her overall mood, she reports that people comment to her all of the time that she seems to be doing so much better. She agrees that she is feeling better but has had some anxiety around a recent spider bite that still bothers her and the hand surgery tomorrow as she knows it will be a long recovery.    Care Gaps:    Health Maintenance Due   Topic Date Due    DTAP/TDAP/TD IMMUNIZATION (1 - Tdap) Never done    RSV VACCINE (1 - Risk 60-74 years 1-dose series) Never done    LUNG CANCER SCREENING  12/12/2022    INFLUENZA VACCINE (1) 09/01/2024    COVID-19 Vaccine (7 - 2024-25 season) 09/01/2024    MEDICARE ANNUAL WELLNESS VISIT  11/22/2024    NICOTINE/TOBACCO CESSATION COUNSELING Q 1 YR  01/15/2025       Care Plans  Care Plan: Mental Health       Problem: Mental Health Symptoms Need Improvement       Goal: Improve management of mental health symptoms and establish with mental health/psychosocial supports  Completed 12/3/2024      Start Date: 8/16/2024 Expected End Date: 11/18/2024    This Visit's Progress: 100% Recent Progress: 80%    Priority: High    Note:     Barriers: Overwhelm, complex and new medical concerns, finding therapist who takes Medicare  Strengths: Motivated and support from daughter who is helping navigate  Patient expressed understanding of goal: Yes  Action steps to achieve this goal:  1. I will meet with therapist  2. I will learn new coping  skills and utilize   3. I will reach out to care team with questions/concerns along the way                               Intervention/Education provided during outreach: Emotional support, positive feedback and encouraged her to ask questions ahead of surgery tomorrow.     Outreach Frequency: monthly, more frequently as needed      Plan:   Care Coordinator will follow up in 1 week to see how she is doing post surgery.    [facility]  Social Work Care Coordinator  Phone: [phone]

## 2025-02-17 ENCOUNTER — TRANSFERRED RECORDS (OUTPATIENT)
Dept: FAMILY MEDICINE | Facility: CLINIC | Age: 75
End: 2025-02-17

## 2025-02-17 ENCOUNTER — PATIENT OUTREACH (OUTPATIENT)
Dept: CARE COORDINATION | Facility: CLINIC | Age: 75
End: 2025-02-17
Payer: MEDICARE

## 2025-02-17 NOTE — PROGRESS NOTES
Clinic Care Coordination Contact  [facility]/Voicemail    Clinical Data: Care Coordinator Outreach    Outreach Documentation Number of Outreach Attempt   2/17/2025   2:44 PM 1       Left message on patient's voicemail with call back information and requested return call.      Plan: Care Coordinator will try to reach patient again in 3-5 business days.    Anna Morales [facility]  Social Work Care Coordinator  Phone: [phone]

## 2025-02-25 ENCOUNTER — HOSPITAL ENCOUNTER (EMERGENCY)
Facility: CLINIC | Age: 75
Discharge: HOME | End: 2025-02-25
Attending: EMERGENCY MEDICINE
Payer: MEDICARE

## 2025-02-25 ENCOUNTER — APPOINTMENT (OUTPATIENT)
Dept: CT IMAGING | Facility: CLINIC | Age: 75
End: 2025-02-25
Attending: EMERGENCY MEDICINE
Payer: MEDICARE

## 2025-02-25 ENCOUNTER — APPOINTMENT (OUTPATIENT)
Dept: MRI IMAGING | Facility: CLINIC | Age: 75
End: 2025-02-25
Attending: EMERGENCY MEDICINE
Payer: MEDICARE

## 2025-02-25 VITALS
TEMPERATURE: 98.6 F | RESPIRATION RATE: 18 BRPM | DIASTOLIC BLOOD PRESSURE: 73 MMHG | BODY MASS INDEX: 26.93 KG/M2 | HEART RATE: 80 BPM | SYSTOLIC BLOOD PRESSURE: 140 MMHG | WEIGHT: 152 LBS | OXYGEN SATURATION: 94 %

## 2025-02-25 DIAGNOSIS — R07.89 CHEST DISCOMFORT: ICD-10-CM

## 2025-02-25 DIAGNOSIS — R42 DIZZINESS: ICD-10-CM

## 2025-02-25 DIAGNOSIS — R41.0 EPISODE OF CONFUSION: ICD-10-CM

## 2025-02-25 LAB
ALBUMIN SERPL BCG-MCNC: 3.7 G/DL (ref 3.5–5.2)
ALBUMIN UR-MCNC: NEGATIVE MG/DL
ALP SERPL-CCNC: 60 U/L (ref 40–150)
ALT SERPL W P-5'-P-CCNC: 9 U/L (ref 0–50)
ANION GAP SERPL CALCULATED.3IONS-SCNC: 10 MMOL/L (ref 7–15)
APPEARANCE UR: CLEAR
AST SERPL W P-5'-P-CCNC: 16 U/L (ref 0–45)
ATRIAL RATE - MUSE: 69 BPM
BASOPHILS # BLD AUTO: 0 10E3/UL (ref 0–0.2)
BASOPHILS NFR BLD AUTO: 0 %
BILIRUB SERPL-MCNC: 0.4 MG/DL
BILIRUB UR QL STRIP: NEGATIVE
BUN SERPL-MCNC: 8.2 MG/DL (ref 8–23)
CALCIUM SERPL-MCNC: 8.7 MG/DL (ref 8.8–10.4)
CHLORIDE SERPL-SCNC: 102 MMOL/L (ref 98–107)
COLOR UR AUTO: NORMAL
CREAT SERPL-MCNC: 0.83 MG/DL (ref 0.51–0.95)
DIASTOLIC BLOOD PRESSURE - MUSE: NORMAL MMHG
EGFRCR SERPLBLD CKD-EPI 2021: 74 ML/MIN/1.73M2
EOSINOPHIL # BLD AUTO: 0.1 10E3/UL (ref 0–0.7)
EOSINOPHIL NFR BLD AUTO: 2 %
ERYTHROCYTE [DISTWIDTH] IN BLOOD BY AUTOMATED COUNT: 13 % (ref 10–15)
FLUAV RNA SPEC QL NAA+PROBE: NEGATIVE
FLUBV RNA RESP QL NAA+PROBE: NEGATIVE
GLUCOSE SERPL-MCNC: 97 MG/DL (ref 70–99)
GLUCOSE UR STRIP-MCNC: NEGATIVE MG/DL
HCO3 SERPL-SCNC: 27 MMOL/L (ref 22–29)
HCT VFR BLD AUTO: 39 % (ref 35–47)
HGB BLD-MCNC: 12.8 G/DL (ref 11.7–15.7)
HGB UR QL STRIP: NEGATIVE
IMM GRANULOCYTES # BLD: 0 10E3/UL
IMM GRANULOCYTES NFR BLD: 1 %
INTERPRETATION ECG - MUSE: NORMAL
KETONES UR STRIP-MCNC: NEGATIVE MG/DL
LEUKOCYTE ESTERASE UR QL STRIP: NEGATIVE
LIPASE SERPL-CCNC: 17 U/L (ref 13–60)
LYMPHOCYTES # BLD AUTO: 1.7 10E3/UL (ref 0.8–5.3)
LYMPHOCYTES NFR BLD AUTO: 23 %
MAGNESIUM SERPL-MCNC: 2.1 MG/DL (ref 1.7–2.3)
MCH RBC QN AUTO: 27.1 PG (ref 26.5–33)
MCHC RBC AUTO-ENTMCNC: 32.8 G/DL (ref 31.5–36.5)
MCV RBC AUTO: 83 FL (ref 78–100)
MONOCYTES # BLD AUTO: 0.6 10E3/UL (ref 0–1.3)
MONOCYTES NFR BLD AUTO: 8 %
NEUTROPHILS # BLD AUTO: 4.8 10E3/UL (ref 1.6–8.3)
NEUTROPHILS NFR BLD AUTO: 67 %
NITRATE UR QL: NEGATIVE
NRBC # BLD AUTO: 0 10E3/UL
NRBC BLD AUTO-RTO: 0 /100
P AXIS - MUSE: 70 DEGREES
PH UR STRIP: 7 [PH] (ref 5–7)
PLATELET # BLD AUTO: 185 10E3/UL (ref 150–450)
POTASSIUM SERPL-SCNC: 4 MMOL/L (ref 3.4–5.3)
PR INTERVAL - MUSE: 138 MS
PROT SERPL-MCNC: 6.3 G/DL (ref 6.4–8.3)
QRS DURATION - MUSE: 72 MS
QT - MUSE: 398 MS
QTC - MUSE: 426 MS
R AXIS - MUSE: 59 DEGREES
RBC # BLD AUTO: 4.72 10E6/UL (ref 3.8–5.2)
RBC URINE: 0 /HPF
RSV RNA SPEC NAA+PROBE: NEGATIVE
SARS-COV-2 RNA RESP QL NAA+PROBE: NEGATIVE
SODIUM SERPL-SCNC: 139 MMOL/L (ref 135–145)
SP GR UR STRIP: 1 (ref 1–1.03)
SQUAMOUS EPITHELIAL: <1 /HPF
SYSTOLIC BLOOD PRESSURE - MUSE: NORMAL MMHG
T AXIS - MUSE: 40 DEGREES
TROPONIN T SERPL HS-MCNC: <6 NG/L
UROBILINOGEN UR STRIP-MCNC: NORMAL MG/DL
VENTRICULAR RATE- MUSE: 69 BPM
WBC # BLD AUTO: 7.3 10E3/UL (ref 4–11)
WBC URINE: 1 /HPF

## 2025-02-25 PROCEDURE — 93005 ELECTROCARDIOGRAM TRACING: CPT

## 2025-02-25 PROCEDURE — 36415 COLL VENOUS BLD VENIPUNCTURE: CPT | Performed by: EMERGENCY MEDICINE

## 2025-02-25 PROCEDURE — 85004 AUTOMATED DIFF WBC COUNT: CPT | Performed by: EMERGENCY MEDICINE

## 2025-02-25 PROCEDURE — 84484 ASSAY OF TROPONIN QUANT: CPT | Performed by: EMERGENCY MEDICINE

## 2025-02-25 PROCEDURE — 99285 EMERGENCY DEPT VISIT HI MDM: CPT | Mod: 25

## 2025-02-25 PROCEDURE — 70450 CT HEAD/BRAIN W/O DYE: CPT | Mod: XS

## 2025-02-25 PROCEDURE — 70553 MRI BRAIN STEM W/O & W/DYE: CPT

## 2025-02-25 PROCEDURE — 82374 ASSAY BLOOD CARBON DIOXIDE: CPT | Performed by: EMERGENCY MEDICINE

## 2025-02-25 PROCEDURE — 250N000011 HC RX IP 250 OP 636: Performed by: EMERGENCY MEDICINE

## 2025-02-25 PROCEDURE — A9585 GADOBUTROL INJECTION: HCPCS | Performed by: EMERGENCY MEDICINE

## 2025-02-25 PROCEDURE — 87637 SARSCOV2&INF A&B&RSV AMP PRB: CPT | Performed by: EMERGENCY MEDICINE

## 2025-02-25 PROCEDURE — 255N000002 HC RX 255 OP 636: Performed by: EMERGENCY MEDICINE

## 2025-02-25 PROCEDURE — 83735 ASSAY OF MAGNESIUM: CPT | Performed by: EMERGENCY MEDICINE

## 2025-02-25 PROCEDURE — 96374 THER/PROPH/DIAG INJ IV PUSH: CPT | Mod: 59

## 2025-02-25 PROCEDURE — 83690 ASSAY OF LIPASE: CPT | Performed by: EMERGENCY MEDICINE

## 2025-02-25 PROCEDURE — 71260 CT THORAX DX C+: CPT

## 2025-02-25 PROCEDURE — 70496 CT ANGIOGRAPHY HEAD: CPT

## 2025-02-25 PROCEDURE — 81001 URINALYSIS AUTO W/SCOPE: CPT | Performed by: EMERGENCY MEDICINE

## 2025-02-25 RX ORDER — IOPAMIDOL 755 MG/ML
100 INJECTION, SOLUTION INTRAVASCULAR ONCE
Status: COMPLETED | OUTPATIENT
Start: 2025-02-25 | End: 2025-02-25

## 2025-02-25 RX ORDER — LIDOCAINE 40 MG/G
CREAM TOPICAL
Status: DISCONTINUED | OUTPATIENT
Start: 2025-02-25 | End: 2025-02-25 | Stop reason: HOSPADM

## 2025-02-25 RX ORDER — MORPHINE SULFATE 4 MG/ML
4 INJECTION, SOLUTION INTRAMUSCULAR; INTRAVENOUS
Status: DISCONTINUED | OUTPATIENT
Start: 2025-02-25 | End: 2025-02-25 | Stop reason: HOSPADM

## 2025-02-25 RX ORDER — GADOBUTROL 604.72 MG/ML
7 INJECTION INTRAVENOUS ONCE
Status: COMPLETED | OUTPATIENT
Start: 2025-02-25 | End: 2025-02-25

## 2025-02-25 RX ADMIN — IOPAMIDOL 100 ML: 755 INJECTION, SOLUTION INTRAVENOUS at 12:57

## 2025-02-25 RX ADMIN — MORPHINE SULFATE 4 MG: 4 INJECTION, SOLUTION INTRAMUSCULAR; INTRAVENOUS at 13:48

## 2025-02-25 RX ADMIN — GADOBUTROL 7 ML: 604.72 INJECTION INTRAVENOUS at 16:24

## 2025-02-25 ASSESSMENT — ACTIVITIES OF DAILY LIVING (ADL)
ADLS_ACUITY_SCORE: 59

## 2025-02-25 NOTE — DISCHARGE INSTRUCTIONS
*You may resume diet and activities. Drink plenty of fluids.  *Continue your current medications  *Return if you develop become worse in any way, faint or feel like you will faint or become worse in any way.    Discharge Instructions  Dizziness (Lightheaded)  Today you were seen for dizziness.  Dizziness can be caused by many things and it can be very difficult to determine the cause of dizziness.  At this time, your provider has found no signs that your dizziness is due to a serious or life-threatening condition. However, sometimes there is a serious problem that does not show up right away, and it is important for you to follow up with your regular provider as instructed.  Generally, every Emergency Department visit should have a follow-up clinic visit with either a primary or a specialty clinic/provider. Please follow-up as instructed by your emergency provider today.      Return to the Emergency Department if:    You pass out (fainting or falling out), especially during exercise.    You develop chest pain, chest pressure or difficulty breathing.  Your feel an irregular heartbeat.  You have excessive vaginal bleeding, or blood in your stool or vomit (throw up).  You have a high fever.  Your symptoms get worse or more frequent.    If when you begin to feel dizzy or lightheaded, it is important to sit down or lay down immediately to prevent injury from falling.  If you were given a prescription for medicine here today, be sure to read all of the information (including the package insert) that comes with your prescription.  This will include important information about the medicine, its side effects, and any warnings that you need to know about.  The pharmacist who fills the prescription can provide more information and answer questions you may have about the medicine.  If you have questions or concerns that the pharmacist cannot address, please call or return to the Emergency Department.   Remember that you can  always come back to the Emergency Department if you are not able to see your regular provider in the amount of time listed above, if you get any new symptoms, or if there is anything that worries you.    Discharge Instructions  Chest Pain    You have been seen today for chest pain or discomfort.  At this time, your provider has found no signs that your chest pain is due to a serious or life-threatening condition, (or you have declined more testing and/or admission to the hospital). However, sometimes there is a serious problem that does not show up right away. Your evaluation today may not be complete and you may need further testing and evaluation.     Generally, every Emergency Department visit should have a follow-up clinic visit with either a primary or a specialty clinic/provider. Please follow-up as instructed by your emergency provider today.  Return to the Emergency Department if:  Your chest pain changes, gets worse, starts to happen more often, or comes with less activity.  You are newly short of breath.  You get very weak or tired.  You pass out or faint.  You have any new symptoms, like fever, cough, numb legs, or you cough up blood.  You have anything else that worries you.    Until you follow-up with your regular provider, please do the following:  Take one aspirin daily unless you have an allergy or are told not to by your provider.  If a stress test appointment has been made, go to the appointment.  If you have questions, contact your regular provider.  Follow-up with your regular provider/clinic as directed; this is very important.    If you were given a prescription for medicine here today, be sure to read all of the information (including the package insert) that comes with your prescription.  This will include important information about the medicine, its side effects, and any warnings that you need to know about.  The pharmacist who fills the prescription can provide more information and answer  questions you may have about the medicine.  If you have questions or concerns that the pharmacist cannot address, please call or return to the Emergency Department.       Remember that you can always come back to the Emergency Department if you are not able to see your regular provider in the amount of time listed above, if you get any new symptoms, or if there is anything that worries you.

## 2025-02-25 NOTE — ED PROVIDER NOTES
"  Emergency Department Note      History of Present Illness     Chief Complaint   Altered Mental Status      HPI   Macey Romero is a 74 year old female with history of TIA and seizure who presents for evaluation of an altered mental status. Patient reports that yesterday she was walking through the [facility] in [place] with a friend and she noticed that she kept listing to the right side and bumping into the walls so she assumed that her blood sugar was low and went home. There she states that she began feeling abnormal, noting that she \"tried to put milk in the bread box\" and was confused as though she was \"out of body.\" Today reports that she woke up with more confusion and has been experiencing a hot flash in her chest and arms, chest pressure, nausea, and headache, and given her history of TIA and seizure she became very anxious and scared so called EMS. Heather also states that in the ED her mouth has been feeling different as though it is tighter. She is not on any new medications but did come off aspirin a few weeks ago following a surgery. She denies experiencing recent fever or cough.       Independent Historian   None    Review of External Notes   Reviewed hospitalization notes from September 2024 where patient was admitted with confusion.  Has a history of TIA.  Was felt to potentially have had a seizure.  She does have a history of vertigo.    Past Medical History     Medical History and Problem List   Asthma  Altered cognition  Skin cancer  Shingles   Reactive airway disease  Raynaud phenomenon  Osteopenia  Osteoarthritis  Pterygium eye  Pneumonia  Malignant neoplasm of breast  Fibromyalgia  Chronic constipation  Cerebral infarction  TIA  Seizure       Medications   Zoloft  Senokot  Vimpat  Cleocin  Symbicort  Aspirin 325mg  Fosamax  Albuterol      Surgical History   Tonsillectomy  Anterior x-disc fusion  Left knee arthroplasty  Blepharoplasty, brow lift bilateral, combined  Cholecystectomy  Colonoscopy " x2  Dilation and curretage  Discectomy, fusion cervical anterior one level, combined  Endoscopy  Esophagogastroduodenoscopy  Total abdominal hysterectomy  Bilateral knee joint replacement  Mammoplasty augmentation bilateral  Mohs micrographic procedure  ORIF ankle  Pterygium with conjunctival autologous transplant  Repair ligament ankle  Salivary gland surgery  Flexible sigmoidoscopy      Physical Exam     No data found.    Physical Exam  General: Well-nourished  Eyes: PERRL, conjunctivae pink no scleral icterus or conjunctival injection  ENT:  Moist mucus membranes, posterior oropharynx clear without erythema or exudates  Respiratory:  Lungs clear to auscultation bilaterally, no crackles/rubs/wheezes.  Good air movement  CV: Normal rate and rhythm, no murmurs/rubs/gallops  GI:  Abdomen soft and non-distended.  Normoactive BS.  No tenderness, guarding or rebound  Skin: Warm, dry.  No rashes or petechiae  Musculoskeletal: No peripheral edema or calf tenderness  Neuro: Alert and oriented to person/place/time. PERRL, EOMI no nystagmus, no aphasia/facial droop/dysarthria, tongue midline, symmetric palatal elevation, normal strength at SCM/trapezius/BUE/BLE, normal coordination to FNF at BUE, normal casual gait, negative romberg, sensation intact to LT over face/BUE/BLE  Psychiatric: Tearful affect      Diagnostics     Lab Results   Labs Ordered and Resulted from Time of ED Arrival to Time of ED Departure   COMPREHENSIVE METABOLIC PANEL - Abnormal       Result Value    Sodium 139      Potassium 4.0      Carbon Dioxide (CO2) 27      Anion Gap 10      Urea Nitrogen 8.2      Creatinine 0.83      GFR Estimate 74      Calcium 8.7 (*)     Chloride 102      Glucose 97      Alkaline Phosphatase 60      AST 16      ALT 9      Protein Total 6.3 (*)     Albumin 3.7      Bilirubin Total 0.4     MAGNESIUM - Normal    Magnesium 2.1     LIPASE - Normal    Lipase 17     ROUTINE UA WITH MICROSCOPIC REFLEX TO CULTURE - Normal    Color  Urine Straw      Appearance Urine Clear      Glucose Urine Negative      Bilirubin Urine Negative      Ketones Urine Negative      Specific Gravity Urine 1.004      Blood Urine Negative      pH Urine 7.0      Protein Albumin Urine Negative      Urobilinogen Urine Normal      Nitrite Urine Negative      Leukocyte Esterase Urine Negative      RBC Urine 0      WBC Urine 1      Squamous Epithelials Urine <1     INFLUENZA A/B, RSV AND SARS-COV2 PCR - Normal    Influenza A PCR Negative      Influenza B PCR Negative      RSV PCR Negative      SARS CoV2 PCR Negative     TROPONIN T, HIGH SENSITIVITY - Normal    Troponin T, High Sensitivity <6     CBC WITH PLATELETS AND DIFFERENTIAL    WBC Count 7.3      RBC Count 4.72      Hemoglobin 12.8      Hematocrit 39.0      MCV 83      MCH 27.1      MCHC 32.8      RDW 13.0      Platelet Count 185      % Neutrophils 67      % Lymphocytes 23      % Monocytes 8      % Eosinophils 2      % Basophils 0      % Immature Granulocytes 1      NRBCs per 100 WBC 0      Absolute Neutrophils 4.8      Absolute Lymphocytes 1.7      Absolute Monocytes 0.6      Absolute Eosinophils 0.1      Absolute Basophils 0.0      Absolute Immature Granulocytes 0.0      Absolute NRBCs 0.0         Imaging   MR Brain w/o & w Contrast   Final Result   IMPRESSION:   1.  No finding for intracranial hemorrhage, mass, or acute infarct.      2.  Mild volume loss and presumed sequela chronic microvascular ischemic change.      CT Aortic Survey w Contrast   Final Result   IMPRESSION:   No evidence of acute aortic syndrome or other acute findings in the chest, abdomen, or pelvis.            CTA Head Neck w Contrast   Final Result   IMPRESSION:    HEAD CT:   1.  No evidence of acute intracranial abnormality.      HEAD CTA:   1.  No large vessel occlusion.   2.  Redemonstrated diminutive caliber of the right posterior cerebral artery and mild multifocal stenoses of the left posterior cerebral artery, likely due to intracranial  atherosclerotic disease.      NECK CTA:   1.  No high-grade stenosis or occlusion of the major arteries in the neck.         Head CT w/o contrast   Final Result   IMPRESSION:    HEAD CT:   1.  No evidence of acute intracranial abnormality.      HEAD CTA:   1.  No large vessel occlusion.   2.  Redemonstrated diminutive caliber of the right posterior cerebral artery and mild multifocal stenoses of the left posterior cerebral artery, likely due to intracranial atherosclerotic disease.      NECK CTA:   1.  No high-grade stenosis or occlusion of the major arteries in the neck.             EKG   ECG results from 02/25/25   EKG 12-lead, tracing only     Value    Systolic Blood Pressure     Diastolic Blood Pressure     Ventricular Rate 69    Atrial Rate 69    AR Interval 138    QRS Duration 72        QTc 426    P Axis 70    R AXIS 59    T Axis 40    Interpretation ECG      Sinus rhythm  Nonspecific ST abnormality  When compared with ECG of 10-Aug-2024 00:33,  QT has shortened    Read by Марина Lopez MD at 1209           Independent Interpretation   CT Head: No intracranial hemorrhage.    ED Course      Medications Administered   Medications   iopamidol (ISOVUE-370) solution 100 mL (100 mLs Intravenous $Given 2/25/25 1257)   sodium chloride (PF) 0.9% PF flush 100 mL (100 mLs Intravenous $Given 2/25/25 1321)   gadobutrol (GADAVIST) injection 7 mL (7 mLs Intravenous $Given 2/25/25 1624)       Procedures   Procedures     Discussion of Management   None    ED Course   ED Course as of 02/26/25 1508   Tue Feb 25, 2025   1134 I obtained patient history and performed a physical exam.    I updated the patient and her friend.  Reviewed all of the results.  She is comfortable with the plan for discharge home and follow-up with her primary care doctor.    Additional Documentation  None    Medical Decision Making / Diagnosis       SANDRA   Macey Romero is a 74 year old female with a history of TIA and seizure who comes today with some  neurologic symptoms including listing, some confusion and a feeling of dizziness.  Her neuroexam is fortunately normal today.  She did have an episode of chest discomfort that radiated to both of her arms and in light of her other symptoms I was concerned about the possibility of an aortic dissection.  CT angiogram was thus indicated for rule out aortic dissection and was obtained.  Fortunately no signs of aortic catastrophe or other intrathoracic or abdominal abnormality.  Cardiac rule out is negative.  No signs of seizures on examination.  Electrolytes are reassuring.  No signs of infection.  Urinalysis is bland.  Head CT shows chronic findings but no new acute large vessel occlusion, dissection or bleed.  No large strokes.  MRI was obtained and showed no evidence of acute stroke.  No signs of other masses.  The patient is feeling much better.  She is actually comfortable with the plan for discharge home.  She is going to follow-up with her primary care doctor and her neurologist.  She will return if any worsening.    Disposition   The patient was discharged.     Diagnosis     ICD-10-CM    1. Dizziness  R42       2. Episode of confusion  R41.0       3. Chest discomfort  R07.89            Discharge Medications   Discharge Medication List as of 2/25/2025  5:14 PM            Scribe Disclosure:  Siri PEDRO, am serving as a scribe at 11:25 AM on 2/25/2025 to document services personally performed by Марина Lopez MD based on my observations and the provider's statements to me.        Марина Lopez MD  02/26/25 1510       Марина Lopez MD  02/26/25 1512

## 2025-02-25 NOTE — ED NOTES
Bed: ED08  Expected date:   Expected time:   Means of arrival:   Comments:  Rebeca 3 74 f dizziness with hx of stroke eta 5

## 2025-02-25 NOTE — ED TRIAGE NOTES
Pt states she has balance issues, noticed they were worse yesterday. Today on waking up has had some confusion. Hx of TIA. Complains of HA 8/10 starting one hr ago.

## 2025-02-26 ENCOUNTER — PATIENT OUTREACH (OUTPATIENT)
Dept: CARE COORDINATION | Facility: CLINIC | Age: 75
End: 2025-02-26
Payer: MEDICARE

## 2025-02-26 NOTE — PROGRESS NOTES
"Clinic Care Coordination Contact  Follow Up Progress Note      Assessment: Called and spoke with pt, she was in the ED yesterday due to complaints of \"feeling out of body\". She also notes vertigo/dizziness. She reports that something on her EEG was off stating \"something wasn't firing right\" but she is awaiting the full details. Pt stated that she has been off of her blood thinner due to her recent thumb/hand surgery and she was wondering if that caused it. Pt notes that the only medication she is currently taking in Zoloft. She isn't taking any anti-seizure medication or statin because of the side effects.    As far as her recovery form the hand surgery, she feels the surgery itself went well. It is still very painful and knows that it will take a long time to fully heal but she expected that.    Pt is seeing Dr. Kat for hospital f/up tomorrow and denied having add'l questions/concerns for writer at this time.    Care Gaps:    Health Maintenance Due   Topic Date Due    DTAP/TDAP/TD IMMUNIZATION (1 - Tdap) Never done    RSV VACCINE (1 - Risk 60-74 years 1-dose series) Never done    LUNG CANCER SCREENING  12/12/2022    INFLUENZA VACCINE (1) 09/01/2024    COVID-19 Vaccine (7 - 2024-25 season) 09/01/2024    MEDICARE ANNUAL WELLNESS VISIT  11/22/2024    NICOTINE/TOBACCO CESSATION COUNSELING Q 1 YR  01/15/2025    LIPID  03/14/2025     Care Plans  Care Plan: Mental Health       Problem: Mental Health Symptoms Need Improvement       Goal: Improve management of mental health symptoms and establish with mental health/psychosocial supports  Completed 12/3/2024      Start Date: 8/16/2024 Expected End Date: 11/18/2024    This Visit's Progress: 100% Recent Progress: 80%    Priority: High    Note:     Barriers: Overwhelm, complex and new medical concerns, finding therapist who takes Medicare  Strengths: Motivated and support from daughter who is helping navigate  Patient expressed understanding of goal: Yes  Action steps to " achieve this goal:  1. I will meet with therapist  2. I will learn new coping skills and utilize   3. I will reach out to care team with questions/concerns along the way                               Intervention/Education provided during outreach: Supportive check-in.     Outreach Frequency: monthly, more frequently as needed    Plan:   Care Coordinator will follow up in 2 months.    Anna Morales [facility]  Social Work Care Coordinator  Phone: [phone]

## 2025-02-27 ENCOUNTER — OFFICE VISIT (OUTPATIENT)
Dept: FAMILY MEDICINE | Facility: CLINIC | Age: 75
End: 2025-02-27

## 2025-02-27 VITALS — OXYGEN SATURATION: 98 % | DIASTOLIC BLOOD PRESSURE: 65 MMHG | SYSTOLIC BLOOD PRESSURE: 145 MMHG | HEART RATE: 72 BPM

## 2025-02-27 DIAGNOSIS — R41.82 ALTERED MENTAL STATUS, UNSPECIFIED ALTERED MENTAL STATUS TYPE: Primary | ICD-10-CM

## 2025-02-27 DIAGNOSIS — R21 MALAR RASH: ICD-10-CM

## 2025-02-27 DIAGNOSIS — T63.301D SPIDER BITE WOUND, ACCIDENTAL OR UNINTENTIONAL, SUBSEQUENT ENCOUNTER: ICD-10-CM

## 2025-02-27 PROCEDURE — 3077F SYST BP >= 140 MM HG: CPT | Performed by: STUDENT IN AN ORGANIZED HEALTH CARE EDUCATION/TRAINING PROGRAM

## 2025-02-27 PROCEDURE — 3078F DIAST BP <80 MM HG: CPT | Performed by: STUDENT IN AN ORGANIZED HEALTH CARE EDUCATION/TRAINING PROGRAM

## 2025-02-27 PROCEDURE — 99215 OFFICE O/P EST HI 40 MIN: CPT | Performed by: STUDENT IN AN ORGANIZED HEALTH CARE EDUCATION/TRAINING PROGRAM

## 2025-02-27 ASSESSMENT — ENCOUNTER SYMPTOMS: MYALGIAS: 1

## 2025-02-27 NOTE — PATIENT INSTRUCTIONS
[facility]  [address], [place] [address]  Phone: [phone]    [facility]  [phone]

## 2025-02-27 NOTE — ASSESSMENT & PLAN NOTE
-discussed that patient can make follow up with rheumatology to discuss  -as we are referring to derm for her finger, can consider discussing malar rash with derm also    Orders:    Adult Dermatology  Referral - To a [facility] Location (Use POS/Location); Future     oral

## 2025-02-27 NOTE — ASSESSMENT & PLAN NOTE
-no obvious causes seen in ED  -patient states that she feels better today  -patient has neurology appt in May

## 2025-02-27 NOTE — ASSESSMENT & PLAN NOTE
-as patient is continuing to have pain to the pad of the finger, will send to dermatology to see if they can see an embedded small foreign object such as a tip of a spider leg or something    Orders:    Adult Dermatology  Referral - To a [facility] Location (Use POS/Location); Future

## 2025-02-27 NOTE — PROGRESS NOTES
"  Assessment & Plan   Assessment & Plan  Altered mental status, unspecified altered mental status type  -no obvious causes seen in ED  -patient states that she feels better today  -patient has neurology appt in May         Spider bite wound, accidental or unintentional, subsequent encounter  -as patient is continuing to have pain to the pad of the finger, will send to dermatology to see if they can see an embedded small foreign object such as a tip of a spider leg or something    Orders:    Adult Dermatology  Referral - To a [facility] Location (Use POS/Location); Future    Malar rash  -discussed that patient can make follow up with rheumatology to discuss  -as we are referring to derm for her finger, can consider discussing malar rash with derm also    Orders:    Adult Dermatology  Referral - To a [facility] Location (Use POS/Location); Future       Nicotine/Tobacco Cessation  She reports that she has been smoking cigarettes. She has been exposed to tobacco smoke. She has never used smokeless tobacco.  Nicotine/Tobacco Cessation Plan  Information offered: Patient not interested at this time. Patient states that she is aware of the resources.    BMI  Estimated body mass index is 26.93 kg/m  as calculated from the following:    Height as of 2/3/25: 1.6 m (5' 3\").    Weight as of 2/25/25: 68.9 kg (152 lb).     Return if symptoms worsen or fail to improve.    Gustavo Choudhury is a 74 year old, presenting for the following health issues:  Hospital F/U (Her gait is back at baseline. Intermittent episodes of confusion since 2/24. )    History of Present Illness       Hypertension: She presents for follow up of hypertension.  She does not check blood pressure  regularly outside of the clinic. Outside blood pressures have been over 140/90. She follows a low salt diet.     She eats 4 or more servings of fruits and vegetables daily.She consumes 0 sweetened beverage(s) daily.She " "exercises with enough effort to increase her heart rate 9 or less minutes per day.  She exercises with enough effort to increase her heart rate 3 or less days per week.   She is taking medications regularly.       Patient presented to the ED at [facility] on 2/25/2025 for altered mental status. Patient had been concerned about listing to the right side and bumping into walls when walking at the mall. Then at home, was acting bizarrely such as \"tried to put milk in the bread box\" and as though she was \"out of body\".    In the ED, physical neurological exam was normal. CMP had abnormal calcium and protein levels. UA WNL. Flu/COVID negative. CBC WNL. MR Brain: No finding for intracranial hemorrhage, mass, or acute infarct. Mild volume loss and presumed sequela chronic microvascular ischemic change. CT Aortic: No evidence of acute aortic syndrome or other acute findings in the chest, abdomen, or pelvis. CTA Head and Neck:  No evidence of acute intracranial abnormality. No large vessel occlusion.  Redemonstrated diminutive caliber of the right posterior cerebral artery and mild multifocal stenoses of the left posterior cerebral artery, likely due to intracranial atherosclerotic disease. No high-grade stenosis or occlusion of the major arteries in the neck. EKG was not concerning for acute changes. Set to follow up with PCP and neurology.    Patient states that she was aware that her action was wrong when she went to put the milk in the bread box. Patient states that she's feeling well today. Patient states that she has limited her driving to [place]. Patient has an appointment with neurology in May.    Patient has a history of spider bite on her left fourth finger in December. Patient states that she swatted away a spider coming towards her and it bit her. Patient still has pain with putting pressure on the pad of that finger. She feels that she still has some swelling in that finger. Patient states that her orthopedist " said it was just arthritis and she is concerned about the persistent pain.    Patient states that she has a rash that comes and goes around her eyes. Patient is a former nurse with concerns about malar rash. Patient also states that she has puffy eyes. Patient states that cortisone will help the rash but some areas still burn and never go away. Patient states that she has seen a rheumatologist previously who did not diagnose her with lupus, but patient is concerned given her multitude of symptoms. She feels like they need to be related.    Patient endorses smoking a few cigarettes a day. Patient states that she knows her resources.    Review of Systems   Musculoskeletal:  Positive for myalgias.   Skin:  Positive for rash.          Objective    BP (!) 145/65 (BP Location: Right arm)   Pulse 72   SpO2 98%   There is no height or weight on file to calculate BMI.  Physical Exam  Constitutional:       General: She is not in acute distress.     Appearance: Normal appearance.   HENT:      Head: Normocephalic and atraumatic.        Comments: Brownish discoloration around face  Eyes:      Conjunctiva/sclera: Conjunctivae normal.   Cardiovascular:      Rate and Rhythm: Normal rate and regular rhythm.      Pulses: Normal pulses.      Heart sounds: Normal heart sounds.   Pulmonary:      Effort: Pulmonary effort is normal. No respiratory distress.      Breath sounds: Normal breath sounds.   Musculoskeletal:        Arms:       Comments: Small area of change on pad of L fourth finger, noted redness to the DIP joint of that finger   Skin:     General: Skin is warm and dry.   Neurological:      Mental Status: She is alert.   Psychiatric:         Thought Content: Thought content normal.                Signed Electronically by: Jaimie Kat DO

## 2025-03-02 ENCOUNTER — APPOINTMENT (OUTPATIENT)
Dept: MRI IMAGING | Facility: CLINIC | Age: 75
End: 2025-03-02
Attending: EMERGENCY MEDICINE
Payer: MEDICARE

## 2025-03-02 ENCOUNTER — HOSPITAL ENCOUNTER (EMERGENCY)
Facility: CLINIC | Age: 75
Discharge: HOME | End: 2025-03-02
Attending: EMERGENCY MEDICINE
Payer: MEDICARE

## 2025-03-02 VITALS
RESPIRATION RATE: 20 BRPM | TEMPERATURE: 97 F | HEART RATE: 85 BPM | WEIGHT: 152 LBS | DIASTOLIC BLOOD PRESSURE: 67 MMHG | SYSTOLIC BLOOD PRESSURE: 149 MMHG | HEIGHT: 63 IN | BODY MASS INDEX: 26.93 KG/M2 | OXYGEN SATURATION: 100 %

## 2025-03-02 DIAGNOSIS — R51.9 NONINTRACTABLE EPISODIC HEADACHE, UNSPECIFIED HEADACHE TYPE: ICD-10-CM

## 2025-03-02 DIAGNOSIS — R42 DIZZINESS: ICD-10-CM

## 2025-03-02 LAB
ALBUMIN UR-MCNC: NEGATIVE MG/DL
ANION GAP SERPL CALCULATED.3IONS-SCNC: 12 MMOL/L (ref 7–15)
APPEARANCE UR: CLEAR
ATRIAL RATE - MUSE: 77 BPM
BASOPHILS # BLD AUTO: 0 10E3/UL (ref 0–0.2)
BASOPHILS NFR BLD AUTO: 1 %
BILIRUB UR QL STRIP: NEGATIVE
BUN SERPL-MCNC: 14.5 MG/DL (ref 8–23)
CALCIUM SERPL-MCNC: 9.2 MG/DL (ref 8.8–10.4)
CHLORIDE SERPL-SCNC: 101 MMOL/L (ref 98–107)
COLOR UR AUTO: ABNORMAL
CREAT SERPL-MCNC: 0.91 MG/DL (ref 0.51–0.95)
DIASTOLIC BLOOD PRESSURE - MUSE: NORMAL MMHG
EGFRCR SERPLBLD CKD-EPI 2021: 66 ML/MIN/1.73M2
EOSINOPHIL # BLD AUTO: 0.2 10E3/UL (ref 0–0.7)
EOSINOPHIL NFR BLD AUTO: 4 %
ERYTHROCYTE [DISTWIDTH] IN BLOOD BY AUTOMATED COUNT: 13.2 % (ref 10–15)
GLUCOSE SERPL-MCNC: 121 MG/DL (ref 70–99)
GLUCOSE UR STRIP-MCNC: NEGATIVE MG/DL
HCO3 SERPL-SCNC: 26 MMOL/L (ref 22–29)
HCT VFR BLD AUTO: 43.3 % (ref 35–47)
HGB BLD-MCNC: 14.4 G/DL (ref 11.7–15.7)
HGB UR QL STRIP: ABNORMAL
HOLD SPECIMEN: NORMAL
IMM GRANULOCYTES # BLD: 0 10E3/UL
IMM GRANULOCYTES NFR BLD: 1 %
INTERPRETATION ECG - MUSE: NORMAL
KETONES UR STRIP-MCNC: NEGATIVE MG/DL
LEUKOCYTE ESTERASE UR QL STRIP: ABNORMAL
LYMPHOCYTES # BLD AUTO: 1.9 10E3/UL (ref 0.8–5.3)
LYMPHOCYTES NFR BLD AUTO: 29 %
MAGNESIUM SERPL-MCNC: 2 MG/DL (ref 1.7–2.3)
MCH RBC QN AUTO: 27.5 PG (ref 26.5–33)
MCHC RBC AUTO-ENTMCNC: 33.3 G/DL (ref 31.5–36.5)
MCV RBC AUTO: 83 FL (ref 78–100)
MONOCYTES # BLD AUTO: 0.6 10E3/UL (ref 0–1.3)
MONOCYTES NFR BLD AUTO: 8 %
MUCOUS THREADS #/AREA URNS LPF: PRESENT /LPF
NEUTROPHILS # BLD AUTO: 3.9 10E3/UL (ref 1.6–8.3)
NEUTROPHILS NFR BLD AUTO: 58 %
NITRATE UR QL: NEGATIVE
NRBC # BLD AUTO: 0 10E3/UL
NRBC BLD AUTO-RTO: 0 /100
P AXIS - MUSE: 63 DEGREES
PH UR STRIP: 5.5 [PH] (ref 5–7)
PLATELET # BLD AUTO: 192 10E3/UL (ref 150–450)
POTASSIUM SERPL-SCNC: 3.7 MMOL/L (ref 3.4–5.3)
PR INTERVAL - MUSE: 142 MS
QRS DURATION - MUSE: 74 MS
QT - MUSE: 396 MS
QTC - MUSE: 448 MS
R AXIS - MUSE: 35 DEGREES
RBC # BLD AUTO: 5.23 10E6/UL (ref 3.8–5.2)
RBC URINE: 2 /HPF
SODIUM SERPL-SCNC: 139 MMOL/L (ref 135–145)
SP GR UR STRIP: 1.01 (ref 1–1.03)
SQUAMOUS EPITHELIAL: 3 /HPF
SYSTOLIC BLOOD PRESSURE - MUSE: NORMAL MMHG
T AXIS - MUSE: 25 DEGREES
TRANSITIONAL EPI: <1 /HPF
TROPONIN T SERPL HS-MCNC: 7 NG/L
TROPONIN T SERPL HS-MCNC: <6 NG/L
TSH SERPL DL<=0.005 MIU/L-ACNC: 3 UIU/ML (ref 0.3–4.2)
UROBILINOGEN UR STRIP-MCNC: NORMAL MG/DL
VENTRICULAR RATE- MUSE: 77 BPM
WBC # BLD AUTO: 6.6 10E3/UL (ref 4–11)
WBC URINE: 8 /HPF

## 2025-03-02 PROCEDURE — 82310 ASSAY OF CALCIUM: CPT | Performed by: EMERGENCY MEDICINE

## 2025-03-02 PROCEDURE — 96374 THER/PROPH/DIAG INJ IV PUSH: CPT | Mod: 59

## 2025-03-02 PROCEDURE — 250N000011 HC RX IP 250 OP 636: Performed by: EMERGENCY MEDICINE

## 2025-03-02 PROCEDURE — 83735 ASSAY OF MAGNESIUM: CPT | Performed by: EMERGENCY MEDICINE

## 2025-03-02 PROCEDURE — 81001 URINALYSIS AUTO W/SCOPE: CPT | Performed by: EMERGENCY MEDICINE

## 2025-03-02 PROCEDURE — 255N000002 HC RX 255 OP 636: Performed by: EMERGENCY MEDICINE

## 2025-03-02 PROCEDURE — 85004 AUTOMATED DIFF WBC COUNT: CPT | Performed by: EMERGENCY MEDICINE

## 2025-03-02 PROCEDURE — 36415 COLL VENOUS BLD VENIPUNCTURE: CPT | Performed by: EMERGENCY MEDICINE

## 2025-03-02 PROCEDURE — 80048 BASIC METABOLIC PNL TOTAL CA: CPT | Performed by: EMERGENCY MEDICINE

## 2025-03-02 PROCEDURE — 99285 EMERGENCY DEPT VISIT HI MDM: CPT | Mod: 25

## 2025-03-02 PROCEDURE — 85014 HEMATOCRIT: CPT | Performed by: EMERGENCY MEDICINE

## 2025-03-02 PROCEDURE — 84443 ASSAY THYROID STIM HORMONE: CPT | Performed by: EMERGENCY MEDICINE

## 2025-03-02 PROCEDURE — 84484 ASSAY OF TROPONIN QUANT: CPT | Performed by: EMERGENCY MEDICINE

## 2025-03-02 PROCEDURE — 87086 URINE CULTURE/COLONY COUNT: CPT | Performed by: EMERGENCY MEDICINE

## 2025-03-02 PROCEDURE — 96375 TX/PRO/DX INJ NEW DRUG ADDON: CPT

## 2025-03-02 PROCEDURE — 258N000003 HC RX IP 258 OP 636: Performed by: EMERGENCY MEDICINE

## 2025-03-02 PROCEDURE — 93005 ELECTROCARDIOGRAM TRACING: CPT

## 2025-03-02 PROCEDURE — A9585 GADOBUTROL INJECTION: HCPCS | Performed by: EMERGENCY MEDICINE

## 2025-03-02 PROCEDURE — 70553 MRI BRAIN STEM W/O & W/DYE: CPT

## 2025-03-02 PROCEDURE — 250N000013 HC RX MED GY IP 250 OP 250 PS 637: Performed by: EMERGENCY MEDICINE

## 2025-03-02 RX ORDER — METOCLOPRAMIDE HYDROCHLORIDE 5 MG/ML
5 INJECTION INTRAMUSCULAR; INTRAVENOUS ONCE
Status: COMPLETED | OUTPATIENT
Start: 2025-03-02 | End: 2025-03-02

## 2025-03-02 RX ORDER — ONDANSETRON 2 MG/ML
4 INJECTION INTRAMUSCULAR; INTRAVENOUS ONCE
Status: COMPLETED | OUTPATIENT
Start: 2025-03-02 | End: 2025-03-02

## 2025-03-02 RX ORDER — ACETAMINOPHEN 500 MG
1000 TABLET ORAL ONCE
Status: COMPLETED | OUTPATIENT
Start: 2025-03-02 | End: 2025-03-02

## 2025-03-02 RX ORDER — KETOROLAC TROMETHAMINE 15 MG/ML
15 INJECTION, SOLUTION INTRAMUSCULAR; INTRAVENOUS ONCE
Status: COMPLETED | OUTPATIENT
Start: 2025-03-02 | End: 2025-03-02

## 2025-03-02 RX ORDER — DIPHENHYDRAMINE HYDROCHLORIDE 50 MG/ML
25 INJECTION, SOLUTION INTRAMUSCULAR; INTRAVENOUS ONCE
Status: COMPLETED | OUTPATIENT
Start: 2025-03-02 | End: 2025-03-02

## 2025-03-02 RX ORDER — GADOBUTROL 604.72 MG/ML
6 INJECTION INTRAVENOUS ONCE
Status: COMPLETED | OUTPATIENT
Start: 2025-03-02 | End: 2025-03-02

## 2025-03-02 RX ADMIN — METOCLOPRAMIDE 5 MG: 5 INJECTION, SOLUTION INTRAMUSCULAR; INTRAVENOUS at 10:06

## 2025-03-02 RX ADMIN — GADOBUTROL 6 ML: 604.72 INJECTION INTRAVENOUS at 11:09

## 2025-03-02 RX ADMIN — SODIUM CHLORIDE 1000 ML: 0.9 INJECTION, SOLUTION INTRAVENOUS at 10:05

## 2025-03-02 RX ADMIN — DIPHENHYDRAMINE HYDROCHLORIDE 25 MG: 50 INJECTION, SOLUTION INTRAMUSCULAR; INTRAVENOUS at 10:05

## 2025-03-02 RX ADMIN — KETOROLAC TROMETHAMINE 15 MG: 15 INJECTION, SOLUTION INTRAMUSCULAR; INTRAVENOUS at 12:15

## 2025-03-02 RX ADMIN — ONDANSETRON 4 MG: 2 INJECTION, SOLUTION INTRAMUSCULAR; INTRAVENOUS at 09:34

## 2025-03-02 RX ADMIN — ACETAMINOPHEN 1000 MG: 500 TABLET, FILM COATED ORAL at 12:15

## 2025-03-02 ASSESSMENT — ACTIVITIES OF DAILY LIVING (ADL)
ADLS_ACUITY_SCORE: 61
ADLS_ACUITY_SCORE: 59
ADLS_ACUITY_SCORE: 61

## 2025-03-02 NOTE — DISCHARGE INSTRUCTIONS
Please call your neurologist to arrange follow-up.  They can discuss treatment for seizures, headache, and tremor.  Return if you have new symptoms or concerns.  Otherwise, follow-up with your regular doctor.

## 2025-03-02 NOTE — ED PROVIDER NOTES
"  Emergency Department Note      History of Present Illness     Chief Complaint   Hypertension      HPI   Macey Romero is a 74 year old female with a past medical history of TIA (aspirin on hold for recent orthopedic surgery) and epilepsy (AEDs stopped in November at her request) presenting with her  for evaluation of hypertension.  Yesterday when she was shopping with a friend she developed occipital headache that moves to her neck.  She felt generally unwell, dizzy and \"other worldly\".  She took her blood pressure and it was elevated at 150 systolic.  When she woke this morning she still had headache and dizziness saying she feels generally odd.  She took her blood pressure and found it to be elevated to 170s systolic.  She checked it several more times and found it to be up to 190 systolic.  This prompted her visit.  She is not on antihypertensives but does not regularly check her blood pressure at home anymore.  She describes a fluttering in her chest which she believes may be related to anxiety.  She denies chest pain or dyspnea.  She denies recent illness such as fever, cough, vomiting, or diarrhea.  She denies vision change, numbness or tingling, recent trauma, and leg pain or swelling.    Review of her medical records indicates she has been seen several times for variations of dizziness, headache, neck pain, and neurologic symptoms.  Most recently she was seen 2/25/2025 with a reassuring workup including negative MRI.  She admits her symptoms today are similar, though less severe, as that visit.    Independent Historian   None    Review of External Notes   Reviewed ED visit note from 2/25/25. She had an MRI of brain for an episode of confusion and dizziness.   Reviewed primary care note from 2/27/25. She said she was feeling better at that time. She is set to follow-up with neurology in May.  Reviewed ED discharge summaries from the following dates; 11/12/23, 2/3/24, 2/16/24, 3/16/24, and " 8/11/24.  Reviewed neurology note from 11/14/24. They stopped Vimpat, at her request, during that visit.     Past Medical History     Medical History and Problem List   Chronic constipation  Fibromyalgia  Malignant neoplasm of female breast  Meningitis  Osteoarthritis  Osteopenia  Pterygium eye  Raynaud phenomenon  Reactive airway disease  Seasonal allergies  Shingles  Skin cancer  Spell of altered cognition  Uncomplicated asthma  IBS  Hypertension  Hyperlipidemia  Colitis   Chronic kidney disease, stage 3a  Prediabetes  Carpal tunnel syndrome  TIA    Medications   Tylenol  Albuterol  Fosamax  Aspirin 325mg  Symbicort  Cleocin  Vimpat  Metronidazole  Miralax  Senokot  Zoloft    Surgical History   Past Surgical History:   Procedure Laterality Date    anterior c-disc fusion      ARTHROPLASTY KNEE Left 10/17/2022    Procedure: LEFT TOTAL KNEE ARTHROPLASTY;  Surgeon: Jax Le MD;  Location:  OR    BLEPHAROPLASTY, BROW LIFT BILATERAL, COMBINED Bilateral 6/18/2018    Procedure: COMBINED BLEPHAROPLASTY, BROW LIFT BILATERAL;  BILATERAL UPPER LID BLEPHAROPLASTY; BILATERAL BROW PTOSIS REPAIR;  Surgeon: Сергей Walters MD;  Location:  EC    CHOLECYSTECTOMY      COLONOSCOPY N/A 10/19/2017    Procedure: COLONOSCOPY;  COLONOSCOPY;  Surgeon: Niko Wong MD;  Location:  GI    COLONOSCOPY N/A 8/27/2022    Procedure: COLONOSCOPY, WITH POLYPECTOMY AND BIOPSY;  Surgeon: Abraham Rogers MD;  Location:  GI    D & C      Bleeding before hysterectomy    DISCECTOMY, FUSION CERVICAL ANTERIOR ONE LEVEL, COMBINED N/A 9/20/2023    Procedure: ANTERIOR CERVICAL 5 TO CERVICAL 6 DISCECTOMY AND FUSION;  Surgeon: Alex Galindo MD;  Location: [facility]    ENDOSCOPY  04/21/08    ESOPHAGOSCOPY, GASTROSCOPY, DUODENOSCOPY (EGD), COMBINED N/A 8/27/2022    Procedure: ESOPHAGOGASTRODUODENOSCOPY, WITH BIOPSY;  Surgeon: Abraham Rogers MD;  Location:  GI    HYSTERECTOMY, MARCUS      Ovaries and tubes out due to breast cancer     "JOINT REPLACEMTN, KNEE RT/LT Right 01/2011    Joint Replacement knee RT, with tibial straightening (2 replacements)    MAMMOPLASTY AUGMENTATION BILATERAL      breast ca     MASTECTOMY, SIMPLE RT/LT/CLAIRE Bilateral 1989    Mastectomy Simple RT/LT/CLAIRE    MOHS MICROGRAPHIC PROCEDURE      Left lateral nose    OPEN REDUCTION INTERNAL FIXATION ANKLE  8/27/2013    Procedure: OPEN REDUCTION INTERNAL FIXATION ANKLE;  RIGHT OPEN REDUCTION INTERNAL FIXATION ANKLE WITH LIGAMENT REPAIR;  Surgeon: Nando Chang MD;  Location:  OR    PTERYGIUM WITH CONJUNCTIVAL AUTOLOGOUS TRANSPLANT Left 8/29/2016    Procedure: PTERYGIUM WITH CONJUNCTIVAL AUTOLOGOUS TRANSPLANT;  Surgeon: Сергей Walters MD;  Location:  EC    REPAIR LIGAMENT ANKLE  8/27/2013    Procedure: REPAIR LIGAMENT ANKLE;;  Surgeon: Nando Chang MD;  Location:  OR    SALIVARY GLAND SURGERY Left     Stone removal     SIGMOIDOSCOPY FLEXIBLE N/A 8/30/2022    Procedure: FLEXIBLE SIGMOIDOSCOPY;  Surgeon: Niko Wong MD;  Location:  GI    TONSILLECTOMY         Physical Exam     Patient Vitals for the past 24 hrs:   BP Temp Temp src Pulse Resp SpO2 Height Weight   03/02/25 1300 (!) 149/67 -- -- 85 20 -- -- --   03/02/25 1230 -- -- -- 69 10 100 % -- --   03/02/25 1200 (!) 145/65 -- -- 71 16 96 % -- --   03/02/25 1000 (!) 154/60 -- -- 80 19 98 % -- --   03/02/25 0910 (!) 153/71 -- -- 75 15 99 % -- --   03/02/25 0856 (!) 152/68 97  F (36.1  C) Temporal 81 16 98 % 1.6 m (5' 3\") 68.9 kg (152 lb)     Physical Exam  General: Well-developed and well-nourished. Well appearing elderly  woman. Cooperative.  Head:  Atraumatic.  Eyes:  Conjunctivae, lids, and sclerae are normal.  ENT:    Normal nose. Moist mucous membranes.  Neck:  Supple. Normal range of motion.  CV:  Regular rate and rhythm. Normal heart sounds with no murmurs, rubs, or gallops detected.  Resp:  No respiratory distress. Clear to auscultation bilaterally without decreased breath sounds, " wheezing, rales, or rhonchi.  GI:  Soft. Non-distended. Non-tender.    MS:  Normal ROM. No bilateral lower extremity edema.  Skin:  Warm. Non-diaphoretic. No pallor.  Neuro:  Awake. A&Ox3. Normal strength.    Sensation intact to light touch.  No facial droop. No dysarthria.  No aphasia.  PERRL.   Psych: Anxious mood and affect. Normal speech.  Vitals reviewed.    Diagnostics     Lab Results   Labs Ordered and Resulted from Time of ED Arrival to Time of ED Departure   BASIC METABOLIC PANEL - Abnormal       Result Value    Sodium 139      Potassium 3.7      Chloride 101      Carbon Dioxide (CO2) 26      Anion Gap 12      Urea Nitrogen 14.5      Creatinine 0.91      GFR Estimate 66      Calcium 9.2      Glucose 121 (*)    CBC WITH PLATELETS AND DIFFERENTIAL - Abnormal    WBC Count 6.6      RBC Count 5.23 (*)     Hemoglobin 14.4      Hematocrit 43.3      MCV 83      MCH 27.5      MCHC 33.3      RDW 13.2      Platelet Count 192      % Neutrophils 58      % Lymphocytes 29      % Monocytes 8      % Eosinophils 4      % Basophils 1      % Immature Granulocytes 1      NRBCs per 100 WBC 0      Absolute Neutrophils 3.9      Absolute Lymphocytes 1.9      Absolute Monocytes 0.6      Absolute Eosinophils 0.2      Absolute Basophils 0.0      Absolute Immature Granulocytes 0.0      Absolute NRBCs 0.0     ROUTINE UA WITH MICROSCOPIC REFLEX TO CULTURE - Abnormal    Color Urine Light Yellow      Appearance Urine Clear      Glucose Urine Negative      Bilirubin Urine Negative      Ketones Urine Negative      Specific Gravity Urine 1.012      Blood Urine Trace (*)     pH Urine 5.5      Protein Albumin Urine Negative      Urobilinogen Urine Normal      Nitrite Urine Negative      Leukocyte Esterase Urine Moderate (*)     Mucus Urine Present (*)     RBC Urine 2      WBC Urine 8 (*)     Squamous Epithelials Urine 3 (*)     Transitional Epithelials Urine <1     TROPONIN T, HIGH SENSITIVITY - Normal    Troponin T, High Sensitivity 7     TSH  "WITH FREE T4 REFLEX - Normal    TSH 3.00     MAGNESIUM - Normal    Magnesium 2.0     TROPONIN T, HIGH SENSITIVITY - Normal    Troponin T, High Sensitivity <6     URINE CULTURE       Imaging   MR Brain w/o & w Contrast   Final Result   IMPRESSION:    No acute intracranial finding. No evidence for recent ischemia, intracranial hemorrhage, or mass.              EKG  Indication: Hypertension  Time: 0850  Rate 77 bpm. ME interval 142 ms. QRS duration 74 ms. QT/QTc 396/448 ms.  Normal sinus rhythm  Low voltage QRS  Borderline ECG   No acute ST changes.  No significant change as compared to prior, dated 2/25/25.    Independent Interpretation   None    ED Course      Medications Administered   Medications   ondansetron (ZOFRAN) injection 4 mg (4 mg Intravenous $Given 3/2/25 0934)   sodium chloride 0.9% BOLUS 1,000 mL (0 mLs Intravenous Stopped 3/2/25 1221)   diphenhydrAMINE (BENADRYL) injection 25 mg (25 mg Intravenous $Given 3/2/25 1005)   metoclopramide (REGLAN) injection 5 mg (5 mg Intravenous $Given 3/2/25 1006)   ketorolac (TORADOL) injection 15 mg (15 mg Intravenous $Given 3/2/25 1215)   acetaminophen (TYLENOL) tablet 1,000 mg (1,000 mg Oral $Given 3/2/25 1215)     ED Course   ED Course as of 03/02/25 1736   Sun Mar 02, 2025   0935 I obtained history and examined the patient as noted above.   1304 I rechecked the patient and explained findings. She is agreeable with discharge.       Additional Documentation  Social Determinants of Health: Supportive care. Established primary care provider and neurologist.    Medical Decision Making / Diagnosis   SANDRA Choudhury is a 74 year old woman presenting with headache, dizziness, and generally feeling \"other worldly.\"  This prompted her to take her blood pressure which she found to be elevated both yesterday and again this morning.  She is not on antihypertensives.  I spent some time reviewing her chart.  Over the last year she has had several visits for dizziness, headache, " "neurologic symptoms, or some variation of these.  She was seen in this emergency department for confusion and dizziness with a negative MRI just 5 days ago.  On exam she is anxious, though well-appearing.  She has moderate hypertension but certainly not to the degree that would cause her symptoms.  She has no focal neurologic deficits but she is very concerned she has had another TIA and does have risk factors.  She reports when she had her TIA she had a splitting headache.  I feel this would be unusual but given her report of headache and similar symptoms, she was sent for MRI of the brain which, fortunately, reveals no acute changes. I did obtain labs which are unremarkable including normal TSH and reassuring urinalysis.  EKG is reassuring without acute ST changes or arrhythmias and both initial and repeat troponin are normal.    She was treated with IV fluids, Benadryl, Reglan, Toradol, and Tylenol.  She is appropriate for discharge given her reassuring workup.  The cause of her symptoms is unclear.  Anxiety alone may be the issue but it is, at minimum, compounding her symptoms.  Some of her confusion at last visit and \"other worldly\" sensation this time could possibly be attributed to seizure as prior EEG has confirmed epileptiform changes and she went off of her antiepileptics.  Either way she should be following up with her neurologist regarding her symptoms.  I counseled her not to check her blood pressure too frequently as I strongly suspect this is worsening her anxiety and resulting in higher values.  She does understand indications to return.  All questions answered.  Amenable to discharge.    Disposition   The patient was discharged.     Diagnosis     ICD-10-CM    1. Nonintractable episodic headache, unspecified headache type  R51.9       2. Dizziness  R42            Discharge Medications   Discharge Medication List as of 3/2/2025  1:26 PM        Scribe Disclosure:  MAYELA, SARAHI MULLIGAN, am serving as a " scribe at 9:17 AM on 3/2/2025 to document services personally performed by Lesvia Amador MD based on my observations and the provider's statements to me.        Lesvia Amador MD  03/06/25 2101

## 2025-03-02 NOTE — ED TRIAGE NOTES
Reports HA, chest pressure and generalized weakness/shaky feeling this morning. Took BP several times this morning and was elevated. Anxious and tearful in triage.     Triage Assessment (Adult)       Row Name 03/02/25 0900          Triage Assessment    Airway WDL WDL        Respiratory WDL    Respiratory WDL WDL        Skin Circulation/Temperature WDL    Skin Circulation/Temperature WDL WDL        Cardiac WDL    Cardiac WDL X  c/o pressure since this morning        Peripheral/Neurovascular WDL    Peripheral Neurovascular WDL WDL        Cognitive/Neuro/Behavioral WDL    Cognitive/Neuro/Behavioral WDL X  c/o HA

## 2025-03-03 LAB — BACTERIA UR CULT: NORMAL

## 2025-03-04 ENCOUNTER — OFFICE VISIT (OUTPATIENT)
Dept: FAMILY MEDICINE | Facility: CLINIC | Age: 75
End: 2025-03-04

## 2025-03-04 VITALS — HEART RATE: 74 BPM | SYSTOLIC BLOOD PRESSURE: 146 MMHG | DIASTOLIC BLOOD PRESSURE: 76 MMHG | OXYGEN SATURATION: 98 %

## 2025-03-04 DIAGNOSIS — I10 ESSENTIAL HYPERTENSION: ICD-10-CM

## 2025-03-04 DIAGNOSIS — I10 ESSENTIAL HYPERTENSION: Primary | ICD-10-CM

## 2025-03-04 PROCEDURE — 3078F DIAST BP <80 MM HG: CPT

## 2025-03-04 PROCEDURE — 99214 OFFICE O/P EST MOD 30 MIN: CPT

## 2025-03-04 PROCEDURE — 3074F SYST BP LT 130 MM HG: CPT

## 2025-03-04 PROCEDURE — G2211 COMPLEX E/M VISIT ADD ON: HCPCS

## 2025-03-04 RX ORDER — IBUPROFEN 600 MG/1
TABLET, FILM COATED ORAL
COMMUNITY
Start: 2025-02-05

## 2025-03-04 RX ORDER — OLMESARTAN MEDOXOMIL 5 MG/1
5 TABLET ORAL DAILY
Qty: 30 TABLET | Refills: 0 | Status: SHIPPED | OUTPATIENT
Start: 2025-03-04

## 2025-03-04 RX ORDER — SENNOSIDES 8.6 MG
TABLET ORAL
COMMUNITY
Start: 2025-02-07

## 2025-03-04 RX ORDER — OLMESARTAN MEDOXOMIL 5 MG/1
5 TABLET ORAL DAILY
Qty: 30 TABLET | Refills: 0 | OUTPATIENT
Start: 2025-03-04

## 2025-03-04 NOTE — TELEPHONE ENCOUNTER
Med: Olmesartan    LOV (related): 3/4/2025    Last Lab: Basic metabolic panel on 3/2/2025    Due for F/U around: 3/18/2025    Next Appt: 3/18/2025        BP Readings from Last 3 Encounters:   03/04/25 (!) 146/76   03/02/25 (!) 149/67   02/27/25 (!) 145/65       Last Comprehensive Metabolic Panel:  Lab Results   Component Value Date     03/02/2025    POTASSIUM 3.7 03/02/2025    CHLORIDE 101 03/02/2025    CO2 26 03/02/2025    ANIONGAP 12 03/02/2025     (H) 03/02/2025    BUN 14.5 03/02/2025    CR 0.91 03/02/2025    GFRESTIMATED 66 03/02/2025    DONNA 9.2 03/02/2025

## 2025-03-04 NOTE — PROGRESS NOTES
"  Assessment & Plan     Essential hypertension  BP above goal. Not on medication. Reviewed current HTN management. Offered 24 hour BP monitoring, patient declined. Will start Olmesartan 5 mg daily. Side effects reviewed. Goal BP <140/90. Reviewed lifestyle modifications. Required intervals for follow up on HTN, lab studies reviewed. Strongly recommened blood pressures are checked outside the clinic to ensure that BPs are remaining within guidelines. Instructed to contact me if the readings are not within guidelines on a regular basis so we can adjust treatment as needed. Reviewed side effects of medications, alarm signs and symptoms, and when to seek further care. Follow up in 2 weeks or sooner as needed.   - olmesartan (BENICAR) 5 MG tablet  Dispense: 30 tablet; Refill: 0        MED REC REQUIRED  Post Medication Reconciliation Status: discharge medications reconciled, continue medications without change  BMI  Estimated body mass index is 26.93 kg/m  as calculated from the following:    Height as of 3/2/25: 1.6 m (5' 3\").    Weight as of 3/2/25: 68.9 kg (152 lb).   Weight management plan: Discussed healthy diet and exercise guidelines      Work on weight loss  Regular exercise  See Patient Instructions    Return in about 2 weeks (around 3/18/2025) for Follow up.    The longitudinal plan of care for the diagnosis(es)/condition(s) as documented were addressed during this visit. Due to the added complexity in care, I will continue to support Macey in the subsequent management and with ongoing continuity of care.      Subjective   Macey is a 74 year old, presenting for the following health issues:  Hospital F/U (Follow up from visit on 3/2/2025 for nonintractable episodic headache, and dizziness, still dealing with pressure headaches, and weakness /Has been checking BP at home since Sunday, and it has been quite elevated)    History of Present Illness       Hypertension: She presents for follow up of hypertension.  She " does not check blood pressure  regularly outside of the clinic. Outside blood pressures have been over 140/90. She follows a low salt diet.     She eats 4 or more servings of fruits and vegetables daily.She consumes 0 sweetened beverage(s) daily.She exercises with enough effort to increase her heart rate 9 or less minutes per day.  She exercises with enough effort to increase her heart rate 3 or less days per week.   She is taking medications regularly.          Blood pressure: Recent ER visit for headache and high blood pressure. Everything else came back normal. BP has been elevated. Last normal BP was 2/3/25. IN the past BP was on the low side. Checked her BP when a headache was coming on and it was elevated at 176/107. Feels that the headache is a clue to her BP. Other BP readings have been 190/86, 157/58, 152/98, 177/89, 173/82, 173/82, 176/99, 154/88 and 159/81. Diet is not the best will do frozen meals. Has gained weigh since her recent surgery as well.   BP Readings from Last 6 Encounters:   03/04/25 (!) 146/76   03/02/25 (!) 149/67   02/27/25 (!) 145/65   02/25/25 (!) 140/73   02/03/25 125/71   01/16/25 (!) 174/67         Review of Systems  Constitutional, HEENT, cardiovascular, pulmonary, GI, , musculoskeletal, neuro, skin, endocrine and psych systems are negative, except as otherwise noted.      Objective    BP (!) 146/76   Pulse 74   SpO2 98%   There is no height or weight on file to calculate BMI.  Physical Exam   GENERAL: alert and no distress  EYES: Eyes grossly normal to inspection, PERRL and conjunctivae and sclerae normal  RESP: lungs clear to auscultation - no rales, rhonchi or wheezes  CV: regular rate and rhythm, normal S1 S2, no S3 or S4, no murmur, click or rub, no peripheral edema  PSYCH: mentation appears normal, affect normal/bright          Signed Electronically by: SHAHRAM Nath CNP

## 2025-03-04 NOTE — PATIENT INSTRUCTIONS
How to take home blood pressure:  Sit for 5 minutes with feet flat on the floor.  Apply cuff to upper arm (bicep area) and have this arm resting at about heart level.  Take blood pressure reading.  Make sure that the machine has a memory bank that records your readings or write readings down  If you remain elevated, greater than 140/90 then please return to clinic for further high blood pressure work up.

## 2025-03-06 ENCOUNTER — TRANSFERRED RECORDS (OUTPATIENT)
Dept: FAMILY MEDICINE | Facility: CLINIC | Age: 75
End: 2025-03-06

## 2025-03-11 DIAGNOSIS — T63.301S SPIDER BITE WOUND, ACCIDENTAL OR UNINTENTIONAL, SEQUELA: ICD-10-CM

## 2025-03-12 NOTE — CONFIDENTIAL NOTE
Med: TRIAMCINOLONE OINTMENT    LOV (related): 2/27/25 - SPIDER BITE      Due for F/U around: 3/2025 FOR BP RECHECK    Next Appt: 3/18/25

## 2025-03-13 ENCOUNTER — TRANSFERRED RECORDS (OUTPATIENT)
Dept: FAMILY MEDICINE | Facility: CLINIC | Age: 75
End: 2025-03-13

## 2025-03-13 RX ORDER — TRIAMCINOLONE ACETONIDE 1 MG/G
OINTMENT TOPICAL 2 TIMES DAILY PRN
Qty: 30 G | Refills: 0 | Status: SHIPPED | OUTPATIENT
Start: 2025-03-13

## 2025-03-17 ENCOUNTER — TRANSFERRED RECORDS (OUTPATIENT)
Dept: FAMILY MEDICINE | Facility: CLINIC | Age: 75
End: 2025-03-17

## 2025-03-18 ENCOUNTER — OFFICE VISIT (OUTPATIENT)
Dept: FAMILY MEDICINE | Facility: CLINIC | Age: 75
End: 2025-03-18

## 2025-03-18 VITALS
OXYGEN SATURATION: 98 % | WEIGHT: 155.4 LBS | HEART RATE: 78 BPM | SYSTOLIC BLOOD PRESSURE: 154 MMHG | DIASTOLIC BLOOD PRESSURE: 72 MMHG | BODY MASS INDEX: 27.53 KG/M2

## 2025-03-18 DIAGNOSIS — I10 ESSENTIAL HYPERTENSION: ICD-10-CM

## 2025-03-18 DIAGNOSIS — I10 ESSENTIAL HYPERTENSION: Primary | ICD-10-CM

## 2025-03-18 LAB
ANION GAP SERPL CALCULATED.3IONS-SCNC: 9 MMOL/L (ref 7–15)
BUN SERPL-MCNC: 10.8 MG/DL (ref 8–23)
CALCIUM SERPL-MCNC: 9.2 MG/DL (ref 8.8–10.4)
CHLORIDE SERPL-SCNC: 99 MMOL/L (ref 98–107)
CREAT SERPL-MCNC: 0.84 MG/DL (ref 0.51–0.95)
EGFRCR SERPLBLD CKD-EPI 2021: 73 ML/MIN/1.73M2
FASTING STATUS PATIENT QL REPORTED: NO
GLUCOSE SERPL-MCNC: 91 MG/DL (ref 70–99)
HCO3 SERPL-SCNC: 27 MMOL/L (ref 22–29)
POTASSIUM SERPL-SCNC: 4 MMOL/L (ref 3.4–5.3)
SODIUM SERPL-SCNC: 135 MMOL/L (ref 135–145)

## 2025-03-18 PROCEDURE — 36415 COLL VENOUS BLD VENIPUNCTURE: CPT

## 2025-03-18 PROCEDURE — 3077F SYST BP >= 140 MM HG: CPT

## 2025-03-18 PROCEDURE — 80048 BASIC METABOLIC PNL TOTAL CA: CPT | Mod: 90

## 2025-03-18 PROCEDURE — 99214 OFFICE O/P EST MOD 30 MIN: CPT

## 2025-03-18 PROCEDURE — G2211 COMPLEX E/M VISIT ADD ON: HCPCS

## 2025-03-18 PROCEDURE — 3078F DIAST BP <80 MM HG: CPT

## 2025-03-18 RX ORDER — OLMESARTAN MEDOXOMIL 5 MG/1
10 TABLET ORAL DAILY
Qty: 60 TABLET | Refills: 0 | Status: SHIPPED | OUTPATIENT
Start: 2025-03-18 | End: 2025-03-19

## 2025-03-18 NOTE — TELEPHONE ENCOUNTER
Med: Olmesartan     90 DAY PLEASE Last refill 3/18/25 #60, 0-R         LOV (related): 3/18/25    Last Lab: 3/18/25      Due for F/U around: follow up 2-4 weeks     Next Appt: 4/3/25 With Ngoc OMALLEY CNP          BP Readings from Last 3 Encounters:   03/18/25 (!) 154/72   03/04/25 (!) 146/76   03/02/25 (!) 149/67       Last Comprehensive Metabolic Panel:  Lab Results   Component Value Date     03/02/2025    POTASSIUM 3.7 03/02/2025    CHLORIDE 101 03/02/2025    CO2 26 03/02/2025    ANIONGAP 12 03/02/2025     (H) 03/02/2025    BUN 14.5 03/02/2025    CR 0.91 03/02/2025    GFRESTIMATED 66 03/02/2025    DONNA 9.2 03/02/2025

## 2025-03-18 NOTE — PROGRESS NOTES
Assessment & Plan     Essential hypertension  BP above goal today. Reviewed current HTN management. Taking Olmesartan 5 mg daily. Will increase to Olmesartan to 10 mg daily. Goal BP <140/90. We today managed prescriptions with refills ensured to ensure availabilty of current medications. Reviewed lifestyle modifications. Required intervals for follow up on HTN, lab studies reviewed. Strongly recommened blood pressures are checked outside the clinic to ensure that BPs are remaining within guidelines. Instructed to contact me if the readings are not within guidelines on a regular basis so we can adjust treatment as needed. Reviewed side effects of medications, alarm signs and symptoms, and when to seek further care. Follow up in 2-4 weeks or sooner as needed. Patient agreeable to plan. All questions answered.   - olmesartan (BENICAR) 5 MG tablet  Dispense: 60 tablet; Refill: 0  - Basic metabolic panel  - VENOUS COLLECTION          Work on weight loss  Regular exercise  See Patient Instructions    Return in about 2 weeks (around 4/1/2025) for Follow up.    The longitudinal plan of care for the diagnosis(es)/condition(s) as documented were addressed during this visit. Due to the added complexity in care, I will continue to support Macey in the subsequent management and with ongoing continuity of care.      Subjective   Macey is a 74 year old, presenting for the following health issues:  RECHECK (Follow up on olmesartan, has rosa tracking BP's at home, been feeling more tired lately unsure if med related )    History of Present Illness       Reason for visit:  Follow up    She eats 2-3 servings of fruits and vegetables daily.She consumes 0 sweetened beverage(s) daily.She exercises with enough effort to increase her heart rate 9 or less minutes per day.  She exercises with enough effort to increase her heart rate 3 or less days per week.   She is taking medications regularly.        BP follow up: Taking Olmesartan 5 mg  daily. Taking BP at home twice daily. BP has been averaging about 150's. On one occasion had a day of 104 and 106. But then also in the 170's to 130's at times. No missed doses of medication.       Review of Systems  Constitutional, HEENT, cardiovascular, pulmonary, GI, , musculoskeletal, neuro, skin, endocrine and psych systems are negative, except as otherwise noted.      Objective    BP (!) 154/72   Pulse 78   Wt 70.5 kg (155 lb 6.4 oz)   SpO2 98%   BMI 27.53 kg/m    Body mass index is 27.53 kg/m .  Physical Exam   GENERAL: alert and no distress  EYES: Eyes grossly normal to inspection, PERRL and conjunctivae and sclerae normal  RESP: lungs clear to auscultation - no rales, rhonchi or wheezes  CV: regular rate and rhythm, normal S1 S2, no S3 or S4, no murmur, click or rub, no peripheral edema  PSYCH: mentation appears normal, affect normal/bright    No results found for this or any previous visit (from the past 24 hours).        Signed Electronically by: SHAHRAM Nath CNP

## 2025-03-19 RX ORDER — OLMESARTAN MEDOXOMIL 5 MG/1
10 TABLET ORAL DAILY
Qty: 180 TABLET | Refills: 3 | Status: SHIPPED | OUTPATIENT
Start: 2025-03-19

## 2025-03-25 ENCOUNTER — MYC MEDICAL ADVICE (OUTPATIENT)
Dept: FAMILY MEDICINE | Facility: CLINIC | Age: 75
End: 2025-03-25

## 2025-03-28 ENCOUNTER — APPOINTMENT (OUTPATIENT)
Dept: MRI IMAGING | Facility: CLINIC | Age: 75
End: 2025-03-28
Attending: STUDENT IN AN ORGANIZED HEALTH CARE EDUCATION/TRAINING PROGRAM
Payer: MEDICARE

## 2025-03-28 ENCOUNTER — HOSPITAL ENCOUNTER (EMERGENCY)
Facility: CLINIC | Age: 75
Discharge: HOME | End: 2025-03-28
Attending: EMERGENCY MEDICINE | Admitting: EMERGENCY MEDICINE
Payer: MEDICARE

## 2025-03-28 ENCOUNTER — APPOINTMENT (OUTPATIENT)
Dept: CT IMAGING | Facility: CLINIC | Age: 75
End: 2025-03-28
Attending: EMERGENCY MEDICINE
Payer: MEDICARE

## 2025-03-28 VITALS
DIASTOLIC BLOOD PRESSURE: 53 MMHG | BODY MASS INDEX: 29.76 KG/M2 | SYSTOLIC BLOOD PRESSURE: 116 MMHG | WEIGHT: 168 LBS | TEMPERATURE: 97.6 F | RESPIRATION RATE: 11 BRPM | OXYGEN SATURATION: 93 % | HEART RATE: 71 BPM

## 2025-03-28 DIAGNOSIS — R20.8 DECREASED SENSATION: ICD-10-CM

## 2025-03-28 DIAGNOSIS — R51.9 NONINTRACTABLE HEADACHE, UNSPECIFIED CHRONICITY PATTERN, UNSPECIFIED HEADACHE TYPE: ICD-10-CM

## 2025-03-28 DIAGNOSIS — R47.9 DIFFICULTY WITH SPEECH: Primary | ICD-10-CM

## 2025-03-28 LAB
ANION GAP SERPL CALCULATED.3IONS-SCNC: 9 MMOL/L (ref 7–15)
APTT PPP: 28 SECONDS (ref 22–38)
ATRIAL RATE - MUSE: 75 BPM
BASOPHILS # BLD AUTO: 0 10E3/UL (ref 0–0.2)
BASOPHILS NFR BLD AUTO: 0 %
BUN SERPL-MCNC: 12.1 MG/DL (ref 8–23)
CALCIUM SERPL-MCNC: 9.4 MG/DL (ref 8.8–10.4)
CHLORIDE SERPL-SCNC: 101 MMOL/L (ref 98–107)
CREAT SERPL-MCNC: 0.79 MG/DL (ref 0.51–0.95)
DIASTOLIC BLOOD PRESSURE - MUSE: NORMAL MMHG
EGFRCR SERPLBLD CKD-EPI 2021: 78 ML/MIN/1.73M2
EOSINOPHIL # BLD AUTO: 0.2 10E3/UL (ref 0–0.7)
EOSINOPHIL NFR BLD AUTO: 3 %
ERYTHROCYTE [DISTWIDTH] IN BLOOD BY AUTOMATED COUNT: 13.1 % (ref 10–15)
GLUCOSE SERPL-MCNC: 111 MG/DL (ref 70–99)
HCO3 SERPL-SCNC: 27 MMOL/L (ref 22–29)
HCT VFR BLD AUTO: 43.1 % (ref 35–47)
HGB BLD-MCNC: 14.4 G/DL (ref 11.7–15.7)
IMM GRANULOCYTES # BLD: 0 10E3/UL
IMM GRANULOCYTES NFR BLD: 0 %
INR PPP: 1.03 (ref 0.85–1.15)
INTERPRETATION ECG - MUSE: NORMAL
LYMPHOCYTES # BLD AUTO: 2.6 10E3/UL (ref 0.8–5.3)
LYMPHOCYTES NFR BLD AUTO: 32 %
MCH RBC QN AUTO: 27.3 PG (ref 26.5–33)
MCHC RBC AUTO-ENTMCNC: 33.4 G/DL (ref 31.5–36.5)
MCV RBC AUTO: 82 FL (ref 78–100)
MONOCYTES # BLD AUTO: 0.7 10E3/UL (ref 0–1.3)
MONOCYTES NFR BLD AUTO: 9 %
NEUTROPHILS # BLD AUTO: 4.5 10E3/UL (ref 1.6–8.3)
NEUTROPHILS NFR BLD AUTO: 55 %
NRBC # BLD AUTO: 0 10E3/UL
NRBC BLD AUTO-RTO: 0 /100
P AXIS - MUSE: 74 DEGREES
PLATELET # BLD AUTO: 191 10E3/UL (ref 150–450)
POTASSIUM SERPL-SCNC: 4.2 MMOL/L (ref 3.4–5.3)
PR INTERVAL - MUSE: 156 MS
QRS DURATION - MUSE: 76 MS
QT - MUSE: 408 MS
QTC - MUSE: 455 MS
R AXIS - MUSE: 71 DEGREES
RBC # BLD AUTO: 5.27 10E6/UL (ref 3.8–5.2)
SODIUM SERPL-SCNC: 137 MMOL/L (ref 135–145)
SYSTOLIC BLOOD PRESSURE - MUSE: NORMAL MMHG
T AXIS - MUSE: 59 DEGREES
TROPONIN T SERPL HS-MCNC: <6 NG/L
VENTRICULAR RATE- MUSE: 75 BPM
WBC # BLD AUTO: 8.1 10E3/UL (ref 4–11)

## 2025-03-28 PROCEDURE — 70496 CT ANGIOGRAPHY HEAD: CPT

## 2025-03-28 PROCEDURE — 250N000009 HC RX 250: Performed by: EMERGENCY MEDICINE

## 2025-03-28 PROCEDURE — 93005 ELECTROCARDIOGRAM TRACING: CPT

## 2025-03-28 PROCEDURE — 96375 TX/PRO/DX INJ NEW DRUG ADDON: CPT | Mod: 59

## 2025-03-28 PROCEDURE — 99285 EMERGENCY DEPT VISIT HI MDM: CPT | Mod: 25

## 2025-03-28 PROCEDURE — 85018 HEMOGLOBIN: CPT | Performed by: EMERGENCY MEDICINE

## 2025-03-28 PROCEDURE — 70450 CT HEAD/BRAIN W/O DYE: CPT

## 2025-03-28 PROCEDURE — 85610 PROTHROMBIN TIME: CPT | Performed by: EMERGENCY MEDICINE

## 2025-03-28 PROCEDURE — 36415 COLL VENOUS BLD VENIPUNCTURE: CPT | Performed by: EMERGENCY MEDICINE

## 2025-03-28 PROCEDURE — 84484 ASSAY OF TROPONIN QUANT: CPT | Performed by: EMERGENCY MEDICINE

## 2025-03-28 PROCEDURE — 80048 BASIC METABOLIC PNL TOTAL CA: CPT | Performed by: EMERGENCY MEDICINE

## 2025-03-28 PROCEDURE — 96365 THER/PROPH/DIAG IV INF INIT: CPT | Mod: 59

## 2025-03-28 PROCEDURE — 85730 THROMBOPLASTIN TIME PARTIAL: CPT | Performed by: EMERGENCY MEDICINE

## 2025-03-28 PROCEDURE — 82310 ASSAY OF CALCIUM: CPT | Performed by: EMERGENCY MEDICINE

## 2025-03-28 PROCEDURE — 85004 AUTOMATED DIFF WBC COUNT: CPT | Performed by: EMERGENCY MEDICINE

## 2025-03-28 PROCEDURE — 250N000011 HC RX IP 250 OP 636: Performed by: EMERGENCY MEDICINE

## 2025-03-28 PROCEDURE — 70553 MRI BRAIN STEM W/O & W/DYE: CPT

## 2025-03-28 PROCEDURE — 255N000002 HC RX 255 OP 636: Performed by: STUDENT IN AN ORGANIZED HEALTH CARE EDUCATION/TRAINING PROGRAM

## 2025-03-28 PROCEDURE — A9585 GADOBUTROL INJECTION: HCPCS | Performed by: STUDENT IN AN ORGANIZED HEALTH CARE EDUCATION/TRAINING PROGRAM

## 2025-03-28 PROCEDURE — 250N000013 HC RX MED GY IP 250 OP 250 PS 637: Performed by: EMERGENCY MEDICINE

## 2025-03-28 RX ORDER — MAGNESIUM SULFATE HEPTAHYDRATE 40 MG/ML
2 INJECTION, SOLUTION INTRAVENOUS ONCE
Status: COMPLETED | OUTPATIENT
Start: 2025-03-28 | End: 2025-03-28

## 2025-03-28 RX ORDER — IOPAMIDOL 755 MG/ML
67 INJECTION, SOLUTION INTRAVASCULAR ONCE
Status: COMPLETED | OUTPATIENT
Start: 2025-03-28 | End: 2025-03-28

## 2025-03-28 RX ORDER — GADOBUTROL 604.72 MG/ML
6 INJECTION INTRAVENOUS ONCE
Status: COMPLETED | OUTPATIENT
Start: 2025-03-28 | End: 2025-03-28

## 2025-03-28 RX ORDER — LORAZEPAM 2 MG/ML
0.5 INJECTION INTRAMUSCULAR ONCE
Status: COMPLETED | OUTPATIENT
Start: 2025-03-28 | End: 2025-03-28

## 2025-03-28 RX ORDER — METOCLOPRAMIDE HYDROCHLORIDE 5 MG/ML
5 INJECTION INTRAMUSCULAR; INTRAVENOUS ONCE
Status: COMPLETED | OUTPATIENT
Start: 2025-03-28 | End: 2025-03-28

## 2025-03-28 RX ORDER — ACETAMINOPHEN 325 MG/1
975 TABLET ORAL ONCE
Status: COMPLETED | OUTPATIENT
Start: 2025-03-28 | End: 2025-03-28

## 2025-03-28 RX ORDER — KETOROLAC TROMETHAMINE 15 MG/ML
15 INJECTION, SOLUTION INTRAMUSCULAR; INTRAVENOUS ONCE
Status: COMPLETED | OUTPATIENT
Start: 2025-03-28 | End: 2025-03-28

## 2025-03-28 RX ORDER — DIPHENHYDRAMINE HYDROCHLORIDE 50 MG/ML
25 INJECTION, SOLUTION INTRAMUSCULAR; INTRAVENOUS ONCE
Status: COMPLETED | OUTPATIENT
Start: 2025-03-28 | End: 2025-03-28

## 2025-03-28 RX ADMIN — GADOBUTROL 6 ML: 604.72 INJECTION INTRAVENOUS at 20:54

## 2025-03-28 RX ADMIN — DIPHENHYDRAMINE HYDROCHLORIDE 25 MG: 50 INJECTION, SOLUTION INTRAMUSCULAR; INTRAVENOUS at 17:56

## 2025-03-28 RX ADMIN — ACETAMINOPHEN 975 MG: 325 TABLET, FILM COATED ORAL at 17:56

## 2025-03-28 RX ADMIN — SODIUM CHLORIDE 100 ML: 9 INJECTION, SOLUTION INTRAVENOUS at 15:55

## 2025-03-28 RX ADMIN — METOCLOPRAMIDE 5 MG: 5 INJECTION, SOLUTION INTRAMUSCULAR; INTRAVENOUS at 17:55

## 2025-03-28 RX ADMIN — MAGNESIUM SULFATE HEPTAHYDRATE 2 G: 40 INJECTION, SOLUTION INTRAVENOUS at 21:32

## 2025-03-28 RX ADMIN — KETOROLAC TROMETHAMINE 15 MG: 15 INJECTION, SOLUTION INTRAMUSCULAR; INTRAVENOUS at 20:31

## 2025-03-28 RX ADMIN — LORAZEPAM 0.5 MG: 2 INJECTION INTRAMUSCULAR; INTRAVENOUS at 16:29

## 2025-03-28 RX ADMIN — IOPAMIDOL 67 ML: 755 INJECTION, SOLUTION INTRAVENOUS at 15:55

## 2025-03-28 ASSESSMENT — COLUMBIA-SUICIDE SEVERITY RATING SCALE - C-SSRS
1. IN THE PAST MONTH, HAVE YOU WISHED YOU WERE DEAD OR WISHED YOU COULD GO TO SLEEP AND NOT WAKE UP?: NO
6. HAVE YOU EVER DONE ANYTHING, STARTED TO DO ANYTHING, OR PREPARED TO DO ANYTHING TO END YOUR LIFE?: NO
2. HAVE YOU ACTUALLY HAD ANY THOUGHTS OF KILLING YOURSELF IN THE PAST MONTH?: NO

## 2025-03-28 ASSESSMENT — ACTIVITIES OF DAILY LIVING (ADL)
ADLS_ACUITY_SCORE: 59

## 2025-03-28 NOTE — ED TRIAGE NOTES
EMS report: lives with ; was on phone with bank and had sudden onset confusion and couldn't remember why was on phone. Medics arrived noting patient had some intermittent speech difficulty (stuttering) but patient was extremely anxious. No focal weaknesses noted upon their exam. . History of TIA     Triage Assessment (Adult)       Row Name 03/28/25 1546          Triage Assessment    Airway WDL WDL        Respiratory WDL    Respiratory WDL WDL        Skin Circulation/Temperature WDL    Skin Circulation/Temperature WDL WDL        Cardiac WDL    Cardiac WDL WDL        Peripheral/Neurovascular WDL    Peripheral Neurovascular WDL WDL        Cognitive/Neuro/Behavioral WDL    Cognitive/Neuro/Behavioral WDL X  sudden onset confusion/forgetfulness while on phone at 1430     Arousal Level opens eyes spontaneously     Mood/Behavior anxious        [place] Coma Scale    Best Eye Response 4-->(E4) spontaneous     Best Motor Response 6-->(M6) obeys commands     Best Verbal Response 5-->(V5) oriented     [place] Coma Scale Score 15                        Triage Assessment (Adult)       Row Name 03/28/25 1546          Triage Assessment    Airway WDL WDL        Respiratory WDL    Respiratory WDL WDL        Skin Circulation/Temperature WDL    Skin Circulation/Temperature WDL WDL        Cardiac WDL    Cardiac WDL WDL        Peripheral/Neurovascular WDL    Peripheral Neurovascular WDL WDL        Cognitive/Neuro/Behavioral WDL    Cognitive/Neuro/Behavioral WDL X  sudden onset confusion/forgetfulness while on phone at 1430     Arousal Level opens eyes spontaneously     Mood/Behavior anxious        [place] Coma Scale    Best Eye Response 4-->(E4) spontaneous     Best Motor Response 6-->(M6) obeys commands     Best Verbal Response 5-->(V5) oriented     [place] Coma Scale Score 15

## 2025-03-28 NOTE — ED PROVIDER NOTES
Emergency Department Note      History of Present Illness     Chief Complaint   Altered Mental Status      HPI   Macey Romero is a 74 year old female with history of TIA presents to the emergency department for speech difficulties and headache.  Patient reports that roughly 1 hour prior to arrival, she developed difficulty with getting her words out, memory loss in regards to why she was on the phone with the bank, and headache.  EMS was contacted and brought patient to the ER.  At time of evaluation, patient endorses still having some difficulty with getting words out and expressing her thoughts and also complains of decreased sensation to the left sided face and left leg. Patient denies any fever, neck pain, chest pain, shortness of breath, abdominal pain, nausea, vomiting, or dizziness.    Independent Historian   EMS as above    Review of External Notes   3/2/25 ED visit note    Past Medical History     Medical History and Problem List   Past Medical History:   Diagnosis Date    Breast CA in situ 1987    Cancer (H) 1987    Cerebral infarction (H)     Chronic constipation     Fibromyalgia     History of blood transfusion     Malignant neoplasm of female breast, unspecified estrogen receptor status, unspecified laterality, unspecified site of breast (H) 06/17/2021    Meningitis     Osteoarthritis 02/01/2011    Osteopenia 02/01/2011    Pneumonia     Pterygium eye 08/26/2013    Raynaud phenomenon     Reactive airway disease 02/01/2011    Seasonal allergies     Shingles     Skin cancer     Spell of altered cognition 08/10/2024    Uncomplicated asthma        Medications   acetaminophen (TYLENOL) 500 MG tablet  albuterol (PROAIR HFA/PROVENTIL HFA/VENTOLIN HFA) 108 (90 Base) MCG/ACT inhaler  alendronate (FOSAMAX) 70 MG tablet  aspirin (ASA) 325 MG EC tablet  budesonide-formoterol (SYMBICORT) 160-4.5 MCG/ACT Inhaler  clindamycin (CLEOCIN) 300 MG capsule  ibuprofen (ADVIL/MOTRIN) 600 MG tablet  metroNIDAZOLE  (METROCREAM) 0.75 % external cream  Multiple Vitamins-Minerals (MULTIVITAMIN ADULT) CHEW  olmesartan (BENICAR) 5 MG tablet  sennosides (SENOKOT) 8.6 MG tablet  sertraline (ZOLOFT) 50 MG tablet  triamcinolone (KENALOG) 0.1 % external ointment        Surgical History   Past Surgical History:   Procedure Laterality Date    anterior c-disc fusion      ARTHROPLASTY KNEE Left 10/17/2022    Procedure: LEFT TOTAL KNEE ARTHROPLASTY;  Surgeon: Jax Le MD;  Location:  OR    BLEPHAROPLASTY, BROW LIFT BILATERAL, COMBINED Bilateral 6/18/2018    Procedure: COMBINED BLEPHAROPLASTY, BROW LIFT BILATERAL;  BILATERAL UPPER LID BLEPHAROPLASTY; BILATERAL BROW PTOSIS REPAIR;  Surgeon: Сергей Walters MD;  Location:  EC    CHOLECYSTECTOMY      COLONOSCOPY N/A 10/19/2017    Procedure: COLONOSCOPY;  COLONOSCOPY;  Surgeon: Niko Wong MD;  Location:  GI    COLONOSCOPY N/A 8/27/2022    Procedure: COLONOSCOPY, WITH POLYPECTOMY AND BIOPSY;  Surgeon: Abraham Rogers MD;  Location:  GI    D & C      Bleeding before hysterectomy    DISCECTOMY, FUSION CERVICAL ANTERIOR ONE LEVEL, COMBINED N/A 9/20/2023    Procedure: ANTERIOR CERVICAL 5 TO CERVICAL 6 DISCECTOMY AND FUSION;  Surgeon: Alex Galindo MD;  Location:  OR    ENDOSCOPY  04/21/08    ESOPHAGOSCOPY, GASTROSCOPY, DUODENOSCOPY (EGD), COMBINED N/A 8/27/2022    Procedure: ESOPHAGOGASTRODUODENOSCOPY, WITH BIOPSY;  Surgeon: Abraham Rogers MD;  Location:  GI    HYSTERECTOMY, MARCUS      Ovaries and tubes out due to breast cancer    JOINT REPLACEMTN, KNEE RT/LT Right 01/2011    Joint Replacement knee RT, with tibial straightening (2 replacements)    MAMMOPLASTY AUGMENTATION BILATERAL      breast ca     MASTECTOMY, SIMPLE RT/LT/CLAIRE Bilateral 1989    Mastectomy Simple RT/LT/CLAIRE    MOHS MICROGRAPHIC PROCEDURE      Left lateral nose    OPEN REDUCTION INTERNAL FIXATION ANKLE  8/27/2013    Procedure: OPEN REDUCTION INTERNAL FIXATION ANKLE;  RIGHT OPEN REDUCTION  INTERNAL FIXATION ANKLE WITH LIGAMENT REPAIR;  Surgeon: Nando Chang MD;  Location:  OR    PTERYGIUM WITH CONJUNCTIVAL AUTOLOGOUS TRANSPLANT Left 8/29/2016    Procedure: PTERYGIUM WITH CONJUNCTIVAL AUTOLOGOUS TRANSPLANT;  Surgeon: Сергей Walters MD;  Location:  EC    REPAIR LIGAMENT ANKLE  8/27/2013    Procedure: REPAIR LIGAMENT ANKLE;;  Surgeon: Nando Chang MD;  Location:  OR    SALIVARY GLAND SURGERY Left     Stone removal     SIGMOIDOSCOPY FLEXIBLE N/A 8/30/2022    Procedure: FLEXIBLE SIGMOIDOSCOPY;  Surgeon: Niko Wong MD;  Location:  GI    TONSILLECTOMY         Physical Exam     Patient Vitals for the past 24 hrs:   BP Temp Temp src Pulse Resp SpO2 Weight   03/28/25 1808 -- -- -- 80 24 99 % --   03/28/25 1615 129/73 -- -- 76 18 -- --   03/28/25 1553 (!) 166/80 -- -- -- -- -- --   03/28/25 1544 (!) 177/51 97.6  F (36.4  C) Temporal 82 22 100 % 76.2 kg (168 lb)     Physical Exam  Constitutional:       General: Not in acute distress.     Appearance: Normal appearance  HENT:      Head: Normocephalic and atraumatic.   Eyes:     Extraocular Movements: Extraocular movements intact.      Conjunctiva/sclera: Conjunctivae normal.      Pupils: Pupils are equal, round, and reactive to light.   Cardiovascular:     Rate and Rhythm: Normal rate and regular rhythm.   Pulmonary:      Effort: Pulmonary effort is normal.      Breath sounds: Normal breath sounds.   Abdominal:      General: Abdomen is flat. There is no distension.      Palpations: Abdomen is soft.      Tenderness: There is no abdominal tenderness.   Musculoskeletal:      Cervical back: Normal range of motion and neck supple. No rigidity.      General: No swelling or deformity.   Skin:     General: Skin is warm and dry.   Neurological:      General: Patient alert and keenly responsive. Able to answer month and age. Able and blink eyes and squeeze hands. No gaze palsy. No visual field deficits. No facial palsy. No arm drift bilaterally.  No leg drift bilaterally. No limb ataxia. Decreased sensation to left face and left leg. Mild expressive aphasia. No dysarthria. No extinction/inattention.     NIHSS score of .       Mental Status: Alert and oriented to person, place, and time.   Psychiatric:         Mood and Affect: Mood normal.         Behavior: Behavior normal.     Diagnostics     Lab Results   Labs Ordered and Resulted from Time of ED Arrival to Time of ED Departure   BASIC METABOLIC PANEL - Abnormal       Result Value    Sodium 137      Potassium 4.2      Chloride 101      Carbon Dioxide (CO2) 27      Anion Gap 9      Urea Nitrogen 12.1      Creatinine 0.79      GFR Estimate 78      Calcium 9.4      Glucose 111 (*)    CBC WITH PLATELETS AND DIFFERENTIAL - Abnormal    WBC Count 8.1      RBC Count 5.27 (*)     Hemoglobin 14.4      Hematocrit 43.1      MCV 82      MCH 27.3      MCHC 33.4      RDW 13.1      Platelet Count 191      % Neutrophils 55      % Lymphocytes 32      % Monocytes 9      % Eosinophils 3      % Basophils 0      % Immature Granulocytes 0      NRBCs per 100 WBC 0      Absolute Neutrophils 4.5      Absolute Lymphocytes 2.6      Absolute Monocytes 0.7      Absolute Eosinophils 0.2      Absolute Basophils 0.0      Absolute Immature Granulocytes 0.0      Absolute NRBCs 0.0     INR - Normal    INR 1.03     PARTIAL THROMBOPLASTIN TIME - Normal    aPTT 28     TROPONIN T, HIGH SENSITIVITY - Normal    Troponin T, High Sensitivity <6     GLUCOSE MONITOR NURSING POCT       Imaging   CTA Head Neck with Contrast   Final Result   IMPRESSION:   HEAD CT:   1.  Negative for acute intracranial hemorrhage, transcortical infarct, hydrocephalus, or sizable intracranial mass.      HEAD CTA:    1.  Similar attenuated right posterior cerebral artery. Otherwise patent major intracranial arteries without evidence for new occlusion or flow-limiting stenosis.   2.  No aneurysm, or high flow vascular malformation.      NECK CTA:   1.  Patent carotid and  vertebral arteries. Negative for high-grade arterial stenosis or acute dissection.         Findings communicated to Dr. Mayer at 1620 hours on 3/28/2025.      CT Head w/o Contrast   Final Result   IMPRESSION:   HEAD CT:   1.  Negative for acute intracranial hemorrhage, transcortical infarct, hydrocephalus, or sizable intracranial mass.      HEAD CTA:    1.  Similar attenuated right posterior cerebral artery. Otherwise patent major intracranial arteries without evidence for new occlusion or flow-limiting stenosis.   2.  No aneurysm, or high flow vascular malformation.      NECK CTA:   1.  Patent carotid and vertebral arteries. Negative for high-grade arterial stenosis or acute dissection.         Findings communicated to Dr. Mayer at 1620 hours on 3/28/2025.      MR Brain w/o & w Contrast    (Results Pending)       EKG   ECG results from 03/28/25   EKG 12-lead, tracing only     Value    Systolic Blood Pressure     Diastolic Blood Pressure     Ventricular Rate 75    Atrial Rate 75    AL Interval 156    QRS Duration 76        QTc 455    P Axis 74    R AXIS 71    T Axis 59    Interpretation ECG      Sinus rhythm  Normal ECG  When compared with ECG of 02-Mar-2025 08:50,  No significant change was found    Reviewed by Rafa Mayer DO           Independent Interpretation   None    ED Course      Medications Administered   Medications   iopamidol (ISOVUE-370) solution 67 mL (67 mLs Intravenous $Given 3/28/25 1555)     And   sodium chloride 0.9 % bag for CT scan flush (100 mLs As instructed $Given 3/28/25 1555)   LORazepam (ATIVAN) injection 0.5 mg (0.5 mg Intravenous $Given 3/28/25 1629)   metoclopramide (REGLAN) injection 5 mg (5 mg Intravenous $Given 3/28/25 1755)   diphenhydrAMINE (BENADRYL) injection 25 mg (25 mg Intravenous $Given 3/28/25 1756)   acetaminophen (TYLENOL) tablet 975 mg (975 mg Oral $Given 3/28/25 1756)       Procedures   Procedures     Discussion of Management   See ED course    ED Course   ED Course as  of 03/28/25 1825   Fri Mar 28, 2025   1550 Discussed patient with stroke neurology Dr. Mccormack who will evaluate the patient.   1626 Discussed patient with radiologist.  No acute findings on CT head or CTA head and neck.   1654 EKG 12-lead, tracing only  Normal sinus rhythm.  Rate of 75.  Normal MN and QRS.  Normal QTc.  No acute ST elevation or depression as compared with 3/2/2025 EKG.       Additional Documentation  None    Medical Decision Making / Diagnosis     CMS Diagnoses: The patient has stroke symptoms:         ED Stroke specific documentation           NIHSS PDF     Patient last known well time: 1430  ED Provider first to bedside at: 1540  CT Results received at: 1626    Thrombolytics:   Not given due to:   - minor/isolated/quickly resolving symptoms    If treating with thrombolytics: Ensure SBP<180 and DBP<105 prior to treatment with thrombolytics.  Administering thrombolytics after treatment with IV labetalol, hydralazine, or nicardipine is reasonable once BP control is established.    Endovascular Retrieval:  Not initiated due to absence of proximal vessel occlusion    [employer] Stroke Scale (Baseline)  Time Performed: 1540     Score    Level of consciousness: (0)   Alert, keenly responsive    LOC questions: (0)   Answers both questions correctly    LOC commands: (0)   Performs both tasks correctly    Best gaze: (0)   Normal    Visual: (0)   No visual loss    Facial palsy: (0)   Normal symmetrical movements    Motor arm (left): (0)   No drift    Motor arm (right): (0)   No drift    Motor leg (left): (0)   No drift    Motor leg (right): (0)   No drift    Limb ataxia: (0)   Absent    Sensory: (1)   Mild to moderate sensory loss    Best language: (1)   Mild to moderate aphasia    Dysarthria: (0)   Normal    Extinction and inattention: (0)   No abnormality        Total Score:  2        Stroke Mimics were considered (including migraine headache, seizure disorder, hypoglycemia (or  hyperglycemia), head or spinal trauma, CNS infection, Toxin ingestion and shock state (e.g. sepsis) .    MIPS       None    MDM   Macey Romero is a 74 year old female as described above presents to the emergency department for expressive aphasia, headache, and decree sensation to left face and left leg.  Patient hemodynamically stable at time of evaluation.  Afebrile.  NIHSS score 2.  Differential diagnosis considered includes, but not limited to, CVA, TIA, complex migraine, and ICH.  Tier 1 stroke alert activated given timing of symptom onset.  Stroke orders placed.  Discussed care plan with patient who voiced understanding and agreement with plan.  Answered all questions.  Additional workup and orders as listed in chart.    Ultimately, work up shows no acute findings on CT head and CTA head and neck.  Stroke neurology de-escalated stroke activation and recommend MRI brain.  If MRI brain negative, no further stroke workup needed.  Suspect symptoms may be secondary to complex migraine.  Headache cocktail ordered.    Patient was signed out to next shift ED physician pending MRI brain and reevaluation.    Please refer to ED course above as part of continuation of MDM for details on the patient's treatment course and any potential changes or updates beyond my initial evaluation and MDM creation.    Disposition   Care of the patient was transferred to my colleague Dr. Wilson pending MRI brain and headache control.     Diagnosis     ICD-10-CM    1. Difficulty with speech  R47.9       2. Decreased sensation  R20.8       3. Nonintractable headache, unspecified chronicity pattern, unspecified headache type  R51.9            Discharge Medications   New Prescriptions    No medications on file         DO Moreno DEWEY Ferris, DO  03/28/25 [id number]

## 2025-03-28 NOTE — CONSULTS
"[facility]     Stroke Code Note          History of Present Illness     Chief Complaint: Altered Mental Status      Macey Romero is a 74 year old with history of Raynaud's phenomenon, shingles, skin cancer, fibromyalgia, and breast cancer presented due to sudden onset of headache, and disorientation.  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  She had similar episodes before, and MRI have been negative.         Past Medical History     Stroke risk factors: High blood pressure, on olmesartan    Preadmission antithrombotic regimen: Aspirin 325 mg    Modified Greenbrier Score (Pre-morbid)  0-No deficits                 Assessment and Plan       1.  Sudden onset of headache, systolic above 200, disorientation, and right-sided paresthesia, similar episodes to the prior ones differential diagnosis include hypertensive emergency, headache/migraine, panic attack.  Rule out amyloid spell based on stereotypical presentation.     Intravenous Thrombolysis  Not given due to:   - minor/isolated/quickly resolving symptoms     Endovascular Treatment  Not initiated due to absence of proximal vessel occlusion     Plan:  Stroke code de-escalated  MRI of the brain with and without contrast with SWI sequence  Once MRI is completed, call us back, if negative no further stroke evaluation needed.     ___________________________________________________________________    The Stroke Staff is Dr. Gilmore.    Hi Mccormack MD  Vascular Neurology Fellow    To page me or covering stroke neurology team member, click here: [facility]  Choose \"On " "Call\" tab at top, then select \"NEUROLOGY/ALL SITES\" from middle drop-down box, press Enter, then look for \"stroke\" or \"telestroke\" for your site.  ___________________________________________________________________        Imaging/Labs   (personally reviewed)    CT head: No acute pathology  CTA head/neck: No large vessel occlusion  CT Perfusion head: Not performed         Physical Examination     BP: 129/73   Pulse: 76   Resp: 18   Temp: 97.6  F (36.4  C)   Temp src: Temporal   SpO2: 100 %   O2 Device: None (Room air)   Weight: 76.2 kg (168 lb)    Wt Readings from Last 2 Encounters:   03/28/25 76.2 kg (168 lb)   03/18/25 70.5 kg (155 lb 6.4 oz)       Neurologic  Mental Status:   The patient is very tearful, intermittently crying, but alert and oriented to herself, time and place.  No dysarthria.  Cranial Nerves:  visual fields intact, PERRL, EOMI with normal smooth pursuit, facial sensation intact and symmetric, facial movements symmetric, hearing not formally tested but intact to conversation, palate elevation symmetric and uvula midline, no dysarthria  Motor:  normal muscle tone and bulk, no abnormal movements, able to move all limbs spontaneously, no pronator drift, extremities 4/5 throughout  Reflexes:  toes down-going  Sensory:   Paresthesia over the right face arm and leg  Coordination:  normal finger-to-nose and heel-to-shin bilaterally without dysmetria, rapid alternating movements symmetric  Station/Gait:  deferred        Stroke Scales       NIHSS  1a. Level of Consciousness 0-->Alert, keenly responsive   1b. LOC Questions 0-->Answers both questions correctly   1c. LOC Commands 0-->Performs both tasks correctly   2.   Best Gaze 0-->Normal   3.   Visual 0-->No visual loss   4.   Facial Palsy 0-->Normal symmetrical movements   5a. Motor Arm, Left 0-->No drift, limb holds 90 (or 45) degrees for full 10 secs   5b. Motor Arm, Right 0-->No drift, limb holds 90 (or 45) degrees for full 10 secs   6a. Motor Leg, Left " "1-->Drift, leg falls by the end of the 5-sec period but does not hit bed   6b. Motor Leg, right 1-->Drift, leg falls by the end of the 5-sec period but does not hit bed   7.   Limb Ataxia 0-->Absent   8.   Sensory 1-->Mild-to-moderate sensory loss, patient feels pinprick is less sharp or is dull on the affected side, or there is a loss of superficial pain with pinprick, but patient is aware of being touched   9.   Best Language 0-->No aphasia, normal   10. Dysarthria 0-->Normal   11. Extinction and Inattention  0-->No abnormality   Total 3 (03/28/25 1631)            Labs     CBC  Lab Results   Component Value Date    HGB 14.4 03/28/2025    HCT 43.1 03/28/2025    WBC 8.1 03/28/2025     03/28/2025       BMP  Lab Results   Component Value Date     03/28/2025    POTASSIUM 4.2 03/28/2025    CHLORIDE 101 03/28/2025    CO2 27 03/28/2025    BUN 12.1 03/28/2025    CR 0.79 03/28/2025     (H) 03/28/2025    DONNA 9.4 03/28/2025       INR  INR   Date Value Ref Range Status   03/28/2025 1.03 0.85 - 1.15 Final   08/10/2024 1.09 0.85 - 1.15 Final   03/13/2024 1.02 0.85 - 1.15 Final       Data   Stroke Code Data  (for stroke code without tele)  Stroke code activated 03/28/25  1549   First stroke provider response 03/28/25  1550   Last known normal 03/28/25  1430   Time of discovery (or onset of symptoms) 03/28/25  1430   Head CT read by Stroke Neuro Provider 03/28/25  1610   Was stroke code de-escalated? Yes  03/28/25  1630        Clinically Significant Risk Factors Present on Admission                 # Drug Induced Platelet Defect: home medication list includes an antiplatelet medication   # Hypertension: Noted on problem list           # Overweight: Estimated body mass index is 29.76 kg/m  as calculated from the following:    Height as of 3/2/25: 1.6 m (5' 3\").    Weight as of this encounter: 76.2 kg (168 lb).       # Financial/Environmental Concerns:    # Asthma: noted on problem list     "

## 2025-03-29 NOTE — ED PROVIDER NOTES
This 74 year old female patient with history of TIA was endorsed to me by Dr. Mayer pending MRI brain for transient speech changes and headache.     I have reviewed the patient's vitals, EKG, imaging, labs, and notes which are unremarkable.  She has been treated with Reglan, Benadryl, Tylenol, and Ativan.    2026 I was notified the patient had headache while in MRI.  I will add Toradol to her treatment regimen.    MR Brain w/o & w Contrast   Final Result   IMPRESSION:      1.  No acute intracranial abnormality. Specifically, no evidence of recent infarct.   2.  No intracranial mass lesion.   3.  Changes suggestive of mild chronic microvascular ischemic disease.     2259 I evaluated the patient who tells me her pain is 6 out of 10.  She received the aforementioned medications as well as magnesium.  We reviewed her reassuring MRI results.  She does have a neurologist and I encouraged outpatient follow-up.  I provided reassurance and answered all her questions.     Lesvia Amador MD  03/29/25 0049

## 2025-03-29 NOTE — DISCHARGE INSTRUCTIONS
Call your neurologist to see if you can get an earlier appointment to discuss your symptoms including headache.  If you have recurrence or new symptoms you should return to the emergency department.

## 2025-04-03 ENCOUNTER — OFFICE VISIT (OUTPATIENT)
Dept: FAMILY MEDICINE | Facility: CLINIC | Age: 75
End: 2025-04-03

## 2025-04-03 VITALS
OXYGEN SATURATION: 99 % | WEIGHT: 168 LBS | SYSTOLIC BLOOD PRESSURE: 125 MMHG | DIASTOLIC BLOOD PRESSURE: 54 MMHG | HEART RATE: 80 BPM | BODY MASS INDEX: 29.76 KG/M2

## 2025-04-03 DIAGNOSIS — R09.89 LABILE BLOOD PRESSURE: ICD-10-CM

## 2025-04-03 DIAGNOSIS — I10 ESSENTIAL HYPERTENSION: Primary | ICD-10-CM

## 2025-04-03 RX ORDER — OLMESARTAN MEDOXOMIL 5 MG/1
5 TABLET ORAL DAILY
Status: SHIPPED
Start: 2025-04-03

## 2025-04-03 NOTE — NURSING NOTE
24 Hour Blood Pressure Monitor placed on Macey Romero  1950 today per Ngoc Duran CNP by connecting BP machine to charity online and registering patient.     Monitor was ordered correctly in patient chart: Yes, and documented in binder: Yes.    Patient received blue information sheet: Yes, with instructions on what to expect and how to return machine back to clinic after 24 hours of wearing.       Telma Mccormick MA  April 3, 2025

## 2025-04-03 NOTE — PROGRESS NOTES
Assessment & Plan     Essential hypertension  BP at goal today. Reviewed current HTN management. Taking Olmesartan 5 mg daily. Goal BP <140/90. She does report orthostatic hypotension symptoms along with some readings above goal. Will get 24 our blood pressure monitor readings to further assess her blood pressure. She initially declined this prior to starting BP medications, but agrees to it today.  We today managed prescriptions with refills ensured to ensure availabilty of current medications. Reviewed lifestyle modifications. Required intervals for follow up on HTN, lab studies reviewed. Strongly recommened blood pressures are checked outside the clinic to ensure that BPs are remaining within guidelines. Instructed to contact me if the readings are not within guidelines on a regular basis so we can adjust treatment as needed. Reviewed side effects of medications, alarm signs and symptoms, and when to seek further care. Follow up in 2-4 weeks for physical or sooner as needed. Patient agreeable to plan. All questions answered.   - olmesartan (BENICAR) 5 MG tablet    Labile blood pressure  See plan above.   - 24 Hour Blood Pressure Monitor - Adult              Work on weight loss  Regular exercise  See Patient Instructions    Return in about 4 weeks (around 5/1/2025) for Follow up.    Gustavo Choudhury is a 74 year old, presenting for the following health issues:  RECHECK (Follow up on BP (has at home readings with her) and has been taking just one 5 mg a day for around 2 weeks, has still been experiencing dizziness, and ear pain at times)    History of Present Illness       Reason for visit:  Follow up    She eats 0-1 servings of fruits and vegetables daily.She consumes 1 sweetened beverage(s) daily.She exercises with enough effort to increase her heart rate 10 to 19 minutes per day.  She exercises with enough effort to increase her heart rate 3 or less days per week.   She is taking medications regularly.         Blood pressure: Taking Olmesartan 5 mg daily. Takes it at night time. Home BP readings: 80/40 to 156/83 systolic. Not good at hydration. Caffeine free diet coke all day. Notices blood pressure are dropping when standing. Has had some pressure in her ears. Goes away instantly when sitting. On average most commonly blood pressures run about 130's. Occasionally 140-150 sneaks in. Then low BP with standing.       Review of Systems  Constitutional, HEENT, cardiovascular, pulmonary, GI, , musculoskeletal, neuro, skin, endocrine and psych systems are negative, except as otherwise noted.      Objective    /54   Pulse 80   Wt 76.2 kg (168 lb)   SpO2 99%   BMI 29.76 kg/m    Body mass index is 29.76 kg/m .  Physical Exam   GENERAL: alert and no distress  EYES: Eyes grossly normal to inspection, PERRL and conjunctivae and sclerae normal  RESP: lungs clear to auscultation - no rales, rhonchi or wheezes  CV: regular rate and rhythm, normal S1 S2, no S3 or S4, no murmur, click or rub, no peripheral edema  MS: no gross musculoskeletal defects noted, no edema  PSYCH: mentation appears normal, affect normal/bright          Signed Electronically by: SHAHRAM Nath CNP

## 2025-04-21 NOTE — PATIENT INSTRUCTIONS
Patient Education   Preventive Care Advice   This is general advice given by our system to help you stay healthy. However, your care team may have specific advice just for you. Please talk to your care team about your preventive care needs.  Nutrition  Eat 5 or more servings of fruits and vegetables each day.  Try wheat bread, brown rice and whole grain pasta (instead of white bread, rice, and pasta).  Get enough calcium and vitamin D. Check the label on foods and aim for 100% of the RDA (recommended daily allowance).  Lifestyle  Exercise at least 150 minutes each week  (30 minutes a day, 5 days a week).  Do muscle strengthening activities 2 days a week. These help control your weight and prevent disease.  No smoking.  Wear sunscreen to prevent skin cancer.  Have a dental exam and cleaning every 6 months.  Yearly exams  See your health care team every year to talk about:  Any changes in your health.  Any medicines your care team has prescribed.  Preventive care, family planning, and ways to prevent chronic diseases.  Shots (vaccines)   HPV shots (up to age 26), if you've never had them before.  Hepatitis B shots (up to age 59), if you've never had them before.  COVID-19 shot: Get this shot when it's due.  Flu shot: Get a flu shot every year.  Tetanus shot: Get a tetanus shot every 10 years.  Pneumococcal, hepatitis A, and RSV shots: Ask your care team if you need these based on your risk.  Shingles shot (for age 50 and up)  General health tests  Diabetes screening:  Starting at age 35, Get screened for diabetes at least every 3 years.  If you are younger than age 35, ask your care team if you should be screened for diabetes.  Cholesterol test: At age 39, start having a cholesterol test every 5 years, or more often if advised.  Bone density scan (DEXA): At age 50, ask your care team if you should have this scan for osteoporosis (brittle bones).  Hepatitis C: Get tested at least once in your life.  STIs (sexually  transmitted infections)  Before age 24: Ask your care team if you should be screened for STIs.  After age 24: Get screened for STIs if you're at risk. You are at risk for STIs (including HIV) if:  You are sexually active with more than one person.  You don't use condoms every time.  You or a partner was diagnosed with a sexually transmitted infection.  If you are at risk for HIV, ask about PrEP medicine to prevent HIV.  Get tested for HIV at least once in your life, whether you are at risk for HIV or not.  Cancer screening tests  Cervical cancer screening: If you have a cervix, begin getting regular cervical cancer screening tests starting at age 21.  Breast cancer scan (mammogram): If you've ever had breasts, begin having regular mammograms starting at age 40. This is a scan to check for breast cancer.  Colon cancer screening: It is important to start screening for colon cancer at age 45.  Have a colonoscopy test every 10 years (or more often if you're at risk) Or, ask your provider about stool tests like a FIT test every year or Cologuard test every 3 years.  To learn more about your testing options, visit:   .  For help making a decision, visit:   https://bit.ly/vw90759.  Prostate cancer screening test: If you have a prostate, ask your care team if a prostate cancer screening test (PSA) at age 55 is right for you.  Lung cancer screening: If you are a current or former smoker ages 50 to 80, ask your care team if ongoing lung cancer screenings are right for you.  For informational purposes only. Not to replace the advice of your health care provider. Copyright   2023 [place] [employer]. All rights reserved. Clinically reviewed by the [facility] Transitions Program. [employer] [id number] - REV 01/24.

## 2025-04-22 ENCOUNTER — OFFICE VISIT (OUTPATIENT)
Dept: FAMILY MEDICINE | Facility: CLINIC | Age: 75
End: 2025-04-22

## 2025-04-22 VITALS
HEIGHT: 63 IN | WEIGHT: 155.2 LBS | OXYGEN SATURATION: 97 % | BODY MASS INDEX: 27.5 KG/M2 | DIASTOLIC BLOOD PRESSURE: 63 MMHG | HEART RATE: 77 BPM | SYSTOLIC BLOOD PRESSURE: 127 MMHG

## 2025-04-22 DIAGNOSIS — Z13.6 SCREENING FOR CARDIOVASCULAR CONDITION: ICD-10-CM

## 2025-04-22 DIAGNOSIS — M85.89 OSTEOPENIA OF MULTIPLE SITES: ICD-10-CM

## 2025-04-22 DIAGNOSIS — F32.9 REACTIVE DEPRESSION: ICD-10-CM

## 2025-04-22 DIAGNOSIS — Z00.00 ENCOUNTER FOR MEDICARE ANNUAL WELLNESS EXAM: Primary | ICD-10-CM

## 2025-04-22 DIAGNOSIS — R73.03 PREDIABETES: ICD-10-CM

## 2025-04-22 DIAGNOSIS — F17.200 CURRENT SMOKER ON SOME DAYS: ICD-10-CM

## 2025-04-22 DIAGNOSIS — K58.2 IRRITABLE BOWEL SYNDROME WITH BOTH CONSTIPATION AND DIARRHEA: ICD-10-CM

## 2025-04-22 DIAGNOSIS — J45.40 MODERATE PERSISTENT ASTHMA WITHOUT COMPLICATION: ICD-10-CM

## 2025-04-22 DIAGNOSIS — I10 ESSENTIAL HYPERTENSION: ICD-10-CM

## 2025-04-22 DIAGNOSIS — M79.7 FIBROMYALGIA: ICD-10-CM

## 2025-04-22 PROCEDURE — G0439 PPPS, SUBSEQ VISIT: HCPCS

## 2025-04-22 PROCEDURE — 3074F SYST BP LT 130 MM HG: CPT

## 2025-04-22 PROCEDURE — 3078F DIAST BP <80 MM HG: CPT

## 2025-04-22 PROCEDURE — 99406 BEHAV CHNG SMOKING 3-10 MIN: CPT

## 2025-04-22 PROCEDURE — 99213 OFFICE O/P EST LOW 20 MIN: CPT | Mod: 25

## 2025-04-22 PROCEDURE — 36415 COLL VENOUS BLD VENIPUNCTURE: CPT

## 2025-04-22 SDOH — HEALTH STABILITY: PHYSICAL HEALTH: ON AVERAGE, HOW MANY DAYS PER WEEK DO YOU ENGAGE IN MODERATE TO STRENUOUS EXERCISE (LIKE A BRISK WALK)?: 1 DAY

## 2025-04-22 ASSESSMENT — PATIENT HEALTH QUESTIONNAIRE - PHQ9
SUM OF ALL RESPONSES TO PHQ QUESTIONS 1-9: 3
SUM OF ALL RESPONSES TO PHQ QUESTIONS 1-9: 3

## 2025-04-22 ASSESSMENT — SOCIAL DETERMINANTS OF HEALTH (SDOH): HOW OFTEN DO YOU GET TOGETHER WITH FRIENDS OR RELATIVES?: THREE TIMES A WEEK

## 2025-04-22 NOTE — PROGRESS NOTES
Preventive Care Visit  [facility]  Ngoc Duran, SHAHRAM CNP, Family Medicine  Apr 22, 2025      Assessment & Plan     Encounter for Medicare annual wellness exam  Age-appropriate preventative health maintenance along with diet, exercise and healthy weight discussed.     Osteopenia of multiple sites  Reviewed Dexa results from 12/2023. Elevated fracture score. Taking Alendronate. Started 1/2023. Reviewed lifestyle recommendations including adequate calcium (3789-9077 mg) and vitamin D (800-2000 IU) daily, exercise (30 minutes at least 3 times per week), smoking cessation, and avoidance of heavy alcohol use. In addition avoiding drugs that increase bone loss, such as glucocorticoids. Due next for Dexa 1/2026. Follow up reviewed. Patient agreeable to plan. All questions answered.      Moderate persistent asthma without complication  Taking Symbicort and albuterol as needed. Stable/controlled. Continue current medication regimen.    Fibromyalgia  Stable with OTC medications as needed.     Reactive depression  Taking Sertraline 50 mg. Stable/controlled. Continue current medication regimen.    Essential hypertension  Not on medications. Stable. Continue current plan.     Irritable bowel syndrome with both constipation and diarrhea  More constipation. Managed with OTC medications as needed. Stable. Following with Kenn[facility] FIELDS. Continue current medication regimen and treatment plan.     Prediabetes  Last A1C was 6.1 on 2/3/25. Reviewed lifestyle modifications including health diet and exercise.     Current smoker on some days  Some day smoker. Counseling given. Encouraged cessation. Does not meet criteria for lung cancer screening.  - SMOKING CESSATION COUNSELING 3-10 MIN    Screening for cardiovascular condition  - VENOUS COLLECTION  - Lipid Profile ([facility])      Patient has been advised of split billing requirements and indicates understanding: Yes        Counseling  Appropriate preventive services were addressed  with this patient via screening, questionnaire, or discussion as appropriate for fall prevention, nutrition, physical activity, Tobacco-use cessation, social engagement, weight loss and cognition.  Checklist reviewing preventive services available has been given to the patient.  Reviewed patient's diet, addressing concerns and/or questions.   She is at risk for lack of exercise and has been provided with information to increase physical activity for the benefit of her well-being.   Discussed possible causes of fatigue. Updated plan of care.  Patient reported difficulty with activities of daily living were addressed today.The patient was provided with written information regarding signs of hearing loss.   Information on urinary incontinence and treatment options given to patient.       Follow-up  Return in about 1 year (around 4/22/2026) for Routine preventive.    Gustavo Choudhury is a 74 year old, presenting for the following:  Physical (Fasting/), Health Maintenance (Immunizations: Flu (no more 65+ vaccines here), Covid, and Tdap (at pharm) due /Colonoscopy: UTD, last 8/30/22/DEXA:  UTD, last 12/21/23), Recheck Medication (Would like to discuss medications (If she should restart specific meds again) ), and Skin Check (Discolored marks on her arm that seem to grow and cause texture on her skin )            HPI       Prediabetes: Last A1C 6.1 2/3/25     Osteopenia: With elevated fracture scores. Last dexa 2023. Taking alendronate every Sunday (started jan 2024)     Asthma: Only during Spring and summer. Symbicort and albuterol as needed.      Fibromyalgia: Everything hurts. Tylenol/ibuprofen as needed     IBS: More constipation. Some bloating. BM every 4-5 days. Will try senna/colace as needed or even enemas at time. Planning to [facility]     HTN: Not on medications. Stopped Olmesartan. Monitoring BP twice and have been in range    Reactive depression (disappointment and loneliness feelings): Taking  Sertraline 50 mg    Cholesterol: Pravastin but not for very long. Had side effects. Last LDL 78    Health maintenance   Colon: UTD, last 8/30/22   Dexa: UTD, last 12/21/23 osteopenia with elevated frax scores. Due next Jan 2026  Vaccines: Allergic to tdap, declines covid, flu vaccines. RSV at pharmacy  Lung cancer: average less than .25/pack (3 cigarettes at a time here and there). Multiple stretches of years where she didn't smoke. Casually smoke. Never bought her own packs. Cumulative added up years to 5 years only per patient.     Advance Care Planning    Discussed advance care planning with patient; informed AVS has link to [employer].        4/22/2025   General Health   How would you rate your overall physical health? Good   Feel stress (tense, anxious, or unable to sleep) Not at all         4/22/2025   Nutrition   Diet: Regular (no restrictions)         4/22/2025   Exercise   Days per week of moderate/strenous exercise 1 day   (!) EXERCISE CONCERN      4/22/2025   Social Factors   Frequency of gathering with friends or relatives Three times a week   Worry food won't last until get money to buy more No   Food not last or not have enough money for food? No   Do you have housing? (Housing is defined as stable permanent housing and does not include staying ouside in a car, in a tent, in an abandoned building, in an overnight shelter, or couch-surfing.) Yes   Are you worried about losing your housing? No   Lack of transportation? No   Unable to get utilities (heat,electricity)? No         4/22/2025   Fall Risk   Fallen 2 or more times in the past year? Yes   Trouble with walking or balance? Yes           4/22/2025   Activities of Daily Living- Home Safety   Needs help with the following daily activites Preparing meals    Housework    Dressing   Safety concerns in the home None of the above       Multiple values from one day are sorted in reverse-chronological order         4/22/2025   Dental   Dentist two  times every year? Yes         4/22/2025   Hearing Screening   Hearing concerns? (!) TROUBLE UNDERSTANDING SOFT OR WHISPERED SPEECH.   Hearing Screen  Left ear:  500Hz  Pass  1000Hz  Pass  2000Hz  Pass  4000Hz  Pass    Right ear:  500Hz  Pass  1000Hz  Pass  2000Hz  Pass  4000Hz  Pass       4/22/2025   Driving Risk Screening   Patient/family members have concerns about driving No         4/22/2025   General Alertness/Fatigue Screening   Have you been more tired than usual lately? (!) YES         4/22/2025   Urinary Incontinence Screening   Bothered by leaking urine in past 6 months Yes       Today's PHQ-9 Score:       4/22/2025     8:05 AM   PHQ-9 SCORE   PHQ-9 Total Score MyChart 3 (Minimal depression)   PHQ-9 Total Score 3        Patient-reported         4/22/2025   Substance Use   Alcohol more than 3/day or more than 7/wk Not Applicable   Do you have a current opioid prescription? No   How severe/bad is pain from 1 to 10? 7/10   Do you use any other substances recreationally? No     Social History     Tobacco Use    Smoking status: Some Days     Types: Cigarettes     Passive exposure: Current    Smokeless tobacco: Never    Tobacco comments:     quit but  still smokes, but not in house   Vaping Use    Vaping status: Never Used   Substance Use Topics    Alcohol use: No     Alcohol/week: 0.0 standard drinks of alcohol    Drug use: No          Mammogram Screening - After age 74- determine frequency with patient based on health status, life expectancy and patient goals    ASCVD Risk   The ASCVD Risk score (Renan JIMENEZ, et al., 2019) failed to calculate for the following reasons:    Risk score cannot be calculated because patient has a medical history suggesting prior/existing ASCVD            Reviewed and updated as needed this visit by Provider   Tobacco  Allergies    Med Hx  Surg Hx  Fam Hx            Past Medical History:   Diagnosis Date    Breast CA in situ 1987    Cancer (H) 1987    Right Breast  treated with surgery    Cerebral infarction (H)     Chronic constipation     Fibromyalgia     History of blood transfusion     Malignant neoplasm of female breast, unspecified estrogen receptor status, unspecified laterality, unspecified site of breast (H) 06/17/2021    Meningitis     Several times as an adult    Osteoarthritis 02/01/2011    Osteopenia 02/01/2011    Pneumonia     Pterygium eye 08/26/2013    Raynaud phenomenon     Reactive airway disease 02/01/2011    Seasonal allergies     Shingles     Prior to 2008 - scalp    Skin cancer     SCC - hand, left nose    Spell of altered cognition 08/10/2024    Uncomplicated asthma      Past Surgical History:   Procedure Laterality Date    anterior c-disc fusion      ARTHROPLASTY KNEE Left 10/17/2022    Procedure: LEFT TOTAL KNEE ARTHROPLASTY;  Surgeon: Jax Le MD;  Location:  OR    BLEPHAROPLASTY, BROW LIFT BILATERAL, COMBINED Bilateral 6/18/2018    Procedure: COMBINED BLEPHAROPLASTY, BROW LIFT BILATERAL;  BILATERAL UPPER LID BLEPHAROPLASTY; BILATERAL BROW PTOSIS REPAIR;  Surgeon: Сергей Walters MD;  Location:  EC    CHOLECYSTECTOMY      COLONOSCOPY N/A 10/19/2017    Procedure: COLONOSCOPY;  COLONOSCOPY;  Surgeon: Niko Wong MD;  Location:  GI    COLONOSCOPY N/A 8/27/2022    Procedure: COLONOSCOPY, WITH POLYPECTOMY AND BIOPSY;  Surgeon: Abraham Rogers MD;  Location:  GI    D & C      Bleeding before hysterectomy    DISCECTOMY, FUSION CERVICAL ANTERIOR ONE LEVEL, COMBINED N/A 9/20/2023    Procedure: ANTERIOR CERVICAL 5 TO CERVICAL 6 DISCECTOMY AND FUSION;  Surgeon: Alex Galindo MD;  Location:  OR    ENDOSCOPY  04/21/08    ESOPHAGOSCOPY, GASTROSCOPY, DUODENOSCOPY (EGD), COMBINED N/A 8/27/2022    Procedure: ESOPHAGOGASTRODUODENOSCOPY, WITH BIOPSY;  Surgeon: Abraham Rogers MD;  Location:  GI    HYSTERECTOMY, MARCUS      Ovaries and tubes out due to breast cancer    JOINT REPLACEMTN, KNEE RT/LT Right 01/2011    Joint Replacement  knee RT, with tibial straightening (2 replacements)    MAMMOPLASTY AUGMENTATION BILATERAL      breast ca     MASTECTOMY, SIMPLE RT/LT/CLAIRE Bilateral 1989    Mastectomy Simple RT/LT/CLAIRE    MOHS MICROGRAPHIC PROCEDURE      Left lateral nose    OPEN REDUCTION INTERNAL FIXATION ANKLE  8/27/2013    Procedure: OPEN REDUCTION INTERNAL FIXATION ANKLE;  RIGHT OPEN REDUCTION INTERNAL FIXATION ANKLE WITH LIGAMENT REPAIR;  Surgeon: Nando Chang MD;  Location: [facility] OR    PTERYGIUM WITH CONJUNCTIVAL AUTOLOGOUS TRANSPLANT Left 8/29/2016    Procedure: PTERYGIUM WITH CONJUNCTIVAL AUTOLOGOUS TRANSPLANT;  Surgeon: Сергей Walters MD;  Location:  [facility]    REPAIR LIGAMENT ANKLE  8/27/2013    Procedure: REPAIR LIGAMENT ANKLE;;  Surgeon: Nando Chang MD;  Location: [facility] OR    SALIVARY GLAND SURGERY Left     Stone removal     SIGMOIDOSCOPY FLEXIBLE N/A 8/30/2022    Procedure: FLEXIBLE SIGMOIDOSCOPY;  Surgeon: Niko Wong MD;  Location:  GI    TONSILLECTOMY       Lab work is in process  Current providers sharing in care for this patient include:  Patient Care Team:  Long Ferrari MD as PCP - General (Family Medicine)  Long Ferrari MD as Assigned PCP  Anna Morales LICSW as Lead Care Coordinator (Primary Care - CC)  Marivel Jimenez CNP as Assigned Behavioral Health Provider  Nani Greenwood APRN CNP as Assigned Surgical Provider    The following health maintenance items are reviewed in Epic and correct as of today:  Health Maintenance   Topic Date Due    DTAP/TDAP/TD IMMUNIZATION (1 - Tdap) Never done    RSV VACCINE (1 - Risk 60-74 years 1-dose series) Never done    LUNG CANCER SCREENING  12/12/2022    INFLUENZA VACCINE (1) 09/01/2024    COVID-19 Vaccine (7 - 2024-25 season) 09/01/2024    LIPID  03/14/2025    A1C  08/03/2025    DEXA  12/21/2025    NICOTINE/TOBACCO CESSATION COUNSELING Q 1 YR  02/27/2026    BMP  03/28/2026    MEDICARE ANNUAL WELLNESS VISIT  04/22/2026    FALL RISK ASSESSMENT  04/22/2026  "   PHQ-9  04/22/2026    DIABETES SCREENING  03/28/2028    ADVANCE CARE PLANNING  04/22/2030    COLORECTAL CANCER SCREENING  08/27/2032    SPIROMETRY  Completed    HEPATITIS C SCREENING  Completed    COPD ACTION PLAN  Completed    DEPRESSION ACTION PLAN  Completed    Pneumococcal Vaccine: 50+ Years  Completed    ZOSTER IMMUNIZATION  Completed    HPV IMMUNIZATION  Aged Out    MENINGITIS IMMUNIZATION  Aged Out    URINE DRUG SCREEN  Discontinued         Review of Systems  Constitutional, HEENT, cardiovascular, pulmonary, GI, , musculoskeletal, neuro, skin, endocrine and psych systems are negative, except as otherwise noted.     Objective    Exam  /63   Pulse 77   Ht 1.607 m (5' 3.25\")   Wt 70.4 kg (155 lb 3.2 oz)   SpO2 97%   BMI 27.28 kg/m     Estimated body mass index is 27.28 kg/m  as calculated from the following:    Height as of this encounter: 1.607 m (5' 3.25\").    Weight as of this encounter: 70.4 kg (155 lb 3.2 oz).    Physical Exam  GENERAL: alert and no distress  EYES: Eyes grossly normal to inspection, PERRL and conjunctivae and sclerae normal  HENT: ear canals and TM's normal, nose and mouth without ulcers or lesions  NECK: no adenopathy, no asymmetry, masses, or scars  RESP: lungs clear to auscultation - no rales, rhonchi or wheezes  CV: regular rate and rhythm, normal S1 S2, no S3 or S4, no murmur, click or rub, no peripheral edema  ABDOMEN: soft, nontender, no hepatosplenomegaly, no masses and bowel sounds normal  MS: no gross musculoskeletal defects noted, no edema  SKIN: no suspicious lesions or rashes  NEURO: Normal strength and tone, mentation intact and speech normal  PSYCH: mentation appears normal, affect normal/bright         4/22/2025   Mini Cog   Clock Draw Score 2 Normal   3 Item Recall 2 objects recalled   Mini Cog Total Score 4              Signed Electronically by: SHAHRAM Nath CNP    "

## 2025-04-23 DIAGNOSIS — Z13.6 SCREENING FOR CARDIOVASCULAR CONDITION: ICD-10-CM

## 2025-04-23 LAB
CHOLESTEROL: 238 MG/DL (ref 100–199)
FASTING?: NO
HDL (RMG): 41 MG/DL (ref 40–?)
LDL CALCULATED (RMG): 163 MG/DL (ref 0–130)
TRIGLYCERIDES (RMG): 170 MG/DL (ref 0–149)

## 2025-04-23 PROCEDURE — 36415 COLL VENOUS BLD VENIPUNCTURE: CPT

## 2025-04-23 PROCEDURE — 80061 LIPID PANEL: CPT | Mod: 90

## 2025-04-23 PROCEDURE — 96372 THER/PROPH/DIAG INJ SC/IM: CPT

## 2025-05-06 ENCOUNTER — OFFICE VISIT (OUTPATIENT)
Dept: FAMILY MEDICINE | Facility: CLINIC | Age: 75
End: 2025-05-06

## 2025-05-06 VITALS
WEIGHT: 151.6 LBS | HEART RATE: 80 BPM | SYSTOLIC BLOOD PRESSURE: 120 MMHG | OXYGEN SATURATION: 97 % | BODY MASS INDEX: 26.64 KG/M2 | DIASTOLIC BLOOD PRESSURE: 62 MMHG

## 2025-05-06 DIAGNOSIS — R13.10 SWALLOWING PROBLEM: ICD-10-CM

## 2025-05-06 DIAGNOSIS — K21.00 GASTROESOPHAGEAL REFLUX DISEASE WITH ESOPHAGITIS, UNSPECIFIED WHETHER HEMORRHAGE: ICD-10-CM

## 2025-05-06 DIAGNOSIS — R09.89 LABILE BLOOD PRESSURE: Primary | ICD-10-CM

## 2025-05-06 DIAGNOSIS — E78.5 HYPERLIPIDEMIA, UNSPECIFIED HYPERLIPIDEMIA TYPE: ICD-10-CM

## 2025-05-06 PROCEDURE — 3074F SYST BP LT 130 MM HG: CPT

## 2025-05-06 PROCEDURE — 99214 OFFICE O/P EST MOD 30 MIN: CPT

## 2025-05-06 PROCEDURE — G2211 COMPLEX E/M VISIT ADD ON: HCPCS

## 2025-05-06 PROCEDURE — 3078F DIAST BP <80 MM HG: CPT

## 2025-05-06 RX ORDER — OMEPRAZOLE 20 MG/1
20 CAPSULE, DELAYED RELEASE ORAL DAILY
Qty: 90 CAPSULE | Refills: 1 | Status: SHIPPED | OUTPATIENT
Start: 2025-05-06

## 2025-05-06 RX ORDER — ROSUVASTATIN CALCIUM 10 MG/1
10 TABLET, COATED ORAL DAILY
Qty: 90 TABLET | Refills: 1 | Status: SHIPPED | OUTPATIENT
Start: 2025-05-06

## 2025-05-06 ASSESSMENT — PATIENT HEALTH QUESTIONNAIRE - PHQ9
10. IF YOU CHECKED OFF ANY PROBLEMS, HOW DIFFICULT HAVE THESE PROBLEMS MADE IT FOR YOU TO DO YOUR WORK, TAKE CARE OF THINGS AT HOME, OR GET ALONG WITH OTHER PEOPLE: SOMEWHAT DIFFICULT
SUM OF ALL RESPONSES TO PHQ QUESTIONS 1-9: 2
SUM OF ALL RESPONSES TO PHQ QUESTIONS 1-9: 2

## 2025-05-06 NOTE — PROGRESS NOTES
Assessment & Plan     Hyperlipidemia, unspecified hyperlipidemia type  Not on medication. Previously on a statin, unclear how it was discontinued. Will start Rosuvastatin 10 mg daily. Discussed previous results and current guidelines for treatment and goals for lipids. Discussed ongoing lifestyle modification. Reviewed medication use for lipid lowering including possible side effects. Instructed to contact me if he develop any intolerance to the treatment. Red flags that warrant emergent evaluation discussed. Follow up in 6-8 weeks or sooner as needed. Patient agreeable to plan. All questions answered.   - rosuvastatin (CRESTOR) 10 MG tablet  Dispense: 90 tablet; Refill: 1    Labile blood pressure  Not on medication. Reviewed blood pressures with patient. Will continue current treatment plan. Recommend continuing to monitor blood pressures to ensure that BPs are remaining within guidelines. Instructed to contact me if the readings are not within guidelines on a regular basis so we can adjust treatment as needed. Red flags that warrant emergent evaluation discussed. Patient agreeable to plan. All questions answered.     Swallowing problem  Gastroesophageal reflux disease with esophagitis, unspecified whether hemorrhage  Per review of chart patient has hx of espohphagitits and Shatzki ring on EGD. Was also recommend to start PPI. Will start omeprazole. Education about adverse effects of prescription medication discussed. GI referral placed as well. Red flags that warrant emergent evaluation discussed. Follow up as needed for new or worsening symptoms or if symptoms fail to improve. Patient agreeable to plan. All questions answered.   - omeprazole (PRILOSEC) 20 MG DR capsule  Dispense: 90 capsule; Refill: 1  - Adult GI  Referral - Consult Only - To a [facility] Location (Use POS/Location)            Follow-up  Return in about 8 weeks (around 7/1/2025) for Follow up.    Gustavo Choudhury is a 74  year old, presenting for the following health issues:  Lipids (Has been off BP medication for around 2 weeks, has been in the range of 130's/80-90's, pulse has also been elevated, would like to discuss past results without taking the pills now ) and Throat Problem (Has been having difficulty with the ring in her throat, causing her to choke at times and voice changes, for around a month )    History of Present Illness       Hypertension: She presents for follow up of hypertension.  She does check blood pressure  regularly outside of the clinic. Outside blood pressures have been over 140/90. She does not follow a low salt diet.     She eats 4 or more servings of fruits and vegetables daily.She consumes 0 sweetened beverage(s) daily.She exercises with enough effort to increase her heart rate 20 to 29 minutes per day.  She exercises with enough effort to increase her heart rate 3 or less days per week.   She is taking medications regularly.        Follow up: Cholesterol was elevated. Previously has been on Pravastatin then stopped. Unsure why she stopped. Has history of TIA's.     Blood pressure: Not on medications. States she had a low reading once of 90/40. Other readings: 141/74, 124/69, 143/72, 135/78.     Throat problem: Hx of espohphagitits and Shatzki ring on EGD around esophagus with history of having it stretched. Feels she has some pressure and voice changes. Two step swallow. Follows with Dr. Rogers. Also reports some constipation concerns. Would benefit per notes from PPI that was never started.       Review of Systems  Constitutional, HEENT, cardiovascular, pulmonary, GI, , musculoskeletal, neuro, skin, endocrine and psych systems are negative, except as otherwise noted.      Objective    /62   Pulse 80   Wt 68.8 kg (151 lb 9.6 oz)   SpO2 97%   BMI 26.64 kg/m    Body mass index is 26.64 kg/m .  Physical Exam   GENERAL: alert and no distress  EYES: Eyes grossly normal to inspection, PERRL and  conjunctivae and sclerae normal  RESP: respirations even and unlabored   SKIN: no suspicious lesions or rashes  PSYCH: mentation appears normal, affect normal/bright          Signed Electronically by: SHAHRAM Nath CNP

## 2025-05-20 ENCOUNTER — TRANSFERRED RECORDS (OUTPATIENT)
Dept: FAMILY MEDICINE | Facility: CLINIC | Age: 75
End: 2025-05-20

## 2025-06-02 ENCOUNTER — OFFICE VISIT (OUTPATIENT)
Dept: FAMILY MEDICINE | Facility: CLINIC | Age: 75
End: 2025-06-02

## 2025-06-02 VITALS
OXYGEN SATURATION: 97 % | WEIGHT: 156.4 LBS | SYSTOLIC BLOOD PRESSURE: 133 MMHG | HEART RATE: 80 BPM | BODY MASS INDEX: 27.49 KG/M2 | DIASTOLIC BLOOD PRESSURE: 55 MMHG

## 2025-06-02 DIAGNOSIS — E78.5 HYPERLIPIDEMIA, UNSPECIFIED HYPERLIPIDEMIA TYPE: Primary | ICD-10-CM

## 2025-06-02 DIAGNOSIS — R91.8 PULMONARY NODULES: ICD-10-CM

## 2025-06-02 DIAGNOSIS — R09.89 LABILE BLOOD PRESSURE: ICD-10-CM

## 2025-06-02 DIAGNOSIS — K21.00 GASTROESOPHAGEAL REFLUX DISEASE WITH ESOPHAGITIS, UNSPECIFIED WHETHER HEMORRHAGE: ICD-10-CM

## 2025-06-02 LAB
CHOLEST SERPL-MCNC: 140 MG/DL
FASTING STATUS PATIENT QL REPORTED: NO
HDLC SERPL-MCNC: 46 MG/DL
LDLC SERPL CALC-MCNC: 60 MG/DL
NONHDLC SERPL-MCNC: 94 MG/DL
TRIGL SERPL-MCNC: 172 MG/DL

## 2025-06-02 PROCEDURE — 3078F DIAST BP <80 MM HG: CPT

## 2025-06-02 PROCEDURE — 36415 COLL VENOUS BLD VENIPUNCTURE: CPT

## 2025-06-02 PROCEDURE — 99214 OFFICE O/P EST MOD 30 MIN: CPT

## 2025-06-02 PROCEDURE — 80061 LIPID PANEL: CPT

## 2025-06-02 PROCEDURE — G2211 COMPLEX E/M VISIT ADD ON: HCPCS

## 2025-06-02 PROCEDURE — 3075F SYST BP GE 130 - 139MM HG: CPT

## 2025-06-02 RX ORDER — PANTOPRAZOLE SODIUM 40 MG/1
TABLET, DELAYED RELEASE ORAL
COMMUNITY
Start: 2025-05-14

## 2025-06-02 NOTE — PROGRESS NOTES
Assessment & Plan     Hyperlipidemia, unspecified hyperlipidemia type  Taking Rosuvastatin 10 mg daily. Discussed previous results and current guidelines for treatment and goals for lipids.  Will recheck lipids today. Discussed ongoing lifestyle modification. Reviewed medication use for lipid lowering including possible side effects.  Managed their prescriptions with refills ensured to ensure availabilty of current medications.  Instructed to contact me if he develop any intolerance to the treatment. Follow up reviewed as needed. Patient agreeable to plan. All questions answered.   - Lipid panel  - VENOUS COLLECTION  - Lipid panel    Gastroesophageal reflux disease with esophagitis, unspecified whether hemorrhage  Per review of chart patient has hx of espohphagitits and Shatzki ring on EGD. Taking Pantoprazole 40 mg. Following with [facility]. Continue current medication regimen.     Labile blood pressure  Not on medication. Reports confusion with taking Olmesartan. Unsure if still taking. Will check medications at home. Reviewed blood pressures with patient. Will continue current treatment plan off medications. Recommend continuing to monitor blood pressures to ensure that BPs are remaining within guidelines. Instructed to contact me if the readings are not within guidelines on a regular basis so we can adjust treatment as needed. Red flags that warrant emergent evaluation discussed. Patient agreeable to plan. All questions answered.     Pulmonary nodules  CT chest in 2021 with pulmonary nodules. Overdue for follow up. Order placed.   - CT Chest Low Dose Non Contrast                Follow-up  Return if symptoms worsen or fail to improve, for Follow up.    uGstavo Choudhury is a 74 year old, presenting for the following health issues:  Recheck Medication (Med check today /Also reports blood pressure has been in 120-130's range )    HPI      Medication recheck:     HLD: Cholesterol restarted Rosuvastatin 10 mg  daily. Going okay. Body aches related to moving summer and winter clothes.     GERD: Following with [facility]. Taking Pantoprazole. Going well.     BP: Taking morning and evening. Highest is in 130's.     Following with clinics of neurology: EEG next week.       Review of Systems  Constitutional, HEENT, cardiovascular, pulmonary, GI, , musculoskeletal, neuro, skin, endocrine and psych systems are negative, except as otherwise noted.      Objective    /55   Pulse 80   Wt 70.9 kg (156 lb 6.4 oz)   SpO2 97%   BMI 27.49 kg/m    Body mass index is 27.49 kg/m .  Physical Exam   GENERAL: alert and no distress  EYES: Eyes grossly normal to inspection, PERRL and conjunctivae and sclerae normal  RESP: lungs clear to auscultation - no rales, rhonchi or wheezes  CV: regular rate and rhythm, normal S1 S2, no S3 or S4, no murmur, click or rub, no peripheral edema  MS: no gross musculoskeletal defects noted, no edema  PSYCH: mentation appears normal, affect normal/bright    No results found for this or any previous visit (from the past 24 hours).        Signed Electronically by: SHAHRAM Nath CNP

## 2025-06-03 ENCOUNTER — RESULTS FOLLOW-UP (OUTPATIENT)
Dept: FAMILY MEDICINE | Facility: CLINIC | Age: 75
End: 2025-06-03

## 2025-06-12 ENCOUNTER — OFFICE VISIT (OUTPATIENT)
Dept: FAMILY MEDICINE | Facility: CLINIC | Age: 75
End: 2025-06-12

## 2025-06-12 VITALS
DIASTOLIC BLOOD PRESSURE: 75 MMHG | SYSTOLIC BLOOD PRESSURE: 126 MMHG | WEIGHT: 151 LBS | OXYGEN SATURATION: 96 % | HEART RATE: 77 BPM | BODY MASS INDEX: 26.54 KG/M2

## 2025-06-12 DIAGNOSIS — R09.89 LABILE BLOOD PRESSURE: ICD-10-CM

## 2025-06-12 DIAGNOSIS — R21 FACIAL RASH: Primary | ICD-10-CM

## 2025-06-12 RX ORDER — DESONIDE 0.5 MG/G
CREAM TOPICAL 2 TIMES DAILY
Qty: 60 G | Refills: 0 | Status: SHIPPED | OUTPATIENT
Start: 2025-06-12 | End: 2025-06-26

## 2025-06-12 RX ORDER — BUDESONIDE AND FORMOTEROL FUMARATE DIHYDRATE 160; 4.5 UG/1; UG/1
AEROSOL RESPIRATORY (INHALATION) PRN
COMMUNITY

## 2025-06-12 ASSESSMENT — PATIENT HEALTH QUESTIONNAIRE - PHQ9
SUM OF ALL RESPONSES TO PHQ QUESTIONS 1-9: 2
SUM OF ALL RESPONSES TO PHQ QUESTIONS 1-9: 2
10. IF YOU CHECKED OFF ANY PROBLEMS, HOW DIFFICULT HAVE THESE PROBLEMS MADE IT FOR YOU TO DO YOUR WORK, TAKE CARE OF THINGS AT HOME, OR GET ALONG WITH OTHER PEOPLE: NOT DIFFICULT AT ALL

## 2025-06-12 NOTE — PROGRESS NOTES
Assessment & Plan     Facial rash  Patient presents for a facial rash. Has previously tried Triamcinolone ointment without relief and prefers a cream based medication. We discussed the diagnosis, pathophysiology and natural history related to rashes. Rx for Desonide cream. May use OTC antihistamine and emollients to help with symptoms relief. Red flags that warrant emergent evaluation discussed. Follow up as needed for new or worsening symptoms or if symptoms fail to improve. Patient agreeable to plan. All questions answered.    - desonide (DESOWEN) 0.05 % external cream  Dispense: 60 g; Refill: 0    Labile blood pressure  Not on medication. Reviewed blood pressures with patient. Will continue current treatment plan off medications. Recommend continuing to monitor blood pressures to ensure that BPs are remaining within guidelines. Instructed to contact me if the readings are not within guidelines on a regular basis so we can adjust treatment as needed. Red flags that warrant emergent evaluation discussed. Patient agreeable to plan. All questions answered.         Follow-up  Return if symptoms worsen or fail to improve, for Follow up.    The longitudinal plan of care for the diagnosis(es)/condition(s) as documented were addressed during this visit. Due to the added complexity in care, I will continue to support Macey in the subsequent management and with ongoing continuity of care.      Subjective   Macey is a 74 year old, presenting for the following health issues:  RECHECK (BP check today, has readings from home with her )    History of Present Illness       Hyperlipidemia:  She presents for follow up of hyperlipidemia.   She is taking medication to lower cholesterol. She is having myalgia or other side effects to statin medications.    Hypertension: She presents for follow up of hypertension.  She does check blood pressure  regularly outside of the clinic. Outside blood pressures have been over 140/90. She  follows a low salt diet.     She eats 4 or more servings of fruits and vegetables daily.She consumes 0 sweetened beverage(s) daily.She exercises with enough effort to increase her heart rate 9 or less minutes per day.  She exercises with enough effort to increase her heart rate 3 or less days per week.   She is taking medications regularly.        Blood pressure: Has been monitoring blood pressure. Not currently taking BP medications. BP at home has been   124/73  134/78  150/91  122/63  137/73  142/70  130/76  164/96 then 128/88    BP Readings from Last 6 Encounters:   06/12/25 126/75   06/02/25 133/55   05/06/25 120/62   04/22/25 127/63   04/03/25 125/54   03/28/25 116/53      Rash: Started the last few days. Itchy red and sore. On both sides of her face. improved in the past with steroid cream but has burning like its shingles to the right side of her forehead. Has tried triamcinolone ointment without relief. Some blisters-papules.       Review of Systems  Constitutional, HEENT, cardiovascular, pulmonary, gi and gu systems are negative, except as otherwise noted.      Objective    /75   Pulse 77   Wt 68.5 kg (151 lb)   SpO2 96%   BMI 26.54 kg/m    Body mass index is 26.54 kg/m .  Physical Exam   GENERAL: alert and no distress  RESP: lungs clear to auscultation - no rales, rhonchi or wheezes  CV: regular rate and rhythm, normal S1 S2, no S3 or S4, no murmur, click or rub, no peripheral edema  MS: no gross musculoskeletal defects noted, no edema  SKIN: three small papules slightly erythematous to right side of forehead and one pinpoint papule lateral to left eyebrow. No drainage noted.   PSYCH: mentation appears normal, affect normal/bright          Signed Electronically by: SHAHRAM Nath CNP

## 2025-06-17 ENCOUNTER — ANCILLARY PROCEDURE (OUTPATIENT)
Dept: CT IMAGING | Facility: CLINIC | Age: 75
End: 2025-06-17
Payer: MEDICARE

## 2025-06-17 DIAGNOSIS — R91.8 PULMONARY NODULES: ICD-10-CM

## 2025-06-17 PROCEDURE — 71250 CT THORAX DX C-: CPT

## 2025-06-30 ENCOUNTER — OFFICE VISIT (OUTPATIENT)
Dept: FAMILY MEDICINE | Facility: CLINIC | Age: 75
End: 2025-06-30

## 2025-06-30 VITALS — DIASTOLIC BLOOD PRESSURE: 72 MMHG | HEART RATE: 76 BPM | OXYGEN SATURATION: 96 % | SYSTOLIC BLOOD PRESSURE: 146 MMHG

## 2025-06-30 DIAGNOSIS — R09.89 LABILE BLOOD PRESSURE: Primary | ICD-10-CM

## 2025-06-30 PROCEDURE — 99214 OFFICE O/P EST MOD 30 MIN: CPT

## 2025-06-30 PROCEDURE — 3077F SYST BP >= 140 MM HG: CPT

## 2025-06-30 PROCEDURE — 3078F DIAST BP <80 MM HG: CPT

## 2025-06-30 PROCEDURE — G2211 COMPLEX E/M VISIT ADD ON: HCPCS

## 2025-06-30 ASSESSMENT — ASTHMA QUESTIONNAIRES
ACT_TOTALSCORE: 25
QUESTION_1 LAST FOUR WEEKS HOW MUCH OF THE TIME DID YOUR ASTHMA KEEP YOU FROM GETTING AS MUCH DONE AT WORK, SCHOOL OR AT HOME: NONE OF THE TIME
QUESTION_2 LAST FOUR WEEKS HOW OFTEN HAVE YOU HAD SHORTNESS OF BREATH: NOT AT ALL
QUESTION_3 LAST FOUR WEEKS HOW OFTEN DID YOUR ASTHMA SYMPTOMS (WHEEZING, COUGHING, SHORTNESS OF BREATH, CHEST TIGHTNESS OR PAIN) WAKE YOU UP AT NIGHT OR EARLIER THAN USUAL IN THE MORNING: NOT AT ALL
QUESTION_4 LAST FOUR WEEKS HOW OFTEN HAVE YOU USED YOUR RESCUE INHALER OR NEBULIZER MEDICATION (SUCH AS ALBUTEROL): NOT AT ALL
QUESTION_5 LAST FOUR WEEKS HOW WOULD YOU RATE YOUR ASTHMA CONTROL: COMPLETELY CONTROLLED

## 2025-06-30 ASSESSMENT — PATIENT HEALTH QUESTIONNAIRE - PHQ9
SUM OF ALL RESPONSES TO PHQ QUESTIONS 1-9: 1
SUM OF ALL RESPONSES TO PHQ QUESTIONS 1-9: 1

## 2025-06-30 NOTE — PROGRESS NOTES
Assessment & Plan     Labile blood pressure  Blood pressure borderline elevated in clinic today. Not currently on blood pressure medications. Previously on Olmesartan 5 mg daily. Has completed 24 hour blood pressure monitor 4/3/25 with daily average 131/67. Reviewed blood pressures with patient. Will continue current treatment plan off medications. Discussed continuing her statin to reduce her risk for heart attack and stroke. Given wide fluctuations in blood pressure, will place referral for cardiology. Recommend continuing to monitor blood pressures to ensure that BPs are remaining within guidelines. Instructed to contact me if the readings are not within guidelines on a regular basis so we can adjust treatment as needed. Red flags that warrant emergent evaluation discussed. Patient agreeable to plan. All questions answered.  - Adult Cardiology Eval  Referral              Follow-up  Return if symptoms worsen or fail to improve, for Follow up.    The longitudinal plan of care for the diagnosis(es)/condition(s) as documented were addressed during this visit. Due to the added complexity in care, I will continue to support Macey in the subsequent management and with ongoing continuity of care.      Subjective   Macey is a 74 year old, presenting for the following health issues:  Hypertension (Has had consistently high blood pressure. Had a headache 6/29 which was the highest her readings have been at home. Having blurry vision, but wondering if it is her prescription. )    History of Present Illness       Hypertension: She presents for follow up of hypertension.  She does check blood pressure  regularly outside of the clinic. Outside blood pressures have been over 140/90. She follows a low salt diet.     She eats 2-3 servings of fruits and vegetables daily.She consumes 0 sweetened beverage(s) daily.She exercises with enough effort to increase her heart rate 10 to 19 minutes per day.  She exercises with  enough effort to increase her heart rate 3 or less days per week.   She is taking medications regularly.        Blood pressure:  Has been in the 150's for the past week.   139/77, 136/80, 147/87, 142/83, 135/80, 137/78, 126/65, 140/80, 135/71, 146/78, 144/72, 141/89, 134/80, 129/72, 158/96, 115/60, 124/69, 140/70, 128/70, 159/83, 140/74, 124/63, 132/80, 124/62, 162/90, 165/97, 150/81, 161/81    Has been tired lately as well. Having a headache. Has felt pretty good beside being tired. Recently followed with [facility] of neurology.     BP Readings from Last 6 Encounters:   06/30/25 (!) 146/72   06/12/25 126/75   06/02/25 133/55   05/06/25 120/62   04/22/25 127/63   04/03/25 125/54        Review of Systems  Constitutional, HEENT, cardiovascular, pulmonary, gi and gu systems are negative, except as otherwise noted.      Objective    BP (!) 146/72   Pulse 76   SpO2 96%   There is no height or weight on file to calculate BMI.  Physical Exam   GENERAL: alert and no distress  EYES: Eyes grossly normal to inspection, PERRL and conjunctivae and sclerae normal  RESP: lungs clear to auscultation - no rales, rhonchi or wheezes  CV: regular rate and rhythm, normal S1 S2, no S3 or S4, no murmur, click or rub, no peripheral edema  MS: no gross musculoskeletal defects noted, no edema  PSYCH: mentation appears normal, affect normal/bright          Signed Electronically by: Ngoc Duran, SHAHRAM CNP

## 2025-07-16 ENCOUNTER — TRANSFERRED RECORDS (OUTPATIENT)
Dept: FAMILY MEDICINE | Facility: CLINIC | Age: 75
End: 2025-07-16

## 2025-07-26 ENCOUNTER — TRANSFERRED RECORDS (OUTPATIENT)
Dept: FAMILY MEDICINE | Facility: CLINIC | Age: 75
End: 2025-07-26

## 2025-08-03 ENCOUNTER — APPOINTMENT (OUTPATIENT)
Dept: GENERAL RADIOLOGY | Facility: CLINIC | Age: 75
End: 2025-08-03
Attending: EMERGENCY MEDICINE
Payer: MEDICARE

## 2025-08-03 ENCOUNTER — APPOINTMENT (OUTPATIENT)
Dept: CT IMAGING | Facility: CLINIC | Age: 75
End: 2025-08-03
Attending: EMERGENCY MEDICINE
Payer: MEDICARE

## 2025-08-03 ENCOUNTER — HOSPITAL ENCOUNTER (EMERGENCY)
Facility: CLINIC | Age: 75
Discharge: HOME | End: 2025-08-03
Attending: EMERGENCY MEDICINE | Admitting: EMERGENCY MEDICINE
Payer: MEDICARE

## 2025-08-03 VITALS
OXYGEN SATURATION: 93 % | SYSTOLIC BLOOD PRESSURE: 117 MMHG | RESPIRATION RATE: 20 BRPM | HEART RATE: 67 BPM | DIASTOLIC BLOOD PRESSURE: 49 MMHG

## 2025-08-03 DIAGNOSIS — S93.422A SPRAIN OF DELTOID LIGAMENT OF LEFT ANKLE, INITIAL ENCOUNTER: Primary | ICD-10-CM
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
## 2025-08-03 PROCEDURE — 250N000011 HC RX IP 250 OP 636: Performed by: EMERGENCY MEDICINE

## 2025-08-03 PROCEDURE — 73630 X-RAY EXAM OF FOOT: CPT | Mod: LT

## 2025-08-03 PROCEDURE — 73080 X-RAY EXAM OF ELBOW: CPT | Mod: RT

## 2025-08-03 PROCEDURE — 99284 EMERGENCY DEPT VISIT MOD MDM: CPT | Mod: 25 | Performed by: EMERGENCY MEDICINE

## 2025-08-03 PROCEDURE — 73502 X-RAY EXAM HIP UNI 2-3 VIEWS: CPT

## 2025-08-03 PROCEDURE — 73562 X-RAY EXAM OF KNEE 3: CPT | Mod: 50

## 2025-08-03 PROCEDURE — 70450 CT HEAD/BRAIN W/O DYE: CPT

## 2025-08-03 PROCEDURE — 73610 X-RAY EXAM OF ANKLE: CPT | Mod: LT

## 2025-08-03 PROCEDURE — 72125 CT NECK SPINE W/O DYE: CPT

## 2025-08-03 PROCEDURE — 73590 X-RAY EXAM OF LOWER LEG: CPT | Mod: LT

## 2025-08-03 PROCEDURE — 73030 X-RAY EXAM OF SHOULDER: CPT | Mod: 50

## 2025-08-03 PROCEDURE — 250N000013 HC RX MED GY IP 250 OP 250 PS 637: Performed by: EMERGENCY MEDICINE

## 2025-08-03 RX ORDER — OXYCODONE HYDROCHLORIDE 5 MG/1
5 TABLET ORAL ONCE
Refills: 0 | Status: COMPLETED | OUTPATIENT
Start: 2025-08-03 | End: 2025-08-03

## 2025-08-03 RX ORDER — ACETAMINOPHEN 500 MG
1000 TABLET ORAL ONCE
Status: COMPLETED | OUTPATIENT
Start: 2025-08-03 | End: 2025-08-03

## 2025-08-03 RX ORDER — OXYCODONE HYDROCHLORIDE 5 MG/1
5 TABLET ORAL EVERY 6 HOURS PRN
Qty: 4 TABLET | Refills: 0 | Status: SHIPPED | OUTPATIENT
Start: 2025-08-03

## 2025-08-03 RX ORDER — ONDANSETRON 4 MG/1
4 TABLET, ORALLY DISINTEGRATING ORAL
Status: COMPLETED | OUTPATIENT
Start: 2025-08-03 | End: 2025-08-03

## 2025-08-03 RX ADMIN — ONDANSETRON 4 MG: 4 TABLET, ORALLY DISINTEGRATING ORAL at 14:03

## 2025-08-03 RX ADMIN — ACETAMINOPHEN 1000 MG: 500 TABLET, FILM COATED ORAL at 15:44

## 2025-08-03 RX ADMIN — OXYCODONE HYDROCHLORIDE 5 MG: 5 TABLET ORAL at 14:03

## 2025-08-03 ASSESSMENT — ACTIVITIES OF DAILY LIVING (ADL)
ADLS_ACUITY_SCORE: 59

## 2025-08-04 ENCOUNTER — OFFICE VISIT (OUTPATIENT)
Dept: FAMILY MEDICINE | Facility: CLINIC | Age: 75
End: 2025-08-04

## 2025-08-04 VITALS
DIASTOLIC BLOOD PRESSURE: 50 MMHG | OXYGEN SATURATION: 97 % | HEART RATE: 78 BPM | WEIGHT: 152 LBS | SYSTOLIC BLOOD PRESSURE: 120 MMHG | BODY MASS INDEX: 26.71 KG/M2

## 2025-08-04 DIAGNOSIS — M25.572 PAIN IN JOINT, ANKLE AND FOOT, LEFT: ICD-10-CM
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
## 2025-08-04 DIAGNOSIS — M25.511 ACUTE PAIN OF RIGHT SHOULDER: ICD-10-CM

## 2025-08-04 PROCEDURE — 99213 OFFICE O/P EST LOW 20 MIN: CPT

## 2025-08-04 PROCEDURE — 3074F SYST BP LT 130 MM HG: CPT

## 2025-08-04 PROCEDURE — 3078F DIAST BP <80 MM HG: CPT

## 2025-08-04 PROCEDURE — G2211 COMPLEX E/M VISIT ADD ON: HCPCS

## 2025-08-08 ENCOUNTER — APPOINTMENT (OUTPATIENT)
Dept: CT IMAGING | Facility: CLINIC | Age: 75
End: 2025-08-08
Attending: EMERGENCY MEDICINE
Payer: MEDICARE

## 2025-08-08 ENCOUNTER — HOSPITAL ENCOUNTER (EMERGENCY)
Facility: CLINIC | Age: 75
Discharge: HOME | End: 2025-08-09
Attending: EMERGENCY MEDICINE | Admitting: EMERGENCY MEDICINE
Payer: MEDICARE

## 2025-08-08 DIAGNOSIS — R51.9 ACUTE NONINTRACTABLE HEADACHE, UNSPECIFIED HEADACHE TYPE: Primary | ICD-10-CM

## 2025-08-08 DIAGNOSIS — R03.0 ELEVATED BLOOD PRESSURE READING: ICD-10-CM

## 2025-08-08 DIAGNOSIS — K64.4 EXTERNAL HEMORRHOIDS: ICD-10-CM

## 2025-08-08 DIAGNOSIS — K59.00 CONSTIPATION, UNSPECIFIED CONSTIPATION TYPE: ICD-10-CM

## 2025-08-08 LAB
ALBUMIN SERPL BCG-MCNC: 4.2 G/DL (ref 3.5–5.2)
ALP SERPL-CCNC: 71 U/L (ref 40–150)
ALT SERPL W P-5'-P-CCNC: 21 U/L (ref 0–50)
ANION GAP SERPL CALCULATED.3IONS-SCNC: 9 MMOL/L (ref 7–15)
AST SERPL W P-5'-P-CCNC: 40 U/L (ref 0–45)
ATRIAL RATE - MUSE: 80 BPM
BASOPHILS # BLD AUTO: 0 10E3/UL (ref 0–0.2)
BASOPHILS NFR BLD AUTO: 0 %
BILIRUB SERPL-MCNC: 0.5 MG/DL
BITE CELLS BLD QL SMEAR: SLIGHT
BUN SERPL-MCNC: 8.1 MG/DL (ref 8–23)
CALCIUM SERPL-MCNC: 8.8 MG/DL (ref 8.8–10.4)
CHLORIDE SERPL-SCNC: 102 MMOL/L (ref 98–107)
CREAT SERPL-MCNC: 0.81 MG/DL (ref 0.51–0.95)
DIASTOLIC BLOOD PRESSURE - MUSE: NORMAL MMHG
EGFRCR SERPLBLD CKD-EPI 2021: 76 ML/MIN/1.73M2
EOSINOPHIL # BLD AUTO: 0.2 10E3/UL (ref 0–0.7)
EOSINOPHIL NFR BLD AUTO: 2 %
ERYTHROCYTE [DISTWIDTH] IN BLOOD BY AUTOMATED COUNT: 12.9 % (ref 10–15)
FRAGMENTS BLD QL SMEAR: ABNORMAL
GLUCOSE SERPL-MCNC: 102 MG/DL (ref 70–99)
HCO3 SERPL-SCNC: 26 MMOL/L (ref 22–29)
HCT VFR BLD AUTO: 43 % (ref 35–47)
HGB BLD-MCNC: 14.3 G/DL (ref 11.7–15.7)
IMM GRANULOCYTES # BLD: 0 10E3/UL
IMM GRANULOCYTES NFR BLD: 0 %
INTERPRETATION ECG - MUSE: NORMAL
LYMPHOCYTES # BLD AUTO: 2.1 10E3/UL (ref 0.8–5.3)
LYMPHOCYTES NFR BLD AUTO: 21 %
MCH RBC QN AUTO: 27.7 PG (ref 26.5–33)
MCHC RBC AUTO-ENTMCNC: 33.3 G/DL (ref 31.5–36.5)
MCV RBC AUTO: 83 FL (ref 78–100)
MONOCYTES # BLD AUTO: 0.8 10E3/UL (ref 0–1.3)
MONOCYTES NFR BLD AUTO: 8 %
NEUTROPHILS # BLD AUTO: 6.5 10E3/UL (ref 1.6–8.3)
NEUTROPHILS NFR BLD AUTO: 68 %
NRBC # BLD AUTO: 0 10E3/UL
NRBC BLD AUTO-RTO: 0 /100
P AXIS - MUSE: 78 DEGREES
PLAT MORPH BLD: ABNORMAL
PLATELET # BLD AUTO: NORMAL 10*3/UL
POTASSIUM SERPL-SCNC: 4.2 MMOL/L (ref 3.4–5.3)
PR INTERVAL - MUSE: 142 MS
PROT SERPL-MCNC: 7.1 G/DL (ref 6.4–8.3)
QRS DURATION - MUSE: 62 MS
QT - MUSE: 366 MS
QTC - MUSE: 422 MS
R AXIS - MUSE: 84 DEGREES
RBC # BLD AUTO: 5.17 10E6/UL (ref 3.8–5.2)
RBC MORPH BLD: ABNORMAL
SODIUM SERPL-SCNC: 137 MMOL/L (ref 135–145)
SYSTOLIC BLOOD PRESSURE - MUSE: NORMAL MMHG
T AXIS - MUSE: 49 DEGREES
VENTRICULAR RATE- MUSE: 80 BPM
WBC # BLD AUTO: 9.6 10E3/UL (ref 4–11)

## 2025-08-08 PROCEDURE — 99285 EMERGENCY DEPT VISIT HI MDM: CPT | Mod: 25 | Performed by: EMERGENCY MEDICINE

## 2025-08-08 PROCEDURE — 70496 CT ANGIOGRAPHY HEAD: CPT

## 2025-08-08 PROCEDURE — 70450 CT HEAD/BRAIN W/O DYE: CPT

## 2025-08-08 PROCEDURE — 250N000013 HC RX MED GY IP 250 OP 250 PS 637: Performed by: EMERGENCY MEDICINE

## 2025-08-08 PROCEDURE — 250N000011 HC RX IP 250 OP 636: Performed by: EMERGENCY MEDICINE

## 2025-08-08 PROCEDURE — 93005 ELECTROCARDIOGRAM TRACING: CPT

## 2025-08-08 PROCEDURE — 258N000003 HC RX IP 258 OP 636: Performed by: EMERGENCY MEDICINE

## 2025-08-08 PROCEDURE — 85018 HEMOGLOBIN: CPT | Performed by: EMERGENCY MEDICINE

## 2025-08-08 PROCEDURE — 250N000009 HC RX 250: Performed by: EMERGENCY MEDICINE

## 2025-08-08 PROCEDURE — 36415 COLL VENOUS BLD VENIPUNCTURE: CPT | Performed by: EMERGENCY MEDICINE

## 2025-08-08 PROCEDURE — 80053 COMPREHEN METABOLIC PANEL: CPT | Performed by: EMERGENCY MEDICINE

## 2025-08-08 PROCEDURE — 74177 CT ABD & PELVIS W/CONTRAST: CPT

## 2025-08-08 RX ORDER — IOPAMIDOL 755 MG/ML
72 INJECTION, SOLUTION INTRAVASCULAR ONCE
Status: COMPLETED | OUTPATIENT
Start: 2025-08-08 | End: 2025-08-08

## 2025-08-08 RX ORDER — ACETAMINOPHEN 500 MG
1000 TABLET ORAL ONCE
Status: COMPLETED | OUTPATIENT
Start: 2025-08-08 | End: 2025-08-08

## 2025-08-08 RX ADMIN — SODIUM CHLORIDE 90 ML: 9 INJECTION, SOLUTION INTRAVENOUS at 23:22

## 2025-08-08 RX ADMIN — IOPAMIDOL 72 ML: 755 INJECTION, SOLUTION INTRAVENOUS at 23:22

## 2025-08-08 RX ADMIN — ACETAMINOPHEN 1000 MG: 500 TABLET, FILM COATED ORAL at 22:59

## 2025-08-08 RX ADMIN — SODIUM CHLORIDE, SODIUM LACTATE, POTASSIUM CHLORIDE, AND CALCIUM CHLORIDE 1000 ML: .6; .31; .03; .02 INJECTION, SOLUTION INTRAVENOUS at 23:42

## 2025-08-08 ASSESSMENT — ACTIVITIES OF DAILY LIVING (ADL)
ADLS_ACUITY_SCORE: 59

## 2025-08-08 ASSESSMENT — COLUMBIA-SUICIDE SEVERITY RATING SCALE - C-SSRS
2. HAVE YOU ACTUALLY HAD ANY THOUGHTS OF KILLING YOURSELF IN THE PAST MONTH?: NO
1. IN THE PAST MONTH, HAVE YOU WISHED YOU WERE DEAD OR WISHED YOU COULD GO TO SLEEP AND NOT WAKE UP?: NO
6. HAVE YOU EVER DONE ANYTHING, STARTED TO DO ANYTHING, OR PREPARED TO DO ANYTHING TO END YOUR LIFE?: NO

## 2025-08-09 VITALS
SYSTOLIC BLOOD PRESSURE: 131 MMHG | TEMPERATURE: 96.8 F | OXYGEN SATURATION: 93 % | WEIGHT: 152 LBS | HEIGHT: 64 IN | BODY MASS INDEX: 25.95 KG/M2 | HEART RATE: 73 BPM | RESPIRATION RATE: 16 BRPM | DIASTOLIC BLOOD PRESSURE: 60 MMHG

## 2025-08-09 PROCEDURE — 96360 HYDRATION IV INFUSION INIT: CPT | Mod: 59

## 2025-08-09 RX ORDER — POLYETHYLENE GLYCOL 3350 17 G/17G
1 POWDER, FOR SOLUTION ORAL EVERY 6 HOURS PRN
Qty: 527 G | Refills: 0 | Status: SHIPPED | OUTPATIENT
Start: 2025-08-09 | End: 2025-09-08

## 2025-08-10 DIAGNOSIS — F32.9 REACTIVE DEPRESSION: ICD-10-CM

## 2025-08-15 ENCOUNTER — TRANSFERRED RECORDS (OUTPATIENT)
Dept: FAMILY MEDICINE | Facility: CLINIC | Age: 75
End: 2025-08-15

## 2025-08-16 ENCOUNTER — HEALTH MAINTENANCE LETTER (OUTPATIENT)
Age: 75
End: 2025-08-16

## (undated) DEVICE — LINEN TOWEL PACK X5 5464

## (undated) DEVICE — SU ETHICON STRATAFIX SPR PS 3-0 30X30CM SXMP2B412

## (undated) DEVICE — SUCTION FRAZIER 12FR W/OBTURATOR 33120

## (undated) DEVICE — Device

## (undated) DEVICE — PREP CHLORAPREP 26ML TINTED HI-LITE ORANGE 930815

## (undated) DEVICE — ESU ELEC BLADE 2.75" COATED/INSULATED E1455

## (undated) DEVICE — PREP CHLORAPREP W/ORANGE TINT 10.5ML 260715

## (undated) DEVICE — DRAPE C-ARMOR 5 SIDED 5523

## (undated) DEVICE — ESU EYE HIGH TEMP 65410-183

## (undated) DEVICE — GLOVE PROTEXIS POWDER FREE SMT 7.5  2D72PT75X

## (undated) DEVICE — SOL WATER IRRIG 1000ML BOTTLE 2F7114

## (undated) DEVICE — EYE KNIFE MICROFEATHER 0715

## (undated) DEVICE — SOL NACL 0.9% IRRIG 1000ML BOTTLE 2F7124

## (undated) DEVICE — TAPE DURAPORE 3" SILK 1538-3

## (undated) DEVICE — TUBING SUCTION SOFT 20'X3/16" 0036570

## (undated) DEVICE — MANIFOLD NEPTUNE 4 PORT 700-20

## (undated) DEVICE — BONE CLEANING TIP INTERPULSE  0210-010-000

## (undated) DEVICE — WRAP EZY KNEE

## (undated) DEVICE — PACK OCULOPLATIC SEN15OCFSD

## (undated) DEVICE — SU VICRYL 0 CT-1 CR 8X18" J740D

## (undated) DEVICE — IMM COLLAR CERVICAL MED UNIVERSAL 3X24" 79-83500

## (undated) DEVICE — GLOVE BIOGEL PI MICRO INDICATOR UNDERGLOVE SZ 7.0 48970

## (undated) DEVICE — SYR 30ML LL W/O NDL 302832

## (undated) DEVICE — SU MONOCRYL 2-0 CT-2 27" Y333H

## (undated) DEVICE — DRAIN JACKSON PRATT RESERVOIR 100ML SU130-1305

## (undated) DEVICE — SU VICRYL 4-0 PS-2 18" UND J496H

## (undated) DEVICE — DRAPE STERI TOWEL LG 1010

## (undated) DEVICE — GOWN IMPERVIOUS SPECIALTY XL/XLONG 39049

## (undated) DEVICE — DRSG AQUACEL AG HYDROFIBER  3.5X10" 422605

## (undated) DEVICE — DRSG TEGADERM 2 1/2X 2 3/4"

## (undated) DEVICE — ESU GROUND PAD UNIVERSAL W/O CORD

## (undated) DEVICE — NDL SPINAL 18GA 3.5" 405184

## (undated) DEVICE — DRSG AQUACEL AG 3.5X9.75" HYDROFIBER 412011

## (undated) DEVICE — BLADE KNIFE SURG 15 371115

## (undated) DEVICE — DRSG DRAIN 4X4" 7086

## (undated) DEVICE — SYR 50ML LL W/O NDL 309653

## (undated) DEVICE — GLOVE BIOGEL PI ULTRATOUCH SZ 6.5 41165

## (undated) DEVICE — DRAPE COVER C-ARM SEAMLESS SNAP-KAP 03-KP26 LATEX FREE

## (undated) DEVICE — SPONGE SURGIFOAM 100 1974

## (undated) DEVICE — BONE CEMENT MIXEVAC III HI VAC KIT  0206-015-000

## (undated) DEVICE — SU VICRYL 3-0 SH 27" UND J416H

## (undated) DEVICE — GLOVE PROTEXIS MICRO 6.5  2D73PM65

## (undated) DEVICE — SPONGE COTTONOID 1/2X3" 80-1407

## (undated) DEVICE — SOLUTION WOUND CLEANSING 3/4OZ 10% PVP EA-L3011FB-50

## (undated) DEVICE — PACK TOTAL KNEE SOP15TKFSD

## (undated) DEVICE — SU MONOCRYL 4-0 PS-2 18" UND Y496G

## (undated) DEVICE — SUCTION IRR SYSTEM W/O TIP INTERPULSE HANDPIECE 0210-100-000

## (undated) DEVICE — PACK SPINE SM CUSTOM SNE15SSFSK

## (undated) DEVICE — DRAPE MICROSCOPE LEICA 54X150" AR8033650

## (undated) DEVICE — SPONGE KITTNER 31001010

## (undated) DEVICE — CAST PADDING 6" STERILE 9046S

## (undated) DEVICE — SOL NACL 0.9% IRRIG 3000ML BAG 2B7477

## (undated) DEVICE — DRAPE MAYO STAND 23X54 8337

## (undated) DEVICE — SU SILK 2-0 FSL 18" 677G

## (undated) DEVICE — DRSG ABDOMINAL 07 1/2X8" 7197D

## (undated) DEVICE — STRAP POSITIONING VELCRO 13' CERVICAL HARNESS 920877

## (undated) DEVICE — SU VICRYL 5-0 S-14DA 18" J571G

## (undated) DEVICE — DRAIN JACKSON PRATT 10FR ROUND SU130-1321

## (undated) DEVICE — DRAPE CONVERTORS U-DRAPE 60X72" 8476

## (undated) DEVICE — BLADE SAW SAGITTAL STRK 18X90X1.19MM HD SYS 6 6118-119-090

## (undated) DEVICE — DRAPE SHEET REV FOLD 3/4 9349

## (undated) DEVICE — TOOL DISSECT MIDAS MR8 12CM SP MATCH SYM-TRI MR8-SP12MH30T

## (undated) DEVICE — RX SURGIFLO HEMOSTATIC MATRIX W/THROMBIN 8ML 2994

## (undated) DEVICE — SU PLAIN FAST ABSORB 6-0 PC-1 18" 1916G

## (undated) DEVICE — SYR 05ML SLIP TIP W/O NDL

## (undated) RX ORDER — DEXTROSE MONOHYDRATE 25 G/50ML
INJECTION, SOLUTION INTRAVENOUS
Status: DISPENSED
Start: 2023-09-20

## (undated) RX ORDER — BUPIVACAINE HYDROCHLORIDE AND EPINEPHRINE 5; 5 MG/ML; UG/ML
INJECTION, SOLUTION EPIDURAL; INTRACAUDAL; PERINEURAL
Status: DISPENSED
Start: 2018-06-18

## (undated) RX ORDER — ONDANSETRON 2 MG/ML
INJECTION INTRAMUSCULAR; INTRAVENOUS
Status: DISPENSED
Start: 2023-09-20

## (undated) RX ORDER — GABAPENTIN 100 MG/1
CAPSULE ORAL
Status: DISPENSED
Start: 2023-09-20

## (undated) RX ORDER — FENTANYL CITRATE 0.05 MG/ML
INJECTION, SOLUTION INTRAMUSCULAR; INTRAVENOUS
Status: DISPENSED
Start: 2023-09-20

## (undated) RX ORDER — FENTANYL CITRATE 50 UG/ML
INJECTION, SOLUTION INTRAMUSCULAR; INTRAVENOUS
Status: DISPENSED
Start: 2022-08-30

## (undated) RX ORDER — ONDANSETRON 2 MG/ML
INJECTION INTRAMUSCULAR; INTRAVENOUS
Status: DISPENSED
Start: 2022-10-17

## (undated) RX ORDER — NEOSTIGMINE METHYLSULFATE 1 MG/ML
VIAL (ML) INJECTION
Status: DISPENSED
Start: 2023-09-20

## (undated) RX ORDER — CEFAZOLIN SODIUM/WATER 2 G/20 ML
SYRINGE (ML) INTRAVENOUS
Status: DISPENSED
Start: 2022-10-17

## (undated) RX ORDER — FENTANYL CITRATE 50 UG/ML
INJECTION, SOLUTION INTRAMUSCULAR; INTRAVENOUS
Status: DISPENSED
Start: 2022-10-17

## (undated) RX ORDER — BUPIVACAINE HYDROCHLORIDE AND EPINEPHRINE 5; 5 MG/ML; UG/ML
INJECTION, SOLUTION EPIDURAL; INTRACAUDAL; PERINEURAL
Status: DISPENSED
Start: 2023-09-20

## (undated) RX ORDER — FENTANYL CITRATE 50 UG/ML
INJECTION, SOLUTION INTRAMUSCULAR; INTRAVENOUS
Status: DISPENSED
Start: 2023-09-20

## (undated) RX ORDER — FENTANYL CITRATE 50 UG/ML
INJECTION, SOLUTION INTRAMUSCULAR; INTRAVENOUS
Status: DISPENSED
Start: 2017-10-19

## (undated) RX ORDER — DEXAMETHASONE SODIUM PHOSPHATE 4 MG/ML
INJECTION, SOLUTION INTRA-ARTICULAR; INTRALESIONAL; INTRAMUSCULAR; INTRAVENOUS; SOFT TISSUE
Status: DISPENSED
Start: 2023-09-20

## (undated) RX ORDER — HYDROMORPHONE HCL IN WATER/PF 6 MG/30 ML
PATIENT CONTROLLED ANALGESIA SYRINGE INTRAVENOUS
Status: DISPENSED
Start: 2022-10-17

## (undated) RX ORDER — ERYTHROMYCIN 5 MG/G
OINTMENT OPHTHALMIC
Status: DISPENSED
Start: 2018-06-18

## (undated) RX ORDER — HYDROMORPHONE HCL IN WATER/PF 6 MG/30 ML
PATIENT CONTROLLED ANALGESIA SYRINGE INTRAVENOUS
Status: DISPENSED
Start: 2023-09-20

## (undated) RX ORDER — FENTANYL CITRATE 50 UG/ML
INJECTION, SOLUTION INTRAMUSCULAR; INTRAVENOUS
Status: DISPENSED
Start: 2022-08-27

## (undated) RX ORDER — PROPOFOL 10 MG/ML
INJECTION, EMULSION INTRAVENOUS
Status: DISPENSED
Start: 2023-09-20

## (undated) RX ORDER — GLYCOPYRROLATE 0.2 MG/ML
INJECTION, SOLUTION INTRAMUSCULAR; INTRAVENOUS
Status: DISPENSED
Start: 2023-09-20

## (undated) RX ORDER — ACETAMINOPHEN 325 MG/1
TABLET ORAL
Status: DISPENSED
Start: 2022-10-17

## (undated) RX ORDER — PROPOFOL 10 MG/ML
INJECTION, EMULSION INTRAVENOUS
Status: DISPENSED
Start: 2022-10-17

## (undated) RX ORDER — FENTANYL CITRATE 0.05 MG/ML
INJECTION, SOLUTION INTRAMUSCULAR; INTRAVENOUS
Status: DISPENSED
Start: 2022-10-17

## (undated) RX ORDER — ONDANSETRON 2 MG/ML
INJECTION INTRAMUSCULAR; INTRAVENOUS
Status: DISPENSED
Start: 2022-08-30

## (undated) RX ORDER — HYDROMORPHONE HYDROCHLORIDE 1 MG/ML
INJECTION, SOLUTION INTRAMUSCULAR; INTRAVENOUS; SUBCUTANEOUS
Status: DISPENSED
Start: 2023-09-20

## (undated) RX ORDER — LIDOCAINE HYDROCHLORIDE 10 MG/ML
INJECTION, SOLUTION EPIDURAL; INFILTRATION; INTRACAUDAL; PERINEURAL
Status: DISPENSED
Start: 2023-11-10

## (undated) RX ORDER — LIDOCAINE HYDROCHLORIDE 10 MG/ML
INJECTION, SOLUTION EPIDURAL; INFILTRATION; INTRACAUDAL; PERINEURAL
Status: DISPENSED
Start: 2024-02-15

## (undated) RX ORDER — DEXAMETHASONE SODIUM PHOSPHATE 4 MG/ML
INJECTION, SOLUTION INTRA-ARTICULAR; INTRALESIONAL; INTRAMUSCULAR; INTRAVENOUS; SOFT TISSUE
Status: DISPENSED
Start: 2022-10-17

## (undated) RX ORDER — ONDANSETRON 2 MG/ML
INJECTION INTRAMUSCULAR; INTRAVENOUS
Status: DISPENSED
Start: 2017-10-19

## (undated) RX ORDER — DIPHENHYDRAMINE HYDROCHLORIDE 50 MG/ML
INJECTION INTRAMUSCULAR; INTRAVENOUS
Status: DISPENSED
Start: 2017-10-19

## (undated) RX ORDER — FENTANYL CITRATE 50 UG/ML
INJECTION, SOLUTION INTRAMUSCULAR; INTRAVENOUS
Status: DISPENSED
Start: 2018-06-18

## (undated) RX ORDER — LIDOCAINE HCL/EPINEPHRINE/PF 2%-1:200K
VIAL (ML) INJECTION
Status: DISPENSED
Start: 2018-06-18